# Patient Record
Sex: FEMALE | Race: WHITE | NOT HISPANIC OR LATINO | Employment: OTHER | URBAN - METROPOLITAN AREA
[De-identification: names, ages, dates, MRNs, and addresses within clinical notes are randomized per-mention and may not be internally consistent; named-entity substitution may affect disease eponyms.]

---

## 2018-09-27 ENCOUNTER — OFFICE VISIT CONVERTED (OUTPATIENT)
Dept: FAMILY MEDICINE CLINIC | Facility: CLINIC | Age: 63
End: 2018-09-27
Attending: NURSE PRACTITIONER

## 2018-09-27 ENCOUNTER — CONVERSION ENCOUNTER (OUTPATIENT)
Dept: FAMILY MEDICINE CLINIC | Facility: CLINIC | Age: 63
End: 2018-09-27

## 2019-01-08 ENCOUNTER — HOSPITAL ENCOUNTER (OUTPATIENT)
Dept: GASTROENTEROLOGY | Facility: HOSPITAL | Age: 64
Setting detail: HOSPITAL OUTPATIENT SURGERY
Discharge: HOME | End: 2019-01-08
Attending: INTERNAL MEDICINE

## 2019-01-15 ENCOUNTER — OFFICE VISIT CONVERTED (OUTPATIENT)
Dept: FAMILY MEDICINE CLINIC | Facility: CLINIC | Age: 64
End: 2019-01-15
Attending: NURSE PRACTITIONER

## 2019-01-15 ENCOUNTER — HOSPITAL ENCOUNTER (OUTPATIENT)
Dept: FAMILY MEDICINE CLINIC | Facility: CLINIC | Age: 64
Discharge: HOME | End: 2019-01-15

## 2019-01-15 LAB
ALBUMIN SERPL-MCNC: 4.5 G/DL (ref 3.5–5)
ALBUMIN/GLOB SERPL: 1.6 {RATIO} (ref 1.4–2.6)
ALP SERPL-CCNC: 109 U/L (ref 43–160)
ALT SERPL-CCNC: 13 U/L (ref 10–40)
ANION GAP SERPL CALC-SCNC: 19 MMOL/L (ref 8–19)
AST SERPL-CCNC: 18 U/L (ref 15–50)
BASOPHILS # BLD AUTO: 0.02 10*3/UL (ref 0–0.2)
BASOPHILS NFR BLD AUTO: 0.22 % (ref 0–3)
BILIRUB SERPL-MCNC: 0.56 MG/DL (ref 0.2–1.3)
BUN SERPL-MCNC: 15 MG/DL (ref 5–25)
BUN/CREAT SERPL: 21 {RATIO} (ref 6–20)
CALCIUM SERPL-MCNC: 9.3 MG/DL (ref 8.7–10.4)
CHLORIDE SERPL-SCNC: 101 MMOL/L (ref 99–111)
CHOLEST SERPL-MCNC: 165 MG/DL (ref 107–200)
CHOLEST/HDLC SERPL: 3.8 {RATIO} (ref 3–6)
CONV CO2: 25 MMOL/L (ref 22–32)
CONV TOTAL PROTEIN: 7.4 G/DL (ref 6.3–8.2)
CREAT UR-MCNC: 0.7 MG/DL (ref 0.5–0.9)
EOSINOPHIL # BLD AUTO: 0.09 10*3/UL (ref 0–0.7)
EOSINOPHIL # BLD AUTO: 1.01 % (ref 0–7)
ERYTHROCYTE [DISTWIDTH] IN BLOOD BY AUTOMATED COUNT: 12.6 % (ref 11.5–14.5)
GFR SERPLBLD BASED ON 1.73 SQ M-ARVRAT: >60 ML/MIN/{1.73_M2}
GLOBULIN UR ELPH-MCNC: 2.9 G/DL (ref 2–3.5)
GLUCOSE SERPL-MCNC: 93 MG/DL (ref 65–99)
HBA1C MFR BLD: 14.1 G/DL (ref 12–16)
HCT VFR BLD AUTO: 42.3 % (ref 37–47)
HDLC SERPL-MCNC: 44 MG/DL (ref 40–60)
LDLC SERPL CALC-MCNC: 80 MG/DL (ref 70–100)
LYMPHOCYTES # BLD AUTO: 2.02 10*3/UL (ref 1–5)
MCH RBC QN AUTO: 30.5 PG (ref 27–31)
MCHC RBC AUTO-ENTMCNC: 33.4 G/DL (ref 33–37)
MCV RBC AUTO: 91.3 FL (ref 81–99)
MONOCYTES # BLD AUTO: 0.85 10*3/UL (ref 0.2–1.2)
MONOCYTES NFR BLD AUTO: 9.57 % (ref 3–10)
NEUTROPHILS # BLD AUTO: 5.93 10*3/UL (ref 2–8)
NEUTROPHILS NFR BLD AUTO: 66.5 % (ref 30–85)
NRBC BLD AUTO-RTO: 0 % (ref 0–0.01)
OSMOLALITY SERPL CALC.SUM OF ELEC: 293 MOSM/KG (ref 273–304)
PLATELET # BLD AUTO: 285 10*3/UL (ref 130–400)
PMV BLD AUTO: 7.1 FL (ref 7.4–10.4)
POTASSIUM SERPL-SCNC: 3.9 MMOL/L (ref 3.5–5.3)
RBC # BLD AUTO: 4.63 10*6/UL (ref 4.2–5.4)
SODIUM SERPL-SCNC: 141 MMOL/L (ref 135–147)
T4 FREE SERPL-MCNC: 1.2 NG/DL (ref 0.9–1.8)
TRIGL SERPL-MCNC: 204 MG/DL (ref 40–150)
TSH SERPL-ACNC: 0.71 M[IU]/L (ref 0.27–4.2)
VARIANT LYMPHS NFR BLD MANUAL: 22.7 % (ref 20–45)
VLDLC SERPL-MCNC: 41 MG/DL (ref 5–37)
WBC # BLD AUTO: 8.91 10*3/UL (ref 4.8–10.8)

## 2019-01-17 LAB — BACTERIA SPEC AEROBE CULT: NORMAL

## 2019-01-23 ENCOUNTER — OFFICE VISIT CONVERTED (OUTPATIENT)
Dept: FAMILY MEDICINE CLINIC | Facility: CLINIC | Age: 64
End: 2019-01-23
Attending: NURSE PRACTITIONER

## 2019-01-29 ENCOUNTER — OFFICE VISIT CONVERTED (OUTPATIENT)
Dept: SURGERY | Facility: CLINIC | Age: 64
End: 2019-01-29
Attending: SURGERY

## 2019-01-29 ENCOUNTER — OFFICE VISIT CONVERTED (OUTPATIENT)
Dept: FAMILY MEDICINE CLINIC | Facility: CLINIC | Age: 64
End: 2019-01-29
Attending: NURSE PRACTITIONER

## 2019-01-29 ENCOUNTER — CONVERSION ENCOUNTER (OUTPATIENT)
Dept: FAMILY MEDICINE CLINIC | Facility: CLINIC | Age: 64
End: 2019-01-29

## 2019-02-22 ENCOUNTER — OFFICE VISIT CONVERTED (OUTPATIENT)
Dept: FAMILY MEDICINE CLINIC | Facility: CLINIC | Age: 64
End: 2019-02-22
Attending: NURSE PRACTITIONER

## 2019-03-22 ENCOUNTER — HOSPITAL ENCOUNTER (OUTPATIENT)
Dept: MAMMOGRAPHY | Facility: HOSPITAL | Age: 64
Discharge: HOME | End: 2019-03-22

## 2019-05-08 ENCOUNTER — HOSPITAL ENCOUNTER (OUTPATIENT)
Dept: SURGERY | Facility: HOSPITAL | Age: 64
Setting detail: HOSPITAL OUTPATIENT SURGERY
Discharge: HOME | End: 2019-05-08
Attending: SURGERY

## 2019-05-30 ENCOUNTER — OFFICE VISIT CONVERTED (OUTPATIENT)
Dept: SURGERY | Facility: CLINIC | Age: 64
End: 2019-05-30
Attending: SURGERY

## 2019-06-26 ENCOUNTER — CONVERSION ENCOUNTER (OUTPATIENT)
Dept: FAMILY MEDICINE CLINIC | Facility: CLINIC | Age: 64
End: 2019-06-26

## 2019-06-26 ENCOUNTER — OFFICE VISIT CONVERTED (OUTPATIENT)
Dept: FAMILY MEDICINE CLINIC | Facility: CLINIC | Age: 64
End: 2019-06-26
Attending: NURSE PRACTITIONER

## 2019-10-10 ENCOUNTER — HOSPITAL ENCOUNTER (OUTPATIENT)
Dept: FAMILY MEDICINE CLINIC | Facility: CLINIC | Age: 64
Discharge: HOME | End: 2019-10-10
Attending: NURSE PRACTITIONER

## 2019-10-10 ENCOUNTER — CONVERSION ENCOUNTER (OUTPATIENT)
Dept: FAMILY MEDICINE CLINIC | Facility: CLINIC | Age: 64
End: 2019-10-10

## 2019-10-10 ENCOUNTER — OFFICE VISIT CONVERTED (OUTPATIENT)
Dept: FAMILY MEDICINE CLINIC | Facility: CLINIC | Age: 64
End: 2019-10-10
Attending: NURSE PRACTITIONER

## 2019-10-10 LAB
25(OH)D3 SERPL-MCNC: 39.5 NG/ML (ref 30–100)
ALBUMIN SERPL-MCNC: 4.2 G/DL (ref 3.5–5)
ALBUMIN/GLOB SERPL: 1.4 {RATIO} (ref 1.4–2.6)
ALP SERPL-CCNC: 104 U/L (ref 43–160)
ALT SERPL-CCNC: 18 U/L (ref 10–40)
ANION GAP SERPL CALC-SCNC: 19 MMOL/L (ref 8–19)
AST SERPL-CCNC: 22 U/L (ref 15–50)
BASOPHILS # BLD AUTO: 0.02 10*3/UL (ref 0–0.2)
BASOPHILS NFR BLD AUTO: 0.4 % (ref 0–3)
BILIRUB SERPL-MCNC: 0.62 MG/DL (ref 0.2–1.3)
BUN SERPL-MCNC: 14 MG/DL (ref 5–25)
BUN/CREAT SERPL: 20 {RATIO} (ref 6–20)
CALCIUM SERPL-MCNC: 9.2 MG/DL (ref 8.7–10.4)
CHLORIDE SERPL-SCNC: 101 MMOL/L (ref 99–111)
CHOLEST SERPL-MCNC: 179 MG/DL (ref 107–200)
CHOLEST/HDLC SERPL: 4.3 {RATIO} (ref 3–6)
CONV ABS IMM GRAN: 0.01 10*3/UL (ref 0–0.2)
CONV CO2: 22 MMOL/L (ref 22–32)
CONV IMMATURE GRAN: 0.2 % (ref 0–1.8)
CONV TOTAL PROTEIN: 7.2 G/DL (ref 6.3–8.2)
CREAT UR-MCNC: 0.7 MG/DL (ref 0.5–0.9)
DEPRECATED RDW RBC AUTO: 47.4 FL (ref 36.4–46.3)
EOSINOPHIL # BLD AUTO: 0.07 10*3/UL (ref 0–0.7)
EOSINOPHIL # BLD AUTO: 1.6 % (ref 0–7)
ERYTHROCYTE [DISTWIDTH] IN BLOOD BY AUTOMATED COUNT: 13.7 % (ref 11.7–14.4)
EST. AVERAGE GLUCOSE BLD GHB EST-MCNC: 111 MG/DL
GFR SERPLBLD BASED ON 1.73 SQ M-ARVRAT: >60 ML/MIN/{1.73_M2}
GLOBULIN UR ELPH-MCNC: 3 G/DL (ref 2–3.5)
GLUCOSE SERPL-MCNC: 110 MG/DL (ref 65–99)
HBA1C MFR BLD: 5.5 % (ref 3.5–5.7)
HCT VFR BLD AUTO: 41.8 % (ref 37–47)
HDLC SERPL-MCNC: 42 MG/DL (ref 40–60)
HGB BLD-MCNC: 13.3 G/DL (ref 12–16)
LDLC SERPL CALC-MCNC: 95 MG/DL (ref 70–100)
LYMPHOCYTES # BLD AUTO: 1.45 10*3/UL (ref 1–5)
LYMPHOCYTES NFR BLD AUTO: 32.4 % (ref 20–45)
MCH RBC QN AUTO: 29.7 PG (ref 27–31)
MCHC RBC AUTO-ENTMCNC: 31.8 G/DL (ref 33–37)
MCV RBC AUTO: 93.3 FL (ref 81–99)
MONOCYTES # BLD AUTO: 0.65 10*3/UL (ref 0.2–1.2)
MONOCYTES NFR BLD AUTO: 14.5 % (ref 3–10)
NEUTROPHILS # BLD AUTO: 2.28 10*3/UL (ref 2–8)
NEUTROPHILS NFR BLD AUTO: 50.9 % (ref 30–85)
NRBC CBCN: 0 % (ref 0–0.7)
OSMOLALITY SERPL CALC.SUM OF ELEC: 287 MOSM/KG (ref 273–304)
PLATELET # BLD AUTO: 252 10*3/UL (ref 130–400)
PMV BLD AUTO: 9.8 FL (ref 9.4–12.3)
POTASSIUM SERPL-SCNC: 3.9 MMOL/L (ref 3.5–5.3)
RBC # BLD AUTO: 4.48 10*6/UL (ref 4.2–5.4)
SODIUM SERPL-SCNC: 138 MMOL/L (ref 135–147)
TRIGL SERPL-MCNC: 212 MG/DL (ref 40–150)
VLDLC SERPL-MCNC: 42 MG/DL (ref 5–37)
WBC # BLD AUTO: 4.48 10*3/UL (ref 4.8–10.8)

## 2019-10-11 LAB
CONV HEPATITIS C AB WITH REFLEX TO CONFIRMATION: <0.1 S/CO RATIO (ref 0–0.9)
CONV HEPATITIS COMMENT: NORMAL

## 2019-11-20 ENCOUNTER — OFFICE VISIT CONVERTED (OUTPATIENT)
Dept: FAMILY MEDICINE CLINIC | Facility: CLINIC | Age: 64
End: 2019-11-20
Attending: NURSE PRACTITIONER

## 2020-01-31 ENCOUNTER — OFFICE VISIT CONVERTED (OUTPATIENT)
Dept: FAMILY MEDICINE CLINIC | Facility: CLINIC | Age: 65
End: 2020-01-31
Attending: NURSE PRACTITIONER

## 2020-01-31 ENCOUNTER — HOSPITAL ENCOUNTER (OUTPATIENT)
Dept: FAMILY MEDICINE CLINIC | Facility: CLINIC | Age: 65
Discharge: HOME | End: 2020-01-31
Attending: NURSE PRACTITIONER

## 2020-01-31 LAB
ALBUMIN SERPL-MCNC: 4.6 G/DL (ref 3.5–5)
ALBUMIN/GLOB SERPL: 1.4 {RATIO} (ref 1.4–2.6)
ALP SERPL-CCNC: 98 U/L (ref 43–160)
ALT SERPL-CCNC: 17 U/L (ref 10–40)
ANION GAP SERPL CALC-SCNC: 18 MMOL/L (ref 8–19)
AST SERPL-CCNC: 20 U/L (ref 15–50)
BASOPHILS # BLD AUTO: 0.03 10*3/UL (ref 0–0.2)
BASOPHILS NFR BLD AUTO: 0.3 % (ref 0–3)
BILIRUB SERPL-MCNC: 1.49 MG/DL (ref 0.2–1.3)
BUN SERPL-MCNC: 19 MG/DL (ref 5–25)
BUN/CREAT SERPL: 24 {RATIO} (ref 6–20)
CALCIUM SERPL-MCNC: 10.2 MG/DL (ref 8.7–10.4)
CHLORIDE SERPL-SCNC: 102 MMOL/L (ref 99–111)
CHOLEST SERPL-MCNC: 191 MG/DL (ref 107–200)
CHOLEST/HDLC SERPL: 4.3 {RATIO} (ref 3–6)
CONV ABS IMM GRAN: 0.03 10*3/UL (ref 0–0.2)
CONV CO2: 24 MMOL/L (ref 22–32)
CONV IMMATURE GRAN: 0.3 % (ref 0–1.8)
CONV TOTAL PROTEIN: 7.8 G/DL (ref 6.3–8.2)
CREAT UR-MCNC: 0.78 MG/DL (ref 0.5–0.9)
DEPRECATED RDW RBC AUTO: 47 FL (ref 36.4–46.3)
EOSINOPHIL # BLD AUTO: 0.04 10*3/UL (ref 0–0.7)
EOSINOPHIL # BLD AUTO: 0.4 % (ref 0–7)
ERYTHROCYTE [DISTWIDTH] IN BLOOD BY AUTOMATED COUNT: 13.5 % (ref 11.7–14.4)
GFR SERPLBLD BASED ON 1.73 SQ M-ARVRAT: >60 ML/MIN/{1.73_M2}
GLOBULIN UR ELPH-MCNC: 3.2 G/DL (ref 2–3.5)
GLUCOSE SERPL-MCNC: 119 MG/DL (ref 65–99)
HCT VFR BLD AUTO: 48 % (ref 37–47)
HDLC SERPL-MCNC: 44 MG/DL (ref 40–60)
HGB BLD-MCNC: 15.3 G/DL (ref 12–16)
LDLC SERPL CALC-MCNC: 108 MG/DL (ref 70–100)
LYMPHOCYTES # BLD AUTO: 2.01 10*3/UL (ref 1–5)
LYMPHOCYTES NFR BLD AUTO: 21.4 % (ref 20–45)
MCH RBC QN AUTO: 29.8 PG (ref 27–31)
MCHC RBC AUTO-ENTMCNC: 31.9 G/DL (ref 33–37)
MCV RBC AUTO: 93.6 FL (ref 81–99)
MONOCYTES # BLD AUTO: 0.78 10*3/UL (ref 0.2–1.2)
MONOCYTES NFR BLD AUTO: 8.3 % (ref 3–10)
NEUTROPHILS # BLD AUTO: 6.5 10*3/UL (ref 2–8)
NEUTROPHILS NFR BLD AUTO: 69.3 % (ref 30–85)
NRBC CBCN: 0 % (ref 0–0.7)
OSMOLALITY SERPL CALC.SUM OF ELEC: 293 MOSM/KG (ref 273–304)
PLATELET # BLD AUTO: 321 10*3/UL (ref 130–400)
PMV BLD AUTO: 9.7 FL (ref 9.4–12.3)
POTASSIUM SERPL-SCNC: 4.1 MMOL/L (ref 3.5–5.3)
RBC # BLD AUTO: 5.13 10*6/UL (ref 4.2–5.4)
SODIUM SERPL-SCNC: 140 MMOL/L (ref 135–147)
TRIGL SERPL-MCNC: 194 MG/DL (ref 40–150)
TSH SERPL-ACNC: 0.79 M[IU]/L (ref 0.27–4.2)
VLDLC SERPL-MCNC: 39 MG/DL (ref 5–37)
WBC # BLD AUTO: 9.39 10*3/UL (ref 4.8–10.8)

## 2020-02-01 LAB — 25(OH)D3 SERPL-MCNC: 39.6 NG/ML (ref 30–100)

## 2020-02-07 ENCOUNTER — HOSPITAL ENCOUNTER (OUTPATIENT)
Dept: FAMILY MEDICINE CLINIC | Facility: CLINIC | Age: 65
Discharge: HOME | End: 2020-02-07
Attending: NURSE PRACTITIONER

## 2020-02-07 LAB
EST. AVERAGE GLUCOSE BLD GHB EST-MCNC: 123 MG/DL
FOLATE SERPL-MCNC: >20 NG/ML (ref 4.8–20)
HBA1C MFR BLD: 5.9 % (ref 3.5–5.7)
VIT B12 SERPL-MCNC: 354 PG/ML (ref 211–911)

## 2020-05-26 ENCOUNTER — OFFICE VISIT CONVERTED (OUTPATIENT)
Dept: SURGERY | Facility: CLINIC | Age: 65
End: 2020-05-26
Attending: NURSE PRACTITIONER

## 2020-05-26 ENCOUNTER — CONVERSION ENCOUNTER (OUTPATIENT)
Dept: SURGERY | Facility: CLINIC | Age: 65
End: 2020-05-26

## 2020-06-09 ENCOUNTER — HOSPITAL ENCOUNTER (OUTPATIENT)
Dept: MAMMOGRAPHY | Facility: HOSPITAL | Age: 65
Discharge: HOME | End: 2020-06-09
Attending: NURSE PRACTITIONER

## 2020-06-10 LAB — SARS-COV-2 RNA SPEC QL NAA+PROBE: NOT DETECTED

## 2020-06-12 ENCOUNTER — HOSPITAL ENCOUNTER (OUTPATIENT)
Dept: GASTROENTEROLOGY | Facility: HOSPITAL | Age: 65
Setting detail: HOSPITAL OUTPATIENT SURGERY
Discharge: HOME | End: 2020-06-12
Attending: SURGERY

## 2020-06-18 ENCOUNTER — OFFICE VISIT CONVERTED (OUTPATIENT)
Dept: SURGERY | Facility: CLINIC | Age: 65
End: 2020-06-18
Attending: SURGERY

## 2020-09-11 ENCOUNTER — OFFICE VISIT CONVERTED (OUTPATIENT)
Dept: PSYCHIATRY | Facility: CLINIC | Age: 65
End: 2020-09-11
Attending: STUDENT IN AN ORGANIZED HEALTH CARE EDUCATION/TRAINING PROGRAM

## 2020-09-11 ENCOUNTER — CONVERSION ENCOUNTER (OUTPATIENT)
Dept: PSYCHIATRY | Facility: CLINIC | Age: 65
End: 2020-09-11

## 2020-09-16 ENCOUNTER — OFFICE VISIT CONVERTED (OUTPATIENT)
Dept: FAMILY MEDICINE CLINIC | Facility: CLINIC | Age: 65
End: 2020-09-16
Attending: NURSE PRACTITIONER

## 2020-09-25 ENCOUNTER — OFFICE VISIT CONVERTED (OUTPATIENT)
Dept: PSYCHIATRY | Facility: CLINIC | Age: 65
End: 2020-09-25
Attending: STUDENT IN AN ORGANIZED HEALTH CARE EDUCATION/TRAINING PROGRAM

## 2020-09-28 ENCOUNTER — HOSPITAL ENCOUNTER (OUTPATIENT)
Dept: OTHER | Facility: HOSPITAL | Age: 65
Discharge: HOME | End: 2020-09-28
Attending: STUDENT IN AN ORGANIZED HEALTH CARE EDUCATION/TRAINING PROGRAM

## 2020-10-16 ENCOUNTER — OFFICE VISIT CONVERTED (OUTPATIENT)
Dept: PSYCHIATRY | Facility: CLINIC | Age: 65
End: 2020-10-16
Attending: STUDENT IN AN ORGANIZED HEALTH CARE EDUCATION/TRAINING PROGRAM

## 2020-11-02 ENCOUNTER — OFFICE VISIT CONVERTED (OUTPATIENT)
Dept: PSYCHIATRY | Facility: CLINIC | Age: 65
End: 2020-11-02
Attending: STUDENT IN AN ORGANIZED HEALTH CARE EDUCATION/TRAINING PROGRAM

## 2020-11-24 ENCOUNTER — HOSPITAL ENCOUNTER (OUTPATIENT)
Dept: FAMILY MEDICINE CLINIC | Facility: CLINIC | Age: 65
Discharge: HOME | End: 2020-11-24
Attending: NURSE PRACTITIONER

## 2020-11-24 ENCOUNTER — OFFICE VISIT CONVERTED (OUTPATIENT)
Dept: FAMILY MEDICINE CLINIC | Facility: CLINIC | Age: 65
End: 2020-11-24
Attending: NURSE PRACTITIONER

## 2020-11-29 LAB — SARS-COV-2 RNA SPEC QL NAA+PROBE: NOT DETECTED

## 2020-12-02 ENCOUNTER — TELEMEDICINE CONVERTED (OUTPATIENT)
Dept: PSYCHIATRY | Facility: CLINIC | Age: 65
End: 2020-12-02
Attending: STUDENT IN AN ORGANIZED HEALTH CARE EDUCATION/TRAINING PROGRAM

## 2021-01-14 ENCOUNTER — TELEMEDICINE CONVERTED (OUTPATIENT)
Dept: PSYCHIATRY | Facility: CLINIC | Age: 66
End: 2021-01-14
Attending: STUDENT IN AN ORGANIZED HEALTH CARE EDUCATION/TRAINING PROGRAM

## 2021-02-02 ENCOUNTER — TELEMEDICINE CONVERTED (OUTPATIENT)
Dept: PSYCHIATRY | Facility: CLINIC | Age: 66
End: 2021-02-02
Attending: STUDENT IN AN ORGANIZED HEALTH CARE EDUCATION/TRAINING PROGRAM

## 2021-02-12 ENCOUNTER — HOSPITAL ENCOUNTER (OUTPATIENT)
Dept: OTHER | Facility: HOSPITAL | Age: 66
Discharge: HOME | End: 2021-02-12
Attending: STUDENT IN AN ORGANIZED HEALTH CARE EDUCATION/TRAINING PROGRAM

## 2021-02-12 LAB
ANION GAP SERPL CALC-SCNC: 15 MMOL/L (ref 8–19)
BUN SERPL-MCNC: 15 MG/DL (ref 5–25)
BUN/CREAT SERPL: 17 {RATIO} (ref 6–20)
CALCIUM SERPL-MCNC: 10.1 MG/DL (ref 8.7–10.4)
CHLORIDE SERPL-SCNC: 102 MMOL/L (ref 99–111)
CONV CO2: 26 MMOL/L (ref 22–32)
CREAT UR-MCNC: 0.87 MG/DL (ref 0.5–0.9)
GFR SERPLBLD BASED ON 1.73 SQ M-ARVRAT: >60 ML/MIN/{1.73_M2}
GLUCOSE SERPL-MCNC: 96 MG/DL (ref 65–99)
OSMOLALITY SERPL CALC.SUM OF ELEC: 289 MOSM/KG (ref 273–304)
POTASSIUM SERPL-SCNC: 4.2 MMOL/L (ref 3.5–5.3)
SODIUM SERPL-SCNC: 139 MMOL/L (ref 135–147)

## 2021-03-26 ENCOUNTER — TELEMEDICINE CONVERTED (OUTPATIENT)
Dept: PSYCHIATRY | Facility: CLINIC | Age: 66
End: 2021-03-26
Attending: STUDENT IN AN ORGANIZED HEALTH CARE EDUCATION/TRAINING PROGRAM

## 2021-05-10 NOTE — H&P
History and Physical      Patient Name: Shabana Ferguson   Patient ID: [id number]   Sex: Female   YOB: 1955    Primary Care Provider: Kvng SALAZAR   Referring Provider: Kvng SALAZAR    Visit Date: May 26, 2020    Provider: MARIE Ann   Location[facility]   Location Address: [address] [place] [address]  [id number]   Location Phone: [phone]          Chief Complaint  · Requesting colonoscopy  · Age 50 or over  · FH of colon cancer  · Here today for a pre-surgical colon screening visit  · Personal History of Polyps      History Of Present Illness  The patient is a 64 year old /White female presenting [facility] the [facility] on a referral from Kvng SALAZAR.   Shabana Ferguson needs to have a screening colonoscopy.   Patient states that they have had a colonoscopy. 1 year ago   Patient currently complains of: no complaints   Patient Does have family history of colon cancer. Sibling      Patient presents today on a referral from Malorie Simental for screening colonoscopy.  Patient presents without complaints today.  She denies any abdominal pain, diarrhea, or rectal bleeding.  She admits to a family history of colon cancer with her brother.  Patient also with a has a history of high-grade dysplasia.    5/2019 - Colonoscopy (Александр): sigmoid- Inflammatory polyp; cecum - Tubulovillous adenoma; diverticulosis.     1/2019 - Colonoscopy (Jerome): Cecum - tubular adenoma with high grade dysplasia; Ascending - tubular adenoma; Sigmoid - 2 Inflammatory polyp with surface erosion/ulceration; Diverticulosis.       Past Medical History  Disease Name Date Onset Notes   Anxiety --  --    Depression --  --    History of fracture of patella 9- ORIF- Left   Hyperlipemia --  --    Osteopenia --  --    Ulcer --  teenager         Past Surgical History  Procedure Name Date Notes   Colonoscopy 2008, 1-8-2019 --    Knee surgery 2017  broken knee         Medication List  Name Date Started Instructions   amitriptyline 25 mg oral tablet 2019 TAKE 1 TABLET (25 MG) BY ORAL ROUTE ONCE DAILY AT BEDTIME   atorvastatin 10 mg oral tablet 10/10/2019 take 1 tablet by oral route daily for 90 days   Calcium 500 + D 500 mg(1,250mg) -200 unit oral tablet 10/10/2019 take 1 tablet by oral route 2 times a day for 90 days   garlic 1,000 mg oral capsule  take 1 capsule by oral route daily   Glucosamine 500 mg oral tablet  take 1 tablet by oral route daily   Omega-3 Fish Oil 300-1,000 mg oral capsule 10/11/2019 take 1 capsule by oral route 3 times a day for 90 days   turmeric root extract 500 mg oral capsule  take 1 capsule by oral route daily   Viibryd 10 mg oral tablet  take 1 tablet (10 mg) by oral route once daily with food   Vitamin D3 1,000 unit oral capsule  take 1 capsule by oral route daily   vitamin E 400 unit oral capsule  take 1 capsule by oral route daily   Women's Multivitamin 18 mg iron-400 mcg-500 mg oral tablet  take 1 tablet by oral route daily   Xanax 0.25 mg oral tablet  take 1 tablet by oral route 2 times a day         Allergy List  Allergen Name Date Reaction Notes   NO KNOWN DRUG ALLERGIES --  --  --        Allergies Reconciled  Family Medical History  Disease Name Relative/Age Notes   Stroke Mother/   Mother   Heart Disease Mother/   Mother   Cancer, Unspecified Brother/   Brother-colon-50's, uncle- colon, 80's         Reproductive History  Menstrual   Age Menopause: 44   Pregnancy Summary   Total Pregnancies: 7 Full Term: 6 Premature: 0   Ab Induced: 0 Ab Spontaneous: 0 Ectopics: 0   Multiples: 0 Livin         Social History  Finding Status Start/Stop Quantity Notes   Alcohol Use Never --/-- --  does not drink   lives with spouse --  --/-- --  --    Recreational Drug Use Never --/-- --  no   Tobacco Never --/-- --  never smoker         Review of Systems  · Constitutional  o Denies  o : fever, chills  · Eyes  o Denies  o : yellowish  "discoloration of eyes  · HENT  o Denies  o : difficulty swallowing  · Cardiovascular  o Denies  o : chest pain, chest pain on exertion  · Respiratory  o Denies  o : shortness of breath  · Gastrointestinal  o Denies  o : nausea, vomiting, diarrhea, constipation  · Genitourinary  o Denies  o : abnormal color of urine  · Integument  o Denies  o : rash  · Neurologic  o Denies  o : tingling or numbness  · Musculoskeletal  o Denies  o : joint pain  · Endocrine  o Denies  o : weight gain, weight loss      Vitals  Date Time BP Position Site L\R Cuff Size HR RR TEMP (F) WT  HT  BMI kg/m2 BSA m2 O2 Sat HC       05/26/2020 09:10 AM       16  160lbs 8oz 5'  4\" 27.55 1.81           Physical Examination  · Constitutional  o Appearance  o : well developed, well-nourished, patient in no apparent distress  · Head and Face  o Head  o :   § Inspection  § : atraumatic, normocephalic  o Face  o :   § Inspection  § : no facial lesions  · Eyes  o Conjunctivae  o : conjunctivae normal  o Sclerae  o : sclerae white  · Neck  o Inspection/Palpation  o : normal appearance, no masses or tenderness, trachea midline  · Respiratory  o Respiratory Effort  o : breathing unlabored  · Skin and Subcutaneous Tissue  o General Inspection  o : no lesions present, no areas of discoloration, skin turgor normal, texture normal  · Neurologic  o Mental Status Examination  o :   § Orientation  § : grossly oriented to person, place and time  § Attention  § : attention normal, concentration abilities normal  § Fund of Knowledge  § : fund of knowledge within normal limits, patient aware of current events  o Gait and Station  o : normal gait, able to stand without difficulty  · Psychiatric  o Judgement and Insight  o : judgment and insight intact  o Mood and Affect  o : mood normal, affect appropriate     Abdomen: Soft, non-distended, non-tender, no guarding; No rebound tenderness noted.           Assessment  · Family History of Colon " Cancer     V16.0/Z80.0  · Personal history of colonic polyps     V12.72/Z86.010  · Screening for colon cancer     V76.51/Z12.11  · High grade dysplasia in colonic adenoma     211.3/D12.6  · Pre-op testing     V72.84/Z01.818      Plan  · Orders  o Consent for Colonoscopy Screening-Possible risk/complications, benefits, and alternatives to surgical or invasive procedure have been explained to patient and/or legal guardian. -Patient has been evaluated and can tolerate anesthesia and/or sedation. Risks, benefits, and alternatives to anesthesia and sedation have been explained to patient and/or legal guardian. () - V76.51/Z12.11, V16.0/Z80.0, 211.3/D12.6, V12.72/Z86.010 - 06/02/2020  o [facility] Pre-Op Covid-19 Screening (58999) - V72.84/Z01.818 - 05/29/2020  · Instructions  o Surgical Facility: [facility]  o Handouts Provided Pre-Procedure Instructions including date, time, and location of procedure.   o PLAN: Proceeed with colonoscopy. Patient understands risks/benefits and is willing to proceed.   o ***Surgical Orders***  o RISK AND BENEFITS:  o Given these options, the patient has verbally expressed an understanding of the risks of the surgery and finds these risks acceptable. Will proceed with surgery as soon as possible.  o O.R. PREP: Per protocol   o IV: Per Anesthesia  o Please sign permit for: Colonoscopy with possible biopsies by Dr. Fuller.  o The above History and Physical Examination has been completed within 30 days of admission.  o ***Patient Status***  o Outpatient  o Follow up in the in the office post procedure.  o I have discussed COVID testing preoperatively and self isolating until colonoscopy. Patient verbalizes understanding and is agreeable to proceeding with plan.  o Electronically Identified Patient Education Materials Provided Electronically  · Disposition  o Call or Return if symptoms worsen or persist.            Electronically Signed by: MARIE Ann -Author on May 26,  2020 10:44:27 AM

## 2021-05-10 NOTE — H&P
"   History and Physical      Patient Name: Shabana Ferguson   Patient ID: [id number]   Sex: Female   YOB: 1955    Primary Care Provider: Kvng SALAZAR   Referring Provider: Kvng SALAZAR    Visit Date: September 11, 2020    Provider: Vinny Krishnamurthy MD   Location: [facility]   Location Address: [address] [place] [address]   Location Phone: [phone]          Chief Complaint     Patient presents for medication management for anxiety and depression    \"I've been seeing a psychiatrist out here for about one and a half years.\"       History Of Present Illness  Shabana Ferguson is a 64 year old /White female, hx of anxiety and depression, fractured patella, HLD, osteopenia, ulcer who presents to the office today referred by REFERRING CARE PROVIDER NAME for anxiety and depression management.   Psychotropic medications: amitriptyline 25 mg QHS,Celexa 20 mg p.o. daily, lorazepam 0.5 p.o. twice daily as needed anxiety, propranolol 10 mg 1 p.o. daily as needed tachycardia, Topamax 50 mg p.o. twice daily.   Patient presented today with her . Both provided extensive history regarding her depression and anxiety. Patient had been seen by  at [facility] for roughly a year and a half. Patient reported that Dr. Gordon had become convinced that she had bipolar disorder, which both she and her  disagree with. Patient and  deny any episodes in the past where she experienced, for an extended length of time lasting at least several days, no need for sleep, rapid speech, risk-taking behaviors. Per the  and wife pair, her previous psychiatrist insisted on her being on a mood stabilizer.   Patient reports that she had been stable on Celexa for \"several years.\" In 2019, January, the patient underwent a colonoscopy where dysplasia was revealed. This led to significant anxiety and a \"breakdown.\" The dysplasia was " subsequently removed. Due to the heightened anxiety the patient's psychiatrist took the patient off of Celexa and started her on Lexapro. The patient did not tolerate Lexapro well, she continued to remain anxious, she began to engage in therapy in addition to medication management. The Lexapro was subsequently switched to Viibryd. The Viibryd did not work. The patient also began to experience panic attacks including shortness of breath, crying, tachycardia, palpitations. The panic attacks were new and had never happened before.   In 2020 the patient experienced another breakdown and sunk into a deeper depression. The COVID-19 pandemic only worsened her situation. In February the patient's , a physician, decided to become her full-time caretaker as she would become anxious and panicky without him present. The patient also experienced intermittent bouts of suicidal ideation.   Recently, the patient and  demanded of their psychiatrist to take her off of Viibryd and Seroquel. They actually tapered her off of Seroquel themselves. They asked him to switch her back to Celexa. She has been on Celexa now for the last 3 days and is already begun to experience improvement. She states that the Seroquel led to having hallucinations and actually worsened insomnia. Last night she did not take Seroquel for the first time in many months and she slept very well. Her mood is slightly improved. Regarding her anxiety, she endorses muscle tension and fatigue restlessness irritability and a history of insomnia. Regarding her depression she denies SI HI AVH, denies anhedonia denies guilt, notes some decreased energy, psychomotor retardation, and a history of insomnia. They had also tried acupuncture in the past with some success. Psychiatric review of systems is negative for psychosis nancy, positive for anxiety and depression. Possibly positive for  depression and postpartum depression. The patient is  medication compliant.   Past Psychiatric History:  Began Psychiatric Treatment: Several years   Dx: Depression and anxiety   Psychiatrist: Doctor Gordon for the last year and a half   Therapist: Sees a therapist at [facility] History: Denies, Although she came close to needing admission recently.   Medication Trials: See HPI. Seroquel, Prozac which was too activating, Zoloft which was too activating, Celexa which worked well, Effexor which worked well, Lexapro which did not work, Viibryd which did not work.   Self-Harm: Denies   Suicide Attempts: Denies   OB/GYN HISTORY:  Sexually Active: OB/GYN history deferred   Substance Abuse History:  Types: Denies all, including illicit   Social History:  Marital Status:    Employed: No     Kids: 4   House: House    Hx: Denies   Family History:  Suicide Attempts: Deferred today, Due to lack of time   Suicide Completions: Deferred   Substance Use: Deferred   Psychiatric Conditions: Deferred    depression, psychosis, anxiety: Deferred   Developmental History:  Born: Deferred   Siblings: Deferred   Access to Weapons: Denies   Thought Content: no suicidal ideation, no homicidal ideation, no auditory hallucinations, no visual hallucinations, and       Past Medical History  Disease Name Date Onset Notes   Anxiety --  --    Depression --  --    History of fracture of patella 2017 ORIF- Left   Hyperlipemia --  --    Osteopenia --  --    Screening Mammogram 2020- normal   Ulcer --  teenager         Past Surgical History  Procedure Name Date Notes   Colonoscopy , 2019 --    Knee surgery 2017 broken knee         Medication List  Name Date Started Instructions   amitriptyline 25 mg oral tablet 2019 TAKE 1 TABLET (25 MG) BY ORAL ROUTE ONCE DAILY AT BEDTIME   atorvastatin 10 mg oral tablet 2020 TAKE 1 TABLET BY MOUTH EVERY DAY   Calcium 500 + D 500 mg(1,250mg) -200 unit oral tablet 2020 take 1 tablet by oral  route 2 times a day for 90 days   citalopram 20 mg oral tablet  take 1 tablet (20 mg) by oral route once daily   garlic 1,000 mg oral capsule  take 1 capsule by oral route daily   Glucosamine 500 mg oral tablet  take 1 tablet by oral route daily   lorazepam 0.5 mg oral tablet  take 1 tablet (0.5 mg) by oral route 2 times per day   Omega-3 Fish Oil 300-1,000 mg oral capsule 10/11/2019 take 1 capsule by oral route 3 times a day for 90 days   propranolol 10 mg oral tablet  take 1 tablet by oral route As needed   topiramate 25 mg oral capsule, sprinkle  take 2 capsules (50 mg) by oral route 2 times per day in the morning and evening   turmeric root extract 500 mg oral capsule  take 1 capsule by oral route daily   Vitamin D3 1,000 unit oral capsule  take 1 capsule by oral route daily   vitamin E 400 unit oral capsule  take 1 capsule by oral route daily   Women's Multivitamin 18 mg iron-400 mcg-500 mg oral tablet  take 1 tablet by oral route daily   Xanax 0.25 mg oral tablet  take 1 tablet by oral route 2 times a day         Allergy List  Allergen Name Date Reaction Notes   NO KNOWN DRUG ALLERGIES --  --  --        Allergies Reconciled  Family Medical History  Disease Name Relative/Age Notes   Family history of cancer Brother/   --    Family history of stroke Mother/   --    Family history of heart disease Mother/   --          Reproductive History  Menstrual   Age Menopause: 44   Pregnancy Summary   Total Pregnancies: 7 Full Term: 6 Premature: 0   Ab Induced: 0 Ab Spontaneous: 0 Ectopics: 0   Multiples: 0 Livin         Social History  Finding Status Start/Stop Quantity Notes   Alcohol Use Never --/-- --  does not drink   lives with spouse --  --/-- --  --    Recreational Drug Use Never --/-- --  no   Tobacco Never --/-- --  never smoker         Immunizations  NameDate Admin Mfg Trade Name Lot Number Route Inj VIS Given VIS Publication   [id number]/10/2019 [facility] Fluzone Quadrivalent [id number] IM RD 10/10/2019   "  Comments: patient tolerated inj well   [id number]/27/2018 [facility] Fluzone Quadrivalent [id number] IM LD 09/27/2018 08/07/2015   Comments: pt tolerated well, left in stable condition   Prevnar 1301/31/2020 [facility] PREVNAR 13 [id number] IM RD 01/31/2020    Comments: pt tolerated inj well and left office in stable condition.         Review of Systems  · Constitutional  o Denies  o : fatigue, night sweats  · Eyes  o Denies  o : double vision, blurred vision  · HENT  o Denies  o : vertigo, recent head injury  · Breasts  o Denies  o : abnormal changes in breast size, additional breast symptoms except as noted in the HPI  · Cardiovascular  o Denies  o : chest pain, irregular heart beats  · Respiratory  o Denies  o : shortness of breath, productive cough  · Gastrointestinal  o Denies  o : nausea, vomiting  · Genitourinary  o Denies  o : dysuria, urinary retention  · Integument  o Denies  o : hair growth change, new skin lesions  · Neurologic  o Denies  o : altered mental status, seizures  · Musculoskeletal  o Denies  o : joint swelling, limitation of motion  · Endocrine  o Denies  o : cold intolerance, heat intolerance  · Psychiatric  o Admits  o : anxiety, depression, some hallucinations in the past attributed to Seroquel use; none before or since  · Heme-Lymph  o Denies  o : petechiae, lymph node enlargement or tenderness  · Allergic-Immunologic  o Denies  o : frequent illnesses      Vitals  Date Time BP Position Site L\R Cuff Size HR RR TEMP (F) WT  HT  BMI kg/m2 BSA m2 O2 Sat        09/11/2020 01:01 /72 Sitting    86 - R 20 97.1 156lbs 6oz 5'  4\" 26.84 1.79 99 %          Physical Examination  · Mental Status Exam  o Appearance  o : good eye contact, normal street clothes, groomed  o Behavior  o : pleasant and cooperative  o Motor  o : Some psychomotor agitation, fidgeting, handwringing specifically  o Speech  o : normal rhythm, rate, volume, tone, not hyperverbal, not pressured, normal prosidy, Speaks with an " accent  o Mood  o : mood normal  o Affect  o : Anxious, able to smile however  o Thought Content  o : negative suicidal ideations, negative homicidal ideations, negative obsessions  o Perceptions  o : negative auditory hallucinations, negative visual hallucinations  o Thought Process  o : linear  o Insight/Judgement  o : fair/fair  o Cognition  o : grossly intact  o Attention  o : intact          Assessment  · Screening for depression     V79.0/Z13.31  · Adverse drug effect     995.20/T88.7XXA  · Anxiety Disorder     300.00/F41.9  · Depressive Disorder     311/F32.9       Presentation most consistent with generalized anxiety disorder, and major depressive disorder, in partial remission, moderate to severe.    Given the history I have obtained today I do not believe the patient has bipolar disorder.  Patient will sign a release today to allow me to obtain the records from [facility] regarding her psychiatric care.  Patient has been switched to Celexa 20 mg and is already experiencing some improvement.  Now tapered off of Seroquel and last night, the first night she did not take Seroquel, she was able to sleep.  I do believe that the patient must have some sensitivity to medications, so we will be very prudent in the future to be very cautious about any medication changes.  I will continue the 25 mg of amitriptyline at this time.  I will also continue the Lorazepam as needed.  At this time I do not believe the patient has bipolar, so I have counseled the patient to reduce Topamax to 50 mg p.o. daily (down from 50 mg p.o. twice daily), for 1 week, and then discontinue the Topamax altogether.  I will see the patient back in 2 weeks, and at that time I will begin tapering her down from the amitriptyline, as I do not like her being on both Celexa and amitriptyline.I may consider adding another medication in its place, such as mirtazapine, after I have tapered her completely off of amitriptyline.    Patient is to continue  therapy.  I will closely follow her.     Problems Reconciled  Plan  · Orders  o ACO-39: Current medications updated and reviewed () - - 09/11/2020  · Medications  o Medications have been Reconciled  o Transition of Care or Provider Policy  · Instructions  o Risk Assessment: Risk of self-harm acutely is lowmoderate. Risk factors include a history of suicidal ideation, mood disorder, anxiety disorder, presence of psychosocial stressors such as colonic dysplasia, COVID-19 pandemic. Protective factors include no history of self-harm or suicide attempts, good social support, willingness to engage in care. Risk of self-harm chronically is also thought to be lowmoderate, but could be further elevated in the event of treatment noncompliance and/or AODA.  o Medications: Continue lorazepam, celexa, amitriptyline. REDUCE topiramax to 50 mg PO QDAY for seven days, then discontinue.  o Therapy: continue.  o Follow Up: two weeks.  · Disposition  o Follow Up in 2 weeks.  · Correspondence  o Behavioral Health Letter to Referring MD (Kvng SALAZAR) - 09/12/2020            Electronically Signed by: Vinny Krishnamurthy MD -Author on September 12, 2020 03:40:42 PM

## 2021-05-13 NOTE — PROGRESS NOTES
"   Progress Note      Patient Name: Shabana Ferguson   Patient ID: [id number]   Sex: Female   YOB: 1955    Primary Care Provider: Kvng SALAZAR   Referring Provider: Kvng SALAZAR    Visit Date: 2020    Provider: Vinny Krishnamurthy MD   Location: [facility]   Location Address: [address] [place] [address]  [id number]   Location Phone: [phone]          Chief Complaint     Pt presents for a 1 month follow up for anxiety    \"I am so grateful.\"           History Of Present Illness  Shabana Ferguson is a 64 year old /White female, hx of anxiety and depression, fractured patella, HLD, osteopenia, ulcer who presents to the office today referred by MARIE Brice, for anxiety and depression management.   Chart: Psychotropic medications: amitriptyline 25 mg QHS, Celexa 20 mg p.o. daily, lorazepam 0.5 p.o. twice daily as needed anxiety.   Audio visit via telephone due to technical difficulties with Zoom for 16 minutes due to the COVID-19 pandemic. Patient is accepting of and agreeable to visit. The visit consisted of the patient, her , and I. Interview: Patient's mood is \"good.\" Denies anxiety. States she is taking Celexa and Elavil, but has not taken lorazepam in basically a month. Feels her anxiety is well controlled. Having a routine has been helpful for her too. Feels she can go grocery shopping and look around \"in peace.\" She took Ativan once last week due to a bad reaction to the second shingles vaccine injection. COVID-19 and flu negative. Denies SI HI AVH.  concurs.   ...   ...   ...     Family History:  Suicide Attempts: Deferred   Suicide Completions: Deferred   Substance Use: Deferred   Psychiatric Conditions: Deferred    depression, psychosis, anxiety: Deferred   Developmental History:  Born: Deferred   Siblings: Deferred       Past Medical History  Disease Name Date Onset Notes   Anxiety --  --  "   Depression --  --    History of fracture of patella 9- ORIF- Left   Hyperlipemia --  --    Osteopenia --  --    Screening Mammogram 06/09/2020 06/09/2020- normal   Ulcer --  teenager         Past Surgical History  Procedure Name Date Notes   Colonoscopy 2008, 1-8-2019 --    Knee surgery 2017 broken knee         Medication List  Name Date Started Instructions   amitriptyline 25 mg oral tablet 02/18/2019 TAKE 1 TABLET (25 MG) BY ORAL ROUTE ONCE DAILY AT BEDTIME   ascorbic acid (vitamin C) 1,000 mg oral tablet  --    atorvastatin 10 mg oral tablet 09/01/2020 TAKE 1 TABLET BY MOUTH EVERY DAY   Calcium 500 + D 500 mg(1,250mg) -200 unit oral tablet 07/22/2020 take 1 tablet by oral route 2 times a day for 90 days   citalopram 20 mg oral tablet 09/25/2020 take 1 tablet (20 mg) by oral route once daily for 60 days   desonide 0.05 % topical cream 09/16/2020 apply sparingly and rub gently into the affected area(s) by topical route 2 times per day for 30 days   garlic 1,000 mg oral capsule  take 1 capsule by oral route daily   Glucosamine 500 mg oral tablet  take 1 tablet by oral route daily   lorazepam 0.5 mg oral tablet 09/25/2020 take 1 tablet (0.5 mg) by oral route 2 times per day for 60 days   Omega-3 Fish Oil 300-1,000 mg oral capsule 10/11/2019 take 1 capsule by oral route 3 times a day for 90 days   turmeric root extract 500 mg oral capsule  take 1 capsule by oral route daily   Vitamin D3 1,000 unit oral capsule  take 1 capsule by oral route daily   vitamin E 400 unit oral capsule  take 1 capsule by oral route daily   Women's Multivitamin 18 mg iron-400 mcg-500 mg oral tablet  take 1 tablet by oral route daily         Allergy List  Allergen Name Date Reaction Notes   NO KNOWN DRUG ALLERGIES --  --  --        Allergies Reconciled  Family Medical History  Disease Name Relative/Age Notes   Family history of cancer Brother/   --    Family history of stroke Mother/   --    Family history of heart disease Mother/    --          Reproductive History  Menstrual   Age Menopause: 44   Pregnancy Summary   Total Pregnancies: 7 Full Term: 6 Premature: 0   Ab Induced: 0 Ab Spontaneous: 0 Ectopics: 0   Multiples: 0 Livin         Social History  Finding Status Start/Stop Quantity Notes   Alcohol Use --  --/-- --  2020 - does not drink   lives with spouse --  --/-- --  --    Recreational Drug Use Never --/-- --  2020 - no   Tobacco Never --/-- --  never smoker         Immunizations  NameDate Admin Mfg Trade Name Lot Number Route Inj VIS Given VIS Publication   [id number]/10/2019 [facility] Fluzone Quadrivalent [id number] IM RD 10/10/2019    Comments: patient tolerated inj well   Prevnar 1302020 [facility] PREVNAR 13 [id number] IM RD 2020    Comments: pt tolerated inj well and left office in stable condition.         Review of Systems  · Constitutional  o Admits  o : night sweats  o Denies  o : fatigue  · Eyes  o Denies  o : double vision, blurred vision  · HENT  o Denies  o : vertigo, recent head injury  · Breasts  o Denies  o : abnormal changes in breast size, additional breast symptoms except as noted in the HPI  · Cardiovascular  o Denies  o : chest pain, irregular heart beats  · Respiratory  o Denies  o : shortness of breath, productive cough  · Gastrointestinal  o Denies  o : nausea, vomiting  · Genitourinary  o Denies  o : dysuria, urinary retention  · Integument  o Denies  o : hair growth change, new skin lesions  · Neurologic  o Denies  o : altered mental status, seizures  · Musculoskeletal  o Denies  o : joint swelling, limitation of motion  · Endocrine  o Denies  o : cold intolerance, heat intolerance  · Psychiatric  o Admits  o : anxiety, depression  · Heme-Lymph  o Denies  o : petechiae, lymph node enlargement or tenderness  · Allergic-Immunologic  o Denies  o : frequent illnesses      Physical Examination  · Mental Status Exam  o Appearance  o : Cannot assess  o Behavior  o : pleasant and cooperative  o Motor  o :  "Cannot assess  o Speech  o : normal rhythm, rate, volume, tone, not hyperverbal, not pressured, normal prosidy, Speaks with an accent  o Mood  o : \"Good\"  o Affect  o : Cannot assess, but patient sounds euthymic on the phone  o Thought Content  o : negative suicidal ideations, negative homicidal ideations, negative obsessions  o Perceptions  o : negative auditory hallucinations, negative visual hallucinations  o Thought Process  o : linear  o Insight/Judgement  o : fair/fair  o Cognition  o : grossly intact  o Attention  o : intact              Assessment  · Major depressive disorder, recurrent, in partial remission     296.35/F33.41  · Anxiety     300.02/F41.1       Presentation most consistent with major depressive disorder with anxious distress, in full remission.     Continued improvement.    Sleeping on the amitriptyline.  Consider doxepin, mirtazapine.    QTC per recent EKG is prolonged, but less than 500 ms.  Continue lorazepam, Celexa.  Continue therapy.  See back in 4 weeks.      See back in 2 months. Consider repeat BMP to monitor Na levels. Repeat EKG in 4 months. Consider discontinuing lorazepam.       Plan  · Orders  o ACO-39: Current medications updated and reviewed () - - 12/02/2020  · Medications  o citalopram 20 mg oral tablet   SIG: take 1 tablet (20 mg) by oral route once daily for 60 days   DISP: (60) Tablet with 1 refills  Prescribed on 12/02/2020     o amitriptyline 25 mg oral tablet   SIG: TAKE 1 TABLET (25 MG) BY ORAL ROUTE ONCE DAILY AT BEDTIME   DISP: (30) Tablet with 1 refills  Adjusted on 12/02/2020     o Medications have been Reconciled  o Transition of Care or Provider Policy  · Instructions  o Risk Assessment: Risk of self-harm acutely remain low. Risk factors include a history of suicidal ideation, mood disorder, anxiety disorder, presence of psychosocial stressors such as colonic dysplasia, COVID-19 pandemic. Protective factors include no history of self-harm or suicide " attempts, good social support, willingness to engage in care, improved depressive symptoms. Risk of self-harm chronically also remain low, but could be further elevated in the event of treatment noncompliance and/or AODA.  o Medications: Continue lorazepam 0.5 BID PRN, celexa 20 mg daily, amitriptyline 25 mg QHS.  o Therapy: continue.  o Labs/Studies: Consider BMP to monitor sodium levels. EKG in four months.  o Follow Up: 8 weeks.  · Disposition  o Follow Up in 2 months.            Electronically Signed by: Vinny Krishnamurthy MD -Author on December 2, 2020 01:59:30 PM

## 2021-05-13 NOTE — PROGRESS NOTES
"   Progress Note      Patient Name: Shabana Ferguson   Patient ID: [id number]   Sex: Female   YOB: 1955    Primary Care Provider: Kvng SALAZAR   Referring Provider: Kvng SALAZAR    Visit Date: September 25, 2020    Provider: Vinny Krishnamurthy MD   Location: [facility]   Location Address: [address] [place] [address]   Location Phone: [phone]          Chief Complaint       Patient is here for a 2 week follow up  Patient presents for medication management for anxiety and depression    \"I am better today than I was 2 weeks ago.\"           History Of Present Illness  Shabana Ferguson is a 64 year old /White female, hx of anxiety and depression, fractured patella, HLD, osteopenia, ulcer who presents to the office today referred by REFERRING CARE PROVIDER NAME for anxiety and depression management.   Psychotropic medications: amitriptyline 25 mg QHS, Celexa 20 mg p.o. daily, lorazepam 0.5 p.o. twice daily as needed anxiety, propranolol 10 mg 1 p.o. daily as needed tachycardia.   Patient accompanied by her  again today. Reports she is \"much better. I am more motivated, happier.\" Still reports having some anxiety which typically occurs early in the morning about 30 minutes after she wakes up. This is remedied easily with lorazepam 0.5 mg. Often times the patient does not take the evening dose of lorazepam. To manage her anxiety, besides taking medications, the patient also uses distraction, walking, activities such as mowing the lawn. She feels that therapy is somewhat helpful. She continues to note some muscle tension in her neck but she has been utilizing a chiropractor to help improve this. Acupuncture has also also been helpful. Sleeping well.   INPUT BOX   INPUT BOX   INPUT BOX     Family History:  Suicide Attempts: Deferred   Suicide Completions: Deferred   Substance Use: Deferred   Psychiatric Conditions: Deferred "    depression, psychosis, anxiety: Deferred   Developmental History:  Born: Deferred   Siblings: Deferred   Access to Weapons: Denies   Thought Content: no suicidal ideation, no homicidal ideation, no auditory hallucinations, no visual hallucinations, and       Past Medical History  Disease Name Date Onset Notes   Anxiety --  --    Depression --  --    History of fracture of patella 2017 ORIF- Left   Hyperlipemia --  --    Osteopenia --  --    Screening Mammogram 2020- normal   Ulcer --  teenager         Past Surgical History  Procedure Name Date Notes   Colonoscopy , 2019 --    Knee surgery 2017 broken knee         Medication List  Name Date Started Instructions   amitriptyline 25 mg oral tablet 2019 TAKE 1 TABLET (25 MG) BY ORAL ROUTE ONCE DAILY AT BEDTIME   ascorbic acid (vitamin C) 1,000 mg oral tablet  --    atorvastatin 10 mg oral tablet 2020 TAKE 1 TABLET BY MOUTH EVERY DAY   Calcium 500 + D 500 mg(1,250mg) -200 unit oral tablet 2020 take 1 tablet by oral route 2 times a day for 90 days   citalopram 20 mg oral tablet 2020 take 1 tablet (20 mg) by oral route once daily for 60 days   desonide 0.05 % topical cream 2020 apply sparingly and rub gently into the affected area(s) by topical route 2 times per day for 30 days   garlic 1,000 mg oral capsule  take 1 capsule by oral route daily   Glucosamine 500 mg oral tablet  take 1 tablet by oral route daily   lorazepam 0.5 mg oral tablet 2020 take 1 tablet (0.5 mg) by oral route 2 times per day for 60 days   Omega-3 Fish Oil 300-1,000 mg oral capsule 10/11/2019 take 1 capsule by oral route 3 times a day for 90 days   propranolol 10 mg oral tablet  take 1 tablet by oral route As needed   turmeric root extract 500 mg oral capsule  take 1 capsule by oral route daily   Vitamin D3 1,000 unit oral capsule  take 1 capsule by oral route daily   vitamin E 400 unit oral capsule  take 1 capsule by oral  route daily   Women's Multivitamin 18 mg iron-400 mcg-500 mg oral tablet  take 1 tablet by oral route daily         Allergy List  Allergen Name Date Reaction Notes   NO KNOWN DRUG ALLERGIES --  --  --          Family Medical History  Disease Name Relative/Age Notes   Family history of cancer Brother/   --    Family history of stroke Mother/   --    Family history of heart disease Mother/   --          Reproductive History  Menstrual   Age Menopause: 44   Pregnancy Summary   Total Pregnancies: 7 Full Term: 6 Premature: 0   Ab Induced: 0 Ab Spontaneous: 0 Ectopics: 0   Multiples: 0 Livin         Social History  Finding Status Start/Stop Quantity Notes   Alcohol Use --  --/-- --  does not drink   lives with spouse --  --/-- --  --    Recreational Drug Use Never --/-- --  no   Tobacco Never --/-- --  never smoker         Immunizations  NameDate Admin Mfg Trade Name Lot Number Route Inj VIS Given VIS Publication   [id number]/10/2019 [facility] Fluzone Quadrivalent [id number] IM RD 10/10/2019    Comments: patient tolerated inj well   Ezhszkvmd42/27/2018 [facility] Fluzone Quadrivalent [id number] IM LD 2018   Comments: pt tolerated well, left in stable condition   Prevnar 1302020 [facility] PREVNAR 13 [id number] IM RD 2020    Comments: pt tolerated inj well and left office in stable condition.         Review of Systems  · Constitutional  o Admits  o : fatigue  o Denies  o : night sweats  · Eyes  o Denies  o : double vision, blurred vision  · HENT  o Denies  o : vertigo, recent head injury  · Breasts  o Denies  o : abnormal changes in breast size, additional breast symptoms except as noted in the HPI  · Cardiovascular  o Denies  o : chest pain, irregular heart beats  · Respiratory  o Denies  o : shortness of breath, productive cough  · Gastrointestinal  o Denies  o : nausea, vomiting  · Genitourinary  o Denies  o : dysuria, urinary retention  · Integument  o Denies  o : hair growth change, new skin  "lesions  · Neurologic  o Denies  o : altered mental status, seizures  · Musculoskeletal  o Denies  o : joint swelling, limitation of motion  · Endocrine  o Denies  o : cold intolerance, heat intolerance  · Psychiatric  o Admits  o : anxiety, depression  · Heme-Lymph  o Denies  o : petechiae, lymph node enlargement or tenderness  · Allergic-Immunologic  o Denies  o : frequent illnesses      Vitals  Date Time BP Position Site L\R Cuff Size HR RR TEMP (F) WT  HT  BMI kg/m2 BSA m2 O2 Sat HC       09/25/2020 04:59 /73 Sitting    102 - R 16 97.9 157lbs 6oz 5'  4\" 27.01 1.8 96 %          Physical Examination  · Mental Status Exam  o Appearance  o : good eye contact, normal street clothes, groomed, accompanied by her , both wearing masks  o Behavior  o : pleasant and cooperative  o Motor  o : Normal.  o Speech  o : normal rhythm, rate, volume, tone, not hyperverbal, not pressured, normal prosidy, Speaks with an accent  o Mood  o : \"More motivated, happier\"  o Affect  o : Slight constriction, nearly euthymic  o Thought Content  o : negative suicidal ideations, negative homicidal ideations, negative obsessions  o Perceptions  o : negative auditory hallucinations, negative visual hallucinations  o Thought Process  o : linear  o Insight/Judgement  o : fair/fair  o Cognition  o : grossly intact  o Attention  o : intact          Assessment  · Major depressive disorder, recurrent, in partial remission     296.35/F33.41       Presentation most consistent with major depressive disorder, in partial remission, mild to moderate, with anxious distress.    Significantly improved.  Now sleeping, muscle tension is improving, basically experiences anxiety during the mornings as a likely panic attack, which is remedied by the low-dose lorazepam.  Sometimes the patient is not even taking 2 pills a day of this PRN medication.    Patient desires to proceed with tapering off of amitriptyline.  She will cut the dose in half for 7 " days, then discontinue.  Patient's anxiety is in the mornings; it will be interesting to see if by discontinuing the amitriptyline we actually improve her anxiety if it is due to orthostatic hypotension.  Patient does seem to have a sensitivity to medications; she had episodes of tachycardia while on Seroquel, which also induces orthostatic hypotension.  She used to take propranolol for the tachycardia; since discontinuing the Seroquel, she has no longer needed to take propranolol, either.    Continue lorazepam, Celexa.  Continue therapy.  Order EKG to monitor QTc interval.       Plan  · Orders  o ACO-39: Current medications updated and reviewed () - - 09/25/2020  o EKG with at least 12 leads (INTERPRETATION AND REPORT ONLY) [facility] (Done at [facility] but read at [facility]) (20603) - - 09/25/2020  · Medications  o citalopram 20 mg oral tablet   SIG: take 1 tablet (20 mg) by oral route once daily for 60 days   DISP: (60) tablet with 1 refills  Courtesy Refilled on 09/25/2020     o lorazepam 0.5 mg oral tablet   SIG: take 1 tablet (0.5 mg) by oral route 2 times per day for 60 days   DISP: (120) tablet with 1 refills  Courtesy Refilled on 09/25/2020     o Medications have been Reconciled  o Transition of Care or Provider Policy  · Instructions  o Risk Assessment: Risk of self-harm acutely is now low. Risk factors include a history of suicidal ideation, mood disorder, anxiety disorder, presence of psychosocial stressors such as colonic dysplasia, COVID-19 pandemic. Protective factors include no history of self-harm or suicide attempts, good social support, willingness to engage in care, improved depressive symptoms. Risk of self-harm chronically is also now thought to be low, but could be further elevated in the event of treatment noncompliance and/or AODA.  o Medications: Continue lorazepam, celexa. Refilled. REDUCE amitriptyline to 12.5 PO QDAY x 7 days and then DISCONTINUE.  o Therapy: continue.  o Labs/Studies: EKG to monitor  QTc. Consider BMP to monitor Na level if not recently done.  o Follow Up: two weeks.  · Disposition  o Follow Up in 2 weeks.  · Correspondence  o Behavioral Health Letter to Referring MD (Kvng SALAZAR) - 09/25/2020            Electronically Signed by: Vinny Krishnamurthy MD -Author on September 25, 2020 06:03:57 PM

## 2021-05-13 NOTE — PROGRESS NOTES
Progress Note      Patient Name: Shabana Ferguson   Patient ID: [id number]   Sex: Female   YOB: 1955    Primary Care Provider: Kvng SALAZAR   Referring Provider: Kvng SALAZAR    Visit Date: 2020    Provider: Skyler Fuller MD   Location: [facility]   Location Address: [address] [place] [address]  [id number]   Location Phone: [phone]          Chief Complaint  · Status Post Surgery      History Of Present Illness  Shabana Ferguson is a 64 year old /White female who presents today for a postoperative visit. She follows-up status post colonoscopy. The patient had a colonoscopy a year ago with the findings of dysplasia in her cecum. She needed a one year follow-up after her colonoscopy. She is not reporting any unexpected signs or symptoms. She is eating and drinking normally and reports regular bowel movements. She is not having any bleeding or pain.       Past Medical History  Anxiety; Depression; History of fracture of patella; Hyperlipemia; Osteopenia; Screening Mammogram; Ulcer         Past Surgical History  Colonoscopy; Knee surgery         Medication List  amitriptyline 25 mg oral tablet; atorvastatin 10 mg oral tablet; Calcium 500 + D 500 mg(1,250mg) -200 unit oral tablet; garlic 1,000 mg oral capsule; Glucosamine 500 mg oral tablet; Omega-3 Fish Oil 300-1,000 mg oral capsule; turmeric root extract 500 mg oral capsule; Viibryd 10 mg oral tablet; Vitamin D3 1,000 unit oral capsule; vitamin E 400 unit oral capsule; Women's Multivitamin 18 mg iron-400 mcg-500 mg oral tablet; Xanax 0.25 mg oral tablet         Allergy List  NO KNOWN DRUG ALLERGIES       Allergies Reconciled  Family Medical History  Stroke; Heart Disease; Cancer, Unspecified         Reproductive History   7 Para 6 0 0 0       Social History  Alcohol Use (Never); lives with spouse; Recreational Drug Use (Never); Tobacco (Never)         Review of  "Systems  · Cardiovascular  o Denies  o : chest pain on exertion, shortness of breath, lower extremity swelling  · Respiratory  o Denies  o : shortness of breath, coughing up blood  · Gastrointestinal  o Denies  o : chronic abdominal pain      Vitals  Date Time BP Position Site L\R Cuff Size HR RR TEMP (F) WT  HT  BMI kg/m2 BSA m2 O2 Sat HC       06/18/2020 10:48 AM       16  160lbs 0oz 5'  4\" 27.46 1.81           Physical Examination  · Constitutional  o Appearance  o : well developed, well-nourished, alert and in no acute distress  · Head and Face  o Head  o :   § Inspection  § : no deformities or lesions  · Eyes  o Conjunctivae  o : clear  o Sclerae  o : nonicteric  · Neck  o Inspection/Palpation  o : normal appearance, no masses or tenderness, trachea midline  · Respiratory  o Respiratory Effort  o : breathing unlabored  o Inspection of Chest  o : normal appearance, no retractions  · Cardiovascular  o Heart  o : regular rate and rhythm  · Gastrointestinal  o Abdominal Examination  o :   § Abdomen  § : abdomen is soft.  · Lymphatic  o Neck  o : no lymphadenopathy present  o Axilla  o : no lymphadenopathy present  o Groin  o : no lymphadenopathy present  · Skin and Subcutaneous Tissue  o General Inspection  o : no rashes present, no lesions present, no areas of discoloration  · Neurologic  o Cranial Nerves  o : grossly intact  o Sensation  o : grossly intact  o Gait and Station  o :   § Gait Screening  § : normal gait, able to stand without diffculty  o Cerebellar Function  o : no obvious abnormalities  · Psychiatric  o Judgement and Insight  o : judgment and insight intact  o Mood and Affect  o : mood normal, affect appropriate          Assessment  · Encounter for examination following surgery     V67.00/Z09       Patient status post colonoscopy. She had findings of normal mucosa in the cecum. I didn't seen any obvious signs of any issues or dysplasia. We did take random biopsies from that area, which came back as " normal with lymphoid aggregates. She also had two areas in her sigmoid that were tattooed on her colonoscopy, which weren't previously mentioned. I did take some random biopsies from around the higher tattoo and those biopsies came back negative. She also had two polyps around the area of the lower tattoo and those came back as an inflammatory polyp as well as an adenoma.     Problems Reconciled  Plan  · Medications  o Medications have been Reconciled  o Transition of Care or Provider Policy     Seeing as the patient had very concerning findings on her colonoscopy a year ago and she has developed a new polyp within the last year around the site of a previous tattoo, I don't feel comfortable extending out her colonoscopy. I think it would be best to go ahead and repeat her colonoscopy again in one year, seeing as she has the new interval development of another polyp.     In regards to the diverticulosis, we have given her some informational handouts and printed information as well as encouraged a high-fiber diet for her diverticulosis. I told her to follow-up or call with any new or concerning symptoms and we will repeat another colonoscopy in one year for surveillance. I discussed all of this with the patient and her .  All questions were answered.  They voiced understanding and agreed to proceed with the above plan.             Electronically Signed by: Serina Pryor-, -Author on June 18, 2020 01:43:01 PM  Electronically Co-signed by: Skyler Fuller MD -Reviewer on June 18, 2020 03:40:50 PM

## 2021-05-13 NOTE — PROGRESS NOTES
"   Progress Note      Patient Name: Shabana Ferguson   Patient ID: [id number]   Sex: Female   YOB: 1955    Primary Care Provider: Kvng SALAZAR   Referring Provider: Kvng SALAZAR    Visit Date: October 16, 2020    Provider: Vinny Krishnamurthy MD   Location: [facility]   Location Address: [address] [place] [address]   Location Phone: [phone]          Chief Complaint     Patient is here for a 2 week follow up    \"My depression has lifted But this week was difficult.\"       History Of Present Illness  Shabana Ferguson is a 64 year old /White female, hx of anxiety and depression, fractured patella, HLD, osteopenia, ulcer who presents to the office today referred by MARIE Brice, for anxiety and depression management.   Psychotropic medications: amitriptyline 25 mg QHS, Celexa 20 mg p.o. daily, lorazepam 0.5 p.o. twice daily as needed anxiety.   Patient reports that her depression \"has lifted,\" however she still continues to experience bouts of anxiety. The bouts do not qualify as panic attacks, but rather worsening of anxiety at different times. No triggers are necessary for the anxiety to worsen, however if patient thinks about the COVID-19 pandemic, she can certainly make her anxiety even worse. , who accompanied her today, also noted that the anxiety improves when they are around her grandkids. Patient continues to take Elavil at night to help with insomnia. She would like to continue this.   Patient is still engaging with her therapist once every 2 weeks. They have not discussed coping skills, such as CBT, mindfulness, meditation for management of anxiety and anxious thoughts. Both patient and  agreed to a short meditation meditation session during the interview. Afterwards, they both noted that that they felt much better. The  felt that the session was similar to the yoga sessions they used to do. " Patient will discuss techniques to manage her anxiety with her therapist.   We discussed patient's EKG and her prolonged QTC of 461 (Bazette method, rate 84, sinus). This QTC is still under the concern threshold of 500 ms. We discussed switching to Lexapro, which the patient now reports she tolerated well in the past. Patient also reports she tolerated Effexor well in the past. She has never tried mirtazapine.   ...     Family History:  Suicide Attempts: Deferred   Suicide Completions: Deferred   Substance Use: Deferred   Psychiatric Conditions: Deferred    depression, psychosis, anxiety: Deferred   Developmental History:  Born: Deferred   Siblings: Deferred   Access to Weapons: Denies   Thought Content: no suicidal ideation, no homicidal ideation, no auditory hallucinations, no visual hallucinations, and       Past Medical History  Anxiety; Depression; History of fracture of patella; Hyperlipemia; Osteopenia; Screening Mammogram; Ulcer         Past Surgical History  Colonoscopy; Knee surgery         Medication List  amitriptyline 25 mg oral tablet; ascorbic acid (vitamin C) 1,000 mg oral tablet; atorvastatin 10 mg oral tablet; Calcium 500 + D 500 mg(1,250mg) -200 unit oral tablet; citalopram 20 mg oral tablet; desonide 0.05 % topical cream; garlic 1,000 mg oral capsule; Glucosamine 500 mg oral tablet; lorazepam 0.5 mg oral tablet; Omega-3 Fish Oil 300-1,000 mg oral capsule; turmeric root extract 500 mg oral capsule; Vitamin D3 1,000 unit oral capsule; vitamin E 400 unit oral capsule; Women's Multivitamin 18 mg iron-400 mcg-500 mg oral tablet         Allergy List  NO KNOWN DRUG ALLERGIES         Family Medical History  Family history of cancer; Family history of stroke; Family history of heart disease         Reproductive History   7 Para 6 0 0 0       Social History  Alcohol Use; lives with spouse; Recreational Drug Use (Never); Tobacco (Never)         Immunizations  Name Date Admin   Influenza  "10/10/2019   Influenza 09/27/2018   Prevnar 13 01/31/2020         Review of Systems  · Constitutional  o Admits  o : night sweats  o Denies  o : fatigue  · Eyes  o Admits  o : blurred vision  o Denies  o : double vision  · HENT  o Denies  o : vertigo, recent head injury  · Breasts  o Denies  o : abnormal changes in breast size, additional breast symptoms except as noted in the HPI  · Cardiovascular  o Denies  o : chest pain, irregular heart beats  · Respiratory  o Denies  o : shortness of breath, productive cough  · Gastrointestinal  o Denies  o : nausea, vomiting  · Genitourinary  o Denies  o : dysuria, urinary retention  · Integument  o Denies  o : hair growth change, new skin lesions  · Neurologic  o Denies  o : altered mental status, seizures  · Musculoskeletal  o Denies  o : joint swelling, limitation of motion  · Endocrine  o Denies  o : cold intolerance, heat intolerance  · Psychiatric  o Admits  o : anxiety, depression  · Heme-Lymph  o Denies  o : petechiae, lymph node enlargement or tenderness  · Allergic-Immunologic  o Denies  o : frequent illnesses      Vitals  Date Time BP Position Site L\R Cuff Size HR RR TEMP (F) WT  HT  BMI kg/m2 BSA m2 O2 Sat FR L/min FiO2 HC       10/16/2020 04:35 PM        97.9                 Physical Examination  · Mental Status Exam  o Appearance  o : good eye contact, normal street clothes, groomed, accompanied by her , both wearing masks  o Behavior  o : pleasant and cooperative  o Motor  o : Normal, not fidgeting  o Speech  o : normal rhythm, rate, volume, tone, not hyperverbal, not pressured, normal prosidy, Speaks with an accent  o Mood  o : \"This week has been difficult\"  o Affect  o : Constricted but able to smile  o Thought Content  o : negative suicidal ideations, negative homicidal ideations, negative obsessions  o Perceptions  o : negative auditory hallucinations, negative visual hallucinations  o Thought Process  o : linear  o Insight/Judgement  o : " fair/fair  o Cognition  o : grossly intact  o Attention  o : intact          Assessment  · Major depressive disorder, recurrent, in partial remission     296.35/F33.41       Presentation most consistent with major depressive disorder, in partial remission, mild to moderate, with anxious distress.  It is possible that the major depressive episode has resolved and that we are now dealing with generalized anxiety disorder.    About the same as 2 weeks ago, perhaps a little worse.  Utilizing lorazepam as needed with success.  Sleeping on the amitriptyline.  She prefers to continue to take the amitriptyline as it helps with insomnia.  I could switch this with doxepin, mirtazapine.    QTC per recent EKG is prolonged, but less than 500 ms.  Patient is agreeable to the idea of switching to Lexapro, which we will also consider.  Also consider switching to Effexor.  Patient is not on any other QTc prolonging agents according to the medications in the system.    I would like the patient to remain stable on one medication regimen for a long period of time before I make medication changes.  I also believe that her anxiety, still significantly improved, but which varies from week to week, likely requires nonmedication management in the form of therapy, CBT, mindfulness, meditation.  Patient agrees to actively explore such techniques with her therapist and through her own research.    Continue lorazepam, Celexa.  Continue therapy.  See back in 2 weeks.       Plan  · Orders  o ACO-39: Current medications updated and reviewed () - - 10/16/2020  · Medications  o Medications have been Reconciled  o Transition of Care or Provider Policy  · Instructions  o Risk Assessment: Risk of self-harm acutely remain low. Risk factors include a history of suicidal ideation, mood disorder, anxiety disorder, presence of psychosocial stressors such as colonic dysplasia, COVID-19 pandemic. Protective factors include no history of self-harm or  suicide attempts, good social support, willingness to engage in care, improved depressive symptoms. Risk of self-harm chronically also remain low, but could be further elevated in the event of treatment noncompliance and/or AODA.  o Medications: Continue lorazepam, celexa, amitriptyline.  o Therapy: continue. Patient to discuss with her therapist how to learn strategies to manage anxiety such as CBT, mindfulness, meditation.  o Labs/Studies: Consider BMP to monitor sodium levels.  o Follow Up: two weeks.  · Disposition  o Follow Up in 2 weeks.            Electronically Signed by: Vinny Krishnamurthy MD -Author on October 17, 2020 02:03:12 PM

## 2021-05-13 NOTE — PROGRESS NOTES
"   Progress Note      Patient Name: Shabana Ferguson   Patient ID: [id number]   Sex: Female   YOB: 1955    Primary Care Provider: Kvng SALAZAR   Referring Provider: Kvng SALAZAR    Visit Date: 2020    Provider: Vinny Krishnamurthy MD   Location: [facility]   Location Address: [address] [place] [address]  [id number]   Location Phone: [phone]          Chief Complaint     Patient is here for a 2 week follow up for anxiety    \"I am doing so much better than last time.\"       History Of Present Illness  Shabana Ferguson is a 64 year old /White female, hx of anxiety and depression, fractured patella, HLD, osteopenia, ulcer who presents to the office today referred by MARIE Brice, for anxiety and depression management.   Chart: Psychotropic medications: amitriptyline 25 mg QHS, Celexa 20 mg p.o. daily, lorazepam 0.5 p.o. twice daily as needed anxiety.   Mood is \"much better.\" Anxiety has reduced to the point that she rarely takes Ativan. Sometimes goes 2-3 days without taking it. Still taking amitriptyline in the evenings to sleep. Spending time around grandchildren sparingly due to the pandemic. Some meditation, but not much.   ...   ...   ...     Family History:  Suicide Attempts: Deferred   Suicide Completions: Deferred   Substance Use: Deferred   Psychiatric Conditions: Deferred    depression, psychosis, anxiety: Deferred   Developmental History:  Born: Deferred   Siblings: Deferred   Access to Weapons: Denies   Thought Content: no suicidal ideation, no homicidal ideation, no auditory hallucinations, no visual hallucinations, and       Past Medical History  Anxiety; Depression; History of fracture of patella; Hyperlipemia; Osteopenia; Screening Mammogram; Ulcer         Past Surgical History  Colonoscopy; Knee surgery         Medication List  amitriptyline 25 mg oral tablet; ascorbic acid (vitamin C) 1,000 mg " oral tablet; atorvastatin 10 mg oral tablet; Calcium 500 + D 500 mg(1,250mg) -200 unit oral tablet; citalopram 20 mg oral tablet; desonide 0.05 % topical cream; garlic 1,000 mg oral capsule; Glucosamine 500 mg oral tablet; lorazepam 0.5 mg oral tablet; Omega-3 Fish Oil 300-1,000 mg oral capsule; turmeric root extract 500 mg oral capsule; Vitamin D3 1,000 unit oral capsule; vitamin E 400 unit oral capsule; Women's Multivitamin 18 mg iron-400 mcg-500 mg oral tablet         Allergy List  NO KNOWN DRUG ALLERGIES         Family Medical History  Family history of cancer; Family history of stroke; Family history of heart disease         Reproductive History   7 Para 6 0 0 0       Social History  Alcohol Use; lives with spouse; Recreational Drug Use (Never); Tobacco (Never)         Immunizations  Name Date Admin   Influenza 10/10/2019   Influenza 2018   Prevnar 13 2020         Review of Systems  · Constitutional  o Denies  o : fatigue, night sweats  · Eyes  o Admits  o : blurred vision  o Denies  o : double vision  · HENT  o Denies  o : vertigo, recent head injury  · Breasts  o Denies  o : abnormal changes in breast size, additional breast symptoms except as noted in the HPI  · Cardiovascular  o Denies  o : chest pain, irregular heart beats  · Respiratory  o Denies  o : shortness of breath, productive cough  · Gastrointestinal  o Denies  o : nausea, vomiting  · Genitourinary  o Denies  o : dysuria, urinary retention  · Integument  o Denies  o : hair growth change, new skin lesions  · Neurologic  o Denies  o : altered mental status, seizures  · Musculoskeletal  o Denies  o : joint swelling, limitation of motion  · Endocrine  o Denies  o : cold intolerance, heat intolerance  · Psychiatric  o Admits  o : anxiety, depression  · Heme-Lymph  o Denies  o : petechiae, lymph node enlargement or tenderness  · Allergic-Immunologic  o Denies  o : frequent illnesses      Vitals  Date Time BP Position Site L\R Cuff  "Size HR RR TEMP (F) WT  HT  BMI kg/m2 BSA m2 O2 Sat FR L/min FiO2 HC       11/02/2020 11:11 /75 Sitting    94 - R 18 97 155lbs 8oz 5'  4\" 26.69 1.78 96 %  21%          Physical Examination  · Mental Status Exam  o Appearance  o : good eye contact, normal street clothes, groomed, accompanied by her , both wearing masks  o Behavior  o : pleasant and cooperative  o Motor  o : Normal, not fidgeting  o Speech  o : normal rhythm, rate, volume, tone, not hyperverbal, not pressured, normal prosidy, Speaks with an accent  o Mood  o : \"I'm so much better\"  o Affect  o : slight constriction, almost euthymic  o Thought Content  o : negative suicidal ideations, negative homicidal ideations, negative obsessions  o Perceptions  o : negative auditory hallucinations, negative visual hallucinations  o Thought Process  o : linear  o Insight/Judgement  o : fair/fair  o Cognition  o : grossly intact  o Attention  o : intact          Assessment  · Major depressive disorder, recurrent, in partial remission     296.35/F33.41  · Anxiety     300.02/F41.1       Presentation most consistent with major depressive disorder, in partial remission, mild to moderate, with anxious distress.     Improved.    Sleeping on the amitriptyline. Consider doxepin, mirtazapine.    QTC per recent EKG is prolonged, but less than 500 ms.  Continue lorazepam, Celexa.  Continue therapy.  See back in 4 weeks. Repeat CMP to monitor Na levels at next visit. Repeat EKG in 4 months.       Plan  · Orders  o ACO-39: Current medications updated and reviewed () - - 11/02/2020  · Medications  o Medications have been Reconciled  o Transition of Care or Provider Policy  · Instructions  o Risk Assessment: Risk of self-harm acutely remain low. Risk factors include a history of suicidal ideation, mood disorder, anxiety disorder, presence of psychosocial stressors such as colonic dysplasia, COVID-19 pandemic. Protective factors include no history of self-harm or " suicide attempts, good social support, willingness to engage in care, improved depressive symptoms. Risk of self-harm chronically also remain low, but could be further elevated in the event of treatment noncompliance and/or AODA.  o Medications: Continue lorazepam 0.5 BID PRN, celexa 20 mg daily, amitriptyline 25 mg QHS.  o Therapy: continue.  o Labs/Studies: Consider BMP to monitor sodium levels. EKG in four months.  o Follow Up: 4 weeks.            Electronically Signed by: Vinny Krishnamurthy MD -Author on November 2, 2020 11:53:38 AM

## 2021-05-13 NOTE — PROGRESS NOTES
Progress Note      Patient Name: Shabana Ferguson   Patient ID: [id number]   Sex: Female   YOB: 1955    Primary Care Provider: Kvng SALAZAR   Referring Provider: Kvng SALAZAR    Visit Date: November 24, 2020    Provider: MARIE Armstrong   Location: [facility]   Location Address: [address] [place] [address], [address] [place] [address]  [id number]   Location Phone: [phone]          Chief Complaint  · body aches      History Of Present Illness  Shabana Ferguson is a 64 year old /White female who presents for evaluation and treatment of:      She is here for an acute visit today, she is a patient of MARIE Moulton.    She is complaining of body aches, low-grade fever of 100 degrees and headache.  She is afebrile here in the office.  She did get her second Shingrix vaccine yesterday.  She did take Tylenol with some relief.  She denies any known exposures to COVID-19 but she would like to be tested.  Her  request a prescription for ibuprofen 600 mg to take as needed.       Past Medical History  Disease Name Date Onset Notes   Anxiety --  --    Depression --  --    History of fracture of patella 9- ORIF- Left   Hyperlipemia --  --    Osteopenia --  --    Screening Mammogram 06/09/2020 06/09/2020- normal   Ulcer --  teenager         Past Surgical History  Procedure Name Date Notes   Colonoscopy 2008, 1-8-2019 --    Knee surgery 2017 broken knee         Medication List  Name Date Started Instructions   amitriptyline 25 mg oral tablet 02/18/2019 TAKE 1 TABLET (25 MG) BY ORAL ROUTE ONCE DAILY AT BEDTIME   ascorbic acid (vitamin C) 1,000 mg oral tablet  --    atorvastatin 10 mg oral tablet 09/01/2020 TAKE 1 TABLET BY MOUTH EVERY DAY   Calcium 500 + D 500 mg(1,250mg) -200 unit oral tablet 07/22/2020 take 1 tablet by oral route 2 times a day for 90 days   citalopram 20 mg oral tablet 09/25/2020 take 1 tablet (20 mg) by oral route once  daily for 60 days   desonide 0.05 % topical cream 2020 apply sparingly and rub gently into the affected area(s) by topical route 2 times per day for 30 days   garlic 1,000 mg oral capsule  take 1 capsule by oral route daily   Glucosamine 500 mg oral tablet  take 1 tablet by oral route daily   lorazepam 0.5 mg oral tablet 2020 take 1 tablet (0.5 mg) by oral route 2 times per day for 60 days   Omega-3 Fish Oil 300-1,000 mg oral capsule 10/11/2019 take 1 capsule by oral route 3 times a day for 90 days   turmeric root extract 500 mg oral capsule  take 1 capsule by oral route daily   Vitamin D3 1,000 unit oral capsule  take 1 capsule by oral route daily   vitamin E 400 unit oral capsule  take 1 capsule by oral route daily   Women's Multivitamin 18 mg iron-400 mcg-500 mg oral tablet  take 1 tablet by oral route daily         Allergy List  Allergen Name Date Reaction Notes   NO KNOWN DRUG ALLERGIES --  --  --        Allergies Reconciled  Family Medical History  Disease Name Relative/Age Notes   Family history of cancer Brother/   --    Family history of stroke Mother/   --    Family history of heart disease Mother/   --          Reproductive History  Menstrual   Age Menopause: 44   Pregnancy Summary   Total Pregnancies: 7 Full Term: 6 Premature: 0   Ab Induced: 0 Ab Spontaneous: 0 Ectopics: 0   Multiples: 0 Livin         Social History  Finding Status Start/Stop Quantity Notes   Alcohol Use --  --/-- --  2020 - does not drink   lives with spouse --  --/-- --  --    Recreational Drug Use Never --/-- --  2020 - no   Tobacco Never --/-- --  never smoker         Immunizations  NameDate Admin Mfg Trade Name Lot Number Route Inj VIS Given VIS Publication   Fcbxvavwa21/10/2019 [facility] Fluzone Quadrivalent [id number] IM RD 10/10/2019    Comments: patient tolerated inj well   Prevnar [id number] [facility] PREVNAR 13 [id number] IM RD 2020    Comments: pt tolerated inj well and left office in stable condition.  "        Review of Systems  · Constitutional  o Admits  o : fever, body aches  o Denies  o : fatigue, night sweats  · HENT  o Admits  o : headaches  o Denies  o : nasal congestion, nasal discharge, sore throat, ear pain  · Cardiovascular  o Denies  o : lower extremity edema, claudication, chest pressure, palpitations  · Respiratory  o Denies  o : shortness of breath, wheezing, cough, hemoptysis, dyspnea on exertion  · Gastrointestinal  o Denies  o : nausea, vomiting, diarrhea, constipation, abdominal pain  · Integument  o Denies  o : rash, itching      Vitals  Date Time BP Position Site L\R Cuff Size HR RR TEMP (F) WT  HT  BMI kg/m2 BSA m2 O2 Sat FR L/min FiO2 HC       11/24/2020 02:37 /70 Sitting    90 - R  98.1 160lbs 4oz 5'  4\" 27.51 1.81 97 %            Physical Examination  · Constitutional  o Appearance  o : no acute distress, well-nourished  · Head and Face  o Head  o :   § Inspection  § : atraumatic, normocephalic  · Ears, Nose, Mouth and Throat  o Ears  o :   § External Ears  § : no auricle tenderness to palpation present, bilateral cerumen present in canal   § Otoscopic Examination  § : tympanic membrane appearance within normal limits bilaterally  o Oral Cavity  o :   § Oral Mucosa  § : moist mucous membranes  o Throat  o :   § Oropharynx  § : no inflammation or lesions present, tonsils within normal limits  · Neck  o Thyroid  o : gland size normal, nontender, no nodules or masses present on palpation, symmetric  · Respiratory  o Respiratory Effort  o : breathing comfortably, symmetric chest rise  o Auscultation of Lungs  o : clear to asculatation bilaterally, no wheezes, rales, or rhonchii  · Cardiovascular  o Heart  o :   § Auscultation of Heart  § : regular rate and rhythm, no murmurs, rubs, or gallops  o Peripheral Vascular System  o :   § Extremities  § : no edema  · Lymphatic  o Neck  o : nontender mobile soft lymphadenopathy present   · Neurologic  o Mental Status Examination  o : "   § Orientation  § : grossly oriented to person, place and time  o Gait and Station  o :   § Gait Screening  § : normal gait  · Psychiatric  o General  o : normal mood and affect          Results  · In-Office Procedures  o Lab procedure  § IOP - Influenza A/B Test (61282)   § Influenza A: Negative   § Influenza B: Negative   § Internal Control Verified?: Yes       Assessment  · Headache     784.0/R51  · Body aches     780.96/R52  We will check her for COVID-19. Discussed we will need to quarantine until a negative COVID-19 and symptoms have resolved. Patient instructed not to go anywhere except to seek medical care. Instructed to seek medical care for any difficulty of breathing, unable to keep fluids down, or worsening of symptoms. Instructed to take Tylenol and alternate with ibuprofen as needed  · Fever     780.60/R50.9      Plan  · Orders  o ACO-39: Current medications updated and reviewed (, 1159F) - - 11/24/2020  o [facility] NCOV2 (send-out) (43124) - 780.96/R52, 780.60/R50.9 - 11/24/2020  · Medications  o ibuprofen 600 mg oral tablet   SIG: take 1 tablet (600 mg) by oral route every 6 hours as needed with food for 30 days   DISP: (90) Tablet with 0 refills  Prescribed on 11/24/2020     · Instructions  o Rest. Increase Fluids.  o Patient was educated/instructed on their diagnosis, treatment and medications prior to discharge from the clinic today.  o Patient instructed to seek medical attention urgently for new or worsening symptoms.  o Call the office with any concerns or questions.  o Discussed Covid-19 precautions including, but not limited to, social distancing, avoid touching your face, and hand washing.   · Disposition  o Call or Return if symptoms worsen or persist.            Electronically Signed by: MARIE Armstrong -Author on November 24, 2020 03:25:56 PM

## 2021-05-13 NOTE — PROGRESS NOTES
Progress Note      Patient Name: Shabana Ferguson   Patient ID: [id number]   Sex: Female   YOB: 1955    Primary Care Provider: Kvng SALAZAR   Referring Provider: Kvng SALAZAR    Visit Date: September 16, 2020    Provider: MARIE Pope   Location: [facility]   Location Address: [address] [place] [address], [address] [place] [address]  [id number]   Location Phone: [phone]          Chief Complaint  · Rash broke out on face      History Of Present Illness  Shabana Ferguson is a 64 year old /White female who presents for evaluation and treatment of:      Patient is a 64-year-old female who comes in today for an acute appointment.  Patient is complaining of a rash on her chin, forehead, and cheeks that is been occurring for the past 3 weeks.  She states that she worked out in the garden without gloves and touched her face and since then she has developed a rash.  She states it is itching in nature.  Has been using hydrocortisone cream without any improvement of symptoms.  She denies a rash spreading to any other area of the body.  She denies the rash being painful.       Past Medical History  Disease Name Date Onset Notes   Anxiety --  --    Depression --  --    History of fracture of patella 9- ORIF- Left   Hyperlipemia --  --    Osteopenia --  --    Screening Mammogram 06/09/2020 06/09/2020- normal   Ulcer --  teenager         Past Surgical History  Procedure Name Date Notes   Colonoscopy 2008, 1-8-2019 --    Knee surgery 2017 broken knee         Medication List  Name Date Started Instructions   amitriptyline 25 mg oral tablet 02/18/2019 TAKE 1 TABLET (25 MG) BY ORAL ROUTE ONCE DAILY AT BEDTIME   atorvastatin 10 mg oral tablet 09/01/2020 TAKE 1 TABLET BY MOUTH EVERY DAY   Calcium 500 + D 500 mg(1,250mg) -200 unit oral tablet 07/22/2020 take 1 tablet by oral route 2 times a day for 90 days   citalopram 20 mg oral tablet  take 1 tablet (20  mg) by oral route once daily   garlic 1,000 mg oral capsule  take 1 capsule by oral route daily   Glucosamine 500 mg oral tablet  take 1 tablet by oral route daily   lorazepam 0.5 mg oral tablet  take 1 tablet (0.5 mg) by oral route 2 times per day   Omega-3 Fish Oil 300-1,000 mg oral capsule 10/11/2019 take 1 capsule by oral route 3 times a day for 90 days   propranolol 10 mg oral tablet  take 1 tablet by oral route As needed   turmeric root extract 500 mg oral capsule  take 1 capsule by oral route daily   Vitamin D3 1,000 unit oral capsule  take 1 capsule by oral route daily   vitamin E 400 unit oral capsule  take 1 capsule by oral route daily   Women's Multivitamin 18 mg iron-400 mcg-500 mg oral tablet  take 1 tablet by oral route daily         Allergy List  Allergen Name Date Reaction Notes   NO KNOWN DRUG ALLERGIES --  --  --        Allergies Reconciled  Family Medical History  Disease Name Relative/Age Notes   Family history of cancer Brother/   --    Family history of stroke Mother/   --    Family history of heart disease Mother/   --          Reproductive History  Menstrual   Age Menopause: 44   Pregnancy Summary   Total Pregnancies: 7 Full Term: 6 Premature: 0   Ab Induced: 0 Ab Spontaneous: 0 Ectopics: 0   Multiples: 0 Livin         Social History  Finding Status Start/Stop Quantity Notes   Alcohol Use Never --/-- --  does not drink   lives with spouse --  --/-- --  --    Recreational Drug Use Never --/-- --  no   Tobacco Never --/-- --  never smoker         Immunizations  NameDate Admin Mfg Trade Name Lot Number Route Inj VIS Given VIS Publication   Shypebool98/10/2019 [facility] Fluzone Quadrivalent [id number] IM RD 10/10/2019    Comments: patient tolerated inj well   Mskvqkxdv88/27/2018 [facility] Fluzone Quadrivalent [id number] IM LD 2018   Comments: pt tolerated well, left in stable condition   Prevnar 1302020 [facility] PREVNAR 13 [id number] IM RD 2020    Comments: pt tolerated inj well and left  "office in stable condition.         Review of Systems  · Constitutional  o Denies  o : fever, fatigue, weight loss, weight gain  · Cardiovascular  o Denies  o : lower extremity edema, claudication, chest pressure, palpitations  · Respiratory  o Denies  o : shortness of breath, wheezing, cough, hemoptysis, dyspnea on exertion  · Gastrointestinal  o Denies  o : nausea, vomiting, diarrhea, constipation, abdominal pain  · Integument  o Admits  o : rash, itching  o Denies  o : skin dryness, nail changes      Vitals  Date Time BP Position Site L\R Cuff Size HR RR TEMP (F) WT  HT  BMI kg/m2 BSA m2 O2 Sat        09/16/2020 02:54 /78 Sitting    93 - R  98.1 156lbs 6oz 5'  4\" 26.84 1.79 98 %          Physical Examination  · Constitutional  o Appearance  o : well-nourished, well developed, in no acute distress  · Eyes  o Conjunctivae  o : conjunctivae normal, no exudates present  o Sclerae  o : sclerae white  o Pupils and Irises  o : pupils equal and round, and reactive to light and accomodation bilaterally  o Eyelids/Ocular Adnexae  o : extra ocular movements intact  · Respiratory  o Respiratory Effort  o : breathing unlabored, no accessory muscle use  o Inspection of Chest  o : normal appearance, no retractions  o Auscultation of Lungs  o : normal breath sounds bilaterally  · Cardiovascular  o Heart  o :   § Auscultation of Heart  § : regular rate and rhythm, no murmurs, gallops or rubs  o Peripheral Vascular System  o :   § Extremities  § : no edema  · Skin and Subcutaneous Tissue  o General Inspection  o : Maculopapular rash, blanchable, scattered over forehead, chin and cheeks, itching, nonpainful, non-spreading  · Neurologic  o Mental Status Examination  o :   § Orientation  § : alert and oriented x3  § Speech/Language  § : normal speech pattern  o Gait and Station  o : normal gait, able to stand without difficulty              Assessment  · Dermatitis     692.9/L30.9  Will treat with desonide cream, discussed " return precautions. Patient verbalized understanding is agreeable treatment plan.    Problems Reconciled  Plan  · Orders  o ACO-39: Current medications updated and reviewed () - - 09/16/2020  · Medications  o desonide 0.05 % topical cream   SIG: apply sparingly and rub gently into the affected area(s) by topical route 2 times per day for 30 days   DISP: (1) 60 gm jar with 0 refills  Prescribed on 09/16/2020     · Instructions  o Take all medications as prescribed/directed.  o Patient was educated/instructed on their diagnosis, treatment and medications prior to discharge from the clinic today.  o Call the office with any concerns or questions.  · Disposition  o Call or Return if symptoms worsen or persist.  o follow up as needed  o call the office with any questions or concerns            Electronically Signed by: MARIE Pope -Author on September 16, 2020 03:58:43 PM

## 2021-05-14 VITALS
DIASTOLIC BLOOD PRESSURE: 75 MMHG | BODY MASS INDEX: 26.55 KG/M2 | HEART RATE: 94 BPM | RESPIRATION RATE: 18 BRPM | WEIGHT: 155.5 LBS | HEIGHT: 64 IN | OXYGEN SATURATION: 96 % | SYSTOLIC BLOOD PRESSURE: 131 MMHG | TEMPERATURE: 97 F

## 2021-05-14 VITALS
HEART RATE: 86 BPM | RESPIRATION RATE: 20 BRPM | SYSTOLIC BLOOD PRESSURE: 139 MMHG | HEIGHT: 64 IN | OXYGEN SATURATION: 99 % | BODY MASS INDEX: 26.7 KG/M2 | TEMPERATURE: 97.1 F | DIASTOLIC BLOOD PRESSURE: 72 MMHG | WEIGHT: 156.37 LBS

## 2021-05-14 VITALS
WEIGHT: 157.37 LBS | TEMPERATURE: 97.9 F | OXYGEN SATURATION: 96 % | BODY MASS INDEX: 26.87 KG/M2 | DIASTOLIC BLOOD PRESSURE: 73 MMHG | RESPIRATION RATE: 16 BRPM | HEIGHT: 64 IN | HEART RATE: 102 BPM | SYSTOLIC BLOOD PRESSURE: 136 MMHG

## 2021-05-14 VITALS
OXYGEN SATURATION: 97 % | WEIGHT: 160.25 LBS | BODY MASS INDEX: 27.36 KG/M2 | TEMPERATURE: 98.1 F | SYSTOLIC BLOOD PRESSURE: 128 MMHG | HEART RATE: 90 BPM | DIASTOLIC BLOOD PRESSURE: 70 MMHG | HEIGHT: 64 IN

## 2021-05-14 VITALS
HEIGHT: 64 IN | BODY MASS INDEX: 26.7 KG/M2 | OXYGEN SATURATION: 98 % | DIASTOLIC BLOOD PRESSURE: 78 MMHG | TEMPERATURE: 98.1 F | HEART RATE: 93 BPM | WEIGHT: 156.37 LBS | SYSTOLIC BLOOD PRESSURE: 132 MMHG

## 2021-05-14 VITALS — TEMPERATURE: 97.9 F

## 2021-05-14 NOTE — PROGRESS NOTES
"   Progress Note      Patient Name: Shabana Ferguson   Patient ID: [id number]   Sex: Female   YOB: 1955    Primary Care Provider: Kvng SALAZAR   Referring Provider: Kvng SALAZAR    Visit Date: 2021    Provider: Vinny Krishnamurthy MD   [facility]   Location Address: [address] [place] [address]  [id number]   Location Phone: [phone]          Chief Complaint     \"I am struggling with anxiety and depression.\"           History Of Present Illness  Shabana Ferguson is a 64 year old /White female, hx of anxiety and depression, fractured patella, HLD, osteopenia, ulcer who presents to the office today referred by MARIE Brice, for anxiety and depression management.   Chart: Psychotropic medications: amitriptyline 25 mg QHS, Celexa 20 mg p.o. daily, lorazepam 0.5 p.o. twice daily as needed anxiety.   Virtual visit via Zoom audio and video due to the COVID-19 pandemic. Patient is accepting of and agreeable to appointment. The visit consisted of the patient, her , and I. Interview: Patient reports that she began to feel anxiety and depression the week before [place]. At that time she began to take lorazepam. Taking lorazepam usually about once a day now. In addition to irritability and restlessness and worrying, the patient is also experiencing hopelessness and despair. She reports that the news has also been very stressful to her. Continuing to do psychotherapy. Notes that her mood is often times fine, then becomes down or anxious, but she is experiencing more down days than up. Denies SI HI AVH.  concurs.   ...   ...   ...     Family History:  Suicide Attempts: Deferred   Suicide Completions: Deferred   Substance Use: Deferred   Psychiatric Conditions: Deferred    depression, psychosis, anxiety: Deferred   Developmental History:  Born: Deferred   Siblings: Deferred       Past Medical " History  Disease Name Date Onset Notes   Anxiety --  --    Depression --  --    History of fracture of patella 9- ORIF- Left   Hyperlipemia --  --    Osteopenia --  --    Screening Mammogram 06/09/2020 06/09/2020- normal   Ulcer --  teenager         Past Surgical History  Procedure Name Date Notes   Colonoscopy 2008, 1-8-2019 --    Knee surgery 2017 broken knee         Medication List  Name Date Started Instructions   amitriptyline 25 mg oral tablet 12/02/2020 TAKE 1 TABLET (25 MG) BY ORAL ROUTE ONCE DAILY AT BEDTIME   ascorbic acid (vitamin C) 1,000 mg oral tablet  --    atorvastatin 10 mg oral tablet 09/01/2020 TAKE 1 TABLET BY MOUTH EVERY DAY   Calcium 500 + D 500 mg(1,250mg) -200 unit oral tablet 07/22/2020 take 1 tablet by oral route 2 times a day for 90 days   citalopram 20 mg oral tablet 12/02/2020 take 1 tablet (20 mg) by oral route once daily for 60 days   garlic 1,000 mg oral capsule  take 1 capsule by oral route daily   Glucosamine 500 mg oral tablet  take 1 tablet by oral route daily   lorazepam 0.5 mg oral tablet 09/25/2020 take 1 tablet (0.5 mg) by oral route 2 times per day for 60 days   Omega-3 Fish Oil 300-1,000 mg oral capsule 10/11/2019 take 1 capsule by oral route 3 times a day for 90 days   turmeric root extract 500 mg oral capsule  take 1 capsule by oral route daily   Vitamin D3 1,000 unit oral capsule  take 1 capsule by oral route daily   vitamin E 400 unit oral capsule  take 1 capsule by oral route daily   Women's Multivitamin 18 mg iron-400 mcg-500 mg oral tablet  take 1 tablet by oral route daily         Allergy List  Allergen Name Date Reaction Notes   NO KNOWN DRUG ALLERGIES --  --  --          Family Medical History  Disease Name Relative/Age Notes   Family history of cancer Brother/   --    Family history of stroke Mother/   --    Family history of heart disease Mother/   --          Reproductive History  Menstrual   Age Menopause: 44   Pregnancy Summary   Total Pregnancies: 7  Full Term: 6 Premature: 0   Ab Induced: 0 Ab Spontaneous: 0 Ectopics: 0   Multiples: 0 Livin         Social History  Finding Status Start/Stop Quantity Notes   Alcohol Use --  --/-- --  2021 - 2020 - does not drink   lives with spouse --  --/-- --  --    Recreational Drug Use Never --/-- --  2021 - 2020 - no   Tobacco Never --/-- --  never smoker         Immunizations  NameDate Admin Mfg Trade Name Lot Number Route Inj VIS Given VIS Publication   Nhzvrgeed01/10/2019 [facility] Fluzone Quadrivalent [id number] IM RD 10/10/2019    Comments: patient tolerated inj well   Prevnar 1302020 [facility] PREVNAR 13 [id number] IM RD 2020    Comments: pt tolerated inj well and left office in stable condition.         Review of Systems  · Constitutional  o Admits  o : night sweats  o Denies  o : fatigue  · Eyes  o Denies  o : double vision, blurred vision  · HENT  o Denies  o : vertigo, recent head injury  · Breasts  o Denies  o : abnormal changes in breast size, additional breast symptoms except as noted in the HPI  · Cardiovascular  o Denies  o : chest pain, irregular heart beats  · Respiratory  o Denies  o : shortness of breath, productive cough  · Gastrointestinal  o Denies  o : nausea, vomiting  · Genitourinary  o Denies  o : dysuria, urinary retention  · Integument  o Denies  o : hair growth change, new skin lesions  · Neurologic  o Denies  o : altered mental status, seizures  · Musculoskeletal  o Denies  o : joint swelling, limitation of motion  · Endocrine  o Admits  o : cold intolerance  o Denies  o : heat intolerance  · Psychiatric  o Admits  o : anxiety, depression, additional psychiatric symptoms except as noted in the HPI  · Heme-Lymph  o Denies  o : petechiae, lymph node enlargement or tenderness  · Allergic-Immunologic  o Denies  o : frequent illnesses      Physical Examination  · Mental Status Exam  o Appearance  o : With her , sitting on a couch, good eye contact, normal street clothes,  "groomed  o Behavior  o : pleasant and cooperative  o Motor  o : No abnormal  o Speech  o : normal rhythm, rate, volume, tone, not hyperverbal, not pressured, normal prosidy, Speaks with an accent  o Mood  o : \"Struggling with anxiety and depression\"  o Affect  o : Slight depression, but able to smile, no tears  o Thought Content  o : negative suicidal ideations, negative homicidal ideations, negative obsessions  o Perceptions  o : negative auditory hallucinations, negative visual hallucinations  o Thought Process  o : linear  o Insight/Judgement  o : fair/fair  o Cognition  o : grossly intact  o Attention  o : intact          Assessment  · Major depressive disorder, recurrent, in partial remission     296.35/F33.41  · Anxiety     300.02/F41.1       Presentation most consistent with major depressive disorder with anxious distress, in full remission.     Patient has worsened, likely because of the season, psychosocial stressors such as the pandemic, the news.    No insomnia on the amitriptyline.  Consider doxepin.    Continue lorazepam, Celexa.  Start low-dose Abilify; consider mirtazapine if it does not work.  See back in 2 weeks.  At that time, repeat EKG, BMP.  Abilify is protective of the QTC.    Continue psychotherapy.       Plan  · Orders  o ACO-39: Current medications updated and reviewed () - - 01/14/2021  · Medications  o aripiprazole 2 mg oral tablet   SIG: take 1 tablet (2 mg) by oral route once daily for 90 days   DISP: (90) Tablet with 1 refills  Prescribed on 01/14/2021     o Medications have been Reconciled  o Transition of Care or Provider Policy  · Instructions  o Risk Assessment: Risk of self-harm acutely remain low. Risk factors include a history of suicidal ideation, mood disorder, anxiety disorder, presence of psychosocial stressors such as colonic dysplasia, COVID-19 pandemic. Protective factors include no history of self-harm or suicide attempts, good social support, willingness to engage " in care, improved depressive symptoms. Risk of self-harm chronically also remain low, but could be further elevated in the event of treatment noncompliance and/or AODA.  o Medications: Continue lorazepam 0.5 BID PRN, celexa 20 mg daily, amitriptyline 25 mg QHS. START Abilify 2 mg p.o. daily to target depression, anxiety, decreased energy. Risks, benefits, alternatives discussed with patient including increased energy, exacerbation of irritability, akathisia, GI upset, orthostatic hypotension. After discussion of these risks and benefits, the patient voiced understanding and agreed to proceed.  o Therapy: continue.  o Labs/Studies: Consider BMP to monitor sodium levels. EKG in four months.  o Follow Up: 2 weeks.  · Disposition  o Follow Up in 2 weeks.            Electronically Signed by: Vinny Krishnamurthy MD -Author on January 14, 2021 10:31:49 AM

## 2021-05-14 NOTE — PROGRESS NOTES
"   Progress Note      Patient Name: Shabana Ferguson   Patient ID: [id number]   Sex: Female   YOB: 1955    Primary Care Provider: Kvng SALAZAR   Referring Provider: Kvng SALAZAR    Visit Date: 2021    Provider: Vinny Krishnamurthy MD   Location: [facility]   Location Address: [address] [place] [address]  [id number]   Location Phone: [phone]          Chief Complaint     \"I am doing a lot better.\"           History Of Present Illness  Shabana Ferguson is a 64 year old /White female, hx of anxiety and depression, fractured patella, HLD, osteopenia, ulcer who presents to the office today referred by MARIE Brice, for anxiety and depression management.   Chart: Psychotropic medications: amitriptyline 25 mg QHS, Celexa 20 mg p.o. daily, lorazepam 0.5 p.o. twice daily as needed anxiety.   Virtual visit via Zoom audio and video due to the COVID-19 pandemic. Patient is accepting of and agreeable to appointment. The visit consisted of the patient, her , and I. Interview: Patient reports her anxiety is significantly improved. The first week she experienced some sleep disruption and vivid dreams, but this resolved. Her anxiety has improved to such an extent that she is no longer taking lorazepam. Patient reports she feels normal for the first time in 2 years. She states \"I am finally able to enjoy life.\" Denies SI HI AVH.  concurs.   ...   ...   ...     Family History:  Suicide Attempts: Deferred   Suicide Completions: Deferred   Substance Use: Deferred   Psychiatric Conditions: Deferred    depression, psychosis, anxiety: Deferred   Developmental History:  Born: Deferred   Siblings: Deferred       Past Medical History  Disease Name Date Onset Notes   Anxiety --  --    Depression --  --    History of fracture of patella 2017 ORIF- Left   Hyperlipemia --  --    Osteopenia --  --    Screening " Mammogram 2020- normal   Ulcer --  teenager         Past Surgical History  Procedure Name Date Notes   Colonoscopy 2019 --    Knee surgery 2017 broken knee         Medication List  Name Date Started Instructions   amitriptyline 25 mg oral tablet 2020 TAKE 1 TABLET (25 MG) BY ORAL ROUTE ONCE DAILY AT BEDTIME   aripiprazole 2 mg oral tablet 2021 take 1 tablet (2 mg) by oral route once daily for 90 days   atorvastatin 10 mg oral tablet 2021 TAKE 1 TABLET BY MOUTH EVERY DAY for 90 days   Calcium 500 + D 500 mg(1,250mg) -200 unit oral tablet 2020 take 1 tablet by oral route 2 times a day for 90 days   citalopram 20 mg oral tablet 2020 take 1 tablet (20 mg) by oral route once daily for 60 days   garlic 1,000 mg oral capsule  take 1 capsule by oral route daily   Glucosamine 500 mg oral tablet  take 1 tablet by oral route daily   lorazepam 0.5 mg oral tablet 2020 take 1 tablet (0.5 mg) by oral route 2 times per day for 60 days   Omega-3 Fish Oil 300-1,000 mg oral capsule 10/11/2019 take 1 capsule by oral route 3 times a day for 90 days   turmeric root extract 500 mg oral capsule  take 1 capsule by oral route daily   Vitamin D3 1,000 unit oral capsule  take 1 capsule by oral route daily   vitamin E 400 unit oral capsule  take 1 capsule by oral route daily   Women's Multivitamin 18 mg iron-400 mcg-500 mg oral tablet  take 1 tablet by oral route daily         Allergy List  Allergen Name Date Reaction Notes   NO KNOWN DRUG ALLERGIES --  --  --          Family Medical History  Disease Name Relative/Age Notes   Family history of cancer Brother/   --    Family history of stroke Mother/   --    Family history of heart disease Mother/   --          Reproductive History  Menstrual   Age Menopause: 44   Pregnancy Summary   Total Pregnancies: 7 Full Term: 6 Premature: 0   Ab Induced: 0 Ab Spontaneous: 0 Ectopics: 0   Multiples: 0 Livin         Social History  Finding  "Status Start/Stop Quantity Notes   Alcohol Use --  --/-- --  02/02/2021 - 01/14/2021 - 11/02/2020 - does not drink   lives with spouse --  --/-- --  --    Recreational Drug Use Never --/-- --  02/02/2021 - 01/14/2021 - 11/02/2020 - no   Tobacco Never --/-- --  never smoker         Immunizations  NameDate Admin Mfg Trade Name Lot Number Route Inj VIS Given VIS Publication   Iqclxpigs71/10/2019 [facility] Fluzone Quadrivalent [id number] IM RD 10/10/2019    Comments: patient tolerated inj well   Prevnar 1301/31/2020 [facility] PREVNAR 13 [id number] IM RD 01/31/2020    Comments: pt tolerated inj well and left office in stable condition.         Review of Systems  · Constitutional  o Admits  o : night sweats  o Denies  o : fatigue  · Eyes  o Denies  o : double vision, blurred vision  · HENT  o Denies  o : vertigo, recent head injury  · Breasts  o Denies  o : abnormal changes in breast size, additional breast symptoms except as noted in the HPI  · Cardiovascular  o Denies  o : chest pain, irregular heart beats  · Respiratory  o Denies  o : shortness of breath, productive cough  · Gastrointestinal  o Denies  o : nausea, vomiting  · Genitourinary  o Denies  o : dysuria, urinary retention  · Integument  o Denies  o : hair growth change, new skin lesions  · Neurologic  o Denies  o : altered mental status, seizures  · Musculoskeletal  o Denies  o : joint swelling, limitation of motion  · Endocrine  o Denies  o : cold intolerance, heat intolerance  · Heme-Lymph  o Denies  o : petechiae, lymph node enlargement or tenderness  · Allergic-Immunologic  o Denies  o : frequent illnesses      Physical Examination  · Mental Status Exam  o Appearance  o : With her , sitting at a table, good eye contact, normal street clothes, groomed  o Behavior  o : pleasant and cooperative  o Motor  o : No abnormal  o Speech  o : normal rhythm, rate, volume, tone, not hyperverbal, not pressured, normal prosidy, Speaks with an accent  o Mood  o : \"I am doing a lot " "better\"  o Affect  o : Euthymic  o Thought Content  o : negative suicidal ideations, negative homicidal ideations, negative obsessions  o Perceptions  o : negative auditory hallucinations, negative visual hallucinations  o Thought Process  o : linear  o Insight/Judgement  o : fair/fair  o Cognition  o : grossly intact  o Attention  o : intact          Assessment  · Major depressive disorder, recurrent, in partial remission     296.35/F33.41  · Anxiety     300.02/F41.1       Presentation most consistent with major depressive disorder with anxious distress, in partial remission.     Significant improvement.  No change to medications.  Continue Celexa, low-dose Abilify, amitriptyline, lorazepam as needed.  EKG and BMP ordered to monitor QTC and electrolytes.    No insomnia on the amitriptyline.  Consider doxepin, mirtazapine.    See back in 6 weeks.    Continue psychotherapy.       Plan  · Orders  o BMP [facility] ([id number]) - 296.35/F33.41, 300.02/F41.1 - 02/02/2021  o ACO-39: Current medications updated and reviewed () - - 02/02/2021  o EKG with at least 12 leads (INTERPRETATION AND REPORT ONLY) [facility] (Done at [facility] but read at [facility]) ([id number], 93560, 26221, 72411, 15353, 44038) - 296.35/F33.41, 300.02/F41.1 - 02/02/2021  · Medications  o Medications have been Reconciled  o Transition of Care or Provider Policy  · Instructions  o Risk Assessment: Risk of self-harm acutely remain low. Risk factors include a history of suicidal ideation, mood disorder, anxiety disorder, presence of psychosocial stressors such as colonic dysplasia, COVID-19 pandemic. Protective factors include no history of self-harm or suicide attempts, good social support, willingness to engage in care, improved depressive symptoms. Risk of self-harm chronically also remain low, but could be further elevated in the event of treatment noncompliance and/or AODA.  o Medications: CONTINUE lorazepam 0.5 BID PRN, celexa 20 mg daily, amitriptyline 25 mg QHS, Abilify 2 mg " p.o. daily to target depression, anxiety, decreased energy. Risks, benefits, alternatives discussed with patient including increased energy, exacerbation of irritability, akathisia, GI upset, orthostatic hypotension. After discussion of these risks and benefits, the patient voiced understanding and agreed to proceed.  o Therapy: continue.  o Labs/Studies: BMP to monitor sodium levels, EKG.  o Follow Up: 8 weeks.            Electronically Signed by: Vinny Krishnamurthy MD -Author on February 2, 2021 11:48:17 AM

## 2021-05-14 NOTE — PROGRESS NOTES
"   Progress Note      Patient Name: Shabana Ferguson   Patient ID: [id number]   Sex: Female   YOB: 1955    Primary Care Provider: Kvng SALAZAR   Referring Provider: Kvng SALAZAR    Visit Date: 2021    Provider: Vinny Krishnamurthy MD   Location: [facility]   Location Address: [address] [place] [address]  [id number]   Location Phone: [phone]          Chief Complaint     \"I am okay.\"           History Of Present Illness  Shabana Ferguson is a 64 year old /White female, hx of anxiety and depression, fractured patella, HLD, osteopenia, ulcer who presents to the office today referred by MARIE Brice, for anxiety and depression management.   Chart: Psychotropic medications: amitriptyline 25 mg QHS, Celexa 20 mg p.o. daily, lorazepam 0.5 p.o. twice daily as needed anxiety.   Virtual visit via Zoom audio and video due to the COVID-19 pandemic. Patient is accepting of and agreeable to appointment. The visit consisted of the patient, her , and I. Interview: Continued improvement. Sleeping well. Patient reports she is out planting things and cross stitching, which is something she did not do last year because she was depressed. Extremely grateful. Able to go out and shop, per her . Not taking lorazepam as her anxiety is under control. Notes rare \"brain zaps.\" Denies SI HI AVH.  concurs.   ...   ...   ...     Family History:  Suicide Attempts: Deferred   Suicide Completions: Deferred   Substance Use: Deferred   Psychiatric Conditions: Deferred    depression, psychosis, anxiety: Deferred   Developmental History:  Born: Deferred   Siblings: Deferred       Past Medical History  Disease Name Date Onset Notes   Anxiety --  --    Depression --  --    History of fracture of patella 2017 ORIF- Left   Hyperlipemia --  --    Osteopenia --  --    Screening Mammogram 2020- normal "   Ulcer --  teenager         Past Surgical History  Procedure Name Date Notes   Colonoscopy , 2019 --    Knee surgery 2017 broken knee         Medication List  Name Date Started Instructions   amitriptyline 25 mg oral tablet 2021 TAKE 1 TABLET (25 MG) BY ORAL ROUTE ONCE DAILY AT BEDTIME   aripiprazole 2 mg oral tablet 2021 take 1 tablet (2 mg) by oral route once daily for 90 days   atorvastatin 10 mg oral tablet 2021 TAKE 1 TABLET BY MOUTH EVERY DAY for 90 days   Calcium 500 + D 500 mg(1,250mg) -200 unit oral tablet 2020 take 1 tablet by oral route 2 times a day for 90 days   citalopram 20 mg oral tablet 2021 take 1 tablet (20 mg) by oral route once daily for 90 days   garlic 1,000 mg oral capsule  take 1 capsule by oral route daily   Glucosamine 500 mg oral tablet  take 1 tablet by oral route daily   lorazepam 0.5 mg oral tablet 2020 take 1 tablet (0.5 mg) by oral route 2 times per day for 60 days   Omega-3 Fish Oil 300-1,000 mg oral capsule 10/11/2019 take 1 capsule by oral route 3 times a day for 90 days   turmeric root extract 500 mg oral capsule  take 1 capsule by oral route daily   Vitamin D3 1,000 unit oral capsule  take 1 capsule by oral route daily   vitamin E 400 unit oral capsule  take 1 capsule by oral route daily   Women's Multivitamin 18 mg iron-400 mcg-500 mg oral tablet  take 1 tablet by oral route daily         Allergy List  Allergen Name Date Reaction Notes   NO KNOWN DRUG ALLERGIES --  --  --        Allergies Reconciled  Family Medical History  Disease Name Relative/Age Notes   Family history of cancer Brother/   --    Family history of stroke Mother/   --    Family history of heart disease Mother/   --          Reproductive History  Menstrual   Age Menopause: 44   Pregnancy Summary   Total Pregnancies: 7 Full Term: 6 Premature: 0   Ab Induced: 0 Ab Spontaneous: 0 Ectopics: 0   Multiples: 0 Livin         Social History  Finding Status Start/Stop  "Quantity Notes   Alcohol Use --  --/-- --  02/02/2021 - 01/14/2021 - 11/02/2020 - does not drink   lives with spouse --  --/-- --  --    Recreational Drug Use Never --/-- --  02/02/2021 - 01/14/2021 - 11/02/2020 - no   Tobacco Never --/-- --  never smoker         Immunizations  NameDate Admin Mfg Trade Name Lot Number Route Inj VIS Given VIS Publication   [id number]/10/2019 [facility] Fluzone Quadrivalent [id number] IM RD 10/10/2019    Comments: patient tolerated inj well   Prevnar 1301/31/2020 [facility] PREVNAR 13 [id number] IM RD 01/31/2020    Comments: pt tolerated inj well and left office in stable condition.         Review of Systems  · Constitutional  o Denies  o : fatigue, night sweats  · Eyes  o Denies  o : double vision, blurred vision  · HENT  o Denies  o : vertigo, recent head injury  · Breasts  o Denies  o : abnormal changes in breast size, additional breast symptoms except as noted in the HPI  · Cardiovascular  o Denies  o : chest pain, irregular heart beats  · Respiratory  o Denies  o : shortness of breath, productive cough  · Gastrointestinal  o Denies  o : nausea, vomiting  · Genitourinary  o Denies  o : dysuria, urinary retention  · Integument  o Denies  o : hair growth change, new skin lesions  · Neurologic  o Denies  o : altered mental status, seizures  · Musculoskeletal  o Denies  o : joint swelling, limitation of motion  · Endocrine  o Denies  o : cold intolerance, heat intolerance  · Heme-Lymph  o Denies  o : petechiae, lymph node enlargement or tenderness  · Allergic-Immunologic  o Denies  o : frequent illnesses      Physical Examination  · Mental Status Exam  o Appearance  o : With her , sitting at a table, good eye contact, normal street clothes, groomed  o Behavior  o : pleasant and cooperative  o Motor  o : No abnormal  o Speech  o : normal rhythm, rate, volume, tone, not hyperverbal, not pressured, normal prosidy, Speaks with an accent  o Mood  o : \"I am okay\"  o Affect  o : Euthymic, " smiling  o Thought Content  o : negative suicidal ideations, negative homicidal ideations, negative obsessions  o Perceptions  o : negative auditory hallucinations, negative visual hallucinations  o Thought Process  o : linear  o Insight/Judgement  o : fair/fair  o Cognition  o : grossly intact  o Attention  o : intact          Assessment  · Anxiety     300.02/F41.1  · Major depressive disorder, recurrent, in full remission     296.36/F33.42       Presentation most consistent with major depressive disorder with anxious distress, in full remission.     Continued improvement. No change to medications.  Continue Celexa, low-dose Abilify, amitriptyline, lorazepam as needed.  Recent EKG is reassuring.  No signs or symptoms of hyponatremia, so I do not believe it is necessary to get the BMP if the patient has not already gotten it.    No insomnia on the amitriptyline.  Consider doxepin, mirtazapine.    See back in 2 months.    Continue psychotherapy.       Plan  · Orders  o ACO-39: Current medications updated and reviewed () - - 03/26/2021  · Medications  o citalopram 20 mg oral tablet   SIG: take 1 tablet (20 mg) by oral route once daily for 90 days   DISP: (90) Tablet with 1 refills  Refilled on 03/26/2021     o Medications have been Reconciled  o Transition of Care or Provider Policy  · Instructions  o Risk Assessment: Risk of self-harm acutely remain low. Risk factors include a history of suicidal ideation, mood disorder, anxiety disorder, presence of psychosocial stressors such as colonic dysplasia, COVID-19 pandemic. Protective factors include no history of self-harm or suicide attempts, good social support, willingness to engage in care, improved depressive symptoms. Risk of self-harm chronically also remain low, but could be further elevated in the event of treatment noncompliance and/or AODA.  o Medications: CONTINUE lorazepam 0.5 BID PRN, celexa 20 mg daily, amitriptyline 25 mg QHS, Abilify 2 mg p.o. daily  to target depression, anxiety, decreased energy. Risks, benefits, alternatives discussed with patient including increased energy, exacerbation of irritability, akathisia, GI upset, orthostatic hypotension. After discussion of these risks and benefits, the patient voiced understanding and agreed to proceed.  o Therapy: continue.  o Labs/Studies: BMP to monitor sodium levels, EKG.  o Follow Up: 8 weeks.  · Disposition  o Follow Up in 2 months.            Electronically Signed by: Vinny Krishnamurthy MD -Author on March 26, 2021 11:13:51 AM

## 2021-05-15 VITALS
OXYGEN SATURATION: 96 % | BODY MASS INDEX: 27.4 KG/M2 | HEIGHT: 64 IN | DIASTOLIC BLOOD PRESSURE: 88 MMHG | WEIGHT: 160.5 LBS | HEART RATE: 89 BPM | TEMPERATURE: 98.3 F | SYSTOLIC BLOOD PRESSURE: 138 MMHG

## 2021-05-15 VITALS — BODY MASS INDEX: 27.4 KG/M2 | RESPIRATION RATE: 16 BRPM | HEIGHT: 64 IN | WEIGHT: 160.5 LBS

## 2021-05-15 VITALS — RESPIRATION RATE: 16 BRPM | WEIGHT: 158 LBS | BODY MASS INDEX: 26.98 KG/M2 | HEIGHT: 64 IN

## 2021-05-15 VITALS
DIASTOLIC BLOOD PRESSURE: 76 MMHG | BODY MASS INDEX: 26.8 KG/M2 | HEIGHT: 64 IN | OXYGEN SATURATION: 97 % | SYSTOLIC BLOOD PRESSURE: 130 MMHG | WEIGHT: 157 LBS | HEART RATE: 88 BPM

## 2021-05-15 VITALS
HEART RATE: 81 BPM | DIASTOLIC BLOOD PRESSURE: 68 MMHG | TEMPERATURE: 98.2 F | BODY MASS INDEX: 26.98 KG/M2 | WEIGHT: 158 LBS | HEIGHT: 64 IN | SYSTOLIC BLOOD PRESSURE: 116 MMHG | OXYGEN SATURATION: 97 %

## 2021-05-15 VITALS
HEART RATE: 94 BPM | HEIGHT: 64 IN | SYSTOLIC BLOOD PRESSURE: 124 MMHG | DIASTOLIC BLOOD PRESSURE: 76 MMHG | OXYGEN SATURATION: 96 % | WEIGHT: 158 LBS | BODY MASS INDEX: 26.98 KG/M2

## 2021-05-15 VITALS
BODY MASS INDEX: 27.19 KG/M2 | HEIGHT: 64 IN | SYSTOLIC BLOOD PRESSURE: 120 MMHG | DIASTOLIC BLOOD PRESSURE: 72 MMHG | TEMPERATURE: 98.5 F | WEIGHT: 159.25 LBS | HEART RATE: 80 BPM | OXYGEN SATURATION: 97 %

## 2021-05-15 VITALS
WEIGHT: 155.5 LBS | HEART RATE: 78 BPM | OXYGEN SATURATION: 98 % | TEMPERATURE: 98.8 F | DIASTOLIC BLOOD PRESSURE: 75 MMHG | BODY MASS INDEX: 26.55 KG/M2 | HEIGHT: 64 IN | SYSTOLIC BLOOD PRESSURE: 134 MMHG

## 2021-05-15 VITALS
SYSTOLIC BLOOD PRESSURE: 140 MMHG | DIASTOLIC BLOOD PRESSURE: 78 MMHG | BODY MASS INDEX: 26.63 KG/M2 | TEMPERATURE: 98.6 F | HEART RATE: 95 BPM | OXYGEN SATURATION: 98 % | WEIGHT: 156 LBS | HEIGHT: 64 IN

## 2021-05-15 VITALS — HEIGHT: 64 IN | WEIGHT: 160 LBS | BODY MASS INDEX: 27.31 KG/M2 | RESPIRATION RATE: 16 BRPM

## 2021-05-15 VITALS
DIASTOLIC BLOOD PRESSURE: 80 MMHG | HEIGHT: 64 IN | WEIGHT: 154.25 LBS | BODY MASS INDEX: 26.34 KG/M2 | OXYGEN SATURATION: 96 % | SYSTOLIC BLOOD PRESSURE: 126 MMHG | TEMPERATURE: 98.2 F | HEART RATE: 108 BPM

## 2021-05-15 VITALS — BODY MASS INDEX: 24.33 KG/M2 | WEIGHT: 155 LBS | RESPIRATION RATE: 16 BRPM | HEIGHT: 67 IN

## 2021-05-16 VITALS
HEIGHT: 64 IN | DIASTOLIC BLOOD PRESSURE: 68 MMHG | OXYGEN SATURATION: 95 % | HEART RATE: 71 BPM | TEMPERATURE: 98.2 F | SYSTOLIC BLOOD PRESSURE: 110 MMHG | BODY MASS INDEX: 27.7 KG/M2 | WEIGHT: 162.25 LBS

## 2021-06-01 ENCOUNTER — TELEMEDICINE CONVERTED (OUTPATIENT)
Dept: PSYCHIATRY | Facility: CLINIC | Age: 66
End: 2021-06-01
Attending: STUDENT IN AN ORGANIZED HEALTH CARE EDUCATION/TRAINING PROGRAM

## 2021-06-05 NOTE — PROGRESS NOTES
"   Progress Note      Patient Name: Shabana Ferguson   Patient ID: [id number]   Sex: Female   YOB: 1955    Primary Care Provider: Kvng SALAZAR   Referring Provider: Kvng SALAZAR    Visit Date: 2021    Provider: Vinny Krishnamurthy MD   Location: [facility]   Location Address: [address] [place] [address]  [id number]   Location Phone: [phone]          Chief Complaint     \"Other than the constipation I'm doing wonderful.\"           History Of Present Illness  Shabana Ferguson is a 64 year old /White female, hx of anxiety and depression, fractured patella, HLD, osteopenia, ulcer who presents to the office today referred by MARIE Brice, for anxiety and depression management.   Chart: Psychotropic medications: amitriptyline 25 mg QHS, Celexa 20 mg p.o. daily, lorazepam 0.5 p.o. twice daily as needed anxiety.   Virtual visit via Zoom audio and video due to the COVID-19 pandemic. Patient is accepting of and agreeable to appointment. The visit consisted of the patient, her , and I. Interview: Continued improvement. Doing well from d and a perspective. Persistent constipation that began with abilify. Still not taking lorazepam as her anxiety is under control. Denies SI HI AVH.  concurs.   ...   ...   ...     Family History:  Suicide Attempts: Deferred   Suicide Completions: Deferred   Substance Use: Deferred   Psychiatric Conditions: Deferred    depression, psychosis, anxiety: Deferred   Developmental History:  Born: Deferred   Siblings: Deferred       Past Medical History  Disease Name Date Onset Notes   Anxiety --  --    Depression --  --    History of fracture of patella 2017 ORIF- Left   Hyperlipemia --  --    Osteopenia --  --    Screening Mammogram 2020- normal   Ulcer --  teenager         Past Surgical History  Procedure Name Date Notes   Colonoscopy , 2019 --  "   Knee surgery 2017 broken knee         Medication List  Name Date Started Instructions   amitriptyline 25 mg oral tablet 2021 TAKE 1 TABLET (25 MG) BY ORAL ROUTE ONCE DAILY AT BEDTIME   aripiprazole 2 mg oral tablet 2021 take 1 tablet (2 mg) by oral route once daily for 90 days   atorvastatin 10 mg oral tablet 2021 TAKE 1 TABLET BY MOUTH EVERY DAY for 90 days   Calcium 500 500 mg calcium (1,250 mg) oral tablet  take 1 tablet by oral route daily   citalopram 20 mg oral tablet 2021 take 1 tablet (20 mg) by oral route once daily for 90 days   garlic 1,000 mg oral capsule  take 1 capsule by oral route daily   Glucosamine 500 mg oral tablet  take 1 tablet by oral route daily   Omega-3 Fish Oil 300-1,000 mg oral capsule  take 1 capsule by oral route daily   turmeric root extract 500 mg oral capsule  take 1 capsule by oral route daily   vitamin E 400 unit oral capsule  take 1 capsule by oral route daily   Women's Multivitamin 18 mg iron-400 mcg-500 mg oral tablet  take 1 tablet by oral route daily         Allergy List  Allergen Name Date Reaction Notes   NO KNOWN DRUG ALLERGIES --  --  --          Family Medical History  Disease Name Relative/Age Notes   Family history of cancer Brother/   --    Family history of stroke Mother/   --    Family history of heart disease Mother/   --          Reproductive History  Menstrual   Age Menopause: 44   Pregnancy Summary   Total Pregnancies: 7 Full Term: 6 Premature: 0   Ab Induced: 0 Ab Spontaneous: 0 Ectopics: 0   Multiples: 0 Livin         Social History  Finding Status Start/Stop Quantity Notes   Alcohol Use --  --/-- --  2021 - 2021 - 2020 - does not drink   lives with spouse --  --/-- --  --    Recreational Drug Use Never --/-- --  2021 - 2021 - 2020 - no   Tobacco Never --/-- --  never smoker         Immunizations  NameDate Admin Mfg Trade Name Lot Number Route Inj VIS Given VIS Publication   [id number]/10/2019  "[facility] Fluzone Quadrivalent [id number] IM RD 10/10/2019    Comments: patient tolerated inj well   Prevnar 1301/31/2020 [facility] PREVNAR 13 [id number] IM RD 01/31/2020    Comments: pt tolerated inj well and left office in stable condition.         Review of Systems  · Constitutional  o Denies  o : fatigue, night sweats  · Eyes  o Denies  o : double vision, blurred vision  · HENT  o Denies  o : vertigo, recent head injury  · Breasts  o Denies  o : abnormal changes in breast size, additional breast symptoms except as noted in the HPI  · Cardiovascular  o Denies  o : chest pain, irregular heart beats  · Respiratory  o Denies  o : shortness of breath, productive cough  · Gastrointestinal  o Denies  o : nausea, vomiting  · Genitourinary  o Denies  o : dysuria, urinary retention  · Integument  o Denies  o : hair growth change, new skin lesions  · Neurologic  o Denies  o : altered mental status, seizures  · Musculoskeletal  o Denies  o : joint swelling, limitation of motion  · Endocrine  o Denies  o : cold intolerance, heat intolerance  · Heme-Lymph  o Denies  o : petechiae, lymph node enlargement or tenderness  · Allergic-Immunologic  o Denies  o : frequent illnesses      Physical Examination  · Mental Status Exam  o Appearance  o : With her , sitting in a chair, good eye contact, normal street clothes, groomed, hotel room  o Behavior  o : pleasant and cooperative  o Motor  o : No abnormal  o Speech  o : normal rhythm, rate, volume, tone, not hyperverbal, not pressured, normal prosidy, Speaks with an accent  o Mood  o : \"wonderful\"  o Affect  o : Euthymic, smiling  o Thought Content  o : negative suicidal ideations, negative homicidal ideations, negative obsessions  o Perceptions  o : negative auditory hallucinations, negative visual hallucinations  o Thought Process  o : linear  o Insight/Judgement  o : fair/fair  o Cognition  o : grossly intact  o Attention  o : intact          Assessment  · Anxiety     300.02/F41.1  · Major " depressive disorder, recurrent, in full remission     296.36/F33.42       Presentation most consistent with major depressive disorder with anxious distress, in full remission.     Doing well from a mental health standpoint, but having constipation from Abilify.  No doubt the low-dose of amitriptyline in the evenings as also contributing to the cholinergic blockade leading to constipation.  Patient instructed to half the dose of Abilify.  Continue other medications as scheduled.    See back in 2 months. Consider switching amitriptyline with mirtazapine.    No insomnia on the amitriptyline.  Consider doxepin as well, trazodone?    Continue psychotherapy.       Plan  · Orders  o ACO-39: Current medications updated and reviewed () - - 06/01/2021  · Medications  o aripiprazole 2 mg oral tablet   SIG: take 0.5 tablet by oral route daily for 90 days   DISP: (45) Tablet with 1 refills  Adjusted on 06/01/2021     o Medications have been Reconciled  o Transition of Care or Provider Policy  · Instructions  o Risk Assessment: Risk of self-harm acutely remain low. Risk factors include a history of suicidal ideation, mood disorder, anxiety disorder, presence of psychosocial stressors such as colonic dysplasia, COVID-19 pandemic. Protective factors include no history of self-harm or suicide attempts, good social support, willingness to engage in care, improved depressive symptoms. Risk of self-harm chronically also remain low, but could be further elevated in the event of treatment noncompliance and/or AODA.  o Medications: CONTINUE lorazepam 0.5 BID PRN, celexa 20 mg daily, amitriptyline 25 mg QHS, REDUCE abilify 2 to 1 mg p.o. daily to target depression, anxiety, decreased energy. Risks, benefits, alternatives discussed with patient including increased energy, exacerbation of irritability, akathisia, GI upset, orthostatic hypotension. After discussion of these risks and benefits, the patient voiced understanding and agreed  to proceed.  o Therapy: continue.  o Labs/Studies: Consider BMP to monitor sodium levels, EKG.  o Follow Up: 8 weeks.  · Disposition  o Follow Up in 2 months.            Electronically Signed by: Vinny Krishnamurthy MD -Author on June 1, 2021 02:13:54 PM

## 2021-06-07 ENCOUNTER — TRANSCRIBE ORDERS (OUTPATIENT)
Dept: FAMILY MEDICINE CLINIC | Facility: CLINIC | Age: 66
End: 2021-06-07

## 2021-06-07 DIAGNOSIS — Z12.31 SCREENING MAMMOGRAM, ENCOUNTER FOR: Primary | ICD-10-CM

## 2021-06-19 ENCOUNTER — HOSPITAL ENCOUNTER (OUTPATIENT)
Dept: MAMMOGRAPHY | Facility: HOSPITAL | Age: 66
Discharge: HOME | End: 2021-06-19
Admitting: NURSE PRACTITIONER

## 2021-06-19 DIAGNOSIS — Z12.31 SCREENING MAMMOGRAM, ENCOUNTER FOR: ICD-10-CM

## 2021-06-19 PROCEDURE — 77063 BREAST TOMOSYNTHESIS BI: CPT

## 2021-06-19 PROCEDURE — 77063 BREAST TOMOSYNTHESIS BI: CPT | Performed by: RADIOLOGY

## 2021-06-19 PROCEDURE — 77067 SCR MAMMO BI INCL CAD: CPT | Performed by: RADIOLOGY

## 2021-06-19 PROCEDURE — 77067 SCR MAMMO BI INCL CAD: CPT

## 2021-07-26 RX ORDER — ATORVASTATIN CALCIUM 10 MG/1
TABLET, FILM COATED ORAL
Qty: 90 TABLET | Refills: 1 | Status: SHIPPED | OUTPATIENT
Start: 2021-07-26 | End: 2021-10-19 | Stop reason: SDUPTHER

## 2021-07-29 NOTE — PROGRESS NOTES
"Subjective   Shabana Ferguson is a 65 y.o. female who presents today for follow up    Referring Provider:  No referring provider defined for this encounter.    Chief Complaint:  mdd margarita    History of Present Illness:     Shabana Ferguson is a 64 year old /White female, hx of anxiety and depression, fractured patella, HLD, osteopenia, ulcer referred by MARIE Brice, for anxiety and depression management.   Chart: Psychotropic medications: amitriptyline 25 mg QHS, Celexa 20 mg p.o. daily, lorazepam 0.5 p.o. twice daily as needed anxiety.   Virtual visit via Zoom audio and video due to the COVID-19 pandemic. Patient is accepting of and agreeable to appointment. The visit consisted of the patient, her , and I.     7/30: \"Doing better on half the abilify.\"  1. Abilify: occasionally had brain zaps when falling asleep. No longer having. Had them when she was taking 2 mg (not having on 1 mg). Still having some constipation, but \"not that bad.\"  2. Takes 12.5 mg of amitriptyline at night. Helps with insomnia.  3. Overall doing \"ok.\" Some depressive symptoms.  4. Agreeable to trying mirtazapine.  5. No SI HI AVH.    6/1: Interview: Continued improvement. Doing well from d and a perspective. Persistent constipation that began with abilify. Still not taking lorazepam as her anxiety is under control. Denies SI HI AVH.  concurs.    Past Psychiatric History:  Began Psychiatric Treatment: Several years   Dx: Depression and anxiety   Psychiatrist: Doctor Gordon for the last year and a half   Therapist: Sees a therapist at [facility]   Admissions History: Denies, Although she came close to needing admission recently.   Medication Trials: See HPI. Seroquel, Prozac which was too activating, Zoloft which was too activating, Celexa which worked well, Effexor which worked well, Lexapro which did not work, Viibryd which did not work.   Self-Harm: Denies   Suicide Attempts: Denies   OB/GYN HISTORY:  Sexually " Active: OB/GYN history deferred   Substance Abuse History:  Types: Denies all, including illicit   Social History:  Marital Status:    Employed: No     Kids: 4   House: House    Hx: Denies   Family History:  Suicide Attempts: Deferred today, Due to lack of time   Suicide Completions: Deferred   Substance Use: Deferred   Psychiatric Conditions: Deferred    depression, psychosis, anxiety: Deferred   Developmental History:  Born: Deferred   Siblings: Deferred   Access to Weapons: Denies   Thought Content: no suicidal ideation, no homicidal ideation, no auditory hallucinations, no visual hallucinations, and     PHQ-9 Depression Screening  PHQ-9 Total Score:      Little interest or pleasure in doing things?     Feeling down, depressed, or hopeless?     Trouble falling or staying asleep, or sleeping too much?     Feeling tired or having little energy?     Poor appetite or overeating?     Feeling bad about yourself - or that you are a failure or have let yourself or your family down?     Trouble concentrating on things, such as reading the newspaper or watching television?     Moving or speaking so slowly that other people could have noticed? Or the opposite - being so fidgety or restless that you have been moving around a lot more than usual?     Thoughts that you would be better off dead, or of hurting yourself in some way?     PHQ-9 Total Score       FAREED-7       Past Surgical History:  Past Surgical History:   Procedure Laterality Date   • COLONOSCOPY  2008   • KNEE SURGERY  2017    broken knee       Problem List:  Patient Active Problem List   Diagnosis   • Anxiety   • Depression   • History of fracture of patella   • Hyperlipemia   • Osteopenia   • Ulcerative lesion       Allergy:   No Known Allergies     Discontinued Medications:  Medications Discontinued During This Encounter   Medication Reason   • ARIPiprazole (ABILIFY) 2 MG tablet        Current Medications:   Current  Outpatient Medications   Medication Sig Dispense Refill   • amitriptyline (ELAVIL) 25 MG tablet      • atorvastatin (LIPITOR) 10 MG tablet TAKE 1 TABLET BY MOUTH EVERY DAY 90 tablet 1   • Calcium Carb-Cholecalciferol (OYSCO 500 + D) 500-200 MG-UNIT tablet      • citalopram (CeleXA) 20 MG tablet      • GARLIC PO garlic 1,000 mg oral capsule take 1 capsule by oral route daily   Active     • Glucosamine Sulfate 500 MG tablet Glucosamine 500 mg oral tablet take 1 tablet by oral route daily   Active     • Multiple Vitamins-Iron (MULTIVITAMIN PLUS IRON ADULT PO) Women's Multivitamin 18 mg iron-400 mcg-500 mg oral tablet take 1 tablet by oral route daily   Active     • Omega-3 Fatty Acids (fish oil) 1000 MG capsule capsule Fish Oil 1,000 mg (120 mg-180 mg) oral capsule take 1 capsule by oral route daily   Suspended     • vitamin E 400 UNIT capsule vitamin E 400 unit oral capsule take 1 capsule by oral route daily   Active     • ALPRAZolam (XANAX) 0.5 MG tablet      • calcium carbonate (OS-KIMBERLYN) 1250 (500 Ca) MG tablet Calcium 500 500 mg calcium (1,250 mg) oral tablet take 1 tablet by oral route daily   Active     • Diclofenac Sodium (Voltaren) 1 % gel gel      • erythromycin (ROMYCIN) 5 MG/GM ophthalmic ointment      • mirtazapine (REMERON) 7.5 MG tablet Take 1 tablet by mouth Every Night for 90 days. 30 tablet 2   • Multiple Vitamins-Minerals (OCUVITE ADULT 50+ PO) Ocuvite 528-90-0-150 mg-unit-mg-mg oral capsule take 1 capsule by oral route daily   Suspended     • mupirocin (BACTROBAN) 2 % ointment      • Omega-3 Fatty Acids (Omega-3 Fish Oil) 1000 MG capsule Omega-3 Fish Oil 300-1,000 mg oral capsule take 1 capsule by oral route daily   Active     • ondansetron (ZOFRAN) 4 MG tablet      • oxyCODONE (ROXICODONE) 5 MG immediate release tablet      • sulfamethoxazole-trimethoprim (BACTRIM DS,SEPTRA DS) 800-160 MG per tablet      • Turmeric, Curcuma Longa, (Turmeric Root) powder turmeric root extract 500 mg oral capsule take  "1 capsule by oral route daily   Active       No current facility-administered medications for this visit.       Past Medical History:  Past Medical History:   Diagnosis Date   • Anxiety    • Depression    • Fracture of patella 09/12/2017   • Hyperlipemia    • Osteopenia        Social History     Socioeconomic History   • Marital status:      Spouse name: Not on file   • Number of children: Not on file   • Years of education: Not on file   • Highest education level: Not on file   Tobacco Use   • Smoking status: Never Smoker   • Smokeless tobacco: Never Used   Vaping Use   • Vaping Use: Never used   Substance and Sexual Activity   • Alcohol use: Not Currently   • Drug use: Never   • Sexual activity: Not Currently         Family History   Problem Relation Age of Onset   • Stroke Mother    • Heart disease Mother    • Cancer Brother      Review of Systems:  Review of Systems   Constitutional: Negative for diaphoresis and fatigue.   HENT: Negative for drooling.    Eyes: Negative for visual disturbance.   Respiratory: Negative for cough and shortness of breath.    Cardiovascular: Negative for chest pain, palpitations and leg swelling.   Gastrointestinal: Negative for nausea and vomiting.   Endocrine: Negative for cold intolerance and heat intolerance.   Genitourinary: Negative for difficulty urinating.   Musculoskeletal: Negative for joint swelling.   Allergic/Immunologic: Negative for immunocompromised state.   Neurological: Negative for dizziness, seizures, speech difficulty and numbness.         · Mental Status Exam  · Appearance  · : With her , sitting in a chair, good eye contact, normal street clothes, groomed, hotel room  · Behavior  · : pleasant and cooperative  · Motor  · : No abnormal  · Speech  · : normal rhythm, rate, volume, tone, not hyperverbal, not pressured, normal prosidy, Speaks with an accent  · Mood  · : \"ok\"  · Affect  · : Euthymic, smiling, mood congruent, good variability  · Thought " Content  · : negative suicidal ideations, negative homicidal ideations, negative obsessions  · Perceptions  · : negative auditory hallucinations, negative visual hallucinations  · Thought Process  · : linear  · Insight/Judgement  · : fair/fair  · Cognition  · : grossly intact  · Attention  · : intact      Physical Exam:  Physical Exam    Vital Signs:   There were no vitals taken for this visit.     Lab Results:   No visits with results within 6 Month(s) from this visit.   Latest known visit with results is:   No results found for any previous visit.       EKG Results:  No orders to display       Imaging Results:  No Images in the past 120 days found..      Assessment/Plan   Diagnoses and all orders for this visit:    1. Generalized anxiety disorder (Primary)    2. Recurrent major depressive disorder in remission (CMS/HCC)    3. Panic attacks    Other orders  -     mirtazapine (REMERON) 7.5 MG tablet; Take 1 tablet by mouth Every Night for 90 days.  Dispense: 30 tablet; Refill: 2        Visit Diagnoses:    ICD-10-CM ICD-9-CM   1. Generalized anxiety disorder  F41.1 300.02   2. Recurrent major depressive disorder in remission (CMS/HCC)  F33.40 296.35   3. Panic attacks  F41.0 300.01       Presentation most consistent with major depressive disorder in partial remission, FAREED, rare panic attacks.      7/30: Abilify causing constipation.  Also caused brain zaps.  Discontinue.  Start mirtazapine to target depression, anxiety.  Willing to try, even though she knows it could make her hungrier.  See back in 4 weeks.  Continue therapy.  Longer term, consider discontinuing amitriptyline.      6/1: Doing well from a mental health standpoint, but having constipation from Abilify. No doubt the low-dose of amitriptyline in the evenings is also contributing to the cholinergic blockade leading to constipation. Patient instructed to half the dose of Abilify. Continue other medications as scheduled.    Previous:  No insomnia on the  amitriptyline. Consider doxepin as well, trazodone.  Abilify caused constipation and brain zaps.        PLAN:  1. Risk Assessment: Risk of self-harm acutely remain low. Risk factors include a history of suicidal ideation, mood disorder, anxiety disorder, presence of psychosocial stressors such as colonic dysplasia, COVID-19 pandemic. Protective factors include no history of self-harm or suicide attempts, good social support, willingness to engage in care, improved depressive symptoms. Risk of self-harm chronically also remain low, but could be further elevated in the event of treatment noncompliance and/or AODA.  2. Medications:   a. START mirtazapine 7.5 mg p.o. nightly. Risks, benefits, alternatives discussed with patient including GI upset, sedation, dizziness with falls risk, increased appetite.  After discussion of these risks and benefits, the patient voiced understanding and agreed to proceed.  b. CONTINUE lorazepam 0.5 BID PRN. Risks, benefits, alternatives discussed with patient including GI upset, sedation, dizziness, respiratory depression, falls risk.  After discussion of these risks and benefits, the patient voiced understanding and agreed to proceed.  c. CONTINUE celexa 20 mg daily. Risks, benefits, alternatives discussed with patient including GI upset, nausea vomiting diarrhea, theoretical decrease of seizure threshold predisposing the patient to a slightly higher seizure risk, headaches, sexual dysfunction, serotonin syndrome, bleeding risk, increased suicidality in patients 24 years and younger.  After discussion of these risks and benefits, the patient voiced understanding and agreed to proceed.  d. CONTINUE amitriptyline 12.5 mg (not 25) QHS.  Risks, benefits, alternatives, discussed with patient including dry mouth, constipation, dizziness, sedation.  After discussion of these risks and benefits, the patient voiced understanding and agreed to proceed.  e. DISCONTINUE abilify 1 mg p.o. daily to  target depression, anxiety, decreased energy. Risks, benefits, alternatives discussed with patient including increased energy, exacerbation of irritability, akathisia, GI upset, orthostatic hypotension. After discussion of these risks and benefits, the patient voiced understanding and agreed to proceed.  3. Therapy: continue.  4. Labs/Studies: BMP to monitor sodium levels in February was entirely normal, EKG February shows rate 72, sinus, .  5. Follow Up: 4 weeks.      TREATMENT PLAN/GOALS: Continue supportive psychotherapy efforts and medications as indicated. Treatment and medication options discussed during today's visit. Patient acknowledged and verbally consented to continue with current treatment plan and was educated on the importance of compliance with treatment and follow-up appointments.    MEDICATION ISSUES:  YVETTE reviewed as expected.  Discussed medication options and treatment plan of prescribed medication as well as the risks, benefits, and side effects including potential falls, possible impaired driving and metabolic adversities among others. Patient is agreeable to call the office with any worsening of symptoms or onset of side effects. Patient is agreeable to call 911 or go to the nearest ER should he/she begin having SI/HI. No medication side effects or related complaints today.     MEDS ORDERED DURING VISIT:  New Medications Ordered This Visit   Medications   • mirtazapine (REMERON) 7.5 MG tablet     Sig: Take 1 tablet by mouth Every Night for 90 days.     Dispense:  30 tablet     Refill:  2       Return in about 4 weeks (around 8/27/2021).         This document has been electronically signed by Vinny Krishnamurthy MD  July 30, 2021 10:14 EDT      Part of this note may be an electronic transcription/translation of spoken language to printed text using the Dragon Dictation System.

## 2021-07-29 NOTE — PATIENT INSTRUCTIONS
1.  Please return to clinic at your next scheduled visit.  Contact the clinic ([phone]) at least 24 hours prior in the event you need to cancel.  2.  Do no harm to yourself or others.    3.  Avoid alcohol and drugs.    4.  Take all medications as prescribed.  Please contact the clinic with any concerns. If you are in need of medication refills, please call the clinic at [phone].    5. Should you want to get in touch with your provider, Dr. Vinny Krishnamurthy, please utilize [facility] or contact the office ([phone]), and staff will be able to page Dr. Krishnamurthy directly.  6.  In the event you have personal crisis, contact the following crisis numbers: Suicide Prevention Hotline 1-760.414.3156; [facility] Helpline 0-218-284-RHZP; [facility] Room [phone]; text HELLO to 187782; or 956.     SPECIFIC RECOMMENDATIONS:     1.      Medications discussed at this encounter:                   -Stop Abilify, start mirtazapine     2.      Psychotherapy recommendations: Continue     3.     Return to clinic: 4 weeks

## 2021-07-30 ENCOUNTER — TELEMEDICINE (OUTPATIENT)
Dept: PSYCHIATRY | Facility: CLINIC | Age: 66
End: 2021-07-30

## 2021-07-30 DIAGNOSIS — F41.0 PANIC ATTACKS: ICD-10-CM

## 2021-07-30 DIAGNOSIS — F33.40 RECURRENT MAJOR DEPRESSIVE DISORDER IN REMISSION (HCC): ICD-10-CM

## 2021-07-30 DIAGNOSIS — F41.1 GENERALIZED ANXIETY DISORDER: Primary | ICD-10-CM

## 2021-07-30 PROBLEM — F32.A DEPRESSION: Status: ACTIVE | Noted: 2021-07-30

## 2021-07-30 PROBLEM — M85.80 OSTEOPENIA: Status: ACTIVE | Noted: 2021-07-30

## 2021-07-30 PROBLEM — F41.9 ANXIETY: Status: ACTIVE | Noted: 2021-07-30

## 2021-07-30 PROBLEM — Z87.81 HISTORY OF FRACTURE OF PATELLA: Status: ACTIVE | Noted: 2017-09-12

## 2021-07-30 PROBLEM — IMO0002 ULCERATIVE LESION: Status: ACTIVE | Noted: 2021-07-30

## 2021-07-30 PROBLEM — E78.5 HYPERLIPEMIA: Status: ACTIVE | Noted: 2021-07-30

## 2021-07-30 PROCEDURE — 99214 OFFICE O/P EST MOD 30 MIN: CPT | Performed by: STUDENT IN AN ORGANIZED HEALTH CARE EDUCATION/TRAINING PROGRAM

## 2021-07-30 RX ORDER — SULFAMETHOXAZOLE AND TRIMETHOPRIM 800; 160 MG/1; MG/1
TABLET ORAL
COMMUNITY
End: 2021-08-26

## 2021-07-30 RX ORDER — MIRTAZAPINE 7.5 MG/1
7.5 TABLET, FILM COATED ORAL NIGHTLY
Qty: 30 TABLET | Refills: 2 | Status: SHIPPED | OUTPATIENT
Start: 2021-07-30 | End: 2021-08-16 | Stop reason: SDUPTHER

## 2021-07-30 RX ORDER — AMITRIPTYLINE HYDROCHLORIDE 25 MG/1
TABLET, FILM COATED ORAL
COMMUNITY
Start: 2021-06-27 | End: 2021-08-26

## 2021-07-30 RX ORDER — ARIPIPRAZOLE 2 MG/1
1 TABLET ORAL DAILY
COMMUNITY
Start: 2021-06-28 | End: 2021-07-30

## 2021-07-30 RX ORDER — ONDANSETRON 4 MG/1
TABLET, FILM COATED ORAL
COMMUNITY
End: 2021-08-26

## 2021-07-30 RX ORDER — IBUPROFEN 200 MG
CAPSULE ORAL
COMMUNITY
End: 2021-08-27

## 2021-07-30 RX ORDER — CITALOPRAM 20 MG/1
TABLET ORAL
COMMUNITY
Start: 2021-06-27 | End: 2021-09-24

## 2021-07-30 RX ORDER — CALCIUM CARBONATE/VITAMIN D3 500MG-5MCG
TABLET ORAL
COMMUNITY

## 2021-07-30 RX ORDER — METHYLPREDNISOLONE 4 MG
TABLET, DOSE PACK ORAL
COMMUNITY
End: 2022-10-31

## 2021-07-30 RX ORDER — CHLORAL HYDRATE 500 MG
3000 CAPSULE ORAL
COMMUNITY

## 2021-07-30 RX ORDER — CHLORAL HYDRATE 500 MG
CAPSULE ORAL
COMMUNITY
End: 2021-08-26 | Stop reason: SDUPTHER

## 2021-07-30 RX ORDER — ALPRAZOLAM 0.5 MG/1
TABLET ORAL
COMMUNITY
End: 2021-08-27

## 2021-07-30 RX ORDER — ERYTHROMYCIN 5 MG/G
OINTMENT OPHTHALMIC
COMMUNITY
End: 2021-08-26

## 2021-07-30 RX ORDER — OXYCODONE HYDROCHLORIDE 5 MG/1
TABLET ORAL
COMMUNITY
End: 2021-08-26

## 2021-07-30 RX ORDER — VITAMIN E 268 MG
CAPSULE ORAL
COMMUNITY

## 2021-07-30 RX ORDER — TURMERIC 100 %
POWDER (GRAM) MISCELLANEOUS
COMMUNITY
End: 2021-08-26

## 2021-08-08 PROCEDURE — U0003 INFECTIOUS AGENT DETECTION BY NUCLEIC ACID (DNA OR RNA); SEVERE ACUTE RESPIRATORY SYNDROME CORONAVIRUS 2 (SARS-COV-2) (CORONAVIRUS DISEASE [COVID-19]), AMPLIFIED PROBE TECHNIQUE, MAKING USE OF HIGH THROUGHPUT TECHNOLOGIES AS DESCRIBED BY CMS-2020-01-R: HCPCS | Performed by: NURSE PRACTITIONER

## 2021-08-10 ENCOUNTER — TELEPHONE (OUTPATIENT)
Dept: URGENT CARE | Facility: CLINIC | Age: 66
End: 2021-08-10

## 2021-08-16 ENCOUNTER — TELEPHONE (OUTPATIENT)
Dept: PSYCHIATRY | Facility: CLINIC | Age: 66
End: 2021-08-16

## 2021-08-16 DIAGNOSIS — F33.40 RECURRENT MAJOR DEPRESSIVE DISORDER IN REMISSION (HCC): Primary | ICD-10-CM

## 2021-08-16 RX ORDER — MIRTAZAPINE 7.5 MG/1
7.5 TABLET, FILM COATED ORAL NIGHTLY
Qty: 60 TABLET | Refills: 2 | Status: SHIPPED | OUTPATIENT
Start: 2021-08-16 | End: 2021-10-08 | Stop reason: SDUPTHER

## 2021-08-16 NOTE — TELEPHONE ENCOUNTER
Patient's insurance will only pay for her Mirtazapine if it is sent through the [facility] pharmacy.  Can you please reroute?

## 2021-08-16 NOTE — TELEPHONE ENCOUNTER
PT SAID [facility], PT WOULD LIKE FOR MED TO GO TO [facility] BECAUSE SHE CAN GET IT CHEAPER

## 2021-08-26 ENCOUNTER — PREP FOR SURGERY (OUTPATIENT)
Dept: OTHER | Facility: HOSPITAL | Age: 66
End: 2021-08-26

## 2021-08-26 ENCOUNTER — OFFICE VISIT (OUTPATIENT)
Dept: SURGERY | Facility: CLINIC | Age: 66
End: 2021-08-26

## 2021-08-26 VITALS — WEIGHT: 161.8 LBS | OXYGEN SATURATION: 95 % | HEIGHT: 64 IN | BODY MASS INDEX: 27.62 KG/M2 | HEART RATE: 89 BPM

## 2021-08-26 DIAGNOSIS — Z86.010 HISTORY OF COLONIC POLYPS: ICD-10-CM

## 2021-08-26 DIAGNOSIS — K59.00 CONSTIPATION, UNSPECIFIED CONSTIPATION TYPE: Primary | ICD-10-CM

## 2021-08-26 DIAGNOSIS — K59.00 CONSTIPATION: Primary | ICD-10-CM

## 2021-08-26 PROBLEM — Z86.0100 HISTORY OF COLONIC POLYPS: Status: ACTIVE | Noted: 2021-08-26

## 2021-08-26 PROCEDURE — 99213 OFFICE O/P EST LOW 20 MIN: CPT | Performed by: NURSE PRACTITIONER

## 2021-08-26 RX ORDER — SODIUM CHLORIDE 0.9 % (FLUSH) 0.9 %
3 SYRINGE (ML) INJECTION EVERY 12 HOURS SCHEDULED
Status: CANCELLED | OUTPATIENT
Start: 2021-08-26

## 2021-08-26 RX ORDER — SODIUM, POTASSIUM,MAG SULFATES 17.5-3.13G
SOLUTION, RECONSTITUTED, ORAL ORAL
Qty: 354 ML | Refills: 0 | Status: SHIPPED | OUTPATIENT
Start: 2021-08-26 | End: 2021-11-05

## 2021-08-26 RX ORDER — SODIUM CHLORIDE 0.9 % (FLUSH) 0.9 %
10 SYRINGE (ML) INJECTION AS NEEDED
Status: CANCELLED | OUTPATIENT
Start: 2021-08-26

## 2021-08-26 RX ORDER — MELATONIN
1000 DAILY
COMMUNITY

## 2021-08-26 RX ORDER — MIRTAZAPINE 15 MG/1
7.5 TABLET, FILM COATED ORAL
COMMUNITY
Start: 2021-08-19 | End: 2021-08-27

## 2021-08-26 NOTE — PROGRESS NOTES
Chief Complaint:     Follow-up (1 year) and Colonoscopy (consult)    Subjective         History of Present Illness  Shabana Ferguson is a 65 y.o. female presents to [facility] for colonoscopy consulation.      Patient reports some constipation issues in which she is taken MOM.    Denies any abdominal pain, change in bowel habit or rectal bleeding.    Patient admits to family history of colon cancer with her father.    Dr. Fuller performed colonoscopy last year, patient had developed another polyp and a previous tattooed area.  He recommended that she repeat colonoscopy in 1 year again.    6/20: Colonoscopy (Alina): 2 sigmoid biopsies- inflammatory polyp; tubular adenoma.    5/19: colonoscopy (Александр): sigmoid - inflammatory; cecum - tubulovillous adenoma; diverticulosis.    1/19: Colonoscopy (Jerome): Cecum - tubular adenoma with high grade dysplasia; Ascending - tubular adenoma; sigmoid - 2 inflammatory polyps with surface erosions/ulceratoin; diverticulosis.      Objective     Past Medical History:   Diagnosis Date   • Anxiety    • Depression    • Fracture of patella 09/12/2017   • Hyperlipemia    • Osteopenia        Past Surgical History:   Procedure Laterality Date   • COLONOSCOPY  [id number] 2008   • KNEE SURGERY  2017    broken knee         Current Outpatient Medications:   •  atorvastatin (LIPITOR) 10 MG tablet, TAKE 1 TABLET BY MOUTH EVERY DAY, Disp: 90 tablet, Rfl: 1  •  Calcium Carb-Cholecalciferol (OYSCO 500 + D) 500-200 MG-UNIT tablet, , Disp: , Rfl:   •  calcium carbonate (OS-KIMBERLYN) 1250 (500 Ca) MG tablet, Calcium 500 500 mg calcium (1,250 mg) oral tablet take 1 tablet by oral route daily   Active, Disp: , Rfl:   •  cholecalciferol (VITAMIN D3) 25 MCG (1000 UT) tablet, Take 1,000 Units by mouth Daily., Disp: , Rfl:   •  citalopram (CeleXA) 20 MG tablet, , Disp: , Rfl:   •  GARLIC PO, garlic 1,000 mg oral capsule take 1 capsule by oral route daily   Active,  "Disp: , Rfl:   •  Glucosamine Sulfate 500 MG tablet, Glucosamine 500 mg oral tablet take 1 tablet by oral route daily   Active, Disp: , Rfl:   •  Multiple Vitamins-Iron (MULTIVITAMIN PLUS IRON ADULT PO), Women's Multivitamin 18 mg iron-400 mcg-500 mg oral tablet take 1 tablet by oral route daily   Active, Disp: , Rfl:   •  Multiple Vitamins-Minerals (OCUVITE ADULT 50+ PO), Ocuvite 635-85-1-150 mg-unit-mg-mg oral capsule take 1 capsule by oral route daily   Suspended, Disp: , Rfl:   •  Omega-3 Fatty Acids (fish oil) 1000 MG capsule capsule, Fish Oil 1,000 mg (120 mg-180 mg) oral capsule take 1 capsule by oral route daily   Suspended, Disp: , Rfl:   •  vitamin E 400 UNIT capsule, vitamin E 400 unit oral capsule take 1 capsule by oral route daily   Active, Disp: , Rfl:   •  ALPRAZolam (XANAX) 0.5 MG tablet, , Disp: , Rfl:   •  Diclofenac Sodium (Voltaren) 1 % gel gel, , Disp: , Rfl:   •  mirtazapine (REMERON) 15 MG tablet, , Disp: , Rfl:   •  mirtazapine (REMERON) 7.5 MG tablet, Take 1 tablet by mouth Every Night for 180 days., Disp: 60 tablet, Rfl: 2  •  sodium-potassium-magnesium sulfates (Suprep Bowel Prep Kit) 17.5-3.13-1.6 GM/177ML solution oral solution, Take as directed. Instructions given in office. Disp: 2 bottles, Disp: 354 mL, Rfl: 0    No Known Allergies     Family History   Problem Relation Age of Onset   • Stroke Mother    • Heart disease Mother    • Cancer Brother        Social History     Socioeconomic History   • Marital status:      Spouse name: Not on file   • Number of children: Not on file   • Years of education: Not on file   • Highest education level: Not on file   Tobacco Use   • Smoking status: Never Smoker   • Smokeless tobacco: Never Used   Vaping Use   • Vaping Use: Never used   Substance and Sexual Activity   • Alcohol use: Not Currently   • Drug use: Never   • Sexual activity: Not Currently       Review of Systems     Vital Signs:   Pulse 89   Ht 162.6 cm (64\")   Wt 73.4 kg (161 " lb 12.8 oz)   SpO2 95% Comment: room air  BMI 27.77 kg/m²      Physical Exam  Constitutional:       Appearance: Normal appearance.   Cardiovascular:      Rate and Rhythm: Normal rate.   Pulmonary:      Effort: Pulmonary effort is normal.   Abdominal:      General: Abdomen is flat.   Skin:     General: Skin is warm and dry.   Neurological:      General: No focal deficit present.      Mental Status: She is alert and oriented to person, place, and time.   Psychiatric:         Mood and Affect: Mood normal.         Behavior: Behavior normal.          Result Review :              []  Laboratory  []  Radiology  [x]  Pathology  []  Microbiology  []  EKG/Telemetry   []  Cardiology/Vascular   [x]  Old records       Assessment and Plan    Diagnoses and all orders for this visit:    1. Constipation, unspecified constipation type (Primary)    2. History of colonic polyps    Other orders  -     sodium-potassium-magnesium sulfates (Suprep Bowel Prep Kit) 17.5-3.13-1.6 GM/177ML solution oral solution; Take as directed. Instructions given in office. Disp: 2 bottles  Dispense: 354 mL; Refill: 0        Follow Up   Return for Scheduled colonoscopy with Dr. Fuller on 10/29/2021 at [facility].     Hospital will call with arrival time.      Patient was given instructions and counseling regarding her condition or for health maintenance advice. Please see specific information pulled into the AVS if appropriate.

## 2021-08-26 NOTE — PATIENT INSTRUCTIONS
1.  Please return to clinic at your next scheduled visit.  Contact the clinic ([phone]) at least 24 hours prior in the event you need to cancel.  2.  Do no harm to yourself or others.    3.  Avoid alcohol and drugs.    4.  Take all medications as prescribed.  Please contact the clinic with any concerns. If you are in need of medication refills, please call the clinic at [phone].    5. Should you want to get in touch with your provider, Dr. Vinny Krishnamurthy, please utilize [facility] or contact the office ([phone]), and staff will be able to page Dr. Krishnamurthy directly.  6.  In the event you have personal crisis, contact the following crisis numbers: Suicide Prevention Hotline 1-916.665.6920; [facility] Helpline 0-909-700-CEFT; [facility] [phone]; text HELLO to 045216; or 701.     SPECIFIC RECOMMENDATIONS:     1.      Medications discussed at this encounter:                   - no changes     2.      Psychotherapy recommendations:      3.     Return to clinic: 6 weeks

## 2021-08-26 NOTE — PROGRESS NOTES
"Subjective   Shabana Ferguson is a 65 y.o. female who presents today for follow up    Referring Provider:  No referring provider defined for this encounter.    Chief Complaint:  mdd margarita    History of Present Illness:     Shabana Ferguson is a 64 year old /White female, hx of anxiety and depression, fractured patella, HLD, osteopenia, ulcer referred by MARIE Brice, for anxiety and depression management.   Chart: Psychotropic medications: amitriptyline 25 mg QHS, Celexa 20 mg p.o. daily, lorazepam 0.5 p.o. twice daily as needed anxiety.   Virtual visit via Zoom audio and video due to the COVID-19 pandemic. Patient is accepting of and agreeable to appointment. The visit consisted of the patient, her , and I.     8/27: Virtual visit via Zoom audio and video due to the COVID-19 pandemic.  Patient is accepting of and agreeable to visit.  The visit consisted of the patient and I.  Interview:  1. Records reviewed: Patient seen yesterday for colonoscopy consult for constipation issues.  tested positive for COVID.  She tested negative on the eighth.  2. Mirtazapine is \"helping.\" Falling asleep. Feels \"fine.\" Anxiety and depression is under control; no restlessness. Doesn't really use ativan.  3. No SI HI AVH.      7/30: \"Doing better on half the abilify.\"  1. Abilify: occasionally had brain zaps when falling asleep. No longer having. Had them when she was taking 2 mg (not having on 1 mg). Still having some constipation, but \"not that bad.\"  2. Takes 12.5 mg of amitriptyline at night. Helps with insomnia.  3. Overall doing \"ok.\" Some depressive symptoms.  4. Agreeable to trying mirtazapine.  5. No SI HI AVH.    6/1: Interview: Continued improvement. Doing well from d and a perspective. Persistent constipation that began with abilify. Still not taking lorazepam as her anxiety is under control. Denies SI HI AVH.  concurs.    Past Psychiatric History:  Began Psychiatric Treatment: " Several years   Dx: Depression and anxiety   Psychiatrist: Doctor Gordon for the last year and a half   Therapist: Sees a therapist at [facility] History: Denies, Although she came close to needing admission recently.   Medication Trials: See HPI. Seroquel, Prozac which was too activating, Zoloft which was too activating, Celexa which worked well, Effexor which worked well, Lexapro which did not work, Viibryd which did not work.   Self-Harm: Denies   Suicide Attempts: Denies   OB/GYN HISTORY:  Sexually Active: OB/GYN history deferred   Substance Abuse History:  Types: Denies all, including illicit   Social History:  Marital Status:    Employed: No     Kids: 4   House: House    Hx: Denies   Family History:  Suicide Attempts: Deferred today, Due to lack of time   Suicide Completions: Deferred   Substance Use: Deferred   Psychiatric Conditions: Deferred    depression, psychosis, anxiety: Deferred   Developmental History:  Born: Deferred   Siblings: Deferred   Access to Weapons: Denies   Thought Content: no suicidal ideation, no homicidal ideation, no auditory hallucinations, no visual hallucinations, and     PHQ-9 Depression Screening  PHQ-9 Total Score: 0    Little interest or pleasure in doing things? 0   Feeling down, depressed, or hopeless? 0   Trouble falling or staying asleep, or sleeping too much?     Feeling tired or having little energy?     Poor appetite or overeating?     Feeling bad about yourself - or that you are a failure or have let yourself or your family down?     Trouble concentrating on things, such as reading the newspaper or watching television?     Moving or speaking so slowly that other people could have noticed? Or the opposite - being so fidgety or restless that you have been moving around a lot more than usual?     Thoughts that you would be better off dead, or of hurting yourself in some way?     PHQ-9 Total Score 0     FAREED-7  Feeling nervous, anxious or on  edge: Not at all  Not being able to stop or control worrying: Not at all  Worrying too much about different things: Not at all  Trouble Relaxing: Not at all  Being so restless that it is hard to sit still: Not at all  Feeling afraid as if something awful might happen: Not at all  Becoming easily annoyed or irritable: Not at all  FAREED 7 Total Score: 0  If you checked any problems, how difficult have these problems made it for you to do your work, take care of things at home, or get along with other people: Not difficult at all    Past Surgical History:  Past Surgical History:   Procedure Laterality Date   • COLONOSCOPY  1/[id number] 2008   • KNEE SURGERY  2017    broken knee       Problem List:  Patient Active Problem List   Diagnosis   • Anxiety   • Depression   • History of fracture of patella   • Hyperlipemia   • Osteopenia   • Ulcerative lesion   • Constipation   • History of colonic polyps       Allergy:   No Known Allergies     Discontinued Medications:  Medications Discontinued During This Encounter   Medication Reason   • ALPRAZolam (XANAX) 0.5 MG tablet *Therapy completed   • calcium carbonate (OS-KIMBERLYN) 1250 (500 Ca) MG tablet *Therapy completed   • Diclofenac Sodium (Voltaren) 1 % gel gel *Therapy completed   • mirtazapine (REMERON) 15 MG tablet *Therapy completed       Current Medications:   Current Outpatient Medications   Medication Sig Dispense Refill   • atorvastatin (LIPITOR) 10 MG tablet TAKE 1 TABLET BY MOUTH EVERY DAY 90 tablet 1   • Calcium Carb-Cholecalciferol (OYSCO 500 + D) 500-200 MG-UNIT tablet      • cholecalciferol (VITAMIN D3) 25 MCG (1000 UT) tablet Take 1,000 Units by mouth Daily.     • citalopram (CeleXA) 20 MG tablet      • GARLIC PO garlic 1,000 mg oral capsule take 1 capsule by oral route daily   Active     • Glucosamine Sulfate 500 MG tablet Glucosamine 500 mg oral tablet take 1 tablet by oral route daily   Active     • mirtazapine (REMERON) 7.5 MG tablet Take 1 tablet by mouth Every  Night for 180 days. 60 tablet 2   • Multiple Vitamins-Iron (MULTIVITAMIN PLUS IRON ADULT PO) Women's Multivitamin 18 mg iron-400 mcg-500 mg oral tablet take 1 tablet by oral route daily   Active     • Multiple Vitamins-Minerals (OCUVITE ADULT 50+ PO) Ocuvite 828-83-6-150 mg-unit-mg-mg oral capsule take 1 capsule by oral route daily   Suspended     • Omega-3 Fatty Acids (fish oil) 1000 MG capsule capsule Fish Oil 1,000 mg (120 mg-180 mg) oral capsule take 1 capsule by oral route daily   Suspended     • sodium-potassium-magnesium sulfates (Suprep Bowel Prep Kit) 17.5-3.13-1.6 GM/177ML solution oral solution Take as directed. Instructions given in office. Disp: 2 bottles 354 mL 0   • vitamin E 400 UNIT capsule vitamin E 400 unit oral capsule take 1 capsule by oral route daily   Active       No current facility-administered medications for this visit.       Past Medical History:  Past Medical History:   Diagnosis Date   • Anxiety    • Depression    • Fracture of patella 09/12/2017   • Hyperlipemia    • Osteopenia        Social History     Socioeconomic History   • Marital status:      Spouse name: Not on file   • Number of children: Not on file   • Years of education: Not on file   • Highest education level: Not on file   Tobacco Use   • Smoking status: Never Smoker   • Smokeless tobacco: Never Used   Vaping Use   • Vaping Use: Never used   Substance and Sexual Activity   • Alcohol use: Not Currently   • Drug use: Never   • Sexual activity: Not Currently         Family History   Problem Relation Age of Onset   • Stroke Mother    • Heart disease Mother    • Cancer Brother      Review of Systems:  Review of Systems   Constitutional: Negative for diaphoresis and fatigue.   HENT: Negative for drooling.    Eyes: Negative for visual disturbance.   Respiratory: Negative for cough and shortness of breath.    Cardiovascular: Negative for chest pain, palpitations and leg swelling.   Gastrointestinal: Negative for nausea  "and vomiting.   Endocrine: Negative for cold intolerance and heat intolerance.   Genitourinary: Negative for difficulty urinating.   Musculoskeletal: Negative for joint swelling.   Allergic/Immunologic: Negative for immunocompromised state.   Neurological: Negative for dizziness, seizures, speech difficulty and numbness.         · Mental Status Exam  · Appearance  · : With her , sitting in a chair, good eye contact, normal street clothes, groomed, hotel room  · Behavior  · : pleasant and cooperative  · Motor  · : No abnormal  · Speech  · : normal rhythm, rate, volume, tone, not hyperverbal, not pressured, normal prosidy, Speaks with an accent  · Mood  · : \"fine\"  · Affect  · : Euthymic, smiling, mood congruent, good variability  · Thought Content  · : negative suicidal ideations, negative homicidal ideations, negative obsessions  · Perceptions  · : negative auditory hallucinations, negative visual hallucinations  · Thought Process  · : linear  · Insight/Judgement  · : fair/fair  · Cognition  · : grossly intact  · Attention  · : intact      Physical Exam:  Physical Exam    Vital Signs:   There were no vitals taken for this visit.     Lab Results:   Admission on 08/08/2021, Discharged on 08/08/2021   Component Date Value Ref Range Status   • SARS Antigen 08/08/2021 Not Detected  Not Detected Final   • Internal Control 08/08/2021 Passed  Passed Final   • Lot Number 08/08/2021 [id number]   Final   • Expiration Date 08/08/2021 01/16/2023   Final   • COVID19 08/08/2021 Not Detected  Not Detected - Ref. Range Final       EKG Results:  No orders to display       Imaging Results:  No Images in the past 120 days found..      Assessment/Plan   Diagnoses and all orders for this visit:    1. Recurrent major depressive disorder in remission (CMS/HCC) (Primary)    2. Generalized anxiety disorder    3. Panic attacks        Visit Diagnoses:    ICD-10-CM ICD-9-CM   1. Recurrent major depressive disorder in remission (CMS/HCC)  " F33.40 296.35   2. Generalized anxiety disorder  F41.1 300.02   3. Panic attacks  F41.0 300.01       Presentation most consistent with major depressive disorder in partial remission, FAREED, rare panic attacks.    8/27: Doing well on mirtazapine. 6 wks.    7/30: Abilify causing constipation.  Also caused brain zaps.  Discontinue.  Start mirtazapine to target depression, anxiety.  Willing to try, even though she knows it could make her hungrier.  See back in 4 weeks.  Continue therapy.  Longer term, consider discontinuing amitriptyline.    6/1: Doing well from a mental health standpoint, but having constipation from Abilify. No doubt the low-dose of amitriptyline in the evenings is also contributing to the cholinergic blockade leading to constipation. Patient instructed to half the dose of Abilify. Continue other medications as scheduled.    Previous:  No insomnia on the amitriptyline. Consider doxepin as well, trazodone.  Abilify caused constipation and brain zaps.        PLAN:  4. Risk Assessment: Risk of self-harm acutely remain low. Risk factors include a history of suicidal ideation, mood disorder, anxiety disorder, presence of psychosocial stressors such as colonic dysplasia, COVID-19 pandemic. Protective factors include no history of self-harm or suicide attempts, good social support, willingness to engage in care, improved depressive symptoms. Risk of self-harm chronically also remain low, but could be further elevated in the event of treatment noncompliance and/or AODA.  5. Medications:   a. CONTINUE mirtazapine 7.5 mg p.o. nightly. Risks, benefits, alternatives discussed with patient including GI upset, sedation, dizziness with falls risk, increased appetite.  After discussion of these risks and benefits, the patient voiced understanding and agreed to proceed.  b. CONTINUE lorazepam 0.5 BID PRN. Risks, benefits, alternatives discussed with patient including GI upset, sedation, dizziness, respiratory  depression, falls risk.  After discussion of these risks and benefits, the patient voiced understanding and agreed to proceed.  c. CONTINUE celexa 20 mg daily. Risks, benefits, alternatives discussed with patient including GI upset, nausea vomiting diarrhea, theoretical decrease of seizure threshold predisposing the patient to a slightly higher seizure risk, headaches, sexual dysfunction, serotonin syndrome, bleeding risk, increased suicidality in patients 24 years and younger.  After discussion of these risks and benefits, the patient voiced understanding and agreed to proceed.  d. CONTINUE amitriptyline 12.5 mg (not 25) QHS.  Risks, benefits, alternatives, discussed with patient including dry mouth, constipation, dizziness, sedation.  After discussion of these risks and benefits, the patient voiced understanding and agreed to proceed.  6. Therapy: continue.  7. Labs/Studies: BMP to monitor sodium levels in February was entirely normal, EKG February shows rate 72, sinus, .  8. Follow Up: 6 weeks.      TREATMENT PLAN/GOALS: Continue supportive psychotherapy efforts and medications as indicated. Treatment and medication options discussed during today's visit. Patient acknowledged and verbally consented to continue with current treatment plan and was educated on the importance of compliance with treatment and follow-up appointments.    MEDICATION ISSUES:  YVETTE reviewed as expected.  Discussed medication options and treatment plan of prescribed medication as well as the risks, benefits, and side effects including potential falls, possible impaired driving and metabolic adversities among others. Patient is agreeable to call the office with any worsening of symptoms or onset of side effects. Patient is agreeable to call 911 or go to the nearest ER should he/she begin having SI/HI. No medication side effects or related complaints today.     MEDS ORDERED DURING VISIT:  No orders of the defined types were placed in  this encounter.      Return in about 6 weeks (around 10/8/2021).         This document has been electronically signed by Vinny Krishnamurthy MD  August 27, 2021 10:03 EDT      Part of this note may be an electronic transcription/translation of spoken language to printed text using the Dragon Dictation System.

## 2021-08-27 ENCOUNTER — TELEMEDICINE (OUTPATIENT)
Dept: PSYCHIATRY | Facility: CLINIC | Age: 66
End: 2021-08-27

## 2021-08-27 DIAGNOSIS — F41.1 GENERALIZED ANXIETY DISORDER: ICD-10-CM

## 2021-08-27 DIAGNOSIS — F41.0 PANIC ATTACKS: ICD-10-CM

## 2021-08-27 DIAGNOSIS — F33.40 RECURRENT MAJOR DEPRESSIVE DISORDER IN REMISSION (HCC): Primary | ICD-10-CM

## 2021-08-27 PROCEDURE — 99214 OFFICE O/P EST MOD 30 MIN: CPT | Performed by: STUDENT IN AN ORGANIZED HEALTH CARE EDUCATION/TRAINING PROGRAM

## 2021-09-24 DIAGNOSIS — F33.40 RECURRENT MAJOR DEPRESSIVE DISORDER IN REMISSION (HCC): Primary | ICD-10-CM

## 2021-09-24 RX ORDER — CITALOPRAM 20 MG/1
20 TABLET ORAL DAILY
Qty: 90 TABLET | Refills: 1 | Status: SHIPPED | OUTPATIENT
Start: 2021-09-24 | End: 2021-10-08 | Stop reason: SDUPTHER

## 2021-10-08 ENCOUNTER — TELEMEDICINE (OUTPATIENT)
Dept: PSYCHIATRY | Facility: CLINIC | Age: 66
End: 2021-10-08

## 2021-10-08 DIAGNOSIS — F33.40 RECURRENT MAJOR DEPRESSIVE DISORDER IN REMISSION (HCC): Primary | ICD-10-CM

## 2021-10-08 DIAGNOSIS — F41.1 GENERALIZED ANXIETY DISORDER: ICD-10-CM

## 2021-10-08 DIAGNOSIS — F41.0 PANIC ATTACKS: ICD-10-CM

## 2021-10-08 PROCEDURE — 99214 OFFICE O/P EST MOD 30 MIN: CPT | Performed by: STUDENT IN AN ORGANIZED HEALTH CARE EDUCATION/TRAINING PROGRAM

## 2021-10-08 RX ORDER — CITALOPRAM 20 MG/1
20 TABLET ORAL DAILY
Qty: 90 TABLET | Refills: 1 | Status: SHIPPED | OUTPATIENT
Start: 2021-10-08 | End: 2021-11-15

## 2021-10-08 RX ORDER — MIRTAZAPINE 15 MG/1
15 TABLET, FILM COATED ORAL NIGHTLY
Qty: 30 TABLET | Refills: 2 | Status: SHIPPED | OUTPATIENT
Start: 2021-10-08 | End: 2021-11-05

## 2021-10-08 NOTE — PROGRESS NOTES
"Subjective   Shabana Ferguson is a 65 y.o. female who presents today for follow up    Referring Provider:  No referring provider defined for this encounter.    Chief Complaint:  mdd margarita    History of Present Illness:     Shabana Ferguson is a 64 year old /White female, hx of anxiety and depression, fractured patella, HLD, osteopenia, ulcer referred by MARIE Brice, for anxiety and depression management.   Chart: Psychotropic medications: amitriptyline 25 mg QHS, Celexa 20 mg p.o. daily, lorazepam 0.5 p.o. twice daily as needed anxiety.   Virtual visit via Zoom audio and video due to the COVID-19 pandemic. Patient is accepting of and agreeable to appointment. The visit consisted of the patient, her , and I.     10/8: Virtual visit via Zoom audio and video due to the COVID-19 pandemic.  Patient is accepting of and agreeable to visit.  The visit consisted of the patient and I.  Interview:  1. Chart review: No new developments.  2. \"I have some difficulties the last few weeks.\"  3. Mood: some depression  a. No precipitating event  b. Depressed mood, starts in the morning, but gets better  c. Energy is sometimes low  d. Concentration good  e. Mild feelings of hopelessness  f. No anhedonia; they got a new puppy, which helps  4. Anxiety:  a. Some anxiety, mild  b. Some worrying  c. Some irritability  d. Restlessness at night; maintenance insomnia  e. No precipitating event  5. I haven't used lorazepam for \"a long time.\"  6. Eating: stable  7. Substances: denies  8. Therapy: therapist retired; awaiting a call back.  9. Medication compliant: yes  10. No SI HI AVH.      8/27: Virtual visit via Zoom audio and video due to the COVID-19 pandemic.  Patient is accepting of and agreeable to visit.  The visit consisted of the patient and I.  Interview:  11. Records reviewed: Patient seen yesterday for colonoscopy consult for constipation issues.  tested positive for COVID.  She tested negative " "on the eighth.  12. Mirtazapine is \"helping.\" Falling asleep. Feels \"fine.\" Anxiety and depression is under control; no restlessness. Doesn't really use ativan.  13. No SI HI AVH.    : \"Doing better on half the abilify.\"  1. Abilify: occasionally had brain zaps when falling asleep. No longer having. Had them when she was taking 2 mg (not having on 1 mg). Still having some constipation, but \"not that bad.\"  2. Takes 12.5 mg of amitriptyline at night. Helps with insomnia.  3. Overall doing \"ok.\" Some depressive symptoms.  4. Agreeable to trying mirtazapine.  5. No SI HI AVH.    : Interview: Continued improvement. Doing well from d and a perspective. Persistent constipation that began with abilify. Still not taking lorazepam as her anxiety is under control. Denies SI HI AVH.  concurs.    Past Psychiatric History:  Began Psychiatric Treatment: Several years   Dx: Depression and anxiety   Psychiatrist: Doctor Gordon for the last year and a half   Therapist: Sees a therapist at [facility] History: Denies, Although she came close to needing admission recently.   Medication Trials: See HPI. Seroquel, Prozac which was too activating, Zoloft which was too activating, Celexa which worked well, Effexor which worked well, Lexapro which did not work, Viibryd which did not work.   Self-Harm: Denies   Suicide Attempts: Denies   OB/GYN HISTORY:  Sexually Active: OB/GYN history deferred   Substance Abuse History:  Types: Denies all, including illicit   Social History:  Marital Status:    Employed: No     Kids: 4   House: House    Hx: Denies   Family History:  Suicide Attempts: Deferred today, Due to lack of time   Suicide Completions: Deferred   Substance Use: Deferred   Psychiatric Conditions: Deferred    depression, psychosis, anxiety: Deferred   Developmental History:  Born: Deferred   Siblings: Deferred   Access to Weapons: Denies   Thought Content: no suicidal ideation, no homicidal " ideation, no auditory hallucinations, no visual hallucinations, and     PHQ-9 Depression Screening  PHQ-9 Total Score:      Little interest or pleasure in doing things?     Feeling down, depressed, or hopeless?     Trouble falling or staying asleep, or sleeping too much?     Feeling tired or having little energy?     Poor appetite or overeating?     Feeling bad about yourself - or that you are a failure or have let yourself or your family down?     Trouble concentrating on things, such as reading the newspaper or watching television?     Moving or speaking so slowly that other people could have noticed? Or the opposite - being so fidgety or restless that you have been moving around a lot more than usual?     Thoughts that you would be better off dead, or of hurting yourself in some way?     PHQ-9 Total Score       FAREED-7       Past Surgical History:  Past Surgical History:   Procedure Laterality Date   • COLONOSCOPY  1/812019 2008   • KNEE SURGERY  2017    broken knee       Problem List:  Patient Active Problem List   Diagnosis   • Anxiety   • Depression   • History of fracture of patella   • Hyperlipemia   • Osteopenia   • Ulcerative lesion   • Constipation   • History of colonic polyps       Allergy:   No Known Allergies     Discontinued Medications:  Medications Discontinued During This Encounter   Medication Reason   • mirtazapine (REMERON) 7.5 MG tablet Reorder   • citalopram (CeleXA) 20 MG tablet Reorder       Current Medications:   Current Outpatient Medications   Medication Sig Dispense Refill   • atorvastatin (LIPITOR) 10 MG tablet TAKE 1 TABLET BY MOUTH EVERY DAY 90 tablet 1   • Calcium Carb-Cholecalciferol (OYSCO 500 + D) 500-200 MG-UNIT tablet      • cholecalciferol (VITAMIN D3) 25 MCG (1000 UT) tablet Take 1,000 Units by mouth Daily.     • citalopram (CeleXA) 20 MG tablet Take 1 tablet by mouth Daily. 90 tablet 1   • GARLIC PO garlic 1,000 mg oral capsule take 1 capsule by oral route daily   Active      • Glucosamine Sulfate 500 MG tablet Glucosamine 500 mg oral tablet take 1 tablet by oral route daily   Active     • mirtazapine (REMERON) 15 MG tablet Take 1 tablet by mouth Every Night. 30 tablet 2   • Multiple Vitamins-Iron (MULTIVITAMIN PLUS IRON ADULT PO) Women's Multivitamin 18 mg iron-400 mcg-500 mg oral tablet take 1 tablet by oral route daily   Active     • Multiple Vitamins-Minerals (OCUVITE ADULT 50+ PO) Ocuvite 601-78-9-150 mg-unit-mg-mg oral capsule take 1 capsule by oral route daily   Suspended     • Omega-3 Fatty Acids (fish oil) 1000 MG capsule capsule Fish Oil 1,000 mg (120 mg-180 mg) oral capsule take 1 capsule by oral route daily   Suspended     • sodium-potassium-magnesium sulfates (Suprep Bowel Prep Kit) 17.5-3.13-1.6 GM/177ML solution oral solution Take as directed. Instructions given in office. Disp: 2 bottles 354 mL 0   • vitamin E 400 UNIT capsule vitamin E 400 unit oral capsule take 1 capsule by oral route daily   Active       No current facility-administered medications for this visit.       Past Medical History:  Past Medical History:   Diagnosis Date   • Anxiety    • Depression    • Fracture of patella 09/12/2017   • Hyperlipemia    • Osteopenia        Social History     Socioeconomic History   • Marital status:      Spouse name: Not on file   • Number of children: Not on file   • Years of education: Not on file   • Highest education level: Not on file   Tobacco Use   • Smoking status: Never Smoker   • Smokeless tobacco: Never Used   Vaping Use   • Vaping Use: Never used   Substance and Sexual Activity   • Alcohol use: Not Currently   • Drug use: Never   • Sexual activity: Not Currently         Family History   Problem Relation Age of Onset   • Stroke Mother    • Heart disease Mother    • Cancer Brother      Review of Systems:  Review of Systems   Constitutional: Negative for diaphoresis and fatigue.   HENT: Negative for drooling.    Eyes: Negative for visual disturbance.  "  Respiratory: Negative for cough and shortness of breath.    Cardiovascular: Negative for chest pain, palpitations and leg swelling.   Gastrointestinal: Negative for nausea and vomiting.   Endocrine: Negative for cold intolerance and heat intolerance.   Genitourinary: Negative for difficulty urinating.   Musculoskeletal: Negative for joint swelling.   Allergic/Immunologic: Negative for immunocompromised state.   Neurological: Negative for dizziness, seizures, speech difficulty and numbness.         · Mental Status Exam  · Appearance  · : alone, sitting in a chair, good eye contact, normal street clothes, groomed, in her kitchen  · Behavior  · : pleasant and cooperative  · Motor  · : No abnormal  · Speech  · : normal rhythm, rate, volume, tone, not hyperverbal, not pressured, normal prosidy, Speaks with an accent  · Mood  · : \"some anxiety and depression, but it is mild\"  · Affect  · : Euthymic, smiling, mood incongruent, good variability  · Thought Content  · : negative suicidal ideations, negative homicidal ideations, negative obsessions  · Perceptions  · : negative auditory hallucinations, negative visual hallucinations  · Thought Process  · : linear  · Insight/Judgement  · : fair/fair  · Cognition  · : grossly intact  · Attention  · : intact      Physical Exam:  Physical Exam    Vital Signs:   There were no vitals taken for this visit.     Lab Results:   Admission on 08/08/2021, Discharged on 08/08/2021   Component Date Value Ref Range Status   • SARS Antigen 08/08/2021 Not Detected  Not Detected Final   • Internal Control 08/08/2021 Passed  Passed Final   • Lot Number 08/08/2021 [id number]   Final   • Expiration Date 08/08/2021 01/16/2023   Final   • COVID19 08/08/2021 Not Detected  Not Detected - Ref. Range Final       EKG Results:  No orders to display       Imaging Results:  No Images in the past 120 days found..      Assessment/Plan   Diagnoses and all orders for this visit:    1. Recurrent major depressive " disorder in remission (HCC) (Primary)  -     mirtazapine (REMERON) 15 MG tablet; Take 1 tablet by mouth Every Night.  Dispense: 30 tablet; Refill: 2  -     citalopram (CeleXA) 20 MG tablet; Take 1 tablet by mouth Daily.  Dispense: 90 tablet; Refill: 1    2. Generalized anxiety disorder    3. Panic attacks        Visit Diagnoses:    ICD-10-CM ICD-9-CM   1. Recurrent major depressive disorder in remission (HCC)  F33.40 296.35   2. Generalized anxiety disorder  F41.1 300.02   3. Panic attacks  F41.0 300.01       Presentation most consistent with major depressive disorder in partial remission, FAREED, rare panic attacks.    10/8: Some residual anxiety and depression reemerging.  Increase mirtazapine.  Patient is now doing her visits alone, rather than with her .  4 weeks.    8/27: Doing well on mirtazapine. 6 wks.    7/30: Abilify causing constipation.  Also caused brain zaps.  Discontinue.  Start mirtazapine to target depression, anxiety.  Willing to try, even though she knows it could make her hungrier.  See back in 4 weeks.  Continue therapy.  Longer term, consider discontinuing amitriptyline.    6/1: Doing well from a mental health standpoint, but having constipation from Abilify. No doubt the low-dose of amitriptyline in the evenings is also contributing to the cholinergic blockade leading to constipation. Patient instructed to half the dose of Abilify. Continue other medications as scheduled.    Previous:  No insomnia on the amitriptyline. Consider doxepin as well, trazodone.  Abilify caused constipation and brain zaps.        PLAN:  14. Risk Assessment: Risk of self-harm acutely remain low. Risk factors include a history of suicidal ideation, mood disorder, anxiety disorder, presence of psychosocial stressors such as colonic dysplasia, COVID-19 pandemic. Protective factors include no history of self-harm or suicide attempts, good social support, willingness to engage in care, improved depressive symptoms.  Risk of self-harm chronically also remain low, but could be further elevated in the event of treatment noncompliance and/or AODA.  15. Medications:   a. INCREASE mirtazapine 7.5 to 15 mg p.o. nightly. Risks, benefits, alternatives discussed with patient including GI upset, sedation, dizziness with falls risk, increased appetite.  After discussion of these risks and benefits, the patient voiced understanding and agreed to proceed.  b. CONTINUE lorazepam 0.5 BID PRN. (rarely uses) Risks, benefits, alternatives discussed with patient including GI upset, sedation, dizziness, respiratory depression, falls risk.  After discussion of these risks and benefits, the patient voiced understanding and agreed to proceed.  c. CONTINUE celexa 20 mg daily. Risks, benefits, alternatives discussed with patient including GI upset, nausea vomiting diarrhea, theoretical decrease of seizure threshold predisposing the patient to a slightly higher seizure risk, headaches, sexual dysfunction, serotonin syndrome, bleeding risk, increased suicidality in patients 24 years and younger.  After discussion of these risks and benefits, the patient voiced understanding and agreed to proceed.  d. CONTINUE amitriptyline 12.5 mg (not 25) QHS.  Risks, benefits, alternatives, discussed with patient including dry mouth, constipation, dizziness, sedation.  After discussion of these risks and benefits, the patient voiced understanding and agreed to proceed.  16. Therapy: continue.  17. Labs/Studies: BMP to monitor sodium levels in February was entirely normal, EKG February shows rate 72, sinus, .  18. Follow Up: 4 weeks.      TREATMENT PLAN/GOALS: Continue supportive psychotherapy efforts and medications as indicated. Treatment and medication options discussed during today's visit. Patient acknowledged and verbally consented to continue with current treatment plan and was educated on the importance of compliance with treatment and follow-up  appointments.    MEDICATION ISSUES:  YVETTE reviewed as expected.  Discussed medication options and treatment plan of prescribed medication as well as the risks, benefits, and side effects including potential falls, possible impaired driving and metabolic adversities among others. Patient is agreeable to call the office with any worsening of symptoms or onset of side effects. Patient is agreeable to call 911 or go to the nearest ER should he/she begin having SI/HI. No medication side effects or related complaints today.     MEDS ORDERED DURING VISIT:  New Medications Ordered This Visit   Medications   • mirtazapine (REMERON) 15 MG tablet     Sig: Take 1 tablet by mouth Every Night.     Dispense:  30 tablet     Refill:  2   • citalopram (CeleXA) 20 MG tablet     Sig: Take 1 tablet by mouth Daily.     Dispense:  90 tablet     Refill:  1       Return in about 4 weeks (around 11/5/2021).         This document has been electronically signed by Vinny Krishnamurthy MD  October 8, 2021 09:57 EDT      Part of this note may be an electronic transcription/translation of spoken language to printed text using the Dragon Dictation System.

## 2021-10-08 NOTE — PATIENT INSTRUCTIONS
1.  Please return to clinic at your next scheduled visit.  Contact the clinic ([phone]) at least 24 hours prior in the event you need to cancel.  2.  Do no harm to yourself or others.    3.  Avoid alcohol and drugs.    4.  Take all medications as prescribed.  Please contact the clinic with any concerns. If you are in need of medication refills, please call the clinic at [phone].    5. Should you want to get in touch with your provider, Dr. Vinny Krishnamurthy, please utilize [employer] or contact the office ([phone]), and staff will be able to page Dr. Krishnamurthy directly.  6.  In the event you have personal crisis, contact the following crisis numbers: Suicide Prevention Hotline 1-504.444.6951; [facility] Helpline 8-986-159-QFNT; [facility] [phone]; text HELLO to 381033; or 659.     SPECIFIC RECOMMENDATIONS:     1.      Medications discussed at this encounter:                   - increase mirtazapine     2.      Psychotherapy recommendations:      3.     Return to clinic: 4 weeks

## 2021-10-11 ENCOUNTER — DOCUMENTATION (OUTPATIENT)
Dept: PSYCHIATRY | Facility: CLINIC | Age: 66
End: 2021-10-11

## 2021-10-11 DIAGNOSIS — F41.0 PANIC ATTACKS: Primary | ICD-10-CM

## 2021-10-11 DIAGNOSIS — F41.1 GENERALIZED ANXIETY DISORDER: ICD-10-CM

## 2021-10-11 RX ORDER — LORAZEPAM 0.5 MG/1
0.5 TABLET ORAL EVERY 8 HOURS PRN
Qty: 60 TABLET | Refills: 2 | Status: SHIPPED | OUTPATIENT
Start: 2021-10-11 | End: 2021-11-05 | Stop reason: SDUPTHER

## 2021-10-11 NOTE — PROGRESS NOTES
Patient's  came in, tres.  Stating patient has been very anxious for the last few weeks and has been minimizing it.  States she should be taking her lorazepam but she is not.  States that she is not sure she has any left.    I called the patient and left a message to call back.  I will refill lorazepam also.  All questions were answered.  No SI HI AVH.

## 2021-10-15 ENCOUNTER — OFFICE VISIT (OUTPATIENT)
Dept: FAMILY MEDICINE CLINIC | Facility: CLINIC | Age: 66
End: 2021-10-15

## 2021-10-15 VITALS
HEART RATE: 71 BPM | WEIGHT: 158.8 LBS | OXYGEN SATURATION: 98 % | HEIGHT: 64 IN | BODY MASS INDEX: 27.11 KG/M2 | SYSTOLIC BLOOD PRESSURE: 120 MMHG | TEMPERATURE: 97.9 F | DIASTOLIC BLOOD PRESSURE: 80 MMHG

## 2021-10-15 DIAGNOSIS — Z00.00 ENCOUNTER FOR MEDICARE ANNUAL WELLNESS EXAM: Primary | ICD-10-CM

## 2021-10-15 DIAGNOSIS — Z23 NEED FOR INFLUENZA VACCINATION: ICD-10-CM

## 2021-10-15 DIAGNOSIS — D22.9 CHANGE IN COLOR OF SKIN MOLE: ICD-10-CM

## 2021-10-15 DIAGNOSIS — E78.2 MIXED HYPERLIPIDEMIA: ICD-10-CM

## 2021-10-15 DIAGNOSIS — M85.88 OSTEOPENIA OF LUMBAR SPINE: ICD-10-CM

## 2021-10-15 LAB
ALBUMIN SERPL-MCNC: 4.7 G/DL (ref 3.5–5.2)
ALBUMIN/GLOB SERPL: 1.7 G/DL
ALP SERPL-CCNC: 112 U/L (ref 39–117)
ALT SERPL W P-5'-P-CCNC: 20 U/L (ref 1–33)
ANION GAP SERPL CALCULATED.3IONS-SCNC: 7.9 MMOL/L (ref 5–15)
AST SERPL-CCNC: 24 U/L (ref 1–32)
BASOPHILS # BLD AUTO: 0.03 10*3/MM3 (ref 0–0.2)
BASOPHILS NFR BLD AUTO: 0.5 % (ref 0–1.5)
BILIRUB SERPL-MCNC: 1 MG/DL (ref 0–1.2)
BUN SERPL-MCNC: 14 MG/DL (ref 8–23)
BUN/CREAT SERPL: 18.7 (ref 7–25)
CALCIUM SPEC-SCNC: 9.6 MG/DL (ref 8.6–10.5)
CHLORIDE SERPL-SCNC: 103 MMOL/L (ref 98–107)
CHOLEST SERPL-MCNC: 197 MG/DL (ref 0–200)
CO2 SERPL-SCNC: 28.1 MMOL/L (ref 22–29)
CREAT SERPL-MCNC: 0.75 MG/DL (ref 0.57–1)
DEPRECATED RDW RBC AUTO: 44.6 FL (ref 37–54)
EOSINOPHIL # BLD AUTO: 0.04 10*3/MM3 (ref 0–0.4)
EOSINOPHIL NFR BLD AUTO: 0.7 % (ref 0.3–6.2)
ERYTHROCYTE [DISTWIDTH] IN BLOOD BY AUTOMATED COUNT: 13.4 % (ref 12.3–15.4)
GFR SERPL CREATININE-BSD FRML MDRD: 77 ML/MIN/1.73
GLOBULIN UR ELPH-MCNC: 2.7 GM/DL
GLUCOSE SERPL-MCNC: 96 MG/DL (ref 65–99)
HCT VFR BLD AUTO: 41.7 % (ref 34–46.6)
HDLC SERPL-MCNC: 46 MG/DL (ref 40–60)
HGB BLD-MCNC: 13.9 G/DL (ref 12–15.9)
IMM GRANULOCYTES # BLD AUTO: 0.02 10*3/MM3 (ref 0–0.05)
IMM GRANULOCYTES NFR BLD AUTO: 0.3 % (ref 0–0.5)
LDLC SERPL CALC-MCNC: 122 MG/DL (ref 0–100)
LDLC/HDLC SERPL: 2.58 {RATIO}
LYMPHOCYTES # BLD AUTO: 1.88 10*3/MM3 (ref 0.7–3.1)
LYMPHOCYTES NFR BLD AUTO: 31.2 % (ref 19.6–45.3)
MCH RBC QN AUTO: 30.2 PG (ref 26.6–33)
MCHC RBC AUTO-ENTMCNC: 33.3 G/DL (ref 31.5–35.7)
MCV RBC AUTO: 90.7 FL (ref 79–97)
MONOCYTES # BLD AUTO: 0.57 10*3/MM3 (ref 0.1–0.9)
MONOCYTES NFR BLD AUTO: 9.5 % (ref 5–12)
NEUTROPHILS NFR BLD AUTO: 3.49 10*3/MM3 (ref 1.7–7)
NEUTROPHILS NFR BLD AUTO: 57.8 % (ref 42.7–76)
NRBC BLD AUTO-RTO: 0.2 /100 WBC (ref 0–0.2)
PLATELET # BLD AUTO: 301 10*3/MM3 (ref 140–450)
PMV BLD AUTO: 10.2 FL (ref 6–12)
POTASSIUM SERPL-SCNC: 4.4 MMOL/L (ref 3.5–5.2)
PROT SERPL-MCNC: 7.4 G/DL (ref 6–8.5)
RBC # BLD AUTO: 4.6 10*6/MM3 (ref 3.77–5.28)
SODIUM SERPL-SCNC: 139 MMOL/L (ref 136–145)
TRIGL SERPL-MCNC: 162 MG/DL (ref 0–150)
TSH SERPL DL<=0.05 MIU/L-ACNC: 0.69 UIU/ML (ref 0.27–4.2)
VLDLC SERPL-MCNC: 29 MG/DL (ref 5–40)
WBC # BLD AUTO: 6.03 10*3/MM3 (ref 3.4–10.8)

## 2021-10-15 PROCEDURE — 80061 LIPID PANEL: CPT | Performed by: NURSE PRACTITIONER

## 2021-10-15 PROCEDURE — 90662 IIV NO PRSV INCREASED AG IM: CPT | Performed by: NURSE PRACTITIONER

## 2021-10-15 PROCEDURE — 84443 ASSAY THYROID STIM HORMONE: CPT | Performed by: NURSE PRACTITIONER

## 2021-10-15 PROCEDURE — 80053 COMPREHEN METABOLIC PANEL: CPT | Performed by: NURSE PRACTITIONER

## 2021-10-15 PROCEDURE — 1126F AMNT PAIN NOTED NONE PRSNT: CPT | Performed by: NURSE PRACTITIONER

## 2021-10-15 PROCEDURE — 1170F FXNL STATUS ASSESSED: CPT | Performed by: NURSE PRACTITIONER

## 2021-10-15 PROCEDURE — G0438 PPPS, INITIAL VISIT: HCPCS | Performed by: NURSE PRACTITIONER

## 2021-10-15 PROCEDURE — G0008 ADMIN INFLUENZA VIRUS VAC: HCPCS | Performed by: NURSE PRACTITIONER

## 2021-10-15 PROCEDURE — 1159F MED LIST DOCD IN RCRD: CPT | Performed by: NURSE PRACTITIONER

## 2021-10-15 PROCEDURE — 99397 PER PM REEVAL EST PAT 65+ YR: CPT | Performed by: NURSE PRACTITIONER

## 2021-10-15 PROCEDURE — 85025 COMPLETE CBC W/AUTO DIFF WBC: CPT | Performed by: NURSE PRACTITIONER

## 2021-10-15 NOTE — PROGRESS NOTES
Chief Complaint  Medicare Wellness-Initial Visit    Subjective          Shabana Ferguson presents to [facility]  activities of daily living  Do you need help using the phone: No  You Deford you have serous difficulty in hearing: Yes history of  Do you need help going to places out of walking distance: No  Do you need help shopping: No  Do you need help preparing meals: No  Darlin help with housework: No  Do you need help with laundry: No  Do you need help taking your medications: No  Do you need help managing her money: No  Do you ever drive or ride in a car without wearing a seatbelt: No    Have you felt unusual stress anger and loneliness in the last month: Yes  Who he lives with: Spouse  If you need help, do you have trouble finding someone available to you: No  Have you been bothered by the last 4 weeks for sexual problems: No    Do have difficulty concentrating, remembering or making decisions: No    PHQ-2 Depression Screening  Little interest or pleasure in doing things? 0  Feeling down, depressed, or hopeless? 3  PHQ-2 Total Score 5      Social History    Socioeconomic History      Marital status:     Tobacco Use      Smoking status: Never Smoker      Smokeless tobacco: Never Used    Vaping Use      Vaping Use: Never used    Substance and Sexual Activity      Alcohol use: Not Currently      Drug use: Never      Sexual activity: Not Currently    Past Medical History:  No date: Anxiety  No date: Depression  09/12/2017: Fracture of patella  No date: Hyperlipemia  No date: Osteopenia    Past Surgical History:  1/812019: COLONOSCOPY      Comment:  2008 2017: KNEE SURGERY      Comment:  broken knee      Rash  This is a new (New skin lesion) problem. The current episode started 1 to 4 weeks ago. The affected locations include the torso. Rash characteristics: Black in color, irregular borders. Pertinent negatives include no anorexia. Past treatments include nothing. (No  "previous history of skin cancer/family history of skin cancer)   Hyperlipidemia  This is a recurrent problem. The current episode started more than 1 year ago. The problem is controlled. Recent lipid tests were reviewed and are normal. She has no history of diabetes. Current antihyperlipidemic treatment includes statins. The current treatment provides moderate improvement of lipids. There are no compliance problems.        Objective   Vital Signs:   /80   Pulse 71   Temp 97.9 °F (36.6 °C)   Ht 162.6 cm (64\")   Wt 72 kg (158 lb 12.8 oz)   SpO2 98%   BMI 27.26 kg/m²     Physical Exam  Vitals reviewed.   Constitutional:       Appearance: Normal appearance. She is well-developed.   HENT:      Head: Normocephalic and atraumatic.      Right Ear: External ear normal.      Left Ear: External ear normal.   Eyes:      Conjunctiva/sclera: Conjunctivae normal.      Pupils: Pupils are equal, round, and reactive to light.   Cardiovascular:      Rate and Rhythm: Normal rate and regular rhythm.      Heart sounds: No murmur heard.  No friction rub. No gallop.    Pulmonary:      Effort: Pulmonary effort is normal.      Breath sounds: Normal breath sounds. No wheezing or rhonchi.   Skin:     General: Skin is warm and dry.             Comments: Single new skin lesion black in color irregular border   Neurological:      Mental Status: She is alert and oriented to person, place, and time.   Psychiatric:         Mood and Affect: Mood and affect normal.         Behavior: Behavior normal.         Thought Content: Thought content normal.         Judgment: Judgment normal.        Result Review :                 Assessment and Plan    Diagnoses and all orders for this visit:    1. Encounter for Medicare annual wellness exam (Primary)    2. Change in color of skin mole  Comments:  Will refer over to dermatology for further work-up and management  Orders:  -     Ambulatory Referral to Dermatology    3. Mixed " hyperlipidemia  Comments:  Will check labs, adjust medication if needed  Orders:  -     CBC & Differential  -     Comprehensive Metabolic Panel  -     TSH  -     Lipid Panel    4. Osteopenia of lumbar spine  Comments:  History of osteopenia we will repeat DEXA scan it has been 2 years since previous exam.  Patient is on calcium twice daily currently.  Orders:  -     DEXA Bone Density Axial    5. Need for influenza vaccination  -     Fluzone High-Dose 65+yrs (1220-6146)        Follow Up   Return in about 6 months (around 4/15/2022), or if symptoms worsen or fail to improve.  Patient was given instructions and counseling regarding her condition or for health maintenance advice. Please see specific information pulled into the AVS if appropriate.

## 2021-10-19 ENCOUNTER — TELEMEDICINE (OUTPATIENT)
Dept: PSYCHIATRY | Facility: CLINIC | Age: 66
End: 2021-10-19

## 2021-10-19 DIAGNOSIS — F41.0 PANIC ATTACKS: ICD-10-CM

## 2021-10-19 DIAGNOSIS — F33.40 RECURRENT MAJOR DEPRESSIVE DISORDER IN REMISSION (HCC): ICD-10-CM

## 2021-10-19 DIAGNOSIS — F41.1 GENERALIZED ANXIETY DISORDER: Primary | ICD-10-CM

## 2021-10-19 PROCEDURE — 99214 OFFICE O/P EST MOD 30 MIN: CPT | Performed by: STUDENT IN AN ORGANIZED HEALTH CARE EDUCATION/TRAINING PROGRAM

## 2021-10-19 RX ORDER — ATORVASTATIN CALCIUM 20 MG/1
20 TABLET, FILM COATED ORAL NIGHTLY
Qty: 90 TABLET | Refills: 1 | Status: SHIPPED | OUTPATIENT
Start: 2021-10-19 | End: 2021-11-05

## 2021-10-19 NOTE — PATIENT INSTRUCTIONS
1.  Please return to clinic at your next scheduled visit.  Contact the clinic ([phone]) at least 24 hours prior in the event you need to cancel.  2.  Do no harm to yourself or others.    3.  Avoid alcohol and drugs.    4.  Take all medications as prescribed.  Please contact the clinic with any concerns. If you are in need of medication refills, please call the clinic at [phone].    5. Should you want to get in touch with your provider, Dr. Vinny Krishnamurthy, please utilize [email or web address] or contact the office ([phone]), and staff will be able to page Dr. Krishnamurhty directly.  6.  In the event you have personal crisis, contact the following crisis numbers: Suicide Prevention Hotline 1-786.351.9086; [facility] Helpline 8-382-380-BVCO; [facility] [phone]; text HELLO to 370832; or 574.     SPECIFIC RECOMMENDATIONS:     1.      Medications discussed at this encounter:                   - re-start elavil, reduce mirtazapine     2.      Psychotherapy recommendations:      3.     Return to clinic: 2 weeks

## 2021-10-19 NOTE — PROGRESS NOTES
"Subjective   Shabana Ferguson is a 65 y.o. female who presents today for follow up    Referring Provider:  No referring provider defined for this encounter.    Chief Complaint:  mdd margarita    History of Present Illness:     Shabana Ferguson is a 64 year old /White female, hx of anxiety and depression, fractured patella, HLD, osteopenia, ulcer referred by MARIE Brice, for anxiety and depression management.   Chart: Psychotropic medications: amitriptyline 25 mg QHS, Celexa 20 mg p.o. daily, lorazepam 0.5 p.o. twice daily as needed anxiety.   Virtual visit via Zoom audio and video due to the COVID-19 pandemic. Patient is accepting of and agreeable to appointment. The visit consisted of the patient, her , and I.     10/19: Virtual visit via Zoom audio and video due to the COVID-19 pandemic.  Patient is accepting of and agreeable to visit.  The visit consisted of the patient and I.  Interview:  1. Chart review: Patient's  came in, floritzel.  Stating patient has been very anxious for the last few weeks and has been minimizing it.  States she should be taking her lorazepam but she is not.  States that she is not sure she has any left.I called the patient and left a message to call back.  I will refill lorazepam also.  All questions were answered.  No SI HI AVH.  2. Meds: \"I am taking only half the mirtazapine.\"  a. Was waking up 2-3x during the night on the 15 mg dose.  b. Better on lower dose of it, rather than higher  c. Stopped elavil when started mirtazpine.  3. Depression/Mood: \"Still waking up in the morning in despair.\"  a. Milder in the morning; gets worse during the day  b. Takes lorazepam to treat it; which works  4. Anxiety:  a. Unknown fears, uncontrolled worrying  b. Fear of losing control over her life  c. Fear of diseases  5. Sleeping: wakes once nightly.  6. Eating: deferred  7. Substances: denies  8. Therapy: saw Desi 2 weeks ago  9. Medication compliant: yes  10. No " "SI HI AVH.      10/8: Virtual visit via Zoom audio and video due to the COVID-19 pandemic.  Patient is accepting of and agreeable to visit.  The visit consisted of the patient and I.  Interview:  11. Chart review: No new developments.  12. \"I have some difficulties the last few weeks.\"  13. Mood: some depression  a. No precipitating event  b. Depressed mood, starts in the morning, but gets better  c. Energy is sometimes low  d. Concentration good  e. Mild feelings of hopelessness  f. No anhedonia; they got a new puppy, which helps  14. Anxiety:  a. Some anxiety, mild  b. Some worrying  c. Some irritability  d. Restlessness at night; maintenance insomnia  e. No precipitating event  15. I haven't used lorazepam for \"a long time.\"  16. Eating: stable  17. Substances: denies  18. Therapy: therapist retired; awaiting a call back.  19. Medication compliant: yes  20. No SI HI AVH.      8/27: Virtual visit via Zoom audio and video due to the COVID-19 pandemic.  Patient is accepting of and agreeable to visit.  The visit consisted of the patient and I.  Interview:  21. Records reviewed: Patient seen yesterday for colonoscopy consult for constipation issues.  tested positive for COVID.  She tested negative on the eighth.  22. Mirtazapine is \"helping.\" Falling asleep. Feels \"fine.\" Anxiety and depression is under control; no restlessness. Doesn't really use ativan.  23. No SI HI AVH.    7/30: \"Doing better on half the abilify.\"  1. Abilify: occasionally had brain zaps when falling asleep. No longer having. Had them when she was taking 2 mg (not having on 1 mg). Still having some constipation, but \"not that bad.\"  2. Takes 12.5 mg of amitriptyline at night. Helps with insomnia.  3. Overall doing \"ok.\" Some depressive symptoms.  4. Agreeable to trying mirtazapine.  5. No SI HI AVH.    6/1: Interview: Continued improvement. Doing well from d and a perspective. Persistent constipation that began with abilify. Still not " "taking lorazepam as her anxiety is under control. Denies SI HI MELISA.  concurs.    9/11/20 H&P: Patient presented today with her . Both provided extensive history regarding her depression and anxiety. Patient had been seen by  at [facility] for roughly a year and a half. Patient reported that Dr. Gordon had become convinced that she had bipolar disorder, which both she and her  disagree with. Patient and  deny any episodes in the past where she experienced, for an extended length of time lasting at least several days, no need for sleep, rapid speech, risk-taking behaviors. Per the  and wife pair, her previous psychiatrist insisted on her being on a mood stabilizer.   Patient reports that she had been stable on Celexa for \"several years.\" In 2019, January, the patient underwent a colonoscopy where dysplasia was revealed. This led to significant anxiety and a \"breakdown.\" The dysplasia was subsequently removed. Due to the heightened anxiety the patient's psychiatrist took the patient off of Celexa and started her on Lexapro. The patient did not tolerate Lexapro well, she continued to remain anxious, she began to engage in therapy in addition to medication management. The Lexapro was subsequently switched to Viibryd. The Viibryd did not work. The patient also began to experience panic attacks including shortness of breath, crying, tachycardia, palpitations. The panic attacks were new and had never happened before.   In January 2020 the patient experienced another breakdown and sunk into a deeper depression. The COVID-19 pandemic only worsened her situation. In February the patient's , a physician, decided to become her full-time caretaker as she would become anxious and panicky without him present. The patient also experienced intermittent bouts of suicidal ideation.   Recently, the patient and  demanded of their psychiatrist to take her off of Viibryd and Seroquel. " They actually tapered her off of Seroquel themselves. They asked him to switch her back to Celexa. She has been on Celexa now for the last 3 days and is already begun to experience improvement. She states that the Seroquel led to having hallucinations and actually worsened insomnia. Last night she did not take Seroquel for the first time in many months and she slept very well. Her mood is slightly improved. Regarding her anxiety, she endorses muscle tension and fatigue restlessness irritability and a history of insomnia. Regarding her depression she denies SI HI AVH, denies anhedonia denies guilt, notes some decreased energy, psychomotor retardation, and a history of insomnia. They had also tried acupuncture in the past with some success. Psychiatric review of systems is negative for psychosis nancy, positive for anxiety and depression. Possibly positive for  depression and postpartum depression. The patient is medication compliant.       Past Psychiatric History:  Began Psychiatric Treatment: Several years   Dx: Depression and anxiety   Psychiatrist: Doctor Gordon for the last year and a half   Therapist: Sees a therapist at [facility] History: Denies, Although she came close to needing admission recently.   Medication Trials: See HPI. Seroquel, Prozac which was too activating, Zoloft which was too activating, Celexa which worked well, Effexor which worked well, Lexapro which did not work, Viibryd which did not work.   Self-Harm: Denies   Suicide Attempts: Denies   OB/GYN HISTORY:  Sexually Active: OB/GYN history deferred   Substance Abuse History:  Types: Denies all, including illicit   Social History:  Marital Status:    Employed: No     Kids: 4   House: House    Hx: Denies   Family History:  Suicide Attempts: Deferred today, Due to lack of time   Suicide Completions: Deferred   Substance Use: Deferred   Psychiatric Conditions: Deferred    depression, psychosis, anxiety:  Deferred   Developmental History:  Born: Deferred   Siblings: Deferred   Access to Weapons: Denies   Thought Content: no suicidal ideation, no homicidal ideation, no auditory hallucinations, no visual hallucinations, and     PHQ-9 Depression Screening  PHQ-9 Total Score:      Little interest or pleasure in doing things?     Feeling down, depressed, or hopeless?     Trouble falling or staying asleep, or sleeping too much?     Feeling tired or having little energy?     Poor appetite or overeating?     Feeling bad about yourself - or that you are a failure or have let yourself or your family down?     Trouble concentrating on things, such as reading the newspaper or watching television?     Moving or speaking so slowly that other people could have noticed? Or the opposite - being so fidgety or restless that you have been moving around a lot more than usual?     Thoughts that you would be better off dead, or of hurting yourself in some way?     PHQ-9 Total Score       FAREED-7       Past Surgical History:  Past Surgical History:   Procedure Laterality Date   • COLONOSCOPY  1/[id number] 2008   • KNEE SURGERY  2017    broken knee       Problem List:  Patient Active Problem List   Diagnosis   • Anxiety   • Depression   • History of fracture of patella   • Hyperlipemia   • Osteopenia   • Ulcerative lesion   • Constipation   • History of colonic polyps       Allergy:   No Known Allergies     Discontinued Medications:  There are no discontinued medications.    Current Medications:   Current Outpatient Medications   Medication Sig Dispense Refill   • atorvastatin (LIPITOR) 20 MG tablet Take 1 tablet by mouth Every Night. 90 tablet 1   • Calcium Carb-Cholecalciferol (OYSCO 500 + D) 500-200 MG-UNIT tablet      • cholecalciferol (VITAMIN D3) 25 MCG (1000 UT) tablet Take 1,000 Units by mouth Daily.     • citalopram (CeleXA) 20 MG tablet Take 1 tablet by mouth Daily. 90 tablet 1   • GARLIC PO garlic 1,000 mg oral capsule take 1 capsule  by oral route daily   Active     • Glucosamine Sulfate 500 MG tablet Glucosamine 500 mg oral tablet take 1 tablet by oral route daily   Active     • LORazepam (Ativan) 0.5 MG tablet Take 1 tablet by mouth Every 8 (Eight) Hours As Needed for Anxiety. 60 tablet 2   • mirtazapine (REMERON) 15 MG tablet Take 1 tablet by mouth Every Night. (Patient taking differently: Take 7.5 mg by mouth Every Night.) 30 tablet 2   • Multiple Vitamins-Iron (MULTIVITAMIN PLUS IRON ADULT PO) Women's Multivitamin 18 mg iron-400 mcg-500 mg oral tablet take 1 tablet by oral route daily   Active     • Multiple Vitamins-Minerals (OCUVITE ADULT 50+ PO) Ocuvite 265-40-5-150 mg-unit-mg-mg oral capsule take 1 capsule by oral route daily   Suspended     • Omega-3 Fatty Acids (fish oil) 1000 MG capsule capsule Fish Oil 1,000 mg (120 mg-180 mg) oral capsule take 1 capsule by oral route daily   Suspended     • sodium-potassium-magnesium sulfates (Suprep Bowel Prep Kit) 17.5-3.13-1.6 GM/177ML solution oral solution Take as directed. Instructions given in office. Disp: 2 bottles 354 mL 0   • vitamin E 400 UNIT capsule vitamin E 400 unit oral capsule take 1 capsule by oral route daily   Active       No current facility-administered medications for this visit.       Past Medical History:  Past Medical History:   Diagnosis Date   • Anxiety    • Depression    • Fracture of patella 09/12/2017   • Hyperlipemia    • Osteopenia        Social History     Socioeconomic History   • Marital status:    Tobacco Use   • Smoking status: Never Smoker   • Smokeless tobacco: Never Used   Vaping Use   • Vaping Use: Never used   Substance and Sexual Activity   • Alcohol use: Not Currently   • Drug use: Never   • Sexual activity: Not Currently         Family History   Problem Relation Age of Onset   • Stroke Mother    • Heart disease Mother    • Cancer Brother      Review of Systems:  Review of Systems   Constitutional: Positive for fatigue. Negative for diaphoresis.  "  HENT: Negative for drooling.    Eyes: Negative for visual disturbance.   Respiratory: Negative for cough and shortness of breath.    Cardiovascular: Negative for chest pain, palpitations and leg swelling.   Gastrointestinal: Negative for nausea and vomiting.   Endocrine: Negative for cold intolerance and heat intolerance.   Genitourinary: Negative for difficulty urinating.   Musculoskeletal: Negative for joint swelling.   Allergic/Immunologic: Negative for immunocompromised state.   Neurological: Negative for dizziness, seizures, speech difficulty and numbness.         · Mental Status Exam  · Appearance  · : alone, sitting in a chair, good eye contact, normal street clothes, groomed, in her living room  · Behavior  · : pleasant and cooperative  · Motor  · : No abnormal  · Speech  · : normal rhythm, rate, volume, tone, not hyperverbal, not pressured, normal prosidy, Speaks with an accent  · Mood  · : \"I feel like I am filled with despair\"  · Affect  · : constricted, slightly depressed, mood congruent, good variability  · Thought Content  · : negative suicidal ideations, negative homicidal ideations, negative obsessions  · Perceptions  · : negative auditory hallucinations, negative visual hallucinations  · Thought Process  · : linear  · Insight/Judgement  · : fair/fair  · Cognition  · : grossly intact  · Attention  · : intact      Physical Exam:  Physical Exam    Vital Signs:   There were no vitals taken for this visit.     Lab Results:   Office Visit on 10/15/2021   Component Date Value Ref Range Status   • Glucose 10/15/2021 96  65 - 99 mg/dL Final   • BUN 10/15/2021 14  8 - 23 mg/dL Final   • Creatinine 10/15/2021 0.75  0.57 - 1.00 mg/dL Final   • Sodium 10/15/2021 139  136 - 145 mmol/L Final   • Potassium 10/15/2021 4.4  3.5 - 5.2 mmol/L Final   • Chloride 10/15/2021 103  98 - 107 mmol/L Final   • CO2 10/15/2021 28.1  22.0 - 29.0 mmol/L Final   • Calcium 10/15/2021 9.6  8.6 - 10.5 mg/dL Final   • Total Protein " 10/15/2021 7.4  6.0 - 8.5 g/dL Final   • Albumin 10/15/2021 4.70  3.50 - 5.20 g/dL Final   • ALT (SGPT) 10/15/2021 20  1 - 33 U/L Final   • AST (SGOT) 10/15/2021 24  1 - 32 U/L Final   • Alkaline Phosphatase 10/15/2021 112  39 - 117 U/L Final   • Total Bilirubin 10/15/2021 1.0  0.0 - 1.2 mg/dL Final   • eGFR Non  Amer 10/15/2021 77  >60 mL/min/1.73 Final   • Globulin 10/15/2021 2.7  gm/dL Final   • A/G Ratio 10/15/2021 1.7  g/dL Final   • BUN/Creatinine Ratio 10/15/2021 18.7  7.0 - 25.0 Final   • Anion Gap 10/15/2021 7.9  5.0 - 15.0 mmol/L Final   • TSH 10/15/2021 0.693  0.270 - 4.200 uIU/mL Final   • Total Cholesterol 10/15/2021 197  0 - 200 mg/dL Final   • Triglycerides 10/15/2021 162* 0 - 150 mg/dL Final   • HDL Cholesterol 10/15/2021 46  40 - 60 mg/dL Final   • LDL Cholesterol  10/15/2021 122* 0 - 100 mg/dL Final   • VLDL Cholesterol 10/15/2021 29  5 - 40 mg/dL Final   • LDL/HDL Ratio 10/15/2021 2.58   Final   • WBC 10/15/2021 6.03  3.40 - 10.80 10*3/mm3 Final   • RBC 10/15/2021 4.60  3.77 - 5.28 10*6/mm3 Final   • Hemoglobin 10/15/2021 13.9  12.0 - 15.9 g/dL Final   • Hematocrit 10/15/2021 41.7  34.0 - 46.6 % Final   • MCV 10/15/2021 90.7  79.0 - 97.0 fL Final   • MCH 10/15/2021 30.2  26.6 - 33.0 pg Final   • MCHC 10/15/2021 33.3  31.5 - 35.7 g/dL Final   • RDW 10/15/2021 13.4  12.3 - 15.4 % Final   • RDW-SD 10/15/2021 44.6  37.0 - 54.0 fl Final   • MPV 10/15/2021 10.2  6.0 - 12.0 fL Final   • Platelets 10/15/2021 301  140 - 450 10*3/mm3 Final   • Neutrophil % 10/15/2021 57.8  42.7 - 76.0 % Final   • Lymphocyte % 10/15/2021 31.2  19.6 - 45.3 % Final   • Monocyte % 10/15/2021 9.5  5.0 - 12.0 % Final   • Eosinophil % 10/15/2021 0.7  0.3 - 6.2 % Final   • Basophil % 10/15/2021 0.5  0.0 - 1.5 % Final   • Immature Grans % 10/15/2021 0.3  0.0 - 0.5 % Final   • Neutrophils, Absolute 10/15/2021 3.49  1.70 - 7.00 10*3/mm3 Final   • Lymphocytes, Absolute 10/15/2021 1.88  0.70 - 3.10 10*3/mm3 Final   • Monocytes,  Absolute 10/15/2021 0.57  0.10 - 0.90 10*3/mm3 Final   • Eosinophils, Absolute 10/15/2021 0.04  0.00 - 0.40 10*3/mm3 Final   • Basophils, Absolute 10/15/2021 0.03  0.00 - 0.20 10*3/mm3 Final   • Immature Grans, Absolute 10/15/2021 0.02  0.00 - 0.05 10*3/mm3 Final   • nRBC 10/15/2021 0.2  0.0 - 0.2 /100 WBC Final   Admission on 08/08/2021, Discharged on 08/08/2021   Component Date Value Ref Range Status   • SARS Antigen 08/08/2021 Not Detected  Not Detected Final   • Internal Control 08/08/2021 Passed  Passed Final   • Lot Number 08/08/2021 [id number]   Final   • Expiration Date 08/08/2021 01/16/2023   Final   • COVID19 08/08/2021 Not Detected  Not Detected - Ref. Range Final       EKG Results:  No orders to display       Imaging Results:  No Images in the past 120 days found..      Assessment/Plan   Diagnoses and all orders for this visit:    1. Generalized anxiety disorder (Primary)  -     Ambulatory Referral to Behavioral Health    2. Panic attacks  -     Ambulatory Referral to Behavioral Health    3. Recurrent major depressive disorder in remission (HCC)  -     Ambulatory Referral to Behavioral Health        Visit Diagnoses:    ICD-10-CM ICD-9-CM   1. Generalized anxiety disorder  F41.1 300.02   2. Panic attacks  F41.0 300.01   3. Recurrent major depressive disorder in remission (HCC)  F33.40 296.35       Presentation most consistent with major depressive disorder in partial remission, FAREED, rare panic attacks.    10/19: Re-start elavil. Reduce mirtazapine. Re-start therapy. 2 wks.    10/8: Some residual anxiety and depression reemerging.  Increase mirtazapine.  Patient is now doing her visits alone, rather than with her .  4 weeks.    8/27: Doing well on mirtazapine. 6 wks.    7/30: Abilify causing constipation.  Also caused brain zaps.  Discontinue.  Start mirtazapine to target depression, anxiety.  Willing to try, even though she knows it could make her hungrier.  See back in 4 weeks.  Continue therapy.   Longer term, consider discontinuing amitriptyline.    6/1: Doing well from a mental health standpoint, but having constipation from Abilify. No doubt the low-dose of amitriptyline in the evenings is also contributing to the cholinergic blockade leading to constipation. Patient instructed to half the dose of Abilify. Continue other medications as scheduled.    Previous:  No insomnia on the amitriptyline. Consider doxepin as well, trazodone.  Abilify caused constipation and brain zaps.        PLAN:  24. Risk Assessment: Risk of self-harm acutely remain low. Risk factors include a history of suicidal ideation, mood disorder, anxiety disorder, presence of psychosocial stressors such as colonic dysplasia, COVID-19 pandemic. Protective factors include no history of self-harm or suicide attempts, good social support, willingness to engage in care, improved depressive symptoms. Risk of self-harm chronically also remain low, but could be further elevated in the event of treatment noncompliance and/or AODA.  25. Medications:   a. REDUCE mirtazapine 15 to 7.5 mg p.o. nightly. Risks, benefits, alternatives discussed with patient including GI upset, sedation, dizziness with falls risk, increased appetite.  After discussion of these risks and benefits, the patient voiced understanding and agreed to proceed.  b. CONTINUE lorazepam 0.5 BID PRN. (rarely uses) Risks, benefits, alternatives discussed with patient including GI upset, sedation, dizziness, respiratory depression, falls risk.  After discussion of these risks and benefits, the patient voiced understanding and agreed to proceed.  c. CONTINUE celexa 20 mg daily. Risks, benefits, alternatives discussed with patient including GI upset, nausea vomiting diarrhea, theoretical decrease of seizure threshold predisposing the patient to a slightly higher seizure risk, headaches, sexual dysfunction, serotonin syndrome, bleeding risk, increased suicidality in patients 24 years and  younger.  After discussion of these risks and benefits, the patient voiced understanding and agreed to proceed.  d. RE-START amitriptyline 25 mg QHS.  Risks, benefits, alternatives, discussed with patient including dry mouth, constipation, dizziness, sedation.  After discussion of these risks and benefits, the patient voiced understanding and agreed to proceed.  26. Therapy: continue.  27. Labs/Studies: BMP to monitor sodium levels in February was entirely normal, EKG February shows rate 72, sinus, .  28. Follow Up: 2 weeks.      TREATMENT PLAN/GOALS: Continue supportive psychotherapy efforts and medications as indicated. Treatment and medication options discussed during today's visit. Patient acknowledged and verbally consented to continue with current treatment plan and was educated on the importance of compliance with treatment and follow-up appointments.    MEDICATION ISSUES:  YVETTE reviewed as expected.  Discussed medication options and treatment plan of prescribed medication as well as the risks, benefits, and side effects including potential falls, possible impaired driving and metabolic adversities among others. Patient is agreeable to call the office with any worsening of symptoms or onset of side effects. Patient is agreeable to call 911 or go to the nearest ER should he/she begin having SI/HI. No medication side effects or related complaints today.     MEDS ORDERED DURING VISIT:  No orders of the defined types were placed in this encounter.      Return in about 2 weeks (around 11/2/2021).         This document has been electronically signed by Vinny Krishnamurthy MD  October 19, 2021 14:01 EDT      Part of this note may be an electronic transcription/translation of spoken language to printed text using the Dragon Dictation System.

## 2021-10-20 ENCOUNTER — TELEPHONE (OUTPATIENT)
Dept: PSYCHIATRY | Facility: CLINIC | Age: 66
End: 2021-10-20

## 2021-10-20 DIAGNOSIS — F51.05 INSOMNIA DUE TO MENTAL CONDITION: ICD-10-CM

## 2021-10-20 DIAGNOSIS — F51.05 INSOMNIA DUE TO MENTAL CONDITION: Primary | ICD-10-CM

## 2021-10-20 RX ORDER — AMITRIPTYLINE HYDROCHLORIDE 25 MG/1
25 TABLET, FILM COATED ORAL NIGHTLY
Qty: 30 TABLET | Refills: 2 | Status: SHIPPED | OUTPATIENT
Start: 2021-10-20 | End: 2021-11-15 | Stop reason: SDUPTHER

## 2021-10-20 RX ORDER — AMITRIPTYLINE HYDROCHLORIDE 25 MG/1
25 TABLET, FILM COATED ORAL NIGHTLY
Qty: 30 TABLET | Refills: 2 | Status: SHIPPED | OUTPATIENT
Start: 2021-10-20 | End: 2021-10-20 | Stop reason: SDUPTHER

## 2021-10-20 NOTE — TELEPHONE ENCOUNTER
Med refill, pt said that you told her to restart amitriptyline 25mg QHS, she didn't realize she didn't have any

## 2021-10-29 ENCOUNTER — HOSPITAL ENCOUNTER (OUTPATIENT)
Facility: HOSPITAL | Age: 66
Setting detail: HOSPITAL OUTPATIENT SURGERY
Discharge: HOME | End: 2021-10-29
Attending: SURGERY | Admitting: SURGERY

## 2021-10-29 ENCOUNTER — ANESTHESIA EVENT (OUTPATIENT)
Dept: GASTROENTEROLOGY | Facility: HOSPITAL | Age: 66
End: 2021-10-29

## 2021-10-29 ENCOUNTER — ANESTHESIA (OUTPATIENT)
Dept: GASTROENTEROLOGY | Facility: HOSPITAL | Age: 66
End: 2021-10-29

## 2021-10-29 VITALS
OXYGEN SATURATION: 97 % | BODY MASS INDEX: 26.41 KG/M2 | TEMPERATURE: 97.6 F | HEART RATE: 66 BPM | DIASTOLIC BLOOD PRESSURE: 58 MMHG | WEIGHT: 153.88 LBS | SYSTOLIC BLOOD PRESSURE: 103 MMHG | RESPIRATION RATE: 18 BRPM

## 2021-10-29 DIAGNOSIS — Z86.010 HISTORY OF COLONIC POLYPS: ICD-10-CM

## 2021-10-29 DIAGNOSIS — K59.00 CONSTIPATION: ICD-10-CM

## 2021-10-29 PROCEDURE — 25010000002 PROPOFOL 10 MG/ML EMULSION: Performed by: NURSE ANESTHETIST, CERTIFIED REGISTERED

## 2021-10-29 RX ORDER — SODIUM CHLORIDE 0.9 % (FLUSH) 0.9 %
10 SYRINGE (ML) INJECTION AS NEEDED
Status: DISCONTINUED | OUTPATIENT
Start: 2021-10-29 | End: 2021-10-29 | Stop reason: HOSPADM

## 2021-10-29 RX ORDER — SODIUM CHLORIDE 0.9 % (FLUSH) 0.9 %
3 SYRINGE (ML) INJECTION EVERY 12 HOURS SCHEDULED
Status: DISCONTINUED | OUTPATIENT
Start: 2021-10-29 | End: 2021-10-29 | Stop reason: HOSPADM

## 2021-10-29 RX ORDER — SODIUM CHLORIDE, SODIUM LACTATE, POTASSIUM CHLORIDE, CALCIUM CHLORIDE 600; 310; 30; 20 MG/100ML; MG/100ML; MG/100ML; MG/100ML
30 INJECTION, SOLUTION INTRAVENOUS CONTINUOUS
Status: DISCONTINUED | OUTPATIENT
Start: 2021-10-29 | End: 2021-10-29 | Stop reason: HOSPADM

## 2021-10-29 RX ORDER — LIDOCAINE HYDROCHLORIDE 20 MG/ML
INJECTION, SOLUTION INFILTRATION; PERINEURAL AS NEEDED
Status: DISCONTINUED | OUTPATIENT
Start: 2021-10-29 | End: 2021-10-29 | Stop reason: SURG

## 2021-10-29 RX ORDER — SODIUM CHLORIDE, SODIUM LACTATE, POTASSIUM CHLORIDE, CALCIUM CHLORIDE 600; 310; 30; 20 MG/100ML; MG/100ML; MG/100ML; MG/100ML
INJECTION, SOLUTION INTRAVENOUS CONTINUOUS PRN
Status: DISCONTINUED | OUTPATIENT
Start: 2021-10-29 | End: 2021-10-29 | Stop reason: SURG

## 2021-10-29 RX ADMIN — SODIUM CHLORIDE, POTASSIUM CHLORIDE, SODIUM LACTATE AND CALCIUM CHLORIDE: 600; 310; 30; 20 INJECTION, SOLUTION INTRAVENOUS at 08:12

## 2021-10-29 RX ADMIN — PROPOFOL 155 MCG/KG/MIN: 10 INJECTION, EMULSION INTRAVENOUS at 08:10

## 2021-10-29 RX ADMIN — LIDOCAINE HYDROCHLORIDE 100 MG: 20 INJECTION, SOLUTION INFILTRATION; PERINEURAL at 08:10

## 2021-10-29 NOTE — ANESTHESIA PREPROCEDURE EVALUATION
Anesthesia Evaluation     Patient summary reviewed and Nursing notes reviewed   no history of anesthetic complications:  NPO Solid Status: > 8 hours  NPO Liquid Status: > 2 hours           Airway   Mallampati: III  TM distance: >3 FB  Neck ROM: full  No difficulty expected  Dental      Pulmonary - negative pulmonary ROS and normal exam    breath sounds clear to auscultation  Cardiovascular - negative cardio ROS and normal exam  Exercise tolerance: good (4-7 METS)    Rhythm: regular        Neuro/Psych- negative ROS  GI/Hepatic/Renal/Endo - negative ROS     Musculoskeletal (-) negative ROS    Abdominal    Substance History - negative use     OB/GYN negative ob/gyn ROS         Other - negative ROS                       Anesthesia Plan    ASA 2     general   total IV anesthesia    Anesthetic plan, all risks, benefits, and alternatives have been provided, discussed and informed consent has been obtained with: patient.

## 2021-10-29 NOTE — ANESTHESIA POSTPROCEDURE EVALUATION
Patient: Shabana Ferguson    Procedure Summary     Date: 10/29/21 Room / Location: [facility] ENDOSCOPY 1 / [facility] ENDOSCOPY    Anesthesia Start: 0809 Anesthesia Stop: 0841    Procedure: COLONOSCOPY with possible biopies. (N/A ) Diagnosis:       Constipation      History of colonic polyps      (Constipation [K59.00])      (History of colonic polyps [Z86.010])    Surgeons: Skyler Fuller MD Provider: Jayy Moralez MD    Anesthesia Type: general ASA Status: 2          Anesthesia Type: general    Vitals  Vitals Value Taken Time   BP 86/55 10/29/21 0847   Temp 36.3 °C (97.3 °F) 10/29/21 0845   Pulse 64 10/29/21 0850   Resp 14 10/29/21 0845   SpO2 95 % 10/29/21 0850   Vitals shown include unvalidated device data.        Post Anesthesia Care and Evaluation    Patient location during evaluation: bedside  Patient participation: complete - patient participated  Level of consciousness: awake  Pain management: adequate  Airway patency: patent  Anesthetic complications: No anesthetic complications  PONV Status: none  Cardiovascular status: acceptable and stable  Respiratory status: acceptable  Hydration status: acceptable    Comments: An Anesthesiologist personally participated in the most demanding procedures (including induction and emergence if applicable) in the anesthesia plan, monitored the course of anesthesia administration at frequent intervals and remained physically present and available for immediate diagnosis and treatment of emergencies.

## 2021-11-05 ENCOUNTER — TELEMEDICINE (OUTPATIENT)
Dept: PSYCHIATRY | Facility: CLINIC | Age: 66
End: 2021-11-05

## 2021-11-05 DIAGNOSIS — F41.0 PANIC ATTACKS: ICD-10-CM

## 2021-11-05 DIAGNOSIS — F41.1 GENERALIZED ANXIETY DISORDER: ICD-10-CM

## 2021-11-05 DIAGNOSIS — F33.1 MAJOR DEPRESSIVE DISORDER, RECURRENT EPISODE, MODERATE (HCC): Primary | ICD-10-CM

## 2021-11-05 DIAGNOSIS — F51.05 INSOMNIA DUE TO MENTAL CONDITION: ICD-10-CM

## 2021-11-05 PROCEDURE — 90833 PSYTX W PT W E/M 30 MIN: CPT | Performed by: STUDENT IN AN ORGANIZED HEALTH CARE EDUCATION/TRAINING PROGRAM

## 2021-11-05 PROCEDURE — 99214 OFFICE O/P EST MOD 30 MIN: CPT | Performed by: STUDENT IN AN ORGANIZED HEALTH CARE EDUCATION/TRAINING PROGRAM

## 2021-11-05 RX ORDER — LORAZEPAM 0.5 MG/1
1 TABLET ORAL 2 TIMES DAILY PRN
Qty: 60 TABLET | Refills: 2 | Status: SHIPPED | OUTPATIENT
Start: 2021-11-05 | End: 2022-04-22

## 2021-11-05 RX ORDER — ATORVASTATIN CALCIUM 10 MG/1
10 TABLET, FILM COATED ORAL DAILY
COMMUNITY
Start: 2021-10-26 | End: 2022-01-27

## 2021-11-05 NOTE — PATIENT INSTRUCTIONS
1.  Please return to clinic at your next scheduled visit.  Contact the clinic ([phone]) at least 24 hours prior in the event you need to cancel.  2.  Do no harm to yourself or others.    3.  Avoid alcohol and drugs.    4.  Take all medications as prescribed.  Please contact the clinic with any concerns. If you are in need of medication refills, please call the clinic at [phone].    5. Should you want to get in touch with your provider, Dr. Vinny Krishnamurthy, please utilize [employer] or contact the office ([phone]), and staff will be able to page Dr. Krishnamurthy directly.  6.  In the event you have personal crisis, contact the following crisis numbers: Suicide Prevention Hotline 1-582.962.8700; [facility] 7-516-347-TUDE; [facility] Emergency Room [phone]; text HELLO to 627624; or 012.     SPECIFIC RECOMMENDATIONS:     1.      Medications discussed at this encounter:                   - increase lorazepam     2.      Psychotherapy recommendations:      3.     Return to clinic: 2 weeks

## 2021-11-05 NOTE — PROGRESS NOTES
"Subjective   Shabana Ferguson is a 65 y.o. female who presents today for follow up    Referring Provider:  No referring provider defined for this encounter.    Chief Complaint:  mdd margarita    History of Present Illness:     Shabana Ferguson is a 64 year old /White female, hx of anxiety and depression, fractured patella, HLD, osteopenia, ulcer referred by MARIE Brice, for anxiety and depression management.   Chart: Psychotropic medications: amitriptyline 25 mg QHS, Celexa 20 mg p.o. daily, lorazepam 0.5 p.o. twice daily as needed anxiety.   Virtual visit via Zoom audio and video due to the COVID-19 pandemic. Patient is accepting of and agreeable to appointment. The visit consisted of the patient, her , and I.     11/5: Virtual visit via Zoom audio and video due to the COVID-19 pandemic.  Patient is accepting of and agreeable to visit.  The visit consisted of the patient and I.  Interview:  1. Chart review: Status post colonoscopy October 29, which apparently went well per the patient.  2. Did she read start therapy?  Does she have a seasonal pattern?  3. \"It's been a very very hard time.\"  a. Ups and downs  b. Some days feels like she is getting better, then gets hopeless.  c. Sometimes high anxiety  d. Crying spells  e. Per : same day can have good hours and bad hours. Nights have been good, generally.  f. Stopped mirtazapine on Sunday night.  4. Depression/Mood: depressed mood, feelings of hopelessness  a. Denies seasonal pattern.  5. Anxiety: some irritability, worrying, generalized  6. Sleeping: stable  7. Eating: not eating as much  8. Substances: denies  9. Therapy: started counseling again yesterday (marriage counseling)  10. Medication compliant: y  11. No SI HI AVH.      10/19: Virtual visit via Zoom audio and video due to the COVID-19 pandemic.  Patient is accepting of and agreeable to visit.  The visit consisted of the patient and I.  Interview:  12. Chart review: " "Patient's  came in, tres.  Stating patient has been very anxious for the last few weeks and has been minimizing it.  States she should be taking her lorazepam but she is not.  States that she is not sure she has any left.I called the patient and left a message to call back.  I will refill lorazepam also.  All questions were answered.  No SI HI AVH.  13. Meds: \"I am taking only half the mirtazapine.\"  a. Was waking up 2-3x during the night on the 15 mg dose.  b. Better on lower dose of it, rather than higher  c. Stopped elavil when started mirtazpine.  14. Depression/Mood: \"Still waking up in the morning in despair.\"  a. Milder in the morning; gets worse during the day  b. Takes lorazepam to treat it; which works  15. Anxiety:  a. Unknown fears, uncontrolled worrying  b. Fear of losing control over her life  c. Fear of diseases  16. Sleeping: wakes once nightly.  17. Eating: deferred  18. Substances: denies  19. Therapy: saw Desi 2 weeks ago  20. Medication compliant: yes  21. No SI HI AVH.    10/8: Virtual visit via Zoom audio and video due to the COVID-19 pandemic.  Patient is accepting of and agreeable to visit.  The visit consisted of the patient and I.  Interview:  22. Chart review: No new developments.  23. \"I have some difficulties the last few weeks.\"  24. Mood: some depression  a. No precipitating event  b. Depressed mood, starts in the morning, but gets better  c. Energy is sometimes low  d. Concentration good  e. Mild feelings of hopelessness  f. No anhedonia; they got a new puppy, which helps  25. Anxiety:  a. Some anxiety, mild  b. Some worrying  c. Some irritability  d. Restlessness at night; maintenance insomnia  e. No precipitating event  26. I haven't used lorazepam for \"a long time.\"  27. Eating: stable  28. Substances: denies  29. Therapy: therapist retired; awaiting a call back.  30. Medication compliant: yes  31. No SI HI AVH.      8/27: Virtual visit via Zoom audio and video due " "to the COVID-19 pandemic.  Patient is accepting of and agreeable to visit.  The visit consisted of the patient and I.  Interview:  32. Records reviewed: Patient seen yesterday for colonoscopy consult for constipation issues.  tested positive for COVID.  She tested negative on the eighth.  33. Mirtazapine is \"helping.\" Falling asleep. Feels \"fine.\" Anxiety and depression is under control; no restlessness. Doesn't really use ativan.  34. No SI HI AVH.    7/30: \"Doing better on half the abilify.\"  1. Abilify: occasionally had brain zaps when falling asleep. No longer having. Had them when she was taking 2 mg (not having on 1 mg). Still having some constipation, but \"not that bad.\"  2. Takes 12.5 mg of amitriptyline at night. Helps with insomnia.  3. Overall doing \"ok.\" Some depressive symptoms.  4. Agreeable to trying mirtazapine.  5. No SI HI AVH.    6/1: Interview: Continued improvement. Doing well from d and a perspective. Persistent constipation that began with abilify. Still not taking lorazepam as her anxiety is under control. Denies SI HI AVH.  concurs.    9/11/20 H&P: Patient presented today with her . Both provided extensive history regarding her depression and anxiety. Patient had been seen by  at [facility] for roughly a year and a half. Patient reported that Dr. Gordon had become convinced that she had bipolar disorder, which both she and her  disagree with. Patient and  deny any episodes in the past where she experienced, for an extended length of time lasting at least several days, no need for sleep, rapid speech, risk-taking behaviors. Per the  and wife pair, her previous psychiatrist insisted on her being on a mood stabilizer.   Patient reports that she had been stable on Celexa for \"several years.\" In 2019, January, the patient underwent a colonoscopy where dysplasia was revealed. This led to significant anxiety and a \"breakdown.\" The dysplasia was " subsequently removed. Due to the heightened anxiety the patient's psychiatrist took the patient off of Celexa and started her on Lexapro. The patient did not tolerate Lexapro well, she continued to remain anxious, she began to engage in therapy in addition to medication management. The Lexapro was subsequently switched to Viibryd. The Viibryd did not work. The patient also began to experience panic attacks including shortness of breath, crying, tachycardia, palpitations. The panic attacks were new and had never happened before.   In 2020 the patient experienced another breakdown and sunk into a deeper depression. The COVID-19 pandemic only worsened her situation. In February the patient's , a physician, decided to become her full-time caretaker as she would become anxious and panicky without him present. The patient also experienced intermittent bouts of suicidal ideation.   Recently, the patient and  demanded of their psychiatrist to take her off of Viibryd and Seroquel. They actually tapered her off of Seroquel themselves. They asked him to switch her back to Celexa. She has been on Celexa now for the last 3 days and is already begun to experience improvement. She states that the Seroquel led to having hallucinations and actually worsened insomnia. Last night she did not take Seroquel for the first time in many months and she slept very well. Her mood is slightly improved. Regarding her anxiety, she endorses muscle tension and fatigue restlessness irritability and a history of insomnia. Regarding her depression she denies SI HI AVH, denies anhedonia denies guilt, notes some decreased energy, psychomotor retardation, and a history of insomnia. They had also tried acupuncture in the past with some success. Psychiatric review of systems is negative for psychosis nancy, positive for anxiety and depression. Possibly positive for  depression and postpartum depression. The patient is  medication compliant.       Past Psychiatric History:  Began Psychiatric Treatment: Several years   Dx: Depression and anxiety   Psychiatrist: Doctor Gordon for the last year and a half   Therapist: Sees a therapist [facility] History: Denies, Although she came close to needing admission recently.   Medication Trials: See HPI. Seroquel, Prozac which was too activating, Zoloft which was too activating, Celexa which worked well, Effexor which worked well, Lexapro which did not work, Viibryd which did not work.   Self-Harm: Denies   Suicide Attempts: Denies   OB/GYN HISTORY:  Sexually Active: OB/GYN history deferred   Substance Abuse History:  Types: Denies all, including illicit   Social History:  Marital Status:    Employed: No     Kids: 4   House: House    Hx: Denies   Family History:  Suicide Attempts: Deferred today, Due to lack of time   Suicide Completions: Deferred   Substance Use: Deferred   Psychiatric Conditions: Deferred    depression, psychosis, anxiety: Deferred   Developmental History:  Born: Deferred   Siblings: Deferred   Access to Weapons: Denies   Thought Content: no suicidal ideation, no homicidal ideation, no auditory hallucinations, no visual hallucinations, and     PHQ-9 Depression Screening  PHQ-9 Total Score:      Little interest or pleasure in doing things?     Feeling down, depressed, or hopeless?     Trouble falling or staying asleep, or sleeping too much?     Feeling tired or having little energy?     Poor appetite or overeating?     Feeling bad about yourself - or that you are a failure or have let yourself or your family down?     Trouble concentrating on things, such as reading the newspaper or watching television?     Moving or speaking so slowly that other people could have noticed? Or the opposite - being so fidgety or restless that you have been moving around a lot more than usual?     Thoughts that you would be better off dead, or of hurting  yourself in some way?     PHQ-9 Total Score       FAREED-7       Past Surgical History:  Past Surgical History:   Procedure Laterality Date   • COLONOSCOPY  [id number]/[id number]    last scope 2020   • COLONOSCOPY N/A 10/29/2021    Procedure: COLONOSCOPY with possible biopies.;  Surgeon: Skyler Fuller MD;  Location: [facility];  Service: General;  Laterality: N/A;   • KNEE SURGERY  2017    broken knee       Problem List:  Patient Active Problem List   Diagnosis   • Anxiety   • Depression   • History of fracture of patella   • Hyperlipemia   • Osteopenia   • Ulcerative lesion   • Constipation   • History of colonic polyps       Allergy:   No Known Allergies     Discontinued Medications:  Medications Discontinued During This Encounter   Medication Reason   • atorvastatin (LIPITOR) 20 MG tablet *Therapy completed   • mirtazapine (REMERON) 15 MG tablet *Therapy completed   • sodium-potassium-magnesium sulfates (Suprep Bowel Prep Kit) 17.5-3.13-1.6 GM/177ML solution oral solution *Therapy completed   • LORazepam (Ativan) 0.5 MG tablet Reorder       Current Medications:   Current Outpatient Medications   Medication Sig Dispense Refill   • amitriptyline (ELAVIL) 25 MG tablet Take 1 tablet by mouth Every Night. 30 tablet 2   • atorvastatin (LIPITOR) 10 MG tablet Take 10 mg by mouth Daily.     • Calcium Carb-Cholecalciferol (OYSCO 500 + D) 500-200 MG-UNIT tablet      • cholecalciferol (VITAMIN D3) 25 MCG (1000 UT) tablet Take 1,000 Units by mouth Daily.     • citalopram (CeleXA) 20 MG tablet Take 1 tablet by mouth Daily. 90 tablet 1   • GARLIC PO garlic 1,000 mg oral capsule take 1 capsule by oral route daily   Active     • Glucosamine Sulfate 500 MG tablet Glucosamine 500 mg oral tablet take 1 tablet by oral route daily   Active     • LORazepam (Ativan) 0.5 MG tablet Take 2 tablets by mouth 2 (Two) Times a Day As Needed for Anxiety. 60 tablet 2   • Multiple Vitamins-Iron (MULTIVITAMIN PLUS IRON ADULT PO) Women's Multivitamin 18  mg iron-400 mcg-500 mg oral tablet take 1 tablet by oral route daily   Active     • Multiple Vitamins-Minerals (OCUVITE ADULT 50+ PO) Ocuvite 310-35-1-150 mg-unit-mg-mg oral capsule take 1 capsule by oral route daily   Suspended     • Omega-3 Fatty Acids (fish oil) 1000 MG capsule capsule Fish Oil 1,000 mg (120 mg-180 mg) oral capsule take 1 capsule by oral route daily   Suspended     • vitamin E 400 UNIT capsule vitamin E 400 unit oral capsule take 1 capsule by oral route daily   Active       No current facility-administered medications for this visit.       Past Medical History:  Past Medical History:   Diagnosis Date   • Anxiety    • Depression    • Fracture of patella 09/12/2017   • Hyperlipemia    • Osteopenia        Social History     Socioeconomic History   • Marital status:    Tobacco Use   • Smoking status: Never Smoker   • Smokeless tobacco: Never Used   Vaping Use   • Vaping Use: Never used   Substance and Sexual Activity   • Alcohol use: Not Currently   • Drug use: Never   • Sexual activity: Not Currently         Family History   Problem Relation Age of Onset   • Stroke Mother    • Heart disease Mother    • Cancer Brother      Review of Systems:  Review of Systems   Constitutional: Positive for fatigue. Negative for diaphoresis.   HENT: Negative for drooling.    Eyes: Negative for visual disturbance.   Respiratory: Negative for cough and shortness of breath.    Cardiovascular: Negative for chest pain, palpitations and leg swelling.   Gastrointestinal: Negative for nausea and vomiting.   Endocrine: Negative for cold intolerance and heat intolerance.   Genitourinary: Negative for difficulty urinating.   Musculoskeletal: Negative for joint swelling.   Allergic/Immunologic: Negative for immunocompromised state.   Neurological: Negative for dizziness, seizures, speech difficulty and numbness.         · Mental Status Exam  · Appearance  · : alone, sitting in a chair, good eye contact, normal street  "clothes, groomed, in her living room  · Behavior  · : pleasant and cooperative  · Motor  · : No abnormal  · Speech  · : normal rhythm, rate, volume, tone, not hyperverbal, not pressured, normal prosidy, Speaks with an accent  · Mood  · : \"I get overwhelmed\"  · Affect  · : depressed, mood congruent, good variability  · Thought Content  · : negative suicidal ideations, negative homicidal ideations, negative obsessions  · Perceptions  · : negative auditory hallucinations, negative visual hallucinations  · Thought Process  · : linear  · Insight/Judgement  · : fair/fair  · Cognition  · : grossly intact  · Attention  · : intact      Physical Exam:  Physical Exam    Vital Signs:   There were no vitals taken for this visit.     Lab Results:   Office Visit on 10/15/2021   Component Date Value Ref Range Status   • Glucose 10/15/2021 96  65 - 99 mg/dL Final   • BUN 10/15/2021 14  8 - 23 mg/dL Final   • Creatinine 10/15/2021 0.75  0.57 - 1.00 mg/dL Final   • Sodium 10/15/2021 139  136 - 145 mmol/L Final   • Potassium 10/15/2021 4.4  3.5 - 5.2 mmol/L Final   • Chloride 10/15/2021 103  98 - 107 mmol/L Final   • CO2 10/15/2021 28.1  22.0 - 29.0 mmol/L Final   • Calcium 10/15/2021 9.6  8.6 - 10.5 mg/dL Final   • Total Protein 10/15/2021 7.4  6.0 - 8.5 g/dL Final   • Albumin 10/15/2021 4.70  3.50 - 5.20 g/dL Final   • ALT (SGPT) 10/15/2021 20  1 - 33 U/L Final   • AST (SGOT) 10/15/2021 24  1 - 32 U/L Final   • Alkaline Phosphatase 10/15/2021 112  39 - 117 U/L Final   • Total Bilirubin 10/15/2021 1.0  0.0 - 1.2 mg/dL Final   • eGFR Non  Amer 10/15/2021 77  >60 mL/min/1.73 Final   • Globulin 10/15/2021 2.7  gm/dL Final   • A/G Ratio 10/15/2021 1.7  g/dL Final   • BUN/Creatinine Ratio 10/15/2021 18.7  7.0 - 25.0 Final   • Anion Gap 10/15/2021 7.9  5.0 - 15.0 mmol/L Final   • TSH 10/15/2021 0.693  0.270 - 4.200 uIU/mL Final   • Total Cholesterol 10/15/2021 197  0 - 200 mg/dL Final   • Triglycerides 10/15/2021 162* 0 - 150 mg/dL " Final   • HDL Cholesterol 10/15/2021 46  40 - 60 mg/dL Final   • LDL Cholesterol  10/15/2021 122* 0 - 100 mg/dL Final   • VLDL Cholesterol 10/15/2021 29  5 - 40 mg/dL Final   • LDL/HDL Ratio 10/15/2021 2.58   Final   • WBC 10/15/2021 6.03  3.40 - 10.80 10*3/mm3 Final   • RBC 10/15/2021 4.60  3.77 - 5.28 10*6/mm3 Final   • Hemoglobin 10/15/2021 13.9  12.0 - 15.9 g/dL Final   • Hematocrit 10/15/2021 41.7  34.0 - 46.6 % Final   • MCV 10/15/2021 90.7  79.0 - 97.0 fL Final   • MCH 10/15/2021 30.2  26.6 - 33.0 pg Final   • MCHC 10/15/2021 33.3  31.5 - 35.7 g/dL Final   • RDW 10/15/2021 13.4  12.3 - 15.4 % Final   • RDW-SD 10/15/2021 44.6  37.0 - 54.0 fl Final   • MPV 10/15/2021 10.2  6.0 - 12.0 fL Final   • Platelets 10/15/2021 301  140 - 450 10*3/mm3 Final   • Neutrophil % 10/15/2021 57.8  42.7 - 76.0 % Final   • Lymphocyte % 10/15/2021 31.2  19.6 - 45.3 % Final   • Monocyte % 10/15/2021 9.5  5.0 - 12.0 % Final   • Eosinophil % 10/15/2021 0.7  0.3 - 6.2 % Final   • Basophil % 10/15/2021 0.5  0.0 - 1.5 % Final   • Immature Grans % 10/15/2021 0.3  0.0 - 0.5 % Final   • Neutrophils, Absolute 10/15/2021 3.49  1.70 - 7.00 10*3/mm3 Final   • Lymphocytes, Absolute 10/15/2021 1.88  0.70 - 3.10 10*3/mm3 Final   • Monocytes, Absolute 10/15/2021 0.57  0.10 - 0.90 10*3/mm3 Final   • Eosinophils, Absolute 10/15/2021 0.04  0.00 - 0.40 10*3/mm3 Final   • Basophils, Absolute 10/15/2021 0.03  0.00 - 0.20 10*3/mm3 Final   • Immature Grans, Absolute 10/15/2021 0.02  0.00 - 0.05 10*3/mm3 Final   • nRBC 10/15/2021 0.2  0.0 - 0.2 /100 WBC Final   Admission on 08/08/2021, Discharged on 08/08/2021   Component Date Value Ref Range Status   • SARS Antigen 08/08/2021 Not Detected  Not Detected Final   • Internal Control 08/08/2021 Passed  Passed Final   • Lot Number 08/08/2021 [id number]   Final   • Expiration Date 08/08/2021 01/16/2023   Final   • COVID19 08/08/2021 Not Detected  Not Detected - Ref. Range Final       EKG Results:  No orders to  display       Imaging Results:  No Images in the past 120 days found..      Assessment/Plan   Diagnoses and all orders for this visit:    1. Major depressive disorder, recurrent episode, moderate (HCC) (Primary)    2. Generalized anxiety disorder  -     LORazepam (Ativan) 0.5 MG tablet; Take 2 tablets by mouth 2 (Two) Times a Day As Needed for Anxiety.  Dispense: 60 tablet; Refill: 2    3. Panic attacks  -     LORazepam (Ativan) 0.5 MG tablet; Take 2 tablets by mouth 2 (Two) Times a Day As Needed for Anxiety.  Dispense: 60 tablet; Refill: 2    4. Insomnia due to mental condition        Visit Diagnoses:    ICD-10-CM ICD-9-CM   1. Major depressive disorder, recurrent episode, moderate (HCC)  F33.1 296.32   2. Generalized anxiety disorder  F41.1 300.02   3. Panic attacks  F41.0 300.01   4. Insomnia due to mental condition  F51.05 300.9     327.02       Presentation most consistent with major depressive disorder in partial remission, FAREED, rare panic attacks.    11/5: Increase lorazepam, continue celexa and amitriptyline. Consider effexor. 17 minutes of supportive psychotherapy with goal to strengthen defenses, promote problems solving, restore adaptive functioning and provide symptom relief. The therapeutic alliance was strengthened to encourage the patient to express their thoughts and feelings. Esteem building was enhanced through praise, reassurance, normalizing and encouragement. Coping skills were enhanced to build distress tolerance skills and emotional regulation. Patient given education on medication side effects, diagnosis/illness and relapse symptoms. Plan to continue supportive psychotherapy in next appointment to provide symptom relief.  2 wks    10/19: Re-start elavil. Reduce mirtazapine. Re-start therapy. 2 wks.    10/8: Some residual anxiety and depression reemerging.  Increase mirtazapine.  Patient is now doing her visits alone, rather than with her .  4 weeks.    8/27: Doing well on mirtazapine.  6 wks.    7/30: Abilify causing constipation.  Also caused brain zaps.  Discontinue.  Start mirtazapine to target depression, anxiety.  Willing to try, even though she knows it could make her hungrier.  See back in 4 weeks.  Continue therapy.  Longer term, consider discontinuing amitriptyline.    6/1: Doing well from a mental health standpoint, but having constipation from Abilify. No doubt the low-dose of amitriptyline in the evenings is also contributing to the cholinergic blockade leading to constipation. Patient instructed to half the dose of Abilify. Continue other medications as scheduled.    Previous:  No insomnia on the amitriptyline. Consider doxepin as well, trazodone.  Abilify caused constipation and brain zaps.        PLAN:  35. Risk Assessment: Risk of self-harm acutely remain low. Risk factors include a history of suicidal ideation, mood disorder, anxiety disorder, presence of psychosocial stressors such as colonic dysplasia, COVID-19 pandemic. Protective factors include no history of self-harm or suicide attempts, good social support, willingness to engage in care, improved depressive symptoms. Risk of self-harm chronically also remain low, but could be further elevated in the event of treatment noncompliance and/or AODA.  36. Medications:   a. DISCONTINUE mirtazapine 7.5 mg p.o. nightly. Risks, benefits, alternatives discussed with patient including GI upset, sedation, dizziness with falls risk, increased appetite.  After discussion of these risks and benefits, the patient voiced understanding and agreed to proceed.  b. INCREASE lorazepam 0.5 to 1 mg BID PRN. (rarely uses) Risks, benefits, alternatives discussed with patient including GI upset, sedation, dizziness, respiratory depression, falls risk.  After discussion of these risks and benefits, the patient voiced understanding and agreed to proceed.  c. CONTINUE celexa 20 mg daily. Risks, benefits, alternatives discussed with patient including GI  upset, nausea vomiting diarrhea, theoretical decrease of seizure threshold predisposing the patient to a slightly higher seizure risk, headaches, sexual dysfunction, serotonin syndrome, bleeding risk, increased suicidality in patients 24 years and younger.  After discussion of these risks and benefits, the patient voiced understanding and agreed to proceed.  d. CONTINUE amitriptyline 25 mg QHS.  Risks, benefits, alternatives, discussed with patient including dry mouth, constipation, dizziness, sedation.  After discussion of these risks and benefits, the patient voiced understanding and agreed to proceed.  37. Therapy: continue.  38. Labs/Studies: BMP to monitor sodium levels in February was entirely normal, EKG February shows rate 72, sinus, .  39. Follow Up: 2 weeks.      TREATMENT PLAN/GOALS: Continue supportive psychotherapy efforts and medications as indicated. Treatment and medication options discussed during today's visit. Patient acknowledged and verbally consented to continue with current treatment plan and was educated on the importance of compliance with treatment and follow-up appointments.    MEDICATION ISSUES:  YVETTE reviewed as expected.  Discussed medication options and treatment plan of prescribed medication as well as the risks, benefits, and side effects including potential falls, possible impaired driving and metabolic adversities among others. Patient is agreeable to call the office with any worsening of symptoms or onset of side effects. Patient is agreeable to call 911 or go to the nearest ER should he/she begin having SI/HI. No medication side effects or related complaints today.     MEDS ORDERED DURING VISIT:  New Medications Ordered This Visit   Medications   • LORazepam (Ativan) 0.5 MG tablet     Sig: Take 2 tablets by mouth 2 (Two) Times a Day As Needed for Anxiety.     Dispense:  60 tablet     Refill:  2       Return in about 2 weeks (around 11/19/2021).         This document has  been electronically signed by Vinny Krishnamurthy MD  November 5, 2021 11:18 EDT      Part of this note may be an electronic transcription/translation of spoken language to printed text using the Dragon Dictation System.

## 2021-11-08 ENCOUNTER — TELEPHONE (OUTPATIENT)
Dept: PSYCHIATRY | Facility: CLINIC | Age: 66
End: 2021-11-08

## 2021-11-08 NOTE — TELEPHONE ENCOUNTER
Pharmacy wants to change to 1mg of lorazepam BID as her insurance will only cover a max of 3 pills per day.  Is this okay?

## 2021-11-15 ENCOUNTER — TELEMEDICINE (OUTPATIENT)
Dept: PSYCHIATRY | Facility: CLINIC | Age: 66
End: 2021-11-15

## 2021-11-15 DIAGNOSIS — F51.05 INSOMNIA DUE TO MENTAL CONDITION: ICD-10-CM

## 2021-11-15 DIAGNOSIS — F41.0 PANIC ATTACKS: ICD-10-CM

## 2021-11-15 DIAGNOSIS — F33.1 MAJOR DEPRESSIVE DISORDER, RECURRENT EPISODE, MODERATE (HCC): Primary | ICD-10-CM

## 2021-11-15 DIAGNOSIS — F41.1 GENERALIZED ANXIETY DISORDER: ICD-10-CM

## 2021-11-15 PROCEDURE — 90833 PSYTX W PT W E/M 30 MIN: CPT | Performed by: STUDENT IN AN ORGANIZED HEALTH CARE EDUCATION/TRAINING PROGRAM

## 2021-11-15 PROCEDURE — 99214 OFFICE O/P EST MOD 30 MIN: CPT | Performed by: STUDENT IN AN ORGANIZED HEALTH CARE EDUCATION/TRAINING PROGRAM

## 2021-11-15 RX ORDER — LORAZEPAM 1 MG/1
TABLET ORAL
COMMUNITY
Start: 2021-11-08 | End: 2021-12-13

## 2021-11-15 RX ORDER — AMITRIPTYLINE HYDROCHLORIDE 25 MG/1
25 TABLET, FILM COATED ORAL 3 TIMES DAILY
Qty: 90 TABLET | Refills: 2 | Status: SHIPPED | OUTPATIENT
Start: 2021-11-15 | End: 2022-01-03 | Stop reason: SDUPTHER

## 2021-11-15 NOTE — PATIENT INSTRUCTIONS
1.  Please return to clinic at your next scheduled visit.  Contact the clinic ([phone]) at least 24 hours prior in the event you need to cancel.  2.  Do no harm to yourself or others.    3.  Avoid alcohol and drugs.    4.  Take all medications as prescribed.  Please contact the clinic with any concerns. If you are in need of medication refills, please call the clinic at [phone].    5. Should you want to get in touch with your provider, Dr. Vinny Krishnamurthy, please utilize [employer] or contact the office ([phone]), and staff will be able to page Dr. Krishnamurthy directly.  6.  In the event you have personal crisis, contact the following crisis numbers: Suicide Prevention Hotline 1-661.939.3618; [facility] Helpline 6-778-250-OIRY; [facility] Emergency Room [phone]; text HELLO to 029893; or 870.     SPECIFIC RECOMMENDATIONS:     1.      Medications discussed at this encounter:                   - stop citalopram and increase elavil     2.      Psychotherapy recommendations:      3.     Return to clinic: 4 weeks

## 2021-11-15 NOTE — PROGRESS NOTES
"Subjective   Shabana Ferguson is a 65 y.o. female who presents today for follow up    Referring Provider:  No referring provider defined for this encounter.    Chief Complaint:  mdd margarita    History of Present Illness:     Shabana Ferguson is a 64 year old /White female, hx of anxiety and depression, fractured patella, HLD, osteopenia, ulcer referred by MARIE Brice, for anxiety and depression management.   Chart: Psychotropic medications: amitriptyline 25 mg QHS, Celexa 20 mg p.o. daily, lorazepam 0.5 p.o. twice daily as needed anxiety.   Virtual visit via Zoom audio and video due to the COVID-19 pandemic. Patient is accepting of and agreeable to appointment. The visit consisted of the patient, her , and I.     11/15: Virtual visit via Zoom audio and video due to the COVID-19 pandemic.  Patient is accepting of and agreeable to visit.  The visit consisted of the patient and I.  Interview:  1. Chart review: Pt emailed me.  States that 1 mg of lorazepam takes away her anxiety but makes her tired, sleeping well at night, very sad with crying in the day and late afternoon.  States that when she takes Elavil at 6:30 PM she soon feels better.  The evenings are the best.  Still continues to suffer from hopelessness and fatigue and not enjoying herself.  Some fears of being alone.  Expresses some concerns regarding why she has suddenly become so depressed when there is no crisis or external reason for this to happen.  2. \"Not feeling very well.\"  a. Anxiety: Feels both jittery, restless, agitated once wakes in the morning.  i. Uncontrolled worrying, irritable.  b. Depression: Crashes in the afternoon. Feels hopeless. Decreased appetite. Depressed mood.  3. Sleeping: well  4. Eating: decreased appetite.  5. Substances: denies  6. Therapy: continuing  7. Medication compliant: y  8. No SI HI AVH.  a. Some passive SI yesterday, without plan or intent.      11/5: Virtual visit via Zoom audio and " "video due to the COVID-19 pandemic.  Patient is accepting of and agreeable to visit.  The visit consisted of the patient and I.  Interview:  9. Chart review: Status post colonoscopy October 29, which apparently went well per the patient.  10. Did she read start therapy?  Does she have a seasonal pattern?  11. \"It's been a very very hard time.\"  a. Ups and downs  b. Some days feels like she is getting better, then gets hopeless.  c. Sometimes high anxiety  d. Crying spells  e. Per : same day can have good hours and bad hours. Nights have been good, generally.  f. Stopped mirtazapine on Sunday night.  12. Depression/Mood: depressed mood, feelings of hopelessness  a. Denies seasonal pattern.  13. Anxiety: some irritability, worrying, generalized  14. Sleeping: stable  15. Eating: not eating as much  16. Substances: denies  17. Therapy: started counseling again yesterday (marriage counseling)  18. Medication compliant: y  19. No SI HI AVH.      10/19: Virtual visit via Zoom audio and video due to the COVID-19 pandemic.  Patient is accepting of and agreeable to visit.  The visit consisted of the patient and I.  Interview:  20. Chart review: Patient's  came in, tres.  Stating patient has been very anxious for the last few weeks and has been minimizing it.  States she should be taking her lorazepam but she is not.  States that she is not sure she has any left.I called the patient and left a message to call back.  I will refill lorazepam also.  All questions were answered.  No SI HI AVH.  21. Meds: \"I am taking only half the mirtazapine.\"  a. Was waking up 2-3x during the night on the 15 mg dose.  b. Better on lower dose of it, rather than higher  c. Stopped elavil when started mirtazpine.  22. Depression/Mood: \"Still waking up in the morning in despair.\"  a. Milder in the morning; gets worse during the day  b. Takes lorazepam to treat it; which works  23. Anxiety:  a. Unknown fears, uncontrolled " "worrying  b. Fear of losing control over her life  c. Fear of diseases  24. Sleeping: wakes once nightly.  25. Eating: deferred  26. Substances: denies  27. Therapy: saw Desi 2 weeks ago  28. Medication compliant: yes  29. No SI HI AVH.    10/8: Virtual visit via Zoom audio and video due to the COVID-19 pandemic.  Patient is accepting of and agreeable to visit.  The visit consisted of the patient and I.  Interview:  30. Chart review: No new developments.  31. \"I have some difficulties the last few weeks.\"  32. Mood: some depression  a. No precipitating event  b. Depressed mood, starts in the morning, but gets better  c. Energy is sometimes low  d. Concentration good  e. Mild feelings of hopelessness  f. No anhedonia; they got a new puppy, which helps  33. Anxiety:  a. Some anxiety, mild  b. Some worrying  c. Some irritability  d. Restlessness at night; maintenance insomnia  e. No precipitating event  34. I haven't used lorazepam for \"a long time.\"  35. Eating: stable  36. Substances: denies  37. Therapy: therapist retired; awaiting a call back.  38. Medication compliant: yes  39. No SI HI AVH.      8/27: Virtual visit via Zoom audio and video due to the COVID-19 pandemic.  Patient is accepting of and agreeable to visit.  The visit consisted of the patient and I.  Interview:  40. Records reviewed: Patient seen yesterday for colonoscopy consult for constipation issues.  tested positive for COVID.  She tested negative on the eighth.  41. Mirtazapine is \"helping.\" Falling asleep. Feels \"fine.\" Anxiety and depression is under control; no restlessness. Doesn't really use ativan.  42. No SI HI AVH.    7/30: \"Doing better on half the abilify.\"  1. Abilify: occasionally had brain zaps when falling asleep. No longer having. Had them when she was taking 2 mg (not having on 1 mg). Still having some constipation, but \"not that bad.\"  2. Takes 12.5 mg of amitriptyline at night. Helps with insomnia.  3. Overall doing " "\"ok.\" Some depressive symptoms.  4. Agreeable to trying mirtazapine.  5. No SI HI AVH.    6/1: Interview: Continued improvement. Doing well from d and a perspective. Persistent constipation that began with abilify. Still not taking lorazepam as her anxiety is under control. Denies SI HI AVH.  concurs.    9/11/20 H&P: Patient presented today with her . Both provided extensive history regarding her depression and anxiety. Patient had been seen by  at [facility] for roughly a year and a half. Patient reported that Dr. Gordon had become convinced that she had bipolar disorder, which both she and her  disagree with. Patient and  deny any episodes in the past where she experienced, for an extended length of time lasting at least several days, no need for sleep, rapid speech, risk-taking behaviors. Per the  and wife pair, her previous psychiatrist insisted on her being on a mood stabilizer.   Patient reports that she had been stable on Celexa for \"several years.\" In 2019, January, the patient underwent a colonoscopy where dysplasia was revealed. This led to significant anxiety and a \"breakdown.\" The dysplasia was subsequently removed. Due to the heightened anxiety the patient's psychiatrist took the patient off of Celexa and started her on Lexapro. The patient did not tolerate Lexapro well, she continued to remain anxious, she began to engage in therapy in addition to medication management. The Lexapro was subsequently switched to Viibryd. The Viibryd did not work. The patient also began to experience panic attacks including shortness of breath, crying, tachycardia, palpitations. The panic attacks were new and had never happened before.   In January 2020 the patient experienced another breakdown and sunk into a deeper depression. The COVID-19 pandemic only worsened her situation. In February the patient's , a physician, decided to become her full-time caretaker as she would " become anxious and panicky without him present. The patient also experienced intermittent bouts of suicidal ideation.   Recently, the patient and  demanded of their psychiatrist to take her off of Viibryd and Seroquel. They actually tapered her off of Seroquel themselves. They asked him to switch her back to Celexa. She has been on Celexa now for the last 3 days and is already begun to experience improvement. She states that the Seroquel led to having hallucinations and actually worsened insomnia. Last night she did not take Seroquel for the first time in many months and she slept very well. Her mood is slightly improved. Regarding her anxiety, she endorses muscle tension and fatigue restlessness irritability and a history of insomnia. Regarding her depression she denies SI HI AVH, denies anhedonia denies guilt, notes some decreased energy, psychomotor retardation, and a history of insomnia. They had also tried acupuncture in the past with some success. Psychiatric review of systems is negative for psychosis nancy, positive for anxiety and depression. Possibly positive for  depression and postpartum depression. The patient is medication compliant.       Past Psychiatric History:  Began Psychiatric Treatment: Several years   Dx: Depression and anxiety   Psychiatrist: Doctor Gordon for the last year and a half   Therapist: Sees a therapist at [facility] History: Denies, Although she came close to needing admission recently.   Medication Trials: See HPI. Seroquel, Prozac which was too activating, Zoloft which was too activating, Celexa which worked well, Effexor which worked well, Lexapro which did not work, Viibryd which did not work.   Self-Harm: Denies   Suicide Attempts: Denies   OB/GYN HISTORY:  Sexually Active: OB/GYN history deferred   Substance Abuse History:  Types: Denies all, including illicit   Social History:  Marital Status:    Employed: No     Kids: 4   House: House     Hx: Denies   Family History:  Suicide Attempts: Deferred today, Due to lack of time   Suicide Completions: Deferred   Substance Use: Deferred   Psychiatric Conditions: Deferred    depression, psychosis, anxiety: Deferred   Developmental History:  Born: Deferred   Siblings: Deferred   Access to Weapons: Denies   Thought Content: no suicidal ideation, no homicidal ideation, no auditory hallucinations, no visual hallucinations, and     PHQ-9 Depression Screening  PHQ-9 Total Score:      Little interest or pleasure in doing things?     Feeling down, depressed, or hopeless?     Trouble falling or staying asleep, or sleeping too much?     Feeling tired or having little energy?     Poor appetite or overeating?     Feeling bad about yourself - or that you are a failure or have let yourself or your family down?     Trouble concentrating on things, such as reading the newspaper or watching television?     Moving or speaking so slowly that other people could have noticed? Or the opposite - being so fidgety or restless that you have been moving around a lot more than usual?     Thoughts that you would be better off dead, or of hurting yourself in some way?     PHQ-9 Total Score       FAREED-7       Past Surgical History:  Past Surgical History:   Procedure Laterality Date   • COLONOSCOPY      last scope    • COLONOSCOPY N/A 10/29/2021    Procedure: COLONOSCOPY with possible biopies.;  Surgeon: Skyler Fuller MD;  Location: formerly [facility] ENDOSCOPY;  Service: General;  Laterality: N/A;   • KNEE SURGERY      broken knee       Problem List:  Patient Active Problem List   Diagnosis   • Anxiety   • Depression   • History of fracture of patella   • Hyperlipemia   • Osteopenia   • Ulcerative lesion   • Constipation   • History of colonic polyps       Allergy:   No Known Allergies     Discontinued Medications:  Medications Discontinued During This Encounter   Medication Reason   • amitriptyline (ELAVIL) 25  MG tablet Reorder   • citalopram (CeleXA) 20 MG tablet Not Efficacious       Current Medications:   Current Outpatient Medications   Medication Sig Dispense Refill   • amitriptyline (ELAVIL) 25 MG tablet Take 1 tablet by mouth 3 (Three) Times a Day. 90 tablet 2   • atorvastatin (LIPITOR) 10 MG tablet Take 10 mg by mouth Daily.     • Calcium Carb-Cholecalciferol (OYSCO 500 + D) 500-200 MG-UNIT tablet      • cholecalciferol (VITAMIN D3) 25 MCG (1000 UT) tablet Take 1,000 Units by mouth Daily.     • GARLIC PO garlic 1,000 mg oral capsule take 1 capsule by oral route daily   Active     • Glucosamine Sulfate 500 MG tablet Glucosamine 500 mg oral tablet take 1 tablet by oral route daily   Active     • LORazepam (Ativan) 0.5 MG tablet Take 2 tablets by mouth 2 (Two) Times a Day As Needed for Anxiety. 60 tablet 2   • LORazepam (ATIVAN) 1 MG tablet TAKE 1 TABLETS BY MOUTH TWICE DAILY AS NEEDED FOR ANXIETY     • Multiple Vitamins-Iron (MULTIVITAMIN PLUS IRON ADULT PO) Women's Multivitamin 18 mg iron-400 mcg-500 mg oral tablet take 1 tablet by oral route daily   Active     • Multiple Vitamins-Minerals (OCUVITE ADULT 50+ PO) Ocuvite 275-84-0-150 mg-unit-mg-mg oral capsule take 1 capsule by oral route daily   Suspended     • Omega-3 Fatty Acids (fish oil) 1000 MG capsule capsule Fish Oil 1,000 mg (120 mg-180 mg) oral capsule take 1 capsule by oral route daily   Suspended     • vitamin E 400 UNIT capsule vitamin E 400 unit oral capsule take 1 capsule by oral route daily   Active       No current facility-administered medications for this visit.       Past Medical History:  Past Medical History:   Diagnosis Date   • Anxiety    • Depression    • Fracture of patella 09/12/2017   • Hyperlipemia    • Osteopenia        Social History     Socioeconomic History   • Marital status:    Tobacco Use   • Smoking status: Never Smoker   • Smokeless tobacco: Never Used   Vaping Use   • Vaping Use: Never used   Substance and Sexual  "Activity   • Alcohol use: Not Currently   • Drug use: Never   • Sexual activity: Not Currently         Family History   Problem Relation Age of Onset   • Stroke Mother    • Heart disease Mother    • Cancer Brother      Review of Systems:  Review of Systems   Constitutional: Positive for fatigue. Negative for diaphoresis.   HENT: Negative for drooling.    Eyes: Negative for visual disturbance.   Respiratory: Negative for cough and shortness of breath.    Cardiovascular: Negative for chest pain, palpitations and leg swelling.   Gastrointestinal: Negative for nausea and vomiting.   Endocrine: Negative for cold intolerance and heat intolerance.   Genitourinary: Negative for difficulty urinating.   Musculoskeletal: Negative for joint swelling.   Allergic/Immunologic: Negative for immunocompromised state.   Neurological: Negative for dizziness, seizures, speech difficulty and numbness.         · Mental Status Exam  · Appearance  · : sitting in a chair with her , good eye contact, normal street clothes, groomed, in her living room  · Behavior  · : pleasant and cooperative  · Motor  · : No abnormal  · Speech  · : normal rhythm, rate, volume, tone, not hyperverbal, not pressured, normal prosidy, Speaks with an accent  · Mood  · : \"I feel hopeless\"  · Affect  · : depressed, tearful, mood congruent, good variability  · Thought Content  · : negative suicidal ideations, negative homicidal ideations, negative obsessions  · Perceptions  · : negative auditory hallucinations, negative visual hallucinations  · Thought Process  · : linear  · Insight/Judgement  · : fair/fair  · Cognition  · : grossly intact  · Attention  · : intact      Physical Exam:  Physical Exam    Vital Signs:   There were no vitals taken for this visit.     Lab Results:   Office Visit on 10/15/2021   Component Date Value Ref Range Status   • Glucose 10/15/2021 96  65 - 99 mg/dL Final   • BUN 10/15/2021 14  8 - 23 mg/dL Final   • Creatinine 10/15/2021 0.75  " 0.57 - 1.00 mg/dL Final   • Sodium 10/15/2021 139  136 - 145 mmol/L Final   • Potassium 10/15/2021 4.4  3.5 - 5.2 mmol/L Final   • Chloride 10/15/2021 103  98 - 107 mmol/L Final   • CO2 10/15/2021 28.1  22.0 - 29.0 mmol/L Final   • Calcium 10/15/2021 9.6  8.6 - 10.5 mg/dL Final   • Total Protein 10/15/2021 7.4  6.0 - 8.5 g/dL Final   • Albumin 10/15/2021 4.70  3.50 - 5.20 g/dL Final   • ALT (SGPT) 10/15/2021 20  1 - 33 U/L Final   • AST (SGOT) 10/15/2021 24  1 - 32 U/L Final   • Alkaline Phosphatase 10/15/2021 112  39 - 117 U/L Final   • Total Bilirubin 10/15/2021 1.0  0.0 - 1.2 mg/dL Final   • eGFR Non  Amer 10/15/2021 77  >60 mL/min/1.73 Final   • Globulin 10/15/2021 2.7  gm/dL Final   • A/G Ratio 10/15/2021 1.7  g/dL Final   • BUN/Creatinine Ratio 10/15/2021 18.7  7.0 - 25.0 Final   • Anion Gap 10/15/2021 7.9  5.0 - 15.0 mmol/L Final   • TSH 10/15/2021 0.693  0.270 - 4.200 uIU/mL Final   • Total Cholesterol 10/15/2021 197  0 - 200 mg/dL Final   • Triglycerides 10/15/2021 162* 0 - 150 mg/dL Final   • HDL Cholesterol 10/15/2021 46  40 - 60 mg/dL Final   • LDL Cholesterol  10/15/2021 122* 0 - 100 mg/dL Final   • VLDL Cholesterol 10/15/2021 29  5 - 40 mg/dL Final   • LDL/HDL Ratio 10/15/2021 2.58   Final   • WBC 10/15/2021 6.03  3.40 - 10.80 10*3/mm3 Final   • RBC 10/15/2021 4.60  3.77 - 5.28 10*6/mm3 Final   • Hemoglobin 10/15/2021 13.9  12.0 - 15.9 g/dL Final   • Hematocrit 10/15/2021 41.7  34.0 - 46.6 % Final   • MCV 10/15/2021 90.7  79.0 - 97.0 fL Final   • MCH 10/15/2021 30.2  26.6 - 33.0 pg Final   • MCHC 10/15/2021 33.3  31.5 - 35.7 g/dL Final   • RDW 10/15/2021 13.4  12.3 - 15.4 % Final   • RDW-SD 10/15/2021 44.6  37.0 - 54.0 fl Final   • MPV 10/15/2021 10.2  6.0 - 12.0 fL Final   • Platelets 10/15/2021 301  140 - 450 10*3/mm3 Final   • Neutrophil % 10/15/2021 57.8  42.7 - 76.0 % Final   • Lymphocyte % 10/15/2021 31.2  19.6 - 45.3 % Final   • Monocyte % 10/15/2021 9.5  5.0 - 12.0 % Final   • Eosinophil  % 10/15/2021 0.7  0.3 - 6.2 % Final   • Basophil % 10/15/2021 0.5  0.0 - 1.5 % Final   • Immature Grans % 10/15/2021 0.3  0.0 - 0.5 % Final   • Neutrophils, Absolute 10/15/2021 3.49  1.70 - 7.00 10*3/mm3 Final   • Lymphocytes, Absolute 10/15/2021 1.88  0.70 - 3.10 10*3/mm3 Final   • Monocytes, Absolute 10/15/2021 0.57  0.10 - 0.90 10*3/mm3 Final   • Eosinophils, Absolute 10/15/2021 0.04  0.00 - 0.40 10*3/mm3 Final   • Basophils, Absolute 10/15/2021 0.03  0.00 - 0.20 10*3/mm3 Final   • Immature Grans, Absolute 10/15/2021 0.02  0.00 - 0.05 10*3/mm3 Final   • nRBC 10/15/2021 0.2  0.0 - 0.2 /100 WBC Final   Admission on 08/08/2021, Discharged on 08/08/2021   Component Date Value Ref Range Status   • SARS Antigen 08/08/2021 Not Detected  Not Detected Final   • Internal Control 08/08/2021 Passed  Passed Final   • Lot Number 08/08/2021 [id number]   Final   • Expiration Date 08/08/2021 01/16/2023   Final   • COVID19 08/08/2021 Not Detected  Not Detected - Ref. Range Final       EKG Results:  No orders to display       Imaging Results:  No Images in the past 120 days found..      Assessment/Plan   Diagnoses and all orders for this visit:    1. Major depressive disorder, recurrent episode, moderate (HCC) (Primary)    2. Generalized anxiety disorder    3. Panic attacks    4. Insomnia due to mental condition  -     amitriptyline (ELAVIL) 25 MG tablet; Take 1 tablet by mouth 3 (Three) Times a Day.  Dispense: 90 tablet; Refill: 2        Visit Diagnoses:    ICD-10-CM ICD-9-CM   1. Major depressive disorder, recurrent episode, moderate (HCC)  F33.1 296.32   2. Generalized anxiety disorder  F41.1 300.02   3. Panic attacks  F41.0 300.01   4. Insomnia due to mental condition  F51.05 300.9     327.02       Presentation most consistent with major depressive disorder in partial remission, FAREED, rare panic attacks.    11/15: Taper off escitalopram and increase elavil as the latter is helping her depression; patient has been on elavil 25 mg  tid in the past with good success. Alternatively, consider switching to effexor. 18 minutes of supportive psychotherapy with goal to strengthen defenses, promote problems solving, restore adaptive functioning and provide symptom relief. The therapeutic alliance was strengthened to encourage the patient to express their thoughts and feelings. Esteem building was enhanced through praise, reassurance, normalizing and encouragement. Coping skills were enhanced to build distress tolerance skills and emotional regulation. Patient given education on medication side effects, diagnosis/illness and relapse symptoms. Plan to continue supportive psychotherapy in next appointment to provide symptom relief.    11/5: Increase lorazepam, continue celexa and amitriptyline. Consider effexor. 17 minutes of supportive psychotherapy with goal to strengthen defenses, promote problems solving, restore adaptive functioning and provide symptom relief. The therapeutic alliance was strengthened to encourage the patient to express their thoughts and feelings. Esteem building was enhanced through praise, reassurance, normalizing and encouragement. Coping skills were enhanced to build distress tolerance skills and emotional regulation. Patient given education on medication side effects, diagnosis/illness and relapse symptoms. Plan to continue supportive psychotherapy in next appointment to provide symptom relief.  2 wks    10/19: Re-start elavil. Reduce mirtazapine. Re-start therapy. 2 wks.    10/8: Some residual anxiety and depression reemerging.  Increase mirtazapine.  Patient is now doing her visits alone, rather than with her .  4 weeks.    8/27: Doing well on mirtazapine. 6 wks.    7/30: Abilify causing constipation.  Also caused brain zaps.  Discontinue.  Start mirtazapine to target depression, anxiety.  Willing to try, even though she knows it could make her hungrier.  See back in 4 weeks.  Continue therapy.  Longer term, consider  discontinuing amitriptyline.    6/1: Doing well from a mental health standpoint, but having constipation from Abilify. No doubt the low-dose of amitriptyline in the evenings is also contributing to the cholinergic blockade leading to constipation. Patient instructed to half the dose of Abilify. Continue other medications as scheduled.    Previous:  No insomnia on the amitriptyline. Consider doxepin as well, trazodone.  Abilify caused constipation and brain zaps.        PLAN:  43. Risk Assessment: Risk of self-harm acutely remain low. Risk factors include a history of suicidal ideation, mood disorder, anxiety disorder, presence of psychosocial stressors such as colonic dysplasia, COVID-19 pandemic. Protective factors include no history of self-harm or suicide attempts, good social support, willingness to engage in care, improved depressive symptoms. Risk of self-harm chronically also remain low, but could be further elevated in the event of treatment noncompliance and/or AODA.  44. Medications:   a. CONTINUE lorazepam 0.5 to 1 mg BID PRN. (rarely uses) Risks, benefits, alternatives discussed with patient including GI upset, sedation, dizziness, respiratory depression, falls risk.  After discussion of these risks and benefits, the patient voiced understanding and agreed to proceed.  b. REDUCE celexa 20 to 10 mg daily for 5 days, then 5 mg daily for 5 days, then STOP. Risks, benefits, alternatives discussed with patient including GI upset, nausea vomiting diarrhea, theoretical decrease of seizure threshold predisposing the patient to a slightly higher seizure risk, headaches, sexual dysfunction, serotonin syndrome, bleeding risk, increased suicidality in patients 24 years and younger.  After discussion of these risks and benefits, the patient voiced understanding and agreed to proceed.  c. INCREASE amitriptyline 25 mg QHS to BID for 5 days, then to TID thereafter.  Risks, benefits, alternatives, discussed with patient  including dry mouth, constipation, dizziness, sedation.  After discussion of these risks and benefits, the patient voiced understanding and agreed to proceed.  d. S/P:   i. Mirtazapine 15 mg, 7.5 mg: ineffective; made anxiety and depression worse.  ii. Abilify: brain zaps and constipation  iii. Seroquel made depression worse  45. Therapy: continue.  46. Labs/Studies: BMP to monitor sodium levels in February was entirely normal, EKG February shows rate 72, sinus, .  47. Follow Up: 4 weeks.      TREATMENT PLAN/GOALS: Continue supportive psychotherapy efforts and medications as indicated. Treatment and medication options discussed during today's visit. Patient acknowledged and verbally consented to continue with current treatment plan and was educated on the importance of compliance with treatment and follow-up appointments.    MEDICATION ISSUES:  YVETTE reviewed as expected.  Discussed medication options and treatment plan of prescribed medication as well as the risks, benefits, and side effects including potential falls, possible impaired driving and metabolic adversities among others. Patient is agreeable to call the office with any worsening of symptoms or onset of side effects. Patient is agreeable to call 911 or go to the nearest ER should he/she begin having SI/HI. No medication side effects or related complaints today.     MEDS ORDERED DURING VISIT:  New Medications Ordered This Visit   Medications   • amitriptyline (ELAVIL) 25 MG tablet     Sig: Take 1 tablet by mouth 3 (Three) Times a Day.     Dispense:  90 tablet     Refill:  2       Return in about 4 weeks (around 12/13/2021).         This document has been electronically signed by Vinny Krishnamurthy MD  November 15, 2021 11:44 EST      Part of this note may be an electronic transcription/translation of spoken language to printed text using the Dragon Dictation System.

## 2021-12-10 ENCOUNTER — TELEPHONE (OUTPATIENT)
Dept: PSYCHIATRY | Facility: CLINIC | Age: 66
End: 2021-12-10

## 2021-12-10 RX ORDER — LURASIDONE HYDROCHLORIDE 40 MG/1
20 TABLET, FILM COATED ORAL DAILY
Qty: 28 TABLET | Refills: 0 | COMMUNITY
Start: 2021-12-10 | End: 2022-01-17 | Stop reason: SDUPTHER

## 2021-12-10 NOTE — TELEPHONE ENCOUNTER
Patient called requesting to speak with you. Stated she is having a crisis and would like a call back.

## 2021-12-10 NOTE — TELEPHONE ENCOUNTER
Patient called in.  Very distressed after waking up to [employer] this morning.  It felt so real, she feared that she was hallucinating.  However it occurred around the time she woke up in bed, which sounds to be more like a hypnopompic hallucination.  She has never experienced hallucinations before.  She had also been to a [employer] concert the night before.      Continued anxiety and depression.  Intrusive thoughts about not liking God and Alejandro.    She and her  are both working on setting up TMS.  The TMS clinic called them yesterday for what sounded like her intake.    Some passive SI without intent.  She has thought of a plan of cutting her wrists or overdosing, though she has never tried to hurt herself in the past.  Protective factors include her family and [employer] beliefs.  We discussed going to the ER if she ever becomes acutely suicidal.  She and her  both voiced understanding and agreed to proceed.    START Latuda 20 mg p.o. daily.  Samples to be picked up.Take with food. Risks, benefits, and alternatives discussed with patient including akathisia, sedation, dizziness/falls risk, nausea, low risk of weight gain & metabolic risks for diabetes and dyslipidemia, and rare tardive dyskinesia.  After discussion of these risks and benefits, the patient voiced understanding and agreed to proceed. Instructed to take medication with meal of minimum of 350 calories to improve consistent efficacy and absorption.

## 2021-12-13 ENCOUNTER — TELEPHONE (OUTPATIENT)
Dept: PSYCHIATRY | Facility: CLINIC | Age: 66
End: 2021-12-13

## 2021-12-13 ENCOUNTER — TELEMEDICINE (OUTPATIENT)
Dept: PSYCHIATRY | Facility: CLINIC | Age: 66
End: 2021-12-13

## 2021-12-13 DIAGNOSIS — F51.05 INSOMNIA DUE TO MENTAL CONDITION: ICD-10-CM

## 2021-12-13 DIAGNOSIS — F33.1 MAJOR DEPRESSIVE DISORDER, RECURRENT EPISODE, MODERATE (HCC): Primary | ICD-10-CM

## 2021-12-13 DIAGNOSIS — F41.0 PANIC ATTACKS: ICD-10-CM

## 2021-12-13 DIAGNOSIS — F41.1 GENERALIZED ANXIETY DISORDER: ICD-10-CM

## 2021-12-13 PROCEDURE — 90833 PSYTX W PT W E/M 30 MIN: CPT | Performed by: STUDENT IN AN ORGANIZED HEALTH CARE EDUCATION/TRAINING PROGRAM

## 2021-12-13 PROCEDURE — 99214 OFFICE O/P EST MOD 30 MIN: CPT | Performed by: STUDENT IN AN ORGANIZED HEALTH CARE EDUCATION/TRAINING PROGRAM

## 2021-12-13 RX ORDER — VENLAFAXINE HYDROCHLORIDE 37.5 MG/1
37.5 CAPSULE, EXTENDED RELEASE ORAL DAILY
Qty: 30 CAPSULE | Refills: 2 | Status: SHIPPED | OUTPATIENT
Start: 2021-12-13 | End: 2022-01-03 | Stop reason: SDUPTHER

## 2021-12-13 NOTE — PROGRESS NOTES
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 insomnia on the amitriptyline. Consider doxepin as well, trazodone.  Abilify caused constipation and brain zaps.        PLAN:  53. Risk Assessment: Risk of self-harm acutely remain low. Risk factors include a history of suicidal ideation, mood disorder, anxiety disorder, presence of psychosocial stressors such as colonic dysplasia, COVID-19 pandemic. Protective factors include no history of self-harm or suicide attempts, good social support, willingness to engage in care, improved depressive symptoms. Risk of self-harm chronically also remain low, but could be further elevated in the event of treatment noncompliance and/or AODA.  54. Medications:   a. CONTINUE lorazepam 0.5 to 1 mg BID PRN. (rarely uses) Risks, benefits, alternatives discussed with patient including GI upset, sedation, dizziness, respiratory depression, falls risk.  After discussion of these risks and benefits, the patient voiced understanding and agreed to proceed.  b. START effexor 37.5 daily. Risks, benefits, alternatives discussed with patient including GI upset, nausea vomiting diarrhea, theoretical decrease of seizure threshold predisposing the patient to a slightly higher seizure risk, headaches, sexual dysfunction,  serotonin syndrome, bleeding risk, increased suicidality in patients 24 years and younger.  Also constipation and urinary retention.  After discussion of these risks and benefits, the patient voiced understanding and agreed to proceed.  c. DECREASE amitriptyline 25 mg BID for 7 days, then nightly thereafter.  Risks, benefits, alternatives, discussed with patient including dry mouth, constipation, dizziness, sedation.  After discussion of these risks and benefits, the patient voiced understanding and agreed to proceed.  d. CONTINUE latuda 20 mg nightly. Take with food. Risks, benefits, and alternatives discussed with patient including akathisia, sedation, dizziness/falls risk, nausea, low risk of weight gain & metabolic risks for diabetes and dyslipidemia, and rare tardive dyskinesia.  After discussion of these risks and benefits, the patient voiced understanding and agreed to proceed. Instructed to take medication with meal of minimum of 350 calories to improve consistent efficacy and absorption.   e. S/P:   i. Mirtazapine 15 mg, 7.5 mg: ineffective; made anxiety and depression worse.  ii. Abilify: brain zaps and constipation  iii. Seroquel made depression worse  iv. celexa 20 mg daily, ineffective after several months  55. Therapy: continue.  56. Labs/Studies: BMP to monitor sodium levels in February was entirely normal, EKG February shows rate 72, sinus, .  57. Follow Up: 1 weeks.      TREATMENT PLAN/GOALS: Continue supportive psychotherapy efforts and medications as indicated. Treatment and medication options discussed during today's visit. Patient acknowledged and verbally consented to continue with current treatment plan and was educated on the importance of compliance with treatment and follow-up appointments.    MEDICATION ISSUES:  YVETTE reviewed as expected.  Discussed medication options and treatment plan of prescribed medication as well as the risks, benefits, and side effects including potential  falls, possible impaired driving and metabolic adversities among others. Patient is agreeable to call the office with any worsening of symptoms or onset of side effects. Patient is agreeable to call 911 or go to the nearest ER should he/she begin having SI/HI. No medication side effects or related complaints today.     MEDS ORDERED DURING VISIT:  New Medications Ordered This Visit   Medications   • venlafaxine XR (Effexor XR) 37.5 MG 24 hr capsule     Sig: Take 1 capsule by mouth Daily.     Dispense:  30 capsule     Refill:  2       Return in about 1 week (around 12/20/2021).         This document has been electronically signed by Vinny Krishnamurthy MD  December 13, 2021 11:23 EST      Part of this note may be an electronic transcription/translation of spoken language to printed text using the Dragon Dictation System.

## 2021-12-13 NOTE — PROGRESS NOTES
Spoke with patient's son 12/12/21, who reports patient has had increase in anxiety and depression recently. Has had suicidal thoughts as well. Patient's son wanted to know if we thought it was appropriate to take patient to the emergency room, which I agreed with. Patient to go to the emergency room to be evaluated and will follow up with Dr. Krishnamurthy this week.

## 2021-12-17 ENCOUNTER — TELEMEDICINE (OUTPATIENT)
Dept: PSYCHIATRY | Facility: CLINIC | Age: 66
End: 2021-12-17

## 2021-12-17 DIAGNOSIS — F41.1 GENERALIZED ANXIETY DISORDER: ICD-10-CM

## 2021-12-17 DIAGNOSIS — F41.0 PANIC ATTACKS: ICD-10-CM

## 2021-12-17 DIAGNOSIS — F51.05 INSOMNIA DUE TO MENTAL CONDITION: ICD-10-CM

## 2021-12-17 DIAGNOSIS — F33.2 SEVERE EPISODE OF RECURRENT MAJOR DEPRESSIVE DISORDER, WITHOUT PSYCHOTIC FEATURES (HCC): Primary | ICD-10-CM

## 2021-12-17 PROCEDURE — 99214 OFFICE O/P EST MOD 30 MIN: CPT | Performed by: STUDENT IN AN ORGANIZED HEALTH CARE EDUCATION/TRAINING PROGRAM

## 2021-12-17 PROCEDURE — 90833 PSYTX W PT W E/M 30 MIN: CPT | Performed by: STUDENT IN AN ORGANIZED HEALTH CARE EDUCATION/TRAINING PROGRAM

## 2021-12-17 NOTE — PATIENT INSTRUCTIONS
1.  Please return to clinic at your next scheduled visit.  Contact the clinic ([phone]) at least 24 hours prior in the event you need to cancel.  2.  Do no harm to yourself or others.    3.  Avoid alcohol and drugs.    4.  Take all medications as prescribed.  Please contact the clinic with any concerns. If you are in need of medication refills, please call the clinic at [phone].    5. Should you want to get in touch with your provider, Dr. Vinny Krishnamurthy, please utilize [employer] or contact the office ([phone]), and staff will be able to page Dr. Krishnamurthy directly.  6.  In the event you have personal crisis, contact the following crisis numbers: Suicide Prevention Hotline 1-792.928.5005; [facility] Helpline 7-480-674-MHEA; [facility] [phone]; text HELLO to 687499; or 397.     SPECIFIC RECOMMENDATIONS:     1.      Medications discussed at this encounter:                   -No changes     2.      Psychotherapy recommendations:      3.     Return to clinic: 2 weeks

## 2021-12-17 NOTE — PROGRESS NOTES
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 relief.    11/5: Increase lorazepam, continue celexa and amitriptyline. Consider effexor. 17 minutes of supportive psychotherapy with goal to strengthen defenses, promote problems solving, restore adaptive functioning and provide symptom relief. The therapeutic alliance was strengthened to encourage the patient to express their thoughts and feelings. Esteem building was enhanced through praise, reassurance, normalizing and encouragement. Coping skills were enhanced to build distress tolerance skills and emotional regulation. Patient given education on medication side effects, diagnosis/illness and relapse symptoms. Plan to continue supportive psychotherapy in next appointment to provide symptom relief.  2 wks    10/19: Re-start elavil. Reduce mirtazapine. Re-start therapy. 2 wks.    10/8: Some residual anxiety and depression reemerging.  Increase mirtazapine.  Patient is now doing her visits alone, rather than with her .  4 weeks.    8/27: Doing well on mirtazapine. 6 wks.    7/30: Abilify causing constipation.  Also caused brain zaps.  Discontinue.  Start mirtazapine to target depression, anxiety.  Willing to try, even though she knows it could make her hungrier.  See back in 4 weeks.  Continue therapy.  Longer term, consider  discontinuing amitriptyline.    6/1: Doing well from a mental health standpoint, but having constipation from Abilify. No doubt the low-dose of amitriptyline in the evenings is also contributing to the cholinergic blockade leading to constipation. Patient instructed to half the dose of Abilify. Continue other medications as scheduled.    Previous:  No insomnia on the amitriptyline. Consider doxepin as well, trazodone.  Abilify caused constipation and brain zaps.        PLAN:  64. Risk Assessment: Risk of self-harm acutely remain low. Risk factors include a history of suicidal ideation, mood disorder, anxiety disorder, presence of psychosocial stressors such as colonic dysplasia, COVID-19 pandemic. Protective factors include no history of self-harm or suicide attempts, good social support, willingness to engage in care, improved depressive symptoms. Risk of self-harm chronically also remain low, but could be further elevated in the event of treatment noncompliance and/or AODA.  65. Medications:   a. CONTINUE lorazepam 0.5 to 1 mg BID PRN. (rarely uses) Risks, benefits, alternatives discussed with patient including GI upset, sedation, dizziness, respiratory depression, falls risk.  After discussion of these risks and benefits, the patient voiced understanding and agreed to proceed.  b. CONTINUE effexor 37.5 daily. Risks, benefits, alternatives discussed with patient including GI upset, nausea vomiting diarrhea, theoretical decrease of seizure threshold predisposing the patient to a slightly higher seizure risk, headaches, sexual dysfunction, serotonin syndrome, bleeding risk, increased suicidality in patients 24 years and younger.  Also constipation and urinary retention.  After discussion of these risks and benefits, the patient voiced understanding and agreed to proceed.  c. CONTINUE amitriptyline 25 mg BID for 7 days, then only QHS. Risks, benefits, alternatives, discussed with patient including dry mouth,  constipation, dizziness, sedation.  After discussion of these risks and benefits, the patient voiced understanding and agreed to proceed.  d. CONTINUE latuda 20 mg nightly. Take with food. Risks, benefits, and alternatives discussed with patient including akathisia, sedation, dizziness/falls risk, nausea, low risk of weight gain & metabolic risks for diabetes and dyslipidemia, and rare tardive dyskinesia.  After discussion of these risks and benefits, the patient voiced understanding and agreed to proceed. Instructed to take medication with meal of minimum of 350 calories to improve consistent efficacy and absorption.   e. S/P:   i. Mirtazapine 15 mg, 7.5 mg: ineffective; made anxiety and depression worse.  ii. Abilify: brain zaps and constipation  iii. Seroquel made depression worse  iv. celexa 20 mg daily, ineffective after several months  66. Therapy: continue.  67. Labs/Studies: BMP to monitor sodium levels in February was entirely normal, EKG February shows rate 72, sinus, .  68. Follow Up: 2 weeks (1/3 at 8:40 am)      TREATMENT PLAN/GOALS: Continue supportive psychotherapy efforts and medications as indicated. Treatment and medication options discussed during today's visit. Patient acknowledged and verbally consented to continue with current treatment plan and was educated on the importance of compliance with treatment and follow-up appointments.    MEDICATION ISSUES:  YVETTE reviewed as expected.  Discussed medication options and treatment plan of prescribed medication as well as the risks, benefits, and side effects including potential falls, possible impaired driving and metabolic adversities among others. Patient is agreeable to call the office with any worsening of symptoms or onset of side effects. Patient is agreeable to call 911 or go to the nearest ER should he/she begin having SI/HI. No medication side effects or related complaints today.     MEDS ORDERED DURING VISIT:  No orders of the  defined types were placed in this encounter.      Return in about 2 weeks (around 12/31/2021).  (1/3 @ 8:40 am)       This document has been electronically signed by Vinny Krishnamurthy MD  December 17, 2021 13:29 EST      Part of this note may be an electronic transcription/translation of spoken language to printed text using the Dragon Dictation System.

## 2022-01-03 ENCOUNTER — TELEMEDICINE (OUTPATIENT)
Dept: PSYCHIATRY | Facility: CLINIC | Age: 67
End: 2022-01-03

## 2022-01-03 DIAGNOSIS — F33.2 SEVERE EPISODE OF RECURRENT MAJOR DEPRESSIVE DISORDER, WITHOUT PSYCHOTIC FEATURES: Primary | ICD-10-CM

## 2022-01-03 DIAGNOSIS — F41.1 GENERALIZED ANXIETY DISORDER: ICD-10-CM

## 2022-01-03 DIAGNOSIS — F33.1 MAJOR DEPRESSIVE DISORDER, RECURRENT EPISODE, MODERATE: ICD-10-CM

## 2022-01-03 DIAGNOSIS — F41.0 PANIC ATTACKS: ICD-10-CM

## 2022-01-03 DIAGNOSIS — F51.05 INSOMNIA DUE TO MENTAL CONDITION: ICD-10-CM

## 2022-01-03 PROCEDURE — 90833 PSYTX W PT W E/M 30 MIN: CPT | Performed by: STUDENT IN AN ORGANIZED HEALTH CARE EDUCATION/TRAINING PROGRAM

## 2022-01-03 PROCEDURE — 99214 OFFICE O/P EST MOD 30 MIN: CPT | Performed by: STUDENT IN AN ORGANIZED HEALTH CARE EDUCATION/TRAINING PROGRAM

## 2022-01-03 RX ORDER — AMITRIPTYLINE HYDROCHLORIDE 25 MG/1
25 TABLET, FILM COATED ORAL NIGHTLY
Qty: 90 TABLET | Refills: 2
Start: 2022-01-03 | End: 2022-03-21

## 2022-01-03 RX ORDER — VENLAFAXINE HYDROCHLORIDE 75 MG/1
75 CAPSULE, EXTENDED RELEASE ORAL DAILY
Qty: 30 CAPSULE | Refills: 2 | Status: SHIPPED | OUTPATIENT
Start: 2022-01-03 | End: 2022-03-07

## 2022-01-03 NOTE — PROGRESS NOTES
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 emotional regulation. Patient given education on medication side effects, diagnosis/illness and relapse symptoms. Plan to continue supportive psychotherapy in next appointment to provide symptom relief.  4 wks    12/17: Improved.  Continue medication titration as planned.  17 minutes of supportive psychotherapy with goal to strengthen defenses, promote problems solving, restore adaptive functioning and provide symptom relief. The therapeutic alliance was strengthened to encourage the patient to express their thoughts and feelings. Esteem building was enhanced through praise, reassurance, normalizing and encouragement. Coping skills were enhanced to build distress tolerance skills and emotional regulation. Patient given education on medication side effects, diagnosis/illness and relapse symptoms. Plan to continue supportive psychotherapy in next appointment to provide symptom relief.  See back in 2 weeks on 3 January in the morning    12/13: See back in 1 week. Taper down on amitriptyline, start effexor. 17 minutes of supportive psychotherapy with goal to strengthen defenses, promote problems solving, restore adaptive functioning and provide symptom relief. The therapeutic alliance was strengthened to encourage the patient to express their thoughts and feelings.  Esteem building was enhanced through praise, reassurance, normalizing and encouragement. Coping skills were enhanced to build distress tolerance skills and emotional regulation. Patient given education on medication side effects, diagnosis/illness and relapse symptoms. Plan to continue supportive psychotherapy in next appointment to provide symptom relief.  1 wk    11/15: Taper off escitalopram and increase elavil as the latter is helping her depression; patient has been on elavil 25 mg tid in the past with good success. Alternatively, consider switching to effexor. 18 minutes of supportive psychotherapy with goal to strengthen defenses, promote problems solving, restore adaptive functioning and provide symptom relief. The therapeutic alliance was strengthened to encourage the patient to express their thoughts and feelings. Esteem building was enhanced through praise, reassurance, normalizing and encouragement. Coping skills were enhanced to build distress tolerance skills and emotional regulation. Patient given education on medication side effects, diagnosis/illness and relapse symptoms. Plan to continue supportive psychotherapy in next appointment to provide symptom relief.    11/5: Increase lorazepam, continue celexa and amitriptyline. Consider effexor. 17 minutes of supportive psychotherapy with goal to strengthen defenses, promote problems solving, restore adaptive functioning and provide symptom relief. The therapeutic alliance was strengthened to encourage the patient to express their thoughts and feelings. Esteem building was enhanced through praise, reassurance, normalizing and encouragement. Coping skills were enhanced to build distress tolerance skills and emotional regulation. Patient given education on medication side effects, diagnosis/illness and relapse symptoms. Plan to continue supportive psychotherapy in next appointment to provide symptom relief.  2 wks    10/19: Re-start elavil. Reduce  mirtazapine. Re-start therapy. 2 wks.    10/8: Some residual anxiety and depression reemerging.  Increase mirtazapine.  Patient is now doing her visits alone, rather than with her .  4 weeks.    8/27: Doing well on mirtazapine. 6 wks.    7/30: Abilify causing constipation.  Also caused brain zaps.  Discontinue.  Start mirtazapine to target depression, anxiety.  Willing to try, even though she knows it could make her hungrier.  See back in 4 weeks.  Continue therapy.  Longer term, consider discontinuing amitriptyline.    6/1: Doing well from a mental health standpoint, but having constipation from Abilify. No doubt the low-dose of amitriptyline in the evenings is also contributing to the cholinergic blockade leading to constipation. Patient instructed to half the dose of Abilify. Continue other medications as scheduled.    Previous:  No insomnia on the amitriptyline. Consider doxepin as well, trazodone.  Abilify caused constipation and brain zaps.        PLAN:  75. Risk Assessment: Risk of self-harm acutely remain low. Risk factors include a history of suicidal ideation, mood disorder, anxiety disorder, presence of psychosocial stressors such as colonic dysplasia, COVID-19 pandemic. Protective factors include no history of self-harm or suicide attempts, good social support, willingness to engage in care, improved depressive symptoms. Risk of self-harm chronically also remain low, but could be further elevated in the event of treatment noncompliance and/or AODA.  76. Medications:   a. CONTINUE lorazepam 0.25 to 0.5 mg BID PRN. (rarely uses) Risks, benefits, alternatives discussed with patient including GI upset, sedation, dizziness, respiratory depression, falls risk.  After discussion of these risks and benefits, the patient voiced understanding and agreed to proceed.  b. INCREASE effexor 37.5 to 75 daily. Risks, benefits, alternatives discussed with patient including GI upset, nausea vomiting diarrhea,  theoretical decrease of seizure threshold predisposing the patient to a slightly higher seizure risk, headaches, sexual dysfunction, serotonin syndrome, bleeding risk, increased suicidality in patients 24 years and younger.  Also constipation and urinary retention.  After discussion of these risks and benefits, the patient voiced understanding and agreed to proceed.  c. CONTINUE amitriptyline 25 mg QHS. Risks, benefits, alternatives, discussed with patient including dry mouth, constipation, dizziness, sedation.  After discussion of these risks and benefits, the patient voiced understanding and agreed to proceed.  d. CONTINUE latuda 20 mg nightly. Take with food. Risks, benefits, and alternatives discussed with patient including akathisia, sedation, dizziness/falls risk, nausea, low risk of weight gain & metabolic risks for diabetes and dyslipidemia, and rare tardive dyskinesia.  After discussion of these risks and benefits, the patient voiced understanding and agreed to proceed. Instructed to take medication with meal of minimum of 350 calories to improve consistent efficacy and absorption.   e. S/P:   i. Mirtazapine 15 mg, 7.5 mg: ineffective; made anxiety and depression worse.  ii. Abilify: brain zaps and constipation  iii. Seroquel made depression worse  iv. celexa 20 mg daily, ineffective after several months  77. Therapy: continue with [facility] at [facility].  78. Labs/Studies: BMP to monitor sodium levels in February was entirely normal, EKG February shows rate 72, sinus, .  79. Follow Up: 2 weeks (1/3 at 8:40 am)      TREATMENT PLAN/GOALS: Continue supportive psychotherapy efforts and medications as indicated. Treatment and medication options discussed during today's visit. Patient acknowledged and verbally consented to continue with current treatment plan and was educated on the importance of compliance with treatment and follow-up appointments.    MEDICATION ISSUES:  YVETTE reviewed as expected.  Discussed  medication options and treatment plan of prescribed medication as well as the risks, benefits, and side effects including potential falls, possible impaired driving and metabolic adversities among others. Patient is agreeable to call the office with any worsening of symptoms or onset of side effects. Patient is agreeable to call 911 or go to the nearest ER should he/she begin having SI/HI. No medication side effects or related complaints today.     MEDS ORDERED DURING VISIT:  New Medications Ordered This Visit   Medications   • amitriptyline (ELAVIL) 25 MG tablet     Sig: Take 1 tablet by mouth Every Night.     Dispense:  90 tablet     Refill:  2   • venlafaxine XR (Effexor XR) 75 MG 24 hr capsule     Sig: Take 1 capsule by mouth Daily.     Dispense:  30 capsule     Refill:  2       Return in about 2 weeks (around 1/17/2022).         This document has been electronically signed by Vinny Krishnamurthy MD  January 3, 2022 09:05 EST      Part of this note may be an electronic transcription/translation of spoken language to printed text using the Dragon Dictation System.

## 2022-01-03 NOTE — PATIENT INSTRUCTIONS
1.  Please return to clinic at your next scheduled visit.  Contact the clinic ([phone]) at least 24 hours prior in the event you need to cancel.  2.  Do no harm to yourself or others.    3.  Avoid alcohol and drugs.    4.  Take all medications as prescribed.  Please contact the clinic with any concerns. If you are in need of medication refills, please call the clinic at [phone].    5. Should you want to get in touch with your provider, Dr. Vinny Krishnamurthy, please utilize [employer] or contact the office ([phone]), and staff will be able to page Dr. Krishnamurthy directly.  6.  In the event you have personal crisis, contact the following crisis numbers: Suicide Prevention Hotline 1-766.670.8201; [facility] Helpline 9-875-493-OTQC; [facility] Emergency Room [phone]; text HELLO to 132700; or 205.     SPECIFIC RECOMMENDATIONS:     1.      Medications discussed at this encounter:                   - increase effexor     2.      Psychotherapy recommendations:      3.     Return to clinic: 2 weeks

## 2022-01-17 ENCOUNTER — TELEMEDICINE (OUTPATIENT)
Dept: PSYCHIATRY | Facility: CLINIC | Age: 67
End: 2022-01-17

## 2022-01-17 ENCOUNTER — TELEPHONE (OUTPATIENT)
Dept: PSYCHIATRY | Facility: CLINIC | Age: 67
End: 2022-01-17

## 2022-01-17 DIAGNOSIS — F41.1 GENERALIZED ANXIETY DISORDER: ICD-10-CM

## 2022-01-17 DIAGNOSIS — F41.0 PANIC ATTACKS: ICD-10-CM

## 2022-01-17 DIAGNOSIS — F33.2 SEVERE EPISODE OF RECURRENT MAJOR DEPRESSIVE DISORDER, WITHOUT PSYCHOTIC FEATURES: Primary | ICD-10-CM

## 2022-01-17 DIAGNOSIS — F51.05 INSOMNIA DUE TO MENTAL CONDITION: ICD-10-CM

## 2022-01-17 PROCEDURE — 99214 OFFICE O/P EST MOD 30 MIN: CPT | Performed by: STUDENT IN AN ORGANIZED HEALTH CARE EDUCATION/TRAINING PROGRAM

## 2022-01-17 PROCEDURE — 90833 PSYTX W PT W E/M 30 MIN: CPT | Performed by: STUDENT IN AN ORGANIZED HEALTH CARE EDUCATION/TRAINING PROGRAM

## 2022-01-17 RX ORDER — LURASIDONE HYDROCHLORIDE 20 MG/1
20 TABLET, FILM COATED ORAL DAILY
Qty: 30 TABLET | Refills: 2 | Status: SHIPPED | OUTPATIENT
Start: 2022-01-17 | End: 2022-01-17 | Stop reason: SDUPTHER

## 2022-01-17 RX ORDER — CITALOPRAM 20 MG/1
20 TABLET ORAL DAILY
COMMUNITY
Start: 2022-01-04 | End: 2022-01-17

## 2022-01-17 RX ORDER — VENLAFAXINE HYDROCHLORIDE 37.5 MG/1
CAPSULE, EXTENDED RELEASE ORAL
COMMUNITY
Start: 2022-01-09 | End: 2022-01-17

## 2022-01-17 RX ORDER — LORAZEPAM 1 MG/1
1 TABLET ORAL 2 TIMES DAILY
COMMUNITY
Start: 2022-01-03 | End: 2022-01-17

## 2022-01-17 RX ORDER — LURASIDONE HYDROCHLORIDE 20 MG/1
20 TABLET, FILM COATED ORAL DAILY
Qty: 28 TABLET | Refills: 0 | COMMUNITY
Start: 2022-01-17 | End: 2022-01-24 | Stop reason: SDUPTHER

## 2022-01-17 NOTE — PROGRESS NOTES
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 effects.  17 minutes of supportive psychotherapy with goal to strengthen defenses, promote problems solving, restore adaptive functioning and provide symptom relief. The therapeutic alliance was strengthened to encourage the patient to express their thoughts and feelings. Esteem building was enhanced through praise, reassurance, normalizing and encouragement. Coping skills were enhanced to build distress tolerance skills and emotional regulation. Patient given education on medication side effects, diagnosis/illness and relapse symptoms. Plan to continue supportive psychotherapy in next appointment to provide symptom relief.  4 wks    1/3: Continued improvement. Increase effexor to target residual dep anx. TMS to start 1/6. 17 minutes of supportive psychotherapy with goal to strengthen defenses, promote problems solving, restore adaptive functioning and provide symptom relief. The therapeutic alliance was strengthened to encourage the patient to express their thoughts and feelings. Esteem building was enhanced through praise, reassurance, normalizing and encouragement. Coping skills were enhanced to build distress tolerance skills and emotional regulation. Patient given education on medication side effects, diagnosis/illness and relapse  symptoms. Plan to continue supportive psychotherapy in next appointment to provide symptom relief.  4 wks    12/17: Improved.  Continue medication titration as planned.  17 minutes of supportive psychotherapy with goal to strengthen defenses, promote problems solving, restore adaptive functioning and provide symptom relief. The therapeutic alliance was strengthened to encourage the patient to express their thoughts and feelings. Esteem building was enhanced through praise, reassurance, normalizing and encouragement. Coping skills were enhanced to build distress tolerance skills and emotional regulation. Patient given education on medication side effects, diagnosis/illness and relapse symptoms. Plan to continue supportive psychotherapy in next appointment to provide symptom relief.  See back in 2 weeks on 3 January in the morning    12/13: See back in 1 week. Taper down on amitriptyline, start effexor. 17 minutes of supportive psychotherapy with goal to strengthen defenses, promote problems solving, restore adaptive functioning and provide symptom relief. The therapeutic alliance was strengthened to encourage the patient to express their thoughts and feelings. Esteem building was enhanced through praise, reassurance, normalizing and encouragement. Coping skills were enhanced to build distress tolerance skills and emotional regulation. Patient given education on medication side effects, diagnosis/illness and relapse symptoms. Plan to continue supportive psychotherapy in next appointment to provide symptom relief.  1 wk    11/15: Taper off escitalopram and increase elavil as the latter is helping her depression; patient has been on elavil 25 mg tid in the past with good success. Alternatively, consider switching to effexor. 18 minutes of supportive psychotherapy with goal to strengthen defenses, promote problems solving, restore adaptive functioning and provide symptom relief. The therapeutic alliance was strengthened  to encourage the patient to express their thoughts and feelings. Esteem building was enhanced through praise, reassurance, normalizing and encouragement. Coping skills were enhanced to build distress tolerance skills and emotional regulation. Patient given education on medication side effects, diagnosis/illness and relapse symptoms. Plan to continue supportive psychotherapy in next appointment to provide symptom relief.    11/5: Increase lorazepam, continue celexa and amitriptyline. Consider effexor. 17 minutes of supportive psychotherapy with goal to strengthen defenses, promote problems solving, restore adaptive functioning and provide symptom relief. The therapeutic alliance was strengthened to encourage the patient to express their thoughts and feelings. Esteem building was enhanced through praise, reassurance, normalizing and encouragement. Coping skills were enhanced to build distress tolerance skills and emotional regulation. Patient given education on medication side effects, diagnosis/illness and relapse symptoms. Plan to continue supportive psychotherapy in next appointment to provide symptom relief.  2 wks    10/19: Re-start elavil. Reduce mirtazapine. Re-start therapy. 2 wks.    10/8: Some residual anxiety and depression reemerging.  Increase mirtazapine.  Patient is now doing her visits alone, rather than with her .  4 weeks.    8/27: Doing well on mirtazapine. 6 wks.    7/30: Abilify causing constipation.  Also caused brain zaps.  Discontinue.  Start mirtazapine to target depression, anxiety.  Willing to try, even though she knows it could make her hungrier.  See back in 4 weeks.  Continue therapy.  Longer term, consider discontinuing amitriptyline.    6/1: Doing well from a mental health standpoint, but having constipation from Abilify. No doubt the low-dose of amitriptyline in the evenings is also contributing to the cholinergic blockade leading to constipation. Patient instructed to half the  dose of Abilify. Continue other medications as scheduled.    Previous:  No insomnia on the amitriptyline. Consider doxepin as well, trazodone.  Abilify caused constipation and brain zaps.        PLAN:  86. Risk Assessment: Risk of self-harm acutely remain low. Risk factors include a history of suicidal ideation, mood disorder, anxiety disorder, presence of psychosocial stressors such as colonic dysplasia, COVID-19 pandemic. Protective factors include no history of self-harm or suicide attempts, good social support, willingness to engage in care, improved depressive symptoms. Risk of self-harm chronically also remain low, but could be further elevated in the event of treatment noncompliance and/or AODA.  87. Medications:   a. CONTINUE lorazepam 0.25 to 0.5 mg BID PRN. (rarely uses) Risks, benefits, alternatives discussed with patient including GI upset, sedation, dizziness, respiratory depression, falls risk.  After discussion of these risks and benefits, the patient voiced understanding and agreed to proceed.  b. CONTINUE effexor 75 daily. Risks, benefits, alternatives discussed with patient including GI upset, nausea vomiting diarrhea, theoretical decrease of seizure threshold predisposing the patient to a slightly higher seizure risk, headaches, sexual dysfunction, serotonin syndrome, bleeding risk, increased suicidality in patients 24 years and younger.  Also constipation and urinary retention.  After discussion of these risks and benefits, the patient voiced understanding and agreed to proceed.  c. CONTINUE amitriptyline 25 mg QHS. Risks, benefits, alternatives, discussed with patient including dry mouth, constipation, dizziness, sedation.  After discussion of these risks and benefits, the patient voiced understanding and agreed to proceed.  d. CONTINUE latuda 20 mg nightly. Take with food. Risks, benefits, and alternatives discussed with patient including akathisia, sedation, dizziness/falls risk, nausea, low  risk of weight gain & metabolic risks for diabetes and dyslipidemia, and rare tardive dyskinesia.  After discussion of these risks and benefits, the patient voiced understanding and agreed to proceed. Instructed to take medication with meal of minimum of 350 calories to improve consistent efficacy and absorption.   e. S/P:   i. Mirtazapine 15 mg, 7.5 mg: ineffective; made anxiety and depression worse.  ii. Abilify: brain zaps and constipation  iii. Seroquel made depression worse  iv. celexa 20 mg daily, ineffective after several months  88. Therapy: continue with Gen[facility] at [facility].  89. Labs/Studies: BMP to monitor sodium levels in February was entirely normal, EKG February shows rate 72, sinus, .  90. Follow Up: 4 weeks      TREATMENT PLAN/GOALS: Continue supportive psychotherapy efforts and medications as indicated. Treatment and medication options discussed during today's visit. Patient acknowledged and verbally consented to continue with current treatment plan and was educated on the importance of compliance with treatment and follow-up appointments.    MEDICATION ISSUES:  YVETTE reviewed as expected.  Discussed medication options and treatment plan of prescribed medication as well as the risks, benefits, and side effects including potential falls, possible impaired driving and metabolic adversities among others. Patient is agreeable to call the office with any worsening of symptoms or onset of side effects. Patient is agreeable to call 911 or go to the nearest ER should he/she begin having SI/HI. No medication side effects or related complaints today.     MEDS ORDERED DURING VISIT:  New Medications Ordered This Visit   Medications   • Lurasidone HCl (LATUDA) 20 MG tablet tablet     Sig: Take 1 tablet by mouth Daily.     Dispense:  28 tablet     Refill:  0     Order Specific Question:   Lot Number?     Answer:   [id number]     Order Specific Question:   Expiration Date?     Answer:   10/1/2024     Order  Specific Question:   Quantity     Answer:   28       Return in about 4 weeks (around 2/14/2022).         This document has been electronically signed by Vinny Krishnamurthy MD  January 17, 2022 16:03 EST      Part of this note may be an electronic transcription/translation of spoken language to printed text using the Dragon Dictation System.

## 2022-01-17 NOTE — PATIENT INSTRUCTIONS
1.  Please return to clinic at your next scheduled visit.  Contact the clinic ([phone]) at least 24 hours prior in the event you need to cancel.  2.  Do no harm to yourself or others.    3.  Avoid alcohol and drugs.    4.  Take all medications as prescribed.  Please contact the clinic with any concerns. If you are in need of medication refills, please call the clinic at [phone].    5. Should you want to get in touch with your provider, Dr. Vinny Krishnamurthy, please utilize [employer] or contact the office ([phone]), and staff will be able to page Dr. Krishnamurthy directly.  6.  In the event you have personal crisis, contact the following crisis numbers: Suicide Prevention Hotline 1-283.732.5427; [facility] Helpline 5-098-494-ZUGO; [facility] Emergency Room [phone]; text HELLO to 553519; or 202.     SPECIFIC RECOMMENDATIONS:     1.      Medications discussed at this encounter:                   - no changes     2.      Psychotherapy recommendations:      3.     Return to clinic: 4 weeks

## 2022-01-24 DIAGNOSIS — F33.2 SEVERE EPISODE OF RECURRENT MAJOR DEPRESSIVE DISORDER, WITHOUT PSYCHOTIC FEATURES: ICD-10-CM

## 2022-01-24 RX ORDER — LURASIDONE HYDROCHLORIDE 20 MG/1
20 TABLET, FILM COATED ORAL DAILY
Qty: 30 TABLET | Refills: 2 | Status: SHIPPED | OUTPATIENT
Start: 2022-01-24 | End: 2022-03-07 | Stop reason: SDUPTHER

## 2022-01-27 RX ORDER — ATORVASTATIN CALCIUM 10 MG/1
TABLET, FILM COATED ORAL
Qty: 90 TABLET | Refills: 0 | Status: SHIPPED | OUTPATIENT
Start: 2022-01-27 | End: 2022-04-22

## 2022-02-03 ENCOUNTER — HOSPITAL ENCOUNTER (OUTPATIENT)
Dept: BONE DENSITY | Facility: HOSPITAL | Age: 67
Discharge: HOME | End: 2022-02-03
Admitting: NURSE PRACTITIONER

## 2022-02-03 PROCEDURE — 77080 DXA BONE DENSITY AXIAL: CPT

## 2022-02-04 ENCOUNTER — APPOINTMENT (OUTPATIENT)
Dept: BONE DENSITY | Facility: HOSPITAL | Age: 67
End: 2022-02-04

## 2022-02-15 ENCOUNTER — TELEMEDICINE (OUTPATIENT)
Dept: PSYCHIATRY | Facility: CLINIC | Age: 67
End: 2022-02-15

## 2022-02-15 DIAGNOSIS — F33.1 MAJOR DEPRESSIVE DISORDER, RECURRENT EPISODE, MODERATE: Primary | ICD-10-CM

## 2022-02-15 DIAGNOSIS — F41.1 GENERALIZED ANXIETY DISORDER: ICD-10-CM

## 2022-02-15 DIAGNOSIS — F51.05 INSOMNIA DUE TO MENTAL CONDITION: ICD-10-CM

## 2022-02-15 DIAGNOSIS — F41.0 PANIC ATTACKS: ICD-10-CM

## 2022-02-15 PROCEDURE — 99214 OFFICE O/P EST MOD 30 MIN: CPT | Performed by: STUDENT IN AN ORGANIZED HEALTH CARE EDUCATION/TRAINING PROGRAM

## 2022-02-15 PROCEDURE — 90833 PSYTX W PT W E/M 30 MIN: CPT | Performed by: STUDENT IN AN ORGANIZED HEALTH CARE EDUCATION/TRAINING PROGRAM

## 2022-02-15 RX ORDER — VENLAFAXINE HYDROCHLORIDE 37.5 MG/1
37.5 CAPSULE, EXTENDED RELEASE ORAL DAILY
Qty: 30 CAPSULE | Refills: 2
Start: 2022-02-15 | End: 2022-03-07

## 2022-02-15 NOTE — PROGRESS NOTES
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 300.9     327.02       Presentation most consistent with major depressive disorder in partial remission, FAREED, rare panic attacks.    2/15: Still utilizing lorazepam which means the anxiety is not under control.  Increase Effexor.  17 minutes of supportive psychotherapy with goal to strengthen defenses, promote problems solving, restore adaptive functioning and provide symptom relief. The therapeutic alliance was strengthened to encourage the patient to express their thoughts and feelings. Esteem building was enhanced through praise, reassurance, normalizing and encouragement. Coping skills were enhanced to build distress tolerance skills and emotional regulation. Patient given education on medication side effects, diagnosis/illness and relapse symptoms. Plan to continue supportive psychotherapy in next appointment to provide symptom relief.  4 weeks    1/17: Continued improvement.  No changes, only on effexor 75 mg for 2 weeks.  Tolerating medications without side effects.  17 minutes of supportive psychotherapy with goal to strengthen defenses, promote problems solving, restore adaptive functioning and provide symptom relief. The therapeutic alliance was strengthened to encourage the patient to express their thoughts and feelings.  Esteem building was enhanced through praise, reassurance, normalizing and encouragement. Coping skills were enhanced to build distress tolerance skills and emotional regulation. Patient given education on medication side effects, diagnosis/illness and relapse symptoms. Plan to continue supportive psychotherapy in next appointment to provide symptom relief.  4 wks    1/3: Continued improvement. Increase effexor to target residual dep anx. TMS to start 1/6. 17 minutes of supportive psychotherapy with goal to strengthen defenses, promote problems solving, restore adaptive functioning and provide symptom relief. The therapeutic alliance was strengthened to encourage the patient to express their thoughts and feelings. Esteem building was enhanced through praise, reassurance, normalizing and encouragement. Coping skills were enhanced to build distress tolerance skills and emotional regulation. Patient given education on medication side effects, diagnosis/illness and relapse symptoms. Plan to continue supportive psychotherapy in next appointment to provide symptom relief.  4 wks    12/17: Improved.  Continue medication titration as planned.  17 minutes of supportive psychotherapy with goal to strengthen defenses, promote problems solving, restore adaptive functioning and provide symptom relief. The therapeutic alliance was strengthened to encourage the patient to express their thoughts and feelings. Esteem building was enhanced through praise, reassurance, normalizing and encouragement. Coping skills were enhanced to build distress tolerance skills and emotional regulation. Patient given education on medication side effects, diagnosis/illness and relapse symptoms. Plan to continue supportive psychotherapy in next appointment to provide symptom relief.  See back in 2 weeks on 3 January in the morning    12/13: See back in 1 week. Taper down on amitriptyline, start effexor. 17 minutes of supportive psychotherapy with goal to  strengthen defenses, promote problems solving, restore adaptive functioning and provide symptom relief. The therapeutic alliance was strengthened to encourage the patient to express their thoughts and feelings. Esteem building was enhanced through praise, reassurance, normalizing and encouragement. Coping skills were enhanced to build distress tolerance skills and emotional regulation. Patient given education on medication side effects, diagnosis/illness and relapse symptoms. Plan to continue supportive psychotherapy in next appointment to provide symptom relief.  1 wk    11/15: Taper off escitalopram and increase elavil as the latter is helping her depression; patient has been on elavil 25 mg tid in the past with good success. Alternatively, consider switching to effexor. 18 minutes of supportive psychotherapy with goal to strengthen defenses, promote problems solving, restore adaptive functioning and provide symptom relief. The therapeutic alliance was strengthened to encourage the patient to express their thoughts and feelings. Esteem building was enhanced through praise, reassurance, normalizing and encouragement. Coping skills were enhanced to build distress tolerance skills and emotional regulation. Patient given education on medication side effects, diagnosis/illness and relapse symptoms. Plan to continue supportive psychotherapy in next appointment to provide symptom relief.    11/5: Increase lorazepam, continue celexa and amitriptyline. Consider effexor. 17 minutes of supportive psychotherapy with goal to strengthen defenses, promote problems solving, restore adaptive functioning and provide symptom relief. The therapeutic alliance was strengthened to encourage the patient to express their thoughts and feelings. Esteem building was enhanced through praise, reassurance, normalizing and encouragement. Coping skills were enhanced to build distress tolerance skills and emotional regulation. Patient given  education on medication side effects, diagnosis/illness and relapse symptoms. Plan to continue supportive psychotherapy in next appointment to provide symptom relief.  2 wks    10/19: Re-start elavil. Reduce mirtazapine. Re-start therapy. 2 wks.    10/8: Some residual anxiety and depression reemerging.  Increase mirtazapine.  Patient is now doing her visits alone, rather than with her .  4 weeks.    8/27: Doing well on mirtazapine. 6 wks.    7/30: Abilify causing constipation.  Also caused brain zaps.  Discontinue.  Start mirtazapine to target depression, anxiety.  Willing to try, even though she knows it could make her hungrier.  See back in 4 weeks.  Continue therapy.  Longer term, consider discontinuing amitriptyline.    6/1: Doing well from a mental health standpoint, but having constipation from Abilify. No doubt the low-dose of amitriptyline in the evenings is also contributing to the cholinergic blockade leading to constipation. Patient instructed to half the dose of Abilify. Continue other medications as scheduled.    Previous:  No insomnia on the amitriptyline. Consider doxepin as well, trazodone.  Abilify caused constipation and brain zaps.        PLAN:  90. Risk Assessment: Risk of self-harm acutely remain low. Risk factors include a history of suicidal ideation, mood disorder, anxiety disorder, presence of psychosocial stressors such as colonic dysplasia, COVID-19 pandemic. Protective factors include no history of self-harm or suicide attempts, good social support, willingness to engage in care, improved depressive symptoms. Risk of self-harm chronically also remain low, but could be further elevated in the event of treatment noncompliance and/or AODA.  91. Medications:   a. CONTINUE lorazepam 0.25 to 0.5 mg BID PRN. (rarely uses) Risks, benefits, alternatives discussed with patient including GI upset, sedation, dizziness, respiratory depression, falls risk.  After discussion of these risks and  benefits, the patient voiced understanding and agreed to proceed.  b. INCREASE effexor 75 to 112.5 daily. Risks, benefits, alternatives discussed with patient including GI upset, nausea vomiting diarrhea, theoretical decrease of seizure threshold predisposing the patient to a slightly higher seizure risk, headaches, sexual dysfunction, serotonin syndrome, bleeding risk, increased suicidality in patients 24 years and younger.  Also constipation and urinary retention.  After discussion of these risks and benefits, the patient voiced understanding and agreed to proceed.  c. CONTINUE amitriptyline 25 mg QHS. Risks, benefits, alternatives, discussed with patient including dry mouth, constipation, dizziness, sedation.  After discussion of these risks and benefits, the patient voiced understanding and agreed to proceed.  d. CONTINUE latuda 20 mg nightly. Take with food. Risks, benefits, and alternatives discussed with patient including akathisia, sedation, dizziness/falls risk, nausea, low risk of weight gain & metabolic risks for diabetes and dyslipidemia, and rare tardive dyskinesia.  After discussion of these risks and benefits, the patient voiced understanding and agreed to proceed. Instructed to take medication with meal of minimum of 350 calories to improve consistent efficacy and absorption.   e. S/P:   i. Mirtazapine 15 mg, 7.5 mg: ineffective; made anxiety and depression worse.  ii. Abilify: brain zaps and constipation  iii. Seroquel made depression worse  iv. celexa 20 mg daily, ineffective after several months  92. Therapy: continue with Genevieve at [facility].  93. Labs/Studies: BMP to monitor sodium levels in February was entirely normal, EKG February shows rate 72, sinus, .  94. Follow Up: 4 weeks      TREATMENT PLAN/GOALS: Continue supportive psychotherapy efforts and medications as indicated. Treatment and medication options discussed during today's visit. Patient acknowledged and verbally consented to  continue with current treatment plan and was educated on the importance of compliance with treatment and follow-up appointments.    MEDICATION ISSUES:  YVETTE reviewed as expected.  Discussed medication options and treatment plan of prescribed medication as well as the risks, benefits, and side effects including potential falls, possible impaired driving and metabolic adversities among others. Patient is agreeable to call the office with any worsening of symptoms or onset of side effects. Patient is agreeable to call 911 or go to the nearest ER should he/she begin having SI/HI. No medication side effects or related complaints today.     MEDS ORDERED DURING VISIT:  New Medications Ordered This Visit   Medications   • venlafaxine XR (Effexor XR) 37.5 MG 24 hr capsule     Sig: Take 1 capsule by mouth Daily.     Dispense:  30 capsule     Refill:  2       Return in about 4 weeks (around 3/15/2022).         This document has been electronically signed by Vinny Krishnamurthy MD  February 15, 2022 08:11 EST      Part of this note may be an electronic transcription/translation of spoken language to printed text using the Dragon Dictation System.

## 2022-02-15 NOTE — PATIENT INSTRUCTIONS
1.  Please return to clinic at your next scheduled visit.  Contact the clinic ([phone]) at least 24 hours prior in the event you need to cancel.  2.  Do no harm to yourself or others.    3.  Avoid alcohol and drugs.    4.  Take all medications as prescribed.  Please contact the clinic with any concerns. If you are in need of medication refills, please call the clinic at [phone].    5. Should you want to get in touch with your provider, Dr. Vinny Krishnamurthy, please utilize [employer] or contact the office ([phone]), and staff will be able to page Dr. Krishnamurthy directly.  6.  In the event you have personal crisis, contact the following crisis numbers: Suicide Prevention Hotline 1-992.372.4057; [facility] Helpline 3-328-953-ODBB; [facility] Emergency Room [phone]; text HELLO to 921049; or 768.     SPECIFIC RECOMMENDATIONS:     1.      Medications discussed at this encounter:                   - increase effexor     2.      Psychotherapy recommendations:      3.     Return to clinic: 4 weeks

## 2022-02-24 NOTE — PROGRESS NOTES
Patient called in.  Since increasing the dose of Effexor 212.5 mg a day she has been having increased anxiety and crying spells.  Counseled her to reduce the dose back to 75 mg a day.  She will call me in a few days with an update.  All questions answered.  No SI EMI BALLARDH.

## 2022-03-01 ENCOUNTER — DOCUMENTATION (OUTPATIENT)
Dept: PSYCHIATRY | Facility: CLINIC | Age: 67
End: 2022-03-01

## 2022-03-01 NOTE — PROGRESS NOTES
I called the patient back after she informed the clinic that she was having a crisis.  Having lots of anxiety leading to chest pain.  She has reduced the dose of Effexor to 75 mg a day.  Ativan helps with the anxiety, but it continues to recur.  She was also not able to sleep very well last night.    It sounds like the Effexor may now be activating her.  Counseled her to reduce the dose to 37.5 mg a day.  We may titrate off this and onto Paxil.  Alternatively, consider increasing Latuda to see if what patient really needs is a mood stabilizer.    No SI HI AVH.  All questions answered.  Patient will call me back tomorrow with an update.    She has never tried Paxil, lithium, Depakote.

## 2022-03-07 DIAGNOSIS — F33.2 SEVERE EPISODE OF RECURRENT MAJOR DEPRESSIVE DISORDER, WITHOUT PSYCHOTIC FEATURES: ICD-10-CM

## 2022-03-07 RX ORDER — LURASIDONE HYDROCHLORIDE 40 MG/1
40 TABLET, FILM COATED ORAL DAILY
Qty: 30 TABLET | Refills: 2 | Status: SHIPPED | OUTPATIENT
Start: 2022-03-07 | End: 2022-03-07 | Stop reason: SDUPTHER

## 2022-03-07 RX ORDER — LAMOTRIGINE 25 MG/1
25 TABLET ORAL DAILY
Qty: 30 TABLET | Refills: 2 | Status: SHIPPED | OUTPATIENT
Start: 2022-03-07 | End: 2022-04-22

## 2022-03-07 RX ORDER — LAMOTRIGINE 25 MG/1
25 TABLET ORAL DAILY
Qty: 30 TABLET | Refills: 2 | Status: SHIPPED | OUTPATIENT
Start: 2022-03-07 | End: 2022-03-07 | Stop reason: SDUPTHER

## 2022-03-07 RX ORDER — LURASIDONE HYDROCHLORIDE 40 MG/1
40 TABLET, FILM COATED ORAL DAILY
Qty: 30 TABLET | Refills: 2 | Status: SHIPPED | OUTPATIENT
Start: 2022-03-07 | End: 2022-03-15 | Stop reason: SDUPTHER

## 2022-03-07 NOTE — PROGRESS NOTES
Patient had a bad weekend.  Discontinue Effexor, start Lamictal, increase Latuda.  Possible bipolar 2 disorder? Risks, benefits, alternatives discussed with patient including rash, rebound depressive or manic symptoms if prompt discontinuation, GI upset, agitation, sedation/falls risk.  After discussion of these risks and benefits, patient voiced understanding and agreed to proceed. No SI HI AVH.  All questions answered.

## 2022-03-07 NOTE — TELEPHONE ENCOUNTER
Patient called stating that the Lamictal should have been sent to [facility] in   [facility] instead of [facility].  I have reordered the med and pended it. (sending to correct pharmacy)

## 2022-03-10 ENCOUNTER — OFFICE VISIT (OUTPATIENT)
Dept: PSYCHIATRY | Facility: CLINIC | Age: 67
End: 2022-03-10

## 2022-03-10 VITALS — HEIGHT: 64 IN | WEIGHT: 158 LBS | BODY MASS INDEX: 26.98 KG/M2

## 2022-03-10 DIAGNOSIS — F41.1 GENERALIZED ANXIETY DISORDER: ICD-10-CM

## 2022-03-10 DIAGNOSIS — F51.05 INSOMNIA DUE TO MENTAL CONDITION: ICD-10-CM

## 2022-03-10 DIAGNOSIS — F33.1 MAJOR DEPRESSIVE DISORDER, RECURRENT EPISODE, MODERATE: ICD-10-CM

## 2022-03-10 DIAGNOSIS — F33.2 SEVERE EPISODE OF RECURRENT MAJOR DEPRESSIVE DISORDER, WITHOUT PSYCHOTIC FEATURES: Primary | ICD-10-CM

## 2022-03-10 DIAGNOSIS — F41.0 PANIC ATTACKS: ICD-10-CM

## 2022-03-10 PROCEDURE — 90833 PSYTX W PT W E/M 30 MIN: CPT | Performed by: STUDENT IN AN ORGANIZED HEALTH CARE EDUCATION/TRAINING PROGRAM

## 2022-03-10 PROCEDURE — 99214 OFFICE O/P EST MOD 30 MIN: CPT | Performed by: STUDENT IN AN ORGANIZED HEALTH CARE EDUCATION/TRAINING PROGRAM

## 2022-03-10 NOTE — PATIENT INSTRUCTIONS
1.  Please return to clinic at your next scheduled visit.  Contact the clinic ([phone]) at least 24 hours prior in the event you need to cancel.  2.  Do no harm to yourself or others.    3.  Avoid alcohol and drugs.    4.  Take all medications as prescribed.  Please contact the clinic with any concerns. If you are in need of medication refills, please call the clinic at [phone].    5. Should you want to get in touch with your provider, Dr. Vinny Krishnamurthy, please utilize [employer] or contact the office ([phone]), and staff will be able to page Dr. Krishnamurthy directly.  6.  In the event you have personal crisis, contact the following crisis numbers: Suicide Prevention Hotline 1-522.599.6896; [facility] Helpline 7-410-328-QMIL; [facility] Emergency Room [phone]; text HELLO to 518364; or 749.     SPECIFIC RECOMMENDATIONS:     1.      Medications discussed at this encounter:                   -No changes, continue Latuda and Lamictal, with lorazepam as needed.     2.      Psychotherapy recommendations:      3.     Return to clinic: 6  weeks

## 2022-03-10 NOTE — PROGRESS NOTES
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    4. Panic attacks    5. Insomnia due to mental condition        Visit Diagnoses:    ICD-10-CM ICD-9-CM   1. Severe episode of recurrent major depressive disorder, without psychotic features (HCC)  F33.2 296.33   2. Major depressive disorder, recurrent episode, moderate (HCC)  F33.1 296.32   3. Generalized anxiety disorder  F41.1 300.02   4. Panic attacks  F41.0 300.01   5. Insomnia due to mental condition  F51.05 300.9     327.02       Presentation most consistent with major depressive disorder in partial remission, FAREED, rare panic attacks.    3/10: Effexor has now been stopped.  Continue Latuda and Lamictal at present doses.  I will see her back in a few days and then at 6 weeks.  Bipolar 2?  If so, how come she is tolerating amitriptyline in the evenings?  19 minutes of supportive psychotherapy with goal to strengthen defenses, promote problems solving, restore adaptive functioning and provide symptom relief. The therapeutic alliance was strengthened to encourage the patient to express their thoughts and feelings. Esteem building was enhanced through praise, reassurance, normalizing and encouragement. Coping skills were enhanced to build distress tolerance skills and emotional regulation. Patient given  education on medication side effects, diagnosis/illness and relapse symptoms. Plan to continue supportive psychotherapy in next appointment to provide symptom relief.  6 weeks    2/15: Still utilizing lorazepam which means the anxiety is not under control.  Increase Effexor.  17 minutes of supportive psychotherapy with goal to strengthen defenses, promote problems solving, restore adaptive functioning and provide symptom relief. The therapeutic alliance was strengthened to encourage the patient to express their thoughts and feelings. Esteem building was enhanced through praise, reassurance, normalizing and encouragement. Coping skills were enhanced to build distress tolerance skills and emotional regulation. Patient given education on medication side effects, diagnosis/illness and relapse symptoms. Plan to continue supportive psychotherapy in next appointment to provide symptom relief.  4 weeks    1/17: Continued improvement.  No changes, only on effexor 75 mg for 2 weeks.  Tolerating medications without side effects.  17 minutes of supportive psychotherapy with goal to strengthen defenses, promote problems solving, restore adaptive functioning and provide symptom relief. The therapeutic alliance was strengthened to encourage the patient to express their thoughts and feelings. Esteem building was enhanced through praise, reassurance, normalizing and encouragement. Coping skills were enhanced to build distress tolerance skills and emotional regulation. Patient given education on medication side effects, diagnosis/illness and relapse symptoms. Plan to continue supportive psychotherapy in next appointment to provide symptom relief.  4 wks    1/3: Continued improvement. Increase effexor to target residual dep anx. TMS to start 1/6. 17 minutes of supportive psychotherapy with goal to strengthen defenses, promote problems solving, restore adaptive functioning and provide symptom relief. The therapeutic alliance was  strengthened to encourage the patient to express their thoughts and feelings. Esteem building was enhanced through praise, reassurance, normalizing and encouragement. Coping skills were enhanced to build distress tolerance skills and emotional regulation. Patient given education on medication side effects, diagnosis/illness and relapse symptoms. Plan to continue supportive psychotherapy in next appointment to provide symptom relief.  4 wks    12/17: Improved.  Continue medication titration as planned.  17 minutes of supportive psychotherapy with goal to strengthen defenses, promote problems solving, restore adaptive functioning and provide symptom relief. The therapeutic alliance was strengthened to encourage the patient to express their thoughts and feelings. Esteem building was enhanced through praise, reassurance, normalizing and encouragement. Coping skills were enhanced to build distress tolerance skills and emotional regulation. Patient given education on medication side effects, diagnosis/illness and relapse symptoms. Plan to continue supportive psychotherapy in next appointment to provide symptom relief.  See back in 2 weeks on 3 January in the morning    12/13: See back in 1 week. Taper down on amitriptyline, start effexor. 17 minutes of supportive psychotherapy with goal to strengthen defenses, promote problems solving, restore adaptive functioning and provide symptom relief. The therapeutic alliance was strengthened to encourage the patient to express their thoughts and feelings. Esteem building was enhanced through praise, reassurance, normalizing and encouragement. Coping skills were enhanced to build distress tolerance skills and emotional regulation. Patient given education on medication side effects, diagnosis/illness and relapse symptoms. Plan to continue supportive psychotherapy in next appointment to provide symptom relief.  1 wk    11/15: Taper off escitalopram and increase elavil as the latter  is helping her depression; patient has been on elavil 25 mg tid in the past with good success. Alternatively, consider switching to effexor. 18 minutes of supportive psychotherapy with goal to strengthen defenses, promote problems solving, restore adaptive functioning and provide symptom relief. The therapeutic alliance was strengthened to encourage the patient to express their thoughts and feelings. Esteem building was enhanced through praise, reassurance, normalizing and encouragement. Coping skills were enhanced to build distress tolerance skills and emotional regulation. Patient given education on medication side effects, diagnosis/illness and relapse symptoms. Plan to continue supportive psychotherapy in next appointment to provide symptom relief.    11/5: Increase lorazepam, continue celexa and amitriptyline. Consider effexor. 17 minutes of supportive psychotherapy with goal to strengthen defenses, promote problems solving, restore adaptive functioning and provide symptom relief. The therapeutic alliance was strengthened to encourage the patient to express their thoughts and feelings. Esteem building was enhanced through praise, reassurance, normalizing and encouragement. Coping skills were enhanced to build distress tolerance skills and emotional regulation. Patient given education on medication side effects, diagnosis/illness and relapse symptoms. Plan to continue supportive psychotherapy in next appointment to provide symptom relief.  2 wks    10/19: Re-start elavil. Reduce mirtazapine. Re-start therapy. 2 wks.    10/8: Some residual anxiety and depression reemerging.  Increase mirtazapine.  Patient is now doing her visits alone, rather than with her .  4 weeks.    8/27: Doing well on mirtazapine. 6 wks.    7/30: Abilify causing constipation.  Also caused brain zaps.  Discontinue.  Start mirtazapine to target depression, anxiety.  Willing to try, even though she knows it could make her hungrier.  See  back in 4 weeks.  Continue therapy.  Longer term, consider discontinuing amitriptyline.    6/1: Doing well from a mental health standpoint, but having constipation from Abilify. No doubt the low-dose of amitriptyline in the evenings is also contributing to the cholinergic blockade leading to constipation. Patient instructed to half the dose of Abilify. Continue other medications as scheduled.    Previous:  No insomnia on the amitriptyline. Consider doxepin as well, trazodone.  Abilify caused constipation and brain zaps.        PLAN:  94. Risk Assessment: Risk of self-harm acutely remain low. Risk factors include a history of suicidal ideation, mood disorder, anxiety disorder, presence of psychosocial stressors such as colonic dysplasia, COVID-19 pandemic. Protective factors include no history of self-harm or suicide attempts, good social support, willingness to engage in care, improved depressive symptoms. Risk of self-harm chronically also remain low, but could be further elevated in the event of treatment noncompliance and/or AODA.  95. Medications:   a. CONTINUE Lamictal 25 mg daily. Risks, benefits, alternatives discussed with patient including rash, rebound depressive or manic symptoms if prompt discontinuation, GI upset, agitation, sedation/falls risk.  After discussion of these risks and benefits, patient voiced understanding and agreed to proceed.  b. CONTINUE lorazepam 0.25 to 0.5 mg BID PRN. (rarely uses) Risks, benefits, alternatives discussed with patient including GI upset, sedation, dizziness, respiratory depression, falls risk.  After discussion of these risks and benefits, the patient voiced understanding and agreed to proceed.  c. DISCONTINUE effexor 37.5 mg daily. Risks, benefits, alternatives discussed with patient including GI upset, nausea vomiting diarrhea, theoretical decrease of seizure threshold predisposing the patient to a slightly higher seizure risk, headaches, sexual dysfunction,  serotonin syndrome, bleeding risk, increased suicidality in patients 24 years and younger.  Also constipation and urinary retention.  After discussion of these risks and benefits, the patient voiced understanding and agreed to proceed.  d. CONTINUE amitriptyline 25 mg QHS. Risks, benefits, alternatives, discussed with patient including dry mouth, constipation, dizziness, sedation.  After discussion of these risks and benefits, the patient voiced understanding and agreed to proceed.  e. CONTINUE latuda 20 mg nightly. Take with food. Risks, benefits, and alternatives discussed with patient including akathisia, sedation, dizziness/falls risk, nausea, low risk of weight gain & metabolic risks for diabetes and dyslipidemia, and rare tardive dyskinesia.  After discussion of these risks and benefits, the patient voiced understanding and agreed to proceed. Instructed to take medication with meal of minimum of 350 calories to improve consistent efficacy and absorption.   f. S/P:   i. Mirtazapine 15 mg, 7.5 mg: ineffective; made anxiety and depression worse.  ii. Abilify: brain zaps and constipation  iii. Seroquel made depression worse  iv. celexa 20 mg daily, ineffective after several months  96. Therapy: continue with Genevieve at [facility].  97. Labs/Studies: BMP to monitor sodium levels in February was entirely normal, EKG February shows rate 72, sinus, .  98. Follow Up: 6 weeks      TREATMENT PLAN/GOALS: Continue supportive psychotherapy efforts and medications as indicated. Treatment and medication options discussed during today's visit. Patient acknowledged and verbally consented to continue with current treatment plan and was educated on the importance of compliance with treatment and follow-up appointments.    MEDICATION ISSUES:  YVETTE reviewed as expected.  Discussed medication options and treatment plan of prescribed medication as well as the risks, benefits, and side effects including potential falls, possible  impaired driving and metabolic adversities among others. Patient is agreeable to call the office with any worsening of symptoms or onset of side effects. Patient is agreeable to call 911 or go to the nearest ER should he/she begin having SI/HI. No medication side effects or related complaints today.     MEDS ORDERED DURING VISIT:  No orders of the defined types were placed in this encounter.      Return in about 6 weeks (around 4/21/2022).         This document has been electronically signed by Vinny Krishnamurthy MD  March 10, 2022 13:39 EST      Part of this note may be an electronic transcription/translation of spoken language to printed text using the Dragon Dictation System.

## 2022-03-14 ENCOUNTER — TELEPHONE (OUTPATIENT)
Dept: PSYCHIATRY | Facility: CLINIC | Age: 67
End: 2022-03-14

## 2022-03-14 DIAGNOSIS — F51.05 INSOMNIA DUE TO MENTAL CONDITION: Primary | ICD-10-CM

## 2022-03-14 RX ORDER — ZOLPIDEM TARTRATE 10 MG/1
5 TABLET ORAL NIGHTLY PRN
Qty: 30 TABLET | Refills: 2 | Status: SHIPPED | OUTPATIENT
Start: 2022-03-14 | End: 2022-03-21

## 2022-03-14 NOTE — PROGRESS NOTES
"Patient had another panic attack this morning.  I advised her to utilize the Ativan liberally as she is not always using it.  Compliant on Latuda 40 mg a day.  She notes that there are some periods where she feels \"completely normal.\"  Then other times, she completely breaks down and has depression and anxiety.    Having trouble sleeping for the last 4 days.  Target this with Ambien 5 mg nightly. Risks, benefits, side effects discussed with patient including sedation, dizziness, GI upset, hallucinations, sleepwalking, sleep-eating.  After discussion of these risks and benefits, the patient voiced understanding and agreed to proceed.    Continue Latuda and Lamictal.    Start titrating off of amitriptyline by taking half a tab, 12.5 mg in other words, nightly.    We have a follow-up appointment tomorrow morning.  All questions answered.  No SI HI AVH.  "

## 2022-03-15 ENCOUNTER — TELEMEDICINE (OUTPATIENT)
Dept: PSYCHIATRY | Facility: CLINIC | Age: 67
End: 2022-03-15

## 2022-03-15 VITALS
HEIGHT: 64 IN | SYSTOLIC BLOOD PRESSURE: 133 MMHG | BODY MASS INDEX: 26.46 KG/M2 | DIASTOLIC BLOOD PRESSURE: 74 MMHG | WEIGHT: 155 LBS

## 2022-03-15 DIAGNOSIS — F51.05 INSOMNIA DUE TO MENTAL CONDITION: ICD-10-CM

## 2022-03-15 DIAGNOSIS — F33.2 SEVERE EPISODE OF RECURRENT MAJOR DEPRESSIVE DISORDER, WITHOUT PSYCHOTIC FEATURES: ICD-10-CM

## 2022-03-15 DIAGNOSIS — F41.1 GENERALIZED ANXIETY DISORDER: ICD-10-CM

## 2022-03-15 DIAGNOSIS — F41.0 PANIC ATTACKS: ICD-10-CM

## 2022-03-15 DIAGNOSIS — F33.1 MAJOR DEPRESSIVE DISORDER, RECURRENT EPISODE, MODERATE: Primary | ICD-10-CM

## 2022-03-15 PROCEDURE — 90833 PSYTX W PT W E/M 30 MIN: CPT | Performed by: STUDENT IN AN ORGANIZED HEALTH CARE EDUCATION/TRAINING PROGRAM

## 2022-03-15 PROCEDURE — 99214 OFFICE O/P EST MOD 30 MIN: CPT | Performed by: STUDENT IN AN ORGANIZED HEALTH CARE EDUCATION/TRAINING PROGRAM

## 2022-03-15 RX ORDER — LURASIDONE HYDROCHLORIDE 40 MG/1
40 TABLET, FILM COATED ORAL DAILY
Qty: 30 TABLET | Refills: 2 | Status: SHIPPED | OUTPATIENT
Start: 2022-03-15 | End: 2022-03-15 | Stop reason: SDUPTHER

## 2022-03-15 RX ORDER — LURASIDONE HYDROCHLORIDE 40 MG/1
40 TABLET, FILM COATED ORAL DAILY
Qty: 30 TABLET | Refills: 2 | Status: SHIPPED | OUTPATIENT
Start: 2022-03-15 | End: 2022-03-21

## 2022-03-15 NOTE — PROGRESS NOTES
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 Cholesterol  10/15/2021 122 (A) 0 - 100 mg/dL Final   • VLDL Cholesterol 10/15/2021 29  5 - 40 mg/dL Final   • LDL/HDL Ratio 10/15/2021 2.58   Final   • WBC 10/15/2021 6.03  3.40 - 10.80 10*3/mm3 Final   • RBC 10/15/2021 4.60  3.77 - 5.28 10*6/mm3 Final   • Hemoglobin 10/15/2021 13.9  12.0 - 15.9 g/dL Final   • Hematocrit 10/15/2021 41.7  34.0 - 46.6 % Final   • MCV 10/15/2021 90.7  79.0 - 97.0 fL Final   • MCH 10/15/2021 30.2  26.6 - 33.0 pg Final   • MCHC 10/15/2021 33.3  31.5 - 35.7 g/dL Final   • RDW 10/15/2021 13.4  12.3 - 15.4 % Final   • RDW-SD 10/15/2021 44.6  37.0 - 54.0 fl Final   • MPV 10/15/2021 10.2  6.0 - 12.0 fL Final   • Platelets 10/15/2021 301  140 - 450 10*3/mm3 Final   • Neutrophil % 10/15/2021 57.8  42.7 - 76.0 % Final   • Lymphocyte % 10/15/2021 31.2  19.6 - 45.3 % Final   • Monocyte % 10/15/2021 9.5  5.0 - 12.0 % Final   • Eosinophil % 10/15/2021 0.7  0.3 - 6.2 % Final   • Basophil % 10/15/2021 0.5  0.0 - 1.5 % Final   • Immature Grans % 10/15/2021 0.3  0.0 - 0.5 % Final   • Neutrophils, Absolute 10/15/2021 3.49  1.70 - 7.00 10*3/mm3 Final   • Lymphocytes, Absolute 10/15/2021 1.88  0.70 - 3.10 10*3/mm3 Final   • Monocytes, Absolute 10/15/2021 0.57  0.10 - 0.90 10*3/mm3 Final   • Eosinophils,  Absolute 10/15/2021 0.04  0.00 - 0.40 10*3/mm3 Final   • Basophils, Absolute 10/15/2021 0.03  0.00 - 0.20 10*3/mm3 Final   • Immature Grans, Absolute 10/15/2021 0.02  0.00 - 0.05 10*3/mm3 Final   • nRBC 10/15/2021 0.2  0.0 - 0.2 /100 WBC Final       EKG Results:  No orders to display       Imaging Results:  No Images in the past 120 days found..      Assessment/Plan   Diagnoses and all orders for this visit:    1. Major depressive disorder, recurrent episode, moderate (HCC) (Primary)    2. Generalized anxiety disorder    3. Panic attacks    4. Insomnia due to mental condition    5. Severe episode of recurrent major depressive disorder, without psychotic features (HCC)  -     Discontinue: lurasidone (LATUDA) 40 MG tablet tablet; Take 1 tablet by mouth Daily.  Dispense: 30 tablet; Refill: 2  -     lurasidone (LATUDA) 40 MG tablet tablet; Take 1 tablet by mouth Daily.  Dispense: 30 tablet; Refill: 2        Visit Diagnoses:    ICD-10-CM ICD-9-CM   1. Major depressive disorder, recurrent episode, moderate (HCC)  F33.1 296.32   2. Generalized anxiety disorder  F41.1 300.02   3. Panic attacks  F41.0 300.01   4. Insomnia due to mental condition  F51.05 300.9     327.02   5. Severe episode of recurrent major depressive disorder, without psychotic features (HCC)  F33.2 296.33       Presentation most consistent with major depressive disorder in partial remission, FAREED, rare panic attacks.    3/15: Continue lurasidone 40 mg at night, increase Ambien to 10 mg at night.  Continue low-dose Lamictal, plan to increase further.  Continue titrating off of amitriptyline.  17 minutes of supportive psychotherapy with goal to strengthen defenses, promote problems solving, restore adaptive functioning and provide symptom relief. The therapeutic alliance was strengthened to encourage the patient to express their thoughts and feelings. Esteem building was enhanced through praise, reassurance, normalizing and encouragement. Coping skills were  enhanced to build distress tolerance skills and emotional regulation. Patient given education on medication side effects, diagnosis/illness and relapse symptoms. Plan to continue supportive psychotherapy in next appointment to provide symptom relief.  1 week, urgent    3/10: Effexor has now been stopped.  Continue Latuda and Lamictal at present doses.  I will see her back in a few days and then at 6 weeks.  Bipolar 2?  If so, how come she is tolerating amitriptyline in the evenings?  19 minutes of supportive psychotherapy with goal to strengthen defenses, promote problems solving, restore adaptive functioning and provide symptom relief. The therapeutic alliance was strengthened to encourage the patient to express their thoughts and feelings. Esteem building was enhanced through praise, reassurance, normalizing and encouragement. Coping skills were enhanced to build distress tolerance skills and emotional regulation. Patient given education on medication side effects, diagnosis/illness and relapse symptoms. Plan to continue supportive psychotherapy in next appointment to provide symptom relief.  6 weeks    2/15: Still utilizing lorazepam which means the anxiety is not under control.  Increase Effexor.  17 minutes of supportive psychotherapy with goal to strengthen defenses, promote problems solving, restore adaptive functioning and provide symptom relief. The therapeutic alliance was strengthened to encourage the patient to express their thoughts and feelings. Esteem building was enhanced through praise, reassurance, normalizing and encouragement. Coping skills were enhanced to build distress tolerance skills and emotional regulation. Patient given education on medication side effects, diagnosis/illness and relapse symptoms. Plan to continue supportive psychotherapy in next appointment to provide symptom relief.  4 weeks    1/17: Continued improvement.  No changes, only on effexor 75 mg for 2 weeks.  Tolerating  medications without side effects.  17 minutes of supportive psychotherapy with goal to strengthen defenses, promote problems solving, restore adaptive functioning and provide symptom relief. The therapeutic alliance was strengthened to encourage the patient to express their thoughts and feelings. Esteem building was enhanced through praise, reassurance, normalizing and encouragement. Coping skills were enhanced to build distress tolerance skills and emotional regulation. Patient given education on medication side effects, diagnosis/illness and relapse symptoms. Plan to continue supportive psychotherapy in next appointment to provide symptom relief.  4 wks    1/3: Continued improvement. Increase effexor to target residual dep anx. TMS to start 1/6. 17 minutes of supportive psychotherapy with goal to strengthen defenses, promote problems solving, restore adaptive functioning and provide symptom relief. The therapeutic alliance was strengthened to encourage the patient to express their thoughts and feelings. Esteem building was enhanced through praise, reassurance, normalizing and encouragement. Coping skills were enhanced to build distress tolerance skills and emotional regulation. Patient given education on medication side effects, diagnosis/illness and relapse symptoms. Plan to continue supportive psychotherapy in next appointment to provide symptom relief.  4 wks    12/17: Improved.  Continue medication titration as planned.  17 minutes of supportive psychotherapy with goal to strengthen defenses, promote problems solving, restore adaptive functioning and provide symptom relief. The therapeutic alliance was strengthened to encourage the patient to express their thoughts and feelings. Esteem building was enhanced through praise, reassurance, normalizing and encouragement. Coping skills were enhanced to build distress tolerance skills and emotional regulation. Patient given education on medication side effects,  diagnosis/illness and relapse symptoms. Plan to continue supportive psychotherapy in next appointment to provide symptom relief.  See back in 2 weeks on 3 January in the morning    12/13: See back in 1 week. Taper down on amitriptyline, start effexor. 17 minutes of supportive psychotherapy with goal to strengthen defenses, promote problems solving, restore adaptive functioning and provide symptom relief. The therapeutic alliance was strengthened to encourage the patient to express their thoughts and feelings. Esteem building was enhanced through praise, reassurance, normalizing and encouragement. Coping skills were enhanced to build distress tolerance skills and emotional regulation. Patient given education on medication side effects, diagnosis/illness and relapse symptoms. Plan to continue supportive psychotherapy in next appointment to provide symptom relief.  1 wk    11/15: Taper off escitalopram and increase elavil as the latter is helping her depression; patient has been on elavil 25 mg tid in the past with good success. Alternatively, consider switching to effexor. 18 minutes of supportive psychotherapy with goal to strengthen defenses, promote problems solving, restore adaptive functioning and provide symptom relief. The therapeutic alliance was strengthened to encourage the patient to express their thoughts and feelings. Esteem building was enhanced through praise, reassurance, normalizing and encouragement. Coping skills were enhanced to build distress tolerance skills and emotional regulation. Patient given education on medication side effects, diagnosis/illness and relapse symptoms. Plan to continue supportive psychotherapy in next appointment to provide symptom relief.    11/5: Increase lorazepam, continue celexa and amitriptyline. Consider effexor. 17 minutes of supportive psychotherapy with goal to strengthen defenses, promote problems solving, restore adaptive functioning and provide symptom relief.  The therapeutic alliance was strengthened to encourage the patient to express their thoughts and feelings. Esteem building was enhanced through praise, reassurance, normalizing and encouragement. Coping skills were enhanced to build distress tolerance skills and emotional regulation. Patient given education on medication side effects, diagnosis/illness and relapse symptoms. Plan to continue supportive psychotherapy in next appointment to provide symptom relief.  2 wks    10/19: Re-start elavil. Reduce mirtazapine. Re-start therapy. 2 wks.    10/8: Some residual anxiety and depression reemerging.  Increase mirtazapine.  Patient is now doing her visits alone, rather than with her .  4 weeks.    8/27: Doing well on mirtazapine. 6 wks.    7/30: Abilify causing constipation.  Also caused brain zaps.  Discontinue.  Start mirtazapine to target depression, anxiety.  Willing to try, even though she knows it could make her hungrier.  See back in 4 weeks.  Continue therapy.  Longer term, consider discontinuing amitriptyline.    6/1: Doing well from a mental health standpoint, but having constipation from Abilify. No doubt the low-dose of amitriptyline in the evenings is also contributing to the cholinergic blockade leading to constipation. Patient instructed to half the dose of Abilify. Continue other medications as scheduled.    Previous:  No insomnia on the amitriptyline. Consider doxepin as well, trazodone.  Abilify caused constipation and brain zaps.        PLAN:  98. Risk Assessment: Risk of self-harm acutely remain low. Risk factors include a history of suicidal ideation, mood disorder, anxiety disorder, presence of psychosocial stressors such as colonic dysplasia, COVID-19 pandemic. Protective factors include no history of self-harm or suicide attempts, good social support, willingness to engage in care, improved depressive symptoms. Risk of self-harm chronically also remain low, but could be further elevated in  the event of treatment noncompliance and/or AODA.  99. Medications:   a. CONTINUE Lamictal 25 mg daily. Risks, benefits, alternatives discussed with patient including rash, rebound depressive or manic symptoms if prompt discontinuation, GI upset, agitation, sedation/falls risk.  After discussion of these risks and benefits, patient voiced understanding and agreed to proceed.  b. CONTINUE lorazepam 0.25 to 0.5 mg BID PRN. (rarely uses) Risks, benefits, alternatives discussed with patient including GI upset, sedation, dizziness, respiratory depression, falls risk.  After discussion of these risks and benefits, the patient voiced understanding and agreed to proceed.  c. STATUS POST effexor 37.5 mg daily.  Worsening anxiety.  Tried for several months.  Risks, benefits, alternatives discussed with patient including GI upset, nausea vomiting diarrhea, theoretical decrease of seizure threshold predisposing the patient to a slightly higher seizure risk, headaches, sexual dysfunction, serotonin syndrome, bleeding risk, increased suicidality in patients 24 years and younger.  Also constipation and urinary retention.  After discussion of these risks and benefits, the patient voiced understanding and agreed to proceed.  d. REDUCE amitriptyline 25 to 12.5 mg QHS. Risks, benefits, alternatives, discussed with patient including dry mouth, constipation, dizziness, sedation.  After discussion of these risks and benefits, the patient voiced understanding and agreed to proceed.  e. CONTINUE Ambien and increase to 10 mg nightly. Risks, benefits, side effects discussed with patient including sedation, dizziness, GI upset, hallucinations, sleepwalking, sleep-eating.  After discussion of these risks and benefits, the patient voiced understanding and agreed to proceed.  f. CONTINUE latuda 20 mg nightly. Take with food. Risks, benefits, and alternatives discussed with patient including akathisia, sedation, dizziness/falls risk, nausea, low  risk of weight gain & metabolic risks for diabetes and dyslipidemia, and rare tardive dyskinesia.  After discussion of these risks and benefits, the patient voiced understanding and agreed to proceed. Instructed to take medication with meal of minimum of 350 calories to improve consistent efficacy and absorption.   g. S/P:   i. Mirtazapine 15 mg, 7.5 mg: ineffective; made anxiety and depression worse.  ii. Abilify: brain zaps and constipation  iii. Seroquel made depression worse  iv. celexa 20 mg daily, ineffective after several months  100. Therapy: continue with Gen[facility] at [facility].  101. Labs/Studies: BMP to monitor sodium levels in February was entirely normal, EKG February shows rate 72, sinus, .  102. Follow Up: 1 weeks      TREATMENT PLAN/GOALS: Continue supportive psychotherapy efforts and medications as indicated. Treatment and medication options discussed during today's visit. Patient acknowledged and verbally consented to continue with current treatment plan and was educated on the importance of compliance with treatment and follow-up appointments.    MEDICATION ISSUES:  YVETTE reviewed as expected.  Discussed medication options and treatment plan of prescribed medication as well as the risks, benefits, and side effects including potential falls, possible impaired driving and metabolic adversities among others. Patient is agreeable to call the office with any worsening of symptoms or onset of side effects. Patient is agreeable to call 911 or go to the nearest ER should he/she begin having SI/HI. No medication side effects or related complaints today.     MEDS ORDERED DURING VISIT:    No follow-ups on file.         This document has been electronically signed by Vinny Krishnamurthy MD  March 15, 2022 11:18 EDT       Part of this note may be an electronic transcription/translation of spoken language to printed text using the Dragon Dictation System.

## 2022-03-21 DIAGNOSIS — F51.05 INSOMNIA DUE TO MENTAL CONDITION: Primary | ICD-10-CM

## 2022-03-21 DIAGNOSIS — F33.2 SEVERE EPISODE OF RECURRENT MAJOR DEPRESSIVE DISORDER, WITHOUT PSYCHOTIC FEATURES: ICD-10-CM

## 2022-03-21 RX ORDER — DOXEPIN HYDROCHLORIDE 6 MG/1
6 TABLET ORAL NIGHTLY
Qty: 30 TABLET | Refills: 2 | Status: SHIPPED | OUTPATIENT
Start: 2022-03-21 | End: 2022-04-22

## 2022-03-21 NOTE — PROGRESS NOTES
I called the patient and she is continuing to have trouble sleeping.  She discontinued Ambien as it does nothing.  Compliant on Lamictal 25 mg only.    Patient will see me this Friday.  Start doxepin for insomnia tonight.  All questions answered.  No SI HI ELIOH.

## 2022-03-24 ENCOUNTER — OFFICE VISIT (OUTPATIENT)
Dept: PSYCHIATRY | Facility: CLINIC | Age: 67
End: 2022-03-24

## 2022-03-24 VITALS
DIASTOLIC BLOOD PRESSURE: 84 MMHG | HEIGHT: 64 IN | BODY MASS INDEX: 26.46 KG/M2 | WEIGHT: 155 LBS | SYSTOLIC BLOOD PRESSURE: 146 MMHG

## 2022-03-24 DIAGNOSIS — F41.1 GENERALIZED ANXIETY DISORDER: ICD-10-CM

## 2022-03-24 DIAGNOSIS — F51.05 INSOMNIA DUE TO MENTAL CONDITION: ICD-10-CM

## 2022-03-24 DIAGNOSIS — F41.0 PANIC ATTACKS: ICD-10-CM

## 2022-03-24 DIAGNOSIS — F33.2 SEVERE EPISODE OF RECURRENT MAJOR DEPRESSIVE DISORDER, WITHOUT PSYCHOTIC FEATURES: Primary | ICD-10-CM

## 2022-03-24 PROCEDURE — 90833 PSYTX W PT W E/M 30 MIN: CPT | Performed by: STUDENT IN AN ORGANIZED HEALTH CARE EDUCATION/TRAINING PROGRAM

## 2022-03-24 PROCEDURE — 99214 OFFICE O/P EST MOD 30 MIN: CPT | Performed by: STUDENT IN AN ORGANIZED HEALTH CARE EDUCATION/TRAINING PROGRAM

## 2022-03-24 RX ORDER — DESVENLAFAXINE 25 MG/1
25 TABLET, EXTENDED RELEASE ORAL DAILY
Qty: 30 TABLET | Refills: 2 | Status: SHIPPED | OUTPATIENT
Start: 2022-03-24 | End: 2022-04-22

## 2022-03-24 NOTE — PATIENT INSTRUCTIONS
1.  Please return to clinic at your next scheduled visit.  Contact the clinic ([phone]) at least 24 hours prior in the event you need to cancel.  2.  Do no harm to yourself or others.    3.  Avoid alcohol and drugs.    4.  Take all medications as prescribed.  Please contact the clinic with any concerns. If you are in need of medication refills, please call the clinic at [phone].    5. Should you want to get in touch with your provider, Dr. Vinny Krishnamurthy, please utilize [employer] or contact the office ([phone]), and staff will be able to page Dr. Krishnamurthy directly.  6.  In the event you have personal crisis, contact the following crisis numbers: Suicide Prevention Hotline 1-821.816.5496; [facility] Helpline 3-444-551-LHPR; [facility] Emergency Room [phone]; text HELLO to 287351; or 159.     SPECIFIC RECOMMENDATIONS:     1.      Medications discussed at this encounter:                   - start pristiq     2.      Psychotherapy recommendations:      3.     Return to clinic: 4 weeks

## 2022-03-24 NOTE — PROGRESS NOTES
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 1.2 mg/dL Final   • eGFR Non  Amer 10/15/2021 77  >60 mL/min/1.73 Final   • Globulin 10/15/2021 2.7  gm/dL Final   • A/G Ratio 10/15/2021 1.7  g/dL Final   • BUN/Creatinine Ratio 10/15/2021 18.7  7.0 - 25.0 Final   • Anion Gap 10/15/2021 7.9  5.0 - 15.0 mmol/L Final   • TSH 10/15/2021 0.693  0.270 - 4.200 uIU/mL Final   • Total Cholesterol 10/15/2021 197  0 - 200 mg/dL Final   • Triglycerides 10/15/2021 162 (A) 0 - 150 mg/dL Final   • HDL Cholesterol 10/15/2021 46  40 - 60 mg/dL Final   • LDL Cholesterol  10/15/2021 122 (A) 0 - 100 mg/dL Final   • VLDL Cholesterol 10/15/2021 29  5 - 40 mg/dL Final   • LDL/HDL Ratio 10/15/2021 2.58   Final   • WBC 10/15/2021 6.03  3.40 - 10.80 10*3/mm3 Final   • RBC 10/15/2021 4.60  3.77 - 5.28 10*6/mm3 Final   • Hemoglobin 10/15/2021 13.9  12.0 - 15.9 g/dL Final   • Hematocrit 10/15/2021 41.7  34.0 - 46.6 % Final   • MCV 10/15/2021 90.7  79.0 - 97.0 fL Final   • MCH 10/15/2021 30.2  26.6 - 33.0 pg Final   • MCHC 10/15/2021 33.3  31.5 - 35.7 g/dL Final   • RDW 10/15/2021 13.4  12.3 - 15.4 % Final   • RDW-SD 10/15/2021 44.6  37.0 - 54.0 fl Final   • MPV 10/15/2021 10.2  6.0 - 12.0 fL Final   • Platelets 10/15/2021 301  140 - 450 10*3/mm3 Final   • Neutrophil % 10/15/2021  57.8  42.7 - 76.0 % Final   • Lymphocyte % 10/15/2021 31.2  19.6 - 45.3 % Final   • Monocyte % 10/15/2021 9.5  5.0 - 12.0 % Final   • Eosinophil % 10/15/2021 0.7  0.3 - 6.2 % Final   • Basophil % 10/15/2021 0.5  0.0 - 1.5 % Final   • Immature Grans % 10/15/2021 0.3  0.0 - 0.5 % Final   • Neutrophils, Absolute 10/15/2021 3.49  1.70 - 7.00 10*3/mm3 Final   • Lymphocytes, Absolute 10/15/2021 1.88  0.70 - 3.10 10*3/mm3 Final   • Monocytes, Absolute 10/15/2021 0.57  0.10 - 0.90 10*3/mm3 Final   • Eosinophils, Absolute 10/15/2021 0.04  0.00 - 0.40 10*3/mm3 Final   • Basophils, Absolute 10/15/2021 0.03  0.00 - 0.20 10*3/mm3 Final   • Immature Grans, Absolute 10/15/2021 0.02  0.00 - 0.05 10*3/mm3 Final   • nRBC 10/15/2021 0.2  0.0 - 0.2 /100 WBC Final       EKG Results:  No orders to display       Imaging Results:  No Images in the past 120 days found..      Assessment/Plan   Diagnoses and all orders for this visit:    1. Severe episode of recurrent major depressive disorder, without psychotic features (HCC) (Primary)  -     Desvenlafaxine Succinate ER 25 MG tablet sustained-release 24 hour; Take 1 tablet by mouth Daily.  Dispense: 30 tablet; Refill: 2    2. Insomnia due to mental condition    3. Generalized anxiety disorder  -     Desvenlafaxine Succinate ER 25 MG tablet sustained-release 24 hour; Take 1 tablet by mouth Daily.  Dispense: 30 tablet; Refill: 2    4. Panic attacks        Visit Diagnoses:    ICD-10-CM ICD-9-CM   1. Severe episode of recurrent major depressive disorder, without psychotic features (HCC)  F33.2 296.33   2. Insomnia due to mental condition  F51.05 300.9     327.02   3. Generalized anxiety disorder  F41.1 300.02   4. Panic attacks  F41.0 300.01       Presentation most consistent with major depressive disorder in partial remission, FAREED, rare panic attacks.    3/24: Focus on lower doses.  Start Pristiq.  Depression with anxious distress versus depression and generalized anxiety disorder.  She only  has distractibility, no other symptoms of hypomania.  17 minutes of supportive psychotherapy with goal to strengthen defenses, promote problems solving, restore adaptive functioning and provide symptom relief. The therapeutic alliance was strengthened to encourage the patient to express their thoughts and feelings. Esteem building was enhanced through praise, reassurance, normalizing and encouragement. Coping skills were enhanced to build distress tolerance skills and emotional regulation. Patient given education on medication side effects, diagnosis/illness and relapse symptoms. Plan to continue supportive psychotherapy in next appointment to provide symptom relief.  4 weeks    3/15: Continue lurasidone 40 mg at night, increase Ambien to 10 mg at night.  Continue low-dose Lamictal, plan to increase further.  Continue titrating off of amitriptyline.  17 minutes of supportive psychotherapy with goal to strengthen defenses, promote problems solving, restore adaptive functioning and provide symptom relief. The therapeutic alliance was strengthened to encourage the patient to express their thoughts and feelings. Esteem building was enhanced through praise, reassurance, normalizing and encouragement. Coping skills were enhanced to build distress tolerance skills and emotional regulation. Patient given education on medication side effects, diagnosis/illness and relapse symptoms. Plan to continue supportive psychotherapy in next appointment to provide symptom relief.  1 week, urgent    3/10: Effexor has now been stopped.  Continue Latuda and Lamictal at present doses.  I will see her back in a few days and then at 6 weeks.  Bipolar 2?  If so, how come she is tolerating amitriptyline in the evenings?  19 minutes of supportive psychotherapy with goal to strengthen defenses, promote problems solving, restore adaptive functioning and provide symptom relief. The therapeutic alliance was strengthened to encourage the patient to  express their thoughts and feelings. Esteem building was enhanced through praise, reassurance, normalizing and encouragement. Coping skills were enhanced to build distress tolerance skills and emotional regulation. Patient given education on medication side effects, diagnosis/illness and relapse symptoms. Plan to continue supportive psychotherapy in next appointment to provide symptom relief.  6 weeks    2/15: Still utilizing lorazepam which means the anxiety is not under control.  Increase Effexor.  17 minutes of supportive psychotherapy with goal to strengthen defenses, promote problems solving, restore adaptive functioning and provide symptom relief. The therapeutic alliance was strengthened to encourage the patient to express their thoughts and feelings. Esteem building was enhanced through praise, reassurance, normalizing and encouragement. Coping skills were enhanced to build distress tolerance skills and emotional regulation. Patient given education on medication side effects, diagnosis/illness and relapse symptoms. Plan to continue supportive psychotherapy in next appointment to provide symptom relief.  4 weeks    1/17: Continued improvement.  No changes, only on effexor 75 mg for 2 weeks.  Tolerating medications without side effects.  17 minutes of supportive psychotherapy with goal to strengthen defenses, promote problems solving, restore adaptive functioning and provide symptom relief. The therapeutic alliance was strengthened to encourage the patient to express their thoughts and feelings. Esteem building was enhanced through praise, reassurance, normalizing and encouragement. Coping skills were enhanced to build distress tolerance skills and emotional regulation. Patient given education on medication side effects, diagnosis/illness and relapse symptoms. Plan to continue supportive psychotherapy in next appointment to provide symptom relief.  4 wks    1/3: Continued improvement. Increase effexor to  target residual dep anx. TMS to start 1/6. 17 minutes of supportive psychotherapy with goal to strengthen defenses, promote problems solving, restore adaptive functioning and provide symptom relief. The therapeutic alliance was strengthened to encourage the patient to express their thoughts and feelings. Esteem building was enhanced through praise, reassurance, normalizing and encouragement. Coping skills were enhanced to build distress tolerance skills and emotional regulation. Patient given education on medication side effects, diagnosis/illness and relapse symptoms. Plan to continue supportive psychotherapy in next appointment to provide symptom relief.  4 wks    12/17: Improved.  Continue medication titration as planned.  17 minutes of supportive psychotherapy with goal to strengthen defenses, promote problems solving, restore adaptive functioning and provide symptom relief. The therapeutic alliance was strengthened to encourage the patient to express their thoughts and feelings. Esteem building was enhanced through praise, reassurance, normalizing and encouragement. Coping skills were enhanced to build distress tolerance skills and emotional regulation. Patient given education on medication side effects, diagnosis/illness and relapse symptoms. Plan to continue supportive psychotherapy in next appointment to provide symptom relief.  See back in 2 weeks on 3 January in the morning    12/13: See back in 1 week. Taper down on amitriptyline, start effexor. 17 minutes of supportive psychotherapy with goal to strengthen defenses, promote problems solving, restore adaptive functioning and provide symptom relief. The therapeutic alliance was strengthened to encourage the patient to express their thoughts and feelings. Esteem building was enhanced through praise, reassurance, normalizing and encouragement. Coping skills were enhanced to build distress tolerance skills and emotional regulation. Patient given education on  medication side effects, diagnosis/illness and relapse symptoms. Plan to continue supportive psychotherapy in next appointment to provide symptom relief.  1 wk    11/15: Taper off escitalopram and increase elavil as the latter is helping her depression; patient has been on elavil 25 mg tid in the past with good success. Alternatively, consider switching to effexor. 18 minutes of supportive psychotherapy with goal to strengthen defenses, promote problems solving, restore adaptive functioning and provide symptom relief. The therapeutic alliance was strengthened to encourage the patient to express their thoughts and feelings. Esteem building was enhanced through praise, reassurance, normalizing and encouragement. Coping skills were enhanced to build distress tolerance skills and emotional regulation. Patient given education on medication side effects, diagnosis/illness and relapse symptoms. Plan to continue supportive psychotherapy in next appointment to provide symptom relief.    11/5: Increase lorazepam, continue celexa and amitriptyline. Consider effexor. 17 minutes of supportive psychotherapy with goal to strengthen defenses, promote problems solving, restore adaptive functioning and provide symptom relief. The therapeutic alliance was strengthened to encourage the patient to express their thoughts and feelings. Esteem building was enhanced through praise, reassurance, normalizing and encouragement. Coping skills were enhanced to build distress tolerance skills and emotional regulation. Patient given education on medication side effects, diagnosis/illness and relapse symptoms. Plan to continue supportive psychotherapy in next appointment to provide symptom relief.  2 wks    10/19: Re-start elavil. Reduce mirtazapine. Re-start therapy. 2 wks.    10/8: Some residual anxiety and depression reemerging.  Increase mirtazapine.  Patient is now doing her visits alone, rather than with her .  4 weeks.    8/27: Doing  well on mirtazapine. 6 wks.    7/30: Abilify causing constipation.  Also caused brain zaps.  Discontinue.  Start mirtazapine to target depression, anxiety.  Willing to try, even though she knows it could make her hungrier.  See back in 4 weeks.  Continue therapy.  Longer term, consider discontinuing amitriptyline.    6/1: Doing well from a mental health standpoint, but having constipation from Abilify. No doubt the low-dose of amitriptyline in the evenings is also contributing to the cholinergic blockade leading to constipation. Patient instructed to half the dose of Abilify. Continue other medications as scheduled.    Previous:  No insomnia on the amitriptyline. Consider doxepin as well, trazodone.  Abilify caused constipation and brain zaps.        PLAN:  102. Risk Assessment: Risk of self-harm acutely remain low. Risk factors include a history of suicidal ideation, mood disorder, anxiety disorder, presence of psychosocial stressors such as colonic dysplasia, COVID-19 pandemic. Protective factors include no history of self-harm or suicide attempts, good social support, willingness to engage in care, improved depressive symptoms. Risk of self-harm chronically also remain low, but could be further elevated in the event of treatment noncompliance and/or AODA.  103. Medications:   a. CONTINUE Lamictal 25 mg daily. Risks, benefits, alternatives discussed with patient including rash, rebound depressive or manic symptoms if prompt discontinuation, GI upset, agitation, sedation/falls risk.  After discussion of these risks and benefits, patient voiced understanding and agreed to proceed.  b. CONTINUE lorazepam 0.25 to 0.5 mg BID PRN. (rarely uses) Risks, benefits, alternatives discussed with patient including GI upset, sedation, dizziness, respiratory depression, falls risk.  After discussion of these risks and benefits, the patient voiced understanding and agreed to proceed.  c. START Pristiq 25 mg daily. Risks, benefits,  alternatives discussed with patient including GI upset, nausea vomiting diarrhea, theoretical decrease of seizure threshold predisposing the patient to a slightly higher seizure risk, headaches, sexual dysfunction, serotonin syndrome, bleeding risk, increased suicidality in patients 24 years and younger.  Also constipation and urinary retention.  After discussion of these risks and benefits, the patient voiced understanding and agreed to proceed.  d. STATUS POST effexor 37.5 mg daily.  Worsening anxiety.  Tried for several months.  Risks, benefits, alternatives discussed with patient including GI upset, nausea vomiting diarrhea, theoretical decrease of seizure threshold predisposing the patient to a slightly higher seizure risk, headaches, sexual dysfunction, serotonin syndrome, bleeding risk, increased suicidality in patients 24 years and younger.  Also constipation and urinary retention.  After discussion of these risks and benefits, the patient voiced understanding and agreed to proceed.  e. STOPPED amitriptyline 25 to 12.5 mg QHS. Risks, benefits, alternatives, discussed with patient including dry mouth, constipation, dizziness, sedation.  After discussion of these risks and benefits, the patient voiced understanding and agreed to proceed.  f. STOPPED Ambien and increase to 10 mg nightly. Risks, benefits, side effects discussed with patient including sedation, dizziness, GI upset, hallucinations, sleepwalking, sleep-eating.  After discussion of these risks and benefits, the patient voiced understanding and agreed to proceed.  g. STOPPED latuda 20 mg nightly. Take with food. Risks, benefits, and alternatives discussed with patient including akathisia, sedation, dizziness/falls risk, nausea, low risk of weight gain & metabolic risks for diabetes and dyslipidemia, and rare tardive dyskinesia.  After discussion of these risks and benefits, the patient voiced understanding and agreed to proceed. Instructed to take  medication with meal of minimum of 350 calories to improve consistent efficacy and absorption.   h. S/P:   i. Mirtazapine 15 mg, 7.5 mg: ineffective; made anxiety and depression worse.  ii. Abilify: brain zaps and constipation  iii. Seroquel made depression worse  iv. celexa 20 mg daily, ineffective after several months  104. Therapy: continue with [facility] at [facility].  105. Labs/Studies: BMP to monitor sodium levels in February was entirely normal, EKG February shows rate 72, sinus, .  106. Follow Up: 4 weeks      TREATMENT PLAN/GOALS: Continue supportive psychotherapy efforts and medications as indicated. Treatment and medication options discussed during today's visit. Patient acknowledged and verbally consented to continue with current treatment plan and was educated on the importance of compliance with treatment and follow-up appointments.    MEDICATION ISSUES:  YVETTE reviewed as expected.  Discussed medication options and treatment plan of prescribed medication as well as the risks, benefits, and side effects including potential falls, possible impaired driving and metabolic adversities among others. Patient is agreeable to call the office with any worsening of symptoms or onset of side effects. Patient is agreeable to call 911 or go to the nearest ER should he/she begin having SI/HI. No medication side effects or related complaints today.     MEDS ORDERED DURING VISIT:    Return in about 4 weeks (around 4/21/2022).         This document has been electronically signed by Vinny Krishnamurthy MD  March 24, 2022 13:24 EDT       Part of this note may be an electronic transcription/translation of spoken language to printed text using the Dragon Dictation System.

## 2022-04-03 DIAGNOSIS — F33.40 RECURRENT MAJOR DEPRESSIVE DISORDER IN REMISSION: ICD-10-CM

## 2022-04-04 RX ORDER — CITALOPRAM 20 MG/1
20 TABLET ORAL DAILY
Qty: 90 TABLET | Refills: 1 | OUTPATIENT
Start: 2022-04-04

## 2022-04-11 ENCOUNTER — DOCUMENTATION (OUTPATIENT)
Dept: PSYCHIATRY | Facility: CLINIC | Age: 67
End: 2022-04-11

## 2022-04-11 NOTE — PROGRESS NOTES
"I called the patient to check in on her progress on lexapro. \"I'm doing better than I was before.\" Sleeping well. Taking nyquil to sleep. Utilizing lorazepam frequently. Has been on lexapro for 10 days. She understands that she needs to give it more time. More active. Has no appetite, however.    I mentioned to her the importance of moving to a higher level of care (IOP, inpt). Pt voiced understanding and agreed to proceed.    Jaya: crying episodes have stopped. Better energy.  "

## 2022-04-22 ENCOUNTER — OFFICE VISIT (OUTPATIENT)
Dept: PSYCHIATRY | Facility: CLINIC | Age: 67
End: 2022-04-22

## 2022-04-22 VITALS
HEIGHT: 64 IN | BODY MASS INDEX: 23.9 KG/M2 | WEIGHT: 140 LBS | DIASTOLIC BLOOD PRESSURE: 74 MMHG | SYSTOLIC BLOOD PRESSURE: 120 MMHG

## 2022-04-22 DIAGNOSIS — F41.1 GENERALIZED ANXIETY DISORDER: ICD-10-CM

## 2022-04-22 DIAGNOSIS — F41.0 PANIC ATTACKS: ICD-10-CM

## 2022-04-22 DIAGNOSIS — F51.05 INSOMNIA DUE TO MENTAL CONDITION: Primary | ICD-10-CM

## 2022-04-22 DIAGNOSIS — F33.1 MAJOR DEPRESSIVE DISORDER, RECURRENT EPISODE, MODERATE: ICD-10-CM

## 2022-04-22 PROCEDURE — 99214 OFFICE O/P EST MOD 30 MIN: CPT | Performed by: STUDENT IN AN ORGANIZED HEALTH CARE EDUCATION/TRAINING PROGRAM

## 2022-04-22 PROCEDURE — 90833 PSYTX W PT W E/M 30 MIN: CPT | Performed by: STUDENT IN AN ORGANIZED HEALTH CARE EDUCATION/TRAINING PROGRAM

## 2022-04-22 RX ORDER — LORAZEPAM 1 MG/1
TABLET ORAL
COMMUNITY
Start: 2022-03-24 | End: 2022-06-13 | Stop reason: SDUPTHER

## 2022-04-22 RX ORDER — ESCITALOPRAM OXALATE 10 MG/1
10 TABLET ORAL DAILY
Qty: 30 TABLET | Refills: 2 | Status: SHIPPED | OUTPATIENT
Start: 2022-04-22 | End: 2022-06-16

## 2022-04-22 RX ORDER — ESCITALOPRAM OXALATE 10 MG/1
10 TABLET ORAL DAILY
Qty: 30 TABLET | Refills: 2 | Status: SHIPPED | OUTPATIENT
Start: 2022-04-22 | End: 2022-04-22 | Stop reason: SDUPTHER

## 2022-04-22 RX ORDER — TRAZODONE HYDROCHLORIDE 50 MG/1
100 TABLET ORAL NIGHTLY
Qty: 60 TABLET | Refills: 2 | Status: SHIPPED | OUTPATIENT
Start: 2022-04-22 | End: 2022-05-20

## 2022-04-22 RX ORDER — ESCITALOPRAM OXALATE 5 MG/1
5 TABLET ORAL DAILY
COMMUNITY
Start: 2022-03-31 | End: 2022-04-22 | Stop reason: SDUPTHER

## 2022-04-22 NOTE — PATIENT INSTRUCTIONS
1.  Please return to clinic at your next scheduled visit.  Contact the clinic ([phone]) at least 24 hours prior in the event you need to cancel.  2.  Do no harm to yourself or others.    3.  Avoid alcohol and drugs.    4.  Take all medications as prescribed.  Please contact the clinic with any concerns. If you are in need of medication refills, please call the clinic at [phone].    5. Should you want to get in touch with your provider, Dr. Vinny Krishnamurthy, please utilize [facility] or contact the office ([phone]), and staff will be able to page Dr. Krishnamurthy directly.  6.  In the event you have personal crisis, contact the following crisis numbers: Suicide Prevention Hotline 1-921.281.5361; [facility] Helpline 8-678-154-VXLA; [facility] [phone]; text HELLO to 265958; or 960.     SPECIFIC RECOMMENDATIONS:     1.      Medications discussed at this encounter:                   - increase lexapro and start trazodone     2.      Psychotherapy recommendations:      3.     Return to clinic: 4 weeks, urgent

## 2022-04-22 NOTE — PROGRESS NOTES
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 hyperverbal, not pressured, normal prosidy, Speaks with an accent  · Mood  · : \"I cannot sleep\"  · Affect  · : depressed, on the verge of tears, mood congruent, fair variability  · Thought Content  · : negative suicidal ideations, negative homicidal ideations, negative obsessions  · Perceptions  · : negative auditory hallucinations, negative visual hallucinations  · Thought Process  · : linear  · Insight/Judgement  · : fair/fair  · Cognition  · : grossly intact  · Attention  · : intact      Physical Exam:  Physical Exam    Vital Signs:   /74   Ht 162.6 cm (64\")   Wt 63.5 kg (140 lb)   BMI 24.03 kg/m²      Lab Results:   No visits with results within 6 Month(s) from this visit.   Latest known visit with results is:   Office Visit on 10/15/2021   Component Date Value Ref Range Status   • Glucose 10/15/2021 96  65 - 99 mg/dL Final   • BUN 10/15/2021 14  8 - 23 mg/dL Final   • Creatinine 10/15/2021 0.75  0.57 - 1.00 mg/dL Final   • Sodium 10/15/2021 139  136 - 145 mmol/L Final   • Potassium 10/15/2021 4.4  3.5 - 5.2 mmol/L Final   • Chloride 10/15/2021 103  98 - 107 mmol/L Final   • CO2 10/15/2021 28.1  22.0 - 29.0 mmol/L Final   • Calcium 10/15/2021 9.6  8.6 - 10.5 mg/dL Final   • Total Protein 10/15/2021 7.4  6.0 - 8.5 " g/dL Final   • Albumin 10/15/2021 4.70  3.50 - 5.20 g/dL Final   • ALT (SGPT) 10/15/2021 20  1 - 33 U/L Final   • AST (SGOT) 10/15/2021 24  1 - 32 U/L Final   • Alkaline Phosphatase 10/15/2021 112  39 - 117 U/L Final   • Total Bilirubin 10/15/2021 1.0  0.0 - 1.2 mg/dL Final   • eGFR Non  Amer 10/15/2021 77  >60 mL/min/1.73 Final   • Globulin 10/15/2021 2.7  gm/dL Final   • A/G Ratio 10/15/2021 1.7  g/dL Final   • BUN/Creatinine Ratio 10/15/2021 18.7  7.0 - 25.0 Final   • Anion Gap 10/15/2021 7.9  5.0 - 15.0 mmol/L Final   • TSH 10/15/2021 0.693  0.270 - 4.200 uIU/mL Final   • Total Cholesterol 10/15/2021 197  0 - 200 mg/dL Final   • Triglycerides 10/15/2021 162 (A) 0 - 150 mg/dL Final   • HDL Cholesterol 10/15/2021 46  40 - 60 mg/dL Final   • LDL Cholesterol  10/15/2021 122 (A) 0 - 100 mg/dL Final   • VLDL Cholesterol 10/15/2021 29  5 - 40 mg/dL Final   • LDL/HDL Ratio 10/15/2021 2.58   Final   • WBC 10/15/2021 6.03  3.40 - 10.80 10*3/mm3 Final   • RBC 10/15/2021 4.60  3.77 - 5.28 10*6/mm3 Final   • Hemoglobin 10/15/2021 13.9  12.0 - 15.9 g/dL Final   • Hematocrit 10/15/2021 41.7  34.0 - 46.6 % Final   • MCV 10/15/2021 90.7  79.0 - 97.0 fL Final   • MCH 10/15/2021 30.2  26.6 - 33.0 pg Final   • MCHC 10/15/2021 33.3  31.5 - 35.7 g/dL Final   • RDW 10/15/2021 13.4  12.3 - 15.4 % Final   • RDW-SD 10/15/2021 44.6  37.0 - 54.0 fl Final   • MPV 10/15/2021 10.2  6.0 - 12.0 fL Final   • Platelets 10/15/2021 301  140 - 450 10*3/mm3 Final   • Neutrophil % 10/15/2021 57.8  42.7 - 76.0 % Final   • Lymphocyte % 10/15/2021 31.2  19.6 - 45.3 % Final   • Monocyte % 10/15/2021 9.5  5.0 - 12.0 % Final   • Eosinophil % 10/15/2021 0.7  0.3 - 6.2 % Final   • Basophil % 10/15/2021 0.5  0.0 - 1.5 % Final   • Immature Grans % 10/15/2021 0.3  0.0 - 0.5 % Final   • Neutrophils, Absolute 10/15/2021 3.49  1.70 - 7.00 10*3/mm3 Final   • Lymphocytes, Absolute 10/15/2021 1.88  0.70 - 3.10 10*3/mm3 Final   • Monocytes, Absolute 10/15/2021  0.57  0.10 - 0.90 10*3/mm3 Final   • Eosinophils, Absolute 10/15/2021 0.04  0.00 - 0.40 10*3/mm3 Final   • Basophils, Absolute 10/15/2021 0.03  0.00 - 0.20 10*3/mm3 Final   • Immature Grans, Absolute 10/15/2021 0.02  0.00 - 0.05 10*3/mm3 Final   • nRBC 10/15/2021 0.2  0.0 - 0.2 /100 WBC Final       EKG Results:  No orders to display       Imaging Results:  No Images in the past 120 days found..      Assessment/Plan   Diagnoses and all orders for this visit:    1. 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  Dispense: 30 tablet; Refill: 2  -     traZODone (DESYREL) 50 MG tablet; Take 2 tablets by mouth Every Night.  Dispense: 60 tablet; Refill: 2  -     escitalopram (LEXAPRO) 10 MG tablet; Take 1 tablet by mouth Daily.  Dispense: 30 tablet; Refill: 2    2. Generalized anxiety disorder  -     Discontinue: escitalopram (LEXAPRO) 10 MG tablet; Take 1 tablet by mouth Daily.  Dispense: 30 tablet; Refill: 2  -     escitalopram (LEXAPRO) 10 MG tablet; Take 1 tablet by mouth Daily.  Dispense: 30 tablet; Refill: 2    3. Panic attacks  -     Discontinue: escitalopram (LEXAPRO) 10 MG tablet; Take 1 tablet by mouth Daily.  Dispense: 30 tablet; Refill: 2  -     escitalopram (LEXAPRO) 10 MG tablet; Take 1 tablet by mouth Daily.  Dispense: 30 tablet; Refill: 2    4. Major depressive disorder, recurrent episode, moderate (HCC)  -     Discontinue: escitalopram (LEXAPRO) 10 MG tablet; Take 1 tablet by mouth Daily.  Dispense: 30 tablet; Refill: 2  -     escitalopram (LEXAPRO) 10 MG tablet; Take 1 tablet by mouth Daily.  Dispense: 30 tablet; Refill: 2        Visit Diagnoses:    ICD-10-CM ICD-9-CM   1. Insomnia due to mental condition  F51.05 300.9     327.02   2. Generalized anxiety disorder  F41.1 300.02   3. Panic attacks  F41.0 300.01   4. Major depressive disorder, recurrent episode, moderate (HCC)  F33.1 296.32       Presentation most consistent  with major depressive disorder in partial remission, FAREED, rare panic attacks.    4/22: Some improvement in anx and dep, but still residual symptoms.  Persistent insomnia. Declines IOP, inpatient admission.  Increase Lexapro and start trazodone.  17 minutes of supportive psychotherapy with goal to strengthen defenses, promote problems solving, restore adaptive functioning and provide symptom relief. The therapeutic alliance was strengthened to encourage the patient to express their thoughts and feelings. Esteem building was enhanced through praise, reassurance, normalizing and encouragement. Coping skills were enhanced to build distress tolerance skills and emotional regulation. Patient given education on medication side effects, diagnosis/illness and relapse symptoms. Plan to continue supportive psychotherapy in next appointment to provide symptom relief.  Diagnoses: as above  Symptoms: as above  Functional status: fair  Mental Status Exam: as above    Treatment plan: Medication management and supportive psychotherapy  Prognosis: good  Progress: some, minimal      3/24: Focus on lower doses.  Start Pristiq.  Depression with anxious distress versus depression and generalized anxiety disorder.  She only has distractibility, no other symptoms of hypomania.  17 minutes of supportive psychotherapy with goal to strengthen defenses, promote problems solving, restore adaptive functioning and provide symptom relief. The therapeutic alliance was strengthened to encourage the patient to express their thoughts and feelings. Esteem building was enhanced through praise, reassurance, normalizing and encouragement. Coping skills were enhanced to build distress tolerance skills and emotional regulation. Patient given education on medication side effects, diagnosis/illness and relapse symptoms. Plan to continue supportive psychotherapy in next appointment to provide symptom relief.  4 weeks    3/15: Continue lurasidone 40 mg at  night, increase Ambien to 10 mg at night.  Continue low-dose Lamictal, plan to increase further.  Continue titrating off of amitriptyline.  17 minutes of supportive psychotherapy with goal to strengthen defenses, promote problems solving, restore adaptive functioning and provide symptom relief. The therapeutic alliance was strengthened to encourage the patient to express their thoughts and feelings. Esteem building was enhanced through praise, reassurance, normalizing and encouragement. Coping skills were enhanced to build distress tolerance skills and emotional regulation. Patient given education on medication side effects, diagnosis/illness and relapse symptoms. Plan to continue supportive psychotherapy in next appointment to provide symptom relief.  1 week, urgent    3/10: Effexor has now been stopped.  Continue Latuda and Lamictal at present doses.  I will see her back in a few days and then at 6 weeks.  Bipolar 2?  If so, how come she is tolerating amitriptyline in the evenings?  19 minutes of supportive psychotherapy with goal to strengthen defenses, promote problems solving, restore adaptive functioning and provide symptom relief. The therapeutic alliance was strengthened to encourage the patient to express their thoughts and feelings. Esteem building was enhanced through praise, reassurance, normalizing and encouragement. Coping skills were enhanced to build distress tolerance skills and emotional regulation. Patient given education on medication side effects, diagnosis/illness and relapse symptoms. Plan to continue supportive psychotherapy in next appointment to provide symptom relief.  6 weeks    2/15: Still utilizing lorazepam which means the anxiety is not under control.  Increase Effexor.  17 minutes of supportive psychotherapy with goal to strengthen defenses, promote problems solving, restore adaptive functioning and provide symptom relief. The therapeutic alliance was strengthened to encourage the  patient to express their thoughts and feelings. Esteem building was enhanced through praise, reassurance, normalizing and encouragement. Coping skills were enhanced to build distress tolerance skills and emotional regulation. Patient given education on medication side effects, diagnosis/illness and relapse symptoms. Plan to continue supportive psychotherapy in next appointment to provide symptom relief.  4 weeks    1/17: Continued improvement.  No changes, only on effexor 75 mg for 2 weeks.  Tolerating medications without side effects.  17 minutes of supportive psychotherapy with goal to strengthen defenses, promote problems solving, restore adaptive functioning and provide symptom relief. The therapeutic alliance was strengthened to encourage the patient to express their thoughts and feelings. Esteem building was enhanced through praise, reassurance, normalizing and encouragement. Coping skills were enhanced to build distress tolerance skills and emotional regulation. Patient given education on medication side effects, diagnosis/illness and relapse symptoms. Plan to continue supportive psychotherapy in next appointment to provide symptom relief.  4 wks    1/3: Continued improvement. Increase effexor to target residual dep anx. TMS to start 1/6. 17 minutes of supportive psychotherapy with goal to strengthen defenses, promote problems solving, restore adaptive functioning and provide symptom relief. The therapeutic alliance was strengthened to encourage the patient to express their thoughts and feelings. Esteem building was enhanced through praise, reassurance, normalizing and encouragement. Coping skills were enhanced to build distress tolerance skills and emotional regulation. Patient given education on medication side effects, diagnosis/illness and relapse symptoms. Plan to continue supportive psychotherapy in next appointment to provide symptom relief.  4 wks    12/17: Improved.  Continue medication titration as  planned.  17 minutes of supportive psychotherapy with goal to strengthen defenses, promote problems solving, restore adaptive functioning and provide symptom relief. The therapeutic alliance was strengthened to encourage the patient to express their thoughts and feelings. Esteem building was enhanced through praise, reassurance, normalizing and encouragement. Coping skills were enhanced to build distress tolerance skills and emotional regulation. Patient given education on medication side effects, diagnosis/illness and relapse symptoms. Plan to continue supportive psychotherapy in next appointment to provide symptom relief.  See back in 2 weeks on 3 January in the morning    12/13: See back in 1 week. Taper down on amitriptyline, start effexor. 17 minutes of supportive psychotherapy with goal to strengthen defenses, promote problems solving, restore adaptive functioning and provide symptom relief. The therapeutic alliance was strengthened to encourage the patient to express their thoughts and feelings. Esteem building was enhanced through praise, reassurance, normalizing and encouragement. Coping skills were enhanced to build distress tolerance skills and emotional regulation. Patient given education on medication side effects, diagnosis/illness and relapse symptoms. Plan to continue supportive psychotherapy in next appointment to provide symptom relief.  1 wk    11/15: Taper off escitalopram and increase elavil as the latter is helping her depression; patient has been on elavil 25 mg tid in the past with good success. Alternatively, consider switching to effexor. 18 minutes of supportive psychotherapy with goal to strengthen defenses, promote problems solving, restore adaptive functioning and provide symptom relief. The therapeutic alliance was strengthened to encourage the patient to express their thoughts and feelings. Esteem building was enhanced through praise, reassurance, normalizing and encouragement. Coping  skills were enhanced to build distress tolerance skills and emotional regulation. Patient given education on medication side effects, diagnosis/illness and relapse symptoms. Plan to continue supportive psychotherapy in next appointment to provide symptom relief.    11/5: Increase lorazepam, continue celexa and amitriptyline. Consider effexor. 17 minutes of supportive psychotherapy with goal to strengthen defenses, promote problems solving, restore adaptive functioning and provide symptom relief. The therapeutic alliance was strengthened to encourage the patient to express their thoughts and feelings. Esteem building was enhanced through praise, reassurance, normalizing and encouragement. Coping skills were enhanced to build distress tolerance skills and emotional regulation. Patient given education on medication side effects, diagnosis/illness and relapse symptoms. Plan to continue supportive psychotherapy in next appointment to provide symptom relief.  2 wks    10/19: Re-start elavil. Reduce mirtazapine. Re-start therapy. 2 wks.    10/8: Some residual anxiety and depression reemerging.  Increase mirtazapine.  Patient is now doing her visits alone, rather than with her .  4 weeks.    8/27: Doing well on mirtazapine. 6 wks.    7/30: Abilify causing constipation.  Also caused brain zaps.  Discontinue.  Start mirtazapine to target depression, anxiety.  Willing to try, even though she knows it could make her hungrier.  See back in 4 weeks.  Continue therapy.  Longer term, consider discontinuing amitriptyline.    6/1: Doing well from a mental health standpoint, but having constipation from Abilify. No doubt the low-dose of amitriptyline in the evenings is also contributing to the cholinergic blockade leading to constipation. Patient instructed to half the dose of Abilify. Continue other medications as scheduled.    Previous:  No insomnia on the amitriptyline. Consider doxepin as well, trazodone.  Abilify caused  "constipation and brain zaps.        PLAN:  106. Risk Assessment: Risk of self-harm acutely remain low. Risk factors include a history of suicidal ideation, mood disorder, anxiety disorder, presence of psychosocial stressors such as colonic dysplasia, COVID-19 pandemic. Protective factors include no history of self-harm or suicide attempts, good social support, willingness to engage in care, improved depressive symptoms. Risk of self-harm chronically also remain low, but could be further elevated in the event of treatment noncompliance and/or AODA.  107. Medications:   a. INCREASE lexapro 5 to 10 mg daily. Risks, benefits, alternatives discussed with patient including GI upset, nausea vomiting diarrhea, theoretical decrease of seizure threshold predisposing the patient to a slightly higher seizure risk, headaches, sexual dysfunction, serotonin syndrome, bleeding risk, increased suicidality in patients 24 years and younger.  After discussion of these risks and benefits, the patient voiced understanding and agreed to proceed.  b. START trazodone 50 mg nightly. Risks, benefits, side effects discussed with patient including GI upset, sedation, dizziness/falls risk, grogginess the following day, prolongation of the QTc interval.  After discussion of these risks and benefits, the patient voiced understanding and agreed to proceed.    c. CONTINUE lorazepam 0.25 to 0.5 mg BID PRN. (rarely uses) Risks, benefits, alternatives discussed with patient including GI upset, sedation, dizziness, respiratory depression, falls risk.  After discussion of these risks and benefits, the patient voiced understanding and agreed to proceed.  d. S/P:   i. Mirtazapine 15 mg, 7.5 mg: ineffective; made anxiety and depression worse.  ii. Abilify: brain zaps and constipation  iii. Seroquel made depression worse  iv. celexa 20 mg daily, ineffective after several months  v. Doxepin caused \"brain zaps\"  vi. Latuda was ineffective.  vii. Amitriptyline " 25 mg nightly helpful for sleep but we didn't want her on too many serotonergic meds.  viii. Ambien minimally helpful for insomnia.  ix. Pristiq and effexor worsened anxiety, both low dose.  x. Stopped lamictal for unclear reasons 25 mg.  108. Therapy: continue with [facility] at [facility].  109. Labs/Studies: BMP to monitor sodium levels in February was entirely normal, EKG February shows rate 72, sinus, .  110. Follow Up: 4 weeks, urgent      TREATMENT PLAN/GOALS: Continue supportive psychotherapy efforts and medications as indicated. Treatment and medication options discussed during today's visit. Patient acknowledged and verbally consented to continue with current treatment plan and was educated on the importance of compliance with treatment and follow-up appointments.    MEDICATION ISSUES:  YVETTE reviewed as expected.  Discussed medication options and treatment plan of prescribed medication as well as the risks, benefits, and side effects including potential falls, possible impaired driving and metabolic adversities among others. Patient is agreeable to call the office with any worsening of symptoms or onset of side effects. Patient is agreeable to call 911 or go to the nearest ER should he/she begin having SI/HI. No medication side effects or related complaints today.     MEDS ORDERED DURING VISIT:    Return in about 4 weeks (around 5/20/2022) for urgent.         This document has been electronically signed by Vinny Krishnamurthy MD  April 22, 2022 11:40 EDT       Part of this note may be an electronic transcription/translation of spoken language to printed text using the Dragon Dictation System.

## 2022-05-13 ENCOUNTER — HOSPITAL ENCOUNTER (EMERGENCY)
Facility: HOSPITAL | Age: 67
Discharge: LEFT BEFORE DISCHARGE | End: 2022-05-13

## 2022-05-13 ENCOUNTER — APPOINTMENT (OUTPATIENT)
Dept: GENERAL RADIOLOGY | Facility: HOSPITAL | Age: 67
End: 2022-05-13

## 2022-05-13 VITALS
DIASTOLIC BLOOD PRESSURE: 76 MMHG | WEIGHT: 143.96 LBS | SYSTOLIC BLOOD PRESSURE: 142 MMHG | OXYGEN SATURATION: 100 % | BODY MASS INDEX: 24.58 KG/M2 | HEART RATE: 72 BPM | TEMPERATURE: 98.5 F | RESPIRATION RATE: 20 BRPM | HEIGHT: 64 IN

## 2022-05-13 LAB
ALBUMIN SERPL-MCNC: 4.5 G/DL (ref 3.5–5.2)
ALBUMIN/GLOB SERPL: 1.6 G/DL
ALP SERPL-CCNC: 110 U/L (ref 39–117)
ALT SERPL W P-5'-P-CCNC: 15 U/L (ref 1–33)
ANION GAP SERPL CALCULATED.3IONS-SCNC: 11.4 MMOL/L (ref 5–15)
AST SERPL-CCNC: 15 U/L (ref 1–32)
BACTERIA UR QL AUTO: ABNORMAL /HPF
BASOPHILS # BLD AUTO: 0.03 10*3/MM3 (ref 0–0.2)
BASOPHILS NFR BLD AUTO: 0.3 % (ref 0–1.5)
BILIRUB SERPL-MCNC: 0.9 MG/DL (ref 0–1.2)
BILIRUB UR QL STRIP: NEGATIVE
BUN SERPL-MCNC: 10 MG/DL (ref 8–23)
BUN/CREAT SERPL: 12.2 (ref 7–25)
CALCIUM SPEC-SCNC: 10 MG/DL (ref 8.6–10.5)
CHLORIDE SERPL-SCNC: 101 MMOL/L (ref 98–107)
CLARITY UR: CLEAR
CO2 SERPL-SCNC: 24.6 MMOL/L (ref 22–29)
COD CRY URNS QL: ABNORMAL /HPF
COLOR UR: YELLOW
CREAT SERPL-MCNC: 0.82 MG/DL (ref 0.57–1)
DEPRECATED RDW RBC AUTO: 48.1 FL (ref 37–54)
EGFRCR SERPLBLD CKD-EPI 2021: 78.5 ML/MIN/1.73
EOSINOPHIL # BLD AUTO: 0.04 10*3/MM3 (ref 0–0.4)
EOSINOPHIL NFR BLD AUTO: 0.5 % (ref 0.3–6.2)
ERYTHROCYTE [DISTWIDTH] IN BLOOD BY AUTOMATED COUNT: 14.2 % (ref 12.3–15.4)
GLOBULIN UR ELPH-MCNC: 2.9 GM/DL
GLUCOSE SERPL-MCNC: 116 MG/DL (ref 65–99)
GLUCOSE UR STRIP-MCNC: NEGATIVE MG/DL
HCT VFR BLD AUTO: 43.2 % (ref 34–46.6)
HGB BLD-MCNC: 14 G/DL (ref 12–15.9)
HGB UR QL STRIP.AUTO: NEGATIVE
HOLD SPECIMEN: NORMAL
HOLD SPECIMEN: NORMAL
HYALINE CASTS UR QL AUTO: ABNORMAL /LPF
IMM GRANULOCYTES # BLD AUTO: 0.03 10*3/MM3 (ref 0–0.05)
IMM GRANULOCYTES NFR BLD AUTO: 0.3 % (ref 0–0.5)
KETONES UR QL STRIP: NEGATIVE
LEUKOCYTE ESTERASE UR QL STRIP.AUTO: ABNORMAL
LIPASE SERPL-CCNC: 54 U/L (ref 13–60)
LYMPHOCYTES # BLD AUTO: 1.91 10*3/MM3 (ref 0.7–3.1)
LYMPHOCYTES NFR BLD AUTO: 22.2 % (ref 19.6–45.3)
MCH RBC QN AUTO: 29.9 PG (ref 26.6–33)
MCHC RBC AUTO-ENTMCNC: 32.4 G/DL (ref 31.5–35.7)
MCV RBC AUTO: 92.3 FL (ref 79–97)
MONOCYTES # BLD AUTO: 0.68 10*3/MM3 (ref 0.1–0.9)
MONOCYTES NFR BLD AUTO: 7.9 % (ref 5–12)
NEUTROPHILS NFR BLD AUTO: 5.92 10*3/MM3 (ref 1.7–7)
NEUTROPHILS NFR BLD AUTO: 68.8 % (ref 42.7–76)
NITRITE UR QL STRIP: NEGATIVE
NRBC BLD AUTO-RTO: 0 /100 WBC (ref 0–0.2)
PH UR STRIP.AUTO: 5.5 [PH] (ref 5–8)
PLATELET # BLD AUTO: 317 10*3/MM3 (ref 140–450)
PMV BLD AUTO: 8.8 FL (ref 6–12)
POTASSIUM SERPL-SCNC: 4.3 MMOL/L (ref 3.5–5.2)
PROT SERPL-MCNC: 7.4 G/DL (ref 6–8.5)
PROT UR QL STRIP: NEGATIVE
RBC # BLD AUTO: 4.68 10*6/MM3 (ref 3.77–5.28)
RBC # UR STRIP: ABNORMAL /HPF
REF LAB TEST METHOD: ABNORMAL
SODIUM SERPL-SCNC: 137 MMOL/L (ref 136–145)
SP GR UR STRIP: 1.01 (ref 1–1.03)
SQUAMOUS #/AREA URNS HPF: ABNORMAL /HPF
TROPONIN T SERPL-MCNC: <0.01 NG/ML (ref 0–0.03)
UROBILINOGEN UR QL STRIP: ABNORMAL
WBC # UR STRIP: ABNORMAL /HPF
WBC NRBC COR # BLD: 8.61 10*3/MM3 (ref 3.4–10.8)
WHOLE BLOOD HOLD COAG: NORMAL
WHOLE BLOOD HOLD SPECIMEN: NORMAL

## 2022-05-13 PROCEDURE — 85025 COMPLETE CBC W/AUTO DIFF WBC: CPT

## 2022-05-13 PROCEDURE — 86677 HELICOBACTER PYLORI ANTIBODY: CPT | Performed by: NURSE PRACTITIONER

## 2022-05-13 PROCEDURE — 36415 COLL VENOUS BLD VENIPUNCTURE: CPT

## 2022-05-13 PROCEDURE — 81001 URINALYSIS AUTO W/SCOPE: CPT

## 2022-05-13 PROCEDURE — 83690 ASSAY OF LIPASE: CPT

## 2022-05-13 PROCEDURE — 80053 COMPREHEN METABOLIC PANEL: CPT

## 2022-05-13 PROCEDURE — 99211 OFF/OP EST MAY X REQ PHY/QHP: CPT

## 2022-05-13 PROCEDURE — 93010 ELECTROCARDIOGRAM REPORT: CPT | Performed by: INTERNAL MEDICINE

## 2022-05-13 PROCEDURE — 84484 ASSAY OF TROPONIN QUANT: CPT

## 2022-05-13 PROCEDURE — 93005 ELECTROCARDIOGRAM TRACING: CPT

## 2022-05-13 RX ORDER — SODIUM CHLORIDE 0.9 % (FLUSH) 0.9 %
10 SYRINGE (ML) INJECTION AS NEEDED
Status: DISCONTINUED | OUTPATIENT
Start: 2022-05-13 | End: 2022-05-13 | Stop reason: HOSPADM

## 2022-05-14 ENCOUNTER — HOSPITAL ENCOUNTER (EMERGENCY)
Facility: HOSPITAL | Age: 67
Discharge: HOME | End: 2022-05-14
Attending: EMERGENCY MEDICINE | Admitting: EMERGENCY MEDICINE

## 2022-05-14 ENCOUNTER — APPOINTMENT (OUTPATIENT)
Dept: CT IMAGING | Facility: HOSPITAL | Age: 67
End: 2022-05-14

## 2022-05-14 VITALS
RESPIRATION RATE: 18 BRPM | DIASTOLIC BLOOD PRESSURE: 80 MMHG | HEIGHT: 64 IN | BODY MASS INDEX: 24.62 KG/M2 | OXYGEN SATURATION: 97 % | HEART RATE: 83 BPM | SYSTOLIC BLOOD PRESSURE: 127 MMHG | WEIGHT: 144.18 LBS | TEMPERATURE: 98.1 F

## 2022-05-14 DIAGNOSIS — K21.9 GASTROESOPHAGEAL REFLUX DISEASE WITHOUT ESOPHAGITIS: Primary | ICD-10-CM

## 2022-05-14 DIAGNOSIS — K92.2 LOWER GI BLEEDING: ICD-10-CM

## 2022-05-14 LAB
ALBUMIN SERPL-MCNC: 4.2 G/DL (ref 3.5–5.2)
ALBUMIN/GLOB SERPL: 1.4 G/DL
ALP SERPL-CCNC: 107 U/L (ref 39–117)
ALT SERPL W P-5'-P-CCNC: 13 U/L (ref 1–33)
ANION GAP SERPL CALCULATED.3IONS-SCNC: 11.8 MMOL/L (ref 5–15)
AST SERPL-CCNC: 15 U/L (ref 1–32)
BACTERIA UR QL AUTO: ABNORMAL /HPF
BASOPHILS # BLD AUTO: 0.02 10*3/MM3 (ref 0–0.2)
BASOPHILS NFR BLD AUTO: 0.3 % (ref 0–1.5)
BILIRUB SERPL-MCNC: 1.1 MG/DL (ref 0–1.2)
BILIRUB UR QL STRIP: NEGATIVE
BUN SERPL-MCNC: 10 MG/DL (ref 8–23)
BUN/CREAT SERPL: 13 (ref 7–25)
CALCIUM SPEC-SCNC: 9.7 MG/DL (ref 8.6–10.5)
CHLORIDE SERPL-SCNC: 103 MMOL/L (ref 98–107)
CLARITY UR: CLEAR
CO2 SERPL-SCNC: 24.2 MMOL/L (ref 22–29)
COLOR UR: YELLOW
CREAT SERPL-MCNC: 0.77 MG/DL (ref 0.57–1)
DEPRECATED RDW RBC AUTO: 47.9 FL (ref 37–54)
EGFRCR SERPLBLD CKD-EPI 2021: 84.7 ML/MIN/1.73
EOSINOPHIL # BLD AUTO: 0.03 10*3/MM3 (ref 0–0.4)
EOSINOPHIL NFR BLD AUTO: 0.4 % (ref 0.3–6.2)
ERYTHROCYTE [DISTWIDTH] IN BLOOD BY AUTOMATED COUNT: 14.2 % (ref 12.3–15.4)
GLOBULIN UR ELPH-MCNC: 2.9 GM/DL
GLUCOSE SERPL-MCNC: 109 MG/DL (ref 65–99)
GLUCOSE UR STRIP-MCNC: NEGATIVE MG/DL
H PYLORI IGG SER IA-ACNC: NEGATIVE
HCT VFR BLD AUTO: 41.9 % (ref 34–46.6)
HEMOCCULT STL QL IA: POSITIVE
HGB BLD-MCNC: 13.9 G/DL (ref 12–15.9)
HGB UR QL STRIP.AUTO: ABNORMAL
HOLD SPECIMEN: NORMAL
HOLD SPECIMEN: NORMAL
HYALINE CASTS UR QL AUTO: ABNORMAL /LPF
IMM GRANULOCYTES # BLD AUTO: 0.04 10*3/MM3 (ref 0–0.05)
IMM GRANULOCYTES NFR BLD AUTO: 0.6 % (ref 0–0.5)
KETONES UR QL STRIP: ABNORMAL
LEUKOCYTE ESTERASE UR QL STRIP.AUTO: NEGATIVE
LIPASE SERPL-CCNC: 47 U/L (ref 13–60)
LYMPHOCYTES # BLD AUTO: 1.54 10*3/MM3 (ref 0.7–3.1)
LYMPHOCYTES NFR BLD AUTO: 22.2 % (ref 19.6–45.3)
MCH RBC QN AUTO: 30.7 PG (ref 26.6–33)
MCHC RBC AUTO-ENTMCNC: 33.2 G/DL (ref 31.5–35.7)
MCV RBC AUTO: 92.5 FL (ref 79–97)
MONOCYTES # BLD AUTO: 0.59 10*3/MM3 (ref 0.1–0.9)
MONOCYTES NFR BLD AUTO: 8.5 % (ref 5–12)
NEUTROPHILS NFR BLD AUTO: 4.73 10*3/MM3 (ref 1.7–7)
NEUTROPHILS NFR BLD AUTO: 68 % (ref 42.7–76)
NITRITE UR QL STRIP: NEGATIVE
NRBC BLD AUTO-RTO: 0 /100 WBC (ref 0–0.2)
PH UR STRIP.AUTO: 7 [PH] (ref 5–8)
PLATELET # BLD AUTO: 288 10*3/MM3 (ref 140–450)
PMV BLD AUTO: 8.9 FL (ref 6–12)
POTASSIUM SERPL-SCNC: 3.9 MMOL/L (ref 3.5–5.2)
PROT SERPL-MCNC: 7.1 G/DL (ref 6–8.5)
PROT UR QL STRIP: NEGATIVE
RBC # BLD AUTO: 4.53 10*6/MM3 (ref 3.77–5.28)
RBC # UR STRIP: ABNORMAL /HPF
REF LAB TEST METHOD: ABNORMAL
SODIUM SERPL-SCNC: 139 MMOL/L (ref 136–145)
SP GR UR STRIP: 1.01 (ref 1–1.03)
SQUAMOUS #/AREA URNS HPF: ABNORMAL /HPF
UROBILINOGEN UR QL STRIP: ABNORMAL
WBC # UR STRIP: ABNORMAL /HPF
WBC NRBC COR # BLD: 6.95 10*3/MM3 (ref 3.4–10.8)
WHOLE BLOOD HOLD COAG: NORMAL
WHOLE BLOOD HOLD SPECIMEN: NORMAL

## 2022-05-14 PROCEDURE — 96375 TX/PRO/DX INJ NEW DRUG ADDON: CPT

## 2022-05-14 PROCEDURE — 83690 ASSAY OF LIPASE: CPT | Performed by: EMERGENCY MEDICINE

## 2022-05-14 PROCEDURE — 81001 URINALYSIS AUTO W/SCOPE: CPT | Performed by: NURSE PRACTITIONER

## 2022-05-14 PROCEDURE — 25010000002 ONDANSETRON PER 1 MG: Performed by: NURSE PRACTITIONER

## 2022-05-14 PROCEDURE — 74177 CT ABD & PELVIS W/CONTRAST: CPT

## 2022-05-14 PROCEDURE — 80053 COMPREHEN METABOLIC PANEL: CPT | Performed by: EMERGENCY MEDICINE

## 2022-05-14 PROCEDURE — 85025 COMPLETE CBC W/AUTO DIFF WBC: CPT | Performed by: EMERGENCY MEDICINE

## 2022-05-14 PROCEDURE — 82274 ASSAY TEST FOR BLOOD FECAL: CPT | Performed by: EMERGENCY MEDICINE

## 2022-05-14 PROCEDURE — 0 IOPAMIDOL PER 1 ML: Performed by: EMERGENCY MEDICINE

## 2022-05-14 PROCEDURE — 99283 EMERGENCY DEPT VISIT LOW MDM: CPT

## 2022-05-14 PROCEDURE — 96374 THER/PROPH/DIAG INJ IV PUSH: CPT

## 2022-05-14 PROCEDURE — 25010000002 KETOROLAC TROMETHAMINE PER 15 MG: Performed by: NURSE PRACTITIONER

## 2022-05-14 RX ORDER — ESOMEPRAZOLE MAGNESIUM 20 MG/1
FOR SUSPENSION ORAL
Qty: 45 EACH | Refills: 0 | Status: SHIPPED | OUTPATIENT
Start: 2022-05-14 | End: 2022-05-20

## 2022-05-14 RX ORDER — ALUMINA, MAGNESIA, AND SIMETHICONE 2400; 2400; 240 MG/30ML; MG/30ML; MG/30ML
15 SUSPENSION ORAL ONCE
Status: COMPLETED | OUTPATIENT
Start: 2022-05-14 | End: 2022-05-14

## 2022-05-14 RX ORDER — ONDANSETRON 4 MG/1
4 TABLET, ORALLY DISINTEGRATING ORAL EVERY 6 HOURS PRN
Qty: 20 TABLET | Refills: 0 | Status: SHIPPED | OUTPATIENT
Start: 2022-05-14 | End: 2022-06-17

## 2022-05-14 RX ORDER — ONDANSETRON 2 MG/ML
4 INJECTION INTRAMUSCULAR; INTRAVENOUS ONCE
Status: COMPLETED | OUTPATIENT
Start: 2022-05-14 | End: 2022-05-14

## 2022-05-14 RX ORDER — KETOROLAC TROMETHAMINE 15 MG/ML
15 INJECTION, SOLUTION INTRAMUSCULAR; INTRAVENOUS ONCE
Status: COMPLETED | OUTPATIENT
Start: 2022-05-14 | End: 2022-05-14

## 2022-05-14 RX ORDER — PANTOPRAZOLE SODIUM 40 MG/10ML
80 INJECTION, POWDER, LYOPHILIZED, FOR SOLUTION INTRAVENOUS ONCE
Status: COMPLETED | OUTPATIENT
Start: 2022-05-14 | End: 2022-05-14

## 2022-05-14 RX ORDER — LIDOCAINE HYDROCHLORIDE 20 MG/ML
15 SOLUTION OROPHARYNGEAL ONCE
Status: COMPLETED | OUTPATIENT
Start: 2022-05-14 | End: 2022-05-14

## 2022-05-14 RX ORDER — SODIUM CHLORIDE 0.9 % (FLUSH) 0.9 %
10 SYRINGE (ML) INJECTION AS NEEDED
Status: DISCONTINUED | OUTPATIENT
Start: 2022-05-14 | End: 2022-05-14 | Stop reason: HOSPADM

## 2022-05-14 RX ADMIN — KETOROLAC TROMETHAMINE 15 MG: 15 INJECTION, SOLUTION INTRAMUSCULAR; INTRAVENOUS at 10:06

## 2022-05-14 RX ADMIN — SODIUM CHLORIDE 1000 ML: 9 INJECTION, SOLUTION INTRAVENOUS at 10:02

## 2022-05-14 RX ADMIN — ONDANSETRON 4 MG: 2 INJECTION INTRAMUSCULAR; INTRAVENOUS at 10:05

## 2022-05-14 RX ADMIN — IOPAMIDOL 100 ML: 755 INJECTION, SOLUTION INTRAVENOUS at 12:13

## 2022-05-14 RX ADMIN — PANTOPRAZOLE SODIUM 80 MG: 40 INJECTION, POWDER, FOR SOLUTION INTRAVENOUS at 10:04

## 2022-05-14 RX ADMIN — ALUMINUM HYDROXIDE, MAGNESIUM HYDROXIDE, AND DIMETHICONE 15 ML: 400; 400; 40 SUSPENSION ORAL at 10:00

## 2022-05-14 RX ADMIN — LIDOCAINE HYDROCHLORIDE 15 ML: 20 SOLUTION ORAL at 10:00

## 2022-05-14 NOTE — ED PROVIDER NOTES
Subjective   Pt to ED with complaint of epigastric pain/burning with nausea and decreased appetite for about 2 weeks. Was seen at  5 days ago and given Famotidine and Sucralfate which she states has not helped at all. She came to the ED yesterday evening and triage labs were drawn, but due to the wait she left without being seen. She is awake and alert and in no acute distress.      History provided by:  Patient   used: No        Review of Systems   Constitutional: Negative.  Negative for fever.   HENT: Negative.    Eyes: Negative.    Respiratory: Negative.    Cardiovascular: Negative.  Negative for chest pain.   Gastrointestinal: Positive for abdominal pain and nausea. Negative for abdominal distention, constipation, diarrhea, rectal pain and vomiting.   Endocrine: Negative.    Genitourinary: Negative.    Musculoskeletal: Negative.    Skin: Negative.    Allergic/Immunologic: Negative.    Neurological: Negative.  Negative for light-headedness and headaches.   Hematological: Negative.    Psychiatric/Behavioral: Negative.        Past Medical History:   Diagnosis Date   • Anxiety    • Depression    • Fracture of patella 09/12/2017   • Hyperlipemia    • Osteopenia    • Panic disorder        No Known Allergies    Past Surgical History:   Procedure Laterality Date   • COLONOSCOPY  1/812019    last scope 2020   • COLONOSCOPY N/A 10/29/2021    Procedure: COLONOSCOPY with possible biopies.;  Surgeon: Skyler Fuller MD;  Location: [facility] ENDOSCOPY;  Service: General;  Laterality: N/A;   • KNEE SURGERY  2017    broken knee       Family History   Problem Relation Age of Onset   • Stroke Mother    • Heart disease Mother    • Cancer Brother    • Seizures Son        Social History     Socioeconomic History   • Marital status:    Tobacco Use   • Smoking status: Never Smoker   • Smokeless tobacco: Never Used   Vaping Use   • Vaping Use: Never used   Substance and Sexual Activity   • Alcohol use: Not  Currently   • Drug use: Never   • Sexual activity: Not Currently           Objective   Physical Exam  Vitals and nursing note reviewed.   Constitutional:       Appearance: Normal appearance. She is normal weight.   HENT:      Head: Normocephalic and atraumatic.      Nose: Nose normal.      Mouth/Throat:      Mouth: Mucous membranes are moist.   Eyes:      Pupils: Pupils are equal, round, and reactive to light.   Cardiovascular:      Rate and Rhythm: Normal rate and regular rhythm.      Pulses: Normal pulses.   Pulmonary:      Effort: Pulmonary effort is normal.      Breath sounds: Normal breath sounds.   Abdominal:      General: Abdomen is flat. Bowel sounds are normal.      Palpations: Abdomen is soft.      Tenderness: There is abdominal tenderness in the epigastric area.   Musculoskeletal:         General: Normal range of motion.      Cervical back: Normal range of motion.   Skin:     General: Skin is warm and dry.      Capillary Refill: Capillary refill takes less than 2 seconds.   Neurological:      General: No focal deficit present.      Mental Status: She is alert and oriented to person, place, and time. Mental status is at baseline.   Psychiatric:         Mood and Affect: Mood normal.         Procedures           ED Course                                                 MDM  Number of Diagnoses or Management Options  Gastroesophageal reflux disease without esophagitis: new and requires workup  Lower GI bleeding: new and requires workup  Diagnosis management comments: The patient is resting comfortably and feels better, is alert and in no distress. Repeat examination is unremarkable and benign; in particular, there's no discomfort at McBurney's point and there is no pulsatile mass. The history, exam, diagnostic testing, and current condition does not suggest acute appendicitis, bowel obstruction, acute cholecystitis, bowel perforation, major gastrointestinal bleeding, severe diverticulitis, abdominal aortic  aneurysm, mesenteric ischemia, volvulus, sepsis, or other significant pathology that warrants further testing, continued ED treatment, admission, for surgical evaluation at this point. The vital signs have been stable. The patient does not have uncontrollable pain, intractable vomiting, or other significant symptoms. The patient's condition is stable and appropriate for discharge from the emergency department.       Amount and/or Complexity of Data Reviewed  Clinical lab tests: reviewed and ordered  Tests in the radiology section of CPT®: reviewed and ordered    Risk of Complications, Morbidity, and/or Mortality  Presenting problems: low  Diagnostic procedures: low  Management options: low    Patient Progress  Patient progress: stable      Final diagnoses:   Gastroesophageal reflux disease without esophagitis   Lower GI bleeding       ED Disposition  ED Disposition     ED Disposition   Discharge    Condition   Stable    Comment   --             Kvng Ramirez, APRN  [address]  [facility] [address]  [phone]    Schedule an appointment as soon as possible for a visit       Silvio Hidalgo MD  [address]  [facility] [address]  [phone]    Schedule an appointment as soon as possible for a visit            Medication List      New Prescriptions    esomeprazole 20 MG packet  Commonly known as: NexIUM  Take 40 mg by mouth Every Morning Before Breakfast for 15 days, THEN 20 mg Every Morning Before Breakfast for 15 days.  Start taking on: May 14, 2022     ondansetron ODT 4 MG disintegrating tablet  Commonly known as: ZOFRAN-ODT  Place 1 tablet on the tongue Every 6 (Six) Hours As Needed for Nausea or Vomiting.           Where to Get Your Medications      These medications were sent to [id number] - [place] - [address] AT [facility] [phone] [facility] [phone] FX  [address] [place] [address]    Hours: 24-hours  Phone: [phone]   · esomeprazole 20 MG packet  · ondansetron ODT 4 MG disintegrating tablet          Lilliana Escalona, APRN  05/14/22 6423

## 2022-05-14 NOTE — DISCHARGE INSTRUCTIONS
Take frequent sips of fluids, refer to the provided pt education sheets regarding food choices for GERD as well as GI bleeding. Stop taking the Famotodine and start taking the Esomeprazole as prescribed, You may also take the ondansetron for nausea as needed. Your CT was normal today. Although you cannot see it, you were found to have evidence of some bleeding in your bowels. You will need to follow up with gastroenterology for this, as you may need a colonoscopy for that as well as an EGD if the current medications do not improve your epigastric pain. Follow up with your PCP as soon as is feasible. Return to the ED for any worsening or new symptoms of concern.

## 2022-05-18 ENCOUNTER — OFFICE VISIT (OUTPATIENT)
Dept: FAMILY MEDICINE CLINIC | Facility: CLINIC | Age: 67
End: 2022-05-18

## 2022-05-18 VITALS
OXYGEN SATURATION: 98 % | WEIGHT: 142.25 LBS | BODY MASS INDEX: 24.28 KG/M2 | HEART RATE: 77 BPM | HEIGHT: 64 IN | TEMPERATURE: 97.8 F | SYSTOLIC BLOOD PRESSURE: 116 MMHG | DIASTOLIC BLOOD PRESSURE: 78 MMHG

## 2022-05-18 DIAGNOSIS — R10.30 LOWER ABDOMINAL PAIN: ICD-10-CM

## 2022-05-18 DIAGNOSIS — K21.9 GASTROESOPHAGEAL REFLUX DISEASE, UNSPECIFIED WHETHER ESOPHAGITIS PRESENT: Primary | ICD-10-CM

## 2022-05-18 PROCEDURE — 99214 OFFICE O/P EST MOD 30 MIN: CPT | Performed by: NURSE PRACTITIONER

## 2022-05-18 RX ORDER — ZOLPIDEM TARTRATE 10 MG/1
10 TABLET ORAL DAILY
COMMUNITY
Start: 2022-04-29 | End: 2022-05-20 | Stop reason: SDUPTHER

## 2022-05-18 NOTE — PROGRESS NOTES
Chief Complaint  Follow-up (ER/ Acid reflux)    Subjective          Medical History: has a past medical history of Acid reflux (2022), Anxiety, Depression, Fracture of patella (09/12/2017), Hyperlipemia, Osteopenia, and Panic disorder.     Surgical History: has a past surgical history that includes Knee surgery (2017); Colonoscopy (1/812019); and Colonoscopy (N/A, 10/29/2021).     Family History: family history includes Cancer in her brother; Heart disease in her mother; Seizures in her son; Stroke in her mother.     Social History: reports that she has never smoked. She has never used smokeless tobacco. She reports previous alcohol use. She reports that she does not use drugs.    Shabana Ferguson presents to [facility]  Patient is a 66-year-old female who comes in today for follow-up.  Patient was started on Pristiq about 6 weeks ago.  Per her spouse she was only on it for about 2 to 3 days when she started develop some abdominal concerns and complaints.  She was having mid upper abdominal pain.  Pristiq was discontinued but abdominal pain continued so 5/9/2022 patient went to urgent care.  She was started on Pepcid and Carafate.  Patient states she continued to have abdominal pain to the point on 5/14/2022 she went to the emergency department to seek treatment.  Lab work was completed, CT of abdomen and pelvis was completed.  All test were unremarkable and showed no acute issues.  It was recommended that patient should switch over to Nexium, it was thought that maybe the Pepcid was not strong enough for her mid epigastric abdominal pain.  Insurance would not pay for Pepcid so patient continued on the Pepcid and Carafate, and does feel like the pain is improving.  Patient states that since she has had the CT abdomen and pelvis she has developed some abdominal cramping and gas and bloating.  She has been passing gas and is feeling better with the passing of gas.  Patient has had  "colonoscopy November 2021 per Dr. Fuller it showed hemorrhoids otherwise normal.  Patient continues to be on the Carafate and Pepcid at this time.    Abdominal Pain  This is a new problem. The current episode started 1 to 4 weeks ago. The problem occurs intermittently. The pain is located in the epigastric region. The pain is moderate. The abdominal pain radiates to the LLQ. Associated symptoms include flatus. The pain is relieved by passing flatus and recumbency. She has tried H2 blockers (Carafate) for the symptoms. Prior diagnostic workup includes CT scan. Diverticulosis       Objective   Vital Signs:   /78   Pulse 77   Temp 97.8 °F (36.6 °C)   Ht 162.6 cm (64\")   Wt 64.5 kg (142 lb 4 oz)   SpO2 98%   BMI 24.42 kg/m²     Physical Exam  Vitals reviewed.   Constitutional:       Appearance: Normal appearance. She is well-developed.   HENT:      Head: Normocephalic and atraumatic.   Eyes:      Conjunctiva/sclera: Conjunctivae normal.      Pupils: Pupils are equal, round, and reactive to light.   Cardiovascular:      Rate and Rhythm: Normal rate and regular rhythm.      Heart sounds: No murmur heard.    No friction rub.   Pulmonary:      Effort: Pulmonary effort is normal.      Breath sounds: Normal breath sounds. No wheezing or rhonchi.   Abdominal:      General: Bowel sounds are normal. There is no distension.      Palpations: Abdomen is soft.      Tenderness: There is abdominal tenderness (left lower quad).   Skin:     General: Skin is warm and dry.   Neurological:      Mental Status: She is alert and oriented to person, place, and time.   Psychiatric:         Mood and Affect: Mood and affect normal.         Behavior: Behavior normal.         Thought Content: Thought content normal.         Judgment: Judgment normal.        Result Review :   The following data was reviewed by: MARIE Weinberg on 05/18/2022:  Common labs    Common Labsle 10/15/21 10/15/21 10/15/21 5/13/22 5/13/22 5/14/22 " 5/14/22    1159 1159 1159 1715 1715 1015 1015   Glucose  96   116 (A)  109 (A)   BUN  14   10  10   Creatinine  0.75   0.82  0.77   eGFR Non African Am  77        Sodium  139   137  139   Potassium  4.4   4.3  3.9   Chloride  103   101  103   Calcium  9.6   10.0  9.7   Albumin  4.70   4.50  4.20   Total Bilirubin  1.0   0.9  1.1   Alkaline Phosphatase  112   110  107   AST (SGOT)  24   15  15   ALT (SGPT)  20   15  13   WBC 6.03   8.61  6.95    Hemoglobin 13.9   14.0  13.9    Hematocrit 41.7   43.2  41.9    Platelets 301   317  288    Total Cholesterol   197       Triglycerides   162 (A)       HDL Cholesterol   46       LDL Cholesterol    122 (A)       (A) Abnormal value            Data reviewed: Radiologic studies CT abdomen pelvis     Emergency room notes, urgent care notes       Current Outpatient Medications on File Prior to Visit   Medication Sig Dispense Refill   • cholecalciferol (VITAMIN D3) 25 MCG (1000 UT) tablet Take 1,000 Units by mouth Daily.     • Coenzyme Q10 (CO Q10 PO) Take  by mouth.     • escitalopram (LEXAPRO) 10 MG tablet Take 1 tablet by mouth Daily. 30 tablet 2   • famotidine (PEPCID) 40 MG tablet Take 1 tablet by mouth At Night As Needed for Indigestion or Heartburn. 30 tablet 0   • GARLIC PO garlic 1,000 mg oral capsule take 1 capsule by oral route daily   Active     • Glucosamine Sulfate 500 MG tablet Glucosamine 500 mg oral tablet take 1 tablet by oral route daily   Active     • Inositol 500 MG tablet Take  by mouth.     • LORazepam (ATIVAN) 1 MG tablet TAKE 1 TABLETS BY MOUTH TWICE DAILY AS NEEDED FOR ANXIETY     • MAGNESIUM PO Take  by mouth.     • Multiple Vitamins-Iron (MULTIVITAMIN PLUS IRON ADULT PO) Women's Multivitamin 18 mg iron-400 mcg-500 mg oral tablet take 1 tablet by oral route daily   Active     • Omega-3 Fatty Acids (fish oil) 1000 MG capsule capsule Fish Oil 1,000 mg (120 mg-180 mg) oral capsule take 1 capsule by oral route daily   Suspended     • ondansetron ODT  (ZOFRAN-ODT) 4 MG disintegrating tablet Place 1 tablet on the tongue Every 6 (Six) Hours As Needed for Nausea or Vomiting. 20 tablet 0   • Probiotic Product (PROBIOTIC PO) Take  by mouth.     • sucralfate (CARAFATE) 1 GM/10ML suspension Take 10 mL by mouth 4 (Four) Times a Day With Meals & at Bedtime for 14 days. 560 mL 0   • TURMERIC PO Take  by mouth.     • vitamin E 400 UNIT capsule vitamin E 400 unit oral capsule take 1 capsule by oral route daily   Active     • zolpidem (AMBIEN) 10 MG tablet Take 10 mg by mouth Daily.     • Calcium Carb-Cholecalciferol (OYSCO 500 + D) 500-200 MG-UNIT tablet      • esomeprazole (NexIUM) 20 MG packet Take 40 mg by mouth Every Morning Before Breakfast for 15 days, THEN 20 mg Every Morning Before Breakfast for 15 days. 45 each 0   • Multiple Vitamins-Minerals (ZINC PO) Take  by mouth.     • traZODone (DESYREL) 50 MG tablet Take 2 tablets by mouth Every Night. 60 tablet 2     No current facility-administered medications on file prior to visit.        Assessment and Plan    Diagnoses and all orders for this visit:    1. Gastroesophageal reflux disease, unspecified whether esophagitis present (Primary)  Comments:  Patient would like to hold on gastroenterology, is agreeable to refer over for EGD.  Encourage patient to continue on Pepcid and Carafate.  Discussed strict return precautions.  Patient and spouse verbalized understanding is agreeable with current treatment plan.  Orders:  -     Ambulatory referral for Screening EGD        Follow Up   Return in about 6 weeks (around 6/29/2022) for Recheck.  Patient was given instructions and counseling regarding her condition or for health maintenance advice. Please see specific information pulled into the AVS if appropriate.

## 2022-05-20 ENCOUNTER — OFFICE VISIT (OUTPATIENT)
Dept: PSYCHIATRY | Facility: CLINIC | Age: 67
End: 2022-05-20

## 2022-05-20 VITALS
DIASTOLIC BLOOD PRESSURE: 77 MMHG | WEIGHT: 143.8 LBS | HEIGHT: 64 IN | BODY MASS INDEX: 24.55 KG/M2 | SYSTOLIC BLOOD PRESSURE: 106 MMHG

## 2022-05-20 DIAGNOSIS — F51.05 INSOMNIA DUE TO MENTAL CONDITION: ICD-10-CM

## 2022-05-20 DIAGNOSIS — F33.2 SEVERE EPISODE OF RECURRENT MAJOR DEPRESSIVE DISORDER, WITHOUT PSYCHOTIC FEATURES: Primary | ICD-10-CM

## 2022-05-20 DIAGNOSIS — F41.0 PANIC ATTACKS: ICD-10-CM

## 2022-05-20 DIAGNOSIS — F41.1 GENERALIZED ANXIETY DISORDER: ICD-10-CM

## 2022-05-20 PROCEDURE — 99214 OFFICE O/P EST MOD 30 MIN: CPT | Performed by: STUDENT IN AN ORGANIZED HEALTH CARE EDUCATION/TRAINING PROGRAM

## 2022-05-20 PROCEDURE — 90833 PSYTX W PT W E/M 30 MIN: CPT | Performed by: STUDENT IN AN ORGANIZED HEALTH CARE EDUCATION/TRAINING PROGRAM

## 2022-05-20 RX ORDER — ZOLPIDEM TARTRATE 10 MG/1
10 TABLET ORAL DAILY
Qty: 30 TABLET | Refills: 2 | Status: SHIPPED | OUTPATIENT
Start: 2022-05-20 | End: 2022-06-17 | Stop reason: SDDI

## 2022-05-20 RX ORDER — SUVOREXANT 5 MG/1
5 TABLET, FILM COATED ORAL NIGHTLY
Qty: 30 TABLET | Refills: 2 | Status: SHIPPED | OUTPATIENT
Start: 2022-05-20 | End: 2022-06-09

## 2022-05-20 NOTE — PATIENT INSTRUCTIONS
1.  Please return to clinic at your next scheduled visit.  Contact the clinic ([phone]) at least 24 hours prior in the event you need to cancel.  2.  Do no harm to yourself or others.    3.  Avoid alcohol and drugs.    4.  Take all medications as prescribed.  Please contact the clinic with any concerns. If you are in need of medication refills, please call the clinic at [phone].    5. Should you want to get in touch with your provider, Dr. Vinny Krishnamurthy, please utilize [place] or contact the office ([phone]), and staff will be able to page Dr. Krishnamurthy directly.  6.  In the event you have personal crisis, contact the following crisis numbers: Suicide Prevention Hotline 1-911.274.9548; [facility] Helpline 8-363-516-KKYF; [facility] Emergency Room [phone]; text HELLO to 470383; or 248.     SPECIFIC RECOMMENDATIONS:     1.      Medications discussed at this encounter:                   -Stop Ambien and start Belsomra     2.      Psychotherapy recommendations:      3.     Return to clinic: 4 weeks

## 2022-05-20 NOTE — PROGRESS NOTES
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 Smoking status: Never Smoker   • Smokeless tobacco: Never Used   Vaping Use   • Vaping Use: Never used   Substance and Sexual Activity   • Alcohol use: Not Currently   • Drug use: Never   • Sexual activity: Not Currently         Family History   Problem Relation Age of Onset   • Stroke Mother    • Heart disease Mother    • Cancer Brother    • Seizures Son      Review of Systems:  Review of Systems   Constitutional: Negative for diaphoresis.   HENT: Negative for drooling.    Eyes: Negative for visual disturbance.   Respiratory: Negative for cough and shortness of breath.    Cardiovascular: Negative for chest pain, palpitations and leg swelling.   Gastrointestinal: Negative for nausea and vomiting.   Endocrine: Negative for cold intolerance and heat intolerance.   Genitourinary: Negative for difficulty urinating.   Musculoskeletal: Negative for joint swelling.   Allergic/Immunologic: Negative for immunocompromised state.   Neurological: Negative for dizziness, seizures, speech difficulty and numbness.         · Mental Status Exam  · Appearance  · : sitting in a chair with her , good eye contact, normal street clothes, groomed, in person  · Behavior  · : pleasant and cooperative  · Motor  · : No abnormal  · Speech  · : normal  "rhythm, rate, volume, tone, not hyperverbal, not pressured, normal prosidy, Speaks with an accent  · Mood  · : \"I am getting better\"  · Affect  · : Mild depression, still on the verge of tears at times, mood congruent, fair variability  · Thought Content  · : negative suicidal ideations, negative homicidal ideations, negative obsessions  · Perceptions  · : negative auditory hallucinations, negative visual hallucinations  · Thought Process  · : linear  · Insight/Judgement  · : fair/fair  · Cognition  · : grossly intact  · Attention  · : intact      Physical Exam:  Physical Exam    Vital Signs:   /77   Ht 162.6 cm (64\")   Wt 65.2 kg (143 lb 12.8 oz)   BMI 24.68 kg/m²      Lab Results:   Admission on 05/14/2022, Discharged on 05/14/2022   Component Date Value Ref Range Status   • Glucose 05/14/2022 109 (A) 65 - 99 mg/dL Final   • BUN 05/14/2022 10  8 - 23 mg/dL Final   • Creatinine 05/14/2022 0.77  0.57 - 1.00 mg/dL Final   • Sodium 05/14/2022 139  136 - 145 mmol/L Final   • Potassium 05/14/2022 3.9  3.5 - 5.2 mmol/L Final   • Chloride 05/14/2022 103  98 - 107 mmol/L Final   • CO2 05/14/2022 24.2  22.0 - 29.0 mmol/L Final   • Calcium 05/14/2022 9.7  8.6 - 10.5 mg/dL Final   • Total Protein 05/14/2022 7.1  6.0 - 8.5 g/dL Final   • Albumin 05/14/2022 4.20  3.50 - 5.20 g/dL Final   • ALT (SGPT) 05/14/2022 13  1 - 33 U/L Final   • AST (SGOT) 05/14/2022 15  1 - 32 U/L Final   • Alkaline Phosphatase 05/14/2022 107  39 - 117 U/L Final   • Total Bilirubin 05/14/2022 1.1  0.0 - 1.2 mg/dL Final   • Globulin 05/14/2022 2.9  gm/dL Final   • A/G Ratio 05/14/2022 1.4  g/dL Final   • BUN/Creatinine Ratio 05/14/2022 13.0  7.0 - 25.0 Final   • Anion Gap 05/14/2022 11.8  5.0 - 15.0 mmol/L Final   • eGFR 05/14/2022 84.7  >60.0 mL/min/1.73 Final    National Kidney Foundation and American Society of Nephrology (ASN) Task Force recommended calculation based on the Chronic Kidney Disease Epidemiology Collaboration (CKD-EPI) equation " refit without adjustment for race.   • Lipase 05/14/2022 47  13 - 60 U/L Final   • Extra Tube 05/14/2022 Hold for add-ons.   Final    Auto resulted.   • Extra Tube 05/14/2022 hold for add-on   Final    Auto resulted   • Extra Tube 05/14/2022 Hold for add-ons.   Final    Auto resulted.   • Extra Tube 05/14/2022 Hold for add-ons.   Final    Auto resulted   • WBC 05/14/2022 6.95  3.40 - 10.80 10*3/mm3 Final   • RBC 05/14/2022 4.53  3.77 - 5.28 10*6/mm3 Final   • Hemoglobin 05/14/2022 13.9  12.0 - 15.9 g/dL Final   • Hematocrit 05/14/2022 41.9  34.0 - 46.6 % Final   • MCV 05/14/2022 92.5  79.0 - 97.0 fL Final   • MCH 05/14/2022 30.7  26.6 - 33.0 pg Final   • MCHC 05/14/2022 33.2  31.5 - 35.7 g/dL Final   • RDW 05/14/2022 14.2  12.3 - 15.4 % Final   • RDW-SD 05/14/2022 47.9  37.0 - 54.0 fl Final   • MPV 05/14/2022 8.9  6.0 - 12.0 fL Final   • Platelets 05/14/2022 288  140 - 450 10*3/mm3 Final   • Neutrophil % 05/14/2022 68.0  42.7 - 76.0 % Final   • Lymphocyte % 05/14/2022 22.2  19.6 - 45.3 % Final   • Monocyte % 05/14/2022 8.5  5.0 - 12.0 % Final   • Eosinophil % 05/14/2022 0.4  0.3 - 6.2 % Final   • Basophil % 05/14/2022 0.3  0.0 - 1.5 % Final   • Immature Grans % 05/14/2022 0.6 (A) 0.0 - 0.5 % Final   • Neutrophils, Absolute 05/14/2022 4.73  1.70 - 7.00 10*3/mm3 Final   • Lymphocytes, Absolute 05/14/2022 1.54  0.70 - 3.10 10*3/mm3 Final   • Monocytes, Absolute 05/14/2022 0.59  0.10 - 0.90 10*3/mm3 Final   • Eosinophils, Absolute 05/14/2022 0.03  0.00 - 0.40 10*3/mm3 Final   • Basophils, Absolute 05/14/2022 0.02  0.00 - 0.20 10*3/mm3 Final   • Immature Grans, Absolute 05/14/2022 0.04  0.00 - 0.05 10*3/mm3 Final   • nRBC 05/14/2022 0.0  0.0 - 0.2 /100 WBC Final   • H. pylori IgG 05/13/2022 Negative  Negative, Equivocal Final   • Occult Blood, Fecal by Immunoassay 05/14/2022 Positive (A) Negative Final   • Color, UA 05/14/2022 Yellow  Yellow, Straw Final   • Appearance, UA 05/14/2022 Clear  Clear Final   • pH, UA  05/14/2022 7.0  5.0 - 8.0 Final   • Specific Gravity, UA 05/14/2022 1.010  1.005 - 1.030 Final   • Glucose, UA 05/14/2022 Negative  Negative Final   • Ketones, UA 05/14/2022 Trace (A) Negative Final   • Bilirubin, UA 05/14/2022 Negative  Negative Final   • Blood, UA 05/14/2022 Trace (A) Negative Final   • Protein, UA 05/14/2022 Negative  Negative Final   • Leuk Esterase, UA 05/14/2022 Negative  Negative Final   • Nitrite, UA 05/14/2022 Negative  Negative Final   • Urobilinogen, UA 05/14/2022 0.2 E.U./dL  0.2 - 1.0 E.U./dL Final   • RBC, UA 05/14/2022 0-2 (A) None Seen /HPF Final   • WBC, UA 05/14/2022 None Seen  None Seen /HPF Final    Urine culture not indicated.   • Bacteria, UA 05/14/2022 None Seen  None Seen /HPF Final   • Squamous Epithelial Cells, UA 05/14/2022 0-2  None Seen, 0-2 /HPF Final   • Hyaline Casts, UA 05/14/2022 None Seen  None Seen /LPF Final   • Methodology 05/14/2022 Automated Microscopy   Final       EKG Results:  No orders to display       Imaging Results:  No Images in the past 120 days found..      Assessment & Plan   Diagnoses and all orders for this visit:    1. Severe episode of recurrent major depressive disorder, without psychotic features (HCC) (Primary)    2. Generalized anxiety disorder    3. Panic attacks    4. Insomnia due to mental condition  -     Suvorexant (Belsomra) 5 MG tablet; Take 1 tablet by mouth Every Night.  Dispense: 30 tablet; Refill: 2  -     zolpidem (AMBIEN) 10 MG tablet; Take 1 tablet by mouth Daily.  Dispense: 30 tablet; Refill: 2        Visit Diagnoses:    ICD-10-CM ICD-9-CM   1. Severe episode of recurrent major depressive disorder, without psychotic features (HCC)  F33.2 296.33   2. Generalized anxiety disorder  F41.1 300.02   3. Panic attacks  F41.0 300.01   4. Insomnia due to mental condition  F51.05 300.9     327.02       Presentation most consistent with major depressive disorder in partial remission, FAREED, rare panic attacks.    5/20: Continue Lexapro and  lorazepam.  Try Belsomra in place of Ambien.  19 minutes of supportive psychotherapy with goal to strengthen defenses, promote problems solving, restore adaptive functioning and provide symptom relief. The therapeutic alliance was strengthened to encourage the patient to express their thoughts and feelings. Esteem building was enhanced through praise, reassurance, normalizing and encouragement. Coping skills were enhanced to build distress tolerance skills and emotional regulation. Allowed patient to freely discuss issues without interruption or judgement with unconditional positive regard, active listening skills, and empathy. Provided a safe, confidential environment to facilitate the development of a positive therapeutic relationship and encourage open, honest communication. Assisted patient in identifying risk factors which would indicate the need for higher level of care including thoughts to harm self or others and/or self-harming behavior and encouraged patient to contact this office, call 911, or present to the nearest emergency room should any of these events occur. Assisted patient in processing session content; acknowledged and normalized patient’s thoughts, feelings, and concerns by utilizing a person-centered approach in efforts to build appropriate rapport and a positive therapeutic relationship with open and honest communication. Patient given education on medication side effects, diagnosis/illness and relapse symptoms. Plan to continue supportive psychotherapy in next appointment to provide symptom relief.  Diagnoses: as above  Symptoms: as above  Functional status: Good  Mental Status Exam: as above    Treatment plan: Medication management and supportive psychotherapy  Prognosis: Good  Progress: Some progress since last visit  4 weeks urgent    4/22: Some improvement in anx and dep, but still residual symptoms.  Persistent insomnia. Declines IOP, inpatient admission.  Increase Lexapro and start  trazodone.  17 minutes of supportive psychotherapy with goal to strengthen defenses, promote problems solving, restore adaptive functioning and provide symptom relief. The therapeutic alliance was strengthened to encourage the patient to express their thoughts and feelings. Esteem building was enhanced through praise, reassurance, normalizing and encouragement. Coping skills were enhanced to build distress tolerance skills and emotional regulation. Patient given education on medication side effects, diagnosis/illness and relapse symptoms. Plan to continue supportive psychotherapy in next appointment to provide symptom relief.  Diagnoses: as above  Symptoms: as above  Functional status: fair  Mental Status Exam: as above    Treatment plan: Medication management and supportive psychotherapy  Prognosis: good  Progress: some, minimal      3/24: Focus on lower doses.  Start Pristiq.  Depression with anxious distress versus depression and generalized anxiety disorder.  She only has distractibility, no other symptoms of hypomania.  17 minutes of supportive psychotherapy with goal to strengthen defenses, promote problems solving, restore adaptive functioning and provide symptom relief. The therapeutic alliance was strengthened to encourage the patient to express their thoughts and feelings. Esteem building was enhanced through praise, reassurance, normalizing and encouragement. Coping skills were enhanced to build distress tolerance skills and emotional regulation. Patient given education on medication side effects, diagnosis/illness and relapse symptoms. Plan to continue supportive psychotherapy in next appointment to provide symptom relief.  4 weeks    3/15: Continue lurasidone 40 mg at night, increase Ambien to 10 mg at night.  Continue low-dose Lamictal, plan to increase further.  Continue titrating off of amitriptyline.  17 minutes of supportive psychotherapy with goal to strengthen defenses, promote problems solving,  restore adaptive functioning and provide symptom relief. The therapeutic alliance was strengthened to encourage the patient to express their thoughts and feelings. Esteem building was enhanced through praise, reassurance, normalizing and encouragement. Coping skills were enhanced to build distress tolerance skills and emotional regulation. Patient given education on medication side effects, diagnosis/illness and relapse symptoms. Plan to continue supportive psychotherapy in next appointment to provide symptom relief.  1 week, urgent    3/10: Effexor has now been stopped.  Continue Latuda and Lamictal at present doses.  I will see her back in a few days and then at 6 weeks.  Bipolar 2?  If so, how come she is tolerating amitriptyline in the evenings?  19 minutes of supportive psychotherapy with goal to strengthen defenses, promote problems solving, restore adaptive functioning and provide symptom relief. The therapeutic alliance was strengthened to encourage the patient to express their thoughts and feelings. Esteem building was enhanced through praise, reassurance, normalizing and encouragement. Coping skills were enhanced to build distress tolerance skills and emotional regulation. Patient given education on medication side effects, diagnosis/illness and relapse symptoms. Plan to continue supportive psychotherapy in next appointment to provide symptom relief.  6 weeks    2/15: Still utilizing lorazepam which means the anxiety is not under control.  Increase Effexor.  17 minutes of supportive psychotherapy with goal to strengthen defenses, promote problems solving, restore adaptive functioning and provide symptom relief. The therapeutic alliance was strengthened to encourage the patient to express their thoughts and feelings. Esteem building was enhanced through praise, reassurance, normalizing and encouragement. Coping skills were enhanced to build distress tolerance skills and emotional regulation. Patient given  education on medication side effects, diagnosis/illness and relapse symptoms. Plan to continue supportive psychotherapy in next appointment to provide symptom relief.  4 weeks    1/17: Continued improvement.  No changes, only on effexor 75 mg for 2 weeks.  Tolerating medications without side effects.  17 minutes of supportive psychotherapy with goal to strengthen defenses, promote problems solving, restore adaptive functioning and provide symptom relief. The therapeutic alliance was strengthened to encourage the patient to express their thoughts and feelings. Esteem building was enhanced through praise, reassurance, normalizing and encouragement. Coping skills were enhanced to build distress tolerance skills and emotional regulation. Patient given education on medication side effects, diagnosis/illness and relapse symptoms. Plan to continue supportive psychotherapy in next appointment to provide symptom relief.  4 wks    1/3: Continued improvement. Increase effexor to target residual dep anx. TMS to start 1/6. 17 minutes of supportive psychotherapy with goal to strengthen defenses, promote problems solving, restore adaptive functioning and provide symptom relief. The therapeutic alliance was strengthened to encourage the patient to express their thoughts and feelings. Esteem building was enhanced through praise, reassurance, normalizing and encouragement. Coping skills were enhanced to build distress tolerance skills and emotional regulation. Patient given education on medication side effects, diagnosis/illness and relapse symptoms. Plan to continue supportive psychotherapy in next appointment to provide symptom relief.  4 wks    12/17: Improved.  Continue medication titration as planned.  17 minutes of supportive psychotherapy with goal to strengthen defenses, promote problems solving, restore adaptive functioning and provide symptom relief. The therapeutic alliance was strengthened to encourage the patient to  express their thoughts and feelings. Esteem building was enhanced through praise, reassurance, normalizing and encouragement. Coping skills were enhanced to build distress tolerance skills and emotional regulation. Patient given education on medication side effects, diagnosis/illness and relapse symptoms. Plan to continue supportive psychotherapy in next appointment to provide symptom relief.  See back in 2 weeks on 3 January in the morning    12/13: See back in 1 week. Taper down on amitriptyline, start effexor. 17 minutes of supportive psychotherapy with goal to strengthen defenses, promote problems solving, restore adaptive functioning and provide symptom relief. The therapeutic alliance was strengthened to encourage the patient to express their thoughts and feelings. Esteem building was enhanced through praise, reassurance, normalizing and encouragement. Coping skills were enhanced to build distress tolerance skills and emotional regulation. Patient given education on medication side effects, diagnosis/illness and relapse symptoms. Plan to continue supportive psychotherapy in next appointment to provide symptom relief.  1 wk    11/15: Taper off escitalopram and increase elavil as the latter is helping her depression; patient has been on elavil 25 mg tid in the past with good success. Alternatively, consider switching to effexor. 18 minutes of supportive psychotherapy with goal to strengthen defenses, promote problems solving, restore adaptive functioning and provide symptom relief. The therapeutic alliance was strengthened to encourage the patient to express their thoughts and feelings. Esteem building was enhanced through praise, reassurance, normalizing and encouragement. Coping skills were enhanced to build distress tolerance skills and emotional regulation. Patient given education on medication side effects, diagnosis/illness and relapse symptoms. Plan to continue supportive psychotherapy in next appointment  to provide symptom relief.    11/5: Increase lorazepam, continue celexa and amitriptyline. Consider effexor. 17 minutes of supportive psychotherapy with goal to strengthen defenses, promote problems solving, restore adaptive functioning and provide symptom relief. The therapeutic alliance was strengthened to encourage the patient to express their thoughts and feelings. Esteem building was enhanced through praise, reassurance, normalizing and encouragement. Coping skills were enhanced to build distress tolerance skills and emotional regulation. Patient given education on medication side effects, diagnosis/illness and relapse symptoms. Plan to continue supportive psychotherapy in next appointment to provide symptom relief.  2 wks    10/19: Re-start elavil. Reduce mirtazapine. Re-start therapy. 2 wks.    10/8: Some residual anxiety and depression reemerging.  Increase mirtazapine.  Patient is now doing her visits alone, rather than with her .  4 weeks.    8/27: Doing well on mirtazapine. 6 wks.    7/30: Abilify causing constipation.  Also caused brain zaps.  Discontinue.  Start mirtazapine to target depression, anxiety.  Willing to try, even though she knows it could make her hungrier.  See back in 4 weeks.  Continue therapy.  Longer term, consider discontinuing amitriptyline.    6/1: Doing well from a mental health standpoint, but having constipation from Abilify. No doubt the low-dose of amitriptyline in the evenings is also contributing to the cholinergic blockade leading to constipation. Patient instructed to half the dose of Abilify. Continue other medications as scheduled.    Previous:  No insomnia on the amitriptyline. Consider doxepin as well, trazodone.  Abilify caused constipation and brain zaps.        PLAN:  114. Risk Assessment: Risk of self-harm acutely remain low. Risk factors include a history of suicidal ideation, mood disorder, anxiety disorder, presence of psychosocial stressors such as  colonic dysplasia, COVID-19 pandemic. Protective factors include no history of self-harm or suicide attempts, good social support, willingness to engage in care, improved depressive symptoms. Risk of self-harm chronically also remain low, but could be further elevated in the event of treatment noncompliance and/or AODA.  115. Medications:   a. CONTINUE lexapro 10 mg daily. Risks, benefits, alternatives discussed with patient including GI upset, nausea vomiting diarrhea, theoretical decrease of seizure threshold predisposing the patient to a slightly higher seizure risk, headaches, sexual dysfunction, serotonin syndrome, bleeding risk, increased suicidality in patients 24 years and younger.  After discussion of these risks and benefits, the patient voiced understanding and agreed to proceed.  b. STOP Ambien 10 mg nightly. Risks, benefits, side effects discussed with patient including sedation, dizziness, GI upset, hallucinations, sleepwalking, sleep-eating.  After discussion of these risks and benefits, the patient voiced understanding and agreed to proceed.  c. START belsomra 5 mg nightly. Risks, benefits, side effects discussed with patient including sedation, dizziness, GI upset,  sleepwalking.  After discussion of these risks and benefits, the patient voiced understanding and agreed to proceed.  d. STOP trazodone 50 mg nightly. Risks, benefits, side effects discussed with patient including GI upset, sedation, dizziness/falls risk, grogginess the following day, prolongation of the QTc interval.  After discussion of these risks and benefits, the patient voiced understanding and agreed to proceed.    e. CONTINUE lorazepam 0.25 to 0.5 mg BID PRN. (rarely uses) Risks, benefits, alternatives discussed with patient including GI upset, sedation, dizziness, respiratory depression, falls risk.  After discussion of these risks and benefits, the patient voiced understanding and agreed to proceed.  f. S/P:   i. Mirtazapine 15  "mg, 7.5 mg: ineffective; made anxiety and depression worse.  ii. Abilify: brain zaps and constipation  iii. Seroquel made depression worse  iv. celexa 20 mg daily, ineffective after several months  v. Doxepin caused \"brain zaps\"  vi. Latuda was ineffective.  vii. Amitriptyline 25 mg nightly helpful for sleep but we didn't want her on too many serotonergic meds.  viii. Ambien minimally helpful for insomnia.  ix. Pristiq and effexor worsened anxiety, both low dose.  x. Stopped lamictal for unclear reasons 25 mg.  116. Therapy: continue with [facility] at [facility].  117. Labs/Studies: BMP to monitor sodium levels in February was entirely normal, EKG February shows rate 72, sinus, .  118. Follow Up: 4 weeks, urgent      TREATMENT PLAN/GOALS: Continue supportive psychotherapy efforts and medications as indicated. Treatment and medication options discussed during today's visit. Patient acknowledged and verbally consented to continue with current treatment plan and was educated on the importance of compliance with treatment and follow-up appointments.    MEDICATION ISSUES:  YVETTE reviewed as expected.  Discussed medication options and treatment plan of prescribed medication as well as the risks, benefits, and side effects including potential falls, possible impaired driving and metabolic adversities among others. Patient is agreeable to call the office with any worsening of symptoms or onset of side effects. Patient is agreeable to call 911 or go to the nearest ER should he/she begin having SI/HI. No medication side effects or related complaints today.     MEDS ORDERED DURING VISIT:    Return in about 4 weeks (around 6/17/2022).         This document has been electronically signed by Vinny Krishnamurthy MD  May 20, 2022 12:03 EDT       Part of this note may be an electronic transcription/translation of spoken language to printed text using the Dragon Dictation System.    "

## 2022-05-21 LAB — QT INTERVAL: 422 MS

## 2022-06-02 ENCOUNTER — TELEPHONE (OUTPATIENT)
Dept: FAMILY MEDICINE CLINIC | Facility: CLINIC | Age: 67
End: 2022-06-02

## 2022-06-02 DIAGNOSIS — R56.9 SEIZURE-LIKE ACTIVITY: Primary | ICD-10-CM

## 2022-06-02 NOTE — TELEPHONE ENCOUNTER
Caller: ALESSIO DE JESUS    Relationship to patient: Emergency Contact    Best call back number: [phone]    Patient is needing: PATIENTS  CALLED STATING THE PATIENT IS NEEDING A REFERRAL TO BE SENT TOA NEUROLOGIST, DR ILEANA AVILES, IN [facility]. THE PATIENTS  STATED SHE HAS BEEN HAVING SEIZURE LIKE PANIC ATTACKS AND WOULD LIKE FOR HER TO BE SEEN WITH A NEUROLOIST. THE PATIENTS  WOULD JENNY A CALL BACK TO LET HER KNOW THIS HAS BEEN SENT TO THE OFFICE PLEASE ADVISE THANK YOU.         DR ILEANA AVILES   [address] [place] [address] [place] [address]  [phone]

## 2022-06-09 ENCOUNTER — OFFICE VISIT (OUTPATIENT)
Dept: FAMILY MEDICINE CLINIC | Facility: CLINIC | Age: 67
End: 2022-06-09

## 2022-06-09 VITALS
TEMPERATURE: 97.3 F | OXYGEN SATURATION: 97 % | DIASTOLIC BLOOD PRESSURE: 70 MMHG | SYSTOLIC BLOOD PRESSURE: 112 MMHG | HEIGHT: 64 IN | WEIGHT: 141 LBS | HEART RATE: 73 BPM | BODY MASS INDEX: 24.07 KG/M2

## 2022-06-09 DIAGNOSIS — E78.2 MIXED HYPERLIPIDEMIA: ICD-10-CM

## 2022-06-09 DIAGNOSIS — F33.1 MODERATE EPISODE OF RECURRENT MAJOR DEPRESSIVE DISORDER: ICD-10-CM

## 2022-06-09 DIAGNOSIS — R73.09 ELEVATED GLUCOSE: ICD-10-CM

## 2022-06-09 DIAGNOSIS — Z09 FOLLOW-UP EXAM, 3-6 MONTHS SINCE PREVIOUS EXAM: Primary | ICD-10-CM

## 2022-06-09 LAB
CHOLEST SERPL-MCNC: 265 MG/DL (ref 0–200)
HBA1C MFR BLD: 5.5 % (ref 4.8–5.6)
HDLC SERPL-MCNC: 36 MG/DL (ref 40–60)
LDLC SERPL CALC-MCNC: 159 MG/DL (ref 0–100)
LDLC/HDLC SERPL: 4.32 {RATIO}
TRIGL SERPL-MCNC: 367 MG/DL (ref 0–150)
VLDLC SERPL-MCNC: 70 MG/DL (ref 5–40)

## 2022-06-09 PROCEDURE — 99214 OFFICE O/P EST MOD 30 MIN: CPT | Performed by: NURSE PRACTITIONER

## 2022-06-09 PROCEDURE — 83036 HEMOGLOBIN GLYCOSYLATED A1C: CPT | Performed by: NURSE PRACTITIONER

## 2022-06-09 PROCEDURE — 80061 LIPID PANEL: CPT | Performed by: NURSE PRACTITIONER

## 2022-06-09 RX ORDER — OMEPRAZOLE 20 MG/1
20 TABLET, DELAYED RELEASE ORAL DAILY
Qty: 30 TABLET | Refills: 1 | Status: SHIPPED | OUTPATIENT
Start: 2022-06-09 | End: 2022-08-01

## 2022-06-09 NOTE — PROGRESS NOTES
Chief Complaint  Follow-up and Heartburn    Subjective          Medical History: has a past medical history of Acid reflux (2022), Anxiety, Depression, Fracture of patella (09/12/2017), Hyperlipemia, Osteopenia, and Panic disorder.     Surgical History: has a past surgical history that includes Knee surgery (2017); Colonoscopy (1/812019); and Colonoscopy (N/A, 10/29/2021).     Family History: family history includes Cancer in her brother; Heart disease in her mother; Seizures in her son; Stroke in her mother.     Social History: reports that she has never smoked. She has never used smokeless tobacco. She reports previous alcohol use. She reports that she does not use drugs.    Shabana Ferguson presents to [facility]  Patient is a 66-year-old female has had a long history of anxiety and depression.  Over the years patient's been on multiple medications including Effexor, and Celexa.  Last fall 2021 patient developed worsening anxiety and depression.  She is now currently seeing psychiatry Dr. Krishnamurthy.  She has had major side effects on multiple medications.  Side effects including worsening in anxiety and depression or abdominal pain nausea and vomiting.    Heartburn  She complains of abdominal pain and heartburn. This is a recurrent problem. The current episode started 1 to 4 weeks ago. The problem has been gradually improving. The heartburn does not wake her from sleep. The heartburn does not limit her activity. Exacerbated by: anxiety and depression. Decrease appetite. She has tried a histamine-2 antagonist and a PPI for the symptoms. The treatment provided moderate relief.   Hyperlipidemia  This is a chronic problem. The current episode started more than 1 year ago. Condition status: Has been off the medication since fall of 2021. Current antihyperlipidemic treatment includes statins. Compliance problems: Adherence to medication.        Objective   Vital Signs:   /70    "Pulse 73   Temp 97.3 °F (36.3 °C)   Ht 162.6 cm (64\")   Wt 64 kg (141 lb)   SpO2 97%   BMI 24.20 kg/m²     Physical Exam  Vitals reviewed.   Constitutional:       Appearance: Normal appearance. She is well-developed.   HENT:      Head: Normocephalic and atraumatic.      Right Ear: External ear normal.      Left Ear: External ear normal.   Eyes:      Conjunctiva/sclera: Conjunctivae normal.      Pupils: Pupils are equal, round, and reactive to light.   Cardiovascular:      Rate and Rhythm: Normal rate and regular rhythm.      Heart sounds: No murmur heard.    No friction rub.   Pulmonary:      Effort: Pulmonary effort is normal.      Breath sounds: Normal breath sounds. No wheezing or rhonchi.   Skin:     General: Skin is warm and dry.   Neurological:      Mental Status: She is alert and oriented to person, place, and time.   Psychiatric:         Mood and Affect: Affect normal. Mood is depressed.         Behavior: Behavior normal.         Thought Content: Thought content normal.         Judgment: Judgment normal.        Result Review :   The following data was reviewed by: MARIE Weinberg on 06/09/2022:  Common labs    Common Labsle 10/15/21 10/15/21 10/15/21 5/13/22 5/13/22 5/14/22 5/14/22    1159 1159 1159 1715 1715 1015 1015   Glucose  96   116 (A)  109 (A)   BUN  14   10  10   Creatinine  0.75   0.82  0.77   eGFR Non African Am  77        Sodium  139   137  139   Potassium  4.4   4.3  3.9   Chloride  103   101  103   Calcium  9.6   10.0  9.7   Albumin  4.70   4.50  4.20   Total Bilirubin  1.0   0.9  1.1   Alkaline Phosphatase  112   110  107   AST (SGOT)  24   15  15   ALT (SGPT)  20   15  13   WBC 6.03   8.61  6.95    Hemoglobin 13.9   14.0  13.9    Hematocrit 41.7   43.2  41.9    Platelets 301   317  288    Total Cholesterol   197       Triglycerides   162 (A)       HDL Cholesterol   46       LDL Cholesterol    122 (A)       (A) Abnormal value            Data reviewed: Psychiatry notes, previous " colonoscopy           Current Outpatient Medications on File Prior to Visit   Medication Sig Dispense Refill   • Calcium Carb-Cholecalciferol (OYSCO 500 + D) 500-200 MG-UNIT tablet      • cholecalciferol (VITAMIN D3) 25 MCG (1000 UT) tablet Take 1,000 Units by mouth Daily.     • Coenzyme Q10 (CO Q10 PO) Take  by mouth.     • escitalopram (LEXAPRO) 10 MG tablet Take 1 tablet by mouth Daily. 30 tablet 2   • famotidine (PEPCID) 40 MG tablet Take 1 tablet by mouth At Night As Needed for Indigestion or Heartburn. 30 tablet 0   • GARLIC PO garlic 1,000 mg oral capsule take 1 capsule by oral route daily   Active     • Glucosamine Sulfate 500 MG tablet Glucosamine 500 mg oral tablet take 1 tablet by oral route daily   Active     • LORazepam (ATIVAN) 1 MG tablet TAKE 1 TABLETS BY MOUTH TWICE DAILY AS NEEDED FOR ANXIETY     • MAGNESIUM PO Take  by mouth.     • Multiple Vitamins-Iron (MULTIVITAMIN PLUS IRON ADULT PO) Women's Multivitamin 18 mg iron-400 mcg-500 mg oral tablet take 1 tablet by oral route daily   Active     • Omega-3 Fatty Acids (fish oil) 1000 MG capsule capsule Fish Oil 1,000 mg (120 mg-180 mg) oral capsule take 1 capsule by oral route daily   Suspended     • ondansetron ODT (ZOFRAN-ODT) 4 MG disintegrating tablet Place 1 tablet on the tongue Every 6 (Six) Hours As Needed for Nausea or Vomiting. 20 tablet 0   • Probiotic Product (PROBIOTIC PO) Take  by mouth.     • TURMERIC PO Take  by mouth.     • vitamin E 400 UNIT capsule vitamin E 400 unit oral capsule take 1 capsule by oral route daily   Active     • Inositol 500 MG tablet Take  by mouth.     • zolpidem (AMBIEN) 10 MG tablet Take 1 tablet by mouth Daily. 30 tablet 2   • [DISCONTINUED] Suvorexant (Belsomra) 5 MG tablet Take 1 tablet by mouth Every Night. 30 tablet 2     No current facility-administered medications on file prior to visit.        Assessment and Plan    Diagnoses and all orders for this visit:    1. Follow-up exam, 3-6 months since previous  exam (Primary)    2. Mixed hyperlipidemia  Comments:  We will recheck labs if remain elevated will start back on Lipitor.  Patient is agreeable.  Orders:  -     Lipid panel    3. Elevated glucose  Comments:  We will check A1c to rule out risk of diabetes  Orders:  -     Hemoglobin A1c    4. Moderate episode of recurrent major depressive disorder (HCC)  Comments:  We will check MTHFR mutation, due to patient's intolerance of anxiety and depression medications.  Orders:  -     MTHFR Mutation    Other orders  -     omeprazole OTC (PriLOSEC OTC) 20 MG EC tablet; Take 1 tablet by mouth Daily.  Dispense: 30 tablet; Refill: 1        Follow Up   Return in about 6 months (around 12/9/2022) for Recheck.  Patient was given instructions and counseling regarding her condition or for health maintenance advice. Please see specific information pulled into the AVS if appropriate.

## 2022-06-10 RX ORDER — ATORVASTATIN CALCIUM 20 MG/1
20 TABLET, FILM COATED ORAL DAILY
Qty: 90 TABLET | Refills: 1 | Status: SHIPPED | OUTPATIENT
Start: 2022-06-10 | End: 2022-06-13 | Stop reason: SDUPTHER

## 2022-06-13 DIAGNOSIS — F41.1 GENERALIZED ANXIETY DISORDER: Primary | ICD-10-CM

## 2022-06-13 RX ORDER — ATORVASTATIN CALCIUM 20 MG/1
20 TABLET, FILM COATED ORAL DAILY
Qty: 90 TABLET | Refills: 1 | Status: SHIPPED | OUTPATIENT
Start: 2022-06-13 | End: 2022-12-05

## 2022-06-13 RX ORDER — LORAZEPAM 1 MG/1
1 TABLET ORAL 2 TIMES DAILY PRN
Qty: 60 TABLET | Refills: 2 | Status: SHIPPED | OUTPATIENT
Start: 2022-06-13 | End: 2022-07-11

## 2022-06-13 NOTE — TELEPHONE ENCOUNTER
Pt spouse called medication refill request for Lorazepam 1mg. Upcoming appt on 06/17/2022. Medication order pended.

## 2022-06-16 DIAGNOSIS — F41.0 PANIC ATTACKS: ICD-10-CM

## 2022-06-16 DIAGNOSIS — F51.05 INSOMNIA DUE TO MENTAL CONDITION: ICD-10-CM

## 2022-06-16 DIAGNOSIS — F33.1 MAJOR DEPRESSIVE DISORDER, RECURRENT EPISODE, MODERATE: ICD-10-CM

## 2022-06-16 DIAGNOSIS — F41.1 GENERALIZED ANXIETY DISORDER: ICD-10-CM

## 2022-06-16 LAB — MTHFR GENE MUT ANL BLD/T: NORMAL

## 2022-06-16 RX ORDER — ESCITALOPRAM OXALATE 10 MG/1
10 TABLET ORAL DAILY
Qty: 90 TABLET | Refills: 0 | Status: SHIPPED | OUTPATIENT
Start: 2022-06-16 | End: 2022-09-30 | Stop reason: SDUPTHER

## 2022-06-17 ENCOUNTER — TELEMEDICINE (OUTPATIENT)
Dept: PSYCHIATRY | Facility: CLINIC | Age: 67
End: 2022-06-17

## 2022-06-17 DIAGNOSIS — F51.05 INSOMNIA DUE TO MENTAL CONDITION: ICD-10-CM

## 2022-06-17 DIAGNOSIS — F33.1 MAJOR DEPRESSIVE DISORDER, RECURRENT EPISODE, MODERATE: Primary | ICD-10-CM

## 2022-06-17 DIAGNOSIS — F41.0 PANIC ATTACKS: ICD-10-CM

## 2022-06-17 DIAGNOSIS — K21.00 GASTROESOPHAGEAL REFLUX DISEASE WITH ESOPHAGITIS WITHOUT HEMORRHAGE: ICD-10-CM

## 2022-06-17 DIAGNOSIS — F41.1 GENERALIZED ANXIETY DISORDER: ICD-10-CM

## 2022-06-17 PROCEDURE — 99214 OFFICE O/P EST MOD 30 MIN: CPT | Performed by: STUDENT IN AN ORGANIZED HEALTH CARE EDUCATION/TRAINING PROGRAM

## 2022-06-17 PROCEDURE — 90833 PSYTX W PT W E/M 30 MIN: CPT | Performed by: STUDENT IN AN ORGANIZED HEALTH CARE EDUCATION/TRAINING PROGRAM

## 2022-06-17 RX ORDER — FAMOTIDINE 40 MG/1
40 TABLET, FILM COATED ORAL NIGHTLY PRN
Qty: 30 TABLET | Refills: 0 | Status: SHIPPED | OUTPATIENT
Start: 2022-06-17 | End: 2022-07-14

## 2022-06-17 RX ORDER — BUSPIRONE HYDROCHLORIDE 5 MG/1
5 TABLET ORAL 2 TIMES DAILY
Qty: 60 TABLET | Refills: 2 | Status: SHIPPED | OUTPATIENT
Start: 2022-06-17 | End: 2022-09-30

## 2022-06-17 RX ORDER — LEVOMEFOLATE CALCIUM 7.5 MG
7.5 TABLET ORAL DAILY
Qty: 90 TABLET | Refills: 1 | Status: SHIPPED | OUTPATIENT
Start: 2022-06-17 | End: 2022-06-21 | Stop reason: SDUPTHER

## 2022-06-17 NOTE — PROGRESS NOTES
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 Take 1 tablet by mouth Daily. 30 tablet 1   • Probiotic Product (PROBIOTIC PO) Take  by mouth.     • vitamin E 400 UNIT capsule vitamin E 400 unit oral capsule take 1 capsule by oral route daily   Active     • busPIRone (BUSPAR) 5 MG tablet Take 1 tablet by mouth 2 (Two) Times a Day. 60 tablet 2     No current facility-administered medications for this visit.       Past Medical History:  Past Medical History:   Diagnosis Date   • Acid reflux 2022   • Anxiety    • Depression    • Fracture of patella 09/12/2017   • Hyperlipemia    • Osteopenia    • Panic disorder        Social History     Socioeconomic History   • Marital status:    Tobacco Use   • Smoking status: Never Smoker   • Smokeless tobacco: Never Used   Vaping Use   • Vaping Use: Never used   Substance and Sexual Activity   • Alcohol use: Not Currently   • Drug use: Never   • Sexual activity: Not Currently         Family History   Problem Relation Age of Onset   • Stroke Mother    • Heart disease Mother    • Cancer Brother    • Seizures Son      Review of Systems:  Review of Systems   Constitutional: Negative for diaphoresis.   HENT: Negative for drooling.    Eyes: Negative for visual disturbance.   Respiratory: Negative for cough and  "shortness of breath.    Cardiovascular: Negative for chest pain, palpitations and leg swelling.   Gastrointestinal: Negative for nausea and vomiting.   Endocrine: Negative for cold intolerance and heat intolerance.   Genitourinary: Negative for difficulty urinating.   Musculoskeletal: Negative for joint swelling.   Allergic/Immunologic: Negative for immunocompromised state.   Neurological: Negative for dizziness, seizures, speech difficulty and numbness.         · Mental Status Exam  · Appearance  · : sitting in a chair with her , good eye contact, normal street clothes, groomed, in person  · Behavior  · : pleasant and cooperative  · Motor  · : No abnormal  · Speech  · : normal rhythm, rate, volume, tone, not hyperverbal, not pressured, normal prosidy, Speaks with an accent  · Mood  · : \"all in all better\"  · Affect  · : Mild depression, no tears, mood congruent, fair variability  · Thought Content  · : negative suicidal ideations, negative homicidal ideations, negative obsessions  · Perceptions  · : negative auditory hallucinations, negative visual hallucinations  · Thought Process  · : linear  · Insight/Judgement  · : fair/fair  · Cognition  · : grossly intact  · Attention  · : intact      Physical Exam:  Physical Exam    Vital Signs:   There were no vitals taken for this visit.     Lab Results:   Office Visit on 06/09/2022   Component Date Value Ref Range Status   • Total Cholesterol 06/09/2022 265 (A) 0 - 200 mg/dL Final   • Triglycerides 06/09/2022 367 (A) 0 - 150 mg/dL Final   • HDL Cholesterol 06/09/2022 36 (A) 40 - 60 mg/dL Final   • LDL Cholesterol  06/09/2022 159 (A) 0 - 100 mg/dL Final   • VLDL Cholesterol 06/09/2022 70 (A) 5 - 40 mg/dL Final   • LDL/HDL Ratio 06/09/2022 4.32   Final   • Hemoglobin A1C 06/09/2022 5.50  4.80 - 5.60 % Final   • MTHFR 06/09/2022 Comment   Final    Comment: Result:  c.665C>T (p. [id number]), legacy name: [id number] - Detected, heterozygous  c.1286A>C (p. [id number]), legacy " name: [id number] - Detected,  heterozygous  Interpretation:  This result is not associated with an increased risk for  hyperhomocysteinemia. See Additional Clinical Information and  Comments.  Additional Clinical Information:  Hyperhomocysteinemia is multifactorial involving genetic, clinical,  and environmental risk factors. Reduced enzyme activity of  methylenetetrahydrofolate reductase (MTHFR) is a genetic risk  factor for hyperhomocysteinemia, particularly when serum folate  levels are low. There are two common variants in the MTHFR gene  that can decrease enzyme activity; c.665C>T (p. [id number]), legacy  name [id number], and c.1286A>C (p. [id number]), legacy name [id number]. These  variants do not independently increase risk of conditions related  to hyperhomocysteinemia in the absence of elevated homocysteine  levels. Measurement of total plasma homocysteine is recommended.  Patients                            should share their MTHFR genotype with physicians who are  making decisions regarding chemotherapy treatments that depend on  folate, such as methotrexate.  Guidelines do not recommend genotyping of these two MTHFR variants  in the evaluation of venous thrombosis or obstetric risk due to  limited evidence of clinical utility (PMID: 26825973).  Comments:  Genetic Coordinators are available for health care providers to  discuss results at [phone]).  Test Details:  Variants Analyzed: c.665C>T (p. [id number]), legacy name: [id number] and  c.1286A>C (p. [id number]), legacy name: [id number]  Methods/Limitations:  DNA analysis of the MTHFR gene was performed by PCR amplification  followed by restriction enzyme analysis. The diagnostic sensitivity  is >99%. Results must be combined with clinical information for the  most accurate interpretation. Molecular-based testing is highly  accurate, but as in any laboratory test, diagnostic errors may  occur. False positive or false negative results                            may  occur for  reasons that include genetic variants, blood transfusions, bone  marrow transplantation, somatic or tissue-specific mosaicism,  mislabeled samples, or erroneous representation of family  relationships.  This test was developed and its performance characteristics  determined by [facility]. It has not been cleared or approved by the  Food and Drug Administration.  References:  Warner SE, Jason CJ, Hadley PEREZ. ACMG Practice Guideline: lack of  evidence for MTHFR polymorphism testing. Sarah Med. 2013 Feb;15(2):153-6. doi: 10.1038/gim.2012.165. Epub 2013 Rk 3. PMID:  13790514.  American College of Obstetricians and Gynecologists' Committee on  Practice Bulletins-Obstetrics. ACOG Practice Bulletin No. 197:  Inherited Thrombophilias in Pregnancy. Obstet Gynecol. 2018  Jul;132(1):e18-e34. doi: 10.1097/AOG.[id number]. Erratum in:  Obstet Gynecol. 2018 Oct;132(4):1069. PMID: 54508983.  Rose Mary Michaels, PhD, [facility]  Carlo Fragoso, PhD, [facility]  Hernesto Reese, PhD, FAC  Vishal Waldron, PhD, [facility]  W. Shima Gonzalez, PhD, [facility]  Windy Otto, PhD, [facility]  Carolyn Bean, PhD, [facility]   Admission on 05/14/2022, Discharged on 05/14/2022   Component Date Value Ref Range Status   • Glucose 05/14/2022 109 (A) 65 - 99 mg/dL Final   • BUN 05/14/2022 10  8 - 23 mg/dL Final   • Creatinine 05/14/2022 0.77  0.57 - 1.00 mg/dL Final   • Sodium 05/14/2022 139  136 - 145 mmol/L Final   • Potassium 05/14/2022 3.9  3.5 - 5.2 mmol/L Final   • Chloride 05/14/2022 103  98 - 107 mmol/L Final   • CO2 05/14/2022 24.2  22.0 - 29.0 mmol/L Final   • Calcium 05/14/2022 9.7  8.6 - 10.5 mg/dL Final   • Total Protein 05/14/2022 7.1  6.0 - 8.5 g/dL Final   • Albumin 05/14/2022 4.20  3.50 - 5.20 g/dL Final   • ALT (SGPT) 05/14/2022 13  1 - 33 U/L Final   • AST (SGOT) 05/14/2022 15  1 - 32 U/L Final   • Alkaline Phosphatase 05/14/2022 107  39 - 117 U/L Final   • Total Bilirubin 05/14/2022 1.1  0.0 - 1.2 mg/dL Final   •  Globulin 05/14/2022 2.9  gm/dL Final   • A/G Ratio 05/14/2022 1.4  g/dL Final   • BUN/Creatinine Ratio 05/14/2022 13.0  7.0 - 25.0 Final   • Anion Gap 05/14/2022 11.8  5.0 - 15.0 mmol/L Final   • eGFR 05/14/2022 84.7  >60.0 mL/min/1.73 Final    National Kidney Foundation and American Society of Nephrology (ASN) Task Force recommended calculation based on the Chronic Kidney Disease Epidemiology Collaboration (CKD-EPI) equation refit without adjustment for race.   • Lipase 05/14/2022 47  13 - 60 U/L Final   • Extra Tube 05/14/2022 Hold for add-ons.   Final    Auto resulted.   • Extra Tube 05/14/2022 hold for add-on   Final    Auto resulted   • Extra Tube 05/14/2022 Hold for add-ons.   Final    Auto resulted.   • Extra Tube 05/14/2022 Hold for add-ons.   Final    Auto resulted   • WBC 05/14/2022 6.95  3.40 - 10.80 10*3/mm3 Final   • RBC 05/14/2022 4.53  3.77 - 5.28 10*6/mm3 Final   • Hemoglobin 05/14/2022 13.9  12.0 - 15.9 g/dL Final   • Hematocrit 05/14/2022 41.9  34.0 - 46.6 % Final   • MCV 05/14/2022 92.5  79.0 - 97.0 fL Final   • MCH 05/14/2022 30.7  26.6 - 33.0 pg Final   • MCHC 05/14/2022 33.2  31.5 - 35.7 g/dL Final   • RDW 05/14/2022 14.2  12.3 - 15.4 % Final   • RDW-SD 05/14/2022 47.9  37.0 - 54.0 fl Final   • MPV 05/14/2022 8.9  6.0 - 12.0 fL Final   • Platelets 05/14/2022 288  140 - 450 10*3/mm3 Final   • Neutrophil % 05/14/2022 68.0  42.7 - 76.0 % Final   • Lymphocyte % 05/14/2022 22.2  19.6 - 45.3 % Final   • Monocyte % 05/14/2022 8.5  5.0 - 12.0 % Final   • Eosinophil % 05/14/2022 0.4  0.3 - 6.2 % Final   • Basophil % 05/14/2022 0.3  0.0 - 1.5 % Final   • Immature Grans % 05/14/2022 0.6 (A) 0.0 - 0.5 % Final   • Neutrophils, Absolute 05/14/2022 4.73  1.70 - 7.00 10*3/mm3 Final   • Lymphocytes, Absolute 05/14/2022 1.54  0.70 - 3.10 10*3/mm3 Final   • Monocytes, Absolute 05/14/2022 0.59  0.10 - 0.90 10*3/mm3 Final   • Eosinophils, Absolute 05/14/2022 0.03  0.00 - 0.40 10*3/mm3 Final   • Basophils, Absolute  05/14/2022 0.02  0.00 - 0.20 10*3/mm3 Final   • Immature Grans, Absolute 05/14/2022 0.04  0.00 - 0.05 10*3/mm3 Final   • nRBC 05/14/2022 0.0  0.0 - 0.2 /100 WBC Final   • H. pylori IgG 05/13/2022 Negative  Negative, Equivocal Final   • Occult Blood, Fecal by Immunoassay 05/14/2022 Positive (A) Negative Final   • Color, UA 05/14/2022 Yellow  Yellow, Straw Final   • Appearance, UA 05/14/2022 Clear  Clear Final   • pH, UA 05/14/2022 7.0  5.0 - 8.0 Final   • Specific Gravity, UA 05/14/2022 1.010  1.005 - 1.030 Final   • Glucose, UA 05/14/2022 Negative  Negative Final   • Ketones, UA 05/14/2022 Trace (A) Negative Final   • Bilirubin, UA 05/14/2022 Negative  Negative Final   • Blood, UA 05/14/2022 Trace (A) Negative Final   • Protein, UA 05/14/2022 Negative  Negative Final   • Leuk Esterase, UA 05/14/2022 Negative  Negative Final   • Nitrite, UA 05/14/2022 Negative  Negative Final   • Urobilinogen, UA 05/14/2022 0.2 E.U./dL  0.2 - 1.0 E.U./dL Final   • RBC, UA 05/14/2022 0-2 (A) None Seen /HPF Final   • WBC, UA 05/14/2022 None Seen  None Seen /HPF Final    Urine culture not indicated.   • Bacteria, UA 05/14/2022 None Seen  None Seen /HPF Final   • Squamous Epithelial Cells, UA 05/14/2022 0-2  None Seen, 0-2 /HPF Final   • Hyaline Casts, UA 05/14/2022 None Seen  None Seen /LPF Final   • Methodology 05/14/2022 Automated Microscopy   Final       EKG Results:  No orders to display       Imaging Results:  No Images in the past 120 days found..      Assessment & Plan   Diagnoses and all orders for this visit:    1. Major depressive disorder, recurrent episode, moderate (HCC) (Primary)  -     busPIRone (BUSPAR) 5 MG tablet; Take 1 tablet by mouth 2 (Two) Times a Day.  Dispense: 60 tablet; Refill: 2    2. Generalized anxiety disorder  -     busPIRone (BUSPAR) 5 MG tablet; Take 1 tablet by mouth 2 (Two) Times a Day.  Dispense: 60 tablet; Refill: 2    3. Panic attacks  -     busPIRone (BUSPAR) 5 MG tablet; Take 1 tablet by mouth 2  (Two) Times a Day.  Dispense: 60 tablet; Refill: 2    4. Insomnia due to mental condition  -     busPIRone (BUSPAR) 5 MG tablet; Take 1 tablet by mouth 2 (Two) Times a Day.  Dispense: 60 tablet; Refill: 2        Visit Diagnoses:    ICD-10-CM ICD-9-CM   1. Major depressive disorder, recurrent episode, moderate (HCC)  F33.1 296.32   2. Generalized anxiety disorder  F41.1 300.02   3. Panic attacks  F41.0 300.01   4. Insomnia due to mental condition  F51.05 300.9     327.02       Presentation most consistent with major depressive disorder in partial remission, FAREED, rare panic attacks.    6/17: start buspar, doing better. 17 minutes of supportive psychotherapy with goal to strengthen defenses, promote problems solving, restore adaptive functioning and provide symptom relief. The therapeutic alliance was strengthened to encourage the patient to express their thoughts and feelings. Esteem building was enhanced through praise, reassurance, normalizing and encouragement. Coping skills were enhanced to build distress tolerance skills and emotional regulation. Allowed patient to freely discuss issues without interruption or judgement with unconditional positive regard, active listening skills, and empathy. Provided a safe, confidential environment to facilitate the development of a positive therapeutic relationship and encourage open, honest communication. Assisted patient in identifying risk factors which would indicate the need for higher level of care including thoughts to harm self or others and/or self-harming behavior and encouraged patient to contact this office, call 911, or present to the nearest emergency room should any of these events occur. Assisted patient in processing session content; acknowledged and normalized patient’s thoughts, feelings, and concerns by utilizing a person-centered approach in efforts to build appropriate rapport and a positive therapeutic relationship with open and honest communication.  Patient given education on medication side effects, diagnosis/illness and relapse symptoms. Plan to continue supportive psychotherapy in next appointment to provide symptom relief.  Diagnoses: as above  Symptoms: as above  Functional status: good  Mental Status Exam: as above    Treatment plan: Medication management and supportive psychotherapy  Prognosis: good  Progress: improving slowly  4 wks      5/20: Continue Lexapro and lorazepam.  Try Belsomra in place of Ambien.  19 minutes of supportive psychotherapy with goal to strengthen defenses, promote problems solving, restore adaptive functioning and provide symptom relief. The therapeutic alliance was strengthened to encourage the patient to express their thoughts and feelings. Esteem building was enhanced through praise, reassurance, normalizing and encouragement. Coping skills were enhanced to build distress tolerance skills and emotional regulation. Allowed patient to freely discuss issues without interruption or judgement with unconditional positive regard, active listening skills, and empathy. Provided a safe, confidential environment to facilitate the development of a positive therapeutic relationship and encourage open, honest communication. Assisted patient in identifying risk factors which would indicate the need for higher level of care including thoughts to harm self or others and/or self-harming behavior and encouraged patient to contact this office, call 911, or present to the nearest emergency room should any of these events occur. Assisted patient in processing session content; acknowledged and normalized patient’s thoughts, feelings, and concerns by utilizing a person-centered approach in efforts to build appropriate rapport and a positive therapeutic relationship with open and honest communication. Patient given education on medication side effects, diagnosis/illness and relapse symptoms. Plan to continue supportive psychotherapy in next  appointment to provide symptom relief.  Diagnoses: as above  Symptoms: as above  Functional status: Good  Mental Status Exam: as above    Treatment plan: Medication management and supportive psychotherapy  Prognosis: Good  Progress: Some progress since last visit  4 weeks urgent    4/22: Some improvement in anx and dep, but still residual symptoms.  Persistent insomnia. Declines IOP, inpatient admission.  Increase Lexapro and start trazodone.  17 minutes of supportive psychotherapy with goal to strengthen defenses, promote problems solving, restore adaptive functioning and provide symptom relief. The therapeutic alliance was strengthened to encourage the patient to express their thoughts and feelings. Esteem building was enhanced through praise, reassurance, normalizing and encouragement. Coping skills were enhanced to build distress tolerance skills and emotional regulation. Patient given education on medication side effects, diagnosis/illness and relapse symptoms. Plan to continue supportive psychotherapy in next appointment to provide symptom relief.  Diagnoses: as above  Symptoms: as above  Functional status: fair  Mental Status Exam: as above    Treatment plan: Medication management and supportive psychotherapy  Prognosis: good  Progress: some, minimal      3/24: Focus on lower doses.  Start Pristiq.  Depression with anxious distress versus depression and generalized anxiety disorder.  She only has distractibility, no other symptoms of hypomania.  17 minutes of supportive psychotherapy with goal to strengthen defenses, promote problems solving, restore adaptive functioning and provide symptom relief. The therapeutic alliance was strengthened to encourage the patient to express their thoughts and feelings. Esteem building was enhanced through praise, reassurance, normalizing and encouragement. Coping skills were enhanced to build distress tolerance skills and emotional regulation. Patient given education on  medication side effects, diagnosis/illness and relapse symptoms. Plan to continue supportive psychotherapy in next appointment to provide symptom relief.  4 weeks    3/15: Continue lurasidone 40 mg at night, increase Ambien to 10 mg at night.  Continue low-dose Lamictal, plan to increase further.  Continue titrating off of amitriptyline.  17 minutes of supportive psychotherapy with goal to strengthen defenses, promote problems solving, restore adaptive functioning and provide symptom relief. The therapeutic alliance was strengthened to encourage the patient to express their thoughts and feelings. Esteem building was enhanced through praise, reassurance, normalizing and encouragement. Coping skills were enhanced to build distress tolerance skills and emotional regulation. Patient given education on medication side effects, diagnosis/illness and relapse symptoms. Plan to continue supportive psychotherapy in next appointment to provide symptom relief.  1 week, urgent    3/10: Effexor has now been stopped.  Continue Latuda and Lamictal at present doses.  I will see her back in a few days and then at 6 weeks.  Bipolar 2?  If so, how come she is tolerating amitriptyline in the evenings?  19 minutes of supportive psychotherapy with goal to strengthen defenses, promote problems solving, restore adaptive functioning and provide symptom relief. The therapeutic alliance was strengthened to encourage the patient to express their thoughts and feelings. Esteem building was enhanced through praise, reassurance, normalizing and encouragement. Coping skills were enhanced to build distress tolerance skills and emotional regulation. Patient given education on medication side effects, diagnosis/illness and relapse symptoms. Plan to continue supportive psychotherapy in next appointment to provide symptom relief.  6 weeks    2/15: Still utilizing lorazepam which means the anxiety is not under control.  Increase Effexor.  17 minutes of  supportive psychotherapy with goal to strengthen defenses, promote problems solving, restore adaptive functioning and provide symptom relief. The therapeutic alliance was strengthened to encourage the patient to express their thoughts and feelings. Esteem building was enhanced through praise, reassurance, normalizing and encouragement. Coping skills were enhanced to build distress tolerance skills and emotional regulation. Patient given education on medication side effects, diagnosis/illness and relapse symptoms. Plan to continue supportive psychotherapy in next appointment to provide symptom relief.  4 weeks    1/17: Continued improvement.  No changes, only on effexor 75 mg for 2 weeks.  Tolerating medications without side effects.  17 minutes of supportive psychotherapy with goal to strengthen defenses, promote problems solving, restore adaptive functioning and provide symptom relief. The therapeutic alliance was strengthened to encourage the patient to express their thoughts and feelings. Esteem building was enhanced through praise, reassurance, normalizing and encouragement. Coping skills were enhanced to build distress tolerance skills and emotional regulation. Patient given education on medication side effects, diagnosis/illness and relapse symptoms. Plan to continue supportive psychotherapy in next appointment to provide symptom relief.  4 wks    1/3: Continued improvement. Increase effexor to target residual dep anx. TMS to start 1/6. 17 minutes of supportive psychotherapy with goal to strengthen defenses, promote problems solving, restore adaptive functioning and provide symptom relief. The therapeutic alliance was strengthened to encourage the patient to express their thoughts and feelings. Esteem building was enhanced through praise, reassurance, normalizing and encouragement. Coping skills were enhanced to build distress tolerance skills and emotional regulation. Patient given education on medication  side effects, diagnosis/illness and relapse symptoms. Plan to continue supportive psychotherapy in next appointment to provide symptom relief.  4 wks    12/17: Improved.  Continue medication titration as planned.  17 minutes of supportive psychotherapy with goal to strengthen defenses, promote problems solving, restore adaptive functioning and provide symptom relief. The therapeutic alliance was strengthened to encourage the patient to express their thoughts and feelings. Esteem building was enhanced through praise, reassurance, normalizing and encouragement. Coping skills were enhanced to build distress tolerance skills and emotional regulation. Patient given education on medication side effects, diagnosis/illness and relapse symptoms. Plan to continue supportive psychotherapy in next appointment to provide symptom relief.  See back in 2 weeks on 3 January in the morning    12/13: See back in 1 week. Taper down on amitriptyline, start effexor. 17 minutes of supportive psychotherapy with goal to strengthen defenses, promote problems solving, restore adaptive functioning and provide symptom relief. The therapeutic alliance was strengthened to encourage the patient to express their thoughts and feelings. Esteem building was enhanced through praise, reassurance, normalizing and encouragement. Coping skills were enhanced to build distress tolerance skills and emotional regulation. Patient given education on medication side effects, diagnosis/illness and relapse symptoms. Plan to continue supportive psychotherapy in next appointment to provide symptom relief.  1 wk    11/15: Taper off escitalopram and increase elavil as the latter is helping her depression; patient has been on elavil 25 mg tid in the past with good success. Alternatively, consider switching to effexor. 18 minutes of supportive psychotherapy with goal to strengthen defenses, promote problems solving, restore adaptive functioning and provide symptom  relief. The therapeutic alliance was strengthened to encourage the patient to express their thoughts and feelings. Esteem building was enhanced through praise, reassurance, normalizing and encouragement. Coping skills were enhanced to build distress tolerance skills and emotional regulation. Patient given education on medication side effects, diagnosis/illness and relapse symptoms. Plan to continue supportive psychotherapy in next appointment to provide symptom relief.    11/5: Increase lorazepam, continue celexa and amitriptyline. Consider effexor. 17 minutes of supportive psychotherapy with goal to strengthen defenses, promote problems solving, restore adaptive functioning and provide symptom relief. The therapeutic alliance was strengthened to encourage the patient to express their thoughts and feelings. Esteem building was enhanced through praise, reassurance, normalizing and encouragement. Coping skills were enhanced to build distress tolerance skills and emotional regulation. Patient given education on medication side effects, diagnosis/illness and relapse symptoms. Plan to continue supportive psychotherapy in next appointment to provide symptom relief.  2 wks    10/19: Re-start elavil. Reduce mirtazapine. Re-start therapy. 2 wks.    10/8: Some residual anxiety and depression reemerging.  Increase mirtazapine.  Patient is now doing her visits alone, rather than with her .  4 weeks.    8/27: Doing well on mirtazapine. 6 wks.    7/30: Abilify causing constipation.  Also caused brain zaps.  Discontinue.  Start mirtazapine to target depression, anxiety.  Willing to try, even though she knows it could make her hungrier.  See back in 4 weeks.  Continue therapy.  Longer term, consider discontinuing amitriptyline.    6/1: Doing well from a mental health standpoint, but having constipation from Abilify. No doubt the low-dose of amitriptyline in the evenings is also contributing to the cholinergic blockade  leading to constipation. Patient instructed to half the dose of Abilify. Continue other medications as scheduled.    Previous:  No insomnia on the amitriptyline. Consider doxepin as well, trazodone.  Abilify caused constipation and brain zaps.        PLAN:  126. Risk Assessment: Risk of self-harm acutely remain low. Risk factors include a history of suicidal ideation, mood disorder, anxiety disorder, presence of psychosocial stressors such as colonic dysplasia, COVID-19 pandemic. Protective factors include no history of self-harm or suicide attempts, good social support, willingness to engage in care, improved depressive symptoms. Risk of self-harm chronically also remain low, but could be further elevated in the event of treatment noncompliance and/or AODA.  127. Medications:   a. CONTINUE lexapro 10 mg daily. Risks, benefits, alternatives discussed with patient including GI upset, nausea vomiting diarrhea, theoretical decrease of seizure threshold predisposing the patient to a slightly higher seizure risk, headaches, sexual dysfunction, serotonin syndrome, bleeding risk, increased suicidality in patients 24 years and younger.  After discussion of these risks and benefits, the patient voiced understanding and agreed to proceed.  b. START buspar 5 mg bid. Risks, benefits, alternatives discussed with patient including nausea, GI upset, mild sedation, falls risk.  After discussion of these risks and benefits, the patient voiced understanding and agreed to proceed.  c. STOP Ambien 10 mg nightly. Unclear if helped with insomnia. Weeks. Risks, benefits, side effects discussed with patient including sedation, dizziness, GI upset, hallucinations, sleepwalking, sleep-eating.  After discussion of these risks and benefits, the patient voiced understanding and agreed to proceed.  d. STOP belsomra 5 mg nightly. Didn't help insomnia. Tried for a few days. Risks, benefits, side effects discussed with patient including sedation,  "dizziness, GI upset,  sleepwalking.  After discussion of these risks and benefits, the patient voiced understanding and agreed to proceed.  e. STOP trazodone 50 mg nightly. No help with insomnia. Risks, benefits, side effects discussed with patient including GI upset, sedation, dizziness/falls risk, grogginess the following day, prolongation of the QTc interval.  After discussion of these risks and benefits, the patient voiced understanding and agreed to proceed.    f. CONTINUE lorazepam 1 mg bid PRN. Risks, benefits, alternatives discussed with patient including GI upset, sedation, dizziness, respiratory depression, falls risk.  After discussion of these risks and benefits, the patient voiced understanding and agreed to proceed.  g. S/P:   i. Mirtazapine 15 mg, 7.5 mg: ineffective; made anxiety and depression worse.  ii. Abilify: brain zaps and constipation  iii. Seroquel made depression worse  iv. celexa 20 mg daily, ineffective after several months  v. Doxepin caused \"brain zaps\"  vi. Latuda was ineffective.  vii. Amitriptyline 25 mg nightly helpful for sleep but we didn't want her on too many serotonergic meds.  viii. Ambien minimally helpful for insomnia.  ix. Pristiq and effexor worsened anxiety, both low dose.  x. Stopped lamictal for unclear reasons 25 mg.  128. Therapy: continue with Genevieve at [facility].  129. Labs/Studies: BMP to monitor sodium levels in February was entirely normal, EKG February shows rate 72, sinus, .  130. Follow Up: 4 weeks, urgent      TREATMENT PLAN/GOALS: Continue supportive psychotherapy efforts and medications as indicated. Treatment and medication options discussed during today's visit. Patient acknowledged and verbally consented to continue with current treatment plan and was educated on the importance of compliance with treatment and follow-up appointments.    MEDICATION ISSUES:  YVETTE reviewed as expected.  Discussed medication options and treatment plan of prescribed " medication as well as the risks, benefits, and side effects including potential falls, possible impaired driving and metabolic adversities among others. Patient is agreeable to call the office with any worsening of symptoms or onset of side effects. Patient is agreeable to call 911 or go to the nearest ER should he/she begin having SI/HI. No medication side effects or related complaints today.     MEDS ORDERED DURING VISIT:    Return in about 4 weeks (around 7/15/2022) for urgent.         This document has been electronically signed by Vinny Krishnamurthy MD  June 17, 2022 13:43 EDT       Part of this note may be an electronic transcription/translation of spoken language to printed text using the Dragon Dictation System.

## 2022-06-17 NOTE — TELEPHONE ENCOUNTER
Caller: ALESSIO DE JESUS    Relationship: Emergency Contact    Best call back number: [phone]    Requested Prescriptions:   Requested Prescriptions     Pending Prescriptions Disp Refills   • famotidine (PEPCID) 40 MG tablet 30 tablet 0     Sig: Take 1 tablet by mouth At Night As Needed for Indigestion or Heartburn.        Pharmacy where request should be sent: [id number] - [facility] [place] - [address] [facility] [phone] [facility] [phone] FX     Additional details provided by patient:   PATIENT HAS 6 LEFT AND AND NEEDS A REFILL ASAP PER CALLER PLEASE. THANKS     Does the patient have less than a 3 day supply:  [x] Yes  [] No    Isi Banda Rep   06/17/22 08:25 EDT     JESU DE JESUS IS NOT ON THE  VERBAL.

## 2022-06-17 NOTE — PATIENT INSTRUCTIONS
1.  Please return to clinic at your next scheduled visit.  Contact the clinic ([phone]) at least 24 hours prior in the event you need to cancel.  2.  Do no harm to yourself or others.    3.  Avoid alcohol and drugs.    4.  Take all medications as prescribed.  Please contact the clinic with any concerns. If you are in need of medication refills, please call the clinic at [phone].    5. Should you want to get in touch with your provider, Dr. Vinny Krishnamurthy, please utilize [employer] or contact the office ([phone]), and staff will be able to page Dr. Krishnamurthy directly.  6.  In the event you have personal crisis, contact the following crisis numbers: Suicide Prevention Hotline 1-692.101.2081; [facility] Helpline 6-761-693-IDPG; [facility] [phone]; text HELLO to 694525; or 823.     SPECIFIC RECOMMENDATIONS:     1.      Medications discussed at this encounter:                   -      2.      Psychotherapy recommendations:      3.     Return to clinic: 4 weeks

## 2022-06-21 RX ORDER — LEVOMEFOLATE CALCIUM 7.5 MG
7.5 TABLET ORAL DAILY
Qty: 90 TABLET | Refills: 1 | Status: SHIPPED | OUTPATIENT
Start: 2022-06-21 | End: 2022-07-06 | Stop reason: CLARIF

## 2022-06-21 NOTE — TELEPHONE ENCOUNTER
Pt and  phoned stating [facility] did not receive pt l-methylfolate calcium. Per pt  would like medication submitted to [facility]. Medication resubmitted.

## 2022-07-05 ENCOUNTER — TELEPHONE (OUTPATIENT)
Dept: FAMILY MEDICINE CLINIC | Facility: CLINIC | Age: 67
End: 2022-07-05

## 2022-07-05 NOTE — TELEPHONE ENCOUNTER
Patient's  called and stated [facility] does not carry the l-methylfolate calcium and asked if regular folic acid can be sent in for the patient. Please advise.

## 2022-07-06 RX ORDER — FOLIC ACID 1 MG/1
1 TABLET ORAL DAILY
Qty: 90 TABLET | Refills: 1 | Status: SHIPPED | OUTPATIENT
Start: 2022-07-06 | End: 2023-03-03

## 2022-07-06 NOTE — TELEPHONE ENCOUNTER
L-methylfolate as a form of folic acid but is not the exact same thing.  The methyl folate is more active and natural form of folic acid.  Folic acid itself is more synthetic and at times does not work as well.  I will send over the folic acid if it does not appear to be helping I do recommend to either purchase the L-methylfolate offline or let me send it over to a different pharmacy.

## 2022-07-11 ENCOUNTER — TELEPHONE (OUTPATIENT)
Dept: PSYCHIATRY | Facility: CLINIC | Age: 67
End: 2022-07-11

## 2022-07-11 DIAGNOSIS — F41.0 PANIC ATTACKS: Primary | ICD-10-CM

## 2022-07-11 RX ORDER — DIAZEPAM 5 MG/1
5 TABLET ORAL 2 TIMES DAILY PRN
Qty: 60 TABLET | Refills: 2 | Status: SHIPPED | OUTPATIENT
Start: 2022-07-11 | End: 2022-08-01

## 2022-07-11 NOTE — TELEPHONE ENCOUNTER
Pt's  called on speakerphone with wife.  Wife could be heard in the background crying.   says that wife has been having uncontrollable crying spells with no reason behind them.   asked to speak to the doctor on the phone.  I explained that the doctor is with a patient, but that I would send him a message.

## 2022-07-11 NOTE — TELEPHONE ENCOUNTER
Pt called in, c/o of crying spells. I called her back. These are panic attacks.    Lorazepam helps in the evenings and afternoons. Doesn't help in the mornings.     Only takes buspar in the mornings.     Declines admission. Fearful of the medications she is put on. States she doesn't want to be on a lot of medications. Fearful of side effects.    To better manage panic attacks, switch to longer acting benzo, valium 5 mg bid. Risks, benefits, alternatives discussed with patient including GI upset, sedation, dizziness, respiratory depression, falls risk.  After discussion of these risks and benefits, the patient voiced understanding and agreed to proceed.  Mamadou already reviewed.    All questions answered.  No SI HI AVH.

## 2022-07-14 DIAGNOSIS — K21.00 GASTROESOPHAGEAL REFLUX DISEASE WITH ESOPHAGITIS WITHOUT HEMORRHAGE: ICD-10-CM

## 2022-07-14 RX ORDER — FAMOTIDINE 40 MG/1
40 TABLET, FILM COATED ORAL NIGHTLY PRN
Qty: 30 TABLET | Refills: 0 | Status: SHIPPED | OUTPATIENT
Start: 2022-07-14 | End: 2022-08-01

## 2022-07-18 NOTE — PROGRESS NOTES
Long conversation with . He informed me that patient felt very sedated on diazepam 5 mg; stopped it. However, she then took 2.5 mg this morning, which surprised him.  Either way, it does not appear to be very helpful.  She is at home; he is getting an oil change.    Her son, Nicola, left the house due to her outbursts.  So has her other son, Obdulio.    As already stated, I recommended inpatient admission/higher level care in the past. Patient and  went to the [facility] on Saturday, and they were full. She agreed to do IOP, but not until August. She refuses to go to [facility] and [facility].    Has TMS evaluation tomorrow morning. Next week, they are going to the [facility] clinic in [place] to get a SPECT scan of her brain and pursue multi-disciplinary treatment.    Needs medical workup, including recent B12, folate, TSH, Vit D. Otherwise appears labs are up to date since May.    Called patient at home. Feels depressed and anxious in the mornings. On Friday went to [facility]; all full. Went to the [facility], and they were full but recommended IOP.  Very depressed on the phone, tearful.  Has SI with a plan, but denies intent.  Protective factor is her children and her family who she would never want to hurt.  She states she has never tried to hurt herself in the past.    Patient would be interested in going back to the [facility] again today.  I called them back at [facility], which is where they went to on Saturday, and they do have one opening.  They said they would hold it for me after I explained the situation.  I called the patient back and her  and informed them both, and they are both agreeable to getting her to the [facility] today.  All questions answered.      They will keep me posted.

## 2022-08-01 ENCOUNTER — OFFICE VISIT (OUTPATIENT)
Dept: PSYCHIATRY | Facility: CLINIC | Age: 67
End: 2022-08-01

## 2022-08-01 ENCOUNTER — TRANSCRIBE ORDERS (OUTPATIENT)
Dept: ADMINISTRATIVE | Facility: HOSPITAL | Age: 67
End: 2022-08-01

## 2022-08-01 VITALS
DIASTOLIC BLOOD PRESSURE: 66 MMHG | SYSTOLIC BLOOD PRESSURE: 123 MMHG | BODY MASS INDEX: 24.01 KG/M2 | WEIGHT: 140.6 LBS | HEIGHT: 64 IN

## 2022-08-01 DIAGNOSIS — F41.0 PANIC ATTACKS: ICD-10-CM

## 2022-08-01 DIAGNOSIS — F41.1 GENERALIZED ANXIETY DISORDER: ICD-10-CM

## 2022-08-01 DIAGNOSIS — F51.05 INSOMNIA DUE TO MENTAL CONDITION: ICD-10-CM

## 2022-08-01 DIAGNOSIS — F33.2 SEVERE EPISODE OF RECURRENT MAJOR DEPRESSIVE DISORDER, WITHOUT PSYCHOTIC FEATURES: Primary | ICD-10-CM

## 2022-08-01 DIAGNOSIS — Z12.31 VISIT FOR SCREENING MAMMOGRAM: Primary | ICD-10-CM

## 2022-08-01 PROCEDURE — 99214 OFFICE O/P EST MOD 30 MIN: CPT | Performed by: STUDENT IN AN ORGANIZED HEALTH CARE EDUCATION/TRAINING PROGRAM

## 2022-08-01 PROCEDURE — 90833 PSYTX W PT W E/M 30 MIN: CPT | Performed by: STUDENT IN AN ORGANIZED HEALTH CARE EDUCATION/TRAINING PROGRAM

## 2022-08-01 RX ORDER — OLANZAPINE 2.5 MG/1
TABLET ORAL
COMMUNITY
Start: 2022-07-22 | End: 2022-08-01 | Stop reason: SINTOL

## 2022-08-01 RX ORDER — ARIPIPRAZOLE 2 MG/1
2 TABLET ORAL DAILY
Qty: 30 TABLET | Refills: 2 | Status: SHIPPED | OUTPATIENT
Start: 2022-08-01 | End: 2022-09-30 | Stop reason: SDUPTHER

## 2022-08-01 RX ORDER — LORAZEPAM 1 MG/1
1 TABLET ORAL 2 TIMES DAILY PRN
COMMUNITY
Start: 2022-07-23 | End: 2022-11-07 | Stop reason: SDUPTHER

## 2022-08-01 NOTE — TREATMENT PLAN
Multi-Disciplinary Problems (from Behavioral Health Treatment Plan)    Active Problems     Problem: Anxiety  Start Date: 08/01/22    Problem Details: The patient self-scales this problem as a 7 with 10 being the worst.        Goal Priority Start Date Expected End Date End Date    Patient will develop and implement behavioral and cognitive strategies to reduce anxiety and irrational fears. -- 08/01/22 -- --    Goal Details: Progress toward goal:  Not appropriate to rate progress toward goal since this is the initial treatment plan.        Goal Intervention Frequency Start Date End Date    Help patient explore past emotional issues in relation to present anxiety. Q Month 08/01/22 --    Intervention Details: Duration of treatment until until remission of symptoms.        Goal Intervention Frequency Start Date End Date    Help patient develop an awareness of their cognitive and physical responses to anxiety. Q Month 08/01/22 --    Intervention Details: Duration of treatment until until remission of symptoms.              Problem: Depression  Start Date: 08/01/22    Problem Details: The patient self-scales this problem as a 7 with 10 being the worst.        Goal Priority Start Date Expected End Date End Date    Patient will demonstrate the ability to initiate new constructive life skills outside of sessions on a consistent basis. -- 08/01/22 -- --    Goal Details: Progress toward goal:  Not appropriate to rate progress toward goal since this is the initial treatment plan.        Goal Intervention Frequency Start Date End Date    Assist patient in setting attainable activities of daily living goals. PRN 08/01/22 --    Goal Intervention Frequency Start Date End Date    Provide education about depression Q Month 08/01/22 --    Intervention Details: Duration of treatment until until remission of symptoms.        Goal Intervention Frequency Start Date End Date    Assist patient in developing healthy coping strategies. Q Month  08/01/22 --    Intervention Details: Duration of treatment until until remission of symptoms.                    Reviewed By     Vinny Krishnamurthy MD 08/01/22 0869                 I have discussed and reviewed this treatment plan with the patient.

## 2022-08-01 NOTE — PROGRESS NOTES
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 (OYSCO 500 + D) 500-200 MG-UNIT tablet      • cholecalciferol (VITAMIN D3) 25 MCG (1000 UT) tablet Take 1,000 Units by mouth Daily.     • Coenzyme Q10 (CO Q10 PO) Take  by mouth.     • escitalopram (LEXAPRO) 10 MG tablet TAKE 1 TABLET BY MOUTH DAILY 90 tablet 0   • folic acid (FOLVITE) 1 MG tablet Take 1 tablet by mouth Daily. 90 tablet 1   • GARLIC PO garlic 1,000 mg oral capsule take 1 capsule by oral route daily   Active     • LORazepam (ATIVAN) 1 MG tablet Take 1 mg by mouth 2 (Two) Times a Day As Needed. for anxiety     • Multiple Vitamins-Iron (MULTIVITAMIN PLUS IRON ADULT PO) Women's Multivitamin 18 mg iron-400 mcg-500 mg oral tablet take 1 tablet by oral route daily   Active     • Omega-3 Fatty Acids (fish oil) 1000 MG capsule capsule Fish Oil 1,000 mg (120 mg-180 mg) oral capsule take 1 capsule by oral route daily   Suspended     • Probiotic Product (PROBIOTIC PO) Take  by mouth.     • vitamin E 400 UNIT capsule vitamin E 400 unit oral capsule take 1 capsule by oral route daily   Active     • ARIPiprazole (Abilify) 2 MG tablet Take 1 tablet by mouth Daily. 30 tablet 2   • busPIRone (BUSPAR) 5 MG tablet Take 1 tablet by mouth 2 (Two) Times a Day. 60 tablet 2   • Glucosamine Sulfate 500 MG tablet Glucosamine 500 mg oral tablet take  "1 tablet by oral route daily   Active       No current facility-administered medications for this visit.       Past Medical History:  Past Medical History:   Diagnosis Date   • Acid reflux 2022   • Anxiety    • Depression    • Fracture of patella 09/12/2017   • Hyperlipemia    • Osteopenia    • Panic disorder    • Psychiatric inpatient 07/22/2022    Discharged 07/22/2022 for Anxiety, Depression, and Suicidal Bx's       Social History     Socioeconomic History   • Marital status:    Tobacco Use   • Smoking status: Never Smoker   • Smokeless tobacco: Never Used   Vaping Use   • Vaping Use: Never used   Substance and Sexual Activity   • Alcohol use: Not Currently   • Drug use: Never   • Sexual activity: Not Currently         Family History   Problem Relation Age of Onset   • Stroke Mother    • Heart disease Mother    • Cancer Brother    • Seizures Son      Review of Systems:  Review of Systems   Constitutional: Positive for fatigue. Negative for diaphoresis.   HENT: Negative for drooling.    Eyes: Negative for visual disturbance.   Respiratory: Negative for cough and shortness of breath.    Cardiovascular: Negative for chest pain, palpitations and leg swelling.   Gastrointestinal: Negative for nausea and vomiting.   Endocrine: Positive for cold intolerance. Negative for heat intolerance.   Genitourinary: Negative for difficulty urinating.   Musculoskeletal: Negative for joint swelling.   Allergic/Immunologic: Negative for immunocompromised state.   Neurological: Negative for dizziness, seizures, speech difficulty and numbness.         · Mental Status Exam  · Appearance  · : sitting in a chair with her , good eye contact, normal street clothes, groomed, in person  · Behavior  · : pleasant and cooperative  · Motor  · : No abnormal  · Speech  · : normal rhythm, rate, volume, tone, not hyperverbal, not pressured, normal prosidy, Speaks with an accent  · Mood  · : \"The crying spells have " "stopped\"  · Affect  · : Continues to have mild depression, no tears, mood congruent, fair variability  · Thought Content  · : negative suicidal ideations, negative homicidal ideations, negative obsessions  · Perceptions  · : negative auditory hallucinations, negative visual hallucinations  · Thought Process  · : linear  · Insight/Judgement  · : fair/fair  · Cognition  · : grossly intact  · Attention  · : intact      Physical Exam:  Physical Exam    Vital Signs:   /66   Ht 162.6 cm (64\")   Wt 63.8 kg (140 lb 9.6 oz)   BMI 24.13 kg/m²      Lab Results:   Office Visit on 06/09/2022   Component Date Value Ref Range Status   • Total Cholesterol 06/09/2022 265 (A) 0 - 200 mg/dL Final   • Triglycerides 06/09/2022 367 (A) 0 - 150 mg/dL Final   • HDL Cholesterol 06/09/2022 36 (A) 40 - 60 mg/dL Final   • LDL Cholesterol  06/09/2022 159 (A) 0 - 100 mg/dL Final   • VLDL Cholesterol 06/09/2022 70 (A) 5 - 40 mg/dL Final   • LDL/HDL Ratio 06/09/2022 4.32   Final   • Hemoglobin A1C 06/09/2022 5.50  4.80 - 5.60 % Final   • MTHFR 06/09/2022 Comment   Final    Comment: Result:  c.665C>T (p. [id number]), legacy name: [id number] - Detected, heterozygous  c.1286A>C (p. [id number]), legacy name: [id number] - Detected,  heterozygous  Interpretation:  This result is not associated with an increased risk for  hyperhomocysteinemia. See Additional Clinical Information and  Comments.  Additional Clinical Information:  Hyperhomocysteinemia is multifactorial involving genetic, clinical,  and environmental risk factors. Reduced enzyme activity of  methylenetetrahydrofolate reductase (MTHFR) is a genetic risk  factor for hyperhomocysteinemia, particularly when serum folate  levels are low. There are two common variants in the MTHFR gene  that can decrease enzyme activity; c.665C>T (p. [id number]), legacy  name [id number], and c.1286A>C (p. [id number]), legacy name [id number]. These  variants do not independently increase risk of conditions related  to " hyperhomocysteinemia in the absence of elevated homocysteine  levels. Measurement of total plasma homocysteine is recommended.  Patients                            should share their MTHFR genotype with physicians who are  making decisions regarding chemotherapy treatments that depend on  folate, such as methotrexate.  Guidelines do not recommend genotyping of these two MTHFR variants  in the evaluation of venous thrombosis or obstetric risk due to  limited evidence of clinical utility (PMID: 33577222).  Comments:  Genetic Coordinators are available for health care providers to  discuss results at [phone] ([phone]).  Test Details:  Variants Analyzed: c.665C>T (p. [id number]), legacy name: C677T and  c.1286A>C (p. [id number]), legacy name: [id number]  Methods/Limitations:  DNA analysis of the MTHFR gene was performed by PCR amplification  followed by restriction enzyme analysis. The diagnostic sensitivity  is >99%. Results must be combined with clinical information for the  most accurate interpretation. Molecular-based testing is highly  accurate, but as in any laboratory test, diagnostic errors may  occur. False positive or false negative results                            may occur for  reasons that include genetic variants, blood transfusions, bone  marrow transplantation, somatic or tissue-specific mosaicism,  mislabeled samples, or erroneous representation of family  relationships.  This test was developed and its performance characteristics  determined by [facility]. It has not been cleared or approved by the  Food and Drug Administration.  References:  Warner SE, Gomez CJ, Hadley PEREZ. ACM[facility] Practice Guideline: lack of  evidence for MTHFR polymorphism testing. Sarah Med. 2013 Feb;15(2):153-6. doi: 10.1038/gim.2012.165. Epub 2013 Rk 3. PMID:  94759050.  American College of Obstetricians and Gynecologists' Committee on  Practice Bulletins-Obstetrics. ACOG Practice Bulletin No. 197:  Inherited Thrombophilias in  Pregnancy. Obstet Gynecol. 2018  Jul;132(1):e18-e34. doi: 10.1097/AOG.[id number]. Erratum in:  Obstet Gynecol. 2018 Oct;132(4):1069. PMID: 19262446.  Rose Mary Michaels, PhD, [facility]  Carlo Fragoso, PhD, [facility]  Hernesto Reese, PhD, [facility]  Vishal Waldron, PhD, [facility]  JT Gonzalez, PhD, [facility]  Windy Otto, PhD, [facility]  Carolyn Bean, PhD, [facility]   Admission on 05/14/2022, Discharged on 05/14/2022   Component Date Value Ref Range Status   • Glucose 05/14/2022 109 (A) 65 - 99 mg/dL Final   • BUN 05/14/2022 10  8 - 23 mg/dL Final   • Creatinine 05/14/2022 0.77  0.57 - 1.00 mg/dL Final   • Sodium 05/14/2022 139  136 - 145 mmol/L Final   • Potassium 05/14/2022 3.9  3.5 - 5.2 mmol/L Final   • Chloride 05/14/2022 103  98 - 107 mmol/L Final   • CO2 05/14/2022 24.2  22.0 - 29.0 mmol/L Final   • Calcium 05/14/2022 9.7  8.6 - 10.5 mg/dL Final   • Total Protein 05/14/2022 7.1  6.0 - 8.5 g/dL Final   • Albumin 05/14/2022 4.20  3.50 - 5.20 g/dL Final   • ALT (SGPT) 05/14/2022 13  1 - 33 U/L Final   • AST (SGOT) 05/14/2022 15  1 - 32 U/L Final   • Alkaline Phosphatase 05/14/2022 107  39 - 117 U/L Final   • Total Bilirubin 05/14/2022 1.1  0.0 - 1.2 mg/dL Final   • Globulin 05/14/2022 2.9  gm/dL Final   • A/G Ratio 05/14/2022 1.4  g/dL Final   • BUN/Creatinine Ratio 05/14/2022 13.0  7.0 - 25.0 Final   • Anion Gap 05/14/2022 11.8  5.0 - 15.0 mmol/L Final   • eGFR 05/14/2022 84.7  >60.0 mL/min/1.73 Final    National Kidney Foundation and American Society of Nephrology (ASN) Task Force recommended calculation based on the Chronic Kidney Disease Epidemiology Collaboration (CKD-EPI) equation refit without adjustment for race.   • Lipase 05/14/2022 47  13 - 60 U/L Final   • Extra Tube 05/14/2022 Hold for add-ons.   Final    Auto resulted.   • Extra Tube 05/14/2022 hold for add-on   Final    Auto resulted   • Extra Tube 05/14/2022 Hold for add-ons.   Final    Auto resulted.   • Extra Tube 05/14/2022 Hold  for add-ons.   Final    Auto resulted   • WBC 05/14/2022 6.95  3.40 - 10.80 10*3/mm3 Final   • RBC 05/14/2022 4.53  3.77 - 5.28 10*6/mm3 Final   • Hemoglobin 05/14/2022 13.9  12.0 - 15.9 g/dL Final   • Hematocrit 05/14/2022 41.9  34.0 - 46.6 % Final   • MCV 05/14/2022 92.5  79.0 - 97.0 fL Final   • MCH 05/14/2022 30.7  26.6 - 33.0 pg Final   • MCHC 05/14/2022 33.2  31.5 - 35.7 g/dL Final   • RDW 05/14/2022 14.2  12.3 - 15.4 % Final   • RDW-SD 05/14/2022 47.9  37.0 - 54.0 fl Final   • MPV 05/14/2022 8.9  6.0 - 12.0 fL Final   • Platelets 05/14/2022 288  140 - 450 10*3/mm3 Final   • Neutrophil % 05/14/2022 68.0  42.7 - 76.0 % Final   • Lymphocyte % 05/14/2022 22.2  19.6 - 45.3 % Final   • Monocyte % 05/14/2022 8.5  5.0 - 12.0 % Final   • Eosinophil % 05/14/2022 0.4  0.3 - 6.2 % Final   • Basophil % 05/14/2022 0.3  0.0 - 1.5 % Final   • Immature Grans % 05/14/2022 0.6 (A) 0.0 - 0.5 % Final   • Neutrophils, Absolute 05/14/2022 4.73  1.70 - 7.00 10*3/mm3 Final   • Lymphocytes, Absolute 05/14/2022 1.54  0.70 - 3.10 10*3/mm3 Final   • Monocytes, Absolute 05/14/2022 0.59  0.10 - 0.90 10*3/mm3 Final   • Eosinophils, Absolute 05/14/2022 0.03  0.00 - 0.40 10*3/mm3 Final   • Basophils, Absolute 05/14/2022 0.02  0.00 - 0.20 10*3/mm3 Final   • Immature Grans, Absolute 05/14/2022 0.04  0.00 - 0.05 10*3/mm3 Final   • nRBC 05/14/2022 0.0  0.0 - 0.2 /100 WBC Final   • H. pylori IgG 05/13/2022 Negative  Negative, Equivocal Final   • Occult Blood, Fecal by Immunoassay 05/14/2022 Positive (A) Negative Final   • Color, UA 05/14/2022 Yellow  Yellow, Straw Final   • Appearance, UA 05/14/2022 Clear  Clear Final   • pH, UA 05/14/2022 7.0  5.0 - 8.0 Final   • Specific Gravity, UA 05/14/2022 1.010  1.005 - 1.030 Final   • Glucose, UA 05/14/2022 Negative  Negative Final   • Ketones, UA 05/14/2022 Trace (A) Negative Final   • Bilirubin, UA 05/14/2022 Negative  Negative Final   • Blood, UA 05/14/2022 Trace (A) Negative Final   • Protein, UA  05/14/2022 Negative  Negative Final   • Leuk Esterase, UA 05/14/2022 Negative  Negative Final   • Nitrite, UA 05/14/2022 Negative  Negative Final   • Urobilinogen, UA 05/14/2022 0.2 E.U./dL  0.2 - 1.0 E.U./dL Final   • RBC, UA 05/14/2022 0-2 (A) None Seen /HPF Final   • WBC, UA 05/14/2022 None Seen  None Seen /HPF Final    Urine culture not indicated.   • Bacteria, UA 05/14/2022 None Seen  None Seen /HPF Final   • Squamous Epithelial Cells, UA 05/14/2022 0-2  None Seen, 0-2 /HPF Final   • Hyaline Casts, UA 05/14/2022 None Seen  None Seen /LPF Final   • Methodology 05/14/2022 Automated Microscopy   Final       EKG Results:  No orders to display       Imaging Results:  No Images in the past 120 days found..      Assessment & Plan   Diagnoses and all orders for this visit:    1. Severe episode of recurrent major depressive disorder, without psychotic features (HCC) (Primary)  -     ARIPiprazole (Abilify) 2 MG tablet; Take 1 tablet by mouth Daily.  Dispense: 30 tablet; Refill: 2    2. Generalized anxiety disorder    3. Panic attacks    4. Insomnia due to mental condition        Visit Diagnoses:    ICD-10-CM ICD-9-CM   1. Severe episode of recurrent major depressive disorder, without psychotic features (HCC)  F33.2 296.33   2. Generalized anxiety disorder  F41.1 300.02   3. Panic attacks  F41.0 300.01   4. Insomnia due to mental condition  F51.05 300.9     327.02       INITIAL presentation most consistent with major depressive disorder in partial remission, FAREED, rare panic attacks.    8/1: Try another mood stabilizer in place of olanzapine.  Will trial Abilify again.  Also consider Geodon.  18 minutes of supportive psychotherapy with goal to strengthen defenses, promote problems solving, restore adaptive functioning and provide symptom relief. The therapeutic alliance was strengthened to encourage the patient to express their thoughts and feelings. Esteem building was enhanced through praise, reassurance, normalizing and  encouragement. Coping skills were enhanced to build distress tolerance skills and emotional regulation. Allowed patient to freely discuss issues without interruption or judgement with unconditional positive regard, active listening skills, and empathy. Provided a safe, confidential environment to facilitate the development of a positive therapeutic relationship and encourage open, honest communication. Assisted patient in identifying risk factors which would indicate the need for higher level of care including thoughts to harm self or others and/or self-harming behavior and encouraged patient to contact this office, call 911, or present to the nearest emergency room should any of these events occur. Assisted patient in processing session content; acknowledged and normalized patient’s thoughts, feelings, and concerns by utilizing a person-centered approach in efforts to build appropriate rapport and a positive therapeutic relationship with open and honest communication. Patient given education on medication side effects, diagnosis/illness and relapse symptoms. Plan to continue supportive psychotherapy in next appointment to provide symptom relief.  Diagnoses: as above  Symptoms: as above  Functional status: Fair to good  Mental Status Exam: as above    Treatment plan: Medication management and supportive psychotherapy  Prognosis: Fair to good  Progress: Slightly improved  4 weeks, urgent    6/17: start buspar, doing better. 17 minutes of supportive psychotherapy with goal to strengthen defenses, promote problems solving, restore adaptive functioning and provide symptom relief. The therapeutic alliance was strengthened to encourage the patient to express their thoughts and feelings. Esteem building was enhanced through praise, reassurance, normalizing and encouragement. Coping skills were enhanced to build distress tolerance skills and emotional regulation. Allowed patient to freely discuss issues without interruption  or judgement with unconditional positive regard, active listening skills, and empathy. Provided a safe, confidential environment to facilitate the development of a positive therapeutic relationship and encourage open, honest communication. Assisted patient in identifying risk factors which would indicate the need for higher level of care including thoughts to harm self or others and/or self-harming behavior and encouraged patient to contact this office, call 911, or present to the nearest emergency room should any of these events occur. Assisted patient in processing session content; acknowledged and normalized patient’s thoughts, feelings, and concerns by utilizing a person-centered approach in efforts to build appropriate rapport and a positive therapeutic relationship with open and honest communication. Patient given education on medication side effects, diagnosis/illness and relapse symptoms. Plan to continue supportive psychotherapy in next appointment to provide symptom relief.  Diagnoses: as above  Symptoms: as above  Functional status: good  Mental Status Exam: as above    Treatment plan: Medication management and supportive psychotherapy  Prognosis: good  Progress: improving slowly  4 wks      5/20: Continue Lexapro and lorazepam.  Try Belsomra in place of Ambien.  19 minutes of supportive psychotherapy with goal to strengthen defenses, promote problems solving, restore adaptive functioning and provide symptom relief. The therapeutic alliance was strengthened to encourage the patient to express their thoughts and feelings. Esteem building was enhanced through praise, reassurance, normalizing and encouragement. Coping skills were enhanced to build distress tolerance skills and emotional regulation. Allowed patient to freely discuss issues without interruption or judgement with unconditional positive regard, active listening skills, and empathy. Provided a safe, confidential environment to facilitate the  development of a positive therapeutic relationship and encourage open, honest communication. Assisted patient in identifying risk factors which would indicate the need for higher level of care including thoughts to harm self or others and/or self-harming behavior and encouraged patient to contact this office, call 911, or present to the nearest emergency room should any of these events occur. Assisted patient in processing session content; acknowledged and normalized patient’s thoughts, feelings, and concerns by utilizing a person-centered approach in efforts to build appropriate rapport and a positive therapeutic relationship with open and honest communication. Patient given education on medication side effects, diagnosis/illness and relapse symptoms. Plan to continue supportive psychotherapy in next appointment to provide symptom relief.  Diagnoses: as above  Symptoms: as above  Functional status: Good  Mental Status Exam: as above    Treatment plan: Medication management and supportive psychotherapy  Prognosis: Good  Progress: Some progress since last visit  4 weeks urgent    4/22: Some improvement in anx and dep, but still residual symptoms.  Persistent insomnia. Declines IOP, inpatient admission.  Increase Lexapro and start trazodone.  17 minutes of supportive psychotherapy with goal to strengthen defenses, promote problems solving, restore adaptive functioning and provide symptom relief. The therapeutic alliance was strengthened to encourage the patient to express their thoughts and feelings. Esteem building was enhanced through praise, reassurance, normalizing and encouragement. Coping skills were enhanced to build distress tolerance skills and emotional regulation. Patient given education on medication side effects, diagnosis/illness and relapse symptoms. Plan to continue supportive psychotherapy in next appointment to provide symptom relief.  Diagnoses: as above  Symptoms: as above  Functional status:  fair  Mental Status Exam: as above    Treatment plan: Medication management and supportive psychotherapy  Prognosis: good  Progress: some, minimal      3/24: Focus on lower doses.  Start Pristiq.  Depression with anxious distress versus depression and generalized anxiety disorder.  She only has distractibility, no other symptoms of hypomania.  17 minutes of supportive psychotherapy with goal to strengthen defenses, promote problems solving, restore adaptive functioning and provide symptom relief. The therapeutic alliance was strengthened to encourage the patient to express their thoughts and feelings. Esteem building was enhanced through praise, reassurance, normalizing and encouragement. Coping skills were enhanced to build distress tolerance skills and emotional regulation. Patient given education on medication side effects, diagnosis/illness and relapse symptoms. Plan to continue supportive psychotherapy in next appointment to provide symptom relief.  4 weeks    3/15: Continue lurasidone 40 mg at night, increase Ambien to 10 mg at night.  Continue low-dose Lamictal, plan to increase further.  Continue titrating off of amitriptyline.  17 minutes of supportive psychotherapy with goal to strengthen defenses, promote problems solving, restore adaptive functioning and provide symptom relief. The therapeutic alliance was strengthened to encourage the patient to express their thoughts and feelings. Esteem building was enhanced through praise, reassurance, normalizing and encouragement. Coping skills were enhanced to build distress tolerance skills and emotional regulation. Patient given education on medication side effects, diagnosis/illness and relapse symptoms. Plan to continue supportive psychotherapy in next appointment to provide symptom relief.  1 week, urgent    3/10: Effexor has now been stopped.  Continue Latuda and Lamictal at present doses.  I will see her back in a few days and then at 6 weeks.  Bipolar 2?   If so, how come she is tolerating amitriptyline in the evenings?  19 minutes of supportive psychotherapy with goal to strengthen defenses, promote problems solving, restore adaptive functioning and provide symptom relief. The therapeutic alliance was strengthened to encourage the patient to express their thoughts and feelings. Esteem building was enhanced through praise, reassurance, normalizing and encouragement. Coping skills were enhanced to build distress tolerance skills and emotional regulation. Patient given education on medication side effects, diagnosis/illness and relapse symptoms. Plan to continue supportive psychotherapy in next appointment to provide symptom relief.  6 weeks    2/15: Still utilizing lorazepam which means the anxiety is not under control.  Increase Effexor.  17 minutes of supportive psychotherapy with goal to strengthen defenses, promote problems solving, restore adaptive functioning and provide symptom relief. The therapeutic alliance was strengthened to encourage the patient to express their thoughts and feelings. Esteem building was enhanced through praise, reassurance, normalizing and encouragement. Coping skills were enhanced to build distress tolerance skills and emotional regulation. Patient given education on medication side effects, diagnosis/illness and relapse symptoms. Plan to continue supportive psychotherapy in next appointment to provide symptom relief.  4 weeks    1/17: Continued improvement.  No changes, only on effexor 75 mg for 2 weeks.  Tolerating medications without side effects.  17 minutes of supportive psychotherapy with goal to strengthen defenses, promote problems solving, restore adaptive functioning and provide symptom relief. The therapeutic alliance was strengthened to encourage the patient to express their thoughts and feelings. Esteem building was enhanced through praise, reassurance, normalizing and encouragement. Coping skills were enhanced to build  distress tolerance skills and emotional regulation. Patient given education on medication side effects, diagnosis/illness and relapse symptoms. Plan to continue supportive psychotherapy in next appointment to provide symptom relief.  4 wks    1/3: Continued improvement. Increase effexor to target residual dep anx. TMS to start 1/6. 17 minutes of supportive psychotherapy with goal to strengthen defenses, promote problems solving, restore adaptive functioning and provide symptom relief. The therapeutic alliance was strengthened to encourage the patient to express their thoughts and feelings. Esteem building was enhanced through praise, reassurance, normalizing and encouragement. Coping skills were enhanced to build distress tolerance skills and emotional regulation. Patient given education on medication side effects, diagnosis/illness and relapse symptoms. Plan to continue supportive psychotherapy in next appointment to provide symptom relief.  4 wks    12/17: Improved.  Continue medication titration as planned.  17 minutes of supportive psychotherapy with goal to strengthen defenses, promote problems solving, restore adaptive functioning and provide symptom relief. The therapeutic alliance was strengthened to encourage the patient to express their thoughts and feelings. Esteem building was enhanced through praise, reassurance, normalizing and encouragement. Coping skills were enhanced to build distress tolerance skills and emotional regulation. Patient given education on medication side effects, diagnosis/illness and relapse symptoms. Plan to continue supportive psychotherapy in next appointment to provide symptom relief.  See back in 2 weeks on 3 January in the morning    12/13: See back in 1 week. Taper down on amitriptyline, start effexor. 17 minutes of supportive psychotherapy with goal to strengthen defenses, promote problems solving, restore adaptive functioning and provide symptom relief. The therapeutic  alliance was strengthened to encourage the patient to express their thoughts and feelings. Esteem building was enhanced through praise, reassurance, normalizing and encouragement. Coping skills were enhanced to build distress tolerance skills and emotional regulation. Patient given education on medication side effects, diagnosis/illness and relapse symptoms. Plan to continue supportive psychotherapy in next appointment to provide symptom relief.  1 wk    11/15: Taper off escitalopram and increase elavil as the latter is helping her depression; patient has been on elavil 25 mg tid in the past with good success. Alternatively, consider switching to effexor. 18 minutes of supportive psychotherapy with goal to strengthen defenses, promote problems solving, restore adaptive functioning and provide symptom relief. The therapeutic alliance was strengthened to encourage the patient to express their thoughts and feelings. Esteem building was enhanced through praise, reassurance, normalizing and encouragement. Coping skills were enhanced to build distress tolerance skills and emotional regulation. Patient given education on medication side effects, diagnosis/illness and relapse symptoms. Plan to continue supportive psychotherapy in next appointment to provide symptom relief.    11/5: Increase lorazepam, continue celexa and amitriptyline. Consider effexor. 17 minutes of supportive psychotherapy with goal to strengthen defenses, promote problems solving, restore adaptive functioning and provide symptom relief. The therapeutic alliance was strengthened to encourage the patient to express their thoughts and feelings. Esteem building was enhanced through praise, reassurance, normalizing and encouragement. Coping skills were enhanced to build distress tolerance skills and emotional regulation. Patient given education on medication side effects, diagnosis/illness and relapse symptoms. Plan to continue supportive psychotherapy in  next appointment to provide symptom relief.  2 wks    10/19: Re-start elavil. Reduce mirtazapine. Re-start therapy. 2 wks.    10/8: Some residual anxiety and depression reemerging.  Increase mirtazapine.  Patient is now doing her visits alone, rather than with her .  4 weeks.    8/27: Doing well on mirtazapine. 6 wks.    7/30: Abilify causing constipation.  Also caused brain zaps.  Discontinue.  Start mirtazapine to target depression, anxiety.  Willing to try, even though she knows it could make her hungrier.  See back in 4 weeks.  Continue therapy.  Longer term, consider discontinuing amitriptyline.    6/1: Doing well from a mental health standpoint, but having constipation from Abilify. No doubt the low-dose of amitriptyline in the evenings is also contributing to the cholinergic blockade leading to constipation. Patient instructed to half the dose of Abilify. Continue other medications as scheduled.    Previous:  No insomnia on the amitriptyline. Consider doxepin as well, trazodone.  Abilify caused constipation and brain zaps.        PLAN:  138. Risk Assessment: Risk of self-harm acutely remain low. Risk factors include a history of suicidal ideation, mood disorder, anxiety disorder, presence of psychosocial stressors such as colonic dysplasia, COVID-19 pandemic. Protective factors include no history of self-harm or suicide attempts, good social support, willingness to engage in care, improved depressive symptoms. Risk of self-harm chronically also remain low, but could be further elevated in the event of treatment noncompliance and/or AODA.  139. Medications:   a. CONTINUE lexapro 10 mg daily. Risks, benefits, alternatives discussed with patient including GI upset, nausea vomiting diarrhea, theoretical decrease of seizure threshold predisposing the patient to a slightly higher seizure risk, headaches, sexual dysfunction, serotonin syndrome, bleeding risk, increased suicidality in patients 24 years and  younger.  After discussion of these risks and benefits, the patient voiced understanding and agreed to proceed.  b. START Abilify 2 mg daily. Risks, benefits, alternatives discussed with patient including increased energy, exacerbation of irritability, akathisia, GI upset, orthostatic hypotension, increased appetite.  After discussion of these risks and benefits, the patient voiced understanding and agreed to proceed.  c. STOP buspar 5 mg bid. Risks, benefits, alternatives discussed with patient including nausea, GI upset, mild sedation, falls risk.  After discussion of these risks and benefits, the patient voiced understanding and agreed to proceed.  d. STOP Ambien 10 mg nightly. Unclear if helped with insomnia. Weeks. Risks, benefits, side effects discussed with patient including sedation, dizziness, GI upset, hallucinations, sleepwalking, sleep-eating.  After discussion of these risks and benefits, the patient voiced understanding and agreed to proceed.  e. STOP belsomra 5 mg nightly. Didn't help insomnia. Tried for a few days. Risks, benefits, side effects discussed with patient including sedation, dizziness, GI upset,  sleepwalking.  After discussion of these risks and benefits, the patient voiced understanding and agreed to proceed.  f. STOP trazodone 50 mg nightly. No help with insomnia. Risks, benefits, side effects discussed with patient including GI upset, sedation, dizziness/falls risk, grogginess the following day, prolongation of the QTc interval.  After discussion of these risks and benefits, the patient voiced understanding and agreed to proceed.    g. CONTINUE lorazepam 1 mg bid PRN. Risks, benefits, alternatives discussed with patient including GI upset, sedation, dizziness, respiratory depression, falls risk.  After discussion of these risks and benefits, the patient voiced understanding and agreed to proceed.  h. S/P:   i. Mirtazapine 15 mg, 7.5 mg: ineffective; made anxiety and depression  "worse.  ii. Abilify: brain zaps and constipation  iii. Seroquel made depression worse  iv. celexa 20 mg daily, ineffective after several months  v. Doxepin caused \"brain zaps\"  vi. Latuda was ineffective.  vii. Amitriptyline 25 mg nightly helpful for sleep but we didn't want her on too many serotonergic meds.  viii. Ambien minimally helpful for insomnia.  ix. Pristiq and effexor worsened anxiety, both low dose.  x. Stopped lamictal for unclear reasons 25 mg.  140. Therapy: continue with Gen[facility] at [facility].  141. Labs/Studies: BMP to monitor sodium levels in February was entirely normal, EKG February shows rate 72, sinus, .  142. Follow Up: 4 weeks, urgent      TREATMENT PLAN/GOALS: Continue supportive psychotherapy efforts and medications as indicated. Treatment and medication options discussed during today's visit. Patient acknowledged and verbally consented to continue with current treatment plan and was educated on the importance of compliance with treatment and follow-up appointments.    MEDICATION ISSUES:  YVETTE reviewed as expected.  Discussed medication options and treatment plan of prescribed medication as well as the risks, benefits, and side effects including potential falls, possible impaired driving and metabolic adversities among others. Patient is agreeable to call the office with any worsening of symptoms or onset of side effects. Patient is agreeable to call 911 or go to the nearest ER should he/she begin having SI/HI. No medication side effects or related complaints today.     MEDS ORDERED DURING VISIT:    Return in about 4 weeks (around 8/29/2022) for urgent.         This document has been electronically signed by Vinny Krishnamurthy MD  August 1, 2022 13:46 EDT       Part of this note may be an electronic transcription/translation of spoken language to printed text using the Dragon Dictation System.    "

## 2022-08-08 ENCOUNTER — TELEPHONE (OUTPATIENT)
Dept: PSYCHIATRY | Facility: CLINIC | Age: 67
End: 2022-08-08

## 2022-08-08 ENCOUNTER — APPOINTMENT (OUTPATIENT)
Dept: MAMMOGRAPHY | Facility: HOSPITAL | Age: 67
End: 2022-08-08

## 2022-08-08 NOTE — TELEPHONE ENCOUNTER
"I called the patient. Still having crying spells. Pt's daughter upset with her that she left the hospital earlier.    Per Jaya, patient has received 2 \"toxic\" texts from their daughter telling pt she needs to go back to inpatient. Daughter blames Jaya, her father, for patient leaving inpt earlier. Jaya feels patient is doing well. Pt has been cooking. Nicola' birthday was yesterday. Feels abilify is working for her. Explained history between daughter and Mom. Fear that Jen will cut her off again.    Per Jaya, patient is yielding to pressure from her daughter, but is actually doing well.    Spoke with Shabana again. Agrees with Jaya's thoughts. Feels better on the abilify. \"Doing a little better.\" No brain zaps and constipation on the abilify.          "

## 2022-08-08 NOTE — TELEPHONE ENCOUNTER
Pt's  called stating the the patient would like provider to give them a call when he can, pt is talking about going back to inpatient facility and wanted providers thoughts and input before she did,  stated he wasn't sure since she was just in 2 weeks ago

## 2022-08-09 ENCOUNTER — TRANSCRIBE ORDERS (OUTPATIENT)
Dept: ADMINISTRATIVE | Facility: HOSPITAL | Age: 67
End: 2022-08-09

## 2022-08-09 DIAGNOSIS — R56.9 SEIZURES: Primary | ICD-10-CM

## 2022-08-15 ENCOUNTER — TRANSCRIBE ORDERS (OUTPATIENT)
Dept: LAB | Facility: HOSPITAL | Age: 67
End: 2022-08-15

## 2022-08-15 ENCOUNTER — LAB (OUTPATIENT)
Dept: LAB | Facility: HOSPITAL | Age: 67
End: 2022-08-15

## 2022-08-15 ENCOUNTER — HOSPITAL ENCOUNTER (OUTPATIENT)
Dept: NEUROLOGY | Facility: HOSPITAL | Age: 67
Discharge: HOME | End: 2022-08-15

## 2022-08-15 DIAGNOSIS — F33.1 MAJOR DEPRESSIVE DISORDER, RECURRENT EPISODE, MODERATE: ICD-10-CM

## 2022-08-15 DIAGNOSIS — F41.1 GENERALIZED ANXIETY DISORDER: ICD-10-CM

## 2022-08-15 DIAGNOSIS — G93.32 CHRONIC FATIGUE SYNDROME: ICD-10-CM

## 2022-08-15 DIAGNOSIS — G93.32 CHRONIC FATIGUE SYNDROME: Primary | ICD-10-CM

## 2022-08-15 DIAGNOSIS — R56.9 SEIZURES: ICD-10-CM

## 2022-08-15 LAB
25(OH)D3 SERPL-MCNC: 56.8 NG/ML (ref 30–100)
ALBUMIN SERPL-MCNC: 4.3 G/DL (ref 3.5–5.2)
ALBUMIN/GLOB SERPL: 1.6 G/DL
ALP SERPL-CCNC: 106 U/L (ref 39–117)
ALT SERPL W P-5'-P-CCNC: 7 U/L (ref 1–33)
ANION GAP SERPL CALCULATED.3IONS-SCNC: 10.6 MMOL/L (ref 5–15)
AST SERPL-CCNC: 11 U/L (ref 1–32)
BASOPHILS # BLD AUTO: 0.03 10*3/MM3 (ref 0–0.2)
BASOPHILS NFR BLD AUTO: 0.5 % (ref 0–1.5)
BILIRUB SERPL-MCNC: 1.1 MG/DL (ref 0–1.2)
BUN SERPL-MCNC: 13 MG/DL (ref 8–23)
BUN/CREAT SERPL: 17.3 (ref 7–25)
CALCIUM SPEC-SCNC: 9.6 MG/DL (ref 8.6–10.5)
CHLORIDE SERPL-SCNC: 101 MMOL/L (ref 98–107)
CHOLEST SERPL-MCNC: 158 MG/DL (ref 0–200)
CO2 SERPL-SCNC: 26.4 MMOL/L (ref 22–29)
CREAT SERPL-MCNC: 0.75 MG/DL (ref 0.57–1)
DEPRECATED RDW RBC AUTO: 46.4 FL (ref 37–54)
EGFRCR SERPLBLD CKD-EPI 2021: 87.4 ML/MIN/1.73
EOSINOPHIL # BLD AUTO: 0.03 10*3/MM3 (ref 0–0.4)
EOSINOPHIL NFR BLD AUTO: 0.5 % (ref 0.3–6.2)
ERYTHROCYTE [DISTWIDTH] IN BLOOD BY AUTOMATED COUNT: 14 % (ref 12.3–15.4)
ESTRADIOL SERPL HS-MCNC: <5 PG/ML
FERRITIN SERPL-MCNC: 272 NG/ML (ref 13–150)
FOLATE SERPL-MCNC: >20 NG/ML (ref 4.78–24.2)
GLOBULIN UR ELPH-MCNC: 2.7 GM/DL
GLUCOSE SERPL-MCNC: 96 MG/DL (ref 65–99)
HBA1C MFR BLD: 5.7 % (ref 4.8–5.6)
HCT VFR BLD AUTO: 42.9 % (ref 34–46.6)
HDLC SERPL-MCNC: 54 MG/DL (ref 40–60)
HGB BLD-MCNC: 13.8 G/DL (ref 12–15.9)
IMM GRANULOCYTES # BLD AUTO: 0.02 10*3/MM3 (ref 0–0.05)
IMM GRANULOCYTES NFR BLD AUTO: 0.3 % (ref 0–0.5)
LDLC SERPL CALC-MCNC: 86 MG/DL (ref 0–100)
LDLC/HDLC SERPL: 1.55 {RATIO}
LYMPHOCYTES # BLD AUTO: 1.75 10*3/MM3 (ref 0.7–3.1)
LYMPHOCYTES NFR BLD AUTO: 27.7 % (ref 19.6–45.3)
MAGNESIUM SERPL-MCNC: 2.3 MG/DL (ref 1.6–2.4)
MCH RBC QN AUTO: 29.1 PG (ref 26.6–33)
MCHC RBC AUTO-ENTMCNC: 32.2 G/DL (ref 31.5–35.7)
MCV RBC AUTO: 90.5 FL (ref 79–97)
MONOCYTES # BLD AUTO: 0.48 10*3/MM3 (ref 0.1–0.9)
MONOCYTES NFR BLD AUTO: 7.6 % (ref 5–12)
NEUTROPHILS NFR BLD AUTO: 4 10*3/MM3 (ref 1.7–7)
NEUTROPHILS NFR BLD AUTO: 63.4 % (ref 42.7–76)
NRBC BLD AUTO-RTO: 0 /100 WBC (ref 0–0.2)
PLATELET # BLD AUTO: 299 10*3/MM3 (ref 140–450)
PMV BLD AUTO: 9.7 FL (ref 6–12)
POTASSIUM SERPL-SCNC: 4.2 MMOL/L (ref 3.5–5.2)
PROGEST SERPL-MCNC: 0.12 NG/ML
PROT SERPL-MCNC: 7 G/DL (ref 6–8.5)
RBC # BLD AUTO: 4.74 10*6/MM3 (ref 3.77–5.28)
SODIUM SERPL-SCNC: 138 MMOL/L (ref 136–145)
T3FREE SERPL-MCNC: 2.83 PG/ML (ref 2–4.4)
T4 FREE SERPL-MCNC: 1.14 NG/DL (ref 0.93–1.7)
TRIGL SERPL-MCNC: 101 MG/DL (ref 0–150)
TSH SERPL DL<=0.05 MIU/L-ACNC: 0.83 UIU/ML (ref 0.27–4.2)
VIT B12 BLD-MCNC: 402 PG/ML (ref 211–946)
VLDLC SERPL-MCNC: 18 MG/DL (ref 5–40)
WBC NRBC COR # BLD: 6.31 10*3/MM3 (ref 3.4–10.8)

## 2022-08-15 PROCEDURE — 83735 ASSAY OF MAGNESIUM: CPT

## 2022-08-15 PROCEDURE — 82306 VITAMIN D 25 HYDROXY: CPT

## 2022-08-15 PROCEDURE — 84402 ASSAY OF FREE TESTOSTERONE: CPT

## 2022-08-15 PROCEDURE — 84481 FREE ASSAY (FT-3): CPT

## 2022-08-15 PROCEDURE — 80053 COMPREHEN METABOLIC PANEL: CPT

## 2022-08-15 PROCEDURE — 82670 ASSAY OF TOTAL ESTRADIOL: CPT

## 2022-08-15 PROCEDURE — 84439 ASSAY OF FREE THYROXINE: CPT

## 2022-08-15 PROCEDURE — 82542 COL CHROMOTOGRAPHY QUAL/QUAN: CPT

## 2022-08-15 PROCEDURE — 36415 COLL VENOUS BLD VENIPUNCTURE: CPT

## 2022-08-15 PROCEDURE — 84443 ASSAY THYROID STIM HORMONE: CPT

## 2022-08-15 PROCEDURE — 82728 ASSAY OF FERRITIN: CPT

## 2022-08-15 PROCEDURE — 80061 LIPID PANEL: CPT

## 2022-08-15 PROCEDURE — 82746 ASSAY OF FOLIC ACID SERUM: CPT

## 2022-08-15 PROCEDURE — 85025 COMPLETE CBC W/AUTO DIFF WBC: CPT

## 2022-08-15 PROCEDURE — 84144 ASSAY OF PROGESTERONE: CPT

## 2022-08-15 PROCEDURE — 82627 DEHYDROEPIANDROSTERONE: CPT

## 2022-08-15 PROCEDURE — 95816 EEG AWAKE AND DROWSY: CPT

## 2022-08-15 PROCEDURE — 82607 VITAMIN B-12: CPT

## 2022-08-15 PROCEDURE — 84403 ASSAY OF TOTAL TESTOSTERONE: CPT

## 2022-08-15 PROCEDURE — 83036 HEMOGLOBIN GLYCOSYLATED A1C: CPT

## 2022-08-16 ENCOUNTER — TELEPHONE (OUTPATIENT)
Dept: FAMILY MEDICINE CLINIC | Facility: CLINIC | Age: 67
End: 2022-08-16

## 2022-08-16 LAB — DHEA-S SERPL-MCNC: 65.7 UG/DL (ref 20.4–186.6)

## 2022-08-18 LAB
TESTOST FREE SERPL-MCNC: 0.8 PG/ML (ref 0–4.2)
TESTOST SERPL-MCNC: 8 NG/DL (ref 3–67)

## 2022-08-20 PROCEDURE — U0004 COV-19 TEST NON-CDC HGH THRU: HCPCS | Performed by: FAMILY MEDICINE

## 2022-08-22 ENCOUNTER — TELEPHONE (OUTPATIENT)
Dept: FAMILY MEDICINE CLINIC | Facility: CLINIC | Age: 67
End: 2022-08-22

## 2022-08-22 DIAGNOSIS — H91.90 HEARING LOSS, UNSPECIFIED HEARING LOSS TYPE, UNSPECIFIED LATERALITY: Primary | ICD-10-CM

## 2022-08-22 NOTE — TELEPHONE ENCOUNTER
Pt  phoned requesting ENT referral to Dr. Atkinson for hearing loss. Referral pending provider response. Please advise.

## 2022-08-28 LAB
ARACHIDONIC ACID, C20:4W6: 14.1 % BY WT (ref 8.6–15.6)
ARACHIDONIC ACID/EPA RATIO: 10.1 (ref 3.7–40.7)
DPA: 1.1 % BY WT (ref 0.8–1.8)
DPA: 3.1 % BY WT (ref 1.4–5.1)
EPA ENTSUB RBC: 1.4 % BY WT (ref 0.2–2.3)
LINOLEIC ACID, C18:2W6: 20 % BY WT (ref 18.6–29.5)
OMEGACHECK (TM): 5.6 % BY WT
W3 FA SERPL-SCNC: 5.6 % BY WT
W6 FA SERPL-SCNC: 37.2 % BY WT
W6 FA/W3 FA SERPL-SRTO: 6.6 (ref 3.7–14.4)

## 2022-08-29 ENCOUNTER — HOSPITAL ENCOUNTER (OUTPATIENT)
Dept: MRI IMAGING | Facility: HOSPITAL | Age: 67
Discharge: HOME | End: 2022-08-29
Admitting: PSYCHIATRY & NEUROLOGY

## 2022-08-29 DIAGNOSIS — R56.9 SEIZURES: ICD-10-CM

## 2022-08-29 PROCEDURE — 0 GADOBENATE DIMEGLUMINE 529 MG/ML SOLUTION: Performed by: PSYCHIATRY & NEUROLOGY

## 2022-08-29 PROCEDURE — A9577 INJ MULTIHANCE: HCPCS | Performed by: PSYCHIATRY & NEUROLOGY

## 2022-08-29 PROCEDURE — 70553 MRI BRAIN STEM W/O & W/DYE: CPT

## 2022-08-29 RX ADMIN — GADOBENATE DIMEGLUMINE 13 ML: 529 INJECTION, SOLUTION INTRAVENOUS at 11:52

## 2022-08-30 ENCOUNTER — OFFICE VISIT (OUTPATIENT)
Dept: PSYCHIATRY | Facility: CLINIC | Age: 67
End: 2022-08-30

## 2022-08-30 VITALS — WEIGHT: 143 LBS | HEIGHT: 64 IN | BODY MASS INDEX: 24.41 KG/M2

## 2022-08-30 DIAGNOSIS — F41.1 GENERALIZED ANXIETY DISORDER: Primary | ICD-10-CM

## 2022-08-30 DIAGNOSIS — F51.05 INSOMNIA DUE TO MENTAL CONDITION: ICD-10-CM

## 2022-08-30 DIAGNOSIS — F33.1 MAJOR DEPRESSIVE DISORDER, RECURRENT EPISODE, MODERATE: ICD-10-CM

## 2022-08-30 DIAGNOSIS — F41.0 PANIC ATTACKS: ICD-10-CM

## 2022-08-30 PROCEDURE — 99214 OFFICE O/P EST MOD 30 MIN: CPT | Performed by: STUDENT IN AN ORGANIZED HEALTH CARE EDUCATION/TRAINING PROGRAM

## 2022-08-30 PROCEDURE — 90833 PSYTX W PT W E/M 30 MIN: CPT | Performed by: STUDENT IN AN ORGANIZED HEALTH CARE EDUCATION/TRAINING PROGRAM

## 2022-08-30 NOTE — PROGRESS NOTES
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 so restless that it is hard to sit still: Not at all  Feeling afraid as if something awful might happen: Not at all  Becoming easily annoyed or irritable: Not at all  FAREED 7 Total Score: 6  If you checked any problems, how difficult have these problems made it for you to do your work, take care of things at home, or get along with other people: Somewhat difficult    Past Surgical History:  Past Surgical History:   Procedure Laterality Date   • COLONOSCOPY  1/812019    last scope 2020   • COLONOSCOPY N/A 10/29/2021    Procedure: COLONOSCOPY with possible biopies.;  Surgeon: Skyler Fuller MD;  Location: [facility] ENDOSCOPY;  Service: General;  Laterality: N/A;   • KNEE SURGERY  2017    broken knee       Problem List:  Patient Active Problem List   Diagnosis   • Anxiety   • Depression   • History of fracture of patella   • Hyperlipemia   • Osteopenia   • Ulcerative lesion   • Constipation   • History of colonic polyps       Allergy:   No Known Allergies     Discontinued Medications:  There are no discontinued medications.    Current Medications:   Current Outpatient Medications   Medication Sig Dispense Refill   • ARIPiprazole (Abilify) 2 MG tablet Take 1 tablet by mouth Daily. 30 tablet 2   • atorvastatin (LIPITOR) 20 MG tablet Take 1 tablet by mouth  Daily for 90 days. 90 tablet 1   • Calcium Carb-Cholecalciferol (OYSCO 500 + D) 500-200 MG-UNIT tablet      • cholecalciferol (VITAMIN D3) 25 MCG (1000 UT) tablet Take 1,000 Units by mouth Daily.     • Coenzyme Q10 (CO Q10 PO) Take  by mouth.     • escitalopram (LEXAPRO) 10 MG tablet TAKE 1 TABLET BY MOUTH DAILY 90 tablet 0   • folic acid (FOLVITE) 1 MG tablet Take 1 tablet by mouth Daily. 90 tablet 1   • gabapentin (NEURONTIN) 600 MG tablet Take 600 mg by mouth Every Night.     • GARLIC PO garlic 1,000 mg oral capsule take 1 capsule by oral route daily   Active     • Glucosamine Sulfate 500 MG tablet Glucosamine 500 mg oral tablet take 1 tablet by oral route daily   Active     • LORazepam (ATIVAN) 1 MG tablet Take 1 mg by mouth 2 (Two) Times a Day As Needed. for anxiety     • Multiple Vitamins-Iron (MULTIVITAMIN PLUS IRON ADULT PO) Women's Multivitamin 18 mg iron-400 mcg-500 mg oral tablet take 1 tablet by oral route daily   Active     • Omega-3 Fatty Acids (fish oil) 1000 MG capsule capsule 3,000 mg 3 (Three) Times a Day With Meals. PT(PATIENT) REPORTS SHE TAKES MEDICATION MORNING/LUNCH/EVENING     • Probiotic Product (PROBIOTIC PO) Take  by mouth.     • vitamin E 400 UNIT capsule vitamin E 400 unit oral capsule take 1 capsule by oral route daily   Active     • busPIRone (BUSPAR) 5 MG tablet Take 1 tablet by mouth 2 (Two) Times a Day. 60 tablet 2     No current facility-administered medications for this visit.       Past Medical History:  Past Medical History:   Diagnosis Date   • Acid reflux 2022   • Anxiety    • Depression    • Fracture of patella 09/12/2017   • Hyperlipemia    • Osteopenia    • Panic disorder    • Psychiatric inpatient 07/22/2022    Discharged 07/22/2022 for Anxiety, Depression, and Suicidal Bx's       Social History     Socioeconomic History   • Marital status:    Tobacco Use   • Smoking status: Never Smoker   • Smokeless tobacco: Never Used   Vaping Use   • Vaping Use: Never used  "  Substance and Sexual Activity   • Alcohol use: Not Currently   • Drug use: Never   • Sexual activity: Not Currently         Family History   Problem Relation Age of Onset   • Stroke Mother    • Heart disease Mother    • Cancer Brother    • Seizures Son      Review of Systems:  Review of Systems   Constitutional: Negative for diaphoresis.   HENT: Negative for drooling.    Eyes: Negative for visual disturbance.   Respiratory: Negative for cough and shortness of breath.    Cardiovascular: Negative for chest pain, palpitations and leg swelling.   Gastrointestinal: Negative for nausea and vomiting.   Endocrine: Negative for heat intolerance.   Genitourinary: Negative for difficulty urinating.   Musculoskeletal: Negative for joint swelling.   Allergic/Immunologic: Negative for immunocompromised state.   Neurological: Positive for dizziness. Negative for seizures, speech difficulty and numbness.         · Mental Status Exam  · Appearance  · : sitting in a chair with her , good eye contact, normal street clothes, groomed, in person  · Behavior  · : pleasant and cooperative  · Motor  · : No abnormal  · Speech  · : normal rhythm, rate, volume, tone, not hyperverbal, not pressured, normal prosidy, Speaks with an accent  · Mood  · : \"I'm so much better\"  · Affect  · : nearly euthymic, no tears, mood congruent, fair variability  · Thought Content  · : negative suicidal ideations, negative homicidal ideations, negative obsessions  · Perceptions  · : negative auditory hallucinations, negative visual hallucinations  · Thought Process  · : linear  · Insight/Judgement  · : fair/fair  · Cognition  · : grossly intact  · Attention  · : intact      Physical Exam:  Physical Exam    Vital Signs:   Ht 162.6 cm (64\")   Wt 64.9 kg (143 lb)   BMI 24.55 kg/m²      Lab Results:   Admission on 08/20/2022, Discharged on 08/20/2022   Component Date Value Ref Range Status   • SARS Antigen 08/20/2022 Not Detected  Not Detected, Presumptive " Negative Final   • Internal Control 08/20/2022 Passed  Passed Final   • Lot Number 08/20/2022 [id number]   Final   • Expiration Date 08/20/2022 92,[id number]   Final   • Rapid Influenza A Ag 08/20/2022 Negative  Negative Final   • Rapid Influenza B Ag 08/20/2022 Negative  Negative Final   • Internal Control 08/20/2022 Passed  Passed Final   • Lot Number 08/20/2022 [id number]   Final   • Expiration Date 08/20/2022 120,[id number]   Final   • COVID19 08/20/2022 Not Detected  Not Detected - Ref. Range Final   Lab on 08/15/2022   Component Date Value Ref Range Status   • OmegaCheck 08/15/2022 5.6  >5.4 % by wt Final    Relative Risk: LOW  Increasing blood levels of long-chain n-3 fatty acids are  associated with a lower risk of sudden cardiac death (1).  Based on the top (75th percentile) and bottom (25th  percentile) quartiles of the CHL reference population, the  following risk categories were established for OmegaCheck:  A cut-off of >=5.5% by wt defines a population at low  relative risk, 3.8-5.4% by wt defines a population at  moderate relative risk, and <=3.7% by wt defines a  population at high relative risk of sudden cardiac death.  The totality of the scientific evidence demonstrates that  when consumption of fish oils is limited to 3 g/day or less  of EPA and DHA, there is no significant risk for increased  bleeding time beyond the normal range. A daily dosage of 1  gram of EPA and DHA lowers the circulating triglycerides by  about 7-10% within 2 to 3 weeks. (Reference: 1-Esteban et al.  NEJM. 2002; 346: 5531-4448).   • Arachidonic Acid/EPA Ratio 08/15/2022 10.1  3.7 - 40.7 Final   • Omega-6/Omega-3 Ratio 08/15/2022 6.6  3.7 - 14.4 Final   • Omega-3 Total 08/15/2022 5.6  % by wt Final   • EPA 08/15/2022 1.4  0.2 - 2.3 % by wt Final   • DPA 08/15/2022 1.1  0.8 - 1.8 % by wt Final   • DPA 08/15/2022 3.1  1.4 - 5.1 % by wt Final   • Omega-6 Total 08/15/2022 37.2  % by wt Final    [facility] measures a number of omega-6 fatty  acids  with AA and LA being the two most abundant forms reported.   • Arachidonic Acid, C20:4w6 08/15/2022 14.1  8.6 - 15.6 % by wt Final   • Linoleic Acid, C18:2w6 08/15/2022 20.0  18.6 - 29.5 % by wt Final    This test is performed by a Liquid Chromatography-Tandem  Mass Spectrometry (LC/MS/MS) method. This test was developed  and its performance characteristics determined by the  [facility] It has not been cleared or approved  by the U.S. FDA. [facility] is regulated under  Clinical Laboratory Improvement Amendments (CLIA) as  qualified to perform high-complexity testing. This test is  used for clinical purposes.  It should not be regarded as  investigational or for research.   • Vitamin B-12 08/15/2022 402  211 - 946 pg/mL Final   • Folate 08/15/2022 >20.00  4.78 - 24.20 ng/mL Final   • 25 Hydroxy, Vitamin D 08/15/2022 56.8  30.0 - 100.0 ng/ml Final   • Ferritin 08/15/2022 272.00 (A) 13.00 - 150.00 ng/mL Final   • Magnesium 08/15/2022 2.3  1.6 - 2.4 mg/dL Final   • Glucose 08/15/2022 96  65 - 99 mg/dL Final   • BUN 08/15/2022 13  8 - 23 mg/dL Final   • Creatinine 08/15/2022 0.75  0.57 - 1.00 mg/dL Final   • Sodium 08/15/2022 138  136 - 145 mmol/L Final   • Potassium 08/15/2022 4.2  3.5 - 5.2 mmol/L Final   • Chloride 08/15/2022 101  98 - 107 mmol/L Final   • CO2 08/15/2022 26.4  22.0 - 29.0 mmol/L Final   • Calcium 08/15/2022 9.6  8.6 - 10.5 mg/dL Final   • Total Protein 08/15/2022 7.0  6.0 - 8.5 g/dL Final   • Albumin 08/15/2022 4.30  3.50 - 5.20 g/dL Final   • ALT (SGPT) 08/15/2022 7  1 - 33 U/L Final   • AST (SGOT) 08/15/2022 11  1 - 32 U/L Final   • Alkaline Phosphatase 08/15/2022 106  39 - 117 U/L Final   • Total Bilirubin 08/15/2022 1.1  0.0 - 1.2 mg/dL Final   • Globulin 08/15/2022 2.7  gm/dL Final   • A/G Ratio 08/15/2022 1.6  g/dL Final   • BUN/Creatinine Ratio 08/15/2022 17.3  7.0 - 25.0 Final   • Anion Gap 08/15/2022 10.6  5.0 - 15.0 mmol/L Final   • eGFR 08/15/2022 87.4   >60.0 mL/min/1.73 Final    National Kidney Foundation and American Society of Nephrology (ASN) Task Force recommended calculation based on the Chronic Kidney Disease Epidemiology Collaboration (CKD-EPI) equation refit without adjustment for race.   • Total Cholesterol 08/15/2022 158  0 - 200 mg/dL Final   • Triglycerides 08/15/2022 101  0 - 150 mg/dL Final   • HDL Cholesterol 08/15/2022 54  40 - 60 mg/dL Final   • LDL Cholesterol  08/15/2022 86  0 - 100 mg/dL Final   • VLDL Cholesterol 08/15/2022 18  5 - 40 mg/dL Final   • LDL/HDL Ratio 08/15/2022 1.55   Final   • Hemoglobin A1C 08/15/2022 5.70 (A) 4.80 - 5.60 % Final   • TSH 08/15/2022 0.826  0.270 - 4.200 uIU/mL Final   • T3, Free 08/15/2022 2.83  2.00 - 4.40 pg/mL Final   • Free T4 08/15/2022 1.14  0.93 - 1.70 ng/dL Final   • DHEA-Sulfate 08/15/2022 65.7  20.4 - 186.6 ug/dL Final   • Testosterone, Total 08/15/2022 8  3 - 67 ng/dL Final   • Testosterone, Free 08/15/2022 0.8  0.0 - 4.2 pg/mL Final   • Estradiol 08/15/2022 <5.0  pg/mL Final   • Progesterone 08/15/2022 0.12  ng/mL Final   • WBC 08/15/2022 6.31  3.40 - 10.80 10*3/mm3 Final   • RBC 08/15/2022 4.74  3.77 - 5.28 10*6/mm3 Final   • Hemoglobin 08/15/2022 13.8  12.0 - 15.9 g/dL Final   • Hematocrit 08/15/2022 42.9  34.0 - 46.6 % Final   • MCV 08/15/2022 90.5  79.0 - 97.0 fL Final   • MCH 08/15/2022 29.1  26.6 - 33.0 pg Final   • MCHC 08/15/2022 32.2  31.5 - 35.7 g/dL Final   • RDW 08/15/2022 14.0  12.3 - 15.4 % Final   • RDW-SD 08/15/2022 46.4  37.0 - 54.0 fl Final   • MPV 08/15/2022 9.7  6.0 - 12.0 fL Final   • Platelets 08/15/2022 299  140 - 450 10*3/mm3 Final   • Neutrophil % 08/15/2022 63.4  42.7 - 76.0 % Final   • Lymphocyte % 08/15/2022 27.7  19.6 - 45.3 % Final   • Monocyte % 08/15/2022 7.6  5.0 - 12.0 % Final   • Eosinophil % 08/15/2022 0.5  0.3 - 6.2 % Final   • Basophil % 08/15/2022 0.5  0.0 - 1.5 % Final   • Immature Grans % 08/15/2022 0.3  0.0 - 0.5 % Final   • Neutrophils, Absolute 08/15/2022  4.00  1.70 - 7.00 10*3/mm3 Final   • Lymphocytes, Absolute 08/15/2022 1.75  0.70 - 3.10 10*3/mm3 Final   • Monocytes, Absolute 08/15/2022 0.48  0.10 - 0.90 10*3/mm3 Final   • Eosinophils, Absolute 08/15/2022 0.03  0.00 - 0.40 10*3/mm3 Final   • Basophils, Absolute 08/15/2022 0.03  0.00 - 0.20 10*3/mm3 Final   • Immature Grans, Absolute 08/15/2022 0.02  0.00 - 0.05 10*3/mm3 Final   • nRBC 08/15/2022 0.0  0.0 - 0.2 /100 WBC Final   Office Visit on 06/09/2022   Component Date Value Ref Range Status   • Total Cholesterol 06/09/2022 265 (A) 0 - 200 mg/dL Final   • Triglycerides 06/09/2022 367 (A) 0 - 150 mg/dL Final   • HDL Cholesterol 06/09/2022 36 (A) 40 - 60 mg/dL Final   • LDL Cholesterol  06/09/2022 159 (A) 0 - 100 mg/dL Final   • VLDL Cholesterol 06/09/2022 70 (A) 5 - 40 mg/dL Final   • LDL/HDL Ratio 06/09/2022 4.32   Final   • Hemoglobin A1C 06/09/2022 5.50  4.80 - 5.60 % Final   • MTHFR 06/09/2022 Comment   Final    Comment: Result:  c.665C>T (p. [id number]), legacy name: [id number] - Detected, heterozygous  c.1286A>C (p. [id number]), legacy name: [id number] - Detected,  heterozygous  Interpretation:  This result is not associated with an increased risk for  hyperhomocysteinemia. See Additional Clinical Information and  Comments.  Additional Clinical Information:  Hyperhomocysteinemia is multifactorial involving genetic, clinical,  and environmental risk factors. Reduced enzyme activity of  methylenetetrahydrofolate reductase (MTHFR) is a genetic risk  factor for hyperhomocysteinemia, particularly when serum folate  levels are low. There are two common variants in the MTHFR gene  that can decrease enzyme activity; c.665C>T (p. [id number]), legacy  name [id number], and c.1286A>C (p. [id number]), legacy name [id number]. These  variants do not independently increase risk of conditions related  to hyperhomocysteinemia in the absence of elevated homocysteine  levels. Measurement of total plasma homocysteine is recommended.  Patients                             should share their MTHFR genotype with physicians who are  making decisions regarding chemotherapy treatments that depend on  folate, such as methotrexate.  Guidelines do not recommend genotyping of these two MTHFR variants  in the evaluation of venous thrombosis or obstetric risk due to  limited evidence of clinical utility (PMID: 63318068).  Comments:  Genetic Coordinators are available for health care providers to  discuss results at [phone] ([phone]).  Test Details:  Variants Analyzed: c.665C>T (p. [id number]), legacy name: C677T and  c.1286A>C (p. [id number]), legacy name: [id number]  Methods/Limitations:  DNA analysis of the MTHFR gene was performed by PCR amplification  followed by restriction enzyme analysis. The diagnostic sensitivity  is >99%. Results must be combined with clinical information for the  most accurate interpretation. Molecular-based testing is highly  accurate, but as in any laboratory test, diagnostic errors may  occur. False positive or false negative results                            may occur for  reasons that include genetic variants, blood transfusions, bone  marrow transplantation, somatic or tissue-specific mosaicism,  mislabeled samples, or erroneous representation of family  relationships.  This test was developed and its performance characteristics  determined by [facility]. It has not been cleared or approved by the  Food and Drug Administration.  References:  Aydeekey SE, Gomez CJ, Hadley PEREZ. ACMG Practice Guideline: lack of  evidence for MTHFR polymorphism testing. Sarah Med. 2013 Feb;15(2):153-6. doi: 10.1038/gim.2012.165. Epub 2013 Rk 3. PMID:  [id number].  American College of Obstetricians and Gynecologists' Committee on  Practice Bulletins-Obstetrics. ACOG Practice Bulletin No. 197:  Inherited Thrombophilias in Pregnancy. Obstet Gynecol. 2018  Jul;132(1):e18-e34. doi: 10.1097/AOG.[id number]. Erratum in:  Obstet Gynecol. 2018 Oct;132(4):1069. PMID:  [id number].  Rose Mary Michaels, PhD, [facility]  Carlo Fragoso, PhD, FAC  Hernesto Reese, PhD, [facility]  Vishal Waldron, PhD, [facility]  JT Gonzalez, PhD, [facility]  Windy Otto, PhD, [facility]  Carolyn Bean, PhD, FAC   Admission on 05/14/2022, Discharged on 05/14/2022   Component Date Value Ref Range Status   • Glucose 05/14/2022 109 (A) 65 - 99 mg/dL Final   • BUN 05/14/2022 10  8 - 23 mg/dL Final   • Creatinine 05/14/2022 0.77  0.57 - 1.00 mg/dL Final   • Sodium 05/14/2022 139  136 - 145 mmol/L Final   • Potassium 05/14/2022 3.9  3.5 - 5.2 mmol/L Final   • Chloride 05/14/2022 103  98 - 107 mmol/L Final   • CO2 05/14/2022 24.2  22.0 - 29.0 mmol/L Final   • Calcium 05/14/2022 9.7  8.6 - 10.5 mg/dL Final   • Total Protein 05/14/2022 7.1  6.0 - 8.5 g/dL Final   • Albumin 05/14/2022 4.20  3.50 - 5.20 g/dL Final   • ALT (SGPT) 05/14/2022 13  1 - 33 U/L Final   • AST (SGOT) 05/14/2022 15  1 - 32 U/L Final   • Alkaline Phosphatase 05/14/2022 107  39 - 117 U/L Final   • Total Bilirubin 05/14/2022 1.1  0.0 - 1.2 mg/dL Final   • Globulin 05/14/2022 2.9  gm/dL Final   • A/G Ratio 05/14/2022 1.4  g/dL Final   • BUN/Creatinine Ratio 05/14/2022 13.0  7.0 - 25.0 Final   • Anion Gap 05/14/2022 11.8  5.0 - 15.0 mmol/L Final   • eGFR 05/14/2022 84.7  >60.0 mL/min/1.73 Final    National Kidney Foundation and American Society of Nephrology (ASN) Task Force recommended calculation based on the Chronic Kidney Disease Epidemiology Collaboration (CKD-EPI) equation refit without adjustment for race.   • Lipase 05/14/2022 47  13 - 60 U/L Final   • Extra Tube 05/14/2022 Hold for add-ons.   Final    Auto resulted.   • Extra Tube 05/14/2022 hold for add-on   Final    Auto resulted   • Extra Tube 05/14/2022 Hold for add-ons.   Final    Auto resulted.   • Extra Tube 05/14/2022 Hold for add-ons.   Final    Auto resulted   • WBC 05/14/2022 6.95  3.40 - 10.80 10*3/mm3 Final   • RBC 05/14/2022 4.53  3.77 - 5.28 10*6/mm3 Final   •  Hemoglobin 05/14/2022 13.9  12.0 - 15.9 g/dL Final   • Hematocrit 05/14/2022 41.9  34.0 - 46.6 % Final   • MCV 05/14/2022 92.5  79.0 - 97.0 fL Final   • MCH 05/14/2022 30.7  26.6 - 33.0 pg Final   • MCHC 05/14/2022 33.2  31.5 - 35.7 g/dL Final   • RDW 05/14/2022 14.2  12.3 - 15.4 % Final   • RDW-SD 05/14/2022 47.9  37.0 - 54.0 fl Final   • MPV 05/14/2022 8.9  6.0 - 12.0 fL Final   • Platelets 05/14/2022 288  140 - 450 10*3/mm3 Final   • Neutrophil % 05/14/2022 68.0  42.7 - 76.0 % Final   • Lymphocyte % 05/14/2022 22.2  19.6 - 45.3 % Final   • Monocyte % 05/14/2022 8.5  5.0 - 12.0 % Final   • Eosinophil % 05/14/2022 0.4  0.3 - 6.2 % Final   • Basophil % 05/14/2022 0.3  0.0 - 1.5 % Final   • Immature Grans % 05/14/2022 0.6 (A) 0.0 - 0.5 % Final   • Neutrophils, Absolute 05/14/2022 4.73  1.70 - 7.00 10*3/mm3 Final   • Lymphocytes, Absolute 05/14/2022 1.54  0.70 - 3.10 10*3/mm3 Final   • Monocytes, Absolute 05/14/2022 0.59  0.10 - 0.90 10*3/mm3 Final   • Eosinophils, Absolute 05/14/2022 0.03  0.00 - 0.40 10*3/mm3 Final   • Basophils, Absolute 05/14/2022 0.02  0.00 - 0.20 10*3/mm3 Final   • Immature Grans, Absolute 05/14/2022 0.04  0.00 - 0.05 10*3/mm3 Final   • nRBC 05/14/2022 0.0  0.0 - 0.2 /100 WBC Final   • H. pylori IgG 05/13/2022 Negative  Negative, Equivocal Final   • Occult Blood, Fecal by Immunoassay 05/14/2022 Positive (A) Negative Final   • Color, UA 05/14/2022 Yellow  Yellow, Straw Final   • Appearance, UA 05/14/2022 Clear  Clear Final   • pH, UA 05/14/2022 7.0  5.0 - 8.0 Final   • Specific Gravity, UA 05/14/2022 1.010  1.005 - 1.030 Final   • Glucose, UA 05/14/2022 Negative  Negative Final   • Ketones, UA 05/14/2022 Trace (A) Negative Final   • Bilirubin, UA 05/14/2022 Negative  Negative Final   • Blood, UA 05/14/2022 Trace (A) Negative Final   • Protein, UA 05/14/2022 Negative  Negative Final   • Leuk Esterase, UA 05/14/2022 Negative  Negative Final   • Nitrite, UA 05/14/2022 Negative  Negative Final   •  Urobilinogen, UA 05/14/2022 0.2 E.U./dL  0.2 - 1.0 E.U./dL Final   • RBC, UA 05/14/2022 0-2 (A) None Seen /HPF Final   • WBC, UA 05/14/2022 None Seen  None Seen /HPF Final    Urine culture not indicated.   • Bacteria, UA 05/14/2022 None Seen  None Seen /HPF Final   • Squamous Epithelial Cells, UA 05/14/2022 0-2  None Seen, 0-2 /HPF Final   • Hyaline Casts, UA 05/14/2022 None Seen  None Seen /LPF Final   • Methodology 05/14/2022 Automated Microscopy   Final       EKG Results:  No orders to display       Imaging Results:  No Images in the past 120 days found..      Assessment & Plan   Diagnoses and all orders for this visit:    1. Generalized anxiety disorder (Primary)    2. Panic attacks    3. Insomnia due to mental condition    4. Major depressive disorder, recurrent episode, moderate (HCC)        Visit Diagnoses:    ICD-10-CM ICD-9-CM   1. Generalized anxiety disorder  F41.1 300.02   2. Panic attacks  F41.0 300.01   3. Insomnia due to mental condition  F51.05 300.9     327.02   4. Major depressive disorder, recurrent episode, moderate (HCC)  F33.1 296.32       INITIAL presentation most consistent with major depressive disorder in partial remission, FAREED, rare panic attacks.    8/30: Remarkably well. Initial insomnia. 17 minutes of supportive psychotherapy with goal to strengthen defenses, promote problems solving, restore adaptive functioning and provide symptom relief. The therapeutic alliance was strengthened to encourage the patient to express their thoughts and feelings. Esteem building was enhanced through praise, reassurance, normalizing and encouragement. Coping skills were enhanced to build distress tolerance skills and emotional regulation. Allowed patient to freely discuss issues without interruption or judgement with unconditional positive regard, active listening skills, and empathy. Provided a safe, confidential environment to facilitate the development of a positive therapeutic relationship and  encourage open, honest communication. Assisted patient in identifying risk factors which would indicate the need for higher level of care including thoughts to harm self or others and/or self-harming behavior and encouraged patient to contact this office, call 911, or present to the nearest emergency room should any of these events occur. Assisted patient in processing session content; acknowledged and normalized patient’s thoughts, feelings, and concerns by utilizing a person-centered approach in efforts to build appropriate rapport and a positive therapeutic relationship with open and honest communication. Patient given education on medication side effects, diagnosis/illness and relapse symptoms. Plan to continue supportive psychotherapy in next appointment to provide symptom relief.  Diagnoses: as above  Symptoms: as above  Functional status: good  Mental Status Exam: as above    Treatment plan: Medication management and supportive psychotherapy  Prognosis: good  Progress: much better  4 wks      8/1: Try another mood stabilizer in place of olanzapine.  Will trial Abilify again.  Also consider Geodon.  18 minutes of supportive psychotherapy Treatment plan: Medication management and supportive psychotherapy  Prognosis: Fair to good  Progress: Slightly improved  4 weeks, urgent    6/17: start buspar, doing better. 17 minutes of supportive psychotherapy Treatment plan: Medication management and supportive psychotherapy  Prognosis: good  Progress: improving slowly  4 wks      5/20: Continue Lexapro and lorazepam.  Try Belsomra in place of Ambien.  19 minutes of supportive psychotherapyTreatment plan: Medication management and supportive psychotherapy  Prognosis: Good  Progress: Some progress since last visit  4 weeks urgent    4/22: Some improvement in anx and dep, but still residual symptoms.  Persistent insomnia. Declines IOP, inpatient admission.  Increase Lexapro and start trazodone.  17 minutes of supportive  psychotherapy Treatment plan: Medication management and supportive psychotherapy  Prognosis: good  Progress: some, minimal      3/24: Focus on lower doses.  Start Pristiq.  Depression with anxious distress versus depression and generalized anxiety disorder.  She only has distractibility, no other symptoms of hypomania.  17 minutes of supportive psychotherapy  4 weeks    3/15: Continue lurasidone 40 mg at night, increase Ambien to 10 mg at night.  Continue low-dose Lamictal, plan to increase further.  Continue titrating off of amitriptyline.  17 minutes of supportive psychotherapy1 week, urgent    3/10: Effexor has now been stopped.  Continue Latuda and Lamictal at present doses.  I will see her back in a few days and then at 6 weeks.  Bipolar 2?  If so, how come she is tolerating amitriptyline in the evenings?  19 minutes of supportive psychotherapy 6 weeks    2/15: Still utilizing lorazepam which means the anxiety is not under control.  Increase Effexor.  17 minutes of supportive psychotherapy 4 weeks    1/17: Continued improvement.  No changes, only on effexor 75 mg for 2 weeks.  Tolerating medications without side effects.  17 minutes of supportive psychotherapy   4 wks    1/3: Continued improvement. Increase effexor to target residual dep anx. TMS to start 1/6. 17 minutes of supportive psychotherapy  4 wks    12/17: Improved.  Continue medication titration as planned.  17 minutes of supportive psychotherapy  See back in 2 weeks on 3 January in the morning    12/13: See back in 1 week. Taper down on amitriptyline, start effexor. 17 minutes of supportive psychotherapy  1 wk    11/15: Taper off escitalopram and increase elavil as the latter is helping her depression; patient has been on elavil 25 mg tid in the past with good success. Alternatively, consider switching to effexor. 18 minutes of supportive psychotherapy     11/5: Increase lorazepam, continue celexa and amitriptyline. Consider effexor. 17 minutes of  supportive psychotherapy2 wks    10/19: Re-start elavil. Reduce mirtazapine. Re-start therapy. 2 wks.    10/8: Some residual anxiety and depression reemerging.  Increase mirtazapine.  Patient is now doing her visits alone, rather than with her .  4 weeks.    8/27: Doing well on mirtazapine. 6 wks.    7/30: Abilify causing constipation.  Also caused brain zaps.  Discontinue.  Start mirtazapine to target depression, anxiety.  Willing to try, even though she knows it could make her hungrier.  See back in 4 weeks.  Continue therapy.  Longer term, consider discontinuing amitriptyline.    6/1: Doing well from a mental health standpoint, but having constipation from Abilify. No doubt the low-dose of amitriptyline in the evenings is also contributing to the cholinergic blockade leading to constipation. Patient instructed to half the dose of Abilify. Continue other medications as scheduled.        PLAN:  150. Risk Assessment: Risk of self-harm acutely remain low. Risk factors include a history of suicidal ideation, mood disorder, anxiety disorder, presence of psychosocial stressors such as colonic dysplasia, COVID-19 pandemic. Protective factors include no history of self-harm or suicide attempts, good social support, willingness to engage in care, improved depressive symptoms. Risk of self-harm chronically also remain low, but could be further elevated in the event of treatment noncompliance and/or AODA.  151. Medications:   a. CONTINUE lexapro 10 mg daily. Risks, benefits, alternatives discussed with patient including GI upset, nausea vomiting diarrhea, theoretical decrease of seizure threshold predisposing the patient to a slightly higher seizure risk, headaches, sexual dysfunction, serotonin syndrome, bleeding risk, increased suicidality in patients 24 years and younger.  After discussion of these risks and benefits, the patient voiced understanding and agreed to proceed.  b. CONTINUE Abilify 2 mg daily. Risks,  "benefits, alternatives discussed with patient including increased energy, exacerbation of irritability, akathisia, GI upset, orthostatic hypotension, increased appetite.  After discussion of these risks and benefits, the patient voiced understanding and agreed to proceed.  c. AGREE with gabapentin 600 mg nightly.   d. RESTART Ambien 10 mg nightly. Unclear if helped with insomnia. Weeks. Risks, benefits, side effects discussed with patient including sedation, dizziness, GI upset, hallucinations, sleepwalking, sleep-eating.  After discussion of these risks and benefits, the patient voiced understanding and agreed to proceed.  e. STOP trazodone 50 mg nightly. No help with insomnia. Risks, benefits, side effects discussed with patient including GI upset, sedation, dizziness/falls risk, grogginess the following day, prolongation of the QTc interval.  After discussion of these risks and benefits, the patient voiced understanding and agreed to proceed.    f. CONTINUE lorazepam 1 mg bid PRN. Risks, benefits, alternatives discussed with patient including GI upset, sedation, dizziness, respiratory depression, falls risk.  After discussion of these risks and benefits, the patient voiced understanding and agreed to proceed.  g. S/P:   i. buspar 5 mg bid was ineffective.  ii. Mirtazapine 15 mg, 7.5 mg: ineffective; made anxiety and depression worse.  iii. Abilify: brain zaps and constipation  iv. Seroquel made depression worse  v. celexa 20 mg daily, ineffective after several months  vi. Doxepin caused \"brain zaps\"  vii. Latuda was ineffective.  viii. Amitriptyline 25 mg nightly helpful for sleep but we didn't want her on too many serotonergic meds.  ix. Ambien minimally helpful for insomnia.  x. Pristiq and effexor worsened anxiety, both low dose.  xi. Stopped lamictal for unclear reasons 25 mg.  xii. belsomra 5 mg nightly didn't help with insomnia.  152. Therapy: continue with Genevieve at [facility]  153. Labs/Studies: BMP to monitor " sodium levels in February was entirely normal, EKG February shows rate 72, sinus, .  154. Follow Up: 4 weeks, urgent      TREATMENT PLAN/GOALS: Continue supportive psychotherapy efforts and medications as indicated. Treatment and medication options discussed during today's visit. Patient acknowledged and verbally consented to continue with current treatment plan and was educated on the importance of compliance with treatment and follow-up appointments.    MEDICATION ISSUES:  YVETTE reviewed as expected.  Discussed medication options and treatment plan of prescribed medication as well as the risks, benefits, and side effects including potential falls, possible impaired driving and metabolic adversities among others. Patient is agreeable to call the office with any worsening of symptoms or onset of side effects. Patient is agreeable to call 911 or go to the nearest ER should he/she begin having SI/HI. No medication side effects or related complaints today.     MEDS ORDERED DURING VISIT:    Return in about 4 weeks (around 9/27/2022) for urgent.         This document has been electronically signed by Vinny Krishnamurthy MD  August 30, 2022 08:42 EDT       Part of this note may be an electronic transcription/translation of spoken language to printed text using the Dragon Dictation System.

## 2022-08-30 NOTE — PATIENT INSTRUCTIONS
1.  Please return to clinic at your next scheduled visit.  Contact the clinic ([phone]) at least 24 hours prior in the event you need to cancel.  2.  Do no harm to yourself or others.    3.  Avoid alcohol and drugs.    4.  Take all medications as prescribed.  Please contact the clinic with any concerns. If you are in need of medication refills, please call the clinic at [phone].    5. Should you want to get in touch with your provider, Dr. Vinny Krishnamurthy, please utilize "[employer] or contact the office ([phone]), and staff will be able to page Dr. Krishnamurthy directly.  6.  In the event you have personal crisis, contact the following crisis numbers: Suicide Prevention Hotline 1-340.768.2326; [facility] Helpline 5-706-079-KVHY; [facility] Emergency Room [phone]; text HELLO to 379700; or 915.     SPECIFIC RECOMMENDATIONS:     1.      Medications discussed at this encounter:                   - restart ambien     2.      Psychotherapy recommendations:      3.     Return to clinic: 4 weeks

## 2022-09-30 ENCOUNTER — OFFICE VISIT (OUTPATIENT)
Dept: PSYCHIATRY | Facility: CLINIC | Age: 67
End: 2022-09-30

## 2022-09-30 VITALS
BODY MASS INDEX: 24.24 KG/M2 | WEIGHT: 142 LBS | HEIGHT: 64 IN | SYSTOLIC BLOOD PRESSURE: 131 MMHG | DIASTOLIC BLOOD PRESSURE: 60 MMHG

## 2022-09-30 DIAGNOSIS — F41.1 GENERALIZED ANXIETY DISORDER: ICD-10-CM

## 2022-09-30 DIAGNOSIS — F41.0 PANIC ATTACKS: ICD-10-CM

## 2022-09-30 DIAGNOSIS — F51.05 INSOMNIA DUE TO MENTAL CONDITION: ICD-10-CM

## 2022-09-30 DIAGNOSIS — F33.1 MODERATE EPISODE OF RECURRENT MAJOR DEPRESSIVE DISORDER: Primary | ICD-10-CM

## 2022-09-30 DIAGNOSIS — F33.2 SEVERE EPISODE OF RECURRENT MAJOR DEPRESSIVE DISORDER, WITHOUT PSYCHOTIC FEATURES: ICD-10-CM

## 2022-09-30 PROCEDURE — 90833 PSYTX W PT W E/M 30 MIN: CPT | Performed by: STUDENT IN AN ORGANIZED HEALTH CARE EDUCATION/TRAINING PROGRAM

## 2022-09-30 PROCEDURE — 99214 OFFICE O/P EST MOD 30 MIN: CPT | Performed by: STUDENT IN AN ORGANIZED HEALTH CARE EDUCATION/TRAINING PROGRAM

## 2022-09-30 RX ORDER — GABAPENTIN 300 MG/1
600 CAPSULE ORAL
COMMUNITY
Start: 2022-09-06 | End: 2022-10-31 | Stop reason: SDUPTHER

## 2022-09-30 RX ORDER — ESCITALOPRAM OXALATE 10 MG/1
10 TABLET ORAL DAILY
Qty: 90 TABLET | Refills: 1 | Status: SHIPPED | OUTPATIENT
Start: 2022-09-30 | End: 2022-09-30 | Stop reason: SDUPTHER

## 2022-09-30 RX ORDER — ARIPIPRAZOLE 2 MG/1
2 TABLET ORAL DAILY
Qty: 7 TABLET | Refills: 0 | Status: SHIPPED | OUTPATIENT
Start: 2022-09-30 | End: 2022-12-12 | Stop reason: SDUPTHER

## 2022-09-30 RX ORDER — GABAPENTIN 100 MG/1
100 CAPSULE ORAL NIGHTLY
Qty: 30 CAPSULE | Refills: 2 | OUTPATIENT
Start: 2022-09-30 | End: 2022-10-31 | Stop reason: SDUPTHER

## 2022-09-30 RX ORDER — ESCITALOPRAM OXALATE 10 MG/1
10 TABLET ORAL DAILY
Qty: 90 TABLET | Refills: 1 | Status: SHIPPED | OUTPATIENT
Start: 2022-09-30 | End: 2023-01-20

## 2022-09-30 RX ORDER — GABAPENTIN 100 MG/1
CAPSULE ORAL
COMMUNITY
Start: 2022-09-06 | End: 2022-09-30 | Stop reason: SDUPTHER

## 2022-09-30 RX ORDER — ARIPIPRAZOLE 2 MG/1
2 TABLET ORAL DAILY
Qty: 90 TABLET | Refills: 1 | Status: SHIPPED | OUTPATIENT
Start: 2022-09-30 | End: 2022-09-30 | Stop reason: SDUPTHER

## 2022-09-30 RX ORDER — ARIPIPRAZOLE 2 MG/1
2 TABLET ORAL DAILY
Qty: 7 TABLET | Refills: 0 | OUTPATIENT
Start: 2022-09-30

## 2022-09-30 NOTE — PROGRESS NOTES
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 Depression Screening  PHQ-9 Total Score:      Little interest or pleasure in doing things?     Feeling down, depressed, or hopeless?     Trouble falling or staying asleep, or sleeping too much?     Feeling tired or having little energy?     Poor appetite or overeating?     Feeling bad about yourself - or that you are a failure or have let yourself or your family down?     Trouble concentrating on things, such as reading the newspaper or watching television?     Moving or speaking so slowly that other people could have noticed? Or the opposite - being so fidgety or restless that you have been moving around a lot more than usual?     Thoughts that you would be better off dead, or of hurting yourself in some way?     PHQ-9 Total Score       FAREED-7       Past Surgical History:  Past Surgical History:   Procedure Laterality Date   • COLONOSCOPY      last scope    • COLONOSCOPY N/A 10/29/2021    Procedure: COLONOSCOPY with possible biopies.;  Surgeon: Skyler Fuller MD;  Location: [facility] ENDOSCOPY;  Service: General;  Laterality: N/A;   • KNEE SURGERY      broken knee       Problem List:  Patient Active Problem List   Diagnosis   • Anxiety   • Depression   • History of fracture of patella   • Hyperlipemia   • Osteopenia   • Ulcerative lesion   •  Constipation   • History of colonic polyps   • Tinnitus of right ear   • Sensorineural hearing loss (SNHL) of both ears       Allergy:   No Known Allergies     Discontinued Medications:  Medications Discontinued During This Encounter   Medication Reason   • busPIRone (BUSPAR) 5 MG tablet *Therapy completed   • gabapentin (NEURONTIN) 600 MG tablet *Re-Entry   • gabapentin (NEURONTIN) 100 MG capsule Duplicate order   • escitalopram (LEXAPRO) 10 MG tablet Reorder   • ARIPiprazole (Abilify) 2 MG tablet Reorder   • ARIPiprazole (Abilify) 2 MG tablet Reorder   • escitalopram (LEXAPRO) 10 MG tablet Reorder       Current Medications:   Current Outpatient Medications   Medication Sig Dispense Refill   • ARIPiprazole (Abilify) 2 MG tablet Take 1 tablet by mouth Daily. 7 tablet 0   • Calcium Carb-Cholecalciferol (OYSCO 500 + D) 500-200 MG-UNIT tablet      • cholecalciferol (VITAMIN D3) 25 MCG (1000 UT) tablet Take 1,000 Units by mouth Daily.     • Coenzyme Q10 (CO Q10 PO) Take  by mouth.     • escitalopram (LEXAPRO) 10 MG tablet Take 1 tablet by mouth Daily. 90 tablet 1   • folic acid (FOLVITE) 1 MG tablet Take 1 tablet by mouth Daily. 90 tablet 1   • gabapentin (NEURONTIN) 100 MG capsule Take 1 capsule by mouth Every Night. 30 capsule 2   • gabapentin (NEURONTIN) 300 MG capsule Take 600 mg by mouth every night at bedtime.     • GAMMA AMINOBUTYRIC ACID PO Take  by mouth.     • GARLIC PO garlic 1,000 mg oral capsule take 1 capsule by oral route daily   Active     • Glucosamine Sulfate 500 MG tablet Glucosamine 500 mg oral tablet take 1 tablet by oral route daily   Active     • LORazepam (ATIVAN) 1 MG tablet Take 1 mg by mouth 2 (Two) Times a Day As Needed. for anxiety     • Multiple Vitamins-Iron (MULTIVITAMIN PLUS IRON ADULT PO) Women's Multivitamin 18 mg iron-400 mcg-500 mg oral tablet take 1 tablet by oral route daily   Active     • Omega-3 Fatty Acids (fish oil) 1000 MG capsule capsule 3,000 mg 3 (Three) Times a Day With  Meals. PT(PATIENT) REPORTS SHE TAKES MEDICATION MORNING/LUNCH/EVENING     • Probiotic Product (PROBIOTIC PO) Take  by mouth.     • vitamin E 400 UNIT capsule vitamin E 400 unit oral capsule take 1 capsule by oral route daily   Active       No current facility-administered medications for this visit.       Past Medical History:  Past Medical History:   Diagnosis Date   • Acid reflux 2022   • Anxiety    • Depression    • Fracture of patella 09/12/2017   • Hyperlipemia    • Osteopenia    • Panic disorder    • Psychiatric inpatient 07/22/2022    Discharged 07/22/2022 for Anxiety, Depression, and Suicidal Bx's       Social History     Socioeconomic History   • Marital status:    Tobacco Use   • Smoking status: Never   • Smokeless tobacco: Never   Vaping Use   • Vaping Use: Never used   Substance and Sexual Activity   • Alcohol use: Not Currently   • Drug use: Never   • Sexual activity: Not Currently         Family History   Problem Relation Age of Onset   • Stroke Mother    • Heart disease Mother    • Cancer Brother    • Seizures Son      Review of Systems:  Review of Systems   Constitutional: Negative for diaphoresis.   HENT: Negative for drooling.    Eyes: Negative for visual disturbance.   Respiratory: Negative for cough and shortness of breath.    Cardiovascular: Negative for chest pain, palpitations and leg swelling.   Gastrointestinal: Negative for nausea and vomiting.   Endocrine: Negative for heat intolerance.   Genitourinary: Negative for difficulty urinating.   Musculoskeletal: Negative for joint swelling.   Allergic/Immunologic: Negative for immunocompromised state.   Neurological: Negative for dizziness, seizures, speech difficulty and numbness.         · Mental Status Exam  · Appearance  · : sitting in a chair with her , good eye contact, normal street clothes, groomed, in person  · Behavior  · : pleasant and cooperative  · Motor  · : No abnormal  · Speech  · : normal rhythm, rate, volume,  "tone, not hyperverbal, not pressured, normal prosidy, Speaks with an accent  · Mood  · : \"I'm better, still good\"  · Affect  · : nearly euthymic, just slightly constricted, no tears, mood congruent, fair variability  · Thought Content  · : negative suicidal ideations, negative homicidal ideations, negative obsessions  · Perceptions  · : negative auditory hallucinations, negative visual hallucinations  · Thought Process  · : linear  · Insight/Judgement  · : fair/fair  · Cognition  · : grossly intact  · Attention  · : intact      Physical Exam:  Physical Exam    Vital Signs:   /60   Ht 162.6 cm (64\")   Wt 64.4 kg (142 lb)   BMI 24.37 kg/m²      Lab Results:   Admission on 08/20/2022, Discharged on 08/20/2022   Component Date Value Ref Range Status   • SARS Antigen 08/20/2022 Not Detected  Not Detected, Presumptive Negative Final   • Internal Control 08/20/2022 Passed  Passed Final   • Lot Number 08/20/2022 [id number]   Final   • Expiration Date 08/20/2022 92,[id number]   Final   • Rapid Influenza A Ag 08/20/2022 Negative  Negative Final   • Rapid Influenza B Ag 08/20/2022 Negative  Negative Final   • Internal Control 08/20/2022 Passed  Passed Final   • Lot Number 08/20/2022 [id number]   Final   • Expiration Date 08/20/2022 120,[id number]   Final   • COVID19 08/20/2022 Not Detected  Not Detected - Ref. Range Final   Lab on 08/15/2022   Component Date Value Ref Range Status   • OmegaCheck 08/15/2022 5.6  >5.4 % by wt Final    Relative Risk: LOW  Increasing blood levels of long-chain n-3 fatty acids are  associated with a lower risk of sudden cardiac death (1).  Based on the top (75th percentile) and bottom (25th  percentile) quartiles of the CHL reference population, the  following risk categories were established for OmegaCheck:  A cut-off of >=5.5% by wt defines a population at low  relative risk, 3.8-5.4% by wt defines a population at  moderate relative risk, and <=3.7% by wt defines a  population at high relative risk of " sudden cardiac death.  The totality of the scientific evidence demonstrates that  when consumption of fish oils is limited to 3 g/day or less  of EPA and DHA, there is no significant risk for increased  bleeding time beyond the normal range. A daily dosage of 1  gram of EPA and DHA lowers the circulating triglycerides by  about 7-10% within 2 to 3 weeks. (Reference: 1-Allison.  [facility]. 2002; 346: 6981-3156).   • Arachidonic Acid/EPA Ratio 08/15/2022 10.1  3.7 - 40.7 Final   • Omega-6/Omega-3 Ratio 08/15/2022 6.6  3.7 - 14.4 Final   • Omega-3 Total 08/15/2022 5.6  % by wt Final   • EPA 08/15/2022 1.4  0.2 - 2.3 % by wt Final   • DPA 08/15/2022 1.1  0.8 - 1.8 % by wt Final   • DPA 08/15/2022 3.1  1.4 - 5.1 % by wt Final   • Omega-6 Total 08/15/2022 37.2  % by wt Final    [facility] measures a number of omega-6 fatty acids  with AA and LA being the two most abundant forms reported.   • Arachidonic Acid, C20:4w6 08/15/2022 14.1  8.6 - 15.6 % by wt Final   • Linoleic Acid, C18:2w6 08/15/2022 20.0  18.6 - 29.5 % by wt Final    This test is performed by a Liquid Chromatography-Tandem  Mass Spectrometry (LC/MS/MS) method. This test was developed  and its performance characteristics determined by the  [facility] It has not been cleared or approved  by the U.S. FDA. The [facility] is regulated under  Clinical Laboratory Improvement Amendments (CLIA) as  qualified to perform high-complexity testing. This test is  used for clinical purposes.  It should not be regarded as  investigational or for research.   • Vitamin B-12 08/15/2022 402  211 - 946 pg/mL Final   • Folate 08/15/2022 >20.00  4.78 - 24.20 ng/mL Final   • 25 Hydroxy, Vitamin D 08/15/2022 56.8  30.0 - 100.0 ng/ml Final   • Ferritin 08/15/2022 272.00 (H)  13.00 - 150.00 ng/mL Final   • Magnesium 08/15/2022 2.3  1.6 - 2.4 mg/dL Final   • Glucose 08/15/2022 96  65 - 99 mg/dL Final   • BUN 08/15/2022 13  8 - 23 mg/dL Final   • Creatinine  08/15/2022 0.75  0.57 - 1.00 mg/dL Final   • Sodium 08/15/2022 138  136 - 145 mmol/L Final   • Potassium 08/15/2022 4.2  3.5 - 5.2 mmol/L Final   • Chloride 08/15/2022 101  98 - 107 mmol/L Final   • CO2 08/15/2022 26.4  22.0 - 29.0 mmol/L Final   • Calcium 08/15/2022 9.6  8.6 - 10.5 mg/dL Final   • Total Protein 08/15/2022 7.0  6.0 - 8.5 g/dL Final   • Albumin 08/15/2022 4.30  3.50 - 5.20 g/dL Final   • ALT (SGPT) 08/15/2022 7  1 - 33 U/L Final   • AST (SGOT) 08/15/2022 11  1 - 32 U/L Final   • Alkaline Phosphatase 08/15/2022 106  39 - 117 U/L Final   • Total Bilirubin 08/15/2022 1.1  0.0 - 1.2 mg/dL Final   • Globulin 08/15/2022 2.7  gm/dL Final   • A/G Ratio 08/15/2022 1.6  g/dL Final   • BUN/Creatinine Ratio 08/15/2022 17.3  7.0 - 25.0 Final   • Anion Gap 08/15/2022 10.6  5.0 - 15.0 mmol/L Final   • eGFR 08/15/2022 87.4  >60.0 mL/min/1.73 Final    National Kidney Foundation and American Society of Nephrology (ASN) Task Force recommended calculation based on the Chronic Kidney Disease Epidemiology Collaboration (CKD-EPI) equation refit without adjustment for race.   • Total Cholesterol 08/15/2022 158  0 - 200 mg/dL Final   • Triglycerides 08/15/2022 101  0 - 150 mg/dL Final   • HDL Cholesterol 08/15/2022 54  40 - 60 mg/dL Final   • LDL Cholesterol  08/15/2022 86  0 - 100 mg/dL Final   • VLDL Cholesterol 08/15/2022 18  5 - 40 mg/dL Final   • LDL/HDL Ratio 08/15/2022 1.55   Final   • Hemoglobin A1C 08/15/2022 5.70 (H)  4.80 - 5.60 % Final   • TSH 08/15/2022 0.826  0.270 - 4.200 uIU/mL Final   • T3, Free 08/15/2022 2.83  2.00 - 4.40 pg/mL Final   • Free T4 08/15/2022 1.14  0.93 - 1.70 ng/dL Final   • DHEA-Sulfate 08/15/2022 65.7  20.4 - 186.6 ug/dL Final   • Testosterone, Total 08/15/2022 8  3 - 67 ng/dL Final   • Testosterone, Free 08/15/2022 0.8  0.0 - 4.2 pg/mL Final   • Estradiol 08/15/2022 <5.0  pg/mL Final   • Progesterone 08/15/2022 0.12  ng/mL Final   • WBC 08/15/2022 6.31  3.40 - 10.80 10*3/mm3 Final   • RBC  08/15/2022 4.74  3.77 - 5.28 10*6/mm3 Final   • Hemoglobin 08/15/2022 13.8  12.0 - 15.9 g/dL Final   • Hematocrit 08/15/2022 42.9  34.0 - 46.6 % Final   • MCV 08/15/2022 90.5  79.0 - 97.0 fL Final   • MCH 08/15/2022 29.1  26.6 - 33.0 pg Final   • MCHC 08/15/2022 32.2  31.5 - 35.7 g/dL Final   • RDW 08/15/2022 14.0  12.3 - 15.4 % Final   • RDW-SD 08/15/2022 46.4  37.0 - 54.0 fl Final   • MPV 08/15/2022 9.7  6.0 - 12.0 fL Final   • Platelets 08/15/2022 299  140 - 450 10*3/mm3 Final   • Neutrophil % 08/15/2022 63.4  42.7 - 76.0 % Final   • Lymphocyte % 08/15/2022 27.7  19.6 - 45.3 % Final   • Monocyte % 08/15/2022 7.6  5.0 - 12.0 % Final   • Eosinophil % 08/15/2022 0.5  0.3 - 6.2 % Final   • Basophil % 08/15/2022 0.5  0.0 - 1.5 % Final   • Immature Grans % 08/15/2022 0.3  0.0 - 0.5 % Final   • Neutrophils, Absolute 08/15/2022 4.00  1.70 - 7.00 10*3/mm3 Final   • Lymphocytes, Absolute 08/15/2022 1.75  0.70 - 3.10 10*3/mm3 Final   • Monocytes, Absolute 08/15/2022 0.48  0.10 - 0.90 10*3/mm3 Final   • Eosinophils, Absolute 08/15/2022 0.03  0.00 - 0.40 10*3/mm3 Final   • Basophils, Absolute 08/15/2022 0.03  0.00 - 0.20 10*3/mm3 Final   • Immature Grans, Absolute 08/15/2022 0.02  0.00 - 0.05 10*3/mm3 Final   • nRBC 08/15/2022 0.0  0.0 - 0.2 /100 WBC Final   Office Visit on 06/09/2022   Component Date Value Ref Range Status   • Total Cholesterol 06/09/2022 265 (H)  0 - 200 mg/dL Final   • Triglycerides 06/09/2022 367 (H)  0 - 150 mg/dL Final   • HDL Cholesterol 06/09/2022 36 (L)  40 - 60 mg/dL Final   • LDL Cholesterol  06/09/2022 159 (H)  0 - 100 mg/dL Final   • VLDL Cholesterol 06/09/2022 70 (H)  5 - 40 mg/dL Final   • LDL/HDL Ratio 06/09/2022 4.32   Final   • Hemoglobin A1C 06/09/2022 5.50  4.80 - 5.60 % Final   • MTHFR 06/09/2022 Comment   Final    Comment: Result:  c.665C>T (p. [id number]), legacy name: [id number] - Detected, heterozygous  c.1286A>C (p. [id number]), legacy name: [id number] -  Detected,  heterozygous  Interpretation:  This result is not associated with an increased risk for  hyperhomocysteinemia. See Additional Clinical Information and  Comments.  Additional Clinical Information:  Hyperhomocysteinemia is multifactorial involving genetic, clinical,  and environmental risk factors. Reduced enzyme activity of  methylenetetrahydrofolate reductase (MTHFR) is a genetic risk  factor for hyperhomocysteinemia, particularly when serum folate  levels are low. There are two common variants in the MTHFR gene  that can decrease enzyme activity; c.665C>T (p. [id number]), legacy  name [id number], and c.1286A>C (p. [id number]), legacy name [id number]. These  variants do not independently increase risk of conditions related  to hyperhomocysteinemia in the absence of elevated homocysteine  levels. Measurement of total plasma homocysteine is recommended.  Patients                            should share their MTHFR genotype with physicians who are  making decisions regarding chemotherapy treatments that depend on  folate, such as methotrexate.  Guidelines do not recommend genotyping of these two MTHFR variants  in the evaluation of venous thrombosis or obstetric risk due to  limited evidence of clinical utility (PMID: 92977248).  Comments:  Genetic Coordinators are available for health care providers to  discuss results at [phone] ([phone]).  Test Details:  Variants Analyzed: c.665C>T (p. [id number]), legacy name: [id number] and  c.1286A>C (p. [id number]), legacy name: [id number]  Methods/Limitations:  DNA analysis of the MTHFR gene was performed by PCR amplification  followed by restriction enzyme analysis. The diagnostic sensitivity  is >99%. Results must be combined with clinical information for the  most accurate interpretation. Molecular-based testing is highly  accurate, but as in any laboratory test, diagnostic errors may  occur. False positive or false negative results                            may occur for  reasons  that include genetic variants, blood transfusions, bone  marrow transplantation, somatic or tissue-specific mosaicism,  mislabeled samples, or erroneous representation of family  relationships.  This test was developed and its performance characteristics  determined by [employer]. It has not been cleared or approved by the  Food and Drug Administration.  References:  Warner SE, Jason CJ, Hadley PEREZ. ACMG Practice Guideline: lack of  evidence for MTHFR polymorphism testing. Sarah Med. 2013 Feb;15(2):153-6. doi: 10.1038/gim.2012.165. Epub 2013 Rk 3. PMID:  45071144.  American College of Obstetricians and Gynecologists' Committee on  Practice Bulletins-Obstetrics. ACOG Practice Bulletin No. 197:  Inherited Thrombophilias in Pregnancy. Obstet Gynecol. 2018  Jul;132(1):e18-e34. doi: 10.1097/AOG.[id number]. Erratum in:  Obstet Gynecol. 2018 Oct;132(4):1069. PMID: 34652363.  Rose Mary Michaels, PhD, [facility]  Carlo Fragoso, PhD, [facility]  Hernesto Reese, PhD, [facility]  Vishal Waldron, PhD, [facility]  W. Sihma Gonzalez, PhD, [facility]  Windy Otto, PhD, [facility]  Carolyn Bean, PhD, [facility]   Admission on 05/14/2022, Discharged on 05/14/2022   Component Date Value Ref Range Status   • Glucose 05/14/2022 109 (H)  65 - 99 mg/dL Final   • BUN 05/14/2022 10  8 - 23 mg/dL Final   • Creatinine 05/14/2022 0.77  0.57 - 1.00 mg/dL Final   • Sodium 05/14/2022 139  136 - 145 mmol/L Final   • Potassium 05/14/2022 3.9  3.5 - 5.2 mmol/L Final   • Chloride 05/14/2022 103  98 - 107 mmol/L Final   • CO2 05/14/2022 24.2  22.0 - 29.0 mmol/L Final   • Calcium 05/14/2022 9.7  8.6 - 10.5 mg/dL Final   • Total Protein 05/14/2022 7.1  6.0 - 8.5 g/dL Final   • Albumin 05/14/2022 4.20  3.50 - 5.20 g/dL Final   • ALT (SGPT) 05/14/2022 13  1 - 33 U/L Final   • AST (SGOT) 05/14/2022 15  1 - 32 U/L Final   • Alkaline Phosphatase 05/14/2022 107  39 - 117 U/L Final   • Total Bilirubin 05/14/2022 1.1  0.0 - 1.2 mg/dL Final   • Globulin  05/14/2022 2.9  gm/dL Final   • A/G Ratio 05/14/2022 1.4  g/dL Final   • BUN/Creatinine Ratio 05/14/2022 13.0  7.0 - 25.0 Final   • Anion Gap 05/14/2022 11.8  5.0 - 15.0 mmol/L Final   • eGFR 05/14/2022 84.7  >60.0 mL/min/1.73 Final    National Kidney Foundation and American Society of Nephrology (ASN) Task Force recommended calculation based on the Chronic Kidney Disease Epidemiology Collaboration (CKD-EPI) equation refit without adjustment for race.   • Lipase 05/14/2022 47  13 - 60 U/L Final   • Extra Tube 05/14/2022 Hold for add-ons.   Final    Auto resulted.   • Extra Tube 05/14/2022 hold for add-on   Final    Auto resulted   • Extra Tube 05/14/2022 Hold for add-ons.   Final    Auto resulted.   • Extra Tube 05/14/2022 Hold for add-ons.   Final    Auto resulted   • WBC 05/14/2022 6.95  3.40 - 10.80 10*3/mm3 Final   • RBC 05/14/2022 4.53  3.77 - 5.28 10*6/mm3 Final   • Hemoglobin 05/14/2022 13.9  12.0 - 15.9 g/dL Final   • Hematocrit 05/14/2022 41.9  34.0 - 46.6 % Final   • MCV 05/14/2022 92.5  79.0 - 97.0 fL Final   • MCH 05/14/2022 30.7  26.6 - 33.0 pg Final   • MCHC 05/14/2022 33.2  31.5 - 35.7 g/dL Final   • RDW 05/14/2022 14.2  12.3 - 15.4 % Final   • RDW-SD 05/14/2022 47.9  37.0 - 54.0 fl Final   • MPV 05/14/2022 8.9  6.0 - 12.0 fL Final   • Platelets 05/14/2022 288  140 - 450 10*3/mm3 Final   • Neutrophil % 05/14/2022 68.0  42.7 - 76.0 % Final   • Lymphocyte % 05/14/2022 22.2  19.6 - 45.3 % Final   • Monocyte % 05/14/2022 8.5  5.0 - 12.0 % Final   • Eosinophil % 05/14/2022 0.4  0.3 - 6.2 % Final   • Basophil % 05/14/2022 0.3  0.0 - 1.5 % Final   • Immature Grans % 05/14/2022 0.6 (H)  0.0 - 0.5 % Final   • Neutrophils, Absolute 05/14/2022 4.73  1.70 - 7.00 10*3/mm3 Final   • Lymphocytes, Absolute 05/14/2022 1.54  0.70 - 3.10 10*3/mm3 Final   • Monocytes, Absolute 05/14/2022 0.59  0.10 - 0.90 10*3/mm3 Final   • Eosinophils, Absolute 05/14/2022 0.03  0.00 - 0.40 10*3/mm3 Final   • Basophils, Absolute  05/14/2022 0.02  0.00 - 0.20 10*3/mm3 Final   • Immature Grans, Absolute 05/14/2022 0.04  0.00 - 0.05 10*3/mm3 Final   • nRBC 05/14/2022 0.0  0.0 - 0.2 /100 WBC Final   • H. pylori IgG 05/13/2022 Negative  Negative, Equivocal Final   • Occult Blood, Fecal by Immunoassay 05/14/2022 Positive (A)  Negative Final   • Color, UA 05/14/2022 Yellow  Yellow, Straw Final   • Appearance, UA 05/14/2022 Clear  Clear Final   • pH, UA 05/14/2022 7.0  5.0 - 8.0 Final   • Specific Gravity, UA 05/14/2022 1.010  1.005 - 1.030 Final   • Glucose, UA 05/14/2022 Negative  Negative Final   • Ketones, UA 05/14/2022 Trace (A)  Negative Final   • Bilirubin, UA 05/14/2022 Negative  Negative Final   • Blood, UA 05/14/2022 Trace (A)  Negative Final   • Protein, UA 05/14/2022 Negative  Negative Final   • Leuk Esterase, UA 05/14/2022 Negative  Negative Final   • Nitrite, UA 05/14/2022 Negative  Negative Final   • Urobilinogen, UA 05/14/2022 0.2 E.U./dL  0.2 - 1.0 E.U./dL Final   • RBC, UA 05/14/2022 0-2 (A)  None Seen /HPF Final   • WBC, UA 05/14/2022 None Seen  None Seen /HPF Final    Urine culture not indicated.   • Bacteria, UA 05/14/2022 None Seen  None Seen /HPF Final   • Squamous Epithelial Cells, UA 05/14/2022 0-2  None Seen, 0-2 /HPF Final   • Hyaline Casts, UA 05/14/2022 None Seen  None Seen /LPF Final   • Methodology 05/14/2022 Automated Microscopy   Final       EKG Results:  No orders to display       Imaging Results:  No Images in the past 120 days found..      Assessment & Plan   Diagnoses and all orders for this visit:    1. Moderate episode of recurrent major depressive disorder (HCC) (Primary)  -     Discontinue: ARIPiprazole (Abilify) 2 MG tablet; Take 1 tablet by mouth Daily.  Dispense: 90 tablet; Refill: 1  -     ARIPiprazole (Abilify) 2 MG tablet; Take 1 tablet by mouth Daily.  Dispense: 7 tablet; Refill: 0    2. Generalized anxiety disorder  -     gabapentin (NEURONTIN) 100 MG capsule; Take 1 capsule by mouth Every Night.   Dispense: 30 capsule; Refill: 2  -     Discontinue: escitalopram (LEXAPRO) 10 MG tablet; Take 1 tablet by mouth Daily.  Dispense: 90 tablet; Refill: 1  -     escitalopram (LEXAPRO) 10 MG tablet; Take 1 tablet by mouth Daily.  Dispense: 90 tablet; Refill: 1    3. Panic attacks  -     gabapentin (NEURONTIN) 100 MG capsule; Take 1 capsule by mouth Every Night.  Dispense: 30 capsule; Refill: 2  -     Discontinue: escitalopram (LEXAPRO) 10 MG tablet; Take 1 tablet by mouth Daily.  Dispense: 90 tablet; Refill: 1  -     escitalopram (LEXAPRO) 10 MG tablet; Take 1 tablet by mouth Daily.  Dispense: 90 tablet; Refill: 1    4. Insomnia due to mental condition  -     Discontinue: escitalopram (LEXAPRO) 10 MG tablet; Take 1 tablet by mouth Daily.  Dispense: 90 tablet; Refill: 1  -     escitalopram (LEXAPRO) 10 MG tablet; Take 1 tablet by mouth Daily.  Dispense: 90 tablet; Refill: 1        Visit Diagnoses:    ICD-10-CM ICD-9-CM   1. Moderate episode of recurrent major depressive disorder (HCC)  F33.1 296.32   2. Generalized anxiety disorder  F41.1 300.02   3. Panic attacks  F41.0 300.01   4. Insomnia due to mental condition  F51.05 300.9     327.02       INITIAL presentation most consistent with major depressive disorder in partial remission, FAREED, rare panic attacks.    9/30: well, stable, some constriction today. No changes. 17 minutes of supportive psychotherapy with goal to strengthen defenses, promote problems solving, restore adaptive functioning and provide symptom relief. The therapeutic alliance was strengthened to encourage the patient to express their thoughts and feelings. Esteem building was enhanced through praise, reassurance, normalizing and encouragement. Coping skills were enhanced to build distress tolerance skills and emotional regulation. Allowed patient to freely discuss issues without interruption or judgement with unconditional positive regard, active listening skills, and empathy. Provided a safe,  confidential environment to facilitate the development of a positive therapeutic relationship and encourage open, honest communication. Assisted patient in identifying risk factors which would indicate the need for higher level of care including thoughts to harm self or others and/or self-harming behavior and encouraged patient to contact this office, call 911, or present to the nearest emergency room should any of these events occur. Assisted patient in processing session content; acknowledged and normalized patient’s thoughts, feelings, and concerns by utilizing a person-centered approach in efforts to build appropriate rapport and a positive therapeutic relationship with open and honest communication. Patient given education on medication side effects, diagnosis/illness and relapse symptoms. Plan to continue supportive psychotherapy in next appointment to provide symptom relief.  Diagnoses: as above  Symptoms: as above  Functional status: Good  Mental Status Exam: as above    Treatment plan: Medication management and supportive psychotherapy  Prognosis: Good  Progress: Stable, close to goal  4 weeks    8/30: Remarkably well. Initial insomnia. 17 minutes of supportive psychotherapyTreatment plan: Medication management and supportive psychotherapy  Prognosis: good  Progress: much better  4 wks    8/1: Try another mood stabilizer in place of olanzapine.  Will trial Abilify again.  Also consider Geodon.  18 minutes of supportive psychotherapy Treatment plan: Medication management and supportive psychotherapy  Prognosis: Fair to good  Progress: Slightly improved  4 weeks, urgent    6/17: start buspar, doing better. 17 minutes of supportive psychotherapy Treatment plan: Medication management and supportive psychotherapy  Prognosis: good  Progress: improving slowly  4 wks      5/20: Continue Lexapro and lorazepam.  Try Belsomra in place of Ambien.  19 minutes of supportive psychotherapyTreatment plan: Medication  management and supportive psychotherapy  Prognosis: Good  Progress: Some progress since last visit  4 weeks urgent    4/22: Some improvement in anx and dep, but still residual symptoms.  Persistent insomnia. Declines IOP, inpatient admission.  Increase Lexapro and start trazodone.  17 minutes of supportive psychotherapy Treatment plan: Medication management and supportive psychotherapy  Prognosis: good  Progress: some, minimal      3/24: Focus on lower doses.  Start Pristiq.  Depression with anxious distress versus depression and generalized anxiety disorder.  She only has distractibility, no other symptoms of hypomania.  17 minutes of supportive psychotherapy  4 weeks    3/15: Continue lurasidone 40 mg at night, increase Ambien to 10 mg at night.  Continue low-dose Lamictal, plan to increase further.  Continue titrating off of amitriptyline.  17 minutes of supportive psychotherapy1 week, urgent    3/10: Effexor has now been stopped.  Continue Latuda and Lamictal at present doses.  I will see her back in a few days and then at 6 weeks.  Bipolar 2?  If so, how come she is tolerating amitriptyline in the evenings?  19 minutes of supportive psychotherapy 6 weeks    2/15: Still utilizing lorazepam which means the anxiety is not under control.  Increase Effexor.  17 minutes of supportive psychotherapy 4 weeks    1/17: Continued improvement.  No changes, only on effexor 75 mg for 2 weeks.  Tolerating medications without side effects.  17 minutes of supportive psychotherapy   4 wks    1/3: Continued improvement. Increase effexor to target residual dep anx. TMS to start 1/6. 17 minutes of supportive psychotherapy  4 wks    12/17: Improved.  Continue medication titration as planned.  17 minutes of supportive psychotherapy  See back in 2 weeks on 3 January in the morning    12/13: See back in 1 week. Taper down on amitriptyline, start effexor. 17 minutes of supportive psychotherapy  1 wk    11/15: Taper off escitalopram and  increase elavil as the latter is helping her depression; patient has been on elavil 25 mg tid in the past with good success. Alternatively, consider switching to effexor. 18 minutes of supportive psychotherapy     11/5: Increase lorazepam, continue celexa and amitriptyline. Consider effexor. 17 minutes of supportive psychotherapy2 wks    10/19: Re-start elavil. Reduce mirtazapine. Re-start therapy. 2 wks.    10/8: Some residual anxiety and depression reemerging.  Increase mirtazapine.  Patient is now doing her visits alone, rather than with her .  4 weeks.    8/27: Doing well on mirtazapine. 6 wks.    7/30: Abilify causing constipation.  Also caused brain zaps.  Discontinue.  Start mirtazapine to target depression, anxiety.  Willing to try, even though she knows it could make her hungrier.  See back in 4 weeks.  Continue therapy.  Longer term, consider discontinuing amitriptyline.    6/1: Doing well from a mental health standpoint, but having constipation from Abilify. No doubt the low-dose of amitriptyline in the evenings is also contributing to the cholinergic blockade leading to constipation. Patient instructed to half the dose of Abilify. Continue other medications as scheduled.        PLAN:  163. Risk Assessment: Risk of self-harm acutely remain low. Risk factors include a history of suicidal ideation, mood disorder, anxiety disorder, presence of psychosocial stressors such as colonic dysplasia, COVID-19 pandemic. Protective factors include no history of self-harm or suicide attempts, good social support, willingness to engage in care, improved depressive symptoms. Risk of self-harm chronically also remain low, but could be further elevated in the event of treatment noncompliance and/or AODA.  164. Medications:   a. CONTINUE lexapro 10 mg daily. Risks, benefits, alternatives discussed with patient including GI upset, nausea vomiting diarrhea, theoretical decrease of seizure threshold predisposing the  patient to a slightly higher seizure risk, headaches, sexual dysfunction, serotonin syndrome, bleeding risk, increased suicidality in patients 24 years and younger.  After discussion of these risks and benefits, the patient voiced understanding and agreed to proceed.  b. CONTINUE gabapentin 600 mg nightly, 100 mg in the afternoon. Risks, benefits, alternatives discussed with patient including sedation, dizziness/falls risk, GI upset, weight gain.  After discussion of these risks and benefits, the patient voiced understanding and agreed to proceed. UDS ordered, Mamadou reviewed.  c. CONTINUE Abilify 2 mg daily. Risks, benefits, alternatives discussed with patient including increased energy, exacerbation of irritability, akathisia, GI upset, orthostatic hypotension, increased appetite.  After discussion of these risks and benefits, the patient voiced understanding and agreed to proceed.  d. AGREE with gabapentin 600 mg nightly.   e. STOPPED Ambien 10 mg nightly. Unclear if helped with insomnia. Weeks. Risks, benefits, side effects discussed with patient including sedation, dizziness, GI upset, hallucinations, sleepwalking, sleep-eating.  After discussion of these risks and benefits, the patient voiced understanding and agreed to proceed.  f. STOP trazodone 50 mg nightly. No help with insomnia. Risks, benefits, side effects discussed with patient including GI upset, sedation, dizziness/falls risk, grogginess the following day, prolongation of the QTc interval.  After discussion of these risks and benefits, the patient voiced understanding and agreed to proceed.    g. CONTINUE lorazepam 1 mg bid PRN. Risks, benefits, alternatives discussed with patient including GI upset, sedation, dizziness, respiratory depression, falls risk.  After discussion of these risks and benefits, the patient voiced understanding and agreed to proceed.  h. S/P:   i. buspar 5 mg bid was ineffective.  ii. Mirtazapine 15 mg, 7.5 mg: ineffective;  "made anxiety and depression worse.  iii. Abilify: brain zaps and constipation  iv. Seroquel made depression worse  v. celexa 20 mg daily, ineffective after several months  vi. Doxepin caused \"brain zaps\"  vii. Latuda was ineffective.  viii. Amitriptyline 25 mg nightly helpful for sleep but we didn't want her on too many serotonergic meds.  ix. Ambien minimally helpful for insomnia.  x. Pristiq and effexor worsened anxiety, both low dose.  xi. Stopped lamictal for unclear reasons 25 mg.  xii. belsomra 5 mg nightly didn't help with insomnia.  165. Therapy: continue with Genevieve at [facility].  166. Labs/Studies: BMP to monitor sodium levels in February was entirely normal, EKG February shows rate 72, sinus, .  167. Follow Up: 4 weeks, urgent      TREATMENT PLAN/GOALS: Continue supportive psychotherapy efforts and medications as indicated. Treatment and medication options discussed during today's visit. Patient acknowledged and verbally consented to continue with current treatment plan and was educated on the importance of compliance with treatment and follow-up appointments.    MEDICATION ISSUES:  YVETTE reviewed as expected.  Discussed medication options and treatment plan of prescribed medication as well as the risks, benefits, and side effects including potential falls, possible impaired driving and metabolic adversities among others. Patient is agreeable to call the office with any worsening of symptoms or onset of side effects. Patient is agreeable to call 911 or go to the nearest ER should he/she begin having SI/HI. No medication side effects or related complaints today.     MEDS ORDERED DURING VISIT:    Return in about 4 weeks (around 10/28/2022) for urgent.         This document has been electronically signed by Vinny Krishnamurthy MD  October 11, 2022 09:05 EDT       Part of this note may be an electronic transcription/translation of spoken language to printed text using the Dragon Dictation System.    "

## 2022-09-30 NOTE — PATIENT INSTRUCTIONS
1.  Please return to clinic at your next scheduled visit.  Contact the clinic ([phone]) at least 24 hours prior in the event you need to cancel.  2.  Do no harm to yourself or others.    3.  Avoid alcohol and drugs.    4.  Take all medications as prescribed.  Please contact the clinic with any concerns. If you are in need of medication refills, please call the clinic at [phone].    5. Should you want to get in touch with your provider, Dr. Vinny Krishnamurthy, please utilize [employer] or contact the office ([phone]), and staff will be able to page Dr. Krishnamurthy directly.  6.  In the event you have personal crisis, contact the following crisis numbers: Suicide Prevention Hotline 1-554.145.9401; [facility] Helpline 6-506-552-SNLP; [facility] Emergency Room [phone]; text HELLO to 561667; or 100.     SPECIFIC RECOMMENDATIONS:     1.      Medications discussed at this encounter:                   - no changes     2.      Psychotherapy recommendations:      3.     Return to clinic: 4 weeks

## 2022-10-06 ENCOUNTER — OFFICE VISIT (OUTPATIENT)
Dept: OTOLARYNGOLOGY | Facility: CLINIC | Age: 67
End: 2022-10-06

## 2022-10-06 VITALS — HEIGHT: 64 IN | BODY MASS INDEX: 24.89 KG/M2 | WEIGHT: 145.8 LBS | TEMPERATURE: 96.9 F

## 2022-10-06 DIAGNOSIS — H93.11 TINNITUS OF RIGHT EAR: ICD-10-CM

## 2022-10-06 DIAGNOSIS — H61.23 BILATERAL IMPACTED CERUMEN: ICD-10-CM

## 2022-10-06 DIAGNOSIS — H90.3 SENSORINEURAL HEARING LOSS (SNHL) OF BOTH EARS: Primary | ICD-10-CM

## 2022-10-06 PROCEDURE — 69210 REMOVE IMPACTED EAR WAX UNI: CPT | Performed by: OTOLARYNGOLOGY

## 2022-10-06 PROCEDURE — 99204 OFFICE O/P NEW MOD 45 MIN: CPT | Performed by: OTOLARYNGOLOGY

## 2022-10-10 ENCOUNTER — HOSPITAL ENCOUNTER (OUTPATIENT)
Dept: MAMMOGRAPHY | Facility: HOSPITAL | Age: 67
Discharge: HOME | End: 2022-10-10
Admitting: NURSE PRACTITIONER

## 2022-10-10 DIAGNOSIS — Z12.31 VISIT FOR SCREENING MAMMOGRAM: ICD-10-CM

## 2022-10-10 PROCEDURE — 77063 BREAST TOMOSYNTHESIS BI: CPT

## 2022-10-10 PROCEDURE — 77067 SCR MAMMO BI INCL CAD: CPT

## 2022-10-17 DIAGNOSIS — F41.1 GENERALIZED ANXIETY DISORDER: ICD-10-CM

## 2022-10-17 DIAGNOSIS — F51.05 INSOMNIA DUE TO MENTAL CONDITION: ICD-10-CM

## 2022-10-17 DIAGNOSIS — F41.0 PANIC ATTACKS: ICD-10-CM

## 2022-10-31 ENCOUNTER — OFFICE VISIT (OUTPATIENT)
Dept: PSYCHIATRY | Facility: CLINIC | Age: 67
End: 2022-10-31

## 2022-10-31 VITALS
WEIGHT: 146.6 LBS | DIASTOLIC BLOOD PRESSURE: 70 MMHG | SYSTOLIC BLOOD PRESSURE: 136 MMHG | HEIGHT: 64 IN | BODY MASS INDEX: 25.03 KG/M2

## 2022-10-31 DIAGNOSIS — F41.0 PANIC ATTACKS: ICD-10-CM

## 2022-10-31 DIAGNOSIS — F33.1 MODERATE EPISODE OF RECURRENT MAJOR DEPRESSIVE DISORDER: Primary | ICD-10-CM

## 2022-10-31 DIAGNOSIS — F41.1 GENERALIZED ANXIETY DISORDER: ICD-10-CM

## 2022-10-31 DIAGNOSIS — F51.05 INSOMNIA DUE TO MENTAL CONDITION: ICD-10-CM

## 2022-10-31 PROCEDURE — 99214 OFFICE O/P EST MOD 30 MIN: CPT | Performed by: STUDENT IN AN ORGANIZED HEALTH CARE EDUCATION/TRAINING PROGRAM

## 2022-10-31 PROCEDURE — 90833 PSYTX W PT W E/M 30 MIN: CPT | Performed by: STUDENT IN AN ORGANIZED HEALTH CARE EDUCATION/TRAINING PROGRAM

## 2022-10-31 RX ORDER — GABAPENTIN 300 MG/1
600 CAPSULE ORAL
Qty: 60 CAPSULE | Refills: 2 | Status: SHIPPED | OUTPATIENT
Start: 2022-11-09 | End: 2022-11-18 | Stop reason: SDUPTHER

## 2022-10-31 RX ORDER — GABAPENTIN 100 MG/1
100 CAPSULE ORAL DAILY
Qty: 30 CAPSULE | Refills: 2 | OUTPATIENT
Start: 2022-10-31 | End: 2022-11-18 | Stop reason: SDUPTHER

## 2022-10-31 NOTE — PATIENT INSTRUCTIONS
1.  Please return to clinic at your next scheduled visit.  Contact the clinic ([phone]) at least 24 hours prior in the event you need to cancel.  2.  Do no harm to yourself or others.    3.  Avoid alcohol and drugs.    4.  Take all medications as prescribed.  Please contact the clinic with any concerns. If you are in need of medication refills, please call the clinic at [phone].    5. Should you want to get in touch with your provider, Dr. Vinny Krishnamurthy, please utilize [employer] or contact the office ([phone]), and staff will be able to page Dr. Krishnamurthy directly.  6.  In the event you have personal crisis, contact the following crisis numbers: Suicide Prevention Hotline 1-104.823.5058; [facility] Helpline 1-156-957-[facility]; [facility] Emergency Room [phone]; text HELLO to 795416; or 037.     SPECIFIC RECOMMENDATIONS:     1.      Medications discussed at this encounter:                   - increase abilify     2.      Psychotherapy recommendations:      3.     Return to clinic: 1 weeks

## 2022-10-31 NOTE — PROGRESS NOTES
"Subjective   Shabana Ferguson is a 66 y.o. female who presents today for follow up    Referring Provider:  Kvng Ramirez APRN  [address]  [address]  [place] [address]    Chief Complaint:  mdd margarita    History of Present Illness:     Shabana Ferguson is a 64 year old /White female, hx of anxiety and depression, fractured patella, HLD, osteopenia, ulcer referred by MARIE Brice, for anxiety and depression management.   Chart: Psychotropic medications: amitriptyline 25 mg QHS, Celexa 20 mg p.o. daily, lorazepam 0.5 p.o. twice daily as needed anxiety.      \"Shabana\" and Jaya    10/31: In person interview:  1. Chart review: No new.  2. Planning: No changes at last visit.  3. \"I feel anxiety in the afternoon.\"  a. Stressors:  i. Nicola, her son, is moving out. Stressful.  ii. Pt wants to end the marriage; they've discussed it numerous times recently.  1. Couples therapy: they've tried in the past, more than once  4. Mood/Depression:  a. Depressed mood in the afternoons, not mornings  b. Jaya: trip to [place] was fun but stressful and \"it was in the end a major setback\"  i. Last night was very destructive: sleepless  5. Anxiety:  a. [facility]; recommend taking gabapentin 100 mg in afternoon rather than morning.  b. Worrying, irritable, insomnia, on edge  6. Panic attacks:  a. Yes, in the afternoon, controlled on ativan  7. Energy: down  8. Concentration: poor  9. Sleeping: initial insomnia  10. Eating: 3 lb  8/22  11. Refills:  12. Substances:  13. Therapy:  14. Medication compliant: y  15. No SI HI AVH.      9/30: In person interview:  16. Chart review: Recently discharged from [facility] outpatient.  a. Patient was depressed from October of last last year to essentially a couple months ago when she started to improve.  Consider light box.  17. Planning: Restarted Ambien at last visit.  Patient was improving at last visit.  Consider light " "box  18. \"Good.\"  a. I don't need to take the ambien, I just need the gabapentin  b. TMS helping as well, 10 more sessions, \"really helping\"  c. Discussed light box  19. Mood/Depression: stable, well  20. Anxiety:  a. Anxiety in the afternoons, takes lorazepam daily at 6 pm  i. Takes care of the brain zaps  21. Panic attacks: n  22. Sleeping: well on the gabapentin  23. Eating: stable  24. Refills: y  25. Substances:  26. Therapy: IOP completed  a. Very beneficial, \"I never thought I would like it.\" I would recommend it to anyone.  27. Medication compliant: y  28. No SI HI AVH.      8/30: In person interview:  29. Chart review: Seen by urgent care August 20 for back pain.  COVID-negative.  Also had some cervical adenopathy. EEG is abnormal.  30. Planning: Trial of Abilify appears to be working well.  This is according to the conversation I had with both of them on August 8.  31. \"I'm better... I feel good when I get up in the morning.\"  a. P1, G6  b. Doing TMS  c. Then IOP.  d. The problem I still have is sleeping.  e. Initial insomnia for 2 hours each night.  f. Afternoons, gets anxious. Uses ativan rarely. Use has gone down dramatically.  g. Prescribed gabapentin by [facility]. Helping her anxiety.  h. Jaya \"quality of life has gone WAY up.\"  i. Sees neurology in early September.  32. Mood/Depression: improved  33. Anxiety: improved  34. Panic attacks: rare  35. Eating: stable  36. Refills: n  37. Substances: n  38. Therapy: def  39. Medication compliant: y  40. No SI HI AVH.      8/1: In person interview:  41. Chart review: Patient admitted to the [facility] July 18.  Awaiting records.  42. Planning: This is a follow-up appointment after her admission to the [facility].  43. \" I am better.\"  a. Patient reported she was extremely afraid initially of being admitted, however admission was a very \"positive\" experience for her, and \"I felt at peace.\"  b. Could not tolerate olanzapine 2.5 mg nightly as it gave her nightmares. " " She has stopped this medication.  c. She has set up intensive outpatient at [facility], has already gone to [facility] last week, and also has TMS scheduled.  d. Per her , \"the crying spells have stopped.\"  44. Mood/Depression: Slightly improved, still has days of depression.  Reports that some days she is perfect, then other days she becomes very depressed and anxious, but lorazepam helps at that time.  45. Panic attacks: Still has occasionally  46. Sleeping: Still has some trouble sleeping, but recently has been using delta 8 to sleep  47. Eating: Stable, now eating  48. Refills: No  49. Substances: No  50. Therapy: Continuing  51. Medication compliant: Yes  52. No SI HI AVH.      6/17: In person interview:  53. Chart review: Seen by primary care June 9.  Apparently experiencing multiple side effects from anxiety and depression medications.  Looks like the results of the testing are not associated with an increased risk for hyper homocystinemia (MTHFR gene assay).  54. Planning: Belsomra was too expensive.  55. \"All in all I'm better.\"  a. Still has nervous stomach; getting help with that from PCP.  b. Sleeping better  c. Taking ativan in the am consistently and also at night to sleep (rarely).  d. Low mood in afternoon with crying spells  56. Mood/Depression: stable   57. Anxiety: under control  58. Sleeping: well  59. Eating: stable  60. Refills: n  61. Substances: n  62. Therapy: has a counselor at [facility] now, plus [facility]  63. Medication compliant: y  64. No SI HI AVH.      5/20: In person interview:  65. Chart review: Patient presented in the emergency room for abdominal pain, she did not want a wait so she left.  Saw primary care for abdominal pain which patient believes is related to starting Pristiq a month ago.  Despite having stopped it long ago, she continues to have abdominal symptoms.  Somatization?  66. Planning: Try Belsomra for insomnia.  67. \" I am getting better.\"  a.  " "agrees.  Patient is becoming more functional.  b. Depression and anxiety have improved.  Still residual symptoms.  She has only been on the higher dose of Lexapro for about 4 weeks ago.  c. Still having trouble with insomnia but it is controlled on Ambien most nights.  Some nights it is not.  Some nights she does not take Ambien.  d. Utilizing half a milligram of lorazepam twice daily most days.  e. More active around the house.  f. Famotidine is helping with abdominal pain.  68. Refills: Yes  69. Substances: No  70. Therapy: Deferred  71. Medication compliant: Yes  72. No SI HI AVH.      4/22: In person interview:  Chart review: 4/11: I called the patient to check in on her progress on lexapro. \"I'm doing better than I was before.\" Sleeping well. Taking nyquil to sleep. Utilizing lorazepam frequently. Has been on lexapro for 10 days. She understands that she needs to give it more time. More active. Has no appetite, however. I mentioned to her the importance of moving to a higher level of care (IOP, inpt). Pt voiced understanding and agreed to proceed. Jaya: crying episodes have stopped. Better energy.  73. \"I can't sleep.\"  a. Persistent insomnia, anxious before bed, unclear why. Has tried ambien which sometimes helps. Also over the counter meds.   b. Irritable, restless, worrying.   c. Still depressed, but this has improved  d. Compliant on lexapro  e. Still having panic attacks.  Utilizing lorazepam.  f. Per , she has made significant improvement over the last few months, but still has a way to go.  Patient agrees.  g. Declines IOP, inpatient admission  74. Therapy: Deferred  75. Medication compliant: To some extent yes  76. No SI HI AVH.      3/24: In person interview: With her   77. Chart review: Patient is now discontinued all medications except for Lamictal 25 mg a day.  I started her on doxepin in the evenings for insomnia on March 21.  78. \"I think I need lower doses.\"  a. Persistent crying " "spells, and anxiety. Utilizing ativan.  b. Doxepin is helping with insomnia.  c. Patient reports that she knows of someone in her 70s who struggled with depression for years until they came up with a solution.  This gave the patient hope.  She noted that the patient was on very low doses of Celexa and other psychotropic medication.  79. Medication compliant: y  80. No SI HI AVH.      3/15: In person interview: With her   1. Chart review: Patient had another panic attack 3/14 morning.  I advised her to utilize the Ativan liberally as she is not always using it.  Compliant on Latuda 40 mg a day.  She notes that there are some periods where she feels \"completely normal.\"  Then other times, she completely breaks down and has depression and anxiety. Having trouble sleeping for the last 4 days.  Target this with Ambien 5 mg nightly. Risks, benefits, side effects discussed with patient including sedation, dizziness, GI upset, hallucinations, sleepwalking, sleep-eating.  After discussion of these risks and benefits, the patient voiced understanding and agreed to proceed. Continue Latuda and Lamictal. Start titrating off of amitriptyline by taking half a tab, 12.5 mg in other words, nightly.  81. \" The Ambien kept me asleep for a couple hours.\"  a. Patient slept for two hours, got up, ate something, and did some reading.  Then went back to sleep.  Notes that she did not feel anxious during this time.  b. When she woke the next morning she felt very tired.  c. Utilizing Ativan more liberally to prevent panic attacks.  d. Some limitation today about how her family is distancing themselves from her due to her depression.  Feels like they should be more supportive.  e. Wants to switch to a different therapist.  She understands that we will have a therapist start next month.  Would like to wait until that time.  Problems with connecting to her present therapist.  82. Therapy: Continuing  83. Medication compliant: " "Yes  84. No SI HI AVH.      3/10: In person: With her   Interview:  85. Chart review: We have reduced the dose of Effexor to 37.5 mg a day, and started Lamictal 25 mg daily.  Continuing Latuda at its present dose of 20 mg a day.  October labs show reassuring CMP, CBC.  86. \" I stopped the Effexor.\"  a. Patient states she is already feeling better.   agrees.  b. Did not take Effexor this morning and is not experiencing panic and anxiety in the afternoon like she has been for the past several days.  c. Tolerating the Lamictal without side effects.  d. No crying spells  e. Affect is still depressed  87. Medication compliant: Yes  88. No SI HI AVH.      2/15:Virtual visit via Zoom audio and video due to the COVID-19 pandemic.  Patient is accepting of and agreeable to visit.  The visit consisted of the patient and I. The patient is at home, and I am at the office.  Interview:  89. Chart review: DEXA scan this month.  Normal in the lumbar spine and hips, it is decreased by 0.9% in the femoral necks compared to 2007.  90. \"Yesterday had a breakdown and cried for hours.\"  a. Otherwise has been doing very well.  b. Still utilizing lorazepam once a day.  c. Has done TMS for what sounds like about a week and a half.  No major changes that she can identify.  d. Sleeping well  e. Still some uncontrolled worrying, irritability, restlessness.  91. Medication compliant: Yes  92. No SI HI AVH.      1/17: Virtual visit via Zoom audio and video due to the COVID-19 pandemic.  Patient is accepting of and agreeable to visit.  The visit consisted of the patient and I.  Interview:  93. Chart review: No new chart developments since January 3.  EKG in October reveals 68, sinus, .  94. \"Doing even better than I was 2 weeks ago.\"  a. Still has mood swings, but not nearly as bad as before.  b. Have not needed to use lorazepam most days.  c. Starting TMS this week.  95. Depression/Mood: much better  96. Anxiety: much " "better  97. Refills: n  98. Sleeping: well  99. Eating: stable  100. Substances: n  101. Therapy: continuing  102. Medication compliant: y  103. No SI HI AVH.      1/3/22: Virtual visit via Zoom audio and video due to the COVID-19 pandemic.  Patient is accepting of and agreeable to visit.  The visit consisted of the patient and I.  Interview:  104. Chart review: No new chart developments since December 17.  105. \"Doing much better.\"  a. Sleeping well at night.  b. Some anxiety sometimes, but has days where she doesn't have anxiety.  c. No panic attacks  d. No crying spells.  e. Some days doesn't take lorazepam, other times takes a half a pill; when she does, it works very well.  f. Some anxiety looking up TMS last night, took a lorazepam and felt much better.   g. No holiday disruption at all. Handled the holidays well.   h. Approved for TMS.   106. Depression/Mood:  1. Depressed mood: much better  2. Seasonal pattern: denies  3. Severity: mild  4. Insomnia: denies  5. Duration: months  107. Anxiety:  1. Uncontrolled worrying: y  2. Severity: mild  3. Restlessness/feeling on edge: y  4. Irritability: y  5. Insomnia: n  6. Duration: months  7. Panic attacks: still has them rarely  108. Refills: y  109. Sleeping: y  110. Eating: stable  111. Substances: n  112. Therapy: With Genevieve at [facility] (continuing)  113. Medication compliant: y  114. No SI HI AVH.      6/1: Interview: Continued improvement. Doing well from d and a perspective. Persistent constipation that began with abilify. Still not taking lorazepam as her anxiety is under control. Denies SI HI AVH.  concurs.    9/11/20 H&P: Patient presented today with her . Both provided extensive history regarding her depression and anxiety. Patient had been seen by  at [facility] at [facility] for roughly a year and a half. Patient reported that Dr. Gordon had become convinced that she had bipolar disorder, which both she and her  disagree with. Patient and " " deny any episodes in the past where she experienced, for an extended length of time lasting at least several days, no need for sleep, rapid speech, risk-taking behaviors. Per the  and wife pair, her previous psychiatrist insisted on her being on a mood stabilizer.   Patient reports that she had been stable on Celexa for \"several years.\" In 2019, January, the patient underwent a colonoscopy where dysplasia was revealed. This led to significant anxiety and a \"breakdown.\" The dysplasia was subsequently removed. Due to the heightened anxiety the patient's psychiatrist took the patient off of Celexa and started her on Lexapro. The patient did not tolerate Lexapro well, she continued to remain anxious, she began to engage in therapy in addition to medication management. The Lexapro was subsequently switched to Viibryd. The Viibryd did not work. The patient also began to experience panic attacks including shortness of breath, crying, tachycardia, palpitations. The panic attacks were new and had never happened before.   In January 2020 the patient experienced another breakdown and sunk into a deeper depression. The COVID-19 pandemic only worsened her situation. In February the patient's , a physician, decided to become her full-time caretaker as she would become anxious and panicky without him present. The patient also experienced intermittent bouts of suicidal ideation.   Recently, the patient and  demanded of their psychiatrist to take her off of Viibryd and Seroquel. They actually tapered her off of Seroquel themselves. They asked him to switch her back to Celexa. She has been on Celexa now for the last 3 days and is already begun to experience improvement. She states that the Seroquel led to having hallucinations and actually worsened insomnia. Last night she did not take Seroquel for the first time in many months and she slept very well. Her mood is slightly improved. Regarding her anxiety, " she endorses muscle tension and fatigue restlessness irritability and a history of insomnia. Regarding her depression she denies SI HI AVH, denies anhedonia denies guilt, notes some decreased energy, psychomotor retardation, and a history of insomnia. They had also tried acupuncture in the past with some success. Psychiatric review of systems is negative for psychosis nancy, positive for anxiety and depression. Possibly positive for  depression and postpartum depression. The patient is medication compliant.       Past Psychiatric History:  Began Psychiatric Treatment: Several years   Dx: Depression and anxiety   Psychiatrist: Doctor Gordon for the last year and a half   Therapist: Sees a therapist at [facility] History: Denies, Although she came close to needing admission recently.   Medication Trials: See HPI. Seroquel, Prozac which was too activating, Zoloft which was too activating, Celexa which worked well, Effexor which worked well, Lexapro which did not work, Viibryd which did not work.   Self-Harm: Denies   Suicide Attempts: Denies   OB/GYN HISTORY:  Sexually Active: OB/GYN history deferred   Substance Abuse History:  Types: Denies all, including illicit   Social History:  Marital Status:    Employed: No     Kids: 4   House: House    Hx: Denies   Family History:  Suicide Attempts: Deferred today, Due to lack of time   Suicide Completions: Deferred   Substance Use: Deferred   Psychiatric Conditions: Deferred    depression, psychosis, anxiety: Deferred   Developmental History:  Born: Deferred   Siblings: Deferred   Access to Weapons: Denies   Thought Content: no suicidal ideation, no homicidal ideation, no auditory hallucinations, no visual hallucinations, and     PHQ-9 Depression Screening  PHQ-9 Total Score:      Little interest or pleasure in doing things?     Feeling down, depressed, or hopeless?     Trouble falling or staying asleep, or sleeping too much?     Feeling  tired or having little energy?     Poor appetite or overeating?     Feeling bad about yourself - or that you are a failure or have let yourself or your family down?     Trouble concentrating on things, such as reading the newspaper or watching television?     Moving or speaking so slowly that other people could have noticed? Or the opposite - being so fidgety or restless that you have been moving around a lot more than usual?     Thoughts that you would be better off dead, or of hurting yourself in some way?     PHQ-9 Total Score       FAREED-7       Past Surgical History:  Past Surgical History:   Procedure Laterality Date   • COLONOSCOPY  [id number]/[id number]    last scope 2020   • COLONOSCOPY N/A 10/29/2021    Procedure: COLONOSCOPY with possible biopies.;  Surgeon: Skyler Fuller MD;  Location: [facility] ENDOSCOPY;  Service: General;  Laterality: N/A;   • KNEE SURGERY  2017    broken knee       Problem List:  Patient Active Problem List   Diagnosis   • Anxiety   • Depression   • History of fracture of patella   • Hyperlipemia   • Osteopenia   • Ulcerative lesion   • Constipation   • History of colonic polyps   • Tinnitus of right ear   • Sensorineural hearing loss (SNHL) of both ears       Allergy:   No Known Allergies     Discontinued Medications:  Medications Discontinued During This Encounter   Medication Reason   • Glucosamine Sulfate 500 MG tablet *Therapy completed   • gabapentin (NEURONTIN) 300 MG capsule Reorder   • gabapentin (NEURONTIN) 100 MG capsule Reorder       Current Medications:   Current Outpatient Medications   Medication Sig Dispense Refill   • ARIPiprazole (Abilify) 2 MG tablet Take 1 tablet by mouth Daily. 7 tablet 0   • Calcium Carb-Cholecalciferol (OYSCO 500 + D) 500-200 MG-UNIT tablet      • cholecalciferol (VITAMIN D3) 25 MCG (1000 UT) tablet Take 1,000 Units by mouth Daily.     • Coenzyme Q10 (CO Q10 PO) Take  by mouth.     • escitalopram (LEXAPRO) 10 MG tablet Take 1 tablet by mouth Daily. 90  tablet 1   • folic acid (FOLVITE) 1 MG tablet Take 1 tablet by mouth Daily. 90 tablet 1   • gabapentin (NEURONTIN) 100 MG capsule Take 1 capsule by mouth Daily. 30 capsule 2   • [START ON 11/9/2022] gabapentin (NEURONTIN) 300 MG capsule Take 2 capsules by mouth every night at bedtime. 60 capsule 2   • GAMMA AMINOBUTYRIC ACID PO Take  by mouth.     • GARLIC PO garlic 1,000 mg oral capsule take 1 capsule by oral route daily   Active     • LORazepam (ATIVAN) 1 MG tablet Take 1 mg by mouth 2 (Two) Times a Day As Needed. for anxiety     • Multiple Vitamins-Iron (MULTIVITAMIN PLUS IRON ADULT PO) Women's Multivitamin 18 mg iron-400 mcg-500 mg oral tablet take 1 tablet by oral route daily   Active     • Omega-3 Fatty Acids (fish oil) 1000 MG capsule capsule 3,000 mg 3 (Three) Times a Day With Meals. PT(PATIENT) REPORTS SHE TAKES MEDICATION MORNING/LUNCH/EVENING     • Probiotic Product (PROBIOTIC PO) Take  by mouth.     • vitamin E 400 UNIT capsule vitamin E 400 unit oral capsule take 1 capsule by oral route daily   Active       No current facility-administered medications for this visit.       Past Medical History:  Past Medical History:   Diagnosis Date   • Acid reflux 2022   • Anxiety    • Depression    • Fracture of patella 09/12/2017   • Hyperlipemia    • Osteopenia    • Panic disorder    • Psychiatric inpatient 07/22/2022    Discharged 07/22/2022 for Anxiety, Depression, and Suicidal Bx's       Social History     Socioeconomic History   • Marital status:    Tobacco Use   • Smoking status: Never   • Smokeless tobacco: Never   Vaping Use   • Vaping Use: Never used   Substance and Sexual Activity   • Alcohol use: Not Currently   • Drug use: Never   • Sexual activity: Not Currently         Family History   Problem Relation Age of Onset   • Stroke Mother    • Heart disease Mother    • Cancer Brother    • Seizures Son      Review of Systems:  Review of Systems   Constitutional: Negative for diaphoresis.   HENT:  "Negative for drooling.    Eyes: Negative for visual disturbance.   Respiratory: Negative for cough and shortness of breath.    Cardiovascular: Negative for chest pain, palpitations and leg swelling.   Gastrointestinal: Negative for nausea and vomiting.   Endocrine: Negative for heat intolerance.   Genitourinary: Negative for difficulty urinating.   Musculoskeletal: Negative for joint swelling.   Allergic/Immunologic: Negative for immunocompromised state.   Neurological: Negative for dizziness, seizures, speech difficulty and numbness.         · Mental Status Exam  · Appearance  · : sitting in a chair with her , good eye contact, normal street clothes, groomed, in person  · Behavior  · : pleasant and cooperative  · Motor  · : No abnormal  · Speech  · : normal rhythm, rate, volume, tone, not hyperverbal, not pressured, normal prosidy, Speaks with an accent  · Mood  · : \"I'm anxious\"  · Affect  · : constricted, no tears, mood congruent, fair variability  · Thought Content  · : negative suicidal ideations, negative homicidal ideations, negative obsessions  · Perceptions  · : negative auditory hallucinations, negative visual hallucinations  · Thought Process  · : linear  · Insight/Judgement  · : fair/fair  · Cognition  · : grossly intact  · Attention  · : intact      Physical Exam:  Physical Exam    Vital Signs:   /70   Ht 162.6 cm (64\")   Wt 66.5 kg (146 lb 9.6 oz)   BMI 25.16 kg/m²      Lab Results:   Admission on 08/20/2022, Discharged on 08/20/2022   Component Date Value Ref Range Status   • SARS Antigen 08/20/2022 Not Detected  Not Detected, Presumptive Negative Final   • Internal Control 08/20/2022 Passed  Passed Final   • Lot Number 08/20/2022 [id number]   Final   • Expiration Date 08/20/2022 92,[id number]   Final   • Rapid Influenza A Ag 08/20/2022 Negative  Negative Final   • Rapid Influenza B Ag 08/20/2022 Negative  Negative Final   • Internal Control 08/20/2022 Passed  Passed Final   • Lot Number " 08/20/2022 [id number]   Final   • Expiration Date 08/20/2022 120,[id number]   Final   • COVID19 08/20/2022 Not Detected  Not Detected - Ref. Range Final   Lab on 08/15/2022   Component Date Value Ref Range Status   • OmegaCheck 08/15/2022 5.6  >5.4 % by wt Final    Relative Risk: LOW  Increasing blood levels of long-chain n-3 fatty acids are  associated with a lower risk of sudden cardiac death (1).  Based on the top (75th percentile) and bottom (25th  percentile) quartiles of the CHL reference population, the  following risk categories were established for OmegaCheck:  A cut-off of >=5.5% by wt defines a population at low  relative risk, 3.8-5.4% by wt defines a population at  moderate relative risk, and <=3.7% by wt defines a  population at high relative risk of sudden cardiac death.  The totality of the scientific evidence demonstrates that  when consumption of fish oils is limited to 3 g/day or less  of EPA and DHA, there is no significant risk for increased  bleeding time beyond the normal range. A daily dosage of 1  gram of EPA and DHA lowers the circulating triglycerides by  about 7-10% within 2 to 3 weeks. (Reference: 1-Esteban et al.  NE. 2002; 346: 0480-4053).   • Arachidonic Acid/EPA Ratio 08/15/2022 10.1  3.7 - 40.7 Final   • Omega-6/Omega-3 Ratio 08/15/2022 6.6  3.7 - 14.4 Final   • Omega-3 Total 08/15/2022 5.6  % by wt Final   • EPA 08/15/2022 1.4  0.2 - 2.3 % by wt Final   • DPA 08/15/2022 1.1  0.8 - 1.8 % by wt Final   • DPA 08/15/2022 3.1  1.4 - 5.1 % by wt Final   • Omega-6 Total 08/15/2022 37.2  % by wt Final    [facility] measures a number of omega-6 fatty acids  with AA and LA being the two most abundant forms reported.   • Arachidonic Acid, C20:4w6 08/15/2022 14.1  8.6 - 15.6 % by wt Final   • Linoleic Acid, C18:2w6 08/15/2022 20.0  18.6 - 29.5 % by wt Final    This test is performed by a Liquid Chromatography-Tandem  Mass Spectrometry (LC/MS/MS) method. This test was developed  and its  performance characteristics determined by the  [facility] It has not been cleared or approved  by the U.S[place] FDA. The [facility] is regulated under  Clinical Laboratory Improvement Amendments (CLIA) as  qualified to perform high-complexity testing. This test is  used for clinical purposes.  It should not be regarded as  investigational or for research.   • Vitamin B-12 08/15/2022 402  211 - 946 pg/mL Final   • Folate 08/15/2022 >20.00  4.78 - 24.20 ng/mL Final   • 25 Hydroxy, Vitamin D 08/15/2022 56.8  30.0 - 100.0 ng/ml Final   • Ferritin 08/15/2022 272.00 (H)  13.00 - 150.00 ng/mL Final   • Magnesium 08/15/2022 2.3  1.6 - 2.4 mg/dL Final   • Glucose 08/15/2022 96  65 - 99 mg/dL Final   • BUN 08/15/2022 13  8 - 23 mg/dL Final   • Creatinine 08/15/2022 0.75  0.57 - 1.00 mg/dL Final   • Sodium 08/15/2022 138  136 - 145 mmol/L Final   • Potassium 08/15/2022 4.2  3.5 - 5.2 mmol/L Final   • Chloride 08/15/2022 101  98 - 107 mmol/L Final   • CO2 08/15/2022 26.4  22.0 - 29.0 mmol/L Final   • Calcium 08/15/2022 9.6  8.6 - 10.5 mg/dL Final   • Total Protein 08/15/2022 7.0  6.0 - 8.5 g/dL Final   • Albumin 08/15/2022 4.30  3.50 - 5.20 g/dL Final   • ALT (SGPT) 08/15/2022 7  1 - 33 U/L Final   • AST (SGOT) 08/15/2022 11  1 - 32 U/L Final   • Alkaline Phosphatase 08/15/2022 106  39 - 117 U/L Final   • Total Bilirubin 08/15/2022 1.1  0.0 - 1.2 mg/dL Final   • Globulin 08/15/2022 2.7  gm/dL Final   • A/G Ratio 08/15/2022 1.6  g/dL Final   • BUN/Creatinine Ratio 08/15/2022 17.3  7.0 - 25.0 Final   • Anion Gap 08/15/2022 10.6  5.0 - 15.0 mmol/L Final   • eGFR 08/15/2022 87.4  >60.0 mL/min/1.73 Final    National Kidney Foundation and American Society of Nephrology (ASN) Task Force recommended calculation based on the Chronic Kidney Disease Epidemiology Collaboration (CKD-EPI) equation refit without adjustment for race.   • Total Cholesterol 08/15/2022 158  0 - 200 mg/dL Final   • Triglycerides 08/15/2022 101  0  - 150 mg/dL Final   • HDL Cholesterol 08/15/2022 54  40 - 60 mg/dL Final   • LDL Cholesterol  08/15/2022 86  0 - 100 mg/dL Final   • VLDL Cholesterol 08/15/2022 18  5 - 40 mg/dL Final   • LDL/HDL Ratio 08/15/2022 1.55   Final   • Hemoglobin A1C 08/15/2022 5.70 (H)  4.80 - 5.60 % Final   • TSH 08/15/2022 0.826  0.270 - 4.200 uIU/mL Final   • T3, Free 08/15/2022 2.83  2.00 - 4.40 pg/mL Final   • Free T4 08/15/2022 1.14  0.93 - 1.70 ng/dL Final   • DHEA-Sulfate 08/15/2022 65.7  20.4 - 186.6 ug/dL Final   • Testosterone, Total 08/15/2022 8  3 - 67 ng/dL Final   • Testosterone, Free 08/15/2022 0.8  0.0 - 4.2 pg/mL Final   • Estradiol 08/15/2022 <5.0  pg/mL Final   • Progesterone 08/15/2022 0.12  ng/mL Final   • WBC 08/15/2022 6.31  3.40 - 10.80 10*3/mm3 Final   • RBC 08/15/2022 4.74  3.77 - 5.28 10*6/mm3 Final   • Hemoglobin 08/15/2022 13.8  12.0 - 15.9 g/dL Final   • Hematocrit 08/15/2022 42.9  34.0 - 46.6 % Final   • MCV 08/15/2022 90.5  79.0 - 97.0 fL Final   • MCH 08/15/2022 29.1  26.6 - 33.0 pg Final   • MCHC 08/15/2022 32.2  31.5 - 35.7 g/dL Final   • RDW 08/15/2022 14.0  12.3 - 15.4 % Final   • RDW-SD 08/15/2022 46.4  37.0 - 54.0 fl Final   • MPV 08/15/2022 9.7  6.0 - 12.0 fL Final   • Platelets 08/15/2022 299  140 - 450 10*3/mm3 Final   • Neutrophil % 08/15/2022 63.4  42.7 - 76.0 % Final   • Lymphocyte % 08/15/2022 27.7  19.6 - 45.3 % Final   • Monocyte % 08/15/2022 7.6  5.0 - 12.0 % Final   • Eosinophil % 08/15/2022 0.5  0.3 - 6.2 % Final   • Basophil % 08/15/2022 0.5  0.0 - 1.5 % Final   • Immature Grans % 08/15/2022 0.3  0.0 - 0.5 % Final   • Neutrophils, Absolute 08/15/2022 4.00  1.70 - 7.00 10*3/mm3 Final   • Lymphocytes, Absolute 08/15/2022 1.75  0.70 - 3.10 10*3/mm3 Final   • Monocytes, Absolute 08/15/2022 0.48  0.10 - 0.90 10*3/mm3 Final   • Eosinophils, Absolute 08/15/2022 0.03  0.00 - 0.40 10*3/mm3 Final   • Basophils, Absolute 08/15/2022 0.03  0.00 - 0.20 10*3/mm3 Final   • Immature Grans, Absolute  08/15/2022 0.02  0.00 - 0.05 10*3/mm3 Final   • nRBC 08/15/2022 0.0  0.0 - 0.2 /100 WBC Final   Office Visit on 06/09/2022   Component Date Value Ref Range Status   • Total Cholesterol 06/09/2022 265 (H)  0 - 200 mg/dL Final   • Triglycerides 06/09/2022 367 (H)  0 - 150 mg/dL Final   • HDL Cholesterol 06/09/2022 36 (L)  40 - 60 mg/dL Final   • LDL Cholesterol  06/09/2022 159 (H)  0 - 100 mg/dL Final   • VLDL Cholesterol 06/09/2022 70 (H)  5 - 40 mg/dL Final   • LDL/HDL Ratio 06/09/2022 4.32   Final   • Hemoglobin A1C 06/09/2022 5.50  4.80 - 5.60 % Final   • MTHFR 06/09/2022 Comment   Final    Comment: Result:  c.665C>T (p. [id number]), legacy name: [id number] - Detected, heterozygous  c.1286A>C (p. [id number]), legacy name: [id number] - Detected,  heterozygous  Interpretation:  This result is not associated with an increased risk for  hyperhomocysteinemia. See Additional Clinical Information and  Comments.  Additional Clinical Information:  Hyperhomocysteinemia is multifactorial involving genetic, clinical,  and environmental risk factors. Reduced enzyme activity of  methylenetetrahydrofolate reductase (MTHFR) is a genetic risk  factor for hyperhomocysteinemia, particularly when serum folate  levels are low. There are two common variants in the MTHFR gene  that can decrease enzyme activity; c.665C>T (p. [id number]), legacy  name [id number], and c.1286A>C (p. [id number]), legacy name [id number]. These  variants do not independently increase risk of conditions related  to hyperhomocysteinemia in the absence of elevated homocysteine  levels. Measurement of total plasma homocysteine is recommended.  Patients                            should share their MTHFR genotype with physicians who are  making decisions regarding chemotherapy treatments that depend on  folate, such as methotrexate.  Guidelines do not recommend genotyping of these two MTHFR variants  in the evaluation of venous thrombosis or obstetric risk due to  limited evidence of  clinical utility (PMID: 46081845).  Comments:  Genetic Coordinators are available for health care providers to  discuss results at [phone]  Test Details:  Variants Analyzed: c.665C>T (p. [id number]), legacy name: C677T and  c.1286A>C (p. [id number]), legacy name: [id number]  Methods/Limitations:  DNA analysis of the MTHFR gene was performed by PCR amplification  followed by restriction enzyme analysis. The diagnostic sensitivity  is >99%. Results must be combined with clinical information for the  most accurate interpretation. Molecular-based testing is highly  accurate, but as in any laboratory test, diagnostic errors may  occur. False positive or false negative results                            may occur for  reasons that include genetic variants, blood transfusions, bone  marrow transplantation, somatic or tissue-specific mosaicism,  mislabeled samples, or erroneous representation of family  relationships.  This test was developed and its performance characteristics  determined by [facility]. It has not been cleared or approved by the  Food and Drug Administration.  References:  Warner SE, Jason CJ, Hadley PEREZ. ACMG Practice Guideline: lack of  evidence for MTHFR polymorphism testing. Sarah Med. 2013 Feb;15(2):153-6. doi: 10.1038/gim.2012.165. Epub 2013 Rk 3. PMID:  15645834.  American College of Obstetricians and Gynecologists' Committee on  Practice Bulletins-Obstetrics. ACOG Practice Bulletin No. 197:  Inherited Thrombophilias in Pregnancy. Obstet Gynecol. 2018  Jul;132(1):e18-e34. doi: 10.1097/AOG.[id number]. Erratum in:  Obstet Gynecol. 2018 Oct;132(4):1069. PMID: 60195282.  Rose Mary Michaels, PhD, [facility]  Carlo Fragoso, PhD, FAC  Hernesto Reese, PhD, FAC  Vishal Waldron, PhD, FAC  JT Gonzalez, PhD, FAC  Windy Otto, PhD, FAC  Carolyn Bean, PhD, FAC   Admission on 05/14/2022, Discharged on 05/14/2022   Component Date Value Ref Range Status   •  Glucose 05/14/2022 109 (H)  65 - 99 mg/dL Final   • BUN 05/14/2022 10  8 - 23 mg/dL Final   • Creatinine 05/14/2022 0.77  0.57 - 1.00 mg/dL Final   • Sodium 05/14/2022 139  136 - 145 mmol/L Final   • Potassium 05/14/2022 3.9  3.5 - 5.2 mmol/L Final   • Chloride 05/14/2022 103  98 - 107 mmol/L Final   • CO2 05/14/2022 24.2  22.0 - 29.0 mmol/L Final   • Calcium 05/14/2022 9.7  8.6 - 10.5 mg/dL Final   • Total Protein 05/14/2022 7.1  6.0 - 8.5 g/dL Final   • Albumin 05/14/2022 4.20  3.50 - 5.20 g/dL Final   • ALT (SGPT) 05/14/2022 13  1 - 33 U/L Final   • AST (SGOT) 05/14/2022 15  1 - 32 U/L Final   • Alkaline Phosphatase 05/14/2022 107  39 - 117 U/L Final   • Total Bilirubin 05/14/2022 1.1  0.0 - 1.2 mg/dL Final   • Globulin 05/14/2022 2.9  gm/dL Final   • A/G Ratio 05/14/2022 1.4  g/dL Final   • BUN/Creatinine Ratio 05/14/2022 13.0  7.0 - 25.0 Final   • Anion Gap 05/14/2022 11.8  5.0 - 15.0 mmol/L Final   • eGFR 05/14/2022 84.7  >60.0 mL/min/1.73 Final    National Kidney Foundation and American Society of Nephrology (ASN) Task Force recommended calculation based on the Chronic Kidney Disease Epidemiology Collaboration (CKD-EPI) equation refit without adjustment for race.   • Lipase 05/14/2022 47  13 - 60 U/L Final   • Extra Tube 05/14/2022 Hold for add-ons.   Final    Auto resulted.   • Extra Tube 05/14/2022 hold for add-on   Final    Auto resulted   • Extra Tube 05/14/2022 Hold for add-ons.   Final    Auto resulted.   • Extra Tube 05/14/2022 Hold for add-ons.   Final    Auto resulted   • WBC 05/14/2022 6.95  3.40 - 10.80 10*3/mm3 Final   • RBC 05/14/2022 4.53  3.77 - 5.28 10*6/mm3 Final   • Hemoglobin 05/14/2022 13.9  12.0 - 15.9 g/dL Final   • Hematocrit 05/14/2022 41.9  34.0 - 46.6 % Final   • MCV 05/14/2022 92.5  79.0 - 97.0 fL Final   • MCH 05/14/2022 30.7  26.6 - 33.0 pg Final   • MCHC 05/14/2022 33.2  31.5 - 35.7 g/dL Final   • RDW 05/14/2022 14.2  12.3 - 15.4 % Final   • RDW-SD 05/14/2022 47.9  37.0 - 54.0 fl  Final   • MPV 05/14/2022 8.9  6.0 - 12.0 fL Final   • Platelets 05/14/2022 288  140 - 450 10*3/mm3 Final   • Neutrophil % 05/14/2022 68.0  42.7 - 76.0 % Final   • Lymphocyte % 05/14/2022 22.2  19.6 - 45.3 % Final   • Monocyte % 05/14/2022 8.5  5.0 - 12.0 % Final   • Eosinophil % 05/14/2022 0.4  0.3 - 6.2 % Final   • Basophil % 05/14/2022 0.3  0.0 - 1.5 % Final   • Immature Grans % 05/14/2022 0.6 (H)  0.0 - 0.5 % Final   • Neutrophils, Absolute 05/14/2022 4.73  1.70 - 7.00 10*3/mm3 Final   • Lymphocytes, Absolute 05/14/2022 1.54  0.70 - 3.10 10*3/mm3 Final   • Monocytes, Absolute 05/14/2022 0.59  0.10 - 0.90 10*3/mm3 Final   • Eosinophils, Absolute 05/14/2022 0.03  0.00 - 0.40 10*3/mm3 Final   • Basophils, Absolute 05/14/2022 0.02  0.00 - 0.20 10*3/mm3 Final   • Immature Grans, Absolute 05/14/2022 0.04  0.00 - 0.05 10*3/mm3 Final   • nRBC 05/14/2022 0.0  0.0 - 0.2 /100 WBC Final   • H. pylori IgG 05/13/2022 Negative  Negative, Equivocal Final   • Occult Blood, Fecal by Immunoassay 05/14/2022 Positive (A)  Negative Final   • Color, UA 05/14/2022 Yellow  Yellow, Straw Final   • Appearance, UA 05/14/2022 Clear  Clear Final   • pH, UA 05/14/2022 7.0  5.0 - 8.0 Final   • Specific Gravity, UA 05/14/2022 1.010  1.005 - 1.030 Final   • Glucose, UA 05/14/2022 Negative  Negative Final   • Ketones, UA 05/14/2022 Trace (A)  Negative Final   • Bilirubin, UA 05/14/2022 Negative  Negative Final   • Blood, UA 05/14/2022 Trace (A)  Negative Final   • Protein, UA 05/14/2022 Negative  Negative Final   • Leuk Esterase, UA 05/14/2022 Negative  Negative Final   • Nitrite, UA 05/14/2022 Negative  Negative Final   • Urobilinogen, UA 05/14/2022 0.2 E.U./dL  0.2 - 1.0 E.U./dL Final   • RBC, UA 05/14/2022 0-2 (A)  None Seen /HPF Final   • WBC, UA 05/14/2022 None Seen  None Seen /HPF Final    Urine culture not indicated.   • Bacteria, UA 05/14/2022 None Seen  None Seen /HPF Final   • Squamous Epithelial Cells, UA 05/14/2022 0-2  None Seen, 0-2  /HPF Final   • Hyaline Casts, UA 05/14/2022 None Seen  None Seen /LPF Final   • Methodology 05/14/2022 Automated Microscopy   Final       EKG Results:  No orders to display       Imaging Results:  No Images in the past 120 days found..      Assessment & Plan   Diagnoses and all orders for this visit:    1. Moderate episode of recurrent major depressive disorder (HCC) (Primary)  -     Ambulatory Referral to Behavioral Health  -     gabapentin (NEURONTIN) 300 MG capsule; Take 2 capsules by mouth every night at bedtime.  Dispense: 60 capsule; Refill: 2    2. Generalized anxiety disorder  -     Ambulatory Referral to Behavioral [facility]  -     gabapentin (NEURONTIN) 100 MG capsule; Take 1 capsule by mouth Daily.  Dispense: 30 capsule; Refill: 2  -     gabapentin (NEURONTIN) 300 MG capsule; Take 2 capsules by mouth every night at bedtime.  Dispense: 60 capsule; Refill: 2    3. Panic attacks  -     Ambulatory Referral to Behavioral [facility]  -     gabapentin (NEURONTIN) 100 MG capsule; Take 1 capsule by mouth Daily.  Dispense: 30 capsule; Refill: 2  -     gabapentin (NEURONTIN) 300 MG capsule; Take 2 capsules by mouth every night at bedtime.  Dispense: 60 capsule; Refill: 2    4. Insomnia due to mental condition  -     Ambulatory Referral to Behavioral Health  -     gabapentin (NEURONTIN) 300 MG capsule; Take 2 capsules by mouth every night at bedtime.  Dispense: 60 capsule; Refill: 2        Visit Diagnoses:    ICD-10-CM ICD-9-CM   1. Moderate episode of recurrent major depressive disorder (HCC)  F33.1 296.32   2. Generalized anxiety disorder  F41.1 300.02   3. Panic attacks  F41.0 300.01   4. Insomnia due to mental condition  F51.05 300.9     327.02       INITIAL presentation most consistent with major depressive disorder in partial remission, FAREED, rare panic attacks.    10/31: Couples therapy, increase Abilify, close follow-up.  I feel that their marriage issues have come to a head recently, and this stressor could largely  explain patient's depression and anxiety.  Patient mentioned Celexa again, she is likely interested in switching.  25 minutes of supportive psychotherapy with goal to strengthen defenses, promote problems solving, restore adaptive functioning and provide symptom relief. The therapeutic alliance was strengthened to encourage the patient to express their thoughts and feelings. Esteem building was enhanced through praise, reassurance, normalizing and encouragement. Coping skills were enhanced to build distress tolerance skills and emotional regulation. Allowed patient to freely discuss issues without interruption or judgement with unconditional positive regard, active listening skills, and empathy. Provided a safe, confidential environment to facilitate the development of a positive therapeutic relationship and encourage open, honest communication. Assisted patient in identifying risk factors which would indicate the need for higher level of care including thoughts to harm self or others and/or self-harming behavior and encouraged patient to contact this office, call 911, or present to the nearest emergency room should any of these events occur. Assisted patient in processing session content; acknowledged and normalized patient’s thoughts, feelings, and concerns by utilizing a person-centered approach in efforts to build appropriate rapport and a positive therapeutic relationship with open and honest communication. Patient given education on medication side effects, diagnosis/illness and relapse symptoms. Plan to continue supportive psychotherapy in next appointment to provide symptom relief.  Diagnoses: as above  Symptoms: as above  Functional status: Fair to good  Mental Status Exam: as above    Treatment plan: Medication management and supportive psychotherapy  Prognosis: Fair to good  Progress: Worsening depression and anxiety related to recent trip to [place]  1 week, highly urgent    9/30: well, stable, some  constriction today. No changes. 17 minutes of supportive psychotherapy  Treatment plan: Medication management and supportive psychotherapy  Prognosis: Good  Progress: Stable, close to goal  4 weeks    8/30: Remarkably well. Initial insomnia. 17 minutes of supportive psychotherapyTreatment plan: Medication management and supportive psychotherapy  Prognosis: good  Progress: much better  4 wks    8/1: Try another mood stabilizer in place of olanzapine.  Will trial Abilify again.  Also consider Geodon.  18 minutes of supportive psychotherapy Treatment plan: Medication management and supportive psychotherapy  Prognosis: Fair to good  Progress: Slightly improved  4 weeks, urgent    6/17: start buspar, doing better. 17 minutes of supportive psychotherapy Treatment plan: Medication management and supportive psychotherapy  Prognosis: good  Progress: improving slowly  4 wks      5/20: Continue Lexapro and lorazepam.  Try Belsomra in place of Ambien.  19 minutes of supportive psychotherapyTreatment plan: Medication management and supportive psychotherapy  Prognosis: Good  Progress: Some progress since last visit  4 weeks urgent    4/22: Some improvement in anx and dep, but still residual symptoms.  Persistent insomnia. Declines IOP, inpatient admission.  Increase Lexapro and start trazodone.  17 minutes of supportive psychotherapy Treatment plan: Medication management and supportive psychotherapy  Prognosis: good  Progress: some, minimal      3/24: Focus on lower doses.  Start Pristiq.  Depression with anxious distress versus depression and generalized anxiety disorder.  She only has distractibility, no other symptoms of hypomania.  17 minutes of supportive psychotherapy  4 weeks    3/15: Continue lurasidone 40 mg at night, increase Ambien to 10 mg at night.  Continue low-dose Lamictal, plan to increase further.  Continue titrating off of amitriptyline.  17 minutes of supportive psychotherapy1 week, urgent    3/10: Effexor has  now been stopped.  Continue Latuda and Lamictal at present doses.  I will see her back in a few days and then at 6 weeks.  Bipolar 2?  If so, how come she is tolerating amitriptyline in the evenings?  19 minutes of supportive psychotherapy 6 weeks    2/15: Still utilizing lorazepam which means the anxiety is not under control.  Increase Effexor.  17 minutes of supportive psychotherapy 4 weeks    1/17: Continued improvement.  No changes, only on effexor 75 mg for 2 weeks.  Tolerating medications without side effects.  17 minutes of supportive psychotherapy   4 wks    1/3: Continued improvement. Increase effexor to target residual dep anx. TMS to start 1/6. 17 minutes of supportive psychotherapy  4 wks        PLAN:  115. Risk Assessment: Risk of self-harm acutely remain low. Risk factors include a history of suicidal ideation, mood disorder, anxiety disorder, presence of psychosocial stressors such as colonic dysplasia, COVID-19 pandemic. Protective factors include no history of self-harm or suicide attempts, good social support, willingness to engage in care, improved depressive symptoms. Risk of self-harm chronically also remain low, but could be further elevated in the event of treatment noncompliance and/or AODA.  116. Medications:   a. CONTINUE lexapro 10 mg daily. Risks, benefits, alternatives discussed with patient including GI upset, nausea vomiting diarrhea, theoretical decrease of seizure threshold predisposing the patient to a slightly higher seizure risk, headaches, sexual dysfunction, serotonin syndrome, bleeding risk, increased suicidality in patients 24 years and younger.  After discussion of these risks and benefits, the patient voiced understanding and agreed to proceed.  b. CONTINUE gabapentin 600 mg nightly, 100 mg in the afternoon. Risks, benefits, alternatives discussed with patient including sedation, dizziness/falls risk, GI upset, weight gain.  After discussion of these risks and benefits, the  "patient voiced understanding and agreed to proceed. UDS ordered, Mamadou reviewed.  c. INCREASE Abilify 2 to 4 mg daily. Risks, benefits, alternatives discussed with patient including increased energy, exacerbation of irritability, akathisia, GI upset, orthostatic hypotension, increased appetite.  After discussion of these risks and benefits, the patient voiced understanding and agreed to proceed.  d. STOPPED Ambien 10 mg nightly. Unclear if helped with insomnia. Weeks. Risks, benefits, side effects discussed with patient including sedation, dizziness, GI upset, hallucinations, sleepwalking, sleep-eating.  After discussion of these risks and benefits, the patient voiced understanding and agreed to proceed.  e. CONTINUE lorazepam 1 mg bid PRN. Risks, benefits, alternatives discussed with patient including GI upset, sedation, dizziness, respiratory depression, falls risk.  After discussion of these risks and benefits, the patient voiced understanding and agreed to proceed.  f. S/P:   i. buspar 5 mg bid was ineffective.  ii. Trazodone 50 mg nightly not helpful for insomnia.  iii. Mirtazapine 15 mg, 7.5 mg: ineffective; made anxiety and depression worse.  iv. Abilify: brain zaps and constipation  v. Seroquel made depression worse  vi. celexa 20 mg daily, ineffective after several months  vii. Doxepin caused \"brain zaps\"  viii. Latuda was ineffective.  ix. Amitriptyline 25 mg nightly helpful for sleep but we didn't want her on too many serotonergic meds.  x. Ambien minimally helpful for insomnia.  xi. Pristiq and effexor worsened anxiety, both low dose.  xii. Stopped lamictal for unclear reasons 25 mg.  xiii. belsomra 5 mg nightly didn't help with insomnia.  117. Therapy: continue with Genevieve at [facility].  118. Labs/Studies: BMP to monitor sodium levels in February was entirely normal, EKG February shows rate 72, sinus, .  119. Follow Up: 1 weeks, urgent      TREATMENT PLAN/GOALS: Continue supportive psychotherapy " efforts and medications as indicated. Treatment and medication options discussed during today's visit. Patient acknowledged and verbally consented to continue with current treatment plan and was educated on the importance of compliance with treatment and follow-up appointments.    MEDICATION ISSUES:  YVETTE reviewed as expected.  Discussed medication options and treatment plan of prescribed medication as well as the risks, benefits, and side effects including potential falls, possible impaired driving and metabolic adversities among others. Patient is agreeable to call the office with any worsening of symptoms or onset of side effects. Patient is agreeable to call 911 or go to the nearest ER should he/she begin having SI/HI. No medication side effects or related complaints today.     MEDS ORDERED DURING VISIT:    Return in about 1 week (around 11/7/2022).         This document has been electronically signed by Vinny Krishnamurthy MD  October 31, 2022 09:04 EDT       Part of this note may be an electronic transcription/translation of spoken language to printed text using the Dragon Dictation System.

## 2022-11-07 ENCOUNTER — OFFICE VISIT (OUTPATIENT)
Dept: PSYCHIATRY | Facility: CLINIC | Age: 67
End: 2022-11-07

## 2022-11-07 VITALS
WEIGHT: 143.6 LBS | HEIGHT: 64 IN | HEART RATE: 84 BPM | SYSTOLIC BLOOD PRESSURE: 126 MMHG | DIASTOLIC BLOOD PRESSURE: 63 MMHG | BODY MASS INDEX: 24.52 KG/M2

## 2022-11-07 DIAGNOSIS — F51.05 INSOMNIA DUE TO MENTAL CONDITION: ICD-10-CM

## 2022-11-07 DIAGNOSIS — F41.1 GENERALIZED ANXIETY DISORDER: ICD-10-CM

## 2022-11-07 DIAGNOSIS — F41.0 PANIC ATTACKS: ICD-10-CM

## 2022-11-07 DIAGNOSIS — F41.0 PANIC DISORDER: ICD-10-CM

## 2022-11-07 DIAGNOSIS — F33.1 MODERATE EPISODE OF RECURRENT MAJOR DEPRESSIVE DISORDER: Primary | ICD-10-CM

## 2022-11-07 PROCEDURE — 90833 PSYTX W PT W E/M 30 MIN: CPT | Performed by: STUDENT IN AN ORGANIZED HEALTH CARE EDUCATION/TRAINING PROGRAM

## 2022-11-07 PROCEDURE — 99214 OFFICE O/P EST MOD 30 MIN: CPT | Performed by: STUDENT IN AN ORGANIZED HEALTH CARE EDUCATION/TRAINING PROGRAM

## 2022-11-07 RX ORDER — LORAZEPAM 1 MG/1
1 TABLET ORAL 2 TIMES DAILY PRN
Qty: 60 TABLET | Refills: 5 | Status: SHIPPED | OUTPATIENT
Start: 2022-11-07

## 2022-11-07 NOTE — PATIENT INSTRUCTIONS
1.  Please return to clinic at your next scheduled visit.  Contact the clinic ([phone]) at least 24 hours prior in the event you need to cancel.  2.  Do no harm to yourself or others.    3.  Avoid alcohol and drugs.    4.  Take all medications as prescribed.  Please contact the clinic with any concerns. If you are in need of medication refills, please call the clinic at [phone].    5. Should you want to get in touch with your provider, Dr. Vinny Krishnamurthy, please utilize [employer] or contact the office ([phone]), and staff will be able to page Dr. Krishnamurthy directly.  6.  In the event you have personal crisis, contact the following crisis numbers: Suicide Prevention Hotline 1-367.795.9104; [facility] Helpline 0-446-441-IWQB; [facility] Emergency Room [phone]; text HELLO to 845953; or 336.     SPECIFIC RECOMMENDATIONS:     1.      Medications discussed at this encounter:                   - increase gabapentin     2.      Psychotherapy recommendations:      3.     Return to clinic: 2 weeks

## 2022-11-07 NOTE — PROGRESS NOTES
"Subjective   Shabana Ferguson is a 66 y.o. female who presents today for follow up    Referring Provider:  Vinny Krishnamurthy MD  [address]  [address]  [place] [address]    Chief Complaint:  mdd margarita    History of Present Illness:     Shabana Ferguson is a 64 year old /White female, hx of anxiety and depression, fractured patella, HLD, osteopenia, ulcer referred by MARIE Brice, for anxiety and depression management.   Chart: Psychotropic medications: amitriptyline 25 mg QHS, Celexa 20 mg p.o. daily, lorazepam 0.5 p.o. twice daily as needed anxiety.      \"Shabana\" and Jaya    11/7: In person interview:  1. Chart review: No new.  2. Planning: Increased Abilify and referred to couples therapy a week ago.  3. \"I'm having a hard time to concentrate and sit still.\"  a. Jaya:  i. Couldn't go back to sleep last night  ii. Nicola's move was this past Friday  iii. Moved animals to his house on Saturday  iv. He feels there is less negativity, but she is not sure  4. Mood/Depression:  a. Depressed mood  5. Anxiety:  a. Worrying a lot, very nervous especially in the afternoons  b. Pacing  6. Panic attacks: y, controlled on lorazepam  7. Energy: stable, not at goal  8. Concentration:   a. Some issues with concentration  9. Sleeping:  a. Always wakes at 4-6 am, maintenance insomnia  b. But slept well last night 7-6 am.  10. Eating: 3 lb wl since 10/31  11. Refills:  y  12. Substances: n  13. Therapy: started marriage counseling, next appnt next week  14. Medication compliant: y  15. No SI HI AVH.      10/31: In person interview:  16. Chart review: No new.  17. Planning: No changes at last visit.  18. \"I feel anxiety in the afternoon.\"  a. Stressors:  i. Nicola, her son, is moving out. Stressful.  ii. Pt wants to end the marriage; they've discussed it numerous times recently.  1. Couples therapy: they've tried in the past, more than once, problems with each therapist " "(patient)  19. Mood/Depression:  a. Depressed mood in the afternoons, not mornings  b. Jaya: trip to [place] was fun but stressful and \"it was in the end a major setback\"  i. Last night was very destructive: sleepless  20. Anxiety:  a. [facility]; recommend taking gabapentin 100 mg in afternoon rather than morning.  i. Because it is costing so much money, they are not going back.  b. Worrying, irritable, insomnia, on edge  21. Panic attacks:  a. Yes, in the afternoon, controlled on ativan  22. Energy: down  23. Concentration: poor  24. Sleeping: initial insomnia  25. Eating: 3 lb wg 8/22  26. Refills: No  27. Substances: No  28. Therapy: Yes continuing  29. Medication compliant: y  30. No SI HI AVH.      9/30: In person interview:  31. Chart review: Recently discharged from [facility].  a. Patient was depressed from October of last last year to essentially a couple months ago when she started to improve.  Consider light box.  32. Planning: Restarted Ambien at last visit.  Patient was improving at last visit.  Consider light box  33. \"Good.\"  a. I don't need to take the ambien, I just need the gabapentin  b. TMS helping as well, 10 more sessions, \"really helping\"  c. Discussed light box  34. Mood/Depression: stable, well  35. Anxiety:  a. Anxiety in the afternoons, takes lorazepam daily at 6 pm  i. Takes care of the brain zaps  36. Panic attacks: n  37. Sleeping: well on the gabapentin  38. Eating: stable  39. Refills: y  40. Substances:  41. Therapy: IOP completed  a. Very beneficial, \"I never thought I would like it.\" I would recommend it to anyone.  42. Medication compliant: y  43. No SI HI AVH.      8/30: In person interview:  44. Chart review: Seen by urgent care August 20 for back pain.  COVID-negative.  Also had some cervical adenopathy. EEG is abnormal.  45. Planning: Trial of Abilify appears to be working well.  This is according to the conversation I had with both " "of them on August 8.  46. \"I'm better... I feel good when I get up in the morning.\"  a. P1, G6  b. Doing TMS  c. Then IOP.  d. The problem I still have is sleeping.  e. Initial insomnia for 2 hours each night.  f. Afternoons, gets anxious. Uses ativan rarely. Use has gone down dramatically.  g. Prescribed gabapentin by [facility]. Helping her anxiety.  h. Jaya \"quality of life has gone WAY up.\"  i. Sees neurology in early September.  47. Mood/Depression: improved  48. Anxiety: improved  49. Panic attacks: rare  50. Eating: stable  51. Refills: n  52. Substances: n  53. Therapy: def  54. Medication compliant: y  55. No SI EMI BALLARDH.      8/1: In person interview:  56. Chart review: Patient admitted to the [facility] July 18.  Awaiting records.  57. Planning: This is a follow-up appointment after her admission to the [facility].  58. \" I am better.\"  a. Patient reported she was extremely afraid initially of being admitted, however admission was a very \"positive\" experience for her, and \"I felt at peace.\"  b. Could not tolerate olanzapine 2.5 mg nightly as it gave her nightmares.  She has stopped this medication.  c. She has set up intensive outpatient at [facility], has already gone to [facility] last week, and also has TMS scheduled.  d. Per her , \"the crying spells have stopped.\"  59. Mood/Depression: Slightly improved, still has days of depression.  Reports that some days she is perfect, then other days she becomes very depressed and anxious, but lorazepam helps at that time.  60. Panic attacks: Still has occasionally  61. Sleeping: Still has some trouble sleeping, but recently has been using delta 8 to sleep  62. Eating: Stable, now eating  63. Refills: No  64. Substances: No  65. Therapy: Continuing  66. Medication compliant: Yes  67. No SI HI AVH.      6/17: In person interview:  68. Chart review: Seen by primary care June 9.  Apparently experiencing multiple side effects from anxiety and depression " "medications.  Looks like the results of the testing are not associated with an increased risk for hyper homocystinemia (MTHFR gene assay).  69. Planning: Belsomra was too expensive.  70. \"All in all I'm better.\"  a. Still has nervous stomach; getting help with that from PCP.  b. Sleeping better  c. Taking ativan in the am consistently and also at night to sleep (rarely).  d. Low mood in afternoon with crying spells  71. Mood/Depression: stable   72. Anxiety: under control  73. Sleeping: well  74. Eating: stable  75. Refills: n  76. Substances: n  77. Therapy: has a counselor at [facility] now, plus [facility]  78. Medication compliant: y  79. No SI HI AVH.      5/20: In person interview:  80. Chart review: Patient presented in the emergency room for abdominal pain, she did not want a wait so she left.  Saw primary care for abdominal pain which patient believes is related to starting Pristiq a month ago.  Despite having stopped it long ago, she continues to have abdominal symptoms.  Somatization?  81. Planning: Try Belsomra for insomnia.  82. \" I am getting better.\"  a.  agrees.  Patient is becoming more functional.  b. Depression and anxiety have improved.  Still residual symptoms.  She has only been on the higher dose of Lexapro for about 4 weeks ago.  c. Still having trouble with insomnia but it is controlled on Ambien most nights.  Some nights it is not.  Some nights she does not take Ambien.  d. Utilizing half a milligram of lorazepam twice daily most days.  e. More active around the house.  f. Famotidine is helping with abdominal pain.  83. Refills: Yes  84. Substances: No  85. Therapy: Deferred  86. Medication compliant: Yes  87. No SI HI AVH.      4/22: In person interview:  Chart review: 4/11: I called the patient to check in on her progress on lexapro. \"I'm doing better than I was before.\" Sleeping well. Taking nyquil to sleep. Utilizing lorazepam frequently. Has been on lexapro for 10 days. She understands " "that she needs to give it more time. More active. Has no appetite, however. I mentioned to her the importance of moving to a higher level of care (IOP, inpt). Pt voiced understanding and agreed to proceed. Jaya: crying episodes have stopped. Better energy.  88. \"I can't sleep.\"  a. Persistent insomnia, anxious before bed, unclear why. Has tried ambien which sometimes helps. Also over the counter meds.   b. Irritable, restless, worrying.   c. Still depressed, but this has improved  d. Compliant on lexapro  e. Still having panic attacks.  Utilizing lorazepam.  f. Per , she has made significant improvement over the last few months, but still has a way to go.  Patient agrees.  g. Declines IOP, inpatient admission  89. Therapy: Deferred  90. Medication compliant: To some extent yes  91. No SI HI AVH.      3/24: In person interview: With her   92. Chart review: Patient is now discontinued all medications except for Lamictal 25 mg a day.  I started her on doxepin in the evenings for insomnia on March 21.  93. \"I think I need lower doses.\"  a. Persistent crying spells, and anxiety. Utilizing ativan.  b. Doxepin is helping with insomnia.  c. Patient reports that she knows of someone in her 70s who struggled with depression for years until they came up with a solution.  This gave the patient hope.  She noted that the patient was on very low doses of Celexa and other psychotropic medication.  94. Medication compliant: y  95. No SI HI AVH.      3/15: In person interview: With her   1. Chart review: Patient had another panic attack 3/14 morning.  I advised her to utilize the Ativan liberally as she is not always using it.  Compliant on Latuda 40 mg a day.  She notes that there are some periods where she feels \"completely normal.\"  Then other times, she completely breaks down and has depression and anxiety. Having trouble sleeping for the last 4 days.  Target this with Ambien 5 mg nightly. Risks, benefits, " "side effects discussed with patient including sedation, dizziness, GI upset, hallucinations, sleepwalking, sleep-eating.  After discussion of these risks and benefits, the patient voiced understanding and agreed to proceed. Continue Latuda and Lamictal. Start titrating off of amitriptyline by taking half a tab, 12.5 mg in other words, nightly.  96. \" The Ambien kept me asleep for a couple hours.\"  a. Patient slept for two hours, got up, ate something, and did some reading.  Then went back to sleep.  Notes that she did not feel anxious during this time.  b. When she woke the next morning she felt very tired.  c. Utilizing Ativan more liberally to prevent panic attacks.  d. Some limitation today about how her family is distancing themselves from her due to her depression.  Feels like they should be more supportive.  e. Wants to switch to a different therapist.  She understands that we will have a therapist start next month.  Would like to wait until that time.  Problems with connecting to her present therapist.  97. Therapy: Continuing  98. Medication compliant: Yes  99. No SI HI AVH.      3/10: In person: With her   Interview:  100. Chart review: We have reduced the dose of Effexor to 37.5 mg a day, and started Lamictal 25 mg daily.  Continuing Latuda at its present dose of 20 mg a day.  October labs show reassuring CMP, CBC.  101. \" I stopped the Effexor.\"  a. Patient states she is already feeling better.   agrees.  b. Did not take Effexor this morning and is not experiencing panic and anxiety in the afternoon like she has been for the past several days.  c. Tolerating the Lamictal without side effects.  d. No crying spells  e. Affect is still depressed  102. Medication compliant: Yes  103. No SI HI AVH.      2/15:Virtual visit via Zoom audio and video due to the COVID-19 pandemic.  Patient is accepting of and agreeable to visit.  The visit consisted of the patient and I. The patient is at home, and I " "am at the office.  Interview:  104. Chart review: DEXA scan this month.  Normal in the lumbar spine and hips, it is decreased by 0.9% in the femoral necks compared to 2007.  105. \"Yesterday had a breakdown and cried for hours.\"  a. Otherwise has been doing very well.  b. Still utilizing lorazepam once a day.  c. Has done TMS for what sounds like about a week and a half.  No major changes that she can identify.  d. Sleeping well  e. Still some uncontrolled worrying, irritability, restlessness.  106. Medication compliant: Yes  107. No SI HI AVH.      1/17: Virtual visit via Zoom audio and video due to the COVID-19 pandemic.  Patient is accepting of and agreeable to visit.  The visit consisted of the patient and I.  Interview:  108. Chart review: No new chart developments since January 3.  EKG in October reveals 68, sinus, .  109. \"Doing even better than I was 2 weeks ago.\"  a. Still has mood swings, but not nearly as bad as before.  b. Have not needed to use lorazepam most days.  c. Starting TMS this week.  110. Depression/Mood: much better  111. Anxiety: much better  112. Refills: n  113. Sleeping: well  114. Eating: stable  115. Substances: n  116. Therapy: continuing  117. Medication compliant: y  118. No SI HI AVH.      1/3/22: Virtual visit via Zoom audio and video due to the COVID-19 pandemic.  Patient is accepting of and agreeable to visit.  The visit consisted of the patient and I.  Interview:  119. Chart review: No new chart developments since December 17.  120. \"Doing much better.\"  a. Sleeping well at night.  b. Some anxiety sometimes, but has days where she doesn't have anxiety.  c. No panic attacks  d. No crying spells.  e. Some days doesn't take lorazepam, other times takes a half a pill; when she does, it works very well.  f. Some anxiety looking up TMS last night, took a lorazepam and felt much better.   g. No holiday disruption at all. Handled the holidays well.   h. Approved for TMS. " "  121. Depression/Mood:  1. Depressed mood: much better  2. Seasonal pattern: denies  3. Severity: mild  4. Insomnia: denies  5. Duration: months  122. Anxiety:  1. Uncontrolled worrying: y  2. Severity: mild  3. Restlessness/feeling on edge: y  4. Irritability: y  5. Insomnia: n  6. Duration: months  7. Panic attacks: still has them rarely  123. Refills: y  124. Sleeping: y  125. Eating: stable  126. Substances: n  127. Therapy: With Genevieve at [facility] (continuing)  128. Medication compliant: y  129. No SI HI AVH.      6/1: Interview: Continued improvement. Doing well from d and a perspective. Persistent constipation that began with abilify. Still not taking lorazepam as her anxiety is under control. Denies SI HI AVH.  concurs.    9/11/20 H&P: Patient presented today with her . Both provided extensive history regarding her depression and anxiety. Patient had been seen by  at [facility] for roughly a year and a half. Patient reported that Dr. Gordon had become convinced that she had bipolar disorder, which both she and her  disagree with. Patient and  deny any episodes in the past where she experienced, for an extended length of time lasting at least several days, no need for sleep, rapid speech, risk-taking behaviors. Per the  and wife pair, her previous psychiatrist insisted on her being on a mood stabilizer.   Patient reports that she had been stable on Celexa for \"several years.\" In 2019, January, the patient underwent a colonoscopy where dysplasia was revealed. This led to significant anxiety and a \"breakdown.\" The dysplasia was subsequently removed. Due to the heightened anxiety the patient's psychiatrist took the patient off of Celexa and started her on Lexapro. The patient did not tolerate Lexapro well, she continued to remain anxious, she began to engage in therapy in addition to medication management. The Lexapro was subsequently switched to Viibryd. The Viibryd did not " work. The patient also began to experience panic attacks including shortness of breath, crying, tachycardia, palpitations. The panic attacks were new and had never happened before.   In 2020 the patient experienced another breakdown and sunk into a deeper depression. The COVID-19 pandemic only worsened her situation. In February the patient's , a physician, decided to become her full-time caretaker as she would become anxious and panicky without him present. The patient also experienced intermittent bouts of suicidal ideation.   Recently, the patient and  demanded of their psychiatrist to take her off of Viibryd and Seroquel. They actually tapered her off of Seroquel themselves. They asked him to switch her back to Celexa. She has been on Celexa now for the last 3 days and is already begun to experience improvement. She states that the Seroquel led to having hallucinations and actually worsened insomnia. Last night she did not take Seroquel for the first time in many months and she slept very well. Her mood is slightly improved. Regarding her anxiety, she endorses muscle tension and fatigue restlessness irritability and a history of insomnia. Regarding her depression she denies SI HI AVH, denies anhedonia denies guilt, notes some decreased energy, psychomotor retardation, and a history of insomnia. They had also tried acupuncture in the past with some success. Psychiatric review of systems is negative for psychosis nancy, positive for anxiety and depression. Possibly positive for  depression and postpartum depression. The patient is medication compliant.       Past Psychiatric History:  Began Psychiatric Treatment: Several years   Dx: Depression and anxiety   Psychiatrist: Doctor Gordon for the last year and a half   Therapist: Sees a therapist at [facility]   Admissions History: Denies, Although she came close to needing admission recently.   Medication Trials: See HPI. Seroquel, Prozac  which was too activating, Zoloft which was too activating, Celexa which worked well, Effexor which worked well, Lexapro which did not work, Viibryd which did not work.   Self-Harm: Denies   Suicide Attempts: Denies   OB/GYN HISTORY:  Sexually Active: OB/GYN history deferred   Substance Abuse History:  Types: Denies all, including illicit   Social History:  Marital Status:    Employed: No     Kids: 4   House: House    Hx: Denies   Family History:  Suicide Attempts: Deferred today, Due to lack of time   Suicide Completions: Deferred   Substance Use: Deferred   Psychiatric Conditions: Deferred    depression, psychosis, anxiety: Deferred   Developmental History:  Born: Deferred   Siblings: Deferred   Access to Weapons: Denies   Thought Content: no suicidal ideation, no homicidal ideation, no auditory hallucinations, no visual hallucinations, and     PHQ-9 Depression Screening  PHQ-9 Total Score:      Little interest or pleasure in doing things?     Feeling down, depressed, or hopeless?     Trouble falling or staying asleep, or sleeping too much?     Feeling tired or having little energy?     Poor appetite or overeating?     Feeling bad about yourself - or that you are a failure or have let yourself or your family down?     Trouble concentrating on things, such as reading the newspaper or watching television?     Moving or speaking so slowly that other people could have noticed? Or the opposite - being so fidgety or restless that you have been moving around a lot more than usual?     Thoughts that you would be better off dead, or of hurting yourself in some way?     PHQ-9 Total Score       FAREED-7  Feeling nervous, anxious or on edge: More than half the days  Not being able to stop or control worrying: Several days  Worrying too much about different things: Several days  Trouble Relaxing: More than half the days  Being so restless that it is hard to sit still: More than half the days  Feeling afraid  as if something awful might happen: More than half the days  Becoming easily annoyed or irritable: More than half the days  FAREED 7 Total Score: 12  If you checked any problems, how difficult have these problems made it for you to do your work, take care of things at home, or get along with other people: Very difficult    Past Surgical History:  Past Surgical History:   Procedure Laterality Date   • COLONOSCOPY  [id number]/[id number]    last scope 2020   • COLONOSCOPY N/A 10/29/2021    Procedure: COLONOSCOPY with possible biopies.;  Surgeon: Skyler Fuller MD;  Location: [facility] END[facility];  Service: General;  Laterality: N/A;   • KNEE SURGERY  2017    broken knee       Problem List:  Patient Active Problem List   Diagnosis   • Anxiety   • Depression   • History of fracture of patella   • Hyperlipemia   • Osteopenia   • Ulcerative lesion   • Constipation   • History of colonic polyps   • Tinnitus of right ear   • Sensorineural hearing loss (SNHL) of both ears       Allergy:   No Known Allergies     Discontinued Medications:  Medications Discontinued During This Encounter   Medication Reason   • LORazepam (ATIVAN) 1 MG tablet Reorder       Current Medications:   Current Outpatient Medications   Medication Sig Dispense Refill   • ARIPiprazole (Abilify) 2 MG tablet Take 1 tablet by mouth Daily. 7 tablet 0   • Calcium Carb-Cholecalciferol (OYSCO 500 + D) 500-200 MG-UNIT tablet      • Coenzyme Q10 (CO Q10 PO) Take  by mouth.     • escitalopram (LEXAPRO) 10 MG tablet Take 1 tablet by mouth Daily. 90 tablet 1   • folic acid (FOLVITE) 1 MG tablet Take 1 tablet by mouth Daily. 90 tablet 1   • gabapentin (NEURONTIN) 100 MG capsule Take 1 capsule by mouth Daily. 30 capsule 2   • [START ON 11/9/2022] gabapentin (NEURONTIN) 300 MG capsule Take 2 capsules by mouth every night at bedtime. 60 capsule 2   • GAMMA AMINOBUTYRIC ACID PO Take  by mouth.     • GARLIC PO garlic 1,000 mg oral capsule take 1 capsule by oral route daily   Active     •  LORazepam (ATIVAN) 1 MG tablet Take 1 tablet by mouth 2 (Two) Times a Day As Needed for Anxiety. for anxiety 60 tablet 5   • Multiple Vitamins-Iron (MULTIVITAMIN PLUS IRON ADULT PO) Women's Multivitamin 18 mg iron-400 mcg-500 mg oral tablet take 1 tablet by oral route daily   Active     • Omega-3 Fatty Acids (fish oil) 1000 MG capsule capsule 3,000 mg 3 (Three) Times a Day With Meals. PT(PATIENT) REPORTS SHE TAKES MEDICATION MORNING/LUNCH/EVENING     • Probiotic Product (PROBIOTIC PO) Take  by mouth.     • vitamin E 400 UNIT capsule vitamin E 400 unit oral capsule take 1 capsule by oral route daily   Active     • cholecalciferol (VITAMIN D3) 25 MCG (1000 UT) tablet Take 1,000 Units by mouth Daily.       No current facility-administered medications for this visit.       Past Medical History:  Past Medical History:   Diagnosis Date   • Acid reflux 2022   • Anxiety    • Depression    • Fracture of patella 09/12/2017   • Hyperlipemia    • Osteopenia    • Panic disorder    • Psychiatric inpatient 07/22/2022    Discharged 07/22/2022 for Anxiety, Depression, and Suicidal Bx's       Social History     Socioeconomic History   • Marital status:    Tobacco Use   • Smoking status: Never   • Smokeless tobacco: Never   Vaping Use   • Vaping Use: Never used   Substance and Sexual Activity   • Alcohol use: Not Currently   • Drug use: Never   • Sexual activity: Not Currently         Family History   Problem Relation Age of Onset   • Stroke Mother    • Heart disease Mother    • No Known Problems Father    • No Known Problems Sister    • Cancer Brother    • No Known Problems Maternal Aunt    • No Known Problems Paternal Aunt    • No Known Problems Maternal Uncle    • No Known Problems Paternal Uncle    • No Known Problems Maternal Grandfather    • No Known Problems Maternal Grandmother    • No Known Problems Paternal Grandfather    • No Known Problems Paternal Grandmother    • No Known Problems Cousin    • Seizures Son    • No  "Known Problems Other    • ADD / ADHD Neg Hx    • Alcohol abuse Neg Hx    • Anxiety disorder Neg Hx    • Bipolar disorder Neg Hx    • Dementia Neg Hx    • Depression Neg Hx    • Drug abuse Neg Hx    • OCD Neg Hx    • Paranoid behavior Neg Hx    • Schizophrenia Neg Hx    • Self-Injurious Behavior  Neg Hx    • Suicide Attempts Neg Hx      Review of Systems:  Review of Systems   Constitutional: Negative for diaphoresis and fatigue.   HENT: Negative for drooling.    Eyes: Negative for visual disturbance.   Respiratory: Negative for cough and shortness of breath.    Cardiovascular: Negative for chest pain, palpitations and leg swelling.   Gastrointestinal: Negative for diarrhea, nausea and vomiting.   Endocrine: Positive for cold intolerance. Negative for heat intolerance.   Genitourinary: Negative for difficulty urinating.   Musculoskeletal: Negative for joint swelling.   Allergic/Immunologic: Negative for immunocompromised state.   Neurological: Positive for headaches. Negative for dizziness, seizures, syncope, speech difficulty, light-headedness and numbness.         · Mental Status Exam  · Appearance  · : sitting in a chair with her , good eye contact, normal street clothes, groomed, in person  · Behavior  · : pleasant and cooperative  · Motor  · : No abnormal  · Speech  · : normal rhythm, rate, volume, tone, not hyperverbal, not pressured, normal prosidy, Speaks with an accent  · Mood  · : \"I'm anxious\"  · Affect  · : constricted, no tears, mood congruent, fair variability  · Thought Content  · : negative suicidal ideations, negative homicidal ideations, negative obsessions  · Perceptions  · : negative auditory hallucinations, negative visual hallucinations  · Thought Process  · : linear  · Insight/Judgement  · : fair/fair  · Cognition  · : grossly intact  · Attention  · : intact      Physical Exam:  Physical Exam    Vital Signs:   /63   Pulse 84   Ht 162.6 cm (64\")   Wt 65.1 kg (143 lb 9.6 oz)   BMI " 24.65 kg/m²      Lab Results:   Admission on 08/20/2022, Discharged on 08/20/2022   Component Date Value Ref Range Status   • SARS Antigen 08/20/2022 Not Detected  Not Detected, Presumptive Negative Final   • Internal Control 08/20/2022 Passed  Passed Final   • Lot Number 08/20/2022 [id number]   Final   • Expiration Date 08/20/2022 92,[id number]   Final   • Rapid Influenza A Ag 08/20/2022 Negative  Negative Final   • Rapid Influenza B Ag 08/20/2022 Negative  Negative Final   • Internal Control 08/20/2022 Passed  Passed Final   • Lot Number 08/20/2022 [id number]   Final   • Expiration Date 08/20/2022 120,[id number]   Final   • COVID19 08/20/2022 Not Detected  Not Detected - Ref. Range Final   Lab on 08/15/2022   Component Date Value Ref Range Status   • OmegaCheck 08/15/2022 5.6  >5.4 % by wt Final    Relative Risk: LOW  Increasing blood levels of long-chain n-3 fatty acids are  associated with a lower risk of sudden cardiac death (1).  Based on the top (75th percentile) and bottom (25th  percentile) quartiles of the CHL reference population, the  following risk categories were established for OmegaCheck:  A cut-off of >=5.5% by wt defines a population at low  relative risk, 3.8-5.4% by wt defines a population at  moderate relative risk, and <=3.7% by wt defines a  population at high relative risk of sudden cardiac death.  The totality of the scientific evidence demonstrates that  when consumption of fish oils is limited to 3 g/day or less  of EPA and DHA, there is no significant risk for increased  bleeding time beyond the normal range. A daily dosage of 1  gram of EPA and DHA lowers the circulating triglycerides by  about 7-10% within 2 to 3 weeks. (Reference: 1-Esteban et al.  NEJM. 2002; 346: 8686-7757).   • Arachidonic Acid/EPA Ratio 08/15/2022 10.1  3.7 - 40.7 Final   • Omega-6/Omega-3 Ratio 08/15/2022 6.6  3.7 - 14.4 Final   • Omega-3 Total 08/15/2022 5.6  % by wt Final   • EPA 08/15/2022 1.4  0.2 - 2.3 % by wt Final   • DPA  08/15/2022 1.1  0.8 - 1.8 % by wt Final   • DPA 08/15/2022 3.1  1.4 - 5.1 % by wt Final   • Omega-6 Total 08/15/2022 37.2  % by wt Final    [facility] measures a number of omega-6 fatty acids  with AA and LA being the two most abundant forms reported.   • Arachidonic Acid, C20:4w6 08/15/2022 14.1  8.6 - 15.6 % by wt Final   • Linoleic Acid, C18:2w6 08/15/2022 20.0  18.6 - 29.5 % by wt Final    This test is performed by a Liquid Chromatography-Tandem  Mass Spectrometry (LC/MS/MS) method. This test was developed  and its performance characteristics determined by the  [facility] It has not been cleared or approved  by the U.S. FDA. The [facility] is regulated under  Clinical Laboratory Improvement Amendments (CLIA) as  qualified to perform high-complexity testing. This test is  used for clinical purposes.  It should not be regarded as  investigational or for research.   • Vitamin B-12 08/15/2022 402  211 - 946 pg/mL Final   • Folate 08/15/2022 >20.00  4.78 - 24.20 ng/mL Final   • 25 Hydroxy, Vitamin D 08/15/2022 56.8  30.0 - 100.0 ng/ml Final   • Ferritin 08/15/2022 272.00 (H)  13.00 - 150.00 ng/mL Final   • Magnesium 08/15/2022 2.3  1.6 - 2.4 mg/dL Final   • Glucose 08/15/2022 96  65 - 99 mg/dL Final   • BUN 08/15/2022 13  8 - 23 mg/dL Final   • Creatinine 08/15/2022 0.75  0.57 - 1.00 mg/dL Final   • Sodium 08/15/2022 138  136 - 145 mmol/L Final   • Potassium 08/15/2022 4.2  3.5 - 5.2 mmol/L Final   • Chloride 08/15/2022 101  98 - 107 mmol/L Final   • CO2 08/15/2022 26.4  22.0 - 29.0 mmol/L Final   • Calcium 08/15/2022 9.6  8.6 - 10.5 mg/dL Final   • Total Protein 08/15/2022 7.0  6.0 - 8.5 g/dL Final   • Albumin 08/15/2022 4.30  3.50 - 5.20 g/dL Final   • ALT (SGPT) 08/15/2022 7  1 - 33 U/L Final   • AST (SGOT) 08/15/2022 11  1 - 32 U/L Final   • Alkaline Phosphatase 08/15/2022 106  39 - 117 U/L Final   • Total Bilirubin 08/15/2022 1.1  0.0 - 1.2 mg/dL Final   • Globulin 08/15/2022 2.7   gm/dL Final   • A/G Ratio 08/15/2022 1.6  g/dL Final   • BUN/Creatinine Ratio 08/15/2022 17.3  7.0 - 25.0 Final   • Anion Gap 08/15/2022 10.6  5.0 - 15.0 mmol/L Final   • eGFR 08/15/2022 87.4  >60.0 mL/min/1.73 Final    National Kidney Foundation and American Society of Nephrology (ASN) Task Force recommended calculation based on the Chronic Kidney Disease Epidemiology Collaboration (CKD-EPI) equation refit without adjustment for race.   • Total Cholesterol 08/15/2022 158  0 - 200 mg/dL Final   • Triglycerides 08/15/2022 101  0 - 150 mg/dL Final   • HDL Cholesterol 08/15/2022 54  40 - 60 mg/dL Final   • LDL Cholesterol  08/15/2022 86  0 - 100 mg/dL Final   • VLDL Cholesterol 08/15/2022 18  5 - 40 mg/dL Final   • LDL/HDL Ratio 08/15/2022 1.55   Final   • Hemoglobin A1C 08/15/2022 5.70 (H)  4.80 - 5.60 % Final   • TSH 08/15/2022 0.826  0.270 - 4.200 uIU/mL Final   • T3, Free 08/15/2022 2.83  2.00 - 4.40 pg/mL Final   • Free T4 08/15/2022 1.14  0.93 - 1.70 ng/dL Final   • DHEA-Sulfate 08/15/2022 65.7  20.4 - 186.6 ug/dL Final   • Testosterone, Total 08/15/2022 8  3 - 67 ng/dL Final   • Testosterone, Free 08/15/2022 0.8  0.0 - 4.2 pg/mL Final   • Estradiol 08/15/2022 <5.0  pg/mL Final   • Progesterone 08/15/2022 0.12  ng/mL Final   • WBC 08/15/2022 6.31  3.40 - 10.80 10*3/mm3 Final   • RBC 08/15/2022 4.74  3.77 - 5.28 10*6/mm3 Final   • Hemoglobin 08/15/2022 13.8  12.0 - 15.9 g/dL Final   • Hematocrit 08/15/2022 42.9  34.0 - 46.6 % Final   • MCV 08/15/2022 90.5  79.0 - 97.0 fL Final   • MCH 08/15/2022 29.1  26.6 - 33.0 pg Final   • MCHC 08/15/2022 32.2  31.5 - 35.7 g/dL Final   • RDW 08/15/2022 14.0  12.3 - 15.4 % Final   • RDW-SD 08/15/2022 46.4  37.0 - 54.0 fl Final   • MPV 08/15/2022 9.7  6.0 - 12.0 fL Final   • Platelets 08/15/2022 299  140 - 450 10*3/mm3 Final   • Neutrophil % 08/15/2022 63.4  42.7 - 76.0 % Final   • Lymphocyte % 08/15/2022 27.7  19.6 - 45.3 % Final   • Monocyte % 08/15/2022 7.6  5.0 - 12.0 % Final    • Eosinophil % 08/15/2022 0.5  0.3 - 6.2 % Final   • Basophil % 08/15/2022 0.5  0.0 - 1.5 % Final   • Immature Grans % 08/15/2022 0.3  0.0 - 0.5 % Final   • Neutrophils, Absolute 08/15/2022 4.00  1.70 - 7.00 10*3/mm3 Final   • Lymphocytes, Absolute 08/15/2022 1.75  0.70 - 3.10 10*3/mm3 Final   • Monocytes, Absolute 08/15/2022 0.48  0.10 - 0.90 10*3/mm3 Final   • Eosinophils, Absolute 08/15/2022 0.03  0.00 - 0.40 10*3/mm3 Final   • Basophils, Absolute 08/15/2022 0.03  0.00 - 0.20 10*3/mm3 Final   • Immature Grans, Absolute 08/15/2022 0.02  0.00 - 0.05 10*3/mm3 Final   • nRBC 08/15/2022 0.0  0.0 - 0.2 /100 WBC Final   Office Visit on 06/09/2022   Component Date Value Ref Range Status   • Total Cholesterol 06/09/2022 265 (H)  0 - 200 mg/dL Final   • Triglycerides 06/09/2022 367 (H)  0 - 150 mg/dL Final   • HDL Cholesterol 06/09/2022 36 (L)  40 - 60 mg/dL Final   • LDL Cholesterol  06/09/2022 159 (H)  0 - 100 mg/dL Final   • VLDL Cholesterol 06/09/2022 70 (H)  5 - 40 mg/dL Final   • LDL/HDL Ratio 06/09/2022 4.32   Final   • Hemoglobin A1C 06/09/2022 5.50  4.80 - 5.60 % Final   • MTHFR 06/09/2022 Comment   Final    Comment: Result:  c.665C>T (p. [id number]), legacy name: [id number] - Detected, heterozygous  c.1286A>C (p. [id number]), legacy name: [id number] - Detected,  heterozygous  Interpretation:  This result is not associated with an increased risk for  hyperhomocysteinemia. See Additional Clinical Information and  Comments.  Additional Clinical Information:  Hyperhomocysteinemia is multifactorial involving genetic, clinical,  and environmental risk factors. Reduced enzyme activity of  methylenetetrahydrofolate reductase (MTHFR) is a genetic risk  factor for hyperhomocysteinemia, particularly when serum folate  levels are low. There are two common variants in the MTHFR gene  that can decrease enzyme activity; c.665C>T (p. [id number]), legacy  name [id number], and c.1286A>C (p. [id number]), legacy name [id number]. These  variants do not  independently increase risk of conditions related  to hyperhomocysteinemia in the absence of elevated homocysteine  levels. Measurement of total plasma homocysteine is recommended.  Patients                            should share their MTHFR genotype with physicians who are  making decisions regarding chemotherapy treatments that depend on  folate, such as methotrexate.  Guidelines do not recommend genotyping of these two MTHFR variants  in the evaluation of venous thrombosis or obstetric risk due to  limited evidence of clinical utility (PMID: 72497431).  Comments:  Genetic Coordinators are available for health care providers to  discuss results at [phone] ([phone]).  Test Details:  Variants Analyzed: c.665C>T (p. [id number]), legacy name: C677T and  c.1286A>C (p. [id number]), legacy name: [id number]  Methods/Limitations:  DNA analysis of the MTHFR gene was performed by PCR amplification  followed by restriction enzyme analysis. The diagnostic sensitivity  is >99%. Results must be combined with clinical information for the  most accurate interpretation. Molecular-based testing is highly  accurate, but as in any laboratory test, diagnostic errors may  occur. False positive or false negative results                            may occur for  reasons that include genetic variants, blood transfusions, bone  marrow transplantation, somatic or tissue-specific mosaicism,  mislabeled samples, or erroneous representation of family  relationships.  This test was developed and its performance characteristics  determined by [facility]. It has not been cleared or approved by the  Food and Drug Administration.  References:  Warner SE, Jason CJ, Hadley PEREZ. ACMG Practice Guideline: lack of  evidence for MTHFR polymorphism testing. Sarah Med. 2013 Feb;15(2):153-6. doi: 10.1038/gim.2012.165. Epub 2013 Rk 3. PMID:  [id number].  American College of Obstetricians and Gynecologists' Committee on  Practice Bulletins-Obstetrics. ACOG  Practice Bulletin No. 197:  Inherited Thrombophilias in Pregnancy. Obstet Gynecol. 2018  Jul;132(1):e18-e34. doi: 10.1097/AOG.[id number]. Erratum in:  Obstet Gynecol. 2018 Oct;132(4):1069. PMID: [id number].  Rose Mary Michaels, PhD, [facility]  Carlo Fragoso, PhD, [facility]  Hernesto Reese, PhD, [facility]  Vishal Waldron, PhD, [facility]  JT Gonzalez, PhD, [facility]  Windy Otto, PhD, [facility]  Carolyn Bean, PhD, [facility]   Admission on 05/14/2022, Discharged on 05/14/2022   Component Date Value Ref Range Status   • Glucose 05/14/2022 109 (H)  65 - 99 mg/dL Final   • BUN 05/14/2022 10  8 - 23 mg/dL Final   • Creatinine 05/14/2022 0.77  0.57 - 1.00 mg/dL Final   • Sodium 05/14/2022 139  136 - 145 mmol/L Final   • Potassium 05/14/2022 3.9  3.5 - 5.2 mmol/L Final   • Chloride 05/14/2022 103  98 - 107 mmol/L Final   • CO2 05/14/2022 24.2  22.0 - 29.0 mmol/L Final   • Calcium 05/14/2022 9.7  8.6 - 10.5 mg/dL Final   • Total Protein 05/14/2022 7.1  6.0 - 8.5 g/dL Final   • Albumin 05/14/2022 4.20  3.50 - 5.20 g/dL Final   • ALT (SGPT) 05/14/2022 13  1 - 33 U/L Final   • AST (SGOT) 05/14/2022 15  1 - 32 U/L Final   • Alkaline Phosphatase 05/14/2022 107  39 - 117 U/L Final   • Total Bilirubin 05/14/2022 1.1  0.0 - 1.2 mg/dL Final   • Globulin 05/14/2022 2.9  gm/dL Final   • A/G Ratio 05/14/2022 1.4  g/dL Final   • BUN/Creatinine Ratio 05/14/2022 13.0  7.0 - 25.0 Final   • Anion Gap 05/14/2022 11.8  5.0 - 15.0 mmol/L Final   • eGFR 05/14/2022 84.7  >60.0 mL/min/1.73 Final    National Kidney Foundation and American Society of Nephrology (ASN) Task Force recommended calculation based on the Chronic Kidney Disease Epidemiology Collaboration (CKD-EPI) equation refit without adjustment for race.   • Lipase 05/14/2022 47  13 - 60 U/L Final   • Extra Tube 05/14/2022 Hold for add-ons.   Final    Auto resulted.   • Extra Tube 05/14/2022 hold for add-on   Final    Auto resulted   • Extra Tube 05/14/2022 Hold for add-ons.    Final    Auto resulted.   • Extra Tube 05/14/2022 Hold for add-ons.   Final    Auto resulted   • WBC 05/14/2022 6.95  3.40 - 10.80 10*3/mm3 Final   • RBC 05/14/2022 4.53  3.77 - 5.28 10*6/mm3 Final   • Hemoglobin 05/14/2022 13.9  12.0 - 15.9 g/dL Final   • Hematocrit 05/14/2022 41.9  34.0 - 46.6 % Final   • MCV 05/14/2022 92.5  79.0 - 97.0 fL Final   • MCH 05/14/2022 30.7  26.6 - 33.0 pg Final   • MCHC 05/14/2022 33.2  31.5 - 35.7 g/dL Final   • RDW 05/14/2022 14.2  12.3 - 15.4 % Final   • RDW-SD 05/14/2022 47.9  37.0 - 54.0 fl Final   • MPV 05/14/2022 8.9  6.0 - 12.0 fL Final   • Platelets 05/14/2022 288  140 - 450 10*3/mm3 Final   • Neutrophil % 05/14/2022 68.0  42.7 - 76.0 % Final   • Lymphocyte % 05/14/2022 22.2  19.6 - 45.3 % Final   • Monocyte % 05/14/2022 8.5  5.0 - 12.0 % Final   • Eosinophil % 05/14/2022 0.4  0.3 - 6.2 % Final   • Basophil % 05/14/2022 0.3  0.0 - 1.5 % Final   • Immature Grans % 05/14/2022 0.6 (H)  0.0 - 0.5 % Final   • Neutrophils, Absolute 05/14/2022 4.73  1.70 - 7.00 10*3/mm3 Final   • Lymphocytes, Absolute 05/14/2022 1.54  0.70 - 3.10 10*3/mm3 Final   • Monocytes, Absolute 05/14/2022 0.59  0.10 - 0.90 10*3/mm3 Final   • Eosinophils, Absolute 05/14/2022 0.03  0.00 - 0.40 10*3/mm3 Final   • Basophils, Absolute 05/14/2022 0.02  0.00 - 0.20 10*3/mm3 Final   • Immature Grans, Absolute 05/14/2022 0.04  0.00 - 0.05 10*3/mm3 Final   • nRBC 05/14/2022 0.0  0.0 - 0.2 /100 WBC Final   • H. pylori IgG 05/13/2022 Negative  Negative, Equivocal Final   • Occult Blood, Fecal by Immunoassay 05/14/2022 Positive (A)  Negative Final   • Color, UA 05/14/2022 Yellow  Yellow, Straw Final   • Appearance, UA 05/14/2022 Clear  Clear Final   • pH, UA 05/14/2022 7.0  5.0 - 8.0 Final   • Specific Gravity, UA 05/14/2022 1.010  1.005 - 1.030 Final   • Glucose, UA 05/14/2022 Negative  Negative Final   • Ketones, UA 05/14/2022 Trace (A)  Negative Final   • Bilirubin, UA 05/14/2022 Negative  Negative Final   • Blood, UA  05/14/2022 Trace (A)  Negative Final   • Protein, UA 05/14/2022 Negative  Negative Final   • Leuk Esterase, UA 05/14/2022 Negative  Negative Final   • Nitrite, UA 05/14/2022 Negative  Negative Final   • Urobilinogen, UA 05/14/2022 0.2 E.U./dL  0.2 - 1.0 E.U./dL Final   • RBC, UA 05/14/2022 0-2 (A)  None Seen /HPF Final   • WBC, UA 05/14/2022 None Seen  None Seen /HPF Final    Urine culture not indicated.   • Bacteria, UA 05/14/2022 None Seen  None Seen /HPF Final   • Squamous Epithelial Cells, UA 05/14/2022 0-2  None Seen, 0-2 /HPF Final   • Hyaline Casts, UA 05/14/2022 None Seen  None Seen /LPF Final   • Methodology 05/14/2022 Automated Microscopy   Final       EKG Results:  No orders to display       Imaging Results:  No Images in the past 120 days found..      Assessment & Plan   Diagnoses and all orders for this visit:    1. Moderate episode of recurrent major depressive disorder (HCC) (Primary)    2. Generalized anxiety disorder  -     LORazepam (ATIVAN) 1 MG tablet; Take 1 tablet by mouth 2 (Two) Times a Day As Needed for Anxiety. for anxiety  Dispense: 60 tablet; Refill: 5    3. Panic attacks  -     LORazepam (ATIVAN) 1 MG tablet; Take 1 tablet by mouth 2 (Two) Times a Day As Needed for Anxiety. for anxiety  Dispense: 60 tablet; Refill: 5    4. Panic disorder  -     LORazepam (ATIVAN) 1 MG tablet; Take 1 tablet by mouth 2 (Two) Times a Day As Needed for Anxiety. for anxiety  Dispense: 60 tablet; Refill: 5    5. Insomnia due to mental condition        Visit Diagnoses:    ICD-10-CM ICD-9-CM   1. Moderate episode of recurrent major depressive disorder (HCC)  F33.1 296.32   2. Generalized anxiety disorder  F41.1 300.02   3. Panic attacks  F41.0 300.01   4. Panic disorder  F41.0 300.01   5. Insomnia due to mental condition  F51.05 300.9     327.02       INITIAL presentation most consistent with major depressive disorder in partial remission, FAREED, rare panic attacks.    11/7: Residual anx (at noon), consider adding  gabapentin in the mornings. and dep. Increase gabapentin in the afternoons. No other changes. 17 minutes of supportive psychotherapy with goal to strengthen defenses, promote problems solving, restore adaptive functioning and provide symptom relief. The therapeutic alliance was strengthened to encourage the patient to express their thoughts and feelings. Esteem building was enhanced through praise, reassurance, normalizing and encouragement. Coping skills were enhanced to build distress tolerance skills and emotional regulation. Allowed patient to freely discuss issues without interruption or judgement with unconditional positive regard, active listening skills, and empathy. Provided a safe, confidential environment to facilitate the development of a positive therapeutic relationship and encourage open, honest communication. Assisted patient in identifying risk factors which would indicate the need for higher level of care including thoughts to harm self or others and/or self-harming behavior and encouraged patient to contact this office, call 911, or present to the nearest emergency room should any of these events occur. Assisted patient in processing session content; acknowledged and normalized patient’s thoughts, feelings, and concerns by utilizing a person-centered approach in efforts to build appropriate rapport and a positive therapeutic relationship with open and honest communication. Patient given education on medication side effects, diagnosis/illness and relapse symptoms. Plan to continue supportive psychotherapy in next appointment to provide symptom relief.  Diagnoses: as above  Symptoms: as above  Functional status: good  Mental Status Exam: as above    Treatment plan: Medication management and supportive psychotherapy  Prognosis: good  Progress: possibly improved, if not stable  2 wks      10/31: Couples therapy, increase Abilify, close follow-up.  I feel that their marriage issues have come to a head  recently, and this stressor could largely explain patient's depression and anxiety.  Patient mentioned Celexa again, she is likely interested in switching.  25 minutes of supportive psychotherapy  Treatment plan: Medication management and supportive psychotherapy  Prognosis: Fair to good  Progress: Worsening depression and anxiety related to recent trip to [place]  1 week, highly urgent    9/30: well, stable, some constriction today. No changes. 17 minutes of supportive psychotherapy  Treatment plan: Medication management and supportive psychotherapy  Prognosis: Good  Progress: Stable, close to goal  4 weeks    8/30: Remarkably well. Initial insomnia. 17 minutes of supportive psychotherapyTreatment plan: Medication management and supportive psychotherapy  Prognosis: good  Progress: much better  4 wks    8/1: Try another mood stabilizer in place of olanzapine.  Will trial Abilify again.  Also consider Geodon.  18 minutes of supportive psychotherapy Treatment plan: Medication management and supportive psychotherapy  Prognosis: Fair to good  Progress: Slightly improved  4 weeks, urgent    6/17: start buspar, doing better. 17 minutes of supportive psychotherapy Treatment plan: Medication management and supportive psychotherapy  Prognosis: good  Progress: improving slowly  4 wks      5/20: Continue Lexapro and lorazepam.  Try Belsomra in place of Ambien.  19 minutes of supportive psychotherapyTreatment plan: Medication management and supportive psychotherapy  Prognosis: Good  Progress: Some progress since last visit  4 weeks urgent    4/22: Some improvement in anx and dep, but still residual symptoms.  Persistent insomnia. Declines IOP, inpatient admission.  Increase Lexapro and start trazodone.  17 minutes of supportive psychotherapy Treatment plan: Medication management and supportive psychotherapy  Prognosis: good  Progress: some, minimal      3/24: Focus on lower doses.  Start Pristiq.  Depression with anxious  distress versus depression and generalized anxiety disorder.  She only has distractibility, no other symptoms of hypomania.  17 minutes of supportive psychotherapy  4 weeks    3/15: Continue lurasidone 40 mg at night, increase Ambien to 10 mg at night.  Continue low-dose Lamictal, plan to increase further.  Continue titrating off of amitriptyline.  17 minutes of supportive psychotherapy1 week, urgent    3/10: Effexor has now been stopped.  Continue Latuda and Lamictal at present doses.  I will see her back in a few days and then at 6 weeks.  Bipolar 2?  If so, how come she is tolerating amitriptyline in the evenings?  19 minutes of supportive psychotherapy 6 weeks    2/15: Still utilizing lorazepam which means the anxiety is not under control.  Increase Effexor.  17 minutes of supportive psychotherapy 4 weeks    1/17: Continued improvement.  No changes, only on effexor 75 mg for 2 weeks.  Tolerating medications without side effects.  17 minutes of supportive psychotherapy   4 wks    1/3: Continued improvement. Increase effexor to target residual dep anx. TMS to start 1/6. 17 minutes of supportive psychotherapy  4 wks        PLAN:  130. Risk Assessment: Risk of self-harm acutely remain low. Risk factors include a history of suicidal ideation, mood disorder, anxiety disorder, presence of psychosocial stressors such as colonic dysplasia, COVID-19 pandemic. Protective factors include no history of self-harm or suicide attempts, good social support, willingness to engage in care, improved depressive symptoms. Risk of self-harm chronically also remain low, but could be further elevated in the event of treatment noncompliance and/or AODA.  131. Medications:   a. CONTINUE lexapro 10 mg daily. Risks, benefits, alternatives discussed with patient including GI upset, nausea vomiting diarrhea, theoretical decrease of seizure threshold predisposing the patient to a slightly higher seizure risk, headaches, sexual dysfunction,  "serotonin syndrome, bleeding risk, increased suicidality in patients 24 years and younger.  After discussion of these risks and benefits, the patient voiced understanding and agreed to proceed.  b. CONTINUE gabapentin 600 mg nightly, INCREASE 100 to 200 mg in the afternoon. Risks, benefits, alternatives discussed with patient including sedation, dizziness/falls risk, GI upset, weight gain.  After discussion of these risks and benefits, the patient voiced understanding and agreed to proceed. UDS ordered, Mamadou reviewed.  c. CONTINUE Abilify 4 mg daily. Risks, benefits, alternatives discussed with patient including increased energy, exacerbation of irritability, akathisia, GI upset, orthostatic hypotension, increased appetite.  After discussion of these risks and benefits, the patient voiced understanding and agreed to proceed.  d. CONTINUE lorazepam 1 mg bid PRN. Risks, benefits, alternatives discussed with patient including GI upset, sedation, dizziness, respiratory depression, falls risk.  After discussion of these risks and benefits, the patient voiced understanding and agreed to proceed.  e. S/P:   i. buspar 5 mg bid was ineffective.  ii. Trazodone 50 mg nightly not helpful for insomnia.  iii. Mirtazapine 15 mg, 7.5 mg: ineffective; made anxiety and depression worse.  iv. Abilify: brain zaps and constipation  v. Seroquel made depression worse  vi. celexa 20 mg daily, ineffective after several months  vii. Doxepin caused \"brain zaps\"  viii. Latuda was ineffective.  ix. Amitriptyline 25 mg nightly helpful for sleep but we didn't want her on too many serotonergic meds.  x. Ambien minimally helpful for insomnia.  xi. Pristiq and effexor worsened anxiety, both low dose.  xii. Stopped lamictal for unclear reasons 25 mg.  xiii. belsomra 5 mg nightly didn't help with insomnia.  132. Therapy: continue with Genevieve at [facility].  Referred for couples therapy 10/2022  a. Status post IOP at [facility] " 10/2022  133. Labs/Studies: BMP to monitor sodium levels in February was entirely normal, EKG February shows rate 72, sinus, .  134. Follow Up: 2 weeks      TREATMENT PLAN/GOALS: Continue supportive psychotherapy efforts and medications as indicated. Treatment and medication options discussed during today's visit. Patient acknowledged and verbally consented to continue with current treatment plan and was educated on the importance of compliance with treatment and follow-up appointments.    MEDICATION ISSUES:  YVETTE reviewed as expected.  Discussed medication options and treatment plan of prescribed medication as well as the risks, benefits, and side effects including potential falls, possible impaired driving and metabolic adversities among others. Patient is agreeable to call the office with any worsening of symptoms or onset of side effects. Patient is agreeable to call 911 or go to the nearest ER should he/she begin having SI/HI. No medication side effects or related complaints today.     MEDS ORDERED DURING VISIT:    Return in about 2 weeks (around 11/21/2022) for urgent or highly.         This document has been electronically signed by Vinny Krishnamurthy MD  November 7, 2022 10:07 EST       Part of this note may be an electronic transcription/translation of spoken language to printed text using the Dragon Dictation System.

## 2022-11-14 ENCOUNTER — OFFICE VISIT (OUTPATIENT)
Dept: FAMILY MEDICINE CLINIC | Facility: CLINIC | Age: 67
End: 2022-11-14

## 2022-11-14 VITALS
TEMPERATURE: 97.3 F | HEART RATE: 84 BPM | SYSTOLIC BLOOD PRESSURE: 126 MMHG | DIASTOLIC BLOOD PRESSURE: 74 MMHG | WEIGHT: 143 LBS | HEIGHT: 64 IN | OXYGEN SATURATION: 97 % | BODY MASS INDEX: 24.41 KG/M2

## 2022-11-14 DIAGNOSIS — Z00.00 ENCOUNTER FOR SUBSEQUENT ANNUAL WELLNESS VISIT (AWV) IN MEDICARE PATIENT: Primary | ICD-10-CM

## 2022-11-14 DIAGNOSIS — Z91.89 STREPTOCOCCUS PNEUMONIAE VACCINATION INDICATED: ICD-10-CM

## 2022-11-14 PROCEDURE — 90677 PCV20 VACCINE IM: CPT | Performed by: NURSE PRACTITIONER

## 2022-11-14 PROCEDURE — G0439 PPPS, SUBSEQ VISIT: HCPCS | Performed by: NURSE PRACTITIONER

## 2022-11-14 PROCEDURE — 1159F MED LIST DOCD IN RCRD: CPT | Performed by: NURSE PRACTITIONER

## 2022-11-14 PROCEDURE — G0009 ADMIN PNEUMOCOCCAL VACCINE: HCPCS | Performed by: NURSE PRACTITIONER

## 2022-11-14 PROCEDURE — 1170F FXNL STATUS ASSESSED: CPT | Performed by: NURSE PRACTITIONER

## 2022-11-14 NOTE — PROGRESS NOTES
The ABCs of the Annual Wellness Visit  Subsequent Medicare Wellness Visit    Chief Complaint   Patient presents with   • Medicare Wellness-subsequent      Subjective    History of Present Illness:  Shabana Ferguson is a 66 y.o. female who presents for a Subsequent Medicare Wellness Visit.    The following portions of the patient's history were reviewed and   updated as appropriate: allergies, current medications, past family history, past medical history, past social history, past surgical history and problem list.    Compared to one year ago, the patient feels her physical   health is better.    Compared to one year ago, the patient feels her mental   health is better.    Recent Hospitalizations:  She was admitted within the past 365 days at [facility] July 2022 4 days for [facility] hospital.       Current Medical Providers:  Patient Care Team:  Kvng Ramirez APRN as PCP - General (Nurse Practitioner)    Outpatient Medications Prior to Visit   Medication Sig Dispense Refill   • ARIPiprazole (Abilify) 2 MG tablet Take 1 tablet by mouth Daily. 7 tablet 0   • Calcium Carb-Cholecalciferol (OYSCO 500 + D) 500-200 MG-UNIT tablet      • cholecalciferol (VITAMIN D3) 25 MCG (1000 UT) tablet Take 1,000 Units by mouth Daily.     • Coenzyme Q10 (CO Q10 PO) Take  by mouth.     • escitalopram (LEXAPRO) 10 MG tablet Take 1 tablet by mouth Daily. 90 tablet 1   • folic acid (FOLVITE) 1 MG tablet Take 1 tablet by mouth Daily. 90 tablet 1   • gabapentin (NEURONTIN) 100 MG capsule Take 1 capsule by mouth Daily. (Patient taking differently: Take 2 capsules by mouth Daily. In the after noon) 30 capsule 2   • gabapentin (NEURONTIN) 300 MG capsule Take 2 capsules by mouth every night at bedtime. 60 capsule 2   • GAMMA AMINOBUTYRIC ACID PO Take  by mouth.     • GARLIC PO garlic 1,000 mg oral capsule take 1 capsule by oral route daily   Active     • LORazepam (ATIVAN) 1 MG tablet Take 1 tablet by mouth 2 (Two) Times a Day As  "Needed for Anxiety. for anxiety 60 tablet 5   • Multiple Vitamins-Iron (MULTIVITAMIN PLUS IRON ADULT PO) Women's Multivitamin 18 mg iron-400 mcg-500 mg oral tablet take 1 tablet by oral route daily   Active     • Omega-3 Fatty Acids (fish oil) 1000 MG capsule capsule 3,000 mg 3 (Three) Times a Day With Meals. PT(PATIENT) REPORTS SHE TAKES MEDICATION MORNING/LUNCH/EVENING     • Probiotic Product (PROBIOTIC PO) Take  by mouth.     • vitamin E 400 UNIT capsule vitamin E 400 unit oral capsule take 1 capsule by oral route daily   Active       No facility-administered medications prior to visit.       No opioid medication identified on active medication list. I have reviewed chart for other potential  high risk medication/s and harmful drug interactions in the elderly.          Aspirin is not on active medication list.  Aspirin use is indicated based on review of current medical condition/s. Pros and cons of this therapy have been discussed with this patient. Benefits of this medication outweigh potential harm.  Patient has been instructed to start taking this medication..    Patient Active Problem List   Diagnosis   • Anxiety   • Depression   • History of fracture of patella   • Hyperlipemia   • Osteopenia   • Ulcerative lesion   • Constipation   • History of colonic polyps   • Tinnitus of right ear   • Sensorineural hearing loss (SNHL) of both ears     Advance Care Planning  Advance Directive is not on file.  ACP discussion was held with the patient during this visit. Patient has an advance directive (not in EMR), copy requested.          Objective    Vitals:    11/14/22 1356   BP: 126/74   Pulse: 84   Temp: 97.3 °F (36.3 °C)   SpO2: 97%   Weight: 64.9 kg (143 lb)   Height: 162.6 cm (64\")     Estimated body mass index is 24.55 kg/m² as calculated from the following:    Height as of this encounter: 162.6 cm (64\").    Weight as of this encounter: 64.9 kg (143 lb).    BMI is within normal parameters. No other follow-up for " BMI required.      Does the patient have evidence of cognitive impairment? No    Physical Exam  Vitals reviewed.   Constitutional:       Appearance: Normal appearance. She is well-developed.   HENT:      Head: Normocephalic and atraumatic.   Eyes:      Conjunctiva/sclera: Conjunctivae normal.      Pupils: Pupils are equal, round, and reactive to light.   Cardiovascular:      Rate and Rhythm: Normal rate and regular rhythm.      Heart sounds: No murmur heard.  Pulmonary:      Effort: Pulmonary effort is normal.      Breath sounds: Normal breath sounds. No wheezing or rhonchi.   Skin:     General: Skin is warm and dry.   Neurological:      Mental Status: She is alert and oriented to person, place, and time.   Psychiatric:         Mood and Affect: Mood and affect normal.         Behavior: Behavior normal.         Thought Content: Thought content normal.         Judgment: Judgment normal.             Patient does have an elevated depression screening she is actively working with psychiatry to help with this she has an appointment with Dr. Krishnamurthy 11/18/2022 for follow-up.    HEALTH RISK ASSESSMENT    Smoking Status:  Social History     Tobacco Use   Smoking Status Never   Smokeless Tobacco Never     Alcohol Consumption:  Social History     Substance and Sexual Activity   Alcohol Use Not Currently     Fall Risk Screen:    STEADI Fall Risk Assessment was completed, and patient is at LOW risk for falls.Assessment completed on:11/14/2022    Depression Screening:  PHQ-2/PHQ-9 Depression Screening 11/14/2022   Retired PHQ-9 Total Score -   Retired Total Score -   Little Interest or Pleasure in Doing Things 2-->more than half the days   Feeling Down, Depressed or Hopeless 2-->more than half the days   Trouble Falling or Staying Asleep, or Sleeping Too Much 2-->more than half the days   Feeling Tired or Having Little Energy 2-->more than half the days   Poor Appetite or Overeating 2-->more than half the days   Feeling Bad about  Yourself - or that You are a Failure or Have Let Yourself or Your Family Down 2-->more than half the days   Trouble Concentrating on Things, Such as Reading the Newspaper or Watching Television 2-->more than half the days   Moving or Speaking So Slowly that Other People Could Have Noticed? Or the Opposite - Being So Fidgety 0-->not at all   Thoughts that You Would be Better Off Dead or of Hurting Yourself in Some Way 0-->not at all   PHQ-9: Brief Depression Severity Measure Score 14   If You Checked Off Any Problems, How Difficult Have These Problems Made It For You to Do Your Work, Take Care of Things at Home, or Get Along with Other People? very difficult       Health Habits and Functional and Cognitive Screening:  Functional & Cognitive Status 11/14/2022   Do you have difficulty preparing food and eating? No   Do you have difficulty bathing yourself, getting dressed or grooming yourself? No   Do you have difficulty using the toilet? No   Do you have difficulty moving around from place to place? No   Do you have trouble with steps or getting out of a bed or a chair? No   Current Diet Well Balanced Diet   Dental Exam Up to date   Eye Exam Up to date   Exercise (times per week) 7 times per week   Current Exercises Include Walking   Do you need help using the phone?  No   Are you deaf or do you have serious difficulty hearing?  Yes   Do you need help with transportation? No   Do you need help shopping? No   Do you need help preparing meals?  No   Do you need help with housework?  No   Do you need help with laundry? No   Do you need help taking your medications? No   Do you need help managing money? No   Do you ever drive or ride in a car without wearing a seat belt? No   Have you felt unusual stress, anger or loneliness in the last month? No   Who do you live with? Spouse   If you need help, do you have trouble finding someone available to you? No   Have you been bothered in the last four weeks by sexual problems? No    Do you have difficulty concentrating, remembering or making decisions? Yes       Age-appropriate Screening Schedule:  Refer to the list below for future screening recommendations based on patient's age, sex and/or medical conditions. Orders for these recommended tests are listed in the plan section. The patient has been provided with a written plan.    Health Maintenance   Topic Date Due   • TDAP/TD VACCINES (1 - Tdap) 02/16/2023 (Originally 12/13/1974)   • LIPID PANEL  08/15/2023   • DXA SCAN  02/03/2024   • MAMMOGRAM  10/10/2024   • INFLUENZA VACCINE  Completed   • ZOSTER VACCINE  Completed              Assessment & Plan   CMS Preventative Services Quick Reference  Risk Factors Identified During Encounter  Depression/Dysphoria  Hearing Problem  The above risks/problems have been discussed with the patient.  Follow up actions/plans if indicated are seen below in the Assessment/Plan Section.  Pertinent information has been shared with the patient in the After Visit Summary.    Diagnoses and all orders for this visit:    1. Encounter for subsequent annual wellness visit (AWV) in Medicare patient (Primary)    2. Streptococcus pneumoniae vaccination indicated  -     Pneumococcal Conjugate Vaccine 20-Valent (PCV20)        Follow Up:   Return in about 1 year (around 11/14/2023) for Medicare Wellness.     An After Visit Summary and PPPS were made available to the patient.

## 2022-11-18 ENCOUNTER — OFFICE VISIT (OUTPATIENT)
Dept: PSYCHIATRY | Facility: CLINIC | Age: 67
End: 2022-11-18

## 2022-11-18 VITALS
HEART RATE: 74 BPM | SYSTOLIC BLOOD PRESSURE: 132 MMHG | WEIGHT: 145 LBS | DIASTOLIC BLOOD PRESSURE: 72 MMHG | HEIGHT: 64 IN | BODY MASS INDEX: 24.75 KG/M2

## 2022-11-18 DIAGNOSIS — F41.1 GENERALIZED ANXIETY DISORDER: ICD-10-CM

## 2022-11-18 DIAGNOSIS — F33.1 MODERATE EPISODE OF RECURRENT MAJOR DEPRESSIVE DISORDER: Primary | ICD-10-CM

## 2022-11-18 DIAGNOSIS — F41.0 PANIC DISORDER: ICD-10-CM

## 2022-11-18 DIAGNOSIS — F41.0 PANIC ATTACKS: ICD-10-CM

## 2022-11-18 DIAGNOSIS — F51.05 INSOMNIA DUE TO MENTAL CONDITION: ICD-10-CM

## 2022-11-18 PROCEDURE — 90833 PSYTX W PT W E/M 30 MIN: CPT | Performed by: STUDENT IN AN ORGANIZED HEALTH CARE EDUCATION/TRAINING PROGRAM

## 2022-11-18 PROCEDURE — 99214 OFFICE O/P EST MOD 30 MIN: CPT | Performed by: STUDENT IN AN ORGANIZED HEALTH CARE EDUCATION/TRAINING PROGRAM

## 2022-11-18 RX ORDER — GABAPENTIN 100 MG/1
200 CAPSULE ORAL DAILY
Qty: 30 CAPSULE | Refills: 2 | Status: SHIPPED | OUTPATIENT
Start: 2022-11-18 | End: 2022-12-09 | Stop reason: DRUGHIGH

## 2022-11-18 RX ORDER — AMITRIPTYLINE HYDROCHLORIDE 25 MG/1
25 TABLET, FILM COATED ORAL NIGHTLY
Qty: 30 TABLET | Refills: 2 | Status: SHIPPED | OUTPATIENT
Start: 2022-11-18 | End: 2022-12-12 | Stop reason: SDUPTHER

## 2022-11-18 RX ORDER — GABAPENTIN 100 MG/1
200 CAPSULE ORAL DAILY
Qty: 30 CAPSULE | Refills: 2 | OUTPATIENT
Start: 2022-11-18 | End: 2022-11-18 | Stop reason: SDUPTHER

## 2022-11-18 RX ORDER — GABAPENTIN 300 MG/1
CAPSULE ORAL
Qty: 120 CAPSULE | Refills: 2 | Status: SHIPPED | OUTPATIENT
Start: 2022-11-18 | End: 2023-01-20 | Stop reason: SDUPTHER

## 2022-11-18 NOTE — PATIENT INSTRUCTIONS
1.  Please return to clinic at your next scheduled visit.  Contact the clinic ([phone]) at least 24 hours prior in the event you need to cancel.  2.  Do no harm to yourself or others.    3.  Avoid alcohol and drugs.    4.  Take all medications as prescribed.  Please contact the clinic with any concerns. If you are in need of medication refills, please call the clinic at [phone].    5. Should you want to get in touch with your provider, Dr. Vinny Krishnamurthy, please utilize [employer] or contact the office ([phone]), and staff will be able to page Dr. Krishnamurthy directly.  6.  In the event you have personal crisis, contact the following crisis numbers: Suicide Prevention Hotline 1-825.763.7059; [facility] Helpline 9-391-296-YTOA; [facility] Emergency Room [phone]; text HELLO to 677239; or 046.     SPECIFIC RECOMMENDATIONS:     1.      Medications discussed at this encounter:                   -      2.      Psychotherapy recommendations:      3.     Return to clinic: 4 weeks

## 2022-11-18 NOTE — PROGRESS NOTES
"Subjective   Shabana Ferguson is a 66 y.o. female who presents today for follow up    Referring Provider:  Kvng Ramirez APRN  [address]  [address]  [place] [address]    Chief Complaint:  mdd margarita    History of Present Illness:     Shabana Ferguson is a 64 year old /White female, hx of anxiety and depression, fractured patella, HLD, osteopenia, ulcer referred by MARIE Brice, for anxiety and depression management.   Chart: Psychotropic medications: amitriptyline 25 mg QHS, Celexa 20 mg p.o. daily, lorazepam 0.5 p.o. twice daily as needed anxiety.      \"Shabana\" and Jaya    11/18: In person interview:  1. Chart review: Seen by primary care November 14.  PHQ-9 is 14, very difficult.  Feels her mental health is better than it was a year ago, however.  2. Planning: They have started marriage counseling.  Increased gabapentin in the afternoons to 200 mg a day.  Reintroduce amitriptyline in the evenings.  3. \"I'm suffering.\"  a. Monday has a big trip to [place]. Just got back from [place] trip, which \"was traumatic.\"  b. Nicola didn't join them for dinner last night -- upsetting for her.  4. Mood/Depression:  a. Depressed mood  5. Anxiety:  a. Restless, irritable, on edge -- lorazepam works. Starts at noon.  b. Very clingy, don't want to be by myself, throughout the day  6. Panic attacks: Controlled on Ativan  7. Energy: Down  8. Concentration: Still not a goal  9. Sleeping: Initial insomnia  10. Eating: Stable weight  11. Refills: Yes  12. Substances: No  13. Therapy: continuing marriage counseling, have to pay out of pocket  14. Medication compliant: y  15. No SI HI AVH.      11/7: In person interview:  16. Chart review: No new.  17. Planning: Increased Abilify and referred to couples therapy a week ago.  18. \"I'm having a hard time to concentrate and sit still.\"  [place]:  i. Couldn't go back to sleep last night  ii. Nicola's move was this past Friday  iii. Moved animals to his " "house on Saturday  iv. He feels there is less negativity, but she is not sure  19. Mood/Depression:  a. Depressed mood  20. Anxiety:  a. Worrying a lot, very nervous especially in the afternoons  b. Pacing  21. Panic attacks: y, controlled on lorazepam  22. Energy: stable, not at goal  23. Concentration:   a. Some issues with concentration  24. Sleeping:  a. Always wakes at 4-6 am, maintenance insomnia  b. But slept well last night 7-6 am.  25. Eating: 3 lb wl since 10/31  26. Refills:  y  27. Substances: n  28. Therapy: started marriage counseling, next appnt next week  29. Medication compliant: y  30. No SI HI AVH.      10/31: In person interview:  31. Chart review: No new.  32. Planning: No changes at last visit.  33. \"I feel anxiety in the afternoon.\"  a. Stressors:  i. Nicola, her son, is moving out. Stressful.  ii. Pt wants to end the marriage; they've discussed it numerous times recently.  1. Couples therapy: they've tried in the past, more than once, problems with each therapist (patient)  34. Mood/Depression:  a. Depressed mood in the afternoons, not mornings  b. Jaya: trip to [place] was fun but stressful and \"it was in the end a major setback\"  i. Last night was very destructive: sleepless  35. Anxiety:  a. [facility]; recommend taking gabapentin 100 mg in afternoon rather than morning.  i. Because it is costing so much money, they are not going back.  b. Worrying, irritable, insomnia, on edge  36. Panic attacks:  a. Yes, in the afternoon, controlled on ativan  37. Energy: down  38. Concentration: poor  39. Sleeping: initial insomnia  40. Eating: 3 lb wg 8/22  41. Refills: No  42. Substances: No  43. Therapy: Yes continuing  44. Medication compliant: y  45. No SI HI AVH.      9/30: In person interview:  46. Chart review: Recently discharged from [facility] outpatient.  a. Patient was depressed from October of last last year to essentially a couple months ago when she started " "to improve.  Consider light box.  47. Planning: Restarted Ambien at last visit.  Patient was improving at last visit.  Consider light box  48. \"Good.\"  a. I don't need to take the ambien, I just need the gabapentin  b. TMS helping as well, 10 more sessions, \"really helping\"  c. Discussed light box  49. Mood/Depression: stable, well  50. Anxiety:  a. Anxiety in the afternoons, takes lorazepam daily at 6 pm  i. Takes care of the brain zaps  51. Panic attacks: n  52. Sleeping: well on the gabapentin  53. Eating: stable  54. Refills: y  55. Substances:  56. Therapy: IOP completed  a. Very beneficial, \"I never thought I would like it.\" I would recommend it to anyone.  57. Medication compliant: y  58. No SI HI AVH.      8/30: In person interview:  59. Chart review: Seen by urgent care August 20 for back pain.  COVID-negative.  Also had some cervical adenopathy. EEG is abnormal.  60. Planning: Trial of Abilify appears to be working well.  This is according to the conversation I had with both of them on August 8.  61. \"I'm better... I feel good when I get up in the morning.\"  a. P1, G6  b. Doing TMS  c. Then IOP.  d. The problem I still have is sleeping.  e. Initial insomnia for 2 hours each night.  f. Afternoons, gets anxious. Uses ativan rarely. Use has gone down dramatically.  g. Prescribed gabapentin by [facility]. Helping her anxiety.  h. Jaya \"quality of life has gone WAY up.\"  i. Sees neurology in early September.  62. Mood/Depression: improved  63. Anxiety: improved  64. Panic attacks: rare  65. Eating: stable  66. Refills: n  67. Substances: n  68. Therapy: def  69. Medication compliant: y  70. No SI HI AVH.      8/1: In person interview:  71. Chart review: Patient admitted to the [facility] July 18.  Awaiting records.  72. Planning: This is a follow-up appointment after her admission to the [facility].  73. \" I am better.\"  a. Patient reported she was extremely afraid initially of being admitted, however admission was a " "very \"positive\" experience for her, and \"I felt at peace.\"  b. Could not tolerate olanzapine 2.5 mg nightly as it gave her nightmares.  She has stopped this medication.  c. She has set up intensive outpatient at [facility], has already gone to [facility] last week, and also has TMS scheduled.  d. Per her , \"the crying spells have stopped.\"  74. Mood/Depression: Slightly improved, still has days of depression.  Reports that some days she is perfect, then other days she becomes very depressed and anxious, but lorazepam helps at that time.  75. Panic attacks: Still has occasionally  76. Sleeping: Still has some trouble sleeping, but recently has been using delta 8 to sleep  77. Eating: Stable, now eating  78. Refills: No  79. Substances: No  80. Therapy: Continuing  81. Medication compliant: Yes  82. No SI HI AVH.      6/17: In person interview:  83. Chart review: Seen by primary care June 9.  Apparently experiencing multiple side effects from anxiety and depression medications.  Looks like the results of the testing are not associated with an increased risk for hyper homocystinemia (MTHFR gene assay).  84. Planning: Belsomra was too expensive.  85. \"All in all I'm better.\"  a. Still has nervous stomach; getting help with that from PCP.  b. Sleeping better  c. Taking ativan in the am consistently and also at night to sleep (rarely).  d. Low mood in afternoon with crying spells  86. Mood/Depression: stable   87. Anxiety: under control  88. Sleeping: well  89. Eating: stable  90. Refills: n  91. Substances: n  92. Therapy: has a counselor at [facility] now, plus [facility]  93. Medication compliant: y  94. No SI HI AVH.      5/20: In person interview:  95. Chart review: Patient presented in the emergency room for abdominal pain, she did not want a wait so she left.  Saw primary care for abdominal pain which patient believes is related to starting Pristiq a month ago.  Despite having stopped it long ago, she " "continues to have abdominal symptoms.  Somatization?  96. Planning: Try Belsomra for insomnia.  97. \" I am getting better.\"  a.  agrees.  Patient is becoming more functional.  b. Depression and anxiety have improved.  Still residual symptoms.  She has only been on the higher dose of Lexapro for about 4 weeks ago.  c. Still having trouble with insomnia but it is controlled on Ambien most nights.  Some nights it is not.  Some nights she does not take Ambien.  d. Utilizing half a milligram of lorazepam twice daily most days.  e. More active around the house.  f. Famotidine is helping with abdominal pain.  98. Refills: Yes  99. Substances: No  100. Therapy: Deferred  101. Medication compliant: Yes  102. No SI HI AVH.      4/22: In person interview:  Chart review: 4/11: I called the patient to check in on her progress on lexapro. \"I'm doing better than I was before.\" Sleeping well. Taking nyquil to sleep. Utilizing lorazepam frequently. Has been on lexapro for 10 days. She understands that she needs to give it more time. More active. Has no appetite, however. I mentioned to her the importance of moving to a higher level of care (IOP, inpt). Pt voiced understanding and agreed to proceed. Jaya: crying episodes have stopped. Better energy.  103. \"I can't sleep.\"  a. Persistent insomnia, anxious before bed, unclear why. Has tried ambien which sometimes helps. Also over the counter meds.   b. Irritable, restless, worrying.   c. Still depressed, but this has improved  d. Compliant on lexapro  e. Still having panic attacks.  Utilizing lorazepam.  f. Per , she has made significant improvement over the last few months, but still has a way to go.  Patient agrees.  g. Declines IOP, inpatient admission  104. Therapy: Deferred  105. Medication compliant: To some extent yes  106. No SI HI AVH.      3/24: In person interview: With her   107. Chart review: Patient is now discontinued all medications except for " "Lamictal 25 mg a day.  I started her on doxepin in the evenings for insomnia on March 21.  108. \"I think I need lower doses.\"  a. Persistent crying spells, and anxiety. Utilizing ativan.  b. Doxepin is helping with insomnia.  c. Patient reports that she knows of someone in her 70s who struggled with depression for years until they came up with a solution.  This gave the patient hope.  She noted that the patient was on very low doses of Celexa and other psychotropic medication.  109. Medication compliant: y  110. No SI HI AVH.      3/15: In person interview: With her   1. Chart review: Patient had another panic attack 3/14 morning.  I advised her to utilize the Ativan liberally as she is not always using it.  Compliant on Latuda 40 mg a day.  She notes that there are some periods where she feels \"completely normal.\"  Then other times, she completely breaks down and has depression and anxiety. Having trouble sleeping for the last 4 days.  Target this with Ambien 5 mg nightly. Risks, benefits, side effects discussed with patient including sedation, dizziness, GI upset, hallucinations, sleepwalking, sleep-eating.  After discussion of these risks and benefits, the patient voiced understanding and agreed to proceed. Continue Latuda and Lamictal. Start titrating off of amitriptyline by taking half a tab, 12.5 mg in other words, nightly.  111. \" The Ambien kept me asleep for a couple hours.\"  a. Patient slept for two hours, got up, ate something, and did some reading.  Then went back to sleep.  Notes that she did not feel anxious during this time.  b. When she woke the next morning she felt very tired.  c. Utilizing Ativan more liberally to prevent panic attacks.  d. Some limitation today about how her family is distancing themselves from her due to her depression.  Feels like they should be more supportive.  e. Wants to switch to a different therapist.  She understands that we will have a therapist start next month. " " Would like to wait until that time.  Problems with connecting to her present therapist.  112. Therapy: Continuing  113. Medication compliant: Yes  114. No SI HI AVH.      3/10: In person: With her   Interview:  115. Chart review: We have reduced the dose of Effexor to 37.5 mg a day, and started Lamictal 25 mg daily.  Continuing Latuda at its present dose of 20 mg a day.  October labs show reassuring CMP, CBC.  116. \" I stopped the Effexor.\"  a. Patient states she is already feeling better.   agrees.  b. Did not take Effexor this morning and is not experiencing panic and anxiety in the afternoon like she has been for the past several days.  c. Tolerating the Lamictal without side effects.  d. No crying spells  e. Affect is still depressed  117. Medication compliant: Yes  118. No SI HI AVH.      2/15:Virtual visit via Zoom audio and video due to the COVID-19 pandemic.  Patient is accepting of and agreeable to visit.  The visit consisted of the patient and I. The patient is at home, and I am at the office.  Interview:  119. Chart review: DEXA scan this month.  Normal in the lumbar spine and hips, it is decreased by 0.9% in the femoral necks compared to 2007.  120. \"Yesterday had a breakdown and cried for hours.\"  a. Otherwise has been doing very well.  b. Still utilizing lorazepam once a day.  c. Has done TMS for what sounds like about a week and a half.  No major changes that she can identify.  d. Sleeping well  e. Still some uncontrolled worrying, irritability, restlessness.  121. Medication compliant: Yes  122. No SI HI AVH.      1/17: Virtual visit via Zoom audio and video due to the COVID-19 pandemic.  Patient is accepting of and agreeable to visit.  The visit consisted of the patient and I.  Interview:  123. Chart review: No new chart developments since January 3.  EKG in October reveals 68, sinus, .  124. \"Doing even better than I was 2 weeks ago.\"  a. Still has mood swings, but not nearly " "as bad as before.  b. Have not needed to use lorazepam most days.  c. Starting TMS this week.  125. Depression/Mood: much better  126. Anxiety: much better  127. Refills: n  128. Sleeping: well  129. Eating: stable  130. Substances: n  131. Therapy: continuing  132. Medication compliant: y  133. No SI HI AVH.      1/3/22: Virtual visit via Zoom audio and video due to the COVID-19 pandemic.  Patient is accepting of and agreeable to visit.  The visit consisted of the patient and I.  Interview:  134. Chart review: No new chart developments since December 17.  135. \"Doing much better.\"  a. Sleeping well at night.  b. Some anxiety sometimes, but has days where she doesn't have anxiety.  c. No panic attacks  d. No crying spells.  e. Some days doesn't take lorazepam, other times takes a half a pill; when she does, it works very well.  f. Some anxiety looking up TMS last night, took a lorazepam and felt much better.   g. No holiday disruption at all. Handled the holidays well.   h. Approved for TMS.   136. Depression/Mood:  1. Depressed mood: much better  2. Seasonal pattern: denies  3. Severity: mild  4. Insomnia: denies  5. Duration: months  137. Anxiety:  1. Uncontrolled worrying: y  2. Severity: mild  3. Restlessness/feeling on edge: y  4. Irritability: y  5. Insomnia: n  6. Duration: months  7. Panic attacks: still has them rarely  138. Refills: y  139. Sleeping: y  140. Eating: stable  141. Substances: n  142. Therapy: With Genevieve at [facility] (continuing)  143. Medication compliant: y  144. No SI HI AVH.      6/1: Interview: Continued improvement. Doing well from d and a perspective. Persistent constipation that began with abilify. Still not taking lorazepam as her anxiety is under control. Denies SI HI AVH.  concurs.    9/11/20 H&P: Patient presented today with her . Both provided extensive history regarding her depression and anxiety. Patient had been seen by  at [facility] at [facility] for roughly a year and a " "half. Patient reported that Dr. Gordon had become convinced that she had bipolar disorder, which both she and her  disagree with. Patient and  deny any episodes in the past where she experienced, for an extended length of time lasting at least several days, no need for sleep, rapid speech, risk-taking behaviors. Per the  and wife pair, her previous psychiatrist insisted on her being on a mood stabilizer.   Patient reports that she had been stable on Celexa for \"several years.\" In 2019, January, the patient underwent a colonoscopy where dysplasia was revealed. This led to significant anxiety and a \"breakdown.\" The dysplasia was subsequently removed. Due to the heightened anxiety the patient's psychiatrist took the patient off of Celexa and started her on Lexapro. The patient did not tolerate Lexapro well, she continued to remain anxious, she began to engage in therapy in addition to medication management. The Lexapro was subsequently switched to Viibryd. The Viibryd did not work. The patient also began to experience panic attacks including shortness of breath, crying, tachycardia, palpitations. The panic attacks were new and had never happened before.   In January 2020 the patient experienced another breakdown and sunk into a deeper depression. The COVID-19 pandemic only worsened her situation. In February the patient's , a physician, decided to become her full-time caretaker as she would become anxious and panicky without him present. The patient also experienced intermittent bouts of suicidal ideation.   Recently, the patient and  demanded of their psychiatrist to take her off of Viibryd and Seroquel. They actually tapered her off of Seroquel themselves. They asked him to switch her back to Celexa. She has been on Celexa now for the last 3 days and is already begun to experience improvement. She states that the Seroquel led to having hallucinations and actually worsened insomnia. " Last night she did not take Seroquel for the first time in many months and she slept very well. Her mood is slightly improved. Regarding her anxiety, she endorses muscle tension and fatigue restlessness irritability and a history of insomnia. Regarding her depression she denies SI HI AVH, denies anhedonia denies guilt, notes some decreased energy, psychomotor retardation, and a history of insomnia. They had also tried acupuncture in the past with some success. Psychiatric review of systems is negative for psychosis nancy, positive for anxiety and depression. Possibly positive for  depression and postpartum depression. The patient is medication compliant.       Past Psychiatric History:  Began Psychiatric Treatment: Several years   Dx: Depression and anxiety   Psychiatrist: Doctor Gordon for the last year and a half   Therapist: Sees a therapist at [facility] History: Denies, Although she came close to needing admission recently.   Medication Trials: See HPI. Seroquel, Prozac which was too activating, Zoloft which was too activating, Celexa which worked well, Effexor which worked well, Lexapro which did not work, Viibryd which did not work.   Self-Harm: Denies   Suicide Attempts: Denies   OB/GYN HISTORY:  Sexually Active: OB/GYN history deferred   Substance Abuse History:  Types: Denies all, including illicit   Social History:  Marital Status:    Employed: No     Kids: 4   House: House    Hx: Denies   Family History:  Suicide Attempts: Deferred today, Due to lack of time   Suicide Completions: Deferred   Substance Use: Deferred   Psychiatric Conditions: Deferred    depression, psychosis, anxiety: Deferred   Developmental History:  Born: Deferred   Siblings: Deferred   Access to Weapons: Denies   Thought Content: no suicidal ideation, no homicidal ideation, no auditory hallucinations, no visual hallucinations, and     PHQ-9 Depression Screening  PHQ-9 Total Score:      Little  interest or pleasure in doing things?     Feeling down, depressed, or hopeless?     Trouble falling or staying asleep, or sleeping too much?     Feeling tired or having little energy?     Poor appetite or overeating?     Feeling bad about yourself - or that you are a failure or have let yourself or your family down?     Trouble concentrating on things, such as reading the newspaper or watching television?     Moving or speaking so slowly that other people could have noticed? Or the opposite - being so fidgety or restless that you have been moving around a lot more than usual?     Thoughts that you would be better off dead, or of hurting yourself in some way?     PHQ-9 Total Score       FAREED-7       Past Surgical History:  Past Surgical History:   Procedure Laterality Date   • COLONOSCOPY  [id number]/[id number]    last scope 2020   • COLONOSCOPY N/A 10/29/2021    Procedure: COLONOSCOPY with possible biopies.;  Surgeon: Skyler Fuller MD;  Location: [facility] ENDOSCOPY;  Service: General;  Laterality: N/A;   • KNEE SURGERY  2017    broken knee       Problem List:  Patient Active Problem List   Diagnosis   • Anxiety   • Depression   • History of fracture of patella   • Hyperlipemia   • Osteopenia   • Ulcerative lesion   • Constipation   • History of colonic polyps   • Tinnitus of right ear   • Sensorineural hearing loss (SNHL) of both ears       Allergy:   No Known Allergies     Discontinued Medications:  Medications Discontinued During This Encounter   Medication Reason   • GAMMA AMINOBUTYRIC ACID PO *Therapy completed   • gabapentin (NEURONTIN) 300 MG capsule Reorder   • gabapentin (NEURONTIN) 100 MG capsule Reorder   • gabapentin (NEURONTIN) 100 MG capsule Reorder       Current Medications:   Current Outpatient Medications   Medication Sig Dispense Refill   • ARIPiprazole (Abilify) 2 MG tablet Take 1 tablet by mouth Daily. 7 tablet 0   • Calcium Carb-Cholecalciferol (OYSCO 500 + D) 500-200 MG-UNIT tablet      • cholecalciferol  (VITAMIN D3) 25 MCG (1000 UT) tablet Take 1,000 Units by mouth Daily.     • Coenzyme Q10 (CO Q10 PO) Take  by mouth.     • escitalopram (LEXAPRO) 10 MG tablet Take 1 tablet by mouth Daily. 90 tablet 1   • folic acid (FOLVITE) 1 MG tablet Take 1 tablet by mouth Daily. 90 tablet 1   • gabapentin (NEURONTIN) 100 MG capsule Take 2 capsules by mouth Daily. IN THE AFTERNOON 30 capsule 2   • gabapentin (NEURONTIN) 300 MG capsule 300 mg qam, 300 mg qpm, 600 mg qhs 120 capsule 2   • GARLIC PO garlic 1,000 mg oral capsule take 1 capsule by oral route daily   Active     • LORazepam (ATIVAN) 1 MG tablet Take 1 tablet by mouth 2 (Two) Times a Day As Needed for Anxiety. for anxiety 60 tablet 5   • Multiple Vitamins-Iron (MULTIVITAMIN PLUS IRON ADULT PO) Women's Multivitamin 18 mg iron-400 mcg-500 mg oral tablet take 1 tablet by oral route daily   Active     • Omega-3 Fatty Acids (fish oil) 1000 MG capsule capsule 3,000 mg 3 (Three) Times a Day With Meals. PT(PATIENT) REPORTS SHE TAKES MEDICATION MORNING/LUNCH/EVENING     • Probiotic Product (PROBIOTIC PO) Take  by mouth.     • vitamin E 400 UNIT capsule vitamin E 400 unit oral capsule take 1 capsule by oral route daily   Active     • amitriptyline (ELAVIL) 25 MG tablet Take 1 tablet by mouth Every Night. 30 tablet 2     No current facility-administered medications for this visit.       Past Medical History:  Past Medical History:   Diagnosis Date   • Acid reflux 2022   • Anxiety    • Depression    • Fracture of patella 09/12/2017   • Hyperlipemia    • Osteopenia    • Panic disorder    • Psychiatric inpatient 07/22/2022    Discharged 07/22/2022 for Anxiety, Depression, and Suicidal Bx's       Social History     Socioeconomic History   • Marital status:    Tobacco Use   • Smoking status: Never   • Smokeless tobacco: Never   Vaping Use   • Vaping Use: Never used   Substance and Sexual Activity   • Alcohol use: Not Currently   • Drug use: Never   • Sexual activity: Not  Currently         Family History   Problem Relation Age of Onset   • Stroke Mother    • Heart disease Mother    • No Known Problems Father    • No Known Problems Sister    • Cancer Brother    • No Known Problems Maternal Aunt    • No Known Problems Paternal Aunt    • No Known Problems Maternal Uncle    • No Known Problems Paternal Uncle    • No Known Problems Maternal Grandfather    • No Known Problems Maternal Grandmother    • No Known Problems Paternal Grandfather    • No Known Problems Paternal Grandmother    • No Known Problems Cousin    • Seizures Son    • No Known Problems Other    • ADD / ADHD Neg Hx    • Alcohol abuse Neg Hx    • Anxiety disorder Neg Hx    • Bipolar disorder Neg Hx    • Dementia Neg Hx    • Depression Neg Hx    • Drug abuse Neg Hx    • OCD Neg Hx    • Paranoid behavior Neg Hx    • Schizophrenia Neg Hx    • Self-Injurious Behavior  Neg Hx    • Suicide Attempts Neg Hx      Review of Systems:  Review of Systems   Constitutional: Positive for fatigue. Negative for diaphoresis.   HENT: Negative for drooling.    Eyes: Negative for visual disturbance.   Respiratory: Negative for cough and shortness of breath.    Cardiovascular: Positive for palpitations. Negative for chest pain and leg swelling.   Gastrointestinal: Negative for diarrhea, nausea and vomiting.   Endocrine: Positive for cold intolerance. Negative for heat intolerance.   Genitourinary: Negative for difficulty urinating.   Musculoskeletal: Negative for joint swelling.   Allergic/Immunologic: Negative for immunocompromised state.   Neurological: Negative for dizziness, seizures, syncope, speech difficulty, light-headedness, numbness and headaches.         · Mental Status Exam  · Appearance  · : sitting in a chair with her , good eye contact, normal street clothes, groomed, in person  · Behavior  · : pleasant and cooperative  · Motor  · : No abnormal  · Speech  · : normal rhythm, rate, volume, tone, not hyperverbal, not pressured,  "normal prosidy, Speaks with an accent  · Mood  · : \"I am struggling\"  · Affect  · : No change: Constricted, no tears, mood congruent, fair variability  · Thought Content  · : negative suicidal ideations, negative homicidal ideations, negative obsessions  · Perceptions  · : negative auditory hallucinations, negative visual hallucinations  · Thought Process  · : linear  · Insight/Judgement  · : fair/fair  · Cognition  · : grossly intact  · Attention  · : intact      Physical Exam:  Physical Exam    Vital Signs:   /72   Pulse 74   Ht 162.6 cm (64\")   Wt 65.8 kg (145 lb)   BMI 24.89 kg/m²      Lab Results:   Admission on 08/20/2022, Discharged on 08/20/2022   Component Date Value Ref Range Status   • SARS Antigen 08/20/2022 Not Detected  Not Detected, Presumptive Negative Final   • Internal Control 08/20/2022 Passed  Passed Final   • Lot Number 08/20/2022 [id number]   Final   • Expiration Date 08/20/2022 92,[id number]   Final   • Rapid Influenza A Ag 08/20/2022 Negative  Negative Final   • Rapid Influenza B Ag 08/20/2022 Negative  Negative Final   • Internal Control 08/20/2022 Passed  Passed Final   • Lot Number 08/20/2022 [id number]   Final   • Expiration Date 08/20/2022 [id number]   Final   • COVID19 08/20/2022 Not Detected  Not Detected - Ref. Range Final   Lab on 08/15/2022   Component Date Value Ref Range Status   • OmegaCheck 08/15/2022 5.6  >5.4 % by wt Final    Relative Risk: LOW  Increasing blood levels of long-chain n-3 fatty acids are  associated with a lower risk of sudden cardiac death (1).  Based on the top (75th percentile) and bottom (25th  percentile) quartiles of the CHL reference population, the  following risk categories were established for OmegaCheck:  A cut-off of >=5.5% by wt defines a population at low  relative risk, 3.8-5.4% by wt defines a population at  moderate relative risk, and <=3.7% by wt defines a  population at high relative risk of sudden cardiac death.  The totality of the scientific " evidence demonstrates that  when consumption of fish oils is limited to 3 g/day or less  of EPA and DHA, there is no significant risk for increased  bleeding time beyond the normal range. A daily dosage of 1  gram of EPA and DHA lowers the circulating triglycerides by  about 7-10% within 2 to 3 weeks. (Reference: 1-Esteban carey al.  [facility]. 2002; 346: 9892-7874).   • Arachidonic Acid/EPA Ratio 08/15/2022 10.1  3.7 - 40.7 Final   • Omega-6/Omega-3 Ratio 08/15/2022 6.6  3.7 - 14.4 Final   • Omega-3 Total 08/15/2022 5.6  % by wt Final   • EPA 08/15/2022 1.4  0.2 - 2.3 % by wt Final   • DPA 08/15/2022 1.1  0.8 - 1.8 % by wt Final   • DPA 08/15/2022 3.1  1.4 - 5.1 % by wt Final   • Omega-6 Total 08/15/2022 37.2  % by wt Final    [facility] measures a number of omega-6 fatty acids  with AA and LA being the two most abundant forms reported.   • Arachidonic Acid, C20:4w6 08/15/2022 14.1  8.6 - 15.6 % by wt Final   • Linoleic Acid, C18:2w6 08/15/2022 20.0  18.6 - 29.5 % by wt Final    This test is performed by a Liquid Chromatography-Tandem  Mass Spectrometry (LC/MS/MS) method. This test was developed  and its performance characteristics determined by the  [facility] It has not been cleared or approved  by the U.S. FDA. The [facility] is regulated under  Clinical Laboratory Improvement Amendments (CLIA) as  qualified to perform high-complexity testing. This test is  used for clinical purposes.  It should not be regarded as  investigational or for research.   • Vitamin B-12 08/15/2022 402  211 - 946 pg/mL Final   • Folate 08/15/2022 >20.00  4.78 - 24.20 ng/mL Final   • 25 Hydroxy, Vitamin D 08/15/2022 56.8  30.0 - 100.0 ng/ml Final   • Ferritin 08/15/2022 272.00 (H)  13.00 - 150.00 ng/mL Final   • Magnesium 08/15/2022 2.3  1.6 - 2.4 mg/dL Final   • Glucose 08/15/2022 96  65 - 99 mg/dL Final   • BUN 08/15/2022 13  8 - 23 mg/dL Final   • Creatinine 08/15/2022 0.75  0.57 - 1.00 mg/dL Final   • Sodium  08/15/2022 138  136 - 145 mmol/L Final   • Potassium 08/15/2022 4.2  3.5 - 5.2 mmol/L Final   • Chloride 08/15/2022 101  98 - 107 mmol/L Final   • CO2 08/15/2022 26.4  22.0 - 29.0 mmol/L Final   • Calcium 08/15/2022 9.6  8.6 - 10.5 mg/dL Final   • Total Protein 08/15/2022 7.0  6.0 - 8.5 g/dL Final   • Albumin 08/15/2022 4.30  3.50 - 5.20 g/dL Final   • ALT (SGPT) 08/15/2022 7  1 - 33 U/L Final   • AST (SGOT) 08/15/2022 11  1 - 32 U/L Final   • Alkaline Phosphatase 08/15/2022 106  39 - 117 U/L Final   • Total Bilirubin 08/15/2022 1.1  0.0 - 1.2 mg/dL Final   • Globulin 08/15/2022 2.7  gm/dL Final   • A/G Ratio 08/15/2022 1.6  g/dL Final   • BUN/Creatinine Ratio 08/15/2022 17.3  7.0 - 25.0 Final   • Anion Gap 08/15/2022 10.6  5.0 - 15.0 mmol/L Final   • eGFR 08/15/2022 87.4  >60.0 mL/min/1.73 Final    National Kidney Foundation and American Society of Nephrology (ASN) Task Force recommended calculation based on the Chronic Kidney Disease Epidemiology Collaboration (CKD-EPI) equation refit without adjustment for race.   • Total Cholesterol 08/15/2022 158  0 - 200 mg/dL Final   • Triglycerides 08/15/2022 101  0 - 150 mg/dL Final   • HDL Cholesterol 08/15/2022 54  40 - 60 mg/dL Final   • LDL Cholesterol  08/15/2022 86  0 - 100 mg/dL Final   • VLDL Cholesterol 08/15/2022 18  5 - 40 mg/dL Final   • LDL/HDL Ratio 08/15/2022 1.55   Final   • Hemoglobin A1C 08/15/2022 5.70 (H)  4.80 - 5.60 % Final   • TSH 08/15/2022 0.826  0.270 - 4.200 uIU/mL Final   • T3, Free 08/15/2022 2.83  2.00 - 4.40 pg/mL Final   • Free T4 08/15/2022 1.14  0.93 - 1.70 ng/dL Final   • DHEA-Sulfate 08/15/2022 65.7  20.4 - 186.6 ug/dL Final   • Testosterone, Total 08/15/2022 8  3 - 67 ng/dL Final   • Testosterone, Free 08/15/2022 0.8  0.0 - 4.2 pg/mL Final   • Estradiol 08/15/2022 <5.0  pg/mL Final   • Progesterone 08/15/2022 0.12  ng/mL Final   • WBC 08/15/2022 6.31  3.40 - 10.80 10*3/mm3 Final   • RBC 08/15/2022 4.74  3.77 - 5.28 10*6/mm3 Final   •  Hemoglobin 08/15/2022 13.8  12.0 - 15.9 g/dL Final   • Hematocrit 08/15/2022 42.9  34.0 - 46.6 % Final   • MCV 08/15/2022 90.5  79.0 - 97.0 fL Final   • MCH 08/15/2022 29.1  26.6 - 33.0 pg Final   • MCHC 08/15/2022 32.2  31.5 - 35.7 g/dL Final   • RDW 08/15/2022 14.0  12.3 - 15.4 % Final   • RDW-SD 08/15/2022 46.4  37.0 - 54.0 fl Final   • MPV 08/15/2022 9.7  6.0 - 12.0 fL Final   • Platelets 08/15/2022 299  140 - 450 10*3/mm3 Final   • Neutrophil % 08/15/2022 63.4  42.7 - 76.0 % Final   • Lymphocyte % 08/15/2022 27.7  19.6 - 45.3 % Final   • Monocyte % 08/15/2022 7.6  5.0 - 12.0 % Final   • Eosinophil % 08/15/2022 0.5  0.3 - 6.2 % Final   • Basophil % 08/15/2022 0.5  0.0 - 1.5 % Final   • Immature Grans % 08/15/2022 0.3  0.0 - 0.5 % Final   • Neutrophils, Absolute 08/15/2022 4.00  1.70 - 7.00 10*3/mm3 Final   • Lymphocytes, Absolute 08/15/2022 1.75  0.70 - 3.10 10*3/mm3 Final   • Monocytes, Absolute 08/15/2022 0.48  0.10 - 0.90 10*3/mm3 Final   • Eosinophils, Absolute 08/15/2022 0.03  0.00 - 0.40 10*3/mm3 Final   • Basophils, Absolute 08/15/2022 0.03  0.00 - 0.20 10*3/mm3 Final   • Immature Grans, Absolute 08/15/2022 0.02  0.00 - 0.05 10*3/mm3 Final   • nRBC 08/15/2022 0.0  0.0 - 0.2 /100 WBC Final   Office Visit on 06/09/2022   Component Date Value Ref Range Status   • Total Cholesterol 06/09/2022 265 (H)  0 - 200 mg/dL Final   • Triglycerides 06/09/2022 367 (H)  0 - 150 mg/dL Final   • HDL Cholesterol 06/09/2022 36 (L)  40 - 60 mg/dL Final   • LDL Cholesterol  06/09/2022 159 (H)  0 - 100 mg/dL Final   • VLDL Cholesterol 06/09/2022 70 (H)  5 - 40 mg/dL Final   • LDL/HDL Ratio 06/09/2022 4.32   Final   • Hemoglobin A1C 06/09/2022 5.50  4.80 - 5.60 % Final   • MTHFR 06/09/2022 Comment   Final    Comment: Result:  c.665C>T (p. [id number]), legacy name: [id number] - Detected, heterozygous  c.1286A>C (p. [id number]), legacy name: [id number] - Detected,  heterozygous  Interpretation:  This result is not associated with an  increased risk for  hyperhomocysteinemia. See Additional Clinical Information and  Comments.  Additional Clinical Information:  Hyperhomocysteinemia is multifactorial involving genetic, clinical,  and environmental risk factors. Reduced enzyme activity of  methylenetetrahydrofolate reductase (MTHFR) is a genetic risk  factor for hyperhomocysteinemia, particularly when serum folate  levels are low. There are two common variants in the MTHFR gene  that can decrease enzyme activity; c.665C>T (p. [id number]), legacy  name [id number], and c.1286A>C (p. [id number]), legacy name [id number]. These  variants do not independently increase risk of conditions related  to hyperhomocysteinemia in the absence of elevated homocysteine  levels. Measurement of total plasma homocysteine is recommended.  Patients                            should share their MTHFR genotype with physicians who are  making decisions regarding chemotherapy treatments that depend on  folate, such as methotrexate.  Guidelines do not recommend genotyping of these two MTHFR variants  in the evaluation of venous thrombosis or obstetric risk due to  limited evidence of clinical utility (PMID: 41362487).  Comments:  Genetic Coordinators are available for health care providers to  discuss results at [phone] ([phone]).  Test Details:  Variants Analyzed: c.665C>T (p. [id number]), legacy name: C677T and  c.1286A>C (p. [id number]), legacy name: [id number]  Methods/Limitations:  DNA analysis of the MTHFR gene was performed by PCR amplification  followed by restriction enzyme analysis. The diagnostic sensitivity  is >99%. Results must be combined with clinical information for the  most accurate interpretation. Molecular-based testing is highly  accurate, but as in any laboratory test, diagnostic errors may  occur. False positive or false negative results                            may occur for  reasons that include genetic variants, blood transfusions, bone  marrow  transplantation, somatic or tissue-specific mosaicism,  mislabeled samples, or erroneous representation of family  relationships.  This test was developed and its performance characteristics  determined by [employer]. It has not been cleared or approved by the  Food and Drug Administration.  References:  Warner SE, Jason CJ, Hadley PEREZ. [facility] Practice Guideline: lack of  evidence for MTHFR polymorphism testing. Sarah Med. 2013  Feb;15(2):153-6. doi: 10.1038/gim.2012.165. Epub 2013 Rk 3. PMID:  66113025.  American College of Obstetricians and Gynecologists' Committee on  Practice Bulletins-Obstetrics. ACOG Practice Bulletin No. 197:  Inherited Thrombophilias in Pregnancy. Obstet Gynecol. 2018  Jul;132(1):e18-e34. doi: 10.1097/AOG.[id number]. Erratum in:  Obstet Gynecol. 2018 Oct;132(4):1069. PMID: 34699314.  Rose Mary Michaels, PhD, FAC  Carlo Fragoso, PhD, FAC  Hernesto Reese, PhD, FACMG  Vishal Waldron, PhD, FACMG  JT Gonzalez, PhD, FACMG  Windy Otto, PhD, FAC  Carolyn Bean, PhD, FAC       EKG Results:  No orders to display       Imaging Results:  No Images in the past 120 days found..      Assessment & Plan   Diagnoses and all orders for this visit:    1. Moderate episode of recurrent major depressive disorder (HCC) (Primary)  -     gabapentin (NEURONTIN) 300 MG capsule; 300 mg qam, 300 mg qpm, 600 mg qhs  Dispense: 120 capsule; Refill: 2  -     amitriptyline (ELAVIL) 25 MG tablet; Take 1 tablet by mouth Every Night.  Dispense: 30 tablet; Refill: 2    2. Generalized anxiety disorder  -     gabapentin (NEURONTIN) 300 MG capsule; 300 mg qam, 300 mg qpm, 600 mg qhs  Dispense: 120 capsule; Refill: 2  -     amitriptyline (ELAVIL) 25 MG tablet; Take 1 tablet by mouth Every Night.  Dispense: 30 tablet; Refill: 2  -     Discontinue: gabapentin (NEURONTIN) 100 MG capsule; Take 2 capsules by mouth Daily. IN THE AFTERNOON  Dispense: 30 capsule; Refill: 2  -     gabapentin  (NEURONTIN) 100 MG capsule; Take 2 capsules by mouth Daily. IN THE AFTERNOON  Dispense: 30 capsule; Refill: 2    3. Panic disorder  -     amitriptyline (ELAVIL) 25 MG tablet; Take 1 tablet by mouth Every Night.  Dispense: 30 tablet; Refill: 2    4. Panic attacks  -     gabapentin (NEURONTIN) 300 MG capsule; 300 mg qam, 300 mg qpm, 600 mg qhs  Dispense: 120 capsule; Refill: 2  -     amitriptyline (ELAVIL) 25 MG tablet; Take 1 tablet by mouth Every Night.  Dispense: 30 tablet; Refill: 2  -     Discontinue: gabapentin (NEURONTIN) 100 MG capsule; Take 2 capsules by mouth Daily. IN THE AFTERNOON  Dispense: 30 capsule; Refill: 2  -     gabapentin (NEURONTIN) 100 MG capsule; Take 2 capsules by mouth Daily. IN THE AFTERNOON  Dispense: 30 capsule; Refill: 2    5. Insomnia due to mental condition  -     gabapentin (NEURONTIN) 300 MG capsule; 300 mg qam, 300 mg qpm, 600 mg qhs  Dispense: 120 capsule; Refill: 2  -     amitriptyline (ELAVIL) 25 MG tablet; Take 1 tablet by mouth Every Night.  Dispense: 30 tablet; Refill: 2        Visit Diagnoses:    ICD-10-CM ICD-9-CM   1. Moderate episode of recurrent major depressive disorder (HCC)  F33.1 296.32   2. Generalized anxiety disorder  F41.1 300.02   3. Panic disorder  F41.0 300.01   4. Panic attacks  F41.0 300.01   5. Insomnia due to mental condition  F51.05 300.9     327.02       INITIAL presentation most consistent with major depressive disorder in partial remission, FAREED, rare panic attacks.    11/18: Residual anxiety, residual depression.  Increase gabapentin, start amitriptyline at night for initial insomnia.  Close follow-up. 19 minutes of supportive psychotherapy with goal to strengthen defenses, promote problems solving, restore adaptive functioning and provide symptom relief. The therapeutic alliance was strengthened to encourage the patient to express their thoughts and feelings. Esteem building was enhanced through praise, reassurance, normalizing and encouragement. Coping  skills were enhanced to build distress tolerance skills and emotional regulation. Allowed patient to freely discuss issues without interruption or judgement with unconditional positive regard, active listening skills, and empathy. Provided a safe, confidential environment to facilitate the development of a positive therapeutic relationship and encourage open, honest communication. Assisted patient in identifying risk factors which would indicate the need for higher level of care including thoughts to harm self or others and/or self-harming behavior and encouraged patient to contact this office, call 911, or present to the nearest emergency room should any of these events occur. Assisted patient in processing session content; acknowledged and normalized patient’s thoughts, feelings, and concerns by utilizing a person-centered approach in efforts to build appropriate rapport and a positive therapeutic relationship with open and honest communication. Patient given education on medication side effects, diagnosis/illness and relapse symptoms. Plan to continue supportive psychotherapy in next appointment to provide symptom relief.  Diagnoses: as above  Symptoms: as above  Functional status: Good  Mental Status Exam: as above    Treatment plan: Medication management and supportive psychotherapy  Prognosis: Good  Progress: No improvement, residual depression and anxiety insomnia  3 weeks    11/7: Residual anx (at noon), consider adding gabapentin in the mornings. and dep. Increase gabapentin in the afternoons. No other changes. 17 minutes of supportive psychotherapy with goal to strengthen defenses, promote problems solving, restore adaptive functioning and provide symptom relief. The therapeutic alliance was strengthened to encourage the patient to express their thoughts and feelings. Esteem building was enhanced through praise, reassurance, normalizing and encouragement. Coping skills were enhanced to build distress  tolerance skills and emotional regulation. Allowed patient to freely discuss issues without interruption or judgement with unconditional positive regard, active listening skills, and empathy. Provided a safe, confidential environment to facilitate the development of a positive therapeutic relationship and encourage open, honest communication. Assisted patient in identifying risk factors which would indicate the need for higher level of care including thoughts to harm self or others and/or self-harming behavior and encouraged patient to contact this office, call 911, or present to the nearest emergency room should any of these events occur. Assisted patient in processing session content; acknowledged and normalized patient’s thoughts, feelings, and concerns by utilizing a person-centered approach in efforts to build appropriate rapport and a positive therapeutic relationship with open and honest communication. Patient given education on medication side effects, diagnosis/illness and relapse symptoms. Plan to continue supportive psychotherapy in next appointment to provide symptom relief.  Diagnoses: as above  Symptoms: as above  Functional status: good  Mental Status Exam: as above    Treatment plan: Medication management and supportive psychotherapy  Prognosis: good  Progress: possibly improved, if not stable  2 wks      10/31: Couples therapy, increase Abilify, close follow-up.  I feel that their marriage issues have come to a head recently, and this stressor could largely explain patient's depression and anxiety.  Patient mentioned Celexa again, she is likely interested in switching.  25 minutes of supportive psychotherapy  Treatment plan: Medication management and supportive psychotherapy  Prognosis: Fair to good  Progress: Worsening depression and anxiety related to recent trip to [place]  1 week, highly urgent    9/30: well, stable, some constriction today. No changes. 17 minutes of supportive  psychotherapy  Treatment plan: Medication management and supportive psychotherapy  Prognosis: Good  Progress: Stable, close to goal  4 weeks    8/30: Remarkably well. Initial insomnia. 17 minutes of supportive psychotherapyTreatment plan: Medication management and supportive psychotherapy  Prognosis: good  Progress: much better  4 wks    8/1: Try another mood stabilizer in place of olanzapine.  Will trial Abilify again.  Also consider Geodon.  18 minutes of supportive psychotherapy Treatment plan: Medication management and supportive psychotherapy  Prognosis: Fair to good  Progress: Slightly improved  4 weeks, urgent    6/17: start buspar, doing better. 17 minutes of supportive psychotherapy Treatment plan: Medication management and supportive psychotherapy  Prognosis: good  Progress: improving slowly  4 wks      5/20: Continue Lexapro and lorazepam.  Try Belsomra in place of Ambien.  19 minutes of supportive psychotherapyTreatment plan: Medication management and supportive psychotherapy  Prognosis: Good  Progress: Some progress since last visit  4 weeks urgent    4/22: Some improvement in anx and dep, but still residual symptoms.  Persistent insomnia. Declines IOP, inpatient admission.  Increase Lexapro and start trazodone.  17 minutes of supportive psychotherapy Treatment plan: Medication management and supportive psychotherapy  Prognosis: good  Progress: some, minimal      3/24: Focus on lower doses.  Start Pristiq.  Depression with anxious distress versus depression and generalized anxiety disorder.  She only has distractibility, no other symptoms of hypomania.  17 minutes of supportive psychotherapy  4 weeks    3/15: Continue lurasidone 40 mg at night, increase Ambien to 10 mg at night.  Continue low-dose Lamictal, plan to increase further.  Continue titrating off of amitriptyline.  17 minutes of supportive psychotherapy1 week, urgent    3/10: Effexor has now been stopped.  Continue Latuda and Lamictal at  present doses.  I will see her back in a few days and then at 6 weeks.  Bipolar 2?  If so, how come she is tolerating amitriptyline in the evenings?  19 minutes of supportive psychotherapy 6 weeks    2/15: Still utilizing lorazepam which means the anxiety is not under control.  Increase Effexor.  17 minutes of supportive psychotherapy 4 weeks    1/17: Continued improvement.  No changes, only on effexor 75 mg for 2 weeks.  Tolerating medications without side effects.  17 minutes of supportive psychotherapy   4 wks    1/3: Continued improvement. Increase effexor to target residual dep anx. TMS to start 1/6. 17 minutes of supportive psychotherapy  4 wks        PLAN:  145. Risk Assessment: Risk of self-harm acutely remain low. Risk factors include a history of suicidal ideation, mood disorder, anxiety disorder, presence of psychosocial stressors such as colonic dysplasia, COVID-19 pandemic. Protective factors include no history of self-harm or suicide attempts, good social support, willingness to engage in care, improved depressive symptoms. Risk of self-harm chronically also remain low, but could be further elevated in the event of treatment noncompliance and/or AODA.  146. Medications:   a. CONTINUE lexapro 10 mg daily. Risks, benefits, alternatives discussed with patient including GI upset, nausea vomiting diarrhea, theoretical decrease of seizure threshold predisposing the patient to a slightly higher seizure risk, headaches, sexual dysfunction, serotonin syndrome, bleeding risk, increased suicidality in patients 24 years and younger.  After discussion of these risks and benefits, the patient voiced understanding and agreed to proceed.  b. INCREASE gabapentin 600 mg nightly, INCREASE 100 to 200 mg in the afternoon. Risks, benefits, alternatives discussed with patient including sedation, dizziness/falls risk, GI upset, weight gain.  After discussion of these risks and benefits, the patient voiced understanding and  "agreed to proceed. UDS ordered, Mamadou reviewed.  c. CONTINUE Abilify 4 mg daily. Risks, benefits, alternatives discussed with patient including increased energy, exacerbation of irritability, akathisia, GI upset, orthostatic hypotension, increased appetite.  After discussion of these risks and benefits, the patient voiced understanding and agreed to proceed.  d. START amitriptyline 25 mg nightly. Risks, benefits, alternatives discussed with patient including sedation, dizziness, falls, GI upset, constipation, urinary retention, dry mouth.  After discussion of these risks and benefits, the patient voiced understanding and agreed to proceed.  e. CONTINUE lorazepam 1 mg bid PRN. Risks, benefits, alternatives discussed with patient including GI upset, sedation, dizziness, respiratory depression, falls risk.  After discussion of these risks and benefits, the patient voiced understanding and agreed to proceed.  f. S/P:   i. buspar 5 mg bid was ineffective.  ii. Trazodone 50 mg nightly not helpful for insomnia.  iii. Mirtazapine 15 mg, 7.5 mg: ineffective; made anxiety and depression worse.  iv. Abilify: brain zaps and constipation  v. Seroquel made depression worse  vi. celexa 20 mg daily, ineffective after several months  vii. Doxepin caused \"brain zaps\"  viii. Latuda was ineffective.  ix. Amitriptyline 25 mg nightly helpful for sleep but we didn't want her on too many serotonergic meds.  x. Ambien minimally helpful for insomnia.  xi. Pristiq and effexor worsened anxiety, both low dose.  xii. Stopped lamictal for unclear reasons 25 mg.  xiii. belsomra 5 mg nightly didn't help with insomnia.  147. Therapy: continue with Genevieve at [facility] (formerly [facility]).  Referred for couples therapy 10/2022  a. Status post IOP at [facility] 10/2022  148. Labs/Studies: BMP to monitor sodium levels in February was entirely normal, EKG February shows rate 72, sinus, .  149. Follow Up: 2 weeks      TREATMENT PLAN/GOALS: Continue supportive " psychotherapy efforts and medications as indicated. Treatment and medication options discussed during today's visit. Patient acknowledged and verbally consented to continue with current treatment plan and was educated on the importance of compliance with treatment and follow-up appointments.    MEDICATION ISSUES:  YVETTE reviewed as expected.  Discussed medication options and treatment plan of prescribed medication as well as the risks, benefits, and side effects including potential falls, possible impaired driving and metabolic adversities among others. Patient is agreeable to call the office with any worsening of symptoms or onset of side effects. Patient is agreeable to call 911 or go to the nearest ER should he/she begin having SI/HI. No medication side effects or related complaints today.     MEDS ORDERED DURING VISIT:    Return in about 3 weeks (around 12/9/2022) for 1:20.         This document has been electronically signed by Vinny Krishnamurthy MD  November 18, 2022 10:12 EST       Part of this note may be an electronic transcription/translation of spoken language to printed text using the Dragon Dictation System.

## 2022-12-05 RX ORDER — ATORVASTATIN CALCIUM 20 MG/1
TABLET, FILM COATED ORAL
Qty: 90 TABLET | Refills: 1 | Status: SHIPPED | OUTPATIENT
Start: 2022-12-05

## 2022-12-05 NOTE — TELEPHONE ENCOUNTER
Requested Prescriptions     Pending Prescriptions Disp Refills   • atorvastatin (LIPITOR) 20 MG tablet [Pharmacy Med Name: ATORVASTATIN 20MG TABLETS] 90 tablet 1     Sig: TAKE 1 TABLET BY MOUTH DAILY     LOV: Office Visit with Kvng Ramirez APRN (11/14/2022)    Labs: Lipid Panel (08/15/2022 10:48)    Pharmacy: [id number] [place], [place] - [address] AT [facility] [phone] [facility] [phone]   [phone]

## 2022-12-09 ENCOUNTER — OFFICE VISIT (OUTPATIENT)
Dept: PSYCHIATRY | Facility: CLINIC | Age: 67
End: 2022-12-09

## 2022-12-09 VITALS
HEART RATE: 78 BPM | DIASTOLIC BLOOD PRESSURE: 63 MMHG | HEIGHT: 64 IN | WEIGHT: 145.8 LBS | BODY MASS INDEX: 24.89 KG/M2 | SYSTOLIC BLOOD PRESSURE: 128 MMHG

## 2022-12-09 DIAGNOSIS — F33.1 MODERATE EPISODE OF RECURRENT MAJOR DEPRESSIVE DISORDER: Primary | ICD-10-CM

## 2022-12-09 DIAGNOSIS — F41.1 GENERALIZED ANXIETY DISORDER: ICD-10-CM

## 2022-12-09 DIAGNOSIS — F41.0 PANIC ATTACKS: ICD-10-CM

## 2022-12-09 DIAGNOSIS — F41.0 PANIC DISORDER: ICD-10-CM

## 2022-12-09 DIAGNOSIS — F51.05 INSOMNIA DUE TO MENTAL CONDITION: ICD-10-CM

## 2022-12-09 PROCEDURE — 99214 OFFICE O/P EST MOD 30 MIN: CPT | Performed by: STUDENT IN AN ORGANIZED HEALTH CARE EDUCATION/TRAINING PROGRAM

## 2022-12-09 PROCEDURE — 90833 PSYTX W PT W E/M 30 MIN: CPT | Performed by: STUDENT IN AN ORGANIZED HEALTH CARE EDUCATION/TRAINING PROGRAM

## 2022-12-09 NOTE — PROGRESS NOTES
"Subjective   Shabana Ferguson is a 66 y.o. female who presents today for follow up    Referring Provider:  Kvng Ramirez, MARIE  [address]  [address]  [place] [address]    Chief Complaint:  mdd margarita    History of Present Illness:     Shabana Ferguson is a 64 year old /White female, hx of anxiety and depression, fractured patella, HLD, osteopenia, ulcer referred by MARIE Brice, for anxiety and depression management.   Chart: Psychotropic medications: amitriptyline 25 mg QHS, Celexa 20 mg p.o. daily, lorazepam 0.5 p.o. twice daily as needed anxiety.      \"Shabana\" and Jaya    12/9: In person interview:  1. Chart review: No new.  2. Planning: Increased gabapentin and started amitriptyline at night.  3. \"I'm doing a lot better.\"  a. Sleeping better on amitriptyline  b. Pt wants to get involved in volunteering, [facility] [employer], every Tues and Thurs  4. Mood/Depression: better  5. Anxiety: less worrying  a. Trip was a success; they saw one of the grandchildren they haven't seen in a while. Patient smiled and laughed during this discussion. Cooked for him.  b. Taking less lorazepam  6. Panic attacks: n  7. Energy: improving  8. Concentration: improving  9. Sleeping: well  10. Eating: stable  11. Refills: n  12. Substances: n  13. Therapy: continuing therapy and couples therapy  14. Medication compliant: y  15. No SI HI AVH.      11/18: In person interview:  16. Chart review: Seen by primary care November 14.  PHQ-9 is 14, very difficult.  Feels her mental health is better than it was a year ago, however.  17. Planning: They have started marriage counseling.  Increased gabapentin in the afternoons to 200 mg a day.  Reintroduce amitriptyline in the evenings.  18. \"I'm suffering.\"  a. Monday has a big trip to [place]. Just got back from [place] trip, which \"was traumatic.\"  b. [place] didn't join them for dinner last night -- upsetting for her.  19. Mood/Depression:  a. Depressed " "mood  20. Anxiety:  a. Restless, irritable, on edge -- lorazepam works. Starts at noon.  b. Very clingy, don't want to be by myself, throughout the day  21. Panic attacks: Controlled on Ativan  22. Energy: Down  23. Concentration: Still not a goal  24. Sleeping: Initial insomnia  25. Eating: Stable weight  26. Refills: Yes  27. Substances: No  28. Therapy: continuing marriage counseling, have to pay out of pocket  29. Medication compliant: y  30. No SI HI AVH.      11/7: In person interview:  31. Chart review: No new.  32. Planning: Increased Abilify and referred to couples therapy a week ago.  33. \"I'm having a hard time to concentrate and sit still.\"  a. Jaya:  i. Couldn't go back to sleep last night  ii. Nicola's move was this past Friday  iii. Moved animals to his house on Saturday  iv. He feels there is less negativity, but she is not sure  34. Mood/Depression:  a. Depressed mood  35. Anxiety:  a. Worrying a lot, very nervous especially in the afternoons  b. Pacing  36. Panic attacks: y, controlled on lorazepam  37. Energy: stable, not at goal  38. Concentration:   a. Some issues with concentration  39. Sleeping:  a. Always wakes at 4-6 am, maintenance insomnia  b. But slept well last night 7-6 am.  40. Eating: 3 lb wl since 10/31  41. Refills:  y  42. Substances: n  43. Therapy: started marriage counseling, next appnt next week  44. Medication compliant: y  45. No SI HI AVH.      10/31: In person interview:  46. Chart review: No new.  47. Planning: No changes at last visit.  48. \"I feel anxiety in the afternoon.\"  a. Stressors:  i. Nicola, her son, is moving out. Stressful.  ii. Pt wants to end the marriage; they've discussed it numerous times recently.  1. Couples therapy: they've tried in the past, more than once, problems with each therapist (patient)  49. Mood/Depression:  a. Depressed mood in the afternoons, not mornings  b. Jaya: trip to [place] was fun but stressful and \"it was in the end a major " "setback\"  i. Last night was very destructive: sleepless  50. Anxiety:  a. [facility] to [facility]; recommend taking gabapentin 100 mg in afternoon rather than morning.  i. Because it is costing so much money, they are not going back.  b. Worrying, irritable, insomnia, on edge  51. Panic attacks:  a. Yes, in the afternoon, controlled on ativan  52. Energy: down  53. Concentration: poor  54. Sleeping: initial insomnia  55. Eating: 3 lb wg 8/22  56. Refills: No  57. Substances: No  58. Therapy: Yes continuing  59. Medication compliant: y  60. No SI HI AVH.      9/30: In person interview:  61. Chart review: Recently discharged from [facility] outpatient.  a. Patient was depressed from October of last last year to essentially a couple months ago when she started to improve.  Consider light box.  62. Planning: Restarted Ambien at last visit.  Patient was improving at last visit.  Consider light box  63. \"Good.\"  a. I don't need to take the ambien, I just need the gabapentin  b. TMS helping as well, 10 more sessions, \"really helping\"  c. Discussed light box  64. Mood/Depression: stable, well  65. Anxiety:  a. Anxiety in the afternoons, takes lorazepam daily at 6 pm  i. Takes care of the brain zaps  66. Panic attacks: n  67. Sleeping: well on the gabapentin  68. Eating: stable  69. Refills: y  70. Substances:  71. Therapy: IOP completed  a. Very beneficial, \"I never thought I would like it.\" I would recommend it to anyone.  72. Medication compliant: y  73. No SI HI AVH.      8/30: In person interview:  74. Chart review: Seen by urgent care August 20 for back pain.  COVID-negative.  Also had some cervical adenopathy. EEG is abnormal.  75. Planning: Trial of Abilify appears to be working well.  This is according to the conversation I had with both of them on August 8.  76. \"I'm better... I feel good when I get up in the morning.\"  a. P1, G6  b. Doing TMS  c. Then IOP.  d. The problem I still have is " "sleeping.  e. Initial insomnia for 2 hours each night.  f. Afternoons, gets anxious. Uses ativan rarely. Use has gone down dramatically.  g. Prescribed gabapentin by [facility]. Helping her anxiety.  h. Jaya \"quality of life has gone WAY up.\"  i. Sees neurology in early September.  77. Mood/Depression: improved  78. Anxiety: improved  79. Panic attacks: rare  80. Eating: stable  81. Refills: n  82. Substances: n  83. Therapy: def  84. Medication compliant: y  85. No SI HI AVH.      8/1: In person interview:  86. Chart review: Patient admitted to the [facility] July 18.  Awaiting records.  87. Planning: This is a follow-up appointment after her admission to the [facility].  88. \" I am better.\"  a. Patient reported she was extremely afraid initially of being admitted, however admission was a very \"positive\" experience for her, and \"I felt at peace.\"  b. Could not tolerate olanzapine 2.5 mg nightly as it gave her nightmares.  She has stopped this medication.  c. She has set up intensive outpatient at [facility], has already gone to [facility] last week, and also has TMS scheduled.  d. Per her , \"the crying spells have stopped.\"  89. Mood/Depression: Slightly improved, still has days of depression.  Reports that some days she is perfect, then other days she becomes very depressed and anxious, but lorazepam helps at that time.  90. Panic attacks: Still has occasionally  91. Sleeping: Still has some trouble sleeping, but recently has been using delta 8 to sleep  92. Eating: Stable, now eating  93. Refills: No  94. Substances: No  95. Therapy: Continuing  96. Medication compliant: Yes  97. No SI HI AVH.      6/17: In person interview:  98. Chart review: Seen by primary care June 9.  Apparently experiencing multiple side effects from anxiety and depression medications.  Looks like the results of the testing are not associated with an increased risk for hyper homocystinemia (MTHFR gene assay).  99. Planning: Belsomra " "was too expensive.  100. \"All in all I'm better.\"  a. Still has nervous stomach; getting help with that from PCP.  b. Sleeping better  c. Taking ativan in the am consistently and also at night to sleep (rarely).  d. Low mood in afternoon with crying spells  101. Mood/Depression: stable   102. Anxiety: under control  103. Sleeping: well  104. Eating: stable  105. Refills: n  106. Substances: n  107. Therapy: has a counselor at [facility] now, plus [facility]  108. Medication compliant: y  109. No SI HI AVH.      5/20: In person interview:  110. Chart review: Patient presented in the emergency room for abdominal pain, she did not want a wait so she left.  Saw primary care for abdominal pain which patient believes is related to starting Pristiq a month ago.  Despite having stopped it long ago, she continues to have abdominal symptoms.  Somatization?  111. Planning: Try Belsomra for insomnia.  112. \" I am getting better.\"  a.  agrees.  Patient is becoming more functional.  b. Depression and anxiety have improved.  Still residual symptoms.  She has only been on the higher dose of Lexapro for about 4 weeks ago.  c. Still having trouble with insomnia but it is controlled on Ambien most nights.  Some nights it is not.  Some nights she does not take Ambien.  d. Utilizing half a milligram of lorazepam twice daily most days.  e. More active around the house.  f. Famotidine is helping with abdominal pain.  113. Refills: Yes  114. Substances: No  115. Therapy: Deferred  116. Medication compliant: Yes  117. No SI HI AV.      4/22: In person interview:  Chart review: 4/11: I called the patient to check in on her progress on lexapro. \"I'm doing better than I was before.\" Sleeping well. Taking nyquil to sleep. Utilizing lorazepam frequently. Has been on lexapro for 10 days. She understands that she needs to give it more time. More active. Has no appetite, however. I mentioned to her the importance of moving to a higher level of care " "(IOP, inpt). Pt voiced understanding and agreed to proceed. Jaya: crying episodes have stopped. Better energy.  118. \"I can't sleep.\"  a. Persistent insomnia, anxious before bed, unclear why. Has tried ambien which sometimes helps. Also over the counter meds.   b. Irritable, restless, worrying.   c. Still depressed, but this has improved  d. Compliant on lexapro  e. Still having panic attacks.  Utilizing lorazepam.  f. Per , she has made significant improvement over the last few months, but still has a way to go.  Patient agrees.  g. Declines IOP, inpatient admission  119. Therapy: Deferred  120. Medication compliant: To some extent yes  121. No SI HI AVH.      3/24: In person interview: With her   122. Chart review: Patient is now discontinued all medications except for Lamictal 25 mg a day.  I started her on doxepin in the evenings for insomnia on March 21.  123. \"I think I need lower doses.\"  a. Persistent crying spells, and anxiety. Utilizing ativan.  b. Doxepin is helping with insomnia.  c. Patient reports that she knows of someone in her 70s who struggled with depression for years until they came up with a solution.  This gave the patient hope.  She noted that the patient was on very low doses of Celexa and other psychotropic medication.  124. Medication compliant: y  125. No SI HI AVH.      3/15: In person interview: With her   1. Chart review: Patient had another panic attack 3/14 morning.  I advised her to utilize the Ativan liberally as she is not always using it.  Compliant on Latuda 40 mg a day.  She notes that there are some periods where she feels \"completely normal.\"  Then other times, she completely breaks down and has depression and anxiety. Having trouble sleeping for the last 4 days.  Target this with Ambien 5 mg nightly. Risks, benefits, side effects discussed with patient including sedation, dizziness, GI upset, hallucinations, sleepwalking, sleep-eating.  After discussion " "of these risks and benefits, the patient voiced understanding and agreed to proceed. Continue Latuda and Lamictal. Start titrating off of amitriptyline by taking half a tab, 12.5 mg in other words, nightly.  126. \" The Ambien kept me asleep for a couple hours.\"  a. Patient slept for two hours, got up, ate something, and did some reading.  Then went back to sleep.  Notes that she did not feel anxious during this time.  b. When she woke the next morning she felt very tired.  c. Utilizing Ativan more liberally to prevent panic attacks.  d. Some limitation today about how her family is distancing themselves from her due to her depression.  Feels like they should be more supportive.  e. Wants to switch to a different therapist.  She understands that we will have a therapist start next month.  Would like to wait until that time.  Problems with connecting to her present therapist.  127. Therapy: Continuing  128. Medication compliant: Yes  129. No SI HI AVH.      3/10: In person: With her   Interview:  130. Chart review: We have reduced the dose of Effexor to 37.5 mg a day, and started Lamictal 25 mg daily.  Continuing Latuda at its present dose of 20 mg a day.  October labs show reassuring CMP, CBC.  131. \" I stopped the Effexor.\"  a. Patient states she is already feeling better.   agrees.  b. Did not take Effexor this morning and is not experiencing panic and anxiety in the afternoon like she has been for the past several days.  c. Tolerating the Lamictal without side effects.  d. No crying spells  e. Affect is still depressed  132. Medication compliant: Yes  133. No SI HI AVH.      2/15:Virtual visit via Zoom audio and video due to the COVID-19 pandemic.  Patient is accepting of and agreeable to visit.  The visit consisted of the patient and I. The patient is at home, and I am at the office.  Interview:  134. Chart review: DEXA scan this month.  Normal in the lumbar spine and hips, it is decreased by 0.9% " "in the femoral necks compared to 2007.  135. \"Yesterday had a breakdown and cried for hours.\"  a. Otherwise has been doing very well.  b. Still utilizing lorazepam once a day.  c. Has done TMS for what sounds like about a week and a half.  No major changes that she can identify.  d. Sleeping well  e. Still some uncontrolled worrying, irritability, restlessness.  136. Medication compliant: Yes  137. No SI HI AVH.      1/17: Virtual visit via Zoom audio and video due to the COVID-19 pandemic.  Patient is accepting of and agreeable to visit.  The visit consisted of the patient and I.  Interview:  138. Chart review: No new chart developments since January 3.  EKG in October reveals 68, sinus, .  139. \"Doing even better than I was 2 weeks ago.\"  a. Still has mood swings, but not nearly as bad as before.  b. Have not needed to use lorazepam most days.  c. Starting TMS this week.  140. Depression/Mood: much better  141. Anxiety: much better  142. Refills: n  143. Sleeping: well  144. Eating: stable  145. Substances: n  146. Therapy: continuing  147. Medication compliant: y  148. No SI HI AVH.      1/3/22: Virtual visit via Zoom audio and video due to the COVID-19 pandemic.  Patient is accepting of and agreeable to visit.  The visit consisted of the patient and I.  Interview:  149. Chart review: No new chart developments since December 17.  150. \"Doing much better.\"  a. Sleeping well at night.  b. Some anxiety sometimes, but has days where she doesn't have anxiety.  c. No panic attacks  d. No crying spells.  e. Some days doesn't take lorazepam, other times takes a half a pill; when she does, it works very well.  f. Some anxiety looking up TMS last night, took a lorazepam and felt much better.   g. No holiday disruption at all. Handled the holidays well.   h. Approved for TMS.   151. Depression/Mood:  1. Depressed mood: much better  2. Seasonal pattern: denies  3. Severity: mild  4. Insomnia: denies  5. Duration: " "months  152. Anxiety:  1. Uncontrolled worrying: y  2. Severity: mild  3. Restlessness/feeling on edge: y  4. Irritability: y  5. Insomnia: n  6. Duration: months  7. Panic attacks: still has them rarely  153. Refills: y  154. Sleeping: y  155. Eating: stable  156. Substances: n  157. Therapy: With Genevieve at [facility] (continuing)  158. Medication compliant: y  159. No SI HI AVH.      6/1: Interview: Continued improvement. Doing well from d and a perspective. Persistent constipation that began with abilify. Still not taking lorazepam as her anxiety is under control. Denies SI HI AVH.  concurs.    9/11/20 H&P: Patient presented today with her . Both provided extensive history regarding her depression and anxiety. Patient had been seen by  at [facility] for roughly a year and a half. Patient reported that Dr. Gordon had become convinced that she had bipolar disorder, which both she and her  disagree with. Patient and  deny any episodes in the past where she experienced, for an extended length of time lasting at least several days, no need for sleep, rapid speech, risk-taking behaviors. Per the  and wife pair, her previous psychiatrist insisted on her being on a mood stabilizer.   Patient reports that she had been stable on Celexa for \"several years.\" In 2019, January, the patient underwent a colonoscopy where dysplasia was revealed. This led to significant anxiety and a \"breakdown.\" The dysplasia was subsequently removed. Due to the heightened anxiety the patient's psychiatrist took the patient off of Celexa and started her on Lexapro. The patient did not tolerate Lexapro well, she continued to remain anxious, she began to engage in therapy in addition to medication management. The Lexapro was subsequently switched to Viibryd. The Viibryd did not work. The patient also began to experience panic attacks including shortness of breath, crying, tachycardia, palpitations. The panic attacks " were new and had never happened before.   In 2020 the patient experienced another breakdown and sunk into a deeper depression. The COVID-19 pandemic only worsened her situation. In February the patient's , a physician, decided to become her full-time caretaker as she would become anxious and panicky without him present. The patient also experienced intermittent bouts of suicidal ideation.   Recently, the patient and  demanded of their psychiatrist to take her off of Viibryd and Seroquel. They actually tapered her off of Seroquel themselves. They asked him to switch her back to Celexa. She has been on Celexa now for the last 3 days and is already begun to experience improvement. She states that the Seroquel led to having hallucinations and actually worsened insomnia. Last night she did not take Seroquel for the first time in many months and she slept very well. Her mood is slightly improved. Regarding her anxiety, she endorses muscle tension and fatigue restlessness irritability and a history of insomnia. Regarding her depression she denies SI HI AVH, denies anhedonia denies guilt, notes some decreased energy, psychomotor retardation, and a history of insomnia. They had also tried acupuncture in the past with some success. Psychiatric review of systems is negative for psychosis nancy, positive for anxiety and depression. Possibly positive for  depression and postpartum depression. The patient is medication compliant.       Past Psychiatric History:  Began Psychiatric Treatment: Several years   Dx: Depression and anxiety   Psychiatrist: Doctor Gordon for the last year and a half   Therapist: Sees a therapist at [facility]   Admissions History: Denies, Although she came close to needing admission recently.   Medication Trials: See HPI. Seroquel, Prozac which was too activating, Zoloft which was too activating, Celexa which worked well, Effexor which worked well, Lexapro which did not work,  Viibryd which did not work.   Self-Harm: Denies   Suicide Attempts: Denies   OB/GYN HISTORY:  Sexually Active: OB/GYN history deferred   Substance Abuse History:  Types: Denies all, including illicit   Social History:  Marital Status:    Employed: No     Kids: 4   House: House    Hx: Denies   Family History:  Suicide Attempts: Deferred today, Due to lack of time   Suicide Completions: Deferred   Substance Use: Deferred   Psychiatric Conditions: Deferred    depression, psychosis, anxiety: Deferred   Developmental History:  Born: Deferred   Siblings: Deferred   Access to Weapons: Denies   Thought Content: no suicidal ideation, no homicidal ideation, no auditory hallucinations, no visual hallucinations, and     PHQ-9 Depression Screening  PHQ-9 Total Score:      Little interest or pleasure in doing things?     Feeling down, depressed, or hopeless?     Trouble falling or staying asleep, or sleeping too much?     Feeling tired or having little energy?     Poor appetite or overeating?     Feeling bad about yourself - or that you are a failure or have let yourself or your family down?     Trouble concentrating on things, such as reading the newspaper or watching television?     Moving or speaking so slowly that other people could have noticed? Or the opposite - being so fidgety or restless that you have been moving around a lot more than usual?     Thoughts that you would be better off dead, or of hurting yourself in some way?     PHQ-9 Total Score       FAREED-7       Past Surgical History:  Past Surgical History:   Procedure Laterality Date   • COLONOSCOPY      last scope    • COLONOSCOPY N/A 10/29/2021    Procedure: COLONOSCOPY with possible biopies.;  Surgeon: Skyler Fuller MD;  Location: [facility] ENDOSCOPY;  Service: General;  Laterality: N/A;   • KNEE SURGERY  2017    broken knee       Problem List:  Patient Active Problem List   Diagnosis   • Anxiety   • Depression   • History of  fracture of patella   • Hyperlipemia   • Osteopenia   • Ulcerative lesion   • Constipation   • History of colonic polyps   • Tinnitus of right ear   • Sensorineural hearing loss (SNHL) of both ears       Allergy:   No Known Allergies     Discontinued Medications:  Medications Discontinued During This Encounter   Medication Reason   • gabapentin (NEURONTIN) 100 MG capsule Dose adjustment       Current Medications:   Current Outpatient Medications   Medication Sig Dispense Refill   • amitriptyline (ELAVIL) 25 MG tablet Take 1 tablet by mouth Every Night. 30 tablet 2   • ARIPiprazole (Abilify) 2 MG tablet Take 1 tablet by mouth Daily. 7 tablet 0   • atorvastatin (LIPITOR) 20 MG tablet TAKE 1 TABLET BY MOUTH DAILY 90 tablet 1   • cholecalciferol (VITAMIN D3) 25 MCG (1000 UT) tablet Take 1,000 Units by mouth Daily.     • Coenzyme Q10 (CO Q10 PO) Take  by mouth.     • escitalopram (LEXAPRO) 10 MG tablet Take 1 tablet by mouth Daily. 90 tablet 1   • folic acid (FOLVITE) 1 MG tablet Take 1 tablet by mouth Daily. 90 tablet 1   • gabapentin (NEURONTIN) 300 MG capsule 300 mg qam, 300 mg qpm, 600 mg qhs 120 capsule 2   • GARLIC PO garlic 1,000 mg oral capsule take 1 capsule by oral route daily   Active     • LORazepam (ATIVAN) 1 MG tablet Take 1 tablet by mouth 2 (Two) Times a Day As Needed for Anxiety. for anxiety 60 tablet 5   • Multiple Vitamins-Iron (MULTIVITAMIN PLUS IRON ADULT PO) Women's Multivitamin 18 mg iron-400 mcg-500 mg oral tablet take 1 tablet by oral route daily   Active     • Omega-3 Fatty Acids (fish oil) 1000 MG capsule capsule 3,000 mg 3 (Three) Times a Day With Meals. PT(PATIENT) REPORTS SHE TAKES MEDICATION MORNING/LUNCH/EVENING     • Probiotic Product (PROBIOTIC PO) Take  by mouth.     • vitamin E 400 UNIT capsule vitamin E 400 unit oral capsule take 1 capsule by oral route daily   Active     • Calcium Carb-Cholecalciferol (OYSCO 500 + D) 500-200 MG-UNIT tablet        No current facility-administered  medications for this visit.       Past Medical History:  Past Medical History:   Diagnosis Date   • Acid reflux 2022   • Anxiety    • Depression    • Fracture of patella 09/12/2017   • Hyperlipemia    • Osteopenia    • Panic disorder    • Psychiatric inpatient 07/22/2022    Discharged 07/22/2022 for Anxiety, Depression, and Suicidal Bx's       Social History     Socioeconomic History   • Marital status:    Tobacco Use   • Smoking status: Never   • Smokeless tobacco: Never   Vaping Use   • Vaping Use: Never used   Substance and Sexual Activity   • Alcohol use: Not Currently   • Drug use: Never   • Sexual activity: Not Currently         Family History   Problem Relation Age of Onset   • Stroke Mother    • Heart disease Mother    • No Known Problems Father    • No Known Problems Sister    • Cancer Brother    • No Known Problems Maternal Aunt    • No Known Problems Paternal Aunt    • No Known Problems Maternal Uncle    • No Known Problems Paternal Uncle    • No Known Problems Maternal Grandfather    • No Known Problems Maternal Grandmother    • No Known Problems Paternal Grandfather    • No Known Problems Paternal Grandmother    • No Known Problems Cousin    • Seizures Son    • No Known Problems Other    • ADD / ADHD Neg Hx    • Alcohol abuse Neg Hx    • Anxiety disorder Neg Hx    • Bipolar disorder Neg Hx    • Dementia Neg Hx    • Depression Neg Hx    • Drug abuse Neg Hx    • OCD Neg Hx    • Paranoid behavior Neg Hx    • Schizophrenia Neg Hx    • Self-Injurious Behavior  Neg Hx    • Suicide Attempts Neg Hx      Review of Systems:  Review of Systems   Constitutional: Negative for diaphoresis and fatigue.   HENT: Negative for drooling.    Eyes: Negative for visual disturbance.   Respiratory: Negative for cough and shortness of breath.    Cardiovascular: Positive for palpitations. Negative for chest pain and leg swelling.   Gastrointestinal: Negative for diarrhea, nausea and vomiting.   Endocrine: Negative for cold  "intolerance and heat intolerance.   Genitourinary: Negative for difficulty urinating.   Musculoskeletal: Negative for joint swelling.   Allergic/Immunologic: Negative for immunocompromised state.   Neurological: Negative for dizziness, seizures, syncope, speech difficulty, light-headedness, numbness and headaches.         · Mental Status Exam  · Appearance  · : sitting in a chair with her , good eye contact, normal street clothes, groomed, in person  · Behavior  · : pleasant and cooperative  · Motor  · : No abnormal  · Speech  · : normal rhythm, rate, volume, tone, not hyperverbal, not pressured, normal prosidy, Speaks with an accent  · Mood  · : \"much better\"  · Affect  · : nearly euthymic, mood congruent, fair variability  · Thought Content  · : negative suicidal ideations, negative homicidal ideations, negative obsessions  · Perceptions  · : negative auditory hallucinations, negative visual hallucinations  · Thought Process  · : linear  · Insight/Judgement  · : fair/fair  · Cognition  · : grossly intact  · Attention  · : intact      Physical Exam:  Physical Exam    Vital Signs:   /63   Pulse 78   Ht 162.6 cm (64\")   Wt 66.1 kg (145 lb 12.8 oz)   BMI 25.03 kg/m²      Lab Results:   Admission on 08/20/2022, Discharged on 08/20/2022   Component Date Value Ref Range Status   • SARS Antigen 08/20/2022 Not Detected  Not Detected, Presumptive Negative Final   • Internal Control 08/20/2022 Passed  Passed Final   • Lot Number 08/20/2022 [id number]   Final   • Expiration Date 08/20/2022 92,[id number]   Final   • Rapid Influenza A Ag 08/20/2022 Negative  Negative Final   • Rapid Influenza B Ag 08/20/2022 Negative  Negative Final   • Internal Control 08/20/2022 Passed  Passed Final   • Lot Number 08/20/2022 [id number]   Final   • Expiration Date 08/20/2022 120,[id number]   Final   • COVID19 08/20/2022 Not Detected  Not Detected - Ref. Range Final   Lab on 08/15/2022   Component Date Value Ref Range Status   • OmegaCheck " 08/15/2022 5.6  >5.4 % by wt Final    Relative Risk: LOW  Increasing blood levels of long-chain n-3 fatty acids are  associated with a lower risk of sudden cardiac death (1).  Based on the top (75th percentile) and bottom (25th  percentile) quartiles of the CHL reference population, the  following risk categories were established for OmegaCheck:  A cut-off of >=5.5% by wt defines a population at low  relative risk, 3.8-5.4% by wt defines a population at  moderate relative risk, and <=3.7% by wt defines a  population at high relative risk of sudden cardiac death.  The totality of the scientific evidence demonstrates that  when consumption of fish oils is limited to 3 g/day or less  of EPA and DHA, there is no significant risk for increased  bleeding time beyond the normal range. A daily dosage of 1  gram of EPA and DHA lowers the circulating triglycerides by  about 7-10% within 2 to 3 weeks. (Reference: 1-Esteban et al.  [facility]. 2002; 346: 2923-9709).   • Arachidonic Acid/EPA Ratio 08/15/2022 10.1  3.7 - 40.7 Final   • Omega-6/Omega-3 Ratio 08/15/2022 6.6  3.7 - 14.4 Final   • Omega-3 Total 08/15/2022 5.6  % by wt Final   • EPA 08/15/2022 1.4  0.2 - 2.3 % by wt Final   • DPA 08/15/2022 1.1  0.8 - 1.8 % by wt Final   • DPA 08/15/2022 3.1  1.4 - 5.1 % by wt Final   • Omega-6 Total 08/15/2022 37.2  % by wt Final    [facility] measures a number of omega-6 fatty acids  with AA and LA being the two most abundant forms reported.   • Arachidonic Acid, C20:4w6 08/15/2022 14.1  8.6 - 15.6 % by wt Final   • Linoleic Acid, C18:2w6 08/15/2022 20.0  18.6 - 29.5 % by wt Final    This test is performed by a Liquid Chromatography-Tandem  Mass Spectrometry (LC/MS/MS) method. This test was developed  and its performance characteristics determined by the  [facility] It has not been cleared or approved  by the U.S. FDA. The [facility] is regulated under  Clinical Laboratory Improvement Amendments (CLIA)  as  qualified to perform high-complexity testing. This test is  used for clinical purposes.  It should not be regarded as  investigational or for research.   • Vitamin B-12 08/15/2022 402  211 - 946 pg/mL Final   • Folate 08/15/2022 >20.00  4.78 - 24.20 ng/mL Final   • 25 Hydroxy, Vitamin D 08/15/2022 56.8  30.0 - 100.0 ng/ml Final   • Ferritin 08/15/2022 272.00 (H)  13.00 - 150.00 ng/mL Final   • Magnesium 08/15/2022 2.3  1.6 - 2.4 mg/dL Final   • Glucose 08/15/2022 96  65 - 99 mg/dL Final   • BUN 08/15/2022 13  8 - 23 mg/dL Final   • Creatinine 08/15/2022 0.75  0.57 - 1.00 mg/dL Final   • Sodium 08/15/2022 138  136 - 145 mmol/L Final   • Potassium 08/15/2022 4.2  3.5 - 5.2 mmol/L Final   • Chloride 08/15/2022 101  98 - 107 mmol/L Final   • CO2 08/15/2022 26.4  22.0 - 29.0 mmol/L Final   • Calcium 08/15/2022 9.6  8.6 - 10.5 mg/dL Final   • Total Protein 08/15/2022 7.0  6.0 - 8.5 g/dL Final   • Albumin 08/15/2022 4.30  3.50 - 5.20 g/dL Final   • ALT (SGPT) 08/15/2022 7  1 - 33 U/L Final   • AST (SGOT) 08/15/2022 11  1 - 32 U/L Final   • Alkaline Phosphatase 08/15/2022 106  39 - 117 U/L Final   • Total Bilirubin 08/15/2022 1.1  0.0 - 1.2 mg/dL Final   • Globulin 08/15/2022 2.7  gm/dL Final   • A/G Ratio 08/15/2022 1.6  g/dL Final   • BUN/Creatinine Ratio 08/15/2022 17.3  7.0 - 25.0 Final   • Anion Gap 08/15/2022 10.6  5.0 - 15.0 mmol/L Final   • eGFR 08/15/2022 87.4  >60.0 mL/min/1.73 Final    National Kidney Foundation and American Society of Nephrology (ASN) Task Force recommended calculation based on the Chronic Kidney Disease Epidemiology Collaboration (CKD-EPI) equation refit without adjustment for race.   • Total Cholesterol 08/15/2022 158  0 - 200 mg/dL Final   • Triglycerides 08/15/2022 101  0 - 150 mg/dL Final   • HDL Cholesterol 08/15/2022 54  40 - 60 mg/dL Final   • LDL Cholesterol  08/15/2022 86  0 - 100 mg/dL Final   • VLDL Cholesterol 08/15/2022 18  5 - 40 mg/dL Final   • LDL/HDL Ratio 08/15/2022 1.55    Final   • Hemoglobin A1C 08/15/2022 5.70 (H)  4.80 - 5.60 % Final   • TSH 08/15/2022 0.826  0.270 - 4.200 uIU/mL Final   • T3, Free 08/15/2022 2.83  2.00 - 4.40 pg/mL Final   • Free T4 08/15/2022 1.14  0.93 - 1.70 ng/dL Final   • DHEA-Sulfate 08/15/2022 65.7  20.4 - 186.6 ug/dL Final   • Testosterone, Total 08/15/2022 8  3 - 67 ng/dL Final   • Testosterone, Free 08/15/2022 0.8  0.0 - 4.2 pg/mL Final   • Estradiol 08/15/2022 <5.0  pg/mL Final   • Progesterone 08/15/2022 0.12  ng/mL Final   • WBC 08/15/2022 6.31  3.40 - 10.80 10*3/mm3 Final   • RBC 08/15/2022 4.74  3.77 - 5.28 10*6/mm3 Final   • Hemoglobin 08/15/2022 13.8  12.0 - 15.9 g/dL Final   • Hematocrit 08/15/2022 42.9  34.0 - 46.6 % Final   • MCV 08/15/2022 90.5  79.0 - 97.0 fL Final   • MCH 08/15/2022 29.1  26.6 - 33.0 pg Final   • MCHC 08/15/2022 32.2  31.5 - 35.7 g/dL Final   • RDW 08/15/2022 14.0  12.3 - 15.4 % Final   • RDW-SD 08/15/2022 46.4  37.0 - 54.0 fl Final   • MPV 08/15/2022 9.7  6.0 - 12.0 fL Final   • Platelets 08/15/2022 299  140 - 450 10*3/mm3 Final   • Neutrophil % 08/15/2022 63.4  42.7 - 76.0 % Final   • Lymphocyte % 08/15/2022 27.7  19.6 - 45.3 % Final   • Monocyte % 08/15/2022 7.6  5.0 - 12.0 % Final   • Eosinophil % 08/15/2022 0.5  0.3 - 6.2 % Final   • Basophil % 08/15/2022 0.5  0.0 - 1.5 % Final   • Immature Grans % 08/15/2022 0.3  0.0 - 0.5 % Final   • Neutrophils, Absolute 08/15/2022 4.00  1.70 - 7.00 10*3/mm3 Final   • Lymphocytes, Absolute 08/15/2022 1.75  0.70 - 3.10 10*3/mm3 Final   • Monocytes, Absolute 08/15/2022 0.48  0.10 - 0.90 10*3/mm3 Final   • Eosinophils, Absolute 08/15/2022 0.03  0.00 - 0.40 10*3/mm3 Final   • Basophils, Absolute 08/15/2022 0.03  0.00 - 0.20 10*3/mm3 Final   • Immature Grans, Absolute 08/15/2022 0.02  0.00 - 0.05 10*3/mm3 Final   • nRBC 08/15/2022 0.0  0.0 - 0.2 /100 WBC Final       EKG Results:  No orders to display       Imaging Results:  No Images in the past 120 days found..      Assessment & Plan    Diagnoses and all orders for this visit:    1. Moderate episode of recurrent major depressive disorder (HCC) (Primary)    2. Generalized anxiety disorder    3. Panic disorder    4. Panic attacks    5. Insomnia due to mental condition        Visit Diagnoses:    ICD-10-CM ICD-9-CM   1. Moderate episode of recurrent major depressive disorder (HCC)  F33.1 296.32   2. Generalized anxiety disorder  F41.1 300.02   3. Panic disorder  F41.0 300.01   4. Panic attacks  F41.0 300.01   5. Insomnia due to mental condition  F51.05 300.9     327.02       INITIAL presentation most consistent with major depressive disorder in partial remission, FAREED, rare panic attacks.    12/9: Significant improvement. No changes. 17 minutes of supportive psychotherapy with goal to strengthen defenses, promote problems solving, restore adaptive functioning and provide symptom relief. The therapeutic alliance was strengthened to encourage the patient to express their thoughts and feelings. Esteem building was enhanced through praise, reassurance, normalizing and encouragement. Coping skills were enhanced to build distress tolerance skills and emotional regulation. Allowed patient to freely discuss issues without interruption or judgement with unconditional positive regard, active listening skills, and empathy. Provided a safe, confidential environment to facilitate the development of a positive therapeutic relationship and encourage open, honest communication. Assisted patient in identifying risk factors which would indicate the need for higher level of care including thoughts to harm self or others and/or self-harming behavior and encouraged patient to contact this office, call 911, or present to the nearest emergency room should any of these events occur. Assisted patient in processing session content; acknowledged and normalized patient’s thoughts, feelings, and concerns by utilizing a person-centered approach in efforts to build appropriate  rapport and a positive therapeutic relationship with open and honest communication. Patient given education on medication side effects, diagnosis/illness and relapse symptoms. Plan to continue supportive psychotherapy in next appointment to provide symptom relief.  Diagnoses: as above  Symptoms: as above  Functional status: good  Mental Status Exam: as above    Treatment plan: Medication management and supportive psychotherapy  Prognosis: good  Progress: improved  6 wks      11/18: Residual anxiety, residual depression.  Increase gabapentin, start amitriptyline at night for initial insomnia.  Close follow-up. 19 minutes of supportive psychotherapy  Treatment plan: Medication management and supportive psychotherapy  Prognosis: Good  Progress: No improvement, residual depression and anxiety insomnia  3 weeks    11/7: Residual anx (at noon), consider adding gabapentin in the mornings. and dep. Increase gabapentin in the afternoons. No other changes. 17 minutes of supportive psychotherapy  Treatment plan: Medication management and supportive psychotherapy  Prognosis: good  Progress: possibly improved, if not stable  2 wks      10/31: Couples therapy, increase Abilify, close follow-up.  I feel that their marriage issues have come to a head recently, and this stressor could largely explain patient's depression and anxiety.  Patient mentioned Celexa again, she is likely interested in switching.  25 minutes of supportive psychotherapy  Treatment plan: Medication management and supportive psychotherapy  Prognosis: Fair to good  Progress: Worsening depression and anxiety related to recent trip to [place]  1 week, highly urgent    9/30: well, stable, some constriction today. No changes. 17 minutes of supportive psychotherapy  Treatment plan: Medication management and supportive psychotherapy  Prognosis: Good  Progress: Stable, close to goal  4 weeks    8/30: Remarkably well. Initial insomnia. 17 minutes of supportive  psychotherapyTreatment plan: Medication management and supportive psychotherapy  Prognosis: good  Progress: much better  4 wks    8/1: Try another mood stabilizer in place of olanzapine.  Will trial Abilify again.  Also consider Geodon.  18 minutes of supportive psychotherapy Treatment plan: Medication management and supportive psychotherapy  Prognosis: Fair to good  Progress: Slightly improved  4 weeks, urgent    6/17: start buspar, doing better. 17 minutes of supportive psychotherapy Treatment plan: Medication management and supportive psychotherapy  Prognosis: good  Progress: improving slowly  4 wks      5/20: Continue Lexapro and lorazepam.  Try Belsomra in place of Ambien.  19 minutes of supportive psychotherapyTreatment plan: Medication management and supportive psychotherapy  Prognosis: Good  Progress: Some progress since last visit  4 weeks urgent    4/22: Some improvement in anx and dep, but still residual symptoms.  Persistent insomnia. Declines IOP, inpatient admission.  Increase Lexapro and start trazodone.  17 minutes of supportive psychotherapy Treatment plan: Medication management and supportive psychotherapy  Prognosis: good  Progress: some, minimal      3/24: Focus on lower doses.  Start Pristiq.  Depression with anxious distress versus depression and generalized anxiety disorder.  She only has distractibility, no other symptoms of hypomania.  17 minutes of supportive psychotherapy  4 weeks    3/15: Continue lurasidone 40 mg at night, increase Ambien to 10 mg at night.  Continue low-dose Lamictal, plan to increase further.  Continue titrating off of amitriptyline.  17 minutes of supportive psychotherapy1 week, urgent    3/10: Effexor has now been stopped.  Continue Latuda and Lamictal at present doses.  I will see her back in a few days and then at 6 weeks.  Bipolar 2?  If so, how come she is tolerating amitriptyline in the evenings?  19 minutes of supportive psychotherapy 6 weeks    2/15: Still  utilizing lorazepam which means the anxiety is not under control.  Increase Effexor.  17 minutes of supportive psychotherapy 4 weeks    1/17: Continued improvement.  No changes, only on effexor 75 mg for 2 weeks.  Tolerating medications without side effects.  17 minutes of supportive psychotherapy   4 wks    1/3: Continued improvement. Increase effexor to target residual dep anx. TMS to start 1/6. 17 minutes of supportive psychotherapy  4 wks        PLAN:  160. Risk Assessment: Risk of self-harm acutely remain low. Risk factors include a history of suicidal ideation, mood disorder, anxiety disorder, presence of psychosocial stressors such as colonic dysplasia, COVID-19 pandemic. Protective factors include no history of self-harm or suicide attempts, good social support, willingness to engage in care, improved depressive symptoms. Risk of self-harm chronically also remain low, but could be further elevated in the event of treatment noncompliance and/or AODA.  161. Medications:   a. CONTINUE lexapro 10 mg daily. Risks, benefits, alternatives discussed with patient including GI upset, nausea vomiting diarrhea, theoretical decrease of seizure threshold predisposing the patient to a slightly higher seizure risk, headaches, sexual dysfunction, serotonin syndrome, bleeding risk, increased suicidality in patients 24 years and younger.  After discussion of these risks and benefits, the patient voiced understanding and agreed to proceed.  b. CONTINUE gabapentin 300 mg qam, 300 qpm, 600 mg nightly. Risks, benefits, alternatives discussed with patient including sedation, dizziness/falls risk, GI upset, weight gain.  After discussion of these risks and benefits, the patient voiced understanding and agreed to proceed. UDS ordered, Mamadou reviewed.  c. CONTINUE Abilify 4 mg daily. Risks, benefits, alternatives discussed with patient including increased energy, exacerbation of irritability, akathisia, GI upset, orthostatic  "hypotension, increased appetite.  After discussion of these risks and benefits, the patient voiced understanding and agreed to proceed.  d. CONTINUE amitriptyline 25 mg nightly. Risks, benefits, alternatives discussed with patient including sedation, dizziness, falls, GI upset, constipation, urinary retention, dry mouth.  After discussion of these risks and benefits, the patient voiced understanding and agreed to proceed.  e. CONTINUE lorazepam 1 mg bid PRN. Risks, benefits, alternatives discussed with patient including GI upset, sedation, dizziness, respiratory depression, falls risk.  After discussion of these risks and benefits, the patient voiced understanding and agreed to proceed.  f. S/P:   i. buspar 5 mg bid was ineffective.  ii. Trazodone 50 mg nightly not helpful for insomnia.  iii. Mirtazapine 15 mg, 7.5 mg: ineffective; made anxiety and depression worse.  iv. Abilify: brain zaps and constipation  v. Seroquel made depression worse  vi. celexa 20 mg daily, ineffective after several months  vii. Doxepin caused \"brain zaps\"  viii. Latuda was ineffective.  ix. Amitriptyline 25 mg nightly helpful for sleep but we didn't want her on too many serotonergic meds.  x. Ambien minimally helpful for insomnia.  xi. Pristiq and effexor worsened anxiety, both low dose.  xii. Stopped lamictal for unclear reasons 25 mg.  xiii. belsomra 5 mg nightly didn't help with insomnia.  162. Therapy: continue with Genevieve at [facility].  Referred for couples therapy 10/2022  a. Status post IOP at [facility] 10/2022  163. Labs/Studies: BMP to monitor sodium levels in February was entirely normal, EKG February shows rate 72, sinus, .  164. Follow Up: 6 weeks      TREATMENT PLAN/GOALS: Continue supportive psychotherapy efforts and medications as indicated. Treatment and medication options discussed during today's visit. Patient acknowledged and verbally consented to continue with current treatment plan and was educated on the " importance of compliance with treatment and follow-up appointments.    MEDICATION ISSUES:  YVETTE reviewed as expected.  Discussed medication options and treatment plan of prescribed medication as well as the risks, benefits, and side effects including potential falls, possible impaired driving and metabolic adversities among others. Patient is agreeable to call the office with any worsening of symptoms or onset of side effects. Patient is agreeable to call 911 or go to the nearest ER should he/she begin having SI/HI. No medication side effects or related complaints today.     MEDS ORDERED DURING VISIT:    Return in about 6 weeks (around 1/20/2023).         This document has been electronically signed by Vinny Krishnamurthy MD  December 9, 2022 13:40 EST       Part of this note may be an electronic transcription/translation of spoken language to printed text using the Dragon Dictation System.

## 2022-12-09 NOTE — PATIENT INSTRUCTIONS
1.  Please return to clinic at your next scheduled visit.  Contact the clinic ([phone]) at least 24 hours prior in the event you need to cancel.  2.  Do no harm to yourself or others.    3.  Avoid alcohol and drugs.    4.  Take all medications as prescribed.  Please contact the clinic with any concerns. If you are in need of medication refills, please call the clinic at [phone].    5. Should you want to get in touch with your provider, Dr. Vinny Krishnamurthy, please utilize [employer] or contact the office ([phone]), and staff will be able to page Dr. Krishnamurthy directly.  6.  In the event you have personal crisis, contact the following crisis numbers: Suicide Prevention Hotline 1-789.335.3791; [facility] Helpline 5-901-336-KLBP; [facility] [phone]; text HELLO to 409702; or 663.     SPECIFIC RECOMMENDATIONS:     1.      Medications discussed at this encounter:                   - no changes     2.      Psychotherapy recommendations:      3.     Return to clinic: 6 weeks

## 2022-12-12 DIAGNOSIS — F41.0 PANIC ATTACKS: ICD-10-CM

## 2022-12-12 DIAGNOSIS — F33.1 MODERATE EPISODE OF RECURRENT MAJOR DEPRESSIVE DISORDER: ICD-10-CM

## 2022-12-12 DIAGNOSIS — F41.1 GENERALIZED ANXIETY DISORDER: ICD-10-CM

## 2022-12-12 DIAGNOSIS — F51.05 INSOMNIA DUE TO MENTAL CONDITION: ICD-10-CM

## 2022-12-12 DIAGNOSIS — F41.0 PANIC DISORDER: ICD-10-CM

## 2022-12-12 RX ORDER — ARIPIPRAZOLE 2 MG/1
4 TABLET ORAL DAILY
Qty: 180 TABLET | Refills: 0 | Status: SHIPPED | OUTPATIENT
Start: 2022-12-12 | End: 2023-01-20 | Stop reason: SDUPTHER

## 2022-12-12 RX ORDER — AMITRIPTYLINE HYDROCHLORIDE 25 MG/1
25 TABLET, FILM COATED ORAL NIGHTLY
Qty: 90 TABLET | Refills: 0 | Status: SHIPPED | OUTPATIENT
Start: 2022-12-12 | End: 2023-01-20 | Stop reason: SDUPTHER

## 2022-12-12 RX ORDER — ARIPIPRAZOLE 2 MG/1
4 TABLET ORAL DAILY
Qty: 60 TABLET | Refills: 2 | Status: SHIPPED | OUTPATIENT
Start: 2022-12-12 | End: 2022-12-12 | Stop reason: SDUPTHER

## 2022-12-12 NOTE — TELEPHONE ENCOUNTER
PT AND  CALLED AND SAID THAT IT NEEDS TO BE SENT TO THE [facility] AND THEY ALSO NEED A REFILL ON AMITRIPTYLINE AND THEY NEED 90 DAY SUPPLIES ON BOTH.    PENDED

## 2022-12-12 NOTE — TELEPHONE ENCOUNTER
Patient's  came in to the office and said that the patient's Abilify dosage had been changed, but a new prescription has not yet been sent to the pharmacy.   wants a prescription reflecting the dosage change sent to the pharmacy.  Please advise

## 2022-12-12 NOTE — TELEPHONE ENCOUNTER
PT(PATIENT) EC VERIFIED     CONTACTED PT(PATIENT) PER PROVIDER'S INSTRUCTIONS    PER PT(PATIENT) , MEDICATION NEEDS TO GO TO [facility] [address] IN [facility]     PROVIDER PLEASE ADVISE

## 2023-01-04 ENCOUNTER — TELEPHONE (OUTPATIENT)
Dept: PSYCHIATRY | Facility: CLINIC | Age: 68
End: 2023-01-04
Payer: MEDICARE

## 2023-01-04 NOTE — TELEPHONE ENCOUNTER
PATIENT WAS REFERRED FOR COUPLE THERAPY ON 10-, CALLED NEXT STEP AND SPOKE WITH SALAZAR AND CONFIRMED FIRST APPT WITH TREVOR AMANDA ON 11/15/2022 AT 10 AM

## 2023-01-20 ENCOUNTER — OFFICE VISIT (OUTPATIENT)
Dept: PSYCHIATRY | Facility: CLINIC | Age: 68
End: 2023-01-20
Payer: MEDICARE

## 2023-01-20 VITALS
DIASTOLIC BLOOD PRESSURE: 62 MMHG | WEIGHT: 149.2 LBS | BODY MASS INDEX: 25.47 KG/M2 | SYSTOLIC BLOOD PRESSURE: 112 MMHG | HEIGHT: 64 IN

## 2023-01-20 DIAGNOSIS — F41.0 PANIC DISORDER: ICD-10-CM

## 2023-01-20 DIAGNOSIS — F51.05 INSOMNIA DUE TO MENTAL CONDITION: ICD-10-CM

## 2023-01-20 DIAGNOSIS — F41.0 PANIC ATTACKS: ICD-10-CM

## 2023-01-20 DIAGNOSIS — F33.1 MODERATE EPISODE OF RECURRENT MAJOR DEPRESSIVE DISORDER: Primary | ICD-10-CM

## 2023-01-20 DIAGNOSIS — F41.1 GENERALIZED ANXIETY DISORDER: ICD-10-CM

## 2023-01-20 PROCEDURE — 90833 PSYTX W PT W E/M 30 MIN: CPT | Performed by: STUDENT IN AN ORGANIZED HEALTH CARE EDUCATION/TRAINING PROGRAM

## 2023-01-20 PROCEDURE — 99214 OFFICE O/P EST MOD 30 MIN: CPT | Performed by: STUDENT IN AN ORGANIZED HEALTH CARE EDUCATION/TRAINING PROGRAM

## 2023-01-20 RX ORDER — GABAPENTIN 300 MG/1
CAPSULE ORAL
Qty: 120 CAPSULE | Refills: 5 | Status: SHIPPED | OUTPATIENT
Start: 2023-01-25

## 2023-01-20 RX ORDER — AMITRIPTYLINE HYDROCHLORIDE 25 MG/1
25 TABLET, FILM COATED ORAL NIGHTLY
Qty: 90 TABLET | Refills: 1 | Status: SHIPPED | OUTPATIENT
Start: 2023-01-20 | End: 2023-03-03

## 2023-01-20 RX ORDER — ARIPIPRAZOLE 2 MG/1
4 TABLET ORAL DAILY
Qty: 180 TABLET | Refills: 1 | Status: SHIPPED | OUTPATIENT
Start: 2023-01-20

## 2023-01-20 RX ORDER — ESCITALOPRAM OXALATE 10 MG/1
10 TABLET ORAL DAILY
Qty: 90 TABLET | Refills: 1 | Status: SHIPPED | OUTPATIENT
Start: 2023-01-20

## 2023-01-20 NOTE — PATIENT INSTRUCTIONS
1.  Please return to clinic at your next scheduled visit.  Contact the clinic ([phone]) at least 24 hours prior in the event you need to cancel.  2.  Do no harm to yourself or others.    3.  Avoid alcohol and drugs.    4.  Take all medications as prescribed.  Please contact the clinic with any concerns. If you are in need of medication refills, please call the clinic at [phone].    5. Should you want to get in touch with your provider, Dr. Vinny Krishnamurthy, please utilize [employer] or contact the office ([phone]), and staff will be able to page Dr. Krishnamurthy directly.  6.  In the event you have personal crisis, contact the following crisis numbers: Suicide Prevention Hotline 1-709.746.5564; [facility] Helpline 6-639-305-EIRN; [facility] [phone]; text HELLO to 618511; or 488.     SPECIFIC RECOMMENDATIONS:     1.      Medications discussed at this encounter:                   - no changes     2.      Psychotherapy recommendations:      3.     Return to clinic: 6 weeks

## 2023-01-20 NOTE — PROGRESS NOTES
"Subjective   Shabana Ferguson is a 67 y.o. female who presents today for follow up    Referring Provider:  Kvng Ramirez APRN  [address]  [address]  [place] [address]    Chief Complaint:  mdd margarita    History of Present Illness:     Shabana Ferguson is a 64 year old /White female, hx of anxiety and depression, fractured patella, HLD, osteopenia, ulcer referred by MARIE Brice, for anxiety and depression management.   Chart: Psychotropic medications: amitriptyline 25 mg QHS, Celexa 20 mg p.o. daily, lorazepam 0.5 p.o. twice daily as needed anxiety.      \"Shabana\" and Jaya    : In person interview:  1. Chart review: No new.    2. Planning: No changes at last visit.  a. Are they still doing couples therapy?  3. \"I'm ok.\"  a. Stable, once in a while I go a little down, but then get back up.  b. Continuing to do couples therapy  4. Mood/Depression: minimal depressed mood  5. Anxiety: sometimes, but controlled  a. Takes half an ativan some nights, otherwise has anxiety in the morning  b. Also has brain zaps at night if doesn't take it -- this is the biggest reason  c. Volunteering at [employer]  d. Both are volunteering at [employer]. Also seeing some pts.  6. Panic attacks: n  7. Energy: good  8. Concentration: good  9. Sleeping: well  10. Eatin lb wg   11. Refills: y  12. Substances: n  13. Therapy: individual and couples (every 2 weeks)  14. Medication compliant: y  15. No SI HI AVH.      : In person interview:  16. Chart review: No new.  17. Planning: Increased gabapentin and started amitriptyline at night.  18. \"I'm doing a lot better.\"  a. Sleeping better on amitriptyline  b. Pt wants to get involved in volunteering, [employer], every Tues and Thurs  19. Mood/Depression: better  20. Anxiety: less worrying  a. Trip was a success; they saw one of the grandchildren they haven't seen in a while. Patient smiled and laughed during this discussion. " "Cooked for him.  b. Taking less lorazepam  21. Panic attacks: n  22. Energy: improving  23. Concentration: improving  24. Sleeping: well  25. Eating: stable  26. Refills: n  27. Substances: n  28. Therapy: continuing therapy and couples therapy  29. Medication compliant: y  30. No SI HI AVH.      11/18: In person interview:  31. Chart review: Seen by primary care November 14.  PHQ-9 is 14, very difficult.  Feels her mental health is better than it was a year ago, however.  32. Planning: They have started marriage counseling.  Increased gabapentin in the afternoons to 200 mg a day.  Reintroduce amitriptyline in the evenings.  33. \"I'm suffering.\"  a. Monday has a big trip to [place]. Just got back from [place] trip, which \"was traumatic.\"  b. Nicoal didn't join them for dinner last night -- upsetting for her.  34. Mood/Depression:  a. Depressed mood  35. Anxiety:  a. Restless, irritable, on edge -- lorazepam works. Starts at noon.  b. Very clingy, don't want to be by myself, throughout the day  36. Panic attacks: Controlled on Ativan  37. Energy: Down  38. Concentration: Still not a goal  39. Sleeping: Initial insomnia  40. Eating: Stable weight  41. Refills: Yes  42. Substances: No  43. Therapy: continuing marriage counseling, have to pay out of pocket  44. Medication compliant: y  45. No SI HI AVH.      11/7: In person interview:  46. Chart review: No new.  47. Planning: Increased Abilify and referred to couples therapy a week ago.  48. \"I'm having a hard time to concentrate and sit still.\"  [place]:  i. Couldn't go back to sleep last night  ii. Nicola's move was this past Friday  iii. Moved animals to his house on Saturday  iv. He feels there is less negativity, but she is not sure  49. Mood/Depression:  a. Depressed mood  50. Anxiety:  a. Worrying a lot, very nervous especially in the afternoons  b. Pacing  51. Panic attacks: y, controlled on lorazepam  52. Energy: stable, not at goal  53. Concentration: " "  a. Some issues with concentration  54. Sleeping:  a. Always wakes at 4-6 am, maintenance insomnia  b. But slept well last night 7-6 am.  55. Eating: 3 lb wl since 10/31  56. Refills:  y  57. Substances: n  58. Therapy: started marriage counseling, next appnt next week  59. Medication compliant: y  60. No SI HI AVH.      10/31: In person interview:  61. Chart review: No new.  62. Planning: No changes at last visit.  63. \"I feel anxiety in the afternoon.\"  a. Stressors:  i. Nicola, her son, is moving out. Stressful.  ii. Pt wants to end the marriage; they've discussed it numerous times recently.  1. Couples therapy: they've tried in the past, more than once, problems with each therapist (patient)  64. Mood/Depression:  a. Depressed mood in the afternoons, not mornings  b. Jaya: trip to [place] was fun but stressful and \"it was in the end a major setback\"  i. Last night was very destructive: sleepless  65. Anxiety:  a. [facility]; recommend taking gabapentin 100 mg in afternoon rather than morning.  i. Because it is costing so much money, they are not going back.  b. Worrying, irritable, insomnia, on edge  66. Panic attacks:  a. Yes, in the afternoon, controlled on ativan  67. Energy: down  68. Concentration: poor  69. Sleeping: initial insomnia  70. Eating: 3 lb wg 8/22  71. Refills: No  72. Substances: No  73. Therapy: Yes continuing  74. Medication compliant: y  75. No SI HI AVH.      9/30: In person interview:  76. Chart review: Recently discharged from [facility] outpatient.  a. Patient was depressed from October of last last year to essentially a couple months ago when she started to improve.  Consider light box.  77. Planning: Restarted Ambien at last visit.  Patient was improving at last visit.  Consider light box  78. \"Good.\"  a. I don't need to take the ambien, I just need the gabapentin  b. TMS helping as well, 10 more sessions, \"really helping\"  c. Discussed light " "box  79. Mood/Depression: stable, well  80. Anxiety:  a. Anxiety in the afternoons, takes lorazepam daily at 6 pm  i. Takes care of the brain zaps  81. Panic attacks: n  82. Sleeping: well on the gabapentin  83. Eating: stable  84. Refills: y  85. Substances:  86. Therapy: IOP completed  a. Very beneficial, \"I never thought I would like it.\" I would recommend it to anyone.  87. Medication compliant: y  88. No SI HI AVH.      8/30: In person interview:  89. Chart review: Seen by urgent care August 20 for back pain.  COVID-negative.  Also had some cervical adenopathy. EEG is abnormal.  90. Planning: Trial of Abilify appears to be working well.  This is according to the conversation I had with both of them on August 8.  91. \"I'm better... I feel good when I get up in the morning.\"  a. P1, G6  b. Doing TMS  c. Then IOP.  d. The problem I still have is sleeping.  e. Initial insomnia for 2 hours each night.  f. Afternoons, gets anxious. Uses ativan rarely. Use has gone down dramatically.  g. Prescribed gabapentin by [facility]. Helping her anxiety.  h. Jaya \"quality of life has gone WAY up.\"  i. Sees neurology in early September.  92. Mood/Depression: improved  93. Anxiety: improved  94. Panic attacks: rare  95. Eating: stable  96. Refills: n  97. Substances: n  98. Therapy: def  99. Medication compliant: y  100. No SI HI AVH.      8/1: In person interview:  101. Chart review: Patient admitted to the [facility] July 18.  Awaiting records.  102. Planning: This is a follow-up appointment after her admission to the [facility].  103. \" I am better.\"  a. Patient reported she was extremely afraid initially of being admitted, however admission was a very \"positive\" experience for her, and \"I felt at peace.\"  b. Could not tolerate olanzapine 2.5 mg nightly as it gave her nightmares.  She has stopped this medication.  c. She has set up intensive outpatient at [facility], has already gone to [facility] last week, and also has TMS " "scheduled.  d. Per her , \"the crying spells have stopped.\"  104. Mood/Depression: Slightly improved, still has days of depression.  Reports that some days she is perfect, then other days she becomes very depressed and anxious, but lorazepam helps at that time.  105. Panic attacks: Still has occasionally  106. Sleeping: Still has some trouble sleeping, but recently has been using delta 8 to sleep  107. Eating: Stable, now eating  108. Refills: No  109. Substances: No  110. Therapy: Continuing  111. Medication compliant: Yes  112. No SI HI AVH.      6/17: In person interview:  113. Chart review: Seen by primary care June 9.  Apparently experiencing multiple side effects from anxiety and depression medications.  Looks like the results of the testing are not associated with an increased risk for hyper homocystinemia (MTHFR gene assay).  114. Planning: Belsomra was too expensive.  115. \"All in all I'm better.\"  a. Still has nervous stomach; getting help with that from PCP.  b. Sleeping better  c. Taking ativan in the am consistently and also at night to sleep (rarely).  d. Low mood in afternoon with crying spells  116. Mood/Depression: stable   117. Anxiety: under control  118. Sleeping: well  119. Eating: stable  120. Refills: n  121. Substances: n  122. Therapy: has a counselor at [facility] now, plus [facility]  123. Medication compliant: y  124. No SI HI AVH.      5/20: In person interview:  125. Chart review: Patient presented in the emergency room for abdominal pain, she did not want a wait so she left.  Saw primary care for abdominal pain which patient believes is related to starting Pristiq a month ago.  Despite having stopped it long ago, she continues to have abdominal symptoms.  Somatization?  126. Planning: Try Belsomra for insomnia.  127. \" I am getting better.\"  a.  agrees.  Patient is becoming more functional.  b. Depression and anxiety have improved.  Still residual symptoms.  She has only been on " "the higher dose of Lexapro for about 4 weeks ago.  c. Still having trouble with insomnia but it is controlled on Ambien most nights.  Some nights it is not.  Some nights she does not take Ambien.  d. Utilizing half a milligram of lorazepam twice daily most days.  e. More active around the house.  f. Famotidine is helping with abdominal pain.  128. Refills: Yes  129. Substances: No  130. Therapy: Deferred  131. Medication compliant: Yes  132. No SI HI AVH.      4/22: In person interview:  Chart review: 4/11: I called the patient to check in on her progress on lexapro. \"I'm doing better than I was before.\" Sleeping well. Taking nyquil to sleep. Utilizing lorazepam frequently. Has been on lexapro for 10 days. She understands that she needs to give it more time. More active. Has no appetite, however. I mentioned to her the importance of moving to a higher level of care (IOP, inpt). Pt voiced understanding and agreed to proceed. Jaya: crying episodes have stopped. Better energy.  133. \"I can't sleep.\"  a. Persistent insomnia, anxious before bed, unclear why. Has tried ambien which sometimes helps. Also over the counter meds.   b. Irritable, restless, worrying.   c. Still depressed, but this has improved  d. Compliant on lexapro  e. Still having panic attacks.  Utilizing lorazepam.  f. Per , she has made significant improvement over the last few months, but still has a way to go.  Patient agrees.  g. Declines IOP, inpatient admission  134. Therapy: Deferred  135. Medication compliant: To some extent yes  136. No SI HI AVH.      3/24: In person interview: With her   137. Chart review: Patient is now discontinued all medications except for Lamictal 25 mg a day.  I started her on doxepin in the evenings for insomnia on March 21.  138. \"I think I need lower doses.\"  a. Persistent crying spells, and anxiety. Utilizing ativan.  b. Doxepin is helping with insomnia.  c. Patient reports that she knows of someone in " "her 70s who struggled with depression for years until they came up with a solution.  This gave the patient hope.  She noted that the patient was on very low doses of Celexa and other psychotropic medication.  139. Medication compliant: y  140. No SI HI AVH.      3/15: In person interview: With her   1. Chart review: Patient had another panic attack 3/14 morning.  I advised her to utilize the Ativan liberally as she is not always using it.  Compliant on Latuda 40 mg a day.  She notes that there are some periods where she feels \"completely normal.\"  Then other times, she completely breaks down and has depression and anxiety. Having trouble sleeping for the last 4 days.  Target this with Ambien 5 mg nightly. Risks, benefits, side effects discussed with patient including sedation, dizziness, GI upset, hallucinations, sleepwalking, sleep-eating.  After discussion of these risks and benefits, the patient voiced understanding and agreed to proceed. Continue Latuda and Lamictal. Start titrating off of amitriptyline by taking half a tab, 12.5 mg in other words, nightly.  141. \" The Ambien kept me asleep for a couple hours.\"  a. Patient slept for two hours, got up, ate something, and did some reading.  Then went back to sleep.  Notes that she did not feel anxious during this time.  b. When she woke the next morning she felt very tired.  c. Utilizing Ativan more liberally to prevent panic attacks.  d. Some limitation today about how her family is distancing themselves from her due to her depression.  Feels like they should be more supportive.  e. Wants to switch to a different therapist.  She understands that we will have a therapist start next month.  Would like to wait until that time.  Problems with connecting to her present therapist.  142. Therapy: Continuing  143. Medication compliant: Yes  144. No SI HI AVH.      3/10: In person: With her   Interview:  145. Chart review: We have reduced the dose of Effexor " "to 37.5 mg a day, and started Lamictal 25 mg daily.  Continuing Latuda at its present dose of 20 mg a day.  October labs show reassuring CMP, CBC.  146. \" I stopped the Effexor.\"  a. Patient states she is already feeling better.   agrees.  b. Did not take Effexor this morning and is not experiencing panic and anxiety in the afternoon like she has been for the past several days.  c. Tolerating the Lamictal without side effects.  d. No crying spells  e. Affect is still depressed  147. Medication compliant: Yes  148. No SI HI AVH.      2/15:Virtual visit via Zoom audio and video due to the COVID-19 pandemic.  Patient is accepting of and agreeable to visit.  The visit consisted of the patient and I. The patient is at home, and I am at the office.  Interview:  149. Chart review: DEXA scan this month.  Normal in the lumbar spine and hips, it is decreased by 0.9% in the femoral necks compared to 2007.  150. \"Yesterday had a breakdown and cried for hours.\"  a. Otherwise has been doing very well.  b. Still utilizing lorazepam once a day.  c. Has done TMS for what sounds like about a week and a half.  No major changes that she can identify.  d. Sleeping well  e. Still some uncontrolled worrying, irritability, restlessness.  151. Medication compliant: Yes  152. No SI HI AVH.      1/17: Virtual visit via Zoom audio and video due to the COVID-19 pandemic.  Patient is accepting of and agreeable to visit.  The visit consisted of the patient and I.  Interview:  153. Chart review: No new chart developments since January 3.  EKG in October reveals 67, sinus, .  154. \"Doing even better than I was 2 weeks ago.\"  a. Still has mood swings, but not nearly as bad as before.  b. Have not needed to use lorazepam most days.  c. Starting TMS this week.  155. Depression/Mood: much better  156. Anxiety: much better  157. Refills: n  158. Sleeping: well  159. Eating: stable  160. Substances: n  161. Therapy: " "continuing  162. Medication compliant: y  163. No SI HI AVH.      1/3/22: Virtual visit via Zoom audio and video due to the COVID-19 pandemic.  Patient is accepting of and agreeable to visit.  The visit consisted of the patient and I.  Interview:  164. Chart review: No new chart developments since December 17.  165. \"Doing much better.\"  a. Sleeping well at night.  b. Some anxiety sometimes, but has days where she doesn't have anxiety.  c. No panic attacks  d. No crying spells.  e. Some days doesn't take lorazepam, other times takes a half a pill; when she does, it works very well.  f. Some anxiety looking up TMS last night, took a lorazepam and felt much better.   g. No holiday disruption at all. Handled the holidays well.   h. Approved for TMS.   166. Depression/Mood:  1. Depressed mood: much better  2. Seasonal pattern: denies  3. Severity: mild  4. Insomnia: denies  5. Duration: months  167. Anxiety:  1. Uncontrolled worrying: y  2. Severity: mild  3. Restlessness/feeling on edge: y  4. Irritability: y  5. Insomnia: n  6. Duration: months  7. Panic attacks: still has them rarely  168. Refills: y  169. Sleeping: y  170. Eating: stable  171. Substances: n  172. Therapy: With Genevieve at [facility] (continuing)  173. Medication compliant: y  174. No SI HI AVH.      6/1: Interview: Continued improvement. Doing well from d and a perspective. Persistent constipation that began with abilify. Still not taking lorazepam as her anxiety is under control. Denies SI HI AVH.  concurs.    9/11/20 H&P: Patient presented today with her . Both provided extensive history regarding her depression and anxiety. Patient had been seen by  at [facility] for roughly a year and a half. Patient reported that Dr. Gordon had become convinced that she had bipolar disorder, which both she and her  disagree with. Patient and  deny any episodes in the past where she experienced, for an extended length of time lasting at " "least several days, no need for sleep, rapid speech, risk-taking behaviors. Per the  and wife pair, her previous psychiatrist insisted on her being on a mood stabilizer.   Patient reports that she had been stable on Celexa for \"several years.\" In 2019, January, the patient underwent a colonoscopy where dysplasia was revealed. This led to significant anxiety and a \"breakdown.\" The dysplasia was subsequently removed. Due to the heightened anxiety the patient's psychiatrist took the patient off of Celexa and started her on Lexapro. The patient did not tolerate Lexapro well, she continued to remain anxious, she began to engage in therapy in addition to medication management. The Lexapro was subsequently switched to Viibryd. The Viibryd did not work. The patient also began to experience panic attacks including shortness of breath, crying, tachycardia, palpitations. The panic attacks were new and had never happened before.   In January 2020 the patient experienced another breakdown and sunk into a deeper depression. The COVID-19 pandemic only worsened her situation. In February the patient's , a physician, decided to become her full-time caretaker as she would become anxious and panicky without him present. The patient also experienced intermittent bouts of suicidal ideation.   Recently, the patient and  demanded of their psychiatrist to take her off of Viibryd and Seroquel. They actually tapered her off of Seroquel themselves. They asked him to switch her back to Celexa. She has been on Celexa now for the last 3 days and is already begun to experience improvement. She states that the Seroquel led to having hallucinations and actually worsened insomnia. Last night she did not take Seroquel for the first time in many months and she slept very well. Her mood is slightly improved. Regarding her anxiety, she endorses muscle tension and fatigue restlessness irritability and a history of insomnia. Regarding " her depression she denies SI HI AVH, denies anhedonia denies guilt, notes some decreased energy, psychomotor retardation, and a history of insomnia. They had also tried acupuncture in the past with some success. Psychiatric review of systems is negative for psychosis nancy, positive for anxiety and depression. Possibly positive for  depression and postpartum depression. The patient is medication compliant.       Past Psychiatric History:  Began Psychiatric Treatment: Several years   Dx: Depression and anxiety   Psychiatrist: Doctor Gordon for the last year and a half   Therapist: Sees a therapist at [facility] History: Denies, Although she came close to needing admission recently.   Medication Trials: See HPI. Seroquel, Prozac which was too activating, Zoloft which was too activating, Celexa which worked well, Effexor which worked well, Lexapro which did not work, Viibryd which did not work.   Self-Harm: Denies   Suicide Attempts: Denies   OB/GYN HISTORY:  Sexually Active: OB/GYN history deferred   Substance Abuse History:  Types: Denies all, including illicit   Social History:  Marital Status:    Employed: No     Kids: 4   House: House    Hx: Denies   Family History:  Suicide Attempts: Deferred today, Due to lack of time   Suicide Completions: Deferred   Substance Use: Deferred   Psychiatric Conditions: Deferred    depression, psychosis, anxiety: Deferred   Developmental History:  Born: Deferred   Siblings: Deferred   Access to Weapons: Denies   Thought Content: no suicidal ideation, no homicidal ideation, no auditory hallucinations, no visual hallucinations, and     PHQ-9 Depression Screening  PHQ-9 Total Score:      Little interest or pleasure in doing things?     Feeling down, depressed, or hopeless?     Trouble falling or staying asleep, or sleeping too much?     Feeling tired or having little energy?     Poor appetite or overeating?     Feeling bad about yourself - or  that you are a failure or have let yourself or your family down?     Trouble concentrating on things, such as reading the newspaper or watching television?     Moving or speaking so slowly that other people could have noticed? Or the opposite - being so fidgety or restless that you have been moving around a lot more than usual?     Thoughts that you would be better off dead, or of hurting yourself in some way?     PHQ-9 Total Score       FAREED-7       Past Surgical History:  Past Surgical History:   Procedure Laterality Date   • COLONOSCOPY  [id number]/[id number]    last scope 2020   • COLONOSCOPY N/A 10/29/2021    Procedure: COLONOSCOPY with possible biopies.;  Surgeon: Skyler Fuller MD;  Location: [facility] ENDOSCOPY;  Service: General;  Laterality: N/A;   • KNEE SURGERY  2017    broken knee       Problem List:  Patient Active Problem List   Diagnosis   • Anxiety   • Depression   • History of fracture of patella   • Hyperlipemia   • Osteopenia   • Ulcerative lesion   • Constipation   • History of colonic polyps   • Tinnitus of right ear   • Sensorineural hearing loss (SNHL) of both ears       Allergy:   No Known Allergies     Discontinued Medications:  Medications Discontinued During This Encounter   Medication Reason   • gabapentin (NEURONTIN) 300 MG capsule Reorder   • amitriptyline (ELAVIL) 25 MG tablet Reorder   • ARIPiprazole (Abilify) 2 MG tablet Reorder       Current Medications:   Current Outpatient Medications   Medication Sig Dispense Refill   • amitriptyline (ELAVIL) 25 MG tablet Take 1 tablet by mouth Every Night. 90 tablet 1   • ARIPiprazole (Abilify) 2 MG tablet Take 2 tablets by mouth Daily. 180 tablet 1   • atorvastatin (LIPITOR) 20 MG tablet TAKE 1 TABLET BY MOUTH DAILY 90 tablet 1   • Calcium Carb-Cholecalciferol (OYSCO 500 + D) 500-200 MG-UNIT tablet      • cholecalciferol (VITAMIN D3) 25 MCG (1000 UT) tablet Take 1,000 Units by mouth Daily.     • Coenzyme Q10 (CO Q10 PO) Take  by mouth.     • folic acid  (FOLVITE) 1 MG tablet Take 1 tablet by mouth Daily. 90 tablet 1   • [START ON 1/25/2023] gabapentin (NEURONTIN) 300 MG capsule 300 mg qam, 300 mg qpm, 600 mg qhs 120 capsule 5   • GARLIC PO garlic 1,000 mg oral capsule take 1 capsule by oral route daily   Active     • LORazepam (ATIVAN) 1 MG tablet Take 1 tablet by mouth 2 (Two) Times a Day As Needed for Anxiety. for anxiety 60 tablet 5   • Multiple Vitamins-Iron (MULTIVITAMIN PLUS IRON ADULT PO) Women's Multivitamin 18 mg iron-400 mcg-500 mg oral tablet take 1 tablet by oral route daily   Active     • Omega-3 Fatty Acids (fish oil) 1000 MG capsule capsule 3,000 mg 3 (Three) Times a Day With Meals. PT(PATIENT) REPORTS SHE TAKES MEDICATION MORNING/LUNCH/EVENING     • Probiotic Product (PROBIOTIC PO) Take  by mouth.     • vitamin E 400 UNIT capsule vitamin E 400 unit oral capsule take 1 capsule by oral route daily   Active     • escitalopram (LEXAPRO) 10 MG tablet TAKE 1 TABLET BY MOUTH DAILY 90 tablet 1     No current facility-administered medications for this visit.       Past Medical History:  Past Medical History:   Diagnosis Date   • Acid reflux 2022   • Anxiety    • Depression    • Fracture of patella 09/12/2017   • Hyperlipemia    • Osteopenia    • Panic disorder    • Psychiatric inpatient 07/22/2022    Discharged 07/22/2022 for Anxiety, Depression, and Suicidal Bx's       Social History     Socioeconomic History   • Marital status:    Tobacco Use   • Smoking status: Never   • Smokeless tobacco: Never   Vaping Use   • Vaping Use: Never used   Substance and Sexual Activity   • Alcohol use: Not Currently   • Drug use: Never   • Sexual activity: Not Currently         Family History   Problem Relation Age of Onset   • Stroke Mother    • Heart disease Mother    • No Known Problems Father    • No Known Problems Sister    • Cancer Brother    • No Known Problems Maternal Aunt    • No Known Problems Paternal Aunt    • No Known Problems Maternal Uncle    • No  "Known Problems Paternal Uncle    • No Known Problems Maternal Grandfather    • No Known Problems Maternal Grandmother    • No Known Problems Paternal Grandfather    • No Known Problems Paternal Grandmother    • No Known Problems Cousin    • Seizures Son    • No Known Problems Other    • ADD / ADHD Neg Hx    • Alcohol abuse Neg Hx    • Anxiety disorder Neg Hx    • Bipolar disorder Neg Hx    • Dementia Neg Hx    • Depression Neg Hx    • Drug abuse Neg Hx    • OCD Neg Hx    • Paranoid behavior Neg Hx    • Schizophrenia Neg Hx    • Self-Injurious Behavior  Neg Hx    • Suicide Attempts Neg Hx      Review of Systems:  Review of Systems   Constitutional: Negative for diaphoresis and fatigue.   HENT: Negative for drooling.    Eyes: Negative for visual disturbance.   Respiratory: Negative for cough and shortness of breath.    Cardiovascular: Negative for chest pain, palpitations and leg swelling.   Gastrointestinal: Negative for diarrhea, nausea and vomiting.   Endocrine: Negative for cold intolerance and heat intolerance.   Genitourinary: Negative for difficulty urinating.   Musculoskeletal: Negative for joint swelling.   Allergic/Immunologic: Negative for immunocompromised state.   Neurological: Negative for dizziness, seizures, syncope, speech difficulty, light-headedness, numbness and headaches.         · Mental Status Exam  · Appearance  · : sitting in a chair with her , good eye contact, normal street clothes, groomed, in person  · Behavior  · : pleasant and cooperative  · Motor  · : No abnormal  · Speech  · : normal rhythm, rate, volume, tone, not hyperverbal, not pressured, normal prosidy, Speaks with an accent  · Mood  · : \"still stable\"  · Affect  · : nearly euthymic, mood congruent, fair variability  · Thought Content  · : negative suicidal ideations, negative homicidal ideations, negative obsessions  · Perceptions  · : negative auditory hallucinations, negative visual hallucinations  · Thought Process  · : " "linear  · Insight/Judgement  · : fair/fair  · Cognition  · : grossly intact  · Attention  · : intact      Physical Exam:  Physical Exam    Vital Signs:   /62   Ht 162.6 cm (64\")   Wt 67.7 kg (149 lb 3.2 oz)   BMI 25.61 kg/m²      Lab Results:   Admission on 08/20/2022, Discharged on 08/20/2022   Component Date Value Ref Range Status   • SARS Antigen 08/20/2022 Not Detected  Not Detected, Presumptive Negative Final   • Internal Control 08/20/2022 Passed  Passed Final   • Lot Number 08/20/2022 [id number]   Final   • Expiration Date 08/20/2022 92,[id number]   Final   • Rapid Influenza A Ag 08/20/2022 Negative  Negative Final   • Rapid Influenza B Ag 08/20/2022 Negative  Negative Final   • Internal Control 08/20/2022 Passed  Passed Final   • Lot Number 08/20/2022 [id number]   Final   • Expiration Date 08/20/2022 [id number]   Final   • COVID19 08/20/2022 Not Detected  Not Detected - Ref. Range Final   Lab on 08/15/2022   Component Date Value Ref Range Status   • OmegaCheck 08/15/2022 5.6  >5.4 % by wt Final    Relative Risk: LOW  Increasing blood levels of long-chain n-3 fatty acids are  associated with a lower risk of sudden cardiac death (1).  Based on the top (75th percentile) and bottom (25th  percentile) quartiles of the CHL reference population, the  following risk categories were established for OmegaCheck:  A cut-off of >=5.5% by wt defines a population at low  relative risk, 3.8-5.4% by wt defines a population at  moderate relative risk, and <=3.7% by wt defines a  population at high relative risk of sudden cardiac death.  The totality of the scientific evidence demonstrates that  when consumption of fish oils is limited to 3 g/day or less  of EPA and DHA, there is no significant risk for increased  bleeding time beyond the normal range. A daily dosage of 1  gram of EPA and DHA lowers the circulating triglycerides by  about 7-10% within 2 to 3 weeks. (Reference: 1-Esteban carey al.  NEJ. 2002; 346: 6607-3651).   • " Arachidonic Acid/EPA Ratio 08/15/2022 10.1  3.7 - 40.7 Final   • Omega-6/Omega-3 Ratio 08/15/2022 6.6  3.7 - 14.4 Final   • Omega-3 Total 08/15/2022 5.6  % by wt Final   • EPA 08/15/2022 1.4  0.2 - 2.3 % by wt Final   • DPA 08/15/2022 1.1  0.8 - 1.8 % by wt Final   • DPA 08/15/2022 3.1  1.4 - 5.1 % by wt Final   • Omega-6 Total 08/15/2022 37.2  % by wt Final    [facility] measures a number of omega-6 fatty acids  with AA and LA being the two most abundant forms reported.   • Arachidonic Acid, C20:4w6 08/15/2022 14.1  8.6 - 15.6 % by wt Final   • Linoleic Acid, C18:2w6 08/15/2022 20.0  18.6 - 29.5 % by wt Final    This test is performed by a Liquid Chromatography-Tandem  Mass Spectrometry (LC/MS/MS) method. This test was developed  and its performance characteristics determined by the  [facility] It has not been cleared or approved  by the U.S. FDA. The [facility] is regulated under  Clinical Laboratory Improvement Amendments (CLIA) as  qualified to perform high-complexity testing. This test is  used for clinical purposes.  It should not be regarded as  investigational or for research.   • Vitamin B-12 08/15/2022 402  211 - 946 pg/mL Final   • Folate 08/15/2022 >20.00  4.78 - 24.20 ng/mL Final   • 25 Hydroxy, Vitamin D 08/15/2022 56.8  30.0 - 100.0 ng/ml Final   • Ferritin 08/15/2022 272.00 (H)  13.00 - 150.00 ng/mL Final   • Magnesium 08/15/2022 2.3  1.6 - 2.4 mg/dL Final   • Glucose 08/15/2022 96  65 - 99 mg/dL Final   • BUN 08/15/2022 13  8 - 23 mg/dL Final   • Creatinine 08/15/2022 0.75  0.57 - 1.00 mg/dL Final   • Sodium 08/15/2022 138  136 - 145 mmol/L Final   • Potassium 08/15/2022 4.2  3.5 - 5.2 mmol/L Final   • Chloride 08/15/2022 101  98 - 107 mmol/L Final   • CO2 08/15/2022 26.4  22.0 - 29.0 mmol/L Final   • Calcium 08/15/2022 9.6  8.6 - 10.5 mg/dL Final   • Total Protein 08/15/2022 7.0  6.0 - 8.5 g/dL Final   • Albumin 08/15/2022 4.30  3.50 - 5.20 g/dL Final   • ALT (SGPT)  08/15/2022 7  1 - 33 U/L Final   • AST (SGOT) 08/15/2022 11  1 - 32 U/L Final   • Alkaline Phosphatase 08/15/2022 106  39 - 117 U/L Final   • Total Bilirubin 08/15/2022 1.1  0.0 - 1.2 mg/dL Final   • Globulin 08/15/2022 2.7  gm/dL Final   • A/G Ratio 08/15/2022 1.6  g/dL Final   • BUN/Creatinine Ratio 08/15/2022 17.3  7.0 - 25.0 Final   • Anion Gap 08/15/2022 10.6  5.0 - 15.0 mmol/L Final   • eGFR 08/15/2022 87.4  >60.0 mL/min/1.73 Final    National Kidney Foundation and American Society of Nephrology (ASN) Task Force recommended calculation based on the Chronic Kidney Disease Epidemiology Collaboration (CKD-EPI) equation refit without adjustment for race.   • Total Cholesterol 08/15/2022 158  0 - 200 mg/dL Final   • Triglycerides 08/15/2022 101  0 - 150 mg/dL Final   • HDL Cholesterol 08/15/2022 54  40 - 60 mg/dL Final   • LDL Cholesterol  08/15/2022 86  0 - 100 mg/dL Final   • VLDL Cholesterol 08/15/2022 18  5 - 40 mg/dL Final   • LDL/HDL Ratio 08/15/2022 1.55   Final   • Hemoglobin A1C 08/15/2022 5.70 (H)  4.80 - 5.60 % Final   • TSH 08/15/2022 0.826  0.270 - 4.200 uIU/mL Final   • T3, Free 08/15/2022 2.83  2.00 - 4.40 pg/mL Final   • Free T4 08/15/2022 1.14  0.93 - 1.70 ng/dL Final   • DHEA-Sulfate 08/15/2022 65.7  20.4 - 186.6 ug/dL Final   • Testosterone, Total 08/15/2022 8  3 - 67 ng/dL Final   • Testosterone, Free 08/15/2022 0.8  0.0 - 4.2 pg/mL Final   • Estradiol 08/15/2022 <5.0  pg/mL Final   • Progesterone 08/15/2022 0.12  ng/mL Final   • WBC 08/15/2022 6.31  3.40 - 10.80 10*3/mm3 Final   • RBC 08/15/2022 4.74  3.77 - 5.28 10*6/mm3 Final   • Hemoglobin 08/15/2022 13.8  12.0 - 15.9 g/dL Final   • Hematocrit 08/15/2022 42.9  34.0 - 46.6 % Final   • MCV 08/15/2022 90.5  79.0 - 97.0 fL Final   • MCH 08/15/2022 29.1  26.6 - 33.0 pg Final   • MCHC 08/15/2022 32.2  31.5 - 35.7 g/dL Final   • RDW 08/15/2022 14.0  12.3 - 15.4 % Final   • RDW-SD 08/15/2022 46.4  37.0 - 54.0 fl Final   • MPV 08/15/2022 9.7  6.0 -  12.0 fL Final   • Platelets 08/15/2022 299  140 - 450 10*3/mm3 Final   • Neutrophil % 08/15/2022 63.4  42.7 - 76.0 % Final   • Lymphocyte % 08/15/2022 27.7  19.6 - 45.3 % Final   • Monocyte % 08/15/2022 7.6  5.0 - 12.0 % Final   • Eosinophil % 08/15/2022 0.5  0.3 - 6.2 % Final   • Basophil % 08/15/2022 0.5  0.0 - 1.5 % Final   • Immature Grans % 08/15/2022 0.3  0.0 - 0.5 % Final   • Neutrophils, Absolute 08/15/2022 4.00  1.70 - 7.00 10*3/mm3 Final   • Lymphocytes, Absolute 08/15/2022 1.75  0.70 - 3.10 10*3/mm3 Final   • Monocytes, Absolute 08/15/2022 0.48  0.10 - 0.90 10*3/mm3 Final   • Eosinophils, Absolute 08/15/2022 0.03  0.00 - 0.40 10*3/mm3 Final   • Basophils, Absolute 08/15/2022 0.03  0.00 - 0.20 10*3/mm3 Final   • Immature Grans, Absolute 08/15/2022 0.02  0.00 - 0.05 10*3/mm3 Final   • nRBC 08/15/2022 0.0  0.0 - 0.2 /100 WBC Final       EKG Results:  No orders to display       Imaging Results:  No Images in the past 120 days found..      Assessment & Plan   Diagnoses and all orders for this visit:    1. Moderate episode of recurrent major depressive disorder (HCC) (Primary)  -     amitriptyline (ELAVIL) 25 MG tablet; Take 1 tablet by mouth Every Night.  Dispense: 90 tablet; Refill: 1  -     gabapentin (NEURONTIN) 300 MG capsule; 300 mg qam, 300 mg qpm, 600 mg qhs  Dispense: 120 capsule; Refill: 5  -     ARIPiprazole (Abilify) 2 MG tablet; Take 2 tablets by mouth Daily.  Dispense: 180 tablet; Refill: 1    2. Generalized anxiety disorder  -     amitriptyline (ELAVIL) 25 MG tablet; Take 1 tablet by mouth Every Night.  Dispense: 90 tablet; Refill: 1  -     gabapentin (NEURONTIN) 300 MG capsule; 300 mg qam, 300 mg qpm, 600 mg qhs  Dispense: 120 capsule; Refill: 5    3. Panic disorder  -     amitriptyline (ELAVIL) 25 MG tablet; Take 1 tablet by mouth Every Night.  Dispense: 90 tablet; Refill: 1    4. Panic attacks  -     amitriptyline (ELAVIL) 25 MG tablet; Take 1 tablet by mouth Every Night.  Dispense: 90 tablet;  Refill: 1  -     gabapentin (NEURONTIN) 300 MG capsule; 300 mg qam, 300 mg qpm, 600 mg qhs  Dispense: 120 capsule; Refill: 5    5. Insomnia due to mental condition  -     amitriptyline (ELAVIL) 25 MG tablet; Take 1 tablet by mouth Every Night.  Dispense: 90 tablet; Refill: 1  -     gabapentin (NEURONTIN) 300 MG capsule; 300 mg qam, 300 mg qpm, 600 mg qhs  Dispense: 120 capsule; Refill: 5        Visit Diagnoses:    ICD-10-CM ICD-9-CM   1. Moderate episode of recurrent major depressive disorder (HCC)  F33.1 296.32   2. Generalized anxiety disorder  F41.1 300.02   3. Panic disorder  F41.0 300.01   4. Panic attacks  F41.0 300.01   5. Insomnia due to mental condition  F51.05 300.9     327.02       INITIAL presentation most consistent with major depressive disorder in partial remission, FAREED, rare panic attacks.    1/20: Well, stable, no changes. 17 minutes of supportive psychotherapy with goal to strengthen defenses, promote problems solving, restore adaptive functioning and provide symptom relief. The therapeutic alliance was strengthened to encourage the patient to express their thoughts and feelings. Esteem building was enhanced through praise, reassurance, normalizing and encouragement. Coping skills were enhanced to build distress tolerance skills and emotional regulation. Allowed patient to freely discuss issues without interruption or judgement with unconditional positive regard, active listening skills, and empathy. Provided a safe, confidential environment to facilitate the development of a positive therapeutic relationship and encourage open, honest communication. Assisted patient in identifying risk factors which would indicate the need for higher level of care including thoughts to harm self or others and/or self-harming behavior and encouraged patient to contact this office, call 911, or present to the nearest emergency room should any of these events occur. Assisted patient in processing session content;  acknowledged and normalized patient’s thoughts, feelings, and concerns by utilizing a person-centered approach in efforts to build appropriate rapport and a positive therapeutic relationship with open and honest communication. Patient given education on medication side effects, diagnosis/illness and relapse symptoms. Plan to continue supportive psychotherapy in next appointment to provide symptom relief.  Diagnoses: as above  Symptoms: as above  Functional status: good  Mental Status Exam: as above    Treatment plan: Medication management and supportive psychotherapy  Prognosis: good  Progress: well, stable  6 wks      12/9: Significant improvement. No changes. 17 minutes of supportive psychotherapy   Prognosis: good  Progress: improved  6 wks      11/18: Residual anxiety, residual depression.  Increase gabapentin, start amitriptyline at night for initial insomnia.  Close follow-up. 19 minutes of supportive psychotherapy  Treatment plan: Medication management and supportive psychotherapy  Prognosis: Good  Progress: No improvement, residual depression and anxiety insomnia  3 weeks      11/7: Residual anx (at noon), consider adding gabapentin in the mornings. and dep. Increase gabapentin in the afternoons. No other changes. 17 minutes of supportive psychotherapy  Treatment plan: Medication management and supportive psychotherapy  Prognosis: good  Progress: possibly improved, if not stable  2 wks      10/31: Couples therapy, increase Abilify, close follow-up.  I feel that their marriage issues have come to a head recently, and this stressor could largely explain patient's depression and anxiety.  Patient mentioned Celexa again, she is likely interested in switching.  25 minutes of supportive psychotherapy  Treatment plan: Medication management and supportive psychotherapy  Prognosis: Fair to good  Progress: Worsening depression and anxiety related to recent trip to [place]  1 week, highly urgent    9/30: well, stable,  some constriction today. No changes. 17 minutes of supportive psychotherapy  Treatment plan: Medication management and supportive psychotherapy  Prognosis: Good  Progress: Stable, close to goal  4 weeks    8/30: Remarkably well. Initial insomnia. 17 minutes of supportive psychotherapyTreatment plan: Medication management and supportive psychotherapy  Prognosis: good  Progress: much better  4 wks    8/1: Try another mood stabilizer in place of olanzapine.  Will trial Abilify again.  Also consider Geodon.  18 minutes of supportive psychotherapy Treatment plan: Medication management and supportive psychotherapy  Prognosis: Fair to good  Progress: Slightly improved  4 weeks, urgent    6/17: start buspar, doing better. 17 minutes of supportive psychotherapy Treatment plan: Medication management and supportive psychotherapy  Prognosis: good  Progress: improving slowly  4 wks      5/20: Continue Lexapro and lorazepam.  Try Belsomra in place of Ambien.  19 minutes of supportive psychotherapyTreatment plan: Medication management and supportive psychotherapy  Prognosis: Good  Progress: Some progress since last visit  4 weeks urgent    4/22: Some improvement in anx and dep, but still residual symptoms.  Persistent insomnia. Declines IOP, inpatient admission.  Increase Lexapro and start trazodone.  17 minutes of supportive psychotherapy Treatment plan: Medication management and supportive psychotherapy  Prognosis: good  Progress: some, minimal      3/24: Focus on lower doses.  Start Pristiq.  Depression with anxious distress versus depression and generalized anxiety disorder.  She only has distractibility, no other symptoms of hypomania.  17 minutes of supportive psychotherapy  4 weeks    3/15: Continue lurasidone 40 mg at night, increase Ambien to 10 mg at night.  Continue low-dose Lamictal, plan to increase further.  Continue titrating off of amitriptyline.  17 minutes of supportive psychotherapy1 week, urgent    3/10:  Effexor has now been stopped.  Continue Latuda and Lamictal at present doses.  I will see her back in a few days and then at 6 weeks.  Bipolar 2?  If so, how come she is tolerating amitriptyline in the evenings?  19 minutes of supportive psychotherapy 6 weeks    2/15: Still utilizing lorazepam which means the anxiety is not under control.  Increase Effexor.  17 minutes of supportive psychotherapy 4 weeks    1/17: Continued improvement.  No changes, only on effexor 75 mg for 2 weeks.  Tolerating medications without side effects.  17 minutes of supportive psychotherapy   4 wks    1/3: Continued improvement. Increase effexor to target residual dep anx. TMS to start 1/6. 17 minutes of supportive psychotherapy  4 wks        PLAN:  175. Risk Assessment: Risk of self-harm acutely remain low. Risk factors include a history of suicidal ideation, mood disorder, anxiety disorder, presence of psychosocial stressors such as colonic dysplasia, COVID-19 pandemic. Protective factors include no history of self-harm or suicide attempts, good social support, willingness to engage in care, improved depressive symptoms. Risk of self-harm chronically also remain low, but could be further elevated in the event of treatment noncompliance and/or AODA.  176. Medications:   a. CONTINUE lexapro 10 mg daily. Risks, benefits, alternatives discussed with patient including GI upset, nausea vomiting diarrhea, theoretical decrease of seizure threshold predisposing the patient to a slightly higher seizure risk, headaches, sexual dysfunction, serotonin syndrome, bleeding risk, increased suicidality in patients 24 years and younger.  After discussion of these risks and benefits, the patient voiced understanding and agreed to proceed.  b. CONTINUE gabapentin 300 mg qam, 300 qpm, 600 mg nightly. Risks, benefits, alternatives discussed with patient including sedation, dizziness/falls risk, GI upset, weight gain.  After discussion of these risks and  "benefits, the patient voiced understanding and agreed to proceed. UDS ordered, Mamadou reviewed.  c. CONTINUE Abilify 4 mg daily. Risks, benefits, alternatives discussed with patient including increased energy, exacerbation of irritability, akathisia, GI upset, orthostatic hypotension, increased appetite.  After discussion of these risks and benefits, the patient voiced understanding and agreed to proceed.  d. CONTINUE amitriptyline 25 mg nightly. Risks, benefits, alternatives discussed with patient including sedation, dizziness, falls, GI upset, constipation, urinary retention, dry mouth.  After discussion of these risks and benefits, the patient voiced understanding and agreed to proceed.  e. CONTINUE lorazepam 1 mg bid PRN. Risks, benefits, alternatives discussed with patient including GI upset, sedation, dizziness, respiratory depression, falls risk.  After discussion of these risks and benefits, the patient voiced understanding and agreed to proceed.  f. S/P:   i. buspar 5 mg bid was ineffective.  ii. Trazodone 50 mg nightly not helpful for insomnia.  iii. Mirtazapine 15 mg, 7.5 mg: ineffective; made anxiety and depression worse.  iv. Abilify: brain zaps and constipation  v. Seroquel made depression worse  vi. celexa 20 mg daily, ineffective after several months  vii. Doxepin caused \"brain zaps\"  viii. Latuda was ineffective.  ix. Amitriptyline 25 mg nightly helpful for sleep but we didn't want her on too many serotonergic meds.  x. Ambien minimally helpful for insomnia.  xi. Pristiq and effexor worsened anxiety, both low dose.  xii. Stopped lamictal for unclear reasons 25 mg.  xiii. belsomra 5 mg nightly didn't help with insomnia.  177. Therapy: continue with Genevieve at [facility].  Referred for couples therapy 10/2022  a. Status post IOP at [facility] 10/2022  178. Labs/Studies: BMP to monitor sodium levels in February was entirely normal, EKG February shows rate 72, sinus, .  179. Follow Up: 6 " weeks      TREATMENT PLAN/GOALS: Continue supportive psychotherapy efforts and medications as indicated. Treatment and medication options discussed during today's visit. Patient acknowledged and verbally consented to continue with current treatment plan and was educated on the importance of compliance with treatment and follow-up appointments.    MEDICATION ISSUES:  YVETTE reviewed as expected.  Discussed medication options and treatment plan of prescribed medication as well as the risks, benefits, and side effects including potential falls, possible impaired driving and metabolic adversities among others. Patient is agreeable to call the office with any worsening of symptoms or onset of side effects. Patient is agreeable to call 911 or go to the nearest ER should he/she begin having SI/HI. No medication side effects or related complaints today.     MEDS ORDERED DURING VISIT:    Return in about 6 weeks (around 3/3/2023).         This document has been electronically signed by Vinny Krishnamurthy MD  January 20, 2023 09:50 EST       Part of this note may be an electronic transcription/translation of spoken language to printed text using the Dragon Dictation System.

## 2023-03-03 ENCOUNTER — OFFICE VISIT (OUTPATIENT)
Dept: PSYCHIATRY | Facility: CLINIC | Age: 68
End: 2023-03-03
Payer: MEDICARE

## 2023-03-03 VITALS
BODY MASS INDEX: 25.81 KG/M2 | SYSTOLIC BLOOD PRESSURE: 106 MMHG | HEIGHT: 64 IN | WEIGHT: 151.2 LBS | HEART RATE: 88 BPM | DIASTOLIC BLOOD PRESSURE: 58 MMHG

## 2023-03-03 DIAGNOSIS — F41.1 GENERALIZED ANXIETY DISORDER: Primary | ICD-10-CM

## 2023-03-03 DIAGNOSIS — F41.0 PANIC DISORDER: ICD-10-CM

## 2023-03-03 DIAGNOSIS — F51.05 INSOMNIA DUE TO MENTAL CONDITION: ICD-10-CM

## 2023-03-03 DIAGNOSIS — F33.1 MODERATE EPISODE OF RECURRENT MAJOR DEPRESSIVE DISORDER: ICD-10-CM

## 2023-03-03 DIAGNOSIS — F41.0 PANIC ATTACKS: ICD-10-CM

## 2023-03-03 DIAGNOSIS — F33.40 RECURRENT MAJOR DEPRESSIVE DISORDER IN REMISSION: ICD-10-CM

## 2023-03-03 DIAGNOSIS — F41.1 GENERALIZED ANXIETY DISORDER: ICD-10-CM

## 2023-03-03 PROCEDURE — 99214 OFFICE O/P EST MOD 30 MIN: CPT | Performed by: STUDENT IN AN ORGANIZED HEALTH CARE EDUCATION/TRAINING PROGRAM

## 2023-03-03 PROCEDURE — 90833 PSYTX W PT W E/M 30 MIN: CPT | Performed by: STUDENT IN AN ORGANIZED HEALTH CARE EDUCATION/TRAINING PROGRAM

## 2023-03-03 RX ORDER — AMITRIPTYLINE HYDROCHLORIDE 25 MG/1
25 TABLET, FILM COATED ORAL NIGHTLY
Qty: 90 TABLET | Refills: 1 | Status: SHIPPED | OUTPATIENT
Start: 2023-03-03

## 2023-03-03 NOTE — PROGRESS NOTES
"Subjective   Shabana Ferguson is a 67 y.o. female who presents today for follow up    Referring Provider:  Kvng Ramirez APRN  [address]  [address]  [place] [address]    Chief Complaint:  mdd margarita    History of Present Illness:     Shabana Ferguson is a 64 year old /White female, hx of anxiety and depression, fractured patella, HLD, osteopenia, ulcer referred by MARIE Brice, for anxiety and depression management.   Chart: Psychotropic medications: amitriptyline 25 mg QHS, Celexa 20 mg p.o. daily, lorazepam 0.5 p.o. twice daily as needed anxiety.      \"Shabana\" and Jaya    3/3: In person interview:  1. Chart review: No new.  2. Planning: Well, stable, no changes.  3. \"Friend's father .\"  a. 98 yo.  b. \"I'm doing good.\"  c. Once in a while I have trouble sleeping. Mostly I sleep well.  d. Tried to wean down on ativan, but has brain zaps.  e. Jaya: things are doing well.  f. I'm doing things now like pictures  g. Family is doing better. Relationships getting better.   4. Mood/Depression: stable, fairly well  5. Anxiety: some bouts, minimal  6. Panic attacks: controlled on ativan  7. Energy: Stable  8. Concentration: Stable  9. Sleeping: Well  10. Eating: Stable  11. Refills: Yes  12. Substances: Denies  13. Therapy:  a. Their couples therapist left. Referred to someone else, who doesn't take their insurance. Trying [facility]; has an appnt.  14. Medication compliant: Yes  15. No SI HI AVH.      : In person interview:  16. Chart review: No new.    17. Planning: No changes at last visit.  a. Are they still doing couples therapy?  18. \"I'm ok.\"  a. Stable, once in a while I go a little down, but then get back up.  b. Continuing to do couples therapy  19. Mood/Depression: minimal depressed mood  20. Anxiety: sometimes, but controlled  a. Takes half an ativan some nights, otherwise has anxiety in the morning  b. Also has brain zaps at night if doesn't take it -- this is the " "biggest reason  c. Volunteering at [employer]  d. Both are volunteering at [employer]. Also seeing some pts.  21. Panic attacks: n  22. Energy: good  23. Concentration: good  24. Sleeping: well  25. Eatin lb wg   26. Refills: y  27. Substances: n  28. Therapy: individual and couples (every 2 weeks)  29. Medication compliant: y  30. No SI HI AVH.      : In person interview:  31. Chart review: No new.  32. Planning: Increased gabapentin and started amitriptyline at night.  33. \"I'm doing a lot better.\"  a. Sleeping better on amitriptyline  b. Pt wants to get involved in volunteering, [employer], every Tues and Th  34. Mood/Depression: better  35. Anxiety: less worrying  a. Trip was a success; they saw one of the grandchildren they haven't seen in a while. Patient smiled and laughed during this discussion. Cooked for him.  b. Taking less lorazepam  36. Panic attacks: n  37. Energy: improving  38. Concentration: improving  39. Sleeping: well  40. Eating: stable  41. Refills: n  42. Substances: n  43. Therapy: continuing therapy and couples therapy  44. Medication compliant: y  45. No SI HI AVH.      : In person interview:  46. Chart review: Seen by primary care .  PHQ-9 is 14, very difficult.  Feels her mental health is better than it was a year ago, however.  47. Planning: They have started marriage counseling.  Increased gabapentin in the afternoons to 200 mg a day.  Reintroduce amitriptyline in the evenings.  48. \"I'm suffering.\"  a. Monday has a big trip to [place]. Just got back from [place] trip, which \"was traumatic.\"  b. Nicola didn't join them for dinner last night -- upsetting for her.  49. Mood/Depression:  a. Depressed mood  50. Anxiety:  a. Restless, irritable, on edge -- lorazepam works. Starts at noon.  b. Very clingy, don't want to be by myself, throughout the day  51. Panic attacks: Controlled on Ativan  52. Energy: Down  53. Concentration: Still not a " "goal  54. Sleeping: Initial insomnia  55. Eating: Stable weight  56. Refills: Yes  57. Substances: No  58. Therapy: continuing marriage counseling, have to pay out of pocket  59. Medication compliant: y  60. No SI HI AVH.      11/7: In person interview:  61. Chart review: No new.  62. Planning: Increased Abilify and referred to couples therapy a week ago.  63. \"I'm having a hard time to concentrate and sit still.\"  a. Jaya:  i. Couldn't go back to sleep last night  ii. Nicola's move was this past Friday  iii. Moved animals to his house on Saturday  iv. He feels there is less negativity, but she is not sure  64. Mood/Depression:  a. Depressed mood  65. Anxiety:  a. Worrying a lot, very nervous especially in the afternoons  b. Pacing  66. Panic attacks: y, controlled on lorazepam  67. Energy: stable, not at goal  68. Concentration:   a. Some issues with concentration  69. Sleeping:  a. Always wakes at 4-6 am, maintenance insomnia  b. But slept well last night 7-6 am.  70. Eating: 3 lb wl since 10/31  71. Refills:  y  72. Substances: n  73. Therapy: started marriage counseling, next appnt next week  74. Medication compliant: y  75. No SI EMI BALLARDH.      10/31: In person interview:  76. Chart review: No new.  77. Planning: No changes at last visit.  78. \"I feel anxiety in the afternoon.\"  a. Stressors:  i. Nicola, her son, is moving out. Stressful.  ii. Pt wants to end the marriage; they've discussed it numerous times recently.  1. Couples therapy: they've tried in the past, more than once, problems with each therapist (patient)  79. Mood/Depression:  a. Depressed mood in the afternoons, not mornings  bSimona Jaya: trip to [place] was fun but stressful and \"it was in the end a major setback\"  i. Last night was very destructive: sleepless  80. Anxiety:  a. [facility]; recommend taking gabapentin 100 mg in afternoon rather than morning.  i. Because it is costing so much money, they are not going " "back.  b. Worrying, irritable, insomnia, on edge  81. Panic attacks:  a. Yes, in the afternoon, controlled on ativan  82. Energy: down  83. Concentration: poor  84. Sleeping: initial insomnia  85. Eating: 3 lb wg 8/22  86. Refills: No  87. Substances: No  88. Therapy: Yes continuing  89. Medication compliant: y  90. No SI HI AVH.      9/30: In person interview:  91. Chart review: Recently discharged from [facility] outpatient.  a. Patient was depressed from October of last last year to essentially a couple months ago when she started to improve.  Consider light box.  92. Planning: Restarted Ambien at last visit.  Patient was improving at last visit.  Consider light box  93. \"Good.\"  a. I don't need to take the ambien, I just need the gabapentin  b. TMS helping as well, 10 more sessions, \"really helping\"  c. Discussed light box  94. Mood/Depression: stable, well  95. Anxiety:  a. Anxiety in the afternoons, takes lorazepam daily at 6 pm  i. Takes care of the brain zaps  96. Panic attacks: n  97. Sleeping: well on the gabapentin  98. Eating: stable  99. Refills: y  100. Substances:  101. Therapy: IOP completed  a. Very beneficial, \"I never thought I would like it.\" I would recommend it to anyone.  102. Medication compliant: y  103. No SI HI AVH.      8/30: In person interview:  104. Chart review: Seen by urgent care August 20 for back pain.  COVID-negative.  Also had some cervical adenopathy. EEG is abnormal.  105. Planning: Trial of Abilify appears to be working well.  This is according to the conversation I had with both of them on August 8.  106. \"I'm better... I feel good when I get up in the morning.\"  a. P1, G6  b. Doing TMS  c. Then IOP.  d. The problem I still have is sleeping.  e. Initial insomnia for 2 hours each night.  f. Afternoons, gets anxious. Uses ativan rarely. Use has gone down dramatically.  g. Prescribed gabapentin by [facility]. Helping her anxiety.  h. Jaya \"quality of life has " "gone WAY up.\"  i. Sees neurology in early September.  107. Mood/Depression: improved  108. Anxiety: improved  109. Panic attacks: rare  110. Eating: stable  111. Refills: n  112. Substances: n  113. Therapy: def  114. Medication compliant: y  115. No SI [facility].      8/1: In person interview:  116. Chart review: Patient admitted to the [facility] July 18.  Awaiting records.  117. Planning: This is a follow-up appointment after her admission to the [facility].  118. \" I am better.\"  a. Patient reported she was extremely afraid initially of being admitted, however admission was a very \"positive\" experience for her, and \"I felt at peace.\"  b. Could not tolerate olanzapine 2.5 mg nightly as it gave her nightmares.  She has stopped this medication.  c. She has set up intensive outpatient at [facility], has already gone to [facility] last week, and also has TMS scheduled.  d. Per her , \"the crying spells have stopped.\"  119. Mood/Depression: Slightly improved, still has days of depression.  Reports that some days she is perfect, then other days she becomes very depressed and anxious, but lorazepam helps at that time.  120. Panic attacks: Still has occasionally  121. Sleeping: Still has some trouble sleeping, but recently has been using delta 8 to sleep  122. Eating: Stable, now eating  123. Refills: No  124. Substances: No  125. Therapy: Continuing  126. Medication compliant: Yes  127. No SI HI AV.      6/17: In person interview:  128. Chart review: Seen by primary care June 9.  Apparently experiencing multiple side effects from anxiety and depression medications.  Looks like the results of the testing are not associated with an increased risk for hyper homocystinemia (MTHFR gene assay).  129. Planning: Belsomra was too expensive.  130. \"All in all I'm better.\"  a. Still has nervous stomach; getting help with that from PCP.  b. Sleeping better  c. Taking ativan in the am consistently and also at night to sleep " "(rarely).  d. Low mood in afternoon with crying spells  131. Mood/Depression: stable   132. Anxiety: under control  133. Sleeping: well  134. Eating: stable  135. Refills: n  136. Substances: n  137. Therapy: has a counselor at [facility] now, plus [facility]  138. Medication compliant: y  139. No SI HI AVH.      5/20: In person interview:  140. Chart review: Patient presented in the emergency room for abdominal pain, she did not want a wait so she left.  Saw primary care for abdominal pain which patient believes is related to starting Pristiq a month ago.  Despite having stopped it long ago, she continues to have abdominal symptoms.  Somatization?  141. Planning: Try Belsomra for insomnia.  142. \" I am getting better.\"  a.  agrees.  Patient is becoming more functional.  b. Depression and anxiety have improved.  Still residual symptoms.  She has only been on the higher dose of Lexapro for about 4 weeks ago.  c. Still having trouble with insomnia but it is controlled on Ambien most nights.  Some nights it is not.  Some nights she does not take Ambien.  d. Utilizing half a milligram of lorazepam twice daily most days.  e. More active around the house.  f. Famotidine is helping with abdominal pain.  143. Refills: Yes  144. Substances: No  145. Therapy: Deferred  146. Medication compliant: Yes  147. No SI HI AVH.      4/22: In person interview:  Chart review: 4/11: I called the patient to check in on her progress on lexapro. \"I'm doing better than I was before.\" Sleeping well. Taking nyquil to sleep. Utilizing lorazepam frequently. Has been on lexapro for 10 days. She understands that she needs to give it more time. More active. Has no appetite, however. I mentioned to her the importance of moving to a higher level of care (IOP, inpt). Pt voiced understanding and agreed to proceed. Jaya: crying episodes have stopped. Better energy.  148. \"I can't sleep.\"  a. Persistent insomnia, anxious before bed, unclear why. Has " "tried ambien which sometimes helps. Also over the counter meds.   b. Irritable, restless, worrying.   c. Still depressed, but this has improved  d. Compliant on lexapro  e. Still having panic attacks.  Utilizing lorazepam.  f. Per , she has made significant improvement over the last few months, but still has a way to go.  Patient agrees.  g. Declines IOP, inpatient admission  149. Therapy: Deferred  150. Medication compliant: To some extent yes  151. No SI HI AVH.      3/24: In person interview: With her   152. Chart review: Patient is now discontinued all medications except for Lamictal 25 mg a day.  I started her on doxepin in the evenings for insomnia on March 21.  153. \"I think I need lower doses.\"  a. Persistent crying spells, and anxiety. Utilizing ativan.  b. Doxepin is helping with insomnia.  c. Patient reports that she knows of someone in her 70s who struggled with depression for years until they came up with a solution.  This gave the patient hope.  She noted that the patient was on very low doses of Celexa and other psychotropic medication.  154. Medication compliant: y  155. No SI HI AVH.      3/15: In person interview: With her   1. Chart review: Patient had another panic attack 3/14 morning.  I advised her to utilize the Ativan liberally as she is not always using it.  Compliant on Latuda 40 mg a day.  She notes that there are some periods where she feels \"completely normal.\"  Then other times, she completely breaks down and has depression and anxiety. Having trouble sleeping for the last 4 days.  Target this with Ambien 5 mg nightly. Risks, benefits, side effects discussed with patient including sedation, dizziness, GI upset, hallucinations, sleepwalking, sleep-eating.  After discussion of these risks and benefits, the patient voiced understanding and agreed to proceed. Continue Latuda and Lamictal. Start titrating off of amitriptyline by taking half a tab, 12.5 mg in other words, " "nightly.  156. \" The Ambien kept me asleep for a couple hours.\"  a. Patient slept for two hours, got up, ate something, and did some reading.  Then went back to sleep.  Notes that she did not feel anxious during this time.  b. When she woke the next morning she felt very tired.  c. Utilizing Ativan more liberally to prevent panic attacks.  d. Some limitation today about how her family is distancing themselves from her due to her depression.  Feels like they should be more supportive.  e. Wants to switch to a different therapist.  She understands that we will have a therapist start next month.  Would like to wait until that time.  Problems with connecting to her present therapist.  157. Therapy: Continuing  158. Medication compliant: Yes  159. No SI HI AVH.      3/10: In person: With her   Interview:  160. Chart review: We have reduced the dose of Effexor to 37.5 mg a day, and started Lamictal 25 mg daily.  Continuing Latuda at its present dose of 20 mg a day.  October labs show reassuring CMP, CBC.  161. \" I stopped the Effexor.\"  a. Patient states she is already feeling better.   agrees.  b. Did not take Effexor this morning and is not experiencing panic and anxiety in the afternoon like she has been for the past several days.  c. Tolerating the Lamictal without side effects.  d. No crying spells  e. Affect is still depressed  162. Medication compliant: Yes  163. No SI HI AVH.      2/15:Virtual visit via Zoom audio and video due to the COVID-19 pandemic.  Patient is accepting of and agreeable to visit.  The visit consisted of the patient and I. The patient is at home, and I am at the office.  Interview:  164. Chart review: DEXA scan this month.  Normal in the lumbar spine and hips, it is decreased by 0.9% in the femoral necks compared to 2007.  165. \"Yesterday had a breakdown and cried for hours.\"  a. Otherwise has been doing very well.  b. Still utilizing lorazepam once a day.  c. Has done TMS for " "what sounds like about a week and a half.  No major changes that she can identify.  d. Sleeping well  e. Still some uncontrolled worrying, irritability, restlessness.  166. Medication compliant: Yes  167. No SI HI AVH.      1/17: Virtual visit via Zoom audio and video due to the COVID-19 pandemic.  Patient is accepting of and agreeable to visit.  The visit consisted of the patient and I.  Interview:  168. Chart review: No new chart developments since January 3.  EKG in October reveals 67, sinus, .  169. \"Doing even better than I was 2 weeks ago.\"  a. Still has mood swings, but not nearly as bad as before.  b. Have not needed to use lorazepam most days.  c. Starting TMS this week.  170. Depression/Mood: much better  171. Anxiety: much better  172. Refills: n  173. Sleeping: well  174. Eating: stable  175. Substances: n  176. Therapy: continuing  177. Medication compliant: y  178. No SI HI AVH.      1/3/22: Virtual visit via Zoom audio and video due to the COVID-19 pandemic.  Patient is accepting of and agreeable to visit.  The visit consisted of the patient and I.  Interview:  179. Chart review: No new chart developments since December 17.  180. \"Doing much better.\"  a. Sleeping well at night.  b. Some anxiety sometimes, but has days where she doesn't have anxiety.  c. No panic attacks  d. No crying spells.  e. Some days doesn't take lorazepam, other times takes a half a pill; when she does, it works very well.  f. Some anxiety looking up TMS last night, took a lorazepam and felt much better.   g. No holiday disruption at all. Handled the holidays well.   h. Approved for TMS.   181. Depression/Mood:  1. Depressed mood: much better  2. Seasonal pattern: denies  3. Severity: mild  4. Insomnia: denies  5. Duration: months  182. Anxiety:  1. Uncontrolled worrying: y  2. Severity: mild  3. Restlessness/feeling on edge: y  4. Irritability: y  5. Insomnia: n  6. Duration: months  7. Panic attacks: still has them " "rarely  183. Refills: y  184. Sleeping: y  185. Eating: stable  186. Substances: n  187. Therapy: With Genevieve at [facility] (continuing)  188. Medication compliant: y  189. No SI HI AVH.      6/1: Interview: Continued improvement. Doing well from d and a perspective. Persistent constipation that began with abilify. Still not taking lorazepam as her anxiety is under control. Denies SI HI AVH.  concurs.    9/11/20 H&P: Patient presented today with her . Both provided extensive history regarding her depression and anxiety. Patient had been seen by  at [facility] at [facility] for roughly a year and a half. Patient reported that Dr. Gordon had become convinced that she had bipolar disorder, which both she and her  disagree with. Patient and  deny any episodes in the past where she experienced, for an extended length of time lasting at least several days, no need for sleep, rapid speech, risk-taking behaviors. Per the  and wife pair, her previous psychiatrist insisted on her being on a mood stabilizer.   Patient reports that she had been stable on Celexa for \"several years.\" In 2019, January, the patient underwent a colonoscopy where dysplasia was revealed. This led to significant anxiety and a \"breakdown.\" The dysplasia was subsequently removed. Due to the heightened anxiety the patient's psychiatrist took the patient off of Celexa and started her on Lexapro. The patient did not tolerate Lexapro well, she continued to remain anxious, she began to engage in therapy in addition to medication management. The Lexapro was subsequently switched to Viibryd. The Viibryd did not work. The patient also began to experience panic attacks including shortness of breath, crying, tachycardia, palpitations. The panic attacks were new and had never happened before.   In January 2020 the patient experienced another breakdown and sunk into a deeper depression. The COVID-19 pandemic only worsened her situation. In " February the patient's , a physician, decided to become her full-time caretaker as she would become anxious and panicky without him present. The patient also experienced intermittent bouts of suicidal ideation.   Recently, the patient and  demanded of their psychiatrist to take her off of Viibryd and Seroquel. They actually tapered her off of Seroquel themselves. They asked him to switch her back to Celexa. She has been on Celexa now for the last 3 days and is already begun to experience improvement. She states that the Seroquel led to having hallucinations and actually worsened insomnia. Last night she did not take Seroquel for the first time in many months and she slept very well. Her mood is slightly improved. Regarding her anxiety, she endorses muscle tension and fatigue restlessness irritability and a history of insomnia. Regarding her depression she denies SI HI AVH, denies anhedonia denies guilt, notes some decreased energy, psychomotor retardation, and a history of insomnia. They had also tried acupuncture in the past with some success. Psychiatric review of systems is negative for psychosis nancy, positive for anxiety and depression. Possibly positive for  depression and postpartum depression. The patient is medication compliant.       Past Psychiatric History:  Began Psychiatric Treatment: Several years   Dx: Depression and anxiety   Psychiatrist: Doctor Gordon for the last year and a half   Therapist: Sees a therapist at [facility] History: Denies, Although she came close to needing admission recently.   Medication Trials: See HPI. Seroquel, Prozac which was too activating, Zoloft which was too activating, Celexa which worked well, Effexor which worked well, Lexapro which did not work, Viibryd which did not work.   Self-Harm: Denies   Suicide Attempts: Denies   OB/GYN HISTORY:  Sexually Active: OB/GYN history deferred   Substance Abuse History:  Types: Denies all, including  illicit   Social History:  Marital Status:    Employed: No     Kids: 4   House: House    Hx: Denies   Family History:  Suicide Attempts: Deferred today, Due to lack of time   Suicide Completions: Deferred   Substance Use: Deferred   Psychiatric Conditions: Deferred    depression, psychosis, anxiety: Deferred   Developmental History:  Born: Deferred   Siblings: Deferred   Access to Weapons: Denies   Thought Content: no suicidal ideation, no homicidal ideation, no auditory hallucinations, no visual hallucinations, and     PHQ-9 Depression Screening  PHQ-9 Total Score:      Little interest or pleasure in doing things?     Feeling down, depressed, or hopeless?     Trouble falling or staying asleep, or sleeping too much?     Feeling tired or having little energy?     Poor appetite or overeating?     Feeling bad about yourself - or that you are a failure or have let yourself or your family down?     Trouble concentrating on things, such as reading the newspaper or watching television?     Moving or speaking so slowly that other people could have noticed? Or the opposite - being so fidgety or restless that you have been moving around a lot more than usual?     Thoughts that you would be better off dead, or of hurting yourself in some way?     PHQ-9 Total Score       FAREED-7       Past Surgical History:  Past Surgical History:   Procedure Laterality Date   • COLONOSCOPY      last scope    • COLONOSCOPY N/A 10/29/2021    Procedure: COLONOSCOPY with possible biopies.;  Surgeon: Skyler Fuller MD;  Location: [facility] ENDOSCOPY;  Service: General;  Laterality: N/A;   • KNEE SURGERY  2017    broken knee       Problem List:  Patient Active Problem List   Diagnosis   • Anxiety   • Depression   • History of fracture of patella   • Hyperlipemia   • Osteopenia   • Ulcerative lesion   • Constipation   • History of colonic polyps   • Tinnitus of right ear   • Sensorineural hearing loss (SNHL) of both  ears       Allergy:   No Known Allergies     Discontinued Medications:  Medications Discontinued During This Encounter   Medication Reason   • folic acid (FOLVITE) 1 MG tablet *Therapy completed       Current Medications:   Current Outpatient Medications   Medication Sig Dispense Refill   • amitriptyline (ELAVIL) 25 MG tablet Take 1 tablet by mouth Every Night. 90 tablet 1   • ARIPiprazole (Abilify) 2 MG tablet Take 2 tablets by mouth Daily. 180 tablet 1   • atorvastatin (LIPITOR) 20 MG tablet TAKE 1 TABLET BY MOUTH DAILY 90 tablet 1   • Calcium Carb-Cholecalciferol (OYSCO 500 + D) 500-200 MG-UNIT tablet      • cholecalciferol (VITAMIN D3) 25 MCG (1000 UT) tablet Take 1 tablet by mouth Daily.     • Coenzyme Q10 (CO Q10 PO) Take  by mouth.     • escitalopram (LEXAPRO) 10 MG tablet TAKE 1 TABLET BY MOUTH DAILY 90 tablet 1   • gabapentin (NEURONTIN) 300 MG capsule 300 mg qam, 300 mg qpm, 600 mg qhs 120 capsule 5   • GARLIC PO garlic 1,000 mg oral capsule take 1 capsule by oral route daily   Active     • LORazepam (ATIVAN) 1 MG tablet Take 1 tablet by mouth 2 (Two) Times a Day As Needed for Anxiety. for anxiety 60 tablet 5   • Multiple Vitamins-Iron (MULTIVITAMIN PLUS IRON ADULT PO) Women's Multivitamin 18 mg iron-400 mcg-500 mg oral tablet take 1 tablet by oral route daily   Active     • Omega-3 Fatty Acids (fish oil) 1000 MG capsule capsule 3 capsules 3 (Three) Times a Day With Meals. PT(PATIENT) REPORTS SHE TAKES MEDICATION MORNING/LUNCH/EVENING     • Probiotic Product (PROBIOTIC PO) Take  by mouth.     • vitamin E 400 UNIT capsule vitamin E 400 unit oral capsule take 1 capsule by oral route daily   Active       No current facility-administered medications for this visit.       Past Medical History:  Past Medical History:   Diagnosis Date   • Acid reflux 2022   • Anxiety    • Depression    • Fracture of patella 09/12/2017   • Hyperlipemia    • Osteopenia    • Panic disorder    • Psychiatric inpatient 07/22/2022     Discharged 07/22/2022 for Anxiety, Depression, and Suicidal Bx's       Social History     Socioeconomic History   • Marital status:    Tobacco Use   • Smoking status: Never   • Smokeless tobacco: Never   Vaping Use   • Vaping Use: Never used   Substance and Sexual Activity   • Alcohol use: Not Currently   • Drug use: Never   • Sexual activity: Not Currently         Family History   Problem Relation Age of Onset   • Stroke Mother    • Heart disease Mother    • No Known Problems Father    • No Known Problems Sister    • Cancer Brother    • No Known Problems Maternal Aunt    • No Known Problems Paternal Aunt    • No Known Problems Maternal Uncle    • No Known Problems Paternal Uncle    • No Known Problems Maternal Grandfather    • No Known Problems Maternal Grandmother    • No Known Problems Paternal Grandfather    • No Known Problems Paternal Grandmother    • No Known Problems Cousin    • Seizures Son    • No Known Problems Other    • ADD / ADHD Neg Hx    • Alcohol abuse Neg Hx    • Anxiety disorder Neg Hx    • Bipolar disorder Neg Hx    • Dementia Neg Hx    • Depression Neg Hx    • Drug abuse Neg Hx    • OCD Neg Hx    • Paranoid behavior Neg Hx    • Schizophrenia Neg Hx    • Self-Injurious Behavior  Neg Hx    • Suicide Attempts Neg Hx      Review of Systems:  Review of Systems   Constitutional: Negative for diaphoresis and fatigue.   HENT: Negative for drooling.    Eyes: Negative for visual disturbance.   Respiratory: Negative for cough and shortness of breath.    Cardiovascular: Negative for chest pain, palpitations and leg swelling.   Gastrointestinal: Negative for diarrhea, nausea and vomiting.   Endocrine: Negative for cold intolerance and heat intolerance.   Genitourinary: Negative for difficulty urinating.   Musculoskeletal: Negative for joint swelling.   Allergic/Immunologic: Negative for immunocompromised state.   Neurological: Negative for dizziness, seizures, syncope, speech difficulty,  "light-headedness, numbness and headaches.         · Mental Status Exam  · Appearance  · : sitting in a chair with her , good eye contact, normal street clothes, groomed, in person  · Behavior  · : pleasant and cooperative  · Motor  · : No abnormal  · Speech  · : normal rhythm, rate, volume, tone, not hyperverbal, not pressured, normal prosidy, Speaks with an accent  · Mood  · : \"Good\"  · Affect  · : Baseline: Nearly euthymic, very slight constriction, mood congruent, fair variability  · Thought Content  · : negative suicidal ideations, negative homicidal ideations, negative obsessions  · Perceptions  · : negative auditory hallucinations, negative visual hallucinations  · Thought Process  · : linear  · Insight/Judgement  · : fair/fair  · Cognition  · : grossly intact  · Attention  · : intact      Physical Exam:  Physical Exam    Vital Signs:   /58   Pulse 88   Ht 162.6 cm (64\")   Wt 68.6 kg (151 lb 3.2 oz)   BMI 25.95 kg/m²      Lab Results:   No visits with results within 6 Month(s) from this visit.   Latest known visit with results is:   Admission on 08/20/2022, Discharged on 08/20/2022   Component Date Value Ref Range Status   • SARS Antigen 08/20/2022 Not Detected  Not Detected, Presumptive Negative Final   • Internal Control 08/20/2022 Passed  Passed Final   • Lot Number 08/20/2022 [id number]   Final   • Expiration Date 08/20/2022 92,[id number]   Final   • Rapid Influenza A Ag 08/20/2022 Negative  Negative Final   • Rapid Influenza B Ag 08/20/2022 Negative  Negative Final   • Internal Control 08/20/2022 Passed  Passed Final   • Lot Number 08/20/2022 [id number]   Final   • Expiration Date 08/20/2022 [id number]   Final   • COVID19 08/20/2022 Not Detected  Not Detected - Ref. Range Final       EKG Results:  No orders to display       Imaging Results:  No Images in the past 120 days found..      Assessment & Plan   Diagnoses and all orders for this visit:    1. Generalized anxiety disorder (Primary)    2. Panic " disorder    3. Panic attacks    4. Insomnia due to mental condition    5. Recurrent major depressive disorder in remission ([facility])        Visit Diagnoses:    ICD-10-CM ICD-9-CM   1. Generalized anxiety disorder  F41.1 300.02   2. Panic disorder  F41.0 300.01   3. Panic attacks  F41.0 300.01   4. Insomnia due to mental condition  F51.05 300.9     327.02   5. Recurrent major depressive disorder in remission ([facility])  F33.40 296.35       INITIAL presentation most consistent with major depressive disorder in partial remission, FAREED, rare panic attacks.    3/3: Stable, well, no changes.  17 minutes of supportive psychotherapy with goal to strengthen defenses, promote problems solving, restore adaptive functioning and provide symptom relief. The therapeutic alliance was strengthened to encourage the patient to express their thoughts and feelings. Esteem building was enhanced through praise, reassurance, normalizing and encouragement. Coping skills were enhanced to build distress tolerance skills and emotional regulation. Allowed patient to freely discuss issues without interruption or judgement with unconditional positive regard, active listening skills, and empathy. Provided a safe, confidential environment to facilitate the development of a positive therapeutic relationship and encourage open, honest communication. Assisted patient in identifying risk factors which would indicate the need for higher level of care including thoughts to harm self or others and/or self-harming behavior and encouraged patient to contact this office, call 911, or present to the nearest emergency room should any of these events occur. Assisted patient in processing session content; acknowledged and normalized patient’s thoughts, feelings, and concerns by utilizing a person-centered approach in efforts to build appropriate rapport and a positive therapeutic relationship with open and honest communication. Patient given education on medication side  effects, diagnosis/illness and relapse symptoms. Plan to continue supportive psychotherapy in next appointment to provide symptom relief.  Diagnoses: as above  Symptoms: as above  Functional status: Good  Mental Status Exam: as above    Treatment plan: Medication management and supportive psychotherapy  Prognosis: Good   Progress: Stable, well  6 weeks    1/20: Well, stable, no changes. 17   Prognosis: good  Progress: well, stable  6 wks      12/9: Significant improvement. No changes. 17 minutes of supportive psychotherapy   Prognosis: good  Progress: improved  6 wks      11/18: Residual anxiety, residual depression.  Increase gabapentin, start amitriptyline at night for initial insomnia.  Close follow-up. 19 minutes of supportive psychotherapy  Treatment plan: Medication management and supportive psychotherapy  Prognosis: Good  Progress: No improvement, residual depression and anxiety insomnia  3 weeks      11/7: Residual anx (at noon), consider adding gabapentin in the mornings. and dep. Increase gabapentin in the afternoons. No other changes. 17 minutes of supportive psychotherapy  Treatment plan: Medication management and supportive psychotherapy  Prognosis: good  Progress: possibly improved, if not stable  2 wks      10/31: Couples therapy, increase Abilify, close follow-up.  I feel that their marriage issues have come to a head recently, and this stressor could largely explain patient's depression and anxiety.  Patient mentioned Celexa again, she is likely interested in switching.  25 minutes of supportive psychotherapy  Treatment plan: Medication management and supportive psychotherapy  Prognosis: Fair to good  Progress: Worsening depression and anxiety related to recent trip to [place]  1 week, highly urgent    9/30: well, stable, some constriction today. No changes. 17 minutes of supportive psychotherapy  Treatment plan: Medication management and supportive psychotherapy  Prognosis: Good  Progress: Stable,  close to goal  4 weeks    8/30: Remarkably well. Initial insomnia. 17 minutes of supportive psychotherapyTreatment plan: Medication management and supportive psychotherapy  Prognosis: good  Progress: much better  4 wks    8/1: Try another mood stabilizer in place of olanzapine.  Will trial Abilify again.  Also consider Geodon.  18 minutes of supportive psychotherapy Treatment plan: Medication management and supportive psychotherapy  Prognosis: Fair to good  Progress: Slightly improved  4 weeks, urgent    6/17: start buspar, doing better. 17 minutes of supportive psychotherapy Treatment plan: Medication management and supportive psychotherapy  Prognosis: good  Progress: improving slowly  4 wks      5/20: Continue Lexapro and lorazepam.  Try Belsomra in place of Ambien.  19 minutes of supportive psychotherapyTreatment plan: Medication management and supportive psychotherapy  Prognosis: Good  Progress: Some progress since last visit  4 weeks urgent    4/22: Some improvement in anx and dep, but still residual symptoms.  Persistent insomnia. Declines IOP, inpatient admission.  Increase Lexapro and start trazodone.  17 minutes of supportive psychotherapy Treatment plan: Medication management and supportive psychotherapy  Prognosis: good  Progress: some, minimal      3/24: Focus on lower doses.  Start Pristiq.  Depression with anxious distress versus depression and generalized anxiety disorder.  She only has distractibility, no other symptoms of hypomania.  17 minutes of supportive psychotherapy  4 weeks    3/15: Continue lurasidone 40 mg at night, increase Ambien to 10 mg at night.  Continue low-dose Lamictal, plan to increase further.  Continue titrating off of amitriptyline.  17 minutes of supportive psychotherapy1 week, urgent    3/10: Effexor has now been stopped.  Continue Latuda and Lamictal at present doses.  I will see her back in a few days and then at 6 weeks.  Bipolar 2?  If so, how come she is tolerating  amitriptyline in the evenings?  19 minutes of supportive psychotherapy 6 weeks    2/15: Still utilizing lorazepam which means the anxiety is not under control.  Increase Effexor.  17 minutes of supportive psychotherapy 4 weeks    1/17: Continued improvement.  No changes, only on effexor 75 mg for 2 weeks.  Tolerating medications without side effects.  17 minutes of supportive psychotherapy   4 wks    1/3: Continued improvement. Increase effexor to target residual dep anx. TMS to start 1/6. 17 minutes of supportive psychotherapy  4 wks        PLAN:  190. Risk Assessment: Risk of self-harm acutely remain low. Risk factors include a history of suicidal ideation, mood disorder, anxiety disorder, presence of psychosocial stressors such as colonic dysplasia, COVID-19 pandemic. Protective factors include no history of self-harm or suicide attempts, good social support, willingness to engage in care, improved depressive symptoms. Risk of self-harm chronically also remain low, but could be further elevated in the event of treatment noncompliance and/or AODA.  191. Medications:   a. CONTINUE lexapro 10 mg daily. Risks, benefits, alternatives discussed with patient including GI upset, nausea vomiting diarrhea, theoretical decrease of seizure threshold predisposing the patient to a slightly higher seizure risk, headaches, sexual dysfunction, serotonin syndrome, bleeding risk, increased suicidality in patients 24 years and younger.  After discussion of these risks and benefits, the patient voiced understanding and agreed to proceed.  b. CONTINUE gabapentin 300 mg qam, 300 qpm, 600 mg nightly. Risks, benefits, alternatives discussed with patient including sedation, dizziness/falls risk, GI upset, weight gain.  After discussion of these risks and benefits, the patient voiced understanding and agreed to proceed. LAVERNE troy, Mamadou reviewed.  c. CONTINUE Abilify 4 mg daily. Risks, benefits, alternatives discussed with patient  "including increased energy, exacerbation of irritability, akathisia, GI upset, orthostatic hypotension, increased appetite.  After discussion of these risks and benefits, the patient voiced understanding and agreed to proceed.  d. CONTINUE amitriptyline 25 mg nightly. Risks, benefits, alternatives discussed with patient including sedation, dizziness, falls, GI upset, constipation, urinary retention, dry mouth.  After discussion of these risks and benefits, the patient voiced understanding and agreed to proceed.  e. CONTINUE lorazepam 1 mg bid PRN. Risks, benefits, alternatives discussed with patient including GI upset, sedation, dizziness, respiratory depression, falls risk.  After discussion of these risks and benefits, the patient voiced understanding and agreed to proceed.  f. S/P:   i. buspar 5 mg bid was ineffective.  ii. Trazodone 50 mg nightly not helpful for insomnia.  iii. Mirtazapine 15 mg, 7.5 mg: ineffective; made anxiety and depression worse.  iv. Abilify: brain zaps and constipation  v. Seroquel made depression worse  vi. celexa 20 mg daily, ineffective after several months  vii. Doxepin caused \"brain zaps\"  viii. Latuda was ineffective.  ix. Amitriptyline 25 mg nightly helpful for sleep but we didn't want her on too many serotonergic meds.  x. Ambien minimally helpful for insomnia.  xi. Pristiq and effexor worsened anxiety, both low dose.  xii. Stopped lamictal for unclear reasons 25 mg.  xiii. belsomra 5 mg nightly didn't help with insomnia.  192. Therapy: continue with Genevieve at [facility].  Referred for couples therapy 10/2022  a. Status post IOP at [facility] 10/2022  193. Labs/Studies: BMP to monitor sodium levels in February was entirely normal, EKG February shows rate 72, sinus, .    TREATMENT PLAN/GOALS: Continue supportive psychotherapy efforts and medications as indicated. Treatment and medication options discussed during today's visit. Patient acknowledged and verbally consented to " continue with current treatment plan and was educated on the importance of compliance with treatment and follow-up appointments.    MEDICATION ISSUES:  YVETTE reviewed as expected.  Discussed medication options and treatment plan of prescribed medication as well as the risks, benefits, and side effects including potential falls, possible impaired driving and metabolic adversities among others. Patient is agreeable to call the office with any worsening of symptoms or onset of side effects. Patient is agreeable to call 911 or go to the nearest ER should he/she begin having SI/HI. No medication side effects or related complaints today.     MEDS ORDERED DURING VISIT:    Return in about 6 weeks (around 4/14/2023).         This document has been electronically signed by Vinny Krishnamurthy MD  March 3, 2023 10:40 EST       Part of this note may be an electronic transcription/translation of spoken language to printed text using the Dragon Dictation System.

## 2023-03-03 NOTE — PATIENT INSTRUCTIONS
1.  Please return to clinic at your next scheduled visit.  Contact the clinic ([phone]) at least 24 hours prior in the event you need to cancel.  2.  Do no harm to yourself or others.    3.  Avoid alcohol and drugs.    4.  Take all medications as prescribed.  Please contact the clinic with any concerns. If you are in need of medication refills, please call the clinic at [phone].    5. Should you want to get in touch with your provider, Dr. Vinny Krishnamurthy, please utilize [employer] or contact the office ([phone]), and staff will be able to page Dr. Krishnamurthy directly.  6.  In the event you have personal crisis, contact the following crisis numbers: Suicide Prevention Hotline 1-351.304.3200; [facility] Helpline 6-286-430-GCXO; [facility] Emergency Room [phone]; text HELLO to 004851; or 643.     SPECIFIC RECOMMENDATIONS:     1.      Medications discussed at this encounter:                   -No changes     2.      Psychotherapy recommendations:

## 2023-03-14 ENCOUNTER — OFFICE VISIT (OUTPATIENT)
Dept: ORTHOPEDIC SURGERY | Facility: CLINIC | Age: 68
End: 2023-03-14
Payer: MEDICARE

## 2023-03-14 VITALS — HEIGHT: 64 IN | WEIGHT: 124 LBS | OXYGEN SATURATION: 95 % | HEART RATE: 85 BPM | BODY MASS INDEX: 21.17 KG/M2

## 2023-03-14 DIAGNOSIS — M25.511 RIGHT SHOULDER PAIN, UNSPECIFIED CHRONICITY: ICD-10-CM

## 2023-03-14 DIAGNOSIS — M25.511 RIGHT SHOULDER PAIN, UNSPECIFIED CHRONICITY: Primary | ICD-10-CM

## 2023-03-14 PROCEDURE — 99203 OFFICE O/P NEW LOW 30 MIN: CPT | Performed by: ORTHOPAEDIC SURGERY

## 2023-03-14 RX ORDER — MELOXICAM 15 MG/1
15 TABLET ORAL DAILY
Qty: 30 TABLET | Refills: 1 | Status: SHIPPED | OUTPATIENT
Start: 2023-03-14

## 2023-03-14 NOTE — PROGRESS NOTES
"Chief Complaint  Initial Evaluation and Pain of the Right Upper Arm     Subjective      Shabana Ferguson presents to [facility] for evaluation of right shoulder, upper arm. She reports pain over the last two months. Patient denies injury of the shoulder, pain over the lateral shoulder. She reports a tear of the biceps and underwent conservative treatment with this. She was doing some better but recently has aggravated the shoulder doing some volunteer work.   She has not underwent a recent xray, no injections of this shoulder. She reports pain with movement.     No Known Allergies     Social History     Socioeconomic History   • Marital status:    Tobacco Use   • Smoking status: Never   • Smokeless tobacco: Never   Vaping Use   • Vaping Use: Never used   Substance and Sexual Activity   • Alcohol use: Not Currently   • Drug use: Never   • Sexual activity: Not Currently        Review of Systems     Objective   Vital Signs:   Pulse 85   Ht 162.6 cm (64\")   Wt 56.2 kg (124 lb)   SpO2 95%   BMI 21.28 kg/m²       Physical Exam  Constitutional:       Appearance: Normal appearance. Patient is well-developed and normal weight.   HENT:      Head: Normocephalic.      Right Ear: Hearing and external ear normal.      Left Ear: Hearing and external ear normal.      Nose: Nose normal.   Eyes:      Conjunctiva/sclera: Conjunctivae normal.   Cardiovascular:      Rate and Rhythm: Normal rate.   Pulmonary:      Effort: Pulmonary effort is normal.      Breath sounds: No wheezing or rales.   Abdominal:      Palpations: Abdomen is soft.      Tenderness: There is no abdominal tenderness.   Musculoskeletal:      Cervical back: Normal range of motion.   Skin:     Findings: No rash.   Neurological:      Mental Status: Patient is alert and oriented to person, place, and time.   Psychiatric:         Mood and Affect: Mood and affect normal.         Judgment: Judgment normal.       Ortho Exam  "     Forward elevation 150 degrees, pain with movement, abduction 40 degrees, IR to low back, positive impingement signs, neuro intact, sensation intact, mild tenderness to palpation, mildly positive Neer and Chen, intact elbow ROM      Procedures      Imaging Results (Most Recent)     Procedure Component Value Units Date/Time    XR Scapula Right [[id number]] Resulted: 03/14/23 1452     Updated: 03/14/23 1454         X-Ray Report:  Right scapula X-Ray  Indication: Evaluation of right arm  AP/Lateral view(s)  Findings: reveals no acute fracture, degenerative changes are noted  Prior studies available for comparison: no       Result Review :         No results found.           Assessment and Plan     Diagnoses and all orders for this visit:    1. Right shoulder pain (Primary)  -     XR Scapula Right            Call or return if worsening symptoms.    Follow Up     Discussed conservative treatment with MRI vs injection and exercises. She wished to proceed with exercises and MRI, declined the injection today. She was also given a antiinflammatory prescription .       Patient was given instructions and counseling regarding her condition or for health maintenance advice. Please see specific information pulled into the AVS if appropriate.     Scribed for Kedar Simpson MD by Crystal Finch.  03/14/23   14:44 EDT    I have personally performed the services described in this document as scribed by the above individual and it is both accurate and complete. Kedar Simpson MD 03/16/23

## 2023-03-23 DIAGNOSIS — M25.511 RIGHT SHOULDER PAIN, UNSPECIFIED CHRONICITY: ICD-10-CM

## 2023-03-28 ENCOUNTER — OFFICE VISIT (OUTPATIENT)
Dept: ORTHOPEDIC SURGERY | Facility: CLINIC | Age: 68
End: 2023-03-28
Payer: MEDICARE

## 2023-03-28 VITALS — HEIGHT: 64 IN | WEIGHT: 124 LBS | BODY MASS INDEX: 21.17 KG/M2

## 2023-03-28 DIAGNOSIS — M75.101 TEAR OF RIGHT ROTATOR CUFF, UNSPECIFIED TEAR EXTENT, UNSPECIFIED WHETHER TRAUMATIC: Primary | ICD-10-CM

## 2023-03-28 RX ORDER — DICLOFENAC SODIUM 75 MG/1
75 TABLET, DELAYED RELEASE ORAL 2 TIMES DAILY
Qty: 60 TABLET | Refills: 1 | Status: SHIPPED | OUTPATIENT
Start: 2023-03-28

## 2023-03-28 RX ADMIN — METHYLPREDNISOLONE ACETATE 80 MG: 80 INJECTION, SUSPENSION INTRA-ARTICULAR; INTRALESIONAL; INTRAMUSCULAR; SOFT TISSUE at 16:00

## 2023-03-28 RX ADMIN — LIDOCAINE HYDROCHLORIDE 5 ML: 10 INJECTION, SOLUTION EPIDURAL; INFILTRATION; INTRACAUDAL; PERINEURAL at 16:00

## 2023-03-28 NOTE — PROGRESS NOTES
"Chief Complaint  Pain and Follow-up of the Right Shoulder     Subjective      Shabana Ferguson presents to [facility] for follow up of the right shoulder. She reports pain over the last 4-6  months. Patient denies injury of the shoulder, pain over the lateral shoulder. She reports a tear of the biceps and underwent conservative treatment with this. She was doing some better but recently has aggravated the shoulder doing some volunteer work.       No Known Allergies     Social History     Socioeconomic History   • Marital status:    Tobacco Use   • Smoking status: Never   • Smokeless tobacco: Never   Vaping Use   • Vaping Use: Never used   Substance and Sexual Activity   • Alcohol use: Not Currently   • Drug use: Never   • Sexual activity: Not Currently        Review of Systems     Objective   Vital Signs:   Ht 162.6 cm (64\")   Wt 56.2 kg (124 lb)   BMI 21.28 kg/m²       Physical Exam  Constitutional:       Appearance: Normal appearance. Patient is well-developed and normal weight.   HENT:      Head: Normocephalic.      Right Ear: Hearing and external ear normal.      Left Ear: Hearing and external ear normal.      Nose: Nose normal.   Eyes:      Conjunctiva/sclera: Conjunctivae normal.   Cardiovascular:      Rate and Rhythm: Normal rate.   Pulmonary:      Effort: Pulmonary effort is normal.      Breath sounds: No wheezing or rales.   Abdominal:      Palpations: Abdomen is soft.      Tenderness: There is no abdominal tenderness.   Musculoskeletal:      Cervical back: Normal range of motion.   Skin:     Findings: No rash.   Neurological:      Mental Status: Patient is alert and oriented to person, place, and time.   Psychiatric:         Mood and Affect: Mood and affect normal.         Judgment: Judgment normal.       Ortho Exam      .RIGHT SHOULDER Forward flexion 150. Abduction 40. External rotation 40. Internal rotation to side of hip. Supraspinatus strength 3/5. " Infraspinatus Strength 3/5. Infrared subscap 3/5.Sensation intact to light touch, median, radial, ulnar nerve. Positive AIN, PIN, ulnar nerve motor. Positive pulses. Good strength in triceps, biceps, deltoid, wrist extensors and wrist flexors.       Large Joint RIGHT SHOULDER  Date/Time: 3/28/2023 4:00 PM  Consent given by: patient  Site marked: site marked  Timeout: Immediately prior to procedure a time out was called to verify the correct patient, procedure, equipment, support staff and site/side marked as required   Supporting Documentation  Indications: pain   Procedure Details  Location: shoulder (RIGHT ) -   Needle gauge: 21G.  Medications administered: 5 mL lidocaine PF 1% 1 %; 80 mg methylPREDNISolone acetate 80 MG/ML  Patient tolerance: patient tolerated the procedure well with no immediate complications            Imaging Results (Most Recent)     None           Result Review :     [facility] IMAGING 3/21/23  IMPRESSION:  1. Full-thickness, near fullwidth tear of the supraspinatus tendon, retracted at least 4 cm over the medial third of the humeral head.   Suspected high-grade partial-thickness tearing of the anterior insertional infraspinatus tendon. Mild loss of supraspinatus and   infraspinatus muscle bulk. 2. High-grade longitudinal partial-thickness tearing throughout the long head biceps tendon, involving   greater than 50% thickness. 3. Degeneration/fraying throughout the superior and posterior glenoid labrum. 4. High-grade chondral   thinning of the posterior glenoid and moderate to high-grade chondral thinning of the central humeral head. Small inferior humeral   head marginal osteophyte formation. 5. Small to moderate glenohumeral effusion with mild synovial proliferation. 6. Mild AC joint   arthrosis with a small subacromial spur. Slightly high riding humeral head produces narrowing of the supraspinatus outlet. 7. Mild to   moderate inflammation of the subacromial/subdeltoid bursa.    XR Scapula  Right    Result Date: 3/16/2023  Narrative: X-Ray Report: Right scapula X-Ray Indication: Evaluation of right arm AP/Lateral view(s) Findings: reveals no acute fracture, degenerative changes are noted Prior studies available for comparison: no             Assessment and Plan     Diagnoses and all orders for this visit:    1. Tear of right rotator cuff, unspecified tear extent, unspecified whether traumatic (Primary)        Discussed the treatment plan with the patient. I reviewed the MRI results with the patient. Prescribed anti inflammatory. Prescribed physical therapy. Discussed the risks and benefits of conservative measures.  The patient expressed understanding and wished to proceed with a right shoulder steroid injection.     Call or return if worsening symptoms.    Follow Up     PRN      Patient was given instructions and counseling regarding her condition or for health maintenance advice. Please see specific information pulled into the AVS if appropriate.     Scribed for Kedar Simpson MD by Bouchra Campos MA.  03/28/23   15:07 EDT    I have personally performed the services described in this document as scribed by the above individual and it is both accurate and complete. Kedar Simpson MD 03/30/23

## 2023-03-30 ENCOUNTER — TREATMENT (OUTPATIENT)
Dept: PHYSICAL THERAPY | Facility: CLINIC | Age: 68
End: 2023-03-30
Payer: MEDICARE

## 2023-03-30 DIAGNOSIS — M25.511 RIGHT SHOULDER PAIN, UNSPECIFIED CHRONICITY: Primary | ICD-10-CM

## 2023-03-30 DIAGNOSIS — R29.898 WEAKNESS OF RIGHT UPPER EXTREMITY: ICD-10-CM

## 2023-03-30 DIAGNOSIS — M25.611 DECREASED ROM OF RIGHT SHOULDER: ICD-10-CM

## 2023-03-30 PROCEDURE — 97110 THERAPEUTIC EXERCISES: CPT | Performed by: PHYSICAL THERAPIST

## 2023-03-30 PROCEDURE — 97161 PT EVAL LOW COMPLEX 20 MIN: CPT | Performed by: PHYSICAL THERAPIST

## 2023-03-30 RX ORDER — LIDOCAINE HYDROCHLORIDE 10 MG/ML
5 INJECTION, SOLUTION EPIDURAL; INFILTRATION; INTRACAUDAL; PERINEURAL
Status: COMPLETED | OUTPATIENT
Start: 2023-03-28 | End: 2023-03-28

## 2023-03-30 RX ORDER — METHYLPREDNISOLONE ACETATE 80 MG/ML
80 INJECTION, SUSPENSION INTRA-ARTICULAR; INTRALESIONAL; INTRAMUSCULAR; SOFT TISSUE
Status: COMPLETED | OUTPATIENT
Start: 2023-03-28 | End: 2023-03-28

## 2023-03-30 NOTE — PROGRESS NOTES
Physical Therapy Initial Evaluation and Plan of Care     [address] [place] [id number]    Patient: Shabana Ferguson   : 1955  Diagnosis/ICD-10 Code:  Right shoulder pain, unspecified chronicity [M25.511]  Referring practitioner: Kedar Simpson MD  Date of Initial Visit: 3/30/2023  Today's Date: 3/30/2023  Patient seen for 1 sessions           Subjective Questionnaire: QuickDASH: 27 = 20-39% limitation      Subjective: Pt presents with Right shoulder pain, lifting heavy stuff doing volunteer work a couple months ago and had pain in the shoulder running down to the elbow. MRI revealed torn RTC and retracted so no plans for surgery. Biceps torn, but this has improved since last imaged. Right handed.     MRI results below:  [facility] IMAGING 3/21/23  IMPRESSION:  1. Full-thickness, near fullwidth tear of the supraspinatus tendon, retracted at least 4 cm over the medial third of the humeral head.   Suspected high-grade partial-thickness tearing of the anterior insertional infraspinatus tendon. Mild loss of supraspinatus and infraspinatus muscle bulk.   2. High-grade longitudinal partial-thickness tearing throughout the long head biceps tendon, involving greater than 50% thickness.   3. Degeneration/fraying throughout the superior and posterior glenoid labrum.   4. High-grade chondral thinning of the posterior glenoid and moderate to high-grade chondral thinning of the central humeral head. Small inferior humeral head marginal osteophyte formation.   5. Small to moderate glenohumeral effusion with mild synovial proliferation. 6. Mild AC joint arthrosis with a small subacromial spur. Slightly high riding humeral head produces narrowing of the supraspinatus outlet.   7. Mild to moderate inflammation of the subacromial/subdeltoid bursa.    Pt occupation: retired, volunteers     Current Pain 0/10  Best Pain: 0/10  Worst Pain: 8/10  Quality of pain: sharp, ache     Post treatment pain: 2/10      Aggravating Factors: lifting overhead, dressing, reaching out  Easing Factors: rest, injection     Past Medical Hx: seizures (on medication), osteopenia, anxiety    Patient Goals: Reduce pain in right shoulder.      Objective          Postural Observations    Additional Postural Observation Details  Increased thoracic kyphosis, rounded shoulders bilaterally.    Tenderness     Additional Tenderness Details  Negative for tenderness    Cervical/Thoracic Screen   Cervical range of motion within normal limits with the following exceptions: Stiffness    Active Range of Motion   Left Shoulder   Flexion: 170 degrees   Abduction: 170 degrees   External rotation BTH: T4   Internal rotation BTB: T5     Right Shoulder   Flexion: 115 degrees with pain  Abduction: 90 degrees with pain  External rotation BTH: T1 with pain  Internal rotation BTB: T8 with pain    Passive Range of Motion     Right Shoulder   Flexion: 155 degrees   Abduction: 130 degrees   External rotation 45°: 80 degrees   Internal rotation 45°: 80 degrees     Strength/Myotome Testing     Left Shoulder   Normal muscle strength    Right Shoulder     Planes of Motion   Flexion: 4-   Extension: 4-   Abduction: 4-   External rotation at 0°: 3+   Internal rotation at 0°: 4-   Horizontal abduction: 3+       See Exercise, Manual, and Modality Logs for complete treatment.     Assessment & Plan     Assessment  Impairments: abnormal muscle firing, abnormal or restricted ROM, activity intolerance, impaired physical strength and pain with function  Functional Limitations: carrying objects, lifting, sleeping, reaching behind back and reaching overhead  Assessment details: The patient presents to physical therapy with complaints of right shoulder pain with MRI confirming RTC repair and biceps tear. The patient presents with associated shoulder weakness most notably with posterior RTC testing, stiffness, and functional deficits (QuickDASH). The patient would benefit from skilled  PT intervention to address the above mentioned functional limitations.     Prognosis: good    Goals  Plan Goals: SHOULDER  PROBLEMS:     1. The patient has limited ROM of the right shoulder.    LTG 1: 12 weeks:  The patient will demonstrate AROM 150 degrees of shoulder flexion, 140 degrees of shoulder abduction to allow the patient to reach into upper kitchen cabinets and manipulate clothing behind the back with greater ease.    STATUS:  New   STG 1a: 6 weeks:  The patient will demonstrate AROM 130degrees of  shoulder flexion, 120 degrees of shoulder abduction.    STATUS:  New   TREATMENT: Manual therapy, therapeutic exercise, home exercise instruction, and modalities as needed to include: electrical stimulation, ultrasound, moist heat, and ice.    2. The patient has limited strength of the right shoulder.   LTG 2: 12 weeks:  The patient will demonstrate 4+ /5 strength for shoulder flexion, abduction, and internal rotation in order to demonstrate improved shoulder stability.    STATUS:  New   STG 2a: 6 weeks:  The patient will demonstrate 4 /5 strength for shoulder flexion, abduction, external rotation, and internal rotation.    STATUS:  New   STG2b:  6 weeks:  The patient will be independent with home exercises.     STATUS:  New   TREATMENT: Manual therapy, therapeutic exercise, home exercise instruction, and modalities as needed to include: electrical stimulation, ultrasound, moist heat, and ice.     3. The patient complains of pain to the right shoulder.   LTG 3: 12 weeks:  The patient will report a pain rating of 0 /10 or better at its worst in order to improve sleep quality and tolerance to performance of activities of daily living.    STATUS:  New   STG 3a: 6 weeks:  The patient will report a pain rating of 4 /10 or better at its worst.     STATUS:  New   TREATMENT: Manual therapy, therapeutic exercise, home exercise instruction, and modalities as needed to include: electrical stimulation, ultrasound, moist  heat, and ice.    4. Carrying, Moving, and Handling Objects Functional Limitation     LTG 4: 12 weeks:  The patient will demonstrate 1-19% limitation by achieving a score of 12-19 on the QuickDASH.    STATUS:  New   STG 4a: 6 weeks:  The patient will demonstrate 20-39 % limitation by achieving a score of 20-27 on the QuickDASH.      STATUS:  New   TREATMENT:  Manual therapy, therapeutic exercise, home exercise instruction, and modalities as needed to include: moist heat, electrical stimulation, and ultrasound.       Plan  Therapy options: will be seen for skilled therapy services  Planned modality interventions: TENS, cryotherapy, thermotherapy (hydrocollator packs) and dry needling  Planned therapy interventions: functional ROM exercises, home exercise program, joint mobilization, manual therapy, soft tissue mobilization, stretching and strengthening  Frequency: 3x week  Duration in weeks: 12  Treatment plan discussed with: patient        Visit Diagnoses:    ICD-10-CM ICD-9-CM   1. Right shoulder pain, unspecified chronicity  M25.511 719.41   2. Weakness of right upper extremity  R29.898 729.89   3. Decreased ROM of right shoulder  M25.611 719.51       History # of Personal Factors and/or Comorbidities: MODERATE (1-2)  Examination of Body System(s): # of elements: MODERATE (3)  Clinical Presentation: STABLE   Clinical Decision Making: LOW       Timed:         Manual Therapy:    0     mins  55219;     Therapeutic Exercise:    15     mins  82338;     Neuromuscular Bernei:    0    mins  97230;    Therapeutic Activity:     0     mins  25153;     Gait Trainin     mins  68404;     Ultrasound:     0     mins  47527;    Ionto                               0    mins   89076  Self Care                       0     mins   99947        Un-Timed:  Electrical Stimulation:    0     mins  62383 ( );  Dry Needling     0     mins self-pay  Canalith Repos    0     mins 06554  Traction     0     mins 93998  Low Eval      30     Mins  37185  Mod Eval     0     Mins  87259  High Eval                       0     Mins  36385  Re-Eval                           0    mins  91973    Timed Treatment:   15   mins   Total Treatment:     45   mins    PT SIGNATURE: Valentin Avelar PT     Electronically signed 3/30/2023    [place] License: PT - [id number]     Initial Certification  Certification Period: 3/30/2023 thru 6/27/2023  I certify that the therapy services are furnished while this patient is under my care.  The services outlined above are required by this patient, and will be reviewed every 90 days.     PHYSICIAN: Kedar Simpson MD   NPI: [id number]                                        DATE:     Please sign and return via fax to [phone]. Thank you, [facility].

## 2023-04-03 ENCOUNTER — TREATMENT (OUTPATIENT)
Dept: PHYSICAL THERAPY | Facility: CLINIC | Age: 68
End: 2023-04-03
Payer: MEDICARE

## 2023-04-03 DIAGNOSIS — R29.898 WEAKNESS OF RIGHT UPPER EXTREMITY: ICD-10-CM

## 2023-04-03 DIAGNOSIS — M25.611 DECREASED ROM OF RIGHT SHOULDER: ICD-10-CM

## 2023-04-03 DIAGNOSIS — M25.511 RIGHT SHOULDER PAIN, UNSPECIFIED CHRONICITY: Primary | ICD-10-CM

## 2023-04-03 PROCEDURE — 97530 THERAPEUTIC ACTIVITIES: CPT | Performed by: PHYSICAL THERAPIST

## 2023-04-03 PROCEDURE — 97110 THERAPEUTIC EXERCISES: CPT | Performed by: PHYSICAL THERAPIST

## 2023-04-03 NOTE — PROGRESS NOTES
Physical Therapy Daily Treatment Note      Patient: Shabana Ferguson   : 1955  Referring practitioner: Kedar Simpson MD  Date of Initial Visit: Type: THERAPY  Noted: 3/30/2023  Today's Date: 4/3/2023  Patient seen for 2 sessions           Subjective Questionnaire:       Subjective Evaluation    History of Present Illness    Subjective comment: When asked about pain pt stated it hurts with certain movements.        Objective   See Exercise, Manual, and Modality Logs for complete treatment.       Assessment & Plan     Assessment    Assessment details: Pt reports compliance with HEP and raising her arm above her head is getting better.  Pt tolerated progressed strengthening well.  Continue with POC.        Visit Diagnoses:    ICD-10-CM ICD-9-CM   1. Right shoulder pain, unspecified chronicity  M25.511 719.41   2. Weakness of right upper extremity  R29.898 729.89   3. Decreased ROM of right shoulder  M25.611 719.51       Progress per Plan of Care and Progress strengthening /stabilization /functional activity           Timed:  Manual Therapy:         mins  55921;  Therapeutic Exercise:    22     mins  10843;     Neuromuscular Bernie:        mins  44196;    Therapeutic Activity:     8     mins  85749;     Gait Training:           mins  20024;     Ultrasound:          mins  89514;    Electrical Stimulation:         mins  41439 ( );  Aquatic Therapy          mins  31973    Untimed:  Electrical Stimulation:         mins  37983 ( );  Mechanical Traction:         mins  75240;     Timed Treatment:   30   mins   Total Treatment:     30   mins    Electronically signed    Lexi Muir PTA  Physical Therapist Assistant    [place] license: [id number]

## 2023-04-10 ENCOUNTER — TREATMENT (OUTPATIENT)
Dept: PHYSICAL THERAPY | Facility: CLINIC | Age: 68
End: 2023-04-10
Payer: MEDICARE

## 2023-04-10 DIAGNOSIS — R29.898 WEAKNESS OF RIGHT UPPER EXTREMITY: ICD-10-CM

## 2023-04-10 DIAGNOSIS — M25.611 DECREASED ROM OF RIGHT SHOULDER: ICD-10-CM

## 2023-04-10 DIAGNOSIS — M25.511 RIGHT SHOULDER PAIN, UNSPECIFIED CHRONICITY: Primary | ICD-10-CM

## 2023-04-10 PROCEDURE — 97530 THERAPEUTIC ACTIVITIES: CPT | Performed by: PHYSICAL THERAPIST

## 2023-04-10 PROCEDURE — 97110 THERAPEUTIC EXERCISES: CPT | Performed by: PHYSICAL THERAPIST

## 2023-04-10 NOTE — PROGRESS NOTES
Outpatient Physical Therapy  [address] [facility] [place] [address]                 Physical Therapy Daily Treatment Note    Patient: Shabana Ferguson   : 1955  Diagnosis/ICD-10 Code:  Right shoulder pain, unspecified chronicity [M25.511]  Referring practitioner: Kedar Simpson MD  Date of Initial Visit: Type: THERAPY  Noted: 3/30/2023  Today's Date: 4/10/2023  Patient seen for 3 sessions             Subjective   Shabana Ferguson reports: 2/10 pain in right shoulder with movement located in bicep and sometimes in the triceps.      Objective   No complaints of increased pain or discomfort.     See Exercise, Manual, and Modality Logs for complete treatment.     Assessment/Plan  Shabana progressing as evident by decreased overall shoulder pain. Pt tolerated exercises well, just general fatigue. Pt would benefit from skilled PT to address Range of Motion  and Strength deficits, pain management and any concerns with ADLs.     Progress per Plan of Care       Timed:  Manual Therapy:    0     mins  55452;  Therapeutic Exercise:    14     mins  87176;     Neuromuscular Bernie:    0    mins  59993;    Therapeutic Activity:     10     mins  22347;     Gait Trainin     mins  84804;    Aquatic Therapy:     0     mins  79980;       Untimed:  Electrical Stimulation:    0     mins  27875 ( );  Mechanical Traction:    0     mins  62258;       Timed Treatment:   24   mins   Total Treatment:     24   mins      Electronically signed:   Bryanna Dunn PTA  Physical Therapist Assistant  [facility] License #: [id number]

## 2023-04-14 ENCOUNTER — TREATMENT (OUTPATIENT)
Dept: PHYSICAL THERAPY | Facility: CLINIC | Age: 68
End: 2023-04-14
Payer: MEDICARE

## 2023-04-14 DIAGNOSIS — M25.611 DECREASED ROM OF RIGHT SHOULDER: ICD-10-CM

## 2023-04-14 DIAGNOSIS — R29.898 WEAKNESS OF RIGHT UPPER EXTREMITY: ICD-10-CM

## 2023-04-14 DIAGNOSIS — M25.511 RIGHT SHOULDER PAIN, UNSPECIFIED CHRONICITY: Primary | ICD-10-CM

## 2023-04-14 PROCEDURE — 97530 THERAPEUTIC ACTIVITIES: CPT | Performed by: PHYSICAL THERAPIST

## 2023-04-14 PROCEDURE — 97110 THERAPEUTIC EXERCISES: CPT | Performed by: PHYSICAL THERAPIST

## 2023-04-14 NOTE — PROGRESS NOTES
Physical Therapy Daily Treatment Note      Patient: Shabana Ferguson   : 1955  Referring practitioner: Kedar Simpson MD  Date of Initial Visit: Type: THERAPY  Noted: 3/30/2023  Today's Date: 2023  Patient seen for 4 sessions           Subjective Questionnaire:       Subjective Evaluation    History of Present Illness    Subjective comment: Pt reports 0/10  currrently but has pain with reaching up and with turning the page of a book.       Objective   See Exercise, Manual, and Modality Logs for complete treatment.       Assessment & Plan     Assessment    Assessment details: Pt tolerated strengthening well and is progressing.  Continue with POC.        Visit Diagnoses:    ICD-10-CM ICD-9-CM   1. Right shoulder pain, unspecified chronicity  M25.511 719.41   2. Weakness of right upper extremity  R29.898 729.89   3. Decreased ROM of right shoulder  M25.611 719.51       Progress per Plan of Care and Progress strengthening /stabilization /functional activity           Timed:  Manual Therapy:         mins  53663;  Therapeutic Exercise:    15     mins  00374;     Neuromuscular Bernie:        mins  66185;    Therapeutic Activity:     10     mins  12372;     Gait Training:           mins  26893;     Ultrasound:          mins  65055;    Electrical Stimulation:         mins  47026 ( );  Aquatic Therapy          mins  85683    Untimed:  Electrical Stimulation:         mins  97738 ( );  Mechanical Traction:         mins  13183;     Timed Treatment:   25   mins   Total Treatment:     25   mins    Electronically signed    Lexi Muir PTA  Physical Therapist Assistant    [place] license: [id number]

## 2023-04-17 ENCOUNTER — TREATMENT (OUTPATIENT)
Dept: PHYSICAL THERAPY | Facility: CLINIC | Age: 68
End: 2023-04-17
Payer: MEDICARE

## 2023-04-17 DIAGNOSIS — M25.611 DECREASED ROM OF RIGHT SHOULDER: ICD-10-CM

## 2023-04-17 DIAGNOSIS — M25.511 RIGHT SHOULDER PAIN, UNSPECIFIED CHRONICITY: Primary | ICD-10-CM

## 2023-04-17 DIAGNOSIS — R29.898 WEAKNESS OF RIGHT UPPER EXTREMITY: ICD-10-CM

## 2023-04-17 PROCEDURE — 97110 THERAPEUTIC EXERCISES: CPT | Performed by: PHYSICAL THERAPIST

## 2023-04-17 PROCEDURE — 97530 THERAPEUTIC ACTIVITIES: CPT | Performed by: PHYSICAL THERAPIST

## 2023-04-17 NOTE — PROGRESS NOTES
Outpatient Physical Therapy  [address] [facility], [place] [address]                 Physical Therapy Daily Treatment Note    Patient: Shabana Ferguson   : 1955  Diagnosis/ICD-10 Code:  Right shoulder pain, unspecified chronicity [M25.511]  Referring practitioner: Kedar Simpson MD  Date of Initial Visit: Type: THERAPY  Noted: 3/30/2023  Today's Date: 2023  Patient seen for 5 sessions             Subjective   Shabana Ferguson reports: some days are better than others; today is a good day. Patient states she was sore in the front of her shoulder after last visit.     Pain: 0/10 pain, in right shoulder at time of arrival.     Objective     See Exercise, Manual, and Modality Logs for complete treatment.     Assessment/Plan  Shabana reports her shoulder pain is not as frequent as it was but patient had some increased discomfort with external rotation exercies. Pt would benefit from skilled PT to address Range of Motion and Strength deficits, pain management and any concerns with ADLs.     Progress per Plan of Care       Timed:  Manual Therapy:    0     mins  35671;  Therapeutic Exercise:    13     mins  25725;     Neuromuscular Bernie:    0    mins  79050;    Therapeutic Activity:     10     mins  10856;     Gait Trainin     mins  08858;    Aquatic Therapy:     0     mins  16133;       Untimed:  Electrical Stimulation:    0     mins  88209 ( );  Mechanical Traction:    0     mins  01434;       Timed Treatment:   23   mins   Total Treatment:     23   mins      Electronically signed:   Bryanna Dunn PTA  Physical Therapist Assistant  Kylie ANIKET License #: [id number]   [facility]     Outpatient Follow Up Note    CHIEF COMPLAINT / HPI: Hospital Follow Up secondary to coronary artery disease, CHF and Ventricular tachycardia     Hospital record has been reviewed  Hospital Course progressed as follows per discharge summary:     Hospital Course:   Patient is a 59-year-old male with a past medical history of diabetes, coronary artery disease, cardiomyopathy status post AICD placement, CKD, cirrhosis and B-cell lymphoma who presented to the ER with chest pain and palpitations.  He was found to have ventricular tachycardia and underwent electrocardioversion.  Patient was also found to be in shock.  Cardiology was consulted and patient was placed on amiodarone drip.  Post cardioversion patient blood pressure was low and central line placed and patient was started on epinephrine.  Patient went into cardiogenic shock with acute kidney injury.  Nephrology was consulted. Pelon resolved.  AICD shock rate was reset .       Wide-complex tachycardia /ventricular tachycardia-EP adjusted parameters for the AICD  -Cardioverted in the ER.   - amio stop due to toxic effect on liver   -AICD interrogated   -EP to evaluate-     Hypotension/ cardiogenic shock -improved  -improved, off pressors  -Midodrine added     CHF-ischemic cardiomyopathy  -Last EF < 20 %  -She has baseline weight is 186 pounds.  On admission weight is 196 pounds, at the time of discharge 193  -wt trending down slowly    Coronary artery disease  -S/p stent in the LAD  -Continue Plavix and statin     Acute hypoxic respiratory failure-wean oxygen as tolerated  -Patient is hypoxic to the low 60s on admission  -Chest x-ray shows cardiomegaly and small pleural effusion     PELON on CKD- - improved   -Consult nephrology  creatinine on admission 2.4, decreased about 3.2 baseline creatinine 1.4  - cardiorenal      Shock liver  -LFTs trending down     Brenda Severe is 75 y.o. male who presents today for a routine follow up after a recent hospitalization related to the above mentioned issues. Subjective:   Mark Balderrama has a significant past medical history of Diffuse B-cell lymphoma in remission presented as mass on liver (follows with onc), alcoholic liver cirrhosis, DM, HTN, HLD, CAD s/p several PCIs, dilated cardiomyopathy. He underwent ICD implantation per Dr. Gwynda Baumgarten 2015  in addition to what is mentioned above. Since the time of discharge, the patient admits their symptoms have not changed. He says \"I feel fine\". When he was in VT (when going into hospital) he noted feeling pounding in his chest.   Today, he denies significant chest pain. There is MCCLELLAN which shows no change. He does reports waking up last night trying to catch his breath, this is new. Has HOSSEIN but is noncompliant with CPAP. The patient does not have swelling. He has baseline weight of around 186 pounds.  On admission weight is 196 pounds, at the time of discharge 193. Wt in office 200lbs today (does have several layers of clothes on and boots). Does not weight himself at home. Denies any light headedness or dizziness. Denies palpations. He feels like he is getting is strength back since being home from hospital. Mostly c/o persistent hiccups. Home BP /50-60s. With regard to medication therapy the patient has been compliant with prescribed regimen. They have tolerated therapy to date.      Past Medical History:   Diagnosis Date    Cardiomyopathy ([address]     [facility]     ETOH abuse    Diabetes mellitus ([facility]     History of abdominal paracentesis 2020    Hyperlipidemia     Hypertension     Lymphoma ([facility]     currently in remission    NSVT (nonsustained ventricular tachycardia) (HCC)     Sleep apnea     unable to tolerate CPAP    Syncope      Social History:    Social History     Tobacco Use   Smoking Status Former Smoker    Last attempt to quit: 1975    Years since quittin.8   Smokeless Tobacco Never Used     Current Medications:  Current Outpatient Medications   Medication Sig Dispense Refill    midodrine (PROAMATINE) 5 MG tablet Take 1 tablet by mouth 3 times daily (with meals) 90 tablet 3    tamsulosin (FLOMAX) 0.4 MG capsule Take 0.4 mg by mouth nightly      furosemide (LASIX) 40 MG tablet Take 40 mg by mouth every other day Switched to every other day on 10/12/20 for hypotension      MAGNESIUM OXIDE 400 PO Take 400 mg by mouth 2 times daily       Calcium Carb-Cholecalciferol (CALCIUM 1000 + D PO) Take 1,000 mg by mouth 2 times daily      famotidine (PEPCID) 20 MG tablet Take 20 mg by mouth 2 times daily       aspirin 81 MG tablet Take 81 mg by mouth daily      vitamin D (CHOLECALCIFEROL) 1000 UNITS TABS tablet Take 1,000 Units by mouth daily      loperamide (IMODIUM) 2 MG capsule Take 2 mg by mouth 4 times daily as needed for Diarrhea      nitroGLYCERIN (NITROSTAT) 0.4 MG SL tablet Place 0.4 mg under the tongue every 5 minutes as needed for Chest pain up to max of 3 total doses. If no relief after 1 dose, call 911.  glipiZIDE (GLUCOTROL) 5 MG tablet Take 7.5 mg by mouth 2 times daily (before meals)       atorvastatin (LIPITOR) 80 MG tablet Take 80 mg by mouth daily.  clopidogrel (PLAVIX) 75 MG tablet Take 75 mg by mouth daily. No current facility-administered medications for this visit. REVIEW OF SYSTEMS:   CONSTITUTIONAL: No major weight gain or loss, night sweats, fever, fatigue, or weakness. HEENT: No new vision difficulties or ringing in the ears. RESPIRATORY: No new SOB, PND, orthopnea or cough. CARDIOVASCULAR: See HPI  GI: No N/V/D, constipation, or abdominal pain. No black/tarry stools  : No urinary urgency, incontinence, or hematuria. SKIN: No cyanosis or skin lesions. MUSCULOSKELETAL: No new muscle or joint pain. NEUROLOGICAL: No syncope or TIA-like symptoms.   PSYCHIATRIC: No anxiety, pain, insomnia or depression    Objective:   PHYSICAL EXAM:        Vitals:    10/27/20 1433 10/27/20 1450   BP: 120/64 113/77   Site: Left Upper Arm    Position: Sitting    Cuff Size: Medium Adult    Pulse: 66    SpO2: 96%    Weight: 200 lb (90.7 kg)    Height: 5' 9\" (1.753 m)       VITALS:  /77   Pulse 66   Ht 5' 9\" (1.753 m)   Wt 200 lb (90.7 kg)   SpO2 96%   BMI 29.53 kg/m²     CONSTITUTIONAL: Cooperative, no apparent distress, and appears well nourished / developed  NEUROLOGIC:  Awake and orientated to person, place, and time. PSYCH: Calm affect. SKIN: Warm and dry. HEENT: Sclera non-icteric, normocephalic, neck supple. RESPIRATORY:  No increased work of breathing and clear to auscultation, no crackles or wheezing. CARDIOVASCULAR:  Regular rate and rhythm without murmur. Normal S1 and S2. No gallops or rubs. Normal PMI. No elevation of JVP. Normal carotid pulses with no bruits. GI:  Normal bowel sounds. Abd rounded. Non-tender to palpation. EXT: No edema. No calf tenderness. Pulses are present bilaterally.     DATA:    Lab Results   Component Value Date    ALT 2,432 (H) 10/19/2020    AST 2,849 (H) 10/19/2020    ALKPHOS 162 (H) 10/19/2020    BILITOT 3.1 (H) 10/19/2020     Lab Results   Component Value Date    CREATININE 1.2 10/22/2020    BUN 34 (H) 10/22/2020     10/22/2020    K 3.7 10/22/2020    CL 99 10/22/2020    CO2 24 10/22/2020     No results found for: TSH, F3UKFOP, E9AGQBL, THYROIDAB  Lab Results   Component Value Date    WBC 5.9 10/22/2020    HGB 13.0 (L) 10/22/2020    HCT 39.1 (L) 10/22/2020    MCV 93.6 10/22/2020    PLT 62 (L) 10/22/2020     No components found for: CHLPL  Lab Results   Component Value Date    TRIG 255 (H) 01/15/2010     Lab Results   Component Value Date    HDL 35 (L) 01/15/2010     Lab Results   Component Value Date    LDLCALC 147 (H) 01/15/2010     Lab Results   Component Value Date    LABVLDL 51 01/15/2010     Radiology Review:  Pertinent images / reports were reviewed as a part of this visit and reveals the following:    Echo: 3/2019  Summary  Left ventricle - normal in size, reduced function with EF of 40%, global   hypokinesis, more severe hypokinesis of inferior wall  Mitral valve - trivial regurgitation  Aortic valve - sclerotic  Tricuspid valve - trivial regurgitation with PASP of 32mmHg  Pacer / ICD wire is visualized in the right ventricle and right atrium. Last Echo: 1/9/20  Summary   Dilated left ventricle. Normal wall thickness. Severely decreased left ventricular systolic function with an estimated EF <20%. E/e'=14. Mild mitral regurgitation. Biatrial enlargement. Aortic valve appears sclerotic but opens adequately. No stenosis or insufficiency. The right ventricle is enlarged and decreased in function. Moderate tricuspid regurgitation. PASP 37mmHg. Trivial pulmonic regurgitation. There is a trivial anterior pericardial effusion noted. There is fluid around the liver. IVC size is normal (<2.1 cm) but collapses < 50% with respiration consistent with elevated RA pressure (8 mmHg). Last SPECT Stress Test: 10/2018  Summary         No EKG evidence for ischemia with lexiscan         Reduced LV systolic function with EF of 44% (ectopy may affect accuracy)         There is normal isotope uptake at stress and rest. There is no evidence of     myocardial ischemia or scar. Last Angiogram: 8/2012   Cath with patent LAD ,marginal and CFX stents. Chronic RCA occlusion. EF 40% with LVEDP 20    Device Interrogation:   9/27/20  Transmission shows normal sensing and pacing function. Pt had ATP therapy for  VT 9/27/20.    10/27/20  Possible OptiVol fluid accumulation: 06-Sep-2020 -- ongoing. RV Capture Management: Actual safety margin (2.4 X) > programmed margin (2.0 X). Patient Activity less than 1 hr/day for 1 weeks. VS 99.8%,  0.2%  Noted significant amount of PVCs. Assessment:     1.  Coronary artery disease   ~ stable ; no c/o CP   ~ [facility] 12/2004: PCI to mid CX and JANINA with DEUCE, EF 35%  ~ [facility] 9/2007: PCI to mid LAD with DEUCE   ~ [facility] 8/2012: patent stents, known  of RCA with L to R collaterals   ~ SPECT 10/2018: no evidence of ischemia or scar   ~ ASA/ plavix/ statin   ~ no BB d/t hypotension     2. Cardiomyopathy   ~ Echo in 3/2019: EF 40%  ~ Last Echo 1/2020: EF <20%  ~ no BB/ ARB/ACE / nakita 2/2 hypotension     3. Ventricular Tachycardia   ~ found to be in [address] [place] [address] in ER 10/14 s/p cardioversion. Post CV pt was hypotensive and was given fluid boluses, started on IV Amio and Levo.     ~ Device was reprogrammed to detect VT rate of 162. Not d/c'd on amio 2/2 liver cirrhosis/ sig elevated LFTs   ~ hx of VT and tachycardia, EP recommended EPS in past but was postponed d/t pt being treated with chemo at that time   ~ interrogation today showed one episode of NSVT that only lasted 5 beats (since last interrogation 10/20)     4. S/p Single Chamber ICD implantation  ~ placed 6/2015 secondary to ICM, LV dysfunction, and VT on MCOT   ~ follows with Dr. Keisha Perry     5. Chronic systolic CHF  ~ stable ; + c/o SOB, PND  ~ Pro BNP 59793 (10/17) > recheck   ~ baseline wt 186lbs ; admit wt 196lbs, d/c wt 193lbs, today 200lbs   ~ lasix 40mg every other day (d/t hypotension)  ~ Optival today fluid index elevated and thoracic impedence down     6. Hypertension   ~ hypotensive in hospital and was d/c'd on midodrine 5mg TID (coreg stopped at d/c)  ~ BP stable today and by home readings     7. Hyperlipidemia   ~ last lipids not up to date in our system, usually drawn through [place]   ~ lipitor 80    8. Chronic kidney disease   ~ last Cr stable at 1.2    9. Liver cirrhosis   ~ from ETOH/ cardiac   ~ hx of paracentesis 2/2020  ~ follows with Dr. Steffi Sandhoff with [address] [place] [address] [facility]    ~ LFTs significantly elevated in hospital 2/2 liver shock   ~ has visit with GI 11/4    10. Hx of Lymphoma   ~ presented as mass on liver; followed at [facility]    Patient  is stable since hospital discharge. Plan:     1.  CMP and BNP today

## 2023-04-21 ENCOUNTER — TREATMENT (OUTPATIENT)
Dept: PHYSICAL THERAPY | Facility: CLINIC | Age: 68
End: 2023-04-21
Payer: MEDICARE

## 2023-04-21 DIAGNOSIS — M25.511 RIGHT SHOULDER PAIN, UNSPECIFIED CHRONICITY: Primary | ICD-10-CM

## 2023-04-21 DIAGNOSIS — R29.898 WEAKNESS OF RIGHT UPPER EXTREMITY: ICD-10-CM

## 2023-04-21 DIAGNOSIS — M25.611 DECREASED ROM OF RIGHT SHOULDER: ICD-10-CM

## 2023-04-21 PROCEDURE — 97530 THERAPEUTIC ACTIVITIES: CPT | Performed by: PHYSICAL THERAPIST

## 2023-04-21 PROCEDURE — 97110 THERAPEUTIC EXERCISES: CPT | Performed by: PHYSICAL THERAPIST

## 2023-04-21 NOTE — PROGRESS NOTES
Progress Assessment  [address] [place] [id number]        Patient: Shabana Ferguson   : 1955  Diagnosis/ICD-10 Code:  Right shoulder pain, unspecified chronicity [M25.511]  Referring practitioner: Kedar Simpson MD  Date of Initial Visit: Type: THERAPY  Noted: 3/30/2023  Today's Date: 2023  Patient seen for 6 sessions      Subjective:     Subjective Questionnaire: QuickDASH: 15 = 1-19% limitation  Clinical Progress: improved  Home Program Compliance: Yes  Treatment has included: therapeutic exercise, manual therapy and therapeutic activity    Subjective : Shabana Ferguson reports: she is doing well overall, though still has soreness in the shoulder at times when reaching or lifting. But she has been able to sleep better even on that side. She feels like she is doing well with the exercises.    Current Pain 0/10  Worst pain 4/10        Objective   Active Range of Motion   Left Shoulder   Flexion: 170 degrees   Abduction: 170 degrees   External rotation BTH: T4   Internal rotation BTB: T5     Right Shoulder   Flexion: 162 degrees  Abduction: 137 degrees  External rotation BTH: T2   Internal rotation BTB: T6      Passive Range of Motion      Right Shoulder   Flexion: 165 degrees   Abduction: 130 degrees   External rotation 45°: 80 degrees   Internal rotation 45°: 80 degrees      Strength/Myotome Testing     Left Shoulder   Normal muscle strength    Right Shoulder      Planes of Motion   Flexion: 4   Extension: 4   Abduction: 4  External rotation at 0°: 4-  Internal rotation at 0°: 4+  Horizontal abduction: 4-      Assessment & Plan     Assessment  Impairments: abnormal muscle firing, abnormal or restricted ROM, activity intolerance, impaired physical strength and pain with function  Functional Limitations: carrying objects, lifting, sleeping, reaching behind back and reaching overhead  Assessment details: The patient presents to physical therapy with complaints of right shoulder pain with  MRI confirming RTC repair and biceps tear. The patient presents with associated shoulder weakness most notably with posterior RTC testing, stiffness, and functional deficits (QuickDASH). The patient would benefit from skilled PT intervention to address the above mentioned functional limitations. She has progressed very well, as evident by her improved active ROM with the right shoulder. She is also demonstrating improved strength overall, even with the evidence of tearing in the shoulder rotator cuff. Will take a week to try HEP, then likely DC unless she has an exacerbation of symptoms.     Prognosis: good    Goals  Plan Goals: SHOULDER  PROBLEMS:     1. The patient has limited ROM of the right shoulder.    LTG 1: 12 weeks:  The patient will demonstrate AROM 150 degrees of shoulder flexion, 140 degrees of shoulder abduction to allow the patient to reach into upper kitchen cabinets and manipulate clothing behind the back with greater ease.    STATUS:  Not met   STG 1a: 6 weeks:  The patient will demonstrate AROM 130 degrees of  shoulder flexion, 120 degrees of shoulder abduction.    STATUS:  MET   TREATMENT: Manual therapy, therapeutic exercise, home exercise instruction, and modalities as needed to include: electrical stimulation, ultrasound, moist heat, and ice.    2. The patient has limited strength of the right shoulder.   LTG 2: 12 weeks:  The patient will demonstrate 4+ /5 strength for shoulder flexion, abduction, and internal rotation in order to demonstrate improved shoulder stability.    STATUS:  Not met   STG 2a: 6 weeks:  The patient will demonstrate 4 /5 strength for shoulder flexion, abduction, external rotation, and internal rotation.    STATUS: Met except for ER   STG2b:  6 weeks:  The patient will be independent with home exercises.     STATUS:  Met, ongoing   TREATMENT: Manual therapy, therapeutic exercise, home exercise instruction, and modalities as needed to include: electrical stimulation,  ultrasound, moist heat, and ice.     3. The patient complains of pain to the right shoulder.   LTG 3: 12 weeks:  The patient will report a pain rating of 0 /10 or better at its worst in order to improve sleep quality and tolerance to performance of activities of daily living.    STATUS:  Not met   STG 3a: 6 weeks:  The patient will report a pain rating of 4 /10 or better at its worst.     STATUS:  MEt   TREATMENT: Manual therapy, therapeutic exercise, home exercise instruction, and modalities as needed to include: electrical stimulation, ultrasound, moist heat, and ice.    4. Carrying, Moving, and Handling Objects Functional Limitation     LTG 4: 12 weeks:  The patient will demonstrate 1-19% limitation by achieving a score of 12-19 on the QuickDASH.    STATUS:  MET   STG 4a: 6 weeks:  The patient will demonstrate 20-39 % limitation by achieving a score of 20-27 on the QuickDASH.      STATUS:  MET   TREATMENT:  Manual therapy, therapeutic exercise, home exercise instruction, and modalities as needed to include: moist heat, electrical stimulation, and ultrasound.       Plan  Therapy options: will be seen for skilled therapy services  Planned modality interventions: TENS, cryotherapy, thermotherapy (hydrocollator packs) and dry needling  Planned therapy interventions: functional ROM exercises, home exercise program, joint mobilization, manual therapy, soft tissue mobilization, stretching and strengthening  Frequency: 3x week  Duration in weeks: 12  Treatment plan discussed with: patient      Progress toward previous goals: Partially Met    See Exercise, Manual, and Modality Logs for complete treatment.         Recommendations: Continue as planned  Timeframe: 1 month  Prognosis to achieve goals: good    PT Signature: Valentin Avelar PT    Electronically signed 4/21/2023    [place] License: PT - [id number]     Based upon review of the patient's progress and continued therapy plan, it is my medical opinion that Shabana Ferguson  should continue physical therapy treatment at [facility]  [address] [place] [address] [phone].      Timed:         Manual Therapy:    0     mins  24026;     Therapeutic Exercise:    27     mins  86136;     Neuromuscular Bernie:    0    mins  91434;    Therapeutic Activity:     12     mins  80884;     Gait Trainin     mins  26698;     Ultrasound:     0     mins  18103;    Self Care                       0     mins   82337  Aquatic                          0     mins 45534        Timed Treatment:   39   mins   Total Treatment:     39   mins      I certify that the therapy services are furnished while this patient is under my care.  The services outlined above are required by this patient, and will be reviewed every 90 days.

## 2023-04-28 ENCOUNTER — OFFICE VISIT (OUTPATIENT)
Dept: PSYCHIATRY | Facility: CLINIC | Age: 68
End: 2023-04-28
Payer: MEDICARE

## 2023-04-28 ENCOUNTER — TREATMENT (OUTPATIENT)
Dept: PHYSICAL THERAPY | Facility: CLINIC | Age: 68
End: 2023-04-28
Payer: MEDICARE

## 2023-04-28 VITALS
SYSTOLIC BLOOD PRESSURE: 111 MMHG | BODY MASS INDEX: 26.43 KG/M2 | HEIGHT: 61 IN | HEART RATE: 82 BPM | WEIGHT: 140 LBS | DIASTOLIC BLOOD PRESSURE: 67 MMHG

## 2023-04-28 DIAGNOSIS — F41.0 PANIC ATTACKS: ICD-10-CM

## 2023-04-28 DIAGNOSIS — M25.511 RIGHT SHOULDER PAIN, UNSPECIFIED CHRONICITY: Primary | ICD-10-CM

## 2023-04-28 DIAGNOSIS — F51.05 INSOMNIA DUE TO MENTAL CONDITION: ICD-10-CM

## 2023-04-28 DIAGNOSIS — F41.0 PANIC DISORDER: ICD-10-CM

## 2023-04-28 DIAGNOSIS — R29.898 WEAKNESS OF RIGHT UPPER EXTREMITY: ICD-10-CM

## 2023-04-28 DIAGNOSIS — M25.611 DECREASED ROM OF RIGHT SHOULDER: ICD-10-CM

## 2023-04-28 DIAGNOSIS — F41.1 GENERALIZED ANXIETY DISORDER: ICD-10-CM

## 2023-04-28 DIAGNOSIS — F33.1 MODERATE EPISODE OF RECURRENT MAJOR DEPRESSIVE DISORDER: ICD-10-CM

## 2023-04-28 DIAGNOSIS — F33.40 RECURRENT MAJOR DEPRESSIVE DISORDER IN REMISSION: Primary | ICD-10-CM

## 2023-04-28 PROCEDURE — 97110 THERAPEUTIC EXERCISES: CPT | Performed by: PHYSICAL THERAPIST

## 2023-04-28 PROCEDURE — 97530 THERAPEUTIC ACTIVITIES: CPT | Performed by: PHYSICAL THERAPIST

## 2023-04-28 NOTE — PATIENT INSTRUCTIONS
1.  Please return to clinic at your next scheduled visit.  Contact the clinic ([phone]) at least 24 hours prior in the event you need to cancel.  2.  Do no harm to yourself or others.    3.  Avoid alcohol and drugs.    4.  Take all medications as prescribed.  Please contact the clinic with any concerns. If you are in need of medication refills, please call the clinic at [phone].    5. Should you want to get in touch with your provider, Dr. Vinny Krishnamurthy, please utilize [employer] or contact the office ([phone]), and staff will be able to page Dr. Krishnamurthy directly.  6.  In the event you have personal crisis, contact the following crisis numbers: Suicide Prevention Hotline 1-822.298.4460; [facility] Helpline 8-161-718-NGVI; [facility] Emergency Room [phone]; text HELLO to 048576; or 129.     SPECIFIC RECOMMENDATIONS:     1.      Medications discussed at this encounter:                   - no changes     2.      Psychotherapy recommendations:

## 2023-04-28 NOTE — PROGRESS NOTES
"Subjective   Shabana Ferguson is a 67 y.o. female who presents today for follow up    Referring Provider:  No referring provider defined for this encounter.    Chief Complaint:  mdd margarita    History of Present Illness:     Shabana Ferguson is a 64 year old /White female, hx of anxiety and depression, fractured patella, HLD, osteopenia, ulcer referred by MARIE Brice, for anxiety and depression management.   Chart: Psychotropic medications: amitriptyline 25 mg QHS, Celexa 20 mg p.o. daily, lorazepam 0.5 p.o. twice daily as needed anxiety.      \"Shabana\" and Jaya    : In person interview:  1. Chart review: getting PT for right shoulder rotator cuff tear.  2. Planning: stable no changes.  3. \"I'm praying and I give thanks to God.\"  a. \"I'm doing a lot better.\"  4. Mood/Depression: only if I get stressed out  5. Anxiety: sometimes get stressed out.  a. Takes an ativan, takes one in the evening (half). About half a day.  6. Panic attacks: denies  7. Energy: good  8. Concentration: stable  9. Sleeping: good, well  10. Eatin lbs  11. Refills: y  12. Substances: n  13. Therapy: continuing  14. Medication compliant: y  15. SE: n  16. No SI HI AVH.      3/3: In person interview:  17. Chart review: No new.  18. Planning: Well, stable, no changes.  19. \"Friend's father .\"  a. 96 yo.  b. \"I'm doing good.\"  c. Once in a while I have trouble sleeping. Mostly I sleep well.  d. Tried to wean down on ativan, but has brain zaps.  e. Jaya: things are doing well.  f. I'm doing things now like pictures  g. Family is doing better. Relationships getting better.   20. Mood/Depression: stable, fairly well  21. Anxiety: some bouts, minimal  22. Panic attacks: controlled on ativan  23. Energy: Stable  24. Concentration: Stable  25. Sleeping: Well  26. Eating: Stable  27. Refills: Yes  28. Substances: Denies  29. Therapy:  a. Their couples therapist left. Referred to someone else, who doesn't take their " "insurance. Trying [facility]; has an appnt.  30. Medication compliant: Yes  31. No SI HI AVH.        : In person interview:  32. Chart review: Seen by orthopedic surgery for right rotator cuff tear, getting physical therapy for this.  MRI of the right shoulder ordered.  33. Planning: Stable, well, no changes.   34. \"  35. Mood/Depression:  36. Anxiety:  37. Panic attacks:  38. Energy:  39. Concentration:  40. Sleepin. Eatin. Refills:  43. Substances:  44. Therapy:  45. Medication compliant:  46. SE:   47. No SI HI AVH.      : In person interview:  48. Chart review: No new.    49. Planning: No changes at last visit.  a. Are they still doing couples therapy?  50. \"I'm ok.\"  a. Stable, once in a while I go a little down, but then get back up.  b. Continuing to do couples therapy  51. Mood/Depression: minimal depressed mood  52. Anxiety: sometimes, but controlled  a. Takes half an ativan some nights, otherwise has anxiety in the morning  b. Also has brain zaps at night if doesn't take it -- this is the biggest reason  c. Volunteering at [employer]  d. Both are volunteering at [employer]. Also seeing some pts.  53. Panic attacks: n  54. Energy: good  55. Concentration: good  56. Sleeping: well  57. Eatin lb wg   58. Refills: y  59. Substances: n  60. Therapy: individual and couples (every 2 weeks)  61. Medication compliant: y  62. No SI HI AVH.      : In person interview:  63. Chart review: No new.  64. Planning: Increased gabapentin and started amitriptyline at night.  65. \"I'm doing a lot better.\"  a. Sleeping better on amitriptyline  b. Pt wants to get involved in volunteering, [employer], every Tues and Thurs  66. Mood/Depression: better  67. Anxiety: less worrying  a. Trip was a success; they saw one of the grandchildren they haven't seen in a while. Patient smiled and laughed during this discussion. Cooked for him.  b. Taking less lorazepam  68. Panic attacks: " "n  69. Energy: improving  70. Concentration: improving  71. Sleeping: well  72. Eating: stable  73. Refills: n  74. Substances: n  75. Therapy: continuing therapy and couples therapy  76. Medication compliant: y  77. No SI HI AV.      11/18: In person interview:  78. Chart review: Seen by primary care November 14.  PHQ-9 is 14, very difficult.  Feels her mental health is better than it was a year ago, however.  79. Planning: They have started marriage counseling.  Increased gabapentin in the afternoons to 200 mg a day.  Reintroduce amitriptyline in the evenings.  80. \"I'm suffering.\"  a. Monday has a big trip to [place]. Just got back from [place] trip, which \"was traumatic.\"  b. Nicola didn't join them for dinner last night -- upsetting for her.  81. Mood/Depression:  a. Depressed mood  82. Anxiety:  a. Restless, irritable, on edge -- lorazepam works. Starts at noon.  b. Very clingy, don't want to be by myself, throughout the day  83. Panic attacks: Controlled on Ativan  84. Energy: Down  85. Concentration: Still not a goal  86. Sleeping: Initial insomnia  87. Eating: Stable weight  88. Refills: Yes  89. Substances: No  90. Therapy: continuing marriage counseling, have to pay out of pocket  91. Medication compliant: y  92. No SI HI AV.      11/7: In person interview:  93. Chart review: No new.  94. Planning: Increased Abilify and referred to couples therapy a week ago.  95. \"I'm having a hard time to concentrate and sit still.\"  [place]:  i. Couldn't go back to sleep last night  ii. Nicola's move was this past Friday  iii. Moved animals to his house on Saturday  iv. He feels there is less negativity, but she is not sure  96. Mood/Depression:  a. Depressed mood  97. Anxiety:  a. Worrying a lot, very nervous especially in the afternoons  b. Pacing  98. Panic attacks: y, controlled on lorazepam  99. Energy: stable, not at goal  100. Concentration:   a. Some issues with concentration  101. Sleeping:  a. Always wakes " "at 4-6 am, maintenance insomnia  b. But slept well last night 7-6 am.  102. Eating: 3 lb wl since 10/31  103. Refills:  y  104. Substances: n  105. Therapy: started marriage counseling, next appnt next week  106. Medication compliant: y  107. No SI HI AVH.      10/31: In person interview:  108. Chart review: No new.  109. Planning: No changes at last visit.  110. \"I feel anxiety in the afternoon.\"  a. Stressors:  i. Nicola, her son, is moving out. Stressful.  ii. Pt wants to end the marriage; they've discussed it numerous times recently.  1. Couples therapy: they've tried in the past, more than once, problems with each therapist (patient)  111. Mood/Depression:  a. Depressed mood in the afternoons, not mornings  b. Jaya: trip to [place] was fun but stressful and \"it was in the end a major setback\"  i. Last night was very destructive: sleepless  112. Anxiety:  a. [facility]; recommend taking gabapentin 100 mg in afternoon rather than morning.  i. Because it is costing so much money, they are not going back.  b. Worrying, irritable, insomnia, on edge  113. Panic attacks:  a. Yes, in the afternoon, controlled on ativan  114. Energy: down  115. Concentration: poor  116. Sleeping: initial insomnia  117. Eating: 3 lb wg 8/22  118. Refills: No  119. Substances: No  120. Therapy: Yes continuing  121. Medication compliant: y  122. No SI HI AVH.      9/30: In person interview:  123. Chart review: Recently discharged from [facility] intensive outpatient.  a. Patient was depressed from October of last last year to essentially a couple months ago when she started to improve.  Consider light box.  124. Planning: Restarted Ambien at last visit.  Patient was improving at last visit.  Consider light box  125. \"Good.\"  a. I don't need to take the ambien, I just need the gabapentin  b. TMS helping as well, 10 more sessions, \"really helping\"  c. Discussed light box  126. Mood/Depression: stable, " "well  127. Anxiety:  a. Anxiety in the afternoons, takes lorazepam daily at 6 pm  i. Takes care of the brain zaps  128. Panic attacks: n  129. Sleeping: well on the gabapentin  130. Eating: stable  131. Refills: y  132. Substances:  133. Therapy: IOP completed  a. Very beneficial, \"I never thought I would like it.\" I would recommend it to anyone.  134. Medication compliant: y  135. No SI HI AVH.      8/30: In person interview:  136. Chart review: Seen by urgent care August 20 for back pain.  COVID-negative.  Also had some cervical adenopathy. EEG is abnormal.  137. Planning: Trial of Abilify appears to be working well.  This is according to the conversation I had with both of them on August 8.  138. \"I'm better... I feel good when I get up in the morning.\"  a. P1, G6  b. Doing TMS  c. Then IOP.  d. The problem I still have is sleeping.  e. Initial insomnia for 2 hours each night.  f. Afternoons, gets anxious. Uses ativan rarely. Use has gone down dramatically.  g. Prescribed gabapentin by [facility]. Helping her anxiety.  h. Jaya \"quality of life has gone WAY up.\"  i. Sees neurology in early September.  139. Mood/Depression: improved  140. Anxiety: improved  141. Panic attacks: rare  142. Eating: stable  143. Refills: n  144. Substances: n  145. Therapy: def  146. Medication compliant: y  147. No SI HI AVH.      8/1: In person interview:  148. Chart review: Patient admitted to the [facility] July 18.  Awaiting records.  149. Planning: This is a follow-up appointment after her admission to the [facility].  150. \" I am better.\"  a. Patient reported she was extremely afraid initially of being admitted, however admission was a very \"positive\" experience for her, and \"I felt at peace.\"  b. Could not tolerate olanzapine 2.5 mg nightly as it gave her nightmares.  She has stopped this medication.  c. She has set up intensive outpatient at [facility], has already gone to the [facility] last week, and also has TMS " "scheduled.  d. Per her , \"the crying spells have stopped.\"  151. Mood/Depression: Slightly improved, still has days of depression.  Reports that some days she is perfect, then other days she becomes very depressed and anxious, but lorazepam helps at that time.  152. Panic attacks: Still has occasionally  153. Sleeping: Still has some trouble sleeping, but recently has been using delta 8 to sleep  154. Eating: Stable, now eating  155. Refills: No  156. Substances: No  157. Therapy: Continuing  158. Medication compliant: Yes  159. No SI HI AVH.      6/17: In person interview:  160. Chart review: Seen by primary care June 9.  Apparently experiencing multiple side effects from anxiety and depression medications.  Looks like the results of the testing are not associated with an increased risk for hyper homocystinemia (MTHFR gene assay).  161. Planning: Belsomra was too expensive.  162. \"All in all I'm better.\"  a. Still has nervous stomach; getting help with that from PCP.  b. Sleeping better  c. Taking ativan in the am consistently and also at night to sleep (rarely).  d. Low mood in afternoon with crying spells  163. Mood/Depression: stable   164. Anxiety: under control  165. Sleeping: well  166. Eating: stable  167. Refills: n  168. Substances: n  169. Therapy: has a counselor at [facility] now, plus [facility]  170. Medication compliant: y  171. No SI HI AVH.      5/20: In person interview:  172. Chart review: Patient presented in the emergency room for abdominal pain, she did not want a wait so she left.  Saw primary care for abdominal pain which patient believes is related to starting Pristiq a month ago.  Despite having stopped it long ago, she continues to have abdominal symptoms.  Somatization?  173. Planning: Try Belsomra for insomnia.  174. \" I am getting better.\"  a.  agrees.  Patient is becoming more functional.  b. Depression and anxiety have improved.  Still residual symptoms.  She has only been on " "the higher dose of Lexapro for about 4 weeks ago.  c. Still having trouble with insomnia but it is controlled on Ambien most nights.  Some nights it is not.  Some nights she does not take Ambien.  d. Utilizing half a milligram of lorazepam twice daily most days.  e. More active around the house.  f. Famotidine is helping with abdominal pain.  175. Refills: Yes  176. Substances: No  177. Therapy: Deferred  178. Medication compliant: Yes  179. No SI HI AVH.      4/22: In person interview:  Chart review: 4/11: I called the patient to check in on her progress on lexapro. \"I'm doing better than I was before.\" Sleeping well. Taking nyquil to sleep. Utilizing lorazepam frequently. Has been on lexapro for 10 days. She understands that she needs to give it more time. More active. Has no appetite, however. I mentioned to her the importance of moving to a higher level of care (IOP, inpt). Pt voiced understanding and agreed to proceed. Jaya: crying episodes have stopped. Better energy.  180. \"I can't sleep.\"  a. Persistent insomnia, anxious before bed, unclear why. Has tried ambien which sometimes helps. Also over the counter meds.   b. Irritable, restless, worrying.   c. Still depressed, but this has improved  d. Compliant on lexapro  e. Still having panic attacks.  Utilizing lorazepam.  f. Per , she has made significant improvement over the last few months, but still has a way to go.  Patient agrees.  g. Declines IOP, inpatient admission  181. Therapy: Deferred  182. Medication compliant: To some extent yes  183. No SI HI AVH.      3/24: In person interview: With her   184. Chart review: Patient is now discontinued all medications except for Lamictal 25 mg a day.  I started her on doxepin in the evenings for insomnia on March 21.  185. \"I think I need lower doses.\"  a. Persistent crying spells, and anxiety. Utilizing ativan.  b. Doxepin is helping with insomnia.  c. Patient reports that she knows of someone in " "her 70s who struggled with depression for years until they came up with a solution.  This gave the patient hope.  She noted that the patient was on very low doses of Celexa and other psychotropic medication.  186. Medication compliant: y  187. No SI HI AVH.      3/15: In person interview: With her   1. Chart review: Patient had another panic attack 3/14 morning.  I advised her to utilize the Ativan liberally as she is not always using it.  Compliant on Latuda 40 mg a day.  She notes that there are some periods where she feels \"completely normal.\"  Then other times, she completely breaks down and has depression and anxiety. Having trouble sleeping for the last 4 days.  Target this with Ambien 5 mg nightly. Risks, benefits, side effects discussed with patient including sedation, dizziness, GI upset, hallucinations, sleepwalking, sleep-eating.  After discussion of these risks and benefits, the patient voiced understanding and agreed to proceed. Continue Latuda and Lamictal. Start titrating off of amitriptyline by taking half a tab, 12.5 mg in other words, nightly.  188. \" The Ambien kept me asleep for a couple hours.\"  a. Patient slept for two hours, got up, ate something, and did some reading.  Then went back to sleep.  Notes that she did not feel anxious during this time.  b. When she woke the next morning she felt very tired.  c. Utilizing Ativan more liberally to prevent panic attacks.  d. Some limitation today about how her family is distancing themselves from her due to her depression.  Feels like they should be more supportive.  e. Wants to switch to a different therapist.  She understands that we will have a therapist start next month.  Would like to wait until that time.  Problems with connecting to her present therapist.  189. Therapy: Continuing  190. Medication compliant: Yes  191. No SI HI AVH.      3/10: In person: With her   Interview:  192. Chart review: We have reduced the dose of Effexor " "to 37.5 mg a day, and started Lamictal 25 mg daily.  Continuing Latuda at its present dose of 20 mg a day.  October labs show reassuring CMP, CBC.  193. \" I stopped the Effexor.\"  a. Patient states she is already feeling better.   agrees.  b. Did not take Effexor this morning and is not experiencing panic and anxiety in the afternoon like she has been for the past several days.  c. Tolerating the Lamictal without side effects.  d. No crying spells  e. Affect is still depressed  194. Medication compliant: Yes  195. No SI HI AVH.      2/15:Virtual visit via Zoom audio and video due to the COVID-19 pandemic.  Patient is accepting of and agreeable to visit.  The visit consisted of the patient and I. The patient is at home, and I am at the office.  Interview:  196. Chart review: DEXA scan this month.  Normal in the lumbar spine and hips, it is decreased by 0.9% in the femoral necks compared to 2007.  197. \"Yesterday had a breakdown and cried for hours.\"  a. Otherwise has been doing very well.  b. Still utilizing lorazepam once a day.  c. Has done TMS for what sounds like about a week and a half.  No major changes that she can identify.  d. Sleeping well  e. Still some uncontrolled worrying, irritability, restlessness.  198. Medication compliant: Yes  199. No SI HI AVH.      1/17: Virtual visit via Zoom audio and video due to the COVID-19 pandemic.  Patient is accepting of and agreeable to visit.  The visit consisted of the patient and I.  Interview:  200. Chart review: No new chart developments since January 3.  EKG in October reveals 67, sinus, .  201. \"Doing even better than I was 2 weeks ago.\"  a. Still has mood swings, but not nearly as bad as before.  b. Have not needed to use lorazepam most days.  c. Starting TMS this week.  202. Depression/Mood: much better  203. Anxiety: much better  204. Refills: n  205. Sleeping: well  206. Eating: stable  207. Substances: n  208. Therapy: " "continuing  209. Medication compliant: y  210. No SI HI AVH.      1/3/22: Virtual visit via Zoom audio and video due to the COVID-19 pandemic.  Patient is accepting of and agreeable to visit.  The visit consisted of the patient and I.  Interview:  211. Chart review: No new chart developments since December 17.  212. \"Doing much better.\"  a. Sleeping well at night.  b. Some anxiety sometimes, but has days where she doesn't have anxiety.  c. No panic attacks  d. No crying spells.  e. Some days doesn't take lorazepam, other times takes a half a pill; when she does, it works very well.  f. Some anxiety looking up TMS last night, took a lorazepam and felt much better.   g. No holiday disruption at all. Handled the holidays well.   h. Approved for TMS.   213. Depression/Mood:  1. Depressed mood: much better  2. Seasonal pattern: denies  3. Severity: mild  4. Insomnia: denies  5. Duration: months  214. Anxiety:  1. Uncontrolled worrying: y  2. Severity: mild  3. Restlessness/feeling on edge: y  4. Irritability: y  5. Insomnia: n  6. Duration: months  7. Panic attacks: still has them rarely  215. Refills: y  216. Sleeping: y  217. Eating: stable  218. Substances: n  219. Therapy: With Genevieve at [facility] (continuing)  220. Medication compliant: y  221. No SI HI AVH.      6/1: Interview: Continued improvement. Doing well from d and a perspective. Persistent constipation that began with abilify. Still not taking lorazepam as her anxiety is under control. Denies SI HI AVH.  concurs.    9/11/20 H&P: Patient presented today with her . Both provided extensive history regarding her depression and anxiety. Patient had been seen by  at [facility] for roughly a year and a half. Patient reported that Dr. Gordon had become convinced that she had bipolar disorder, which both she and her  disagree with. Patient and  deny any episodes in the past where she experienced, for an extended length of time lasting at " "least several days, no need for sleep, rapid speech, risk-taking behaviors. Per the  and wife pair, her previous psychiatrist insisted on her being on a mood stabilizer.   Patient reports that she had been stable on Celexa for \"several years.\" In 2019, January, the patient underwent a colonoscopy where dysplasia was revealed. This led to significant anxiety and a \"breakdown.\" The dysplasia was subsequently removed. Due to the heightened anxiety the patient's psychiatrist took the patient off of Celexa and started her on Lexapro. The patient did not tolerate Lexapro well, she continued to remain anxious, she began to engage in therapy in addition to medication management. The Lexapro was subsequently switched to Viibryd. The Viibryd did not work. The patient also began to experience panic attacks including shortness of breath, crying, tachycardia, palpitations. The panic attacks were new and had never happened before.   In January 2020 the patient experienced another breakdown and sunk into a deeper depression. The COVID-19 pandemic only worsened her situation. In February the patient's , a physician, decided to become her full-time caretaker as she would become anxious and panicky without him present. The patient also experienced intermittent bouts of suicidal ideation.   Recently, the patient and  demanded of their psychiatrist to take her off of Viibryd and Seroquel. They actually tapered her off of Seroquel themselves. They asked him to switch her back to Celexa. She has been on Celexa now for the last 3 days and is already begun to experience improvement. She states that the Seroquel led to having hallucinations and actually worsened insomnia. Last night she did not take Seroquel for the first time in many months and she slept very well. Her mood is slightly improved. Regarding her anxiety, she endorses muscle tension and fatigue restlessness irritability and a history of insomnia. Regarding " her depression she denies SI HI AVH, denies anhedonia denies guilt, notes some decreased energy, psychomotor retardation, and a history of insomnia. They had also tried acupuncture in the past with some success. Psychiatric review of systems is negative for psychosis nancy, positive for anxiety and depression. Possibly positive for  depression and postpartum depression. The patient is medication compliant.       Past Psychiatric History:  Began Psychiatric Treatment: Several years   Dx: Depression and anxiety   Psychiatrist: Doctor Gordon for the last year and a half   Therapist: Sees a therapist at [facility] History: Denies, Although she came close to needing admission recently.   Medication Trials: See HPI. Seroquel, Prozac which was too activating, Zoloft which was too activating, Celexa which worked well, Effexor which worked well, Lexapro which did not work, Viibryd which did not work.   Self-Harm: Denies   Suicide Attempts: Denies   OB/GYN HISTORY:  Sexually Active: OB/GYN history deferred   Substance Abuse History:  Types: Denies all, including illicit   Social History:  Marital Status:    Employed: No     Kids: 4   House: House    Hx: Denies   Family History:  Suicide Attempts: Deferred today, Due to lack of time   Suicide Completions: Deferred   Substance Use: Deferred   Psychiatric Conditions: Deferred    depression, psychosis, anxiety: Deferred   Developmental History:  Born: Deferred   Siblings: Deferred   Access to Weapons: Denies   Thought Content: no suicidal ideation, no homicidal ideation, no auditory hallucinations, no visual hallucinations, and     PHQ-9 Depression Screening  PHQ-9 Total Score:      Little interest or pleasure in doing things?     Feeling down, depressed, or hopeless?     Trouble falling or staying asleep, or sleeping too much?     Feeling tired or having little energy?     Poor appetite or overeating?     Feeling bad about yourself - or  that you are a failure or have let yourself or your family down?     Trouble concentrating on things, such as reading the newspaper or watching television?     Moving or speaking so slowly that other people could have noticed? Or the opposite - being so fidgety or restless that you have been moving around a lot more than usual?     Thoughts that you would be better off dead, or of hurting yourself in some way?     PHQ-9 Total Score       FAREED-7       Past Surgical History:  Past Surgical History:   Procedure Laterality Date   • COLONOSCOPY  [id number]    last scope 2020   • COLONOSCOPY N/A 10/29/2021    Procedure: COLONOSCOPY with possible biopies.;  Surgeon: Skyler Fuller MD;  Location: [facility] ENDOSCOPY;  Service: General;  Laterality: N/A;   • KNEE SURGERY  2017    broken knee       Problem List:  Patient Active Problem List   Diagnosis   • Anxiety   • Depression   • History of fracture of patella   • Hyperlipemia   • Osteopenia   • Ulcerative lesion   • Constipation   • History of colonic polyps   • Tinnitus of right ear   • Sensorineural hearing loss (SNHL) of both ears   • Right shoulder pain       Allergy:   No Known Allergies     Discontinued Medications:  There are no discontinued medications.    Current Medications:   Current Outpatient Medications   Medication Sig Dispense Refill   • amitriptyline (ELAVIL) 25 MG tablet TAKE 1 TABLET BY MOUTH EVERY NIGHT 90 tablet 1   • ARIPiprazole (Abilify) 2 MG tablet Take 2 tablets by mouth Daily. 180 tablet 1   • atorvastatin (LIPITOR) 20 MG tablet TAKE 1 TABLET BY MOUTH DAILY 90 tablet 1   • Calcium Carb-Cholecalciferol (OYSCO 500 + D) 500-200 MG-UNIT tablet      • cholecalciferol (VITAMIN D3) 25 MCG (1000 UT) tablet Take 1 tablet by mouth Daily.     • Coenzyme Q10 (CO Q10 PO) Take  by mouth.     • escitalopram (LEXAPRO) 10 MG tablet TAKE 1 TABLET BY MOUTH DAILY 90 tablet 1   • gabapentin (NEURONTIN) 300 MG capsule 300 mg qam, 300 mg qpm, 600 mg qhs 120 capsule 5    • GARLIC PO garlic 1,000 mg oral capsule take 1 capsule by oral route daily   Active     • LORazepam (ATIVAN) 1 MG tablet Take 1 tablet by mouth 2 (Two) Times a Day As Needed for Anxiety. for anxiety 60 tablet 5   • Multiple Vitamins-Iron (MULTIVITAMIN PLUS IRON ADULT PO) Women's Multivitamin 18 mg iron-400 mcg-500 mg oral tablet take 1 tablet by oral route daily   Active     • Omega-3 Fatty Acids (fish oil) 1000 MG capsule capsule 3 capsules 3 (Three) Times a Day With Meals. PT(PATIENT) REPORTS SHE TAKES MEDICATION MORNING/LUNCH/EVENING     • Probiotic Product (PROBIOTIC PO) Take  by mouth.     • vitamin E 400 UNIT capsule vitamin E 400 unit oral capsule take 1 capsule by oral route daily   Active     • diclofenac (VOLTAREN) 75 MG EC tablet Take 1 tablet by mouth 2 (Two) Times a Day. (Patient not taking: Reported on 4/28/2023) 60 tablet 1   • meloxicam (Mobic) 15 MG tablet Take 1 tablet by mouth Daily. (Patient not taking: Reported on 4/28/2023) 30 tablet 1     No current facility-administered medications for this visit.       Past Medical History:  Past Medical History:   Diagnosis Date   • Acid reflux 2022   • Anxiety    • Depression    • Fracture of patella 09/12/2017   • Hyperlipemia    • Osteopenia    • Panic disorder    • Psychiatric inpatient 07/22/2022    Discharged 07/22/2022 for Anxiety, Depression, and Suicidal Bx's   • Seizures     reports seizure activity, but under control.       Social History     Socioeconomic History   • Marital status:    Tobacco Use   • Smoking status: Never   • Smokeless tobacco: Never   Vaping Use   • Vaping Use: Never used   Substance and Sexual Activity   • Alcohol use: Not Currently   • Drug use: Never   • Sexual activity: Not Currently         Family History   Problem Relation Age of Onset   • Stroke Mother    • Heart disease Mother    • No Known Problems Father    • No Known Problems Sister    • Cancer Brother    • No Known Problems Maternal Aunt    • No Known  "Problems Paternal Aunt    • No Known Problems Maternal Uncle    • No Known Problems Paternal Uncle    • No Known Problems Maternal Grandfather    • No Known Problems Maternal Grandmother    • No Known Problems Paternal Grandfather    • No Known Problems Paternal Grandmother    • No Known Problems Cousin    • Seizures Son    • No Known Problems Other    • ADD / ADHD Neg Hx    • Alcohol abuse Neg Hx    • Anxiety disorder Neg Hx    • Bipolar disorder Neg Hx    • Dementia Neg Hx    • Depression Neg Hx    • Drug abuse Neg Hx    • OCD Neg Hx    • Paranoid behavior Neg Hx    • Schizophrenia Neg Hx    • Self-Injurious Behavior  Neg Hx    • Suicide Attempts Neg Hx      Review of Systems:  Review of Systems   Constitutional: Negative for diaphoresis and fatigue.   HENT: Negative for drooling.    Eyes: Negative for visual disturbance.   Respiratory: Negative for cough and shortness of breath.    Cardiovascular: Negative for chest pain, palpitations and leg swelling.   Gastrointestinal: Negative for diarrhea, nausea and vomiting.   Endocrine: Negative for cold intolerance and heat intolerance.   Genitourinary: Negative for difficulty urinating.   Musculoskeletal: Negative for joint swelling.   Allergic/Immunologic: Negative for immunocompromised state.   Neurological: Negative for dizziness, seizures, syncope, speech difficulty, light-headedness, numbness and headaches.         · Mental Status Exam  · Appearance  · : sitting in a chair with her , good eye contact, normal street clothes, groomed, in person  · Behavior  · : pleasant and cooperative  · Motor  · : No abnormal  · Speech  · : normal rhythm, rate, volume, tone, not hyperverbal, not pressured, normal prosidy, Speaks with an accent  · Mood  · : \"Happy that I'm doing so much better\"  · Affect  · : euthymic, very slight constriction, mood congruent, fair variability  · Thought Content  · : negative suicidal ideations, negative homicidal ideations, negative " "obsessions  · Perceptions  · : negative auditory hallucinations, negative visual hallucinations  · Thought Process  · : linear  · Insight/Judgement  · : fair/fair  · Cognition  · : grossly intact  · Attention  · : intact      Physical Exam:  Physical Exam    Vital Signs:   /67   Pulse 82   Ht 154.9 cm (61\")   Wt 63.5 kg (140 lb)   BMI 26.45 kg/m²      Lab Results:   No visits with results within 6 Month(s) from this visit.   Latest known visit with results is:   Admission on 08/20/2022, Discharged on 08/20/2022   Component Date Value Ref Range Status   • SARS Antigen 08/20/2022 Not Detected  Not Detected, Presumptive Negative Final   • Internal Control 08/20/2022 Passed  Passed Final   • Lot Number 08/20/2022 [id number]   Final   • Expiration Date 08/20/2022 92,[id number]   Final   • Rapid Influenza A Ag 08/20/2022 Negative  Negative Final   • Rapid Influenza B Ag 08/20/2022 Negative  Negative Final   • Internal Control 08/20/2022 Passed  Passed Final   • Lot Number 08/20/2022 [id number]   Final   • Expiration Date 08/20/2022 [id number]   Final   • COVID19 08/20/2022 Not Detected  Not Detected - Ref. Range Final       EKG Results:  No orders to display       Imaging Results:  No Images in the past 120 days found..      Assessment & Plan   Diagnoses and all orders for this visit:    1. Recurrent major depressive disorder in remission (Primary)    2. Generalized anxiety disorder    3. Panic disorder    4. Panic attacks    5. Insomnia due to mental condition    6. Moderate episode of recurrent major depressive disorder        Visit Diagnoses:    ICD-10-CM ICD-9-CM   1. Recurrent major depressive disorder in remission  F33.40 296.35   2. Generalized anxiety disorder  F41.1 300.02   3. Panic disorder  F41.0 300.01   4. Panic attacks  F41.0 300.01   5. Insomnia due to mental condition  F51.05 300.9     327.02   6. Moderate episode of recurrent major depressive disorder  F33.1 296.32       INITIAL presentation most consistent " with major depressive disorder in partial remission, FAREED, rare panic attacks.      4/28: Euthymic well. No changes. 16 minutes of supportive psychotherapy with goal to strengthen defenses, promote problems solving, restore adaptive functioning and provide symptom relief. The therapeutic alliance was strengthened to encourage the patient to express their thoughts and feelings. Esteem building was enhanced through praise, reassurance, normalizing and encouragement. Coping skills were enhanced to build distress tolerance skills and emotional regulation. Allowed patient to freely discuss issues without interruption or judgement with unconditional positive regard, active listening skills, and empathy. Provided a safe, confidential environment to facilitate the development of a positive therapeutic relationship and encourage open, honest communication. Assisted patient in identifying risk factors which would indicate the need for higher level of care including thoughts to harm self or others and/or self-harming behavior and encouraged patient to contact this office, call 911, or present to the nearest emergency room should any of these events occur. Assisted patient in processing session content; acknowledged and normalized patient’s thoughts, feelings, and concerns by utilizing a person-centered approach in efforts to build appropriate rapport and a positive therapeutic relationship with open and honest communication. Patient given education on medication side effects, diagnosis/illness and relapse symptoms. Plan to continue supportive psychotherapy in next appointment to provide symptom relief.  Diagnoses: as above  Symptoms: as above  Functional status: good  Mental Status Exam: as above    Treatment plan: Medication management and supportive psychotherapy  Prognosis: good  Progress: stable, well  2 mos        3/3: Stable, well, no changes.  17   Progress: Stable, well  6 weeks    1/20: Well, stable, no changes. 17    Prognosis: good  Progress: well, stable  6 wks      12/9: Significant improvement. No changes. 17 minutes of supportive psychotherapy   Prognosis: good  Progress: improved  6 wks      11/18: Residual anxiety, residual depression.  Increase gabapentin, start amitriptyline at night for initial insomnia.  Close follow-up. 19 minutes of supportive psychotherapy  Treatment plan: Medication management and supportive psychotherapy  Prognosis: Good  Progress: No improvement, residual depression and anxiety insomnia  3 weeks      11/7: Residual anx (at noon), consider adding gabapentin in the mornings. and dep. Increase gabapentin in the afternoons. No other changes. 17 minutes of supportive psychotherapy  Treatment plan: Medication management and supportive psychotherapy  Prognosis: good  Progress: possibly improved, if not stable  2 wks      10/31: Couples therapy, increase Abilify, close follow-up.  I feel that their marriage issues have come to a head recently, and this stressor could largely explain patient's depression and anxiety.  Patient mentioned Celexa again, she is likely interested in switching.  25 minutes of supportive psychotherapy  Treatment plan: Medication management and supportive psychotherapy  Prognosis: Fair to good  Progress: Worsening depression and anxiety related to recent trip to [place]  1 week, highly urgent    9/30: well, stable, some constriction today. No changes. 17 minutes of supportive psychotherapy  Treatment plan: Medication management and supportive psychotherapy  Prognosis: Good  Progress: Stable, close to goal  4 weeks    8/30: Remarkably well. Initial insomnia. 17 minutes of supportive psychotherapyTreatment plan: Medication management and supportive psychotherapy  Prognosis: good  Progress: much better  4 wks    8/1: Try another mood stabilizer in place of olanzapine.  Will trial Abilify again.  Also consider Geodon.  18 minutes of supportive psychotherapy Treatment plan:  Medication management and supportive psychotherapy  Prognosis: Fair to good  Progress: Slightly improved  4 weeks, urgent    6/17: start buspar, doing better. 17 minutes of supportive psychotherapy Treatment plan: Medication management and supportive psychotherapy  Prognosis: good  Progress: improving slowly  4 wks      5/20: Continue Lexapro and lorazepam.  Try Belsomra in place of Ambien.  19 minutes of supportive psychotherapyTreatment plan: Medication management and supportive psychotherapy  Prognosis: Good  Progress: Some progress since last visit  4 weeks urgent    4/22: Some improvement in anx and dep, but still residual symptoms.  Persistent insomnia. Declines IOP, inpatient admission.  Increase Lexapro and start trazodone.  17 minutes of supportive psychotherapy Treatment plan: Medication management and supportive psychotherapy  Prognosis: good  Progress: some, minimal      3/24: Focus on lower doses.  Start Pristiq.  Depression with anxious distress versus depression and generalized anxiety disorder.  She only has distractibility, no other symptoms of hypomania.  17 minutes of supportive psychotherapy  4 weeks    3/15: Continue lurasidone 40 mg at night, increase Ambien to 10 mg at night.  Continue low-dose Lamictal, plan to increase further.  Continue titrating off of amitriptyline.  17 minutes of supportive psychotherapy1 week, urgent    3/10: Effexor has now been stopped.  Continue Latuda and Lamictal at present doses.  I will see her back in a few days and then at 6 weeks.  Bipolar 2?  If so, how come she is tolerating amitriptyline in the evenings?  19 minutes of supportive psychotherapy 6 weeks    2/15: Still utilizing lorazepam which means the anxiety is not under control.  Increase Effexor.  17 minutes of supportive psychotherapy 4 weeks    1/17: Continued improvement.  No changes, only on effexor 75 mg for 2 weeks.  Tolerating medications without side effects.  17 minutes of supportive  psychotherapy   4 wks    1/3: Continued improvement. Increase effexor to target residual dep anx. TMS to start 1/6. 17 minutes of supportive psychotherapy  4 wks        PLAN:  222. Risk Assessment: Risk of self-harm acutely remain low. Risk factors include a history of suicidal ideation, mood disorder, anxiety disorder, presence of psychosocial stressors such as colonic dysplasia, COVID-19 pandemic. Protective factors include no history of self-harm or suicide attempts, good social support, willingness to engage in care, improved depressive symptoms. Risk of self-harm chronically also remain low, but could be further elevated in the event of treatment noncompliance and/or AODA.  223. Medications:   a. CONTINUE lexapro 10 mg daily. Risks, benefits, alternatives discussed with patient including GI upset, nausea vomiting diarrhea, theoretical decrease of seizure threshold predisposing the patient to a slightly higher seizure risk, headaches, sexual dysfunction, serotonin syndrome, bleeding risk, increased suicidality in patients 24 years and younger.  After discussion of these risks and benefits, the patient voiced understanding and agreed to proceed.  b. CONTINUE gabapentin 300 mg qam, 300 qpm, 600 mg nightly. Risks, benefits, alternatives discussed with patient including sedation, dizziness/falls risk, GI upset, weight gain.  After discussion of these risks and benefits, the patient voiced understanding and agreed to proceed. LAVERNE ordered, Mamadou reviewed.  c. CONTINUE Abilify 4 mg daily. Risks, benefits, alternatives discussed with patient including increased energy, exacerbation of irritability, akathisia, GI upset, orthostatic hypotension, increased appetite.  After discussion of these risks and benefits, the patient voiced understanding and agreed to proceed.  d. CONTINUE amitriptyline 25 mg nightly. Risks, benefits, alternatives discussed with patient including sedation, dizziness, falls, GI upset, constipation,  "urinary retention, dry mouth.  After discussion of these risks and benefits, the patient voiced understanding and agreed to proceed.  e. CONTINUE lorazepam 1 mg bid PRN. Risks, benefits, alternatives discussed with patient including GI upset, sedation, dizziness, respiratory depression, falls risk.  After discussion of these risks and benefits, the patient voiced understanding and agreed to proceed.  f. S/P:   i. buspar 5 mg bid was ineffective.  ii. Trazodone 50 mg nightly not helpful for insomnia.  iii. Mirtazapine 15 mg, 7.5 mg: ineffective; made anxiety and depression worse.  iv. Abilify: brain zaps and constipation  v. Seroquel made depression worse  vi. celexa 20 mg daily, ineffective after several months  vii. Doxepin caused \"brain zaps\"  viii. Latuda was ineffective.  ix. Amitriptyline 25 mg nightly helpful for sleep but we didn't want her on too many serotonergic meds.  x. Ambien minimally helpful for insomnia.  xi. Pristiq and effexor worsened anxiety, both low dose.  xii. Stopped lamictal for unclear reasons 25 mg.  xiii. belsomra 5 mg nightly didn't help with insomnia.  224. Therapy: continue with Genevieve at [facility].  Referred for couples therapy 10/2022  a. Status post IOP at [facility] 10/2022  225. Labs/Studies: BMP to monitor sodium levels in February was entirely normal, EKG February shows rate 72, sinus, .    TREATMENT PLAN/GOALS: Continue supportive psychotherapy efforts and medications as indicated. Treatment and medication options discussed during today's visit. Patient acknowledged and verbally consented to continue with current treatment plan and was educated on the importance of compliance with treatment and follow-up appointments.    MEDICATION ISSUES:  YVETTE reviewed as expected.  Discussed medication options and treatment plan of prescribed medication as well as the risks, benefits, and side effects including potential falls, possible impaired driving and metabolic adversities among " others. Patient is agreeable to call the office with any worsening of symptoms or onset of side effects. Patient is agreeable to call 911 or go to the nearest ER should he/she begin having SI/HI. No medication side effects or related complaints today.     MEDS ORDERED DURING VISIT:    Return in about 2 months (around 6/28/2023).         This document has been electronically signed by Vinny Krishnamurthy MD  April 28, 2023 10:24 EDT       Part of this note may be an electronic transcription/translation of spoken language to printed text using the Dragon Dictation System.

## 2023-04-28 NOTE — PROGRESS NOTES
Physical Therapy Daily Treatment Note      Patient: Shabana Ferguson   : 1955  Referring practitioner: Kedar Simpson MD  Date of Initial Visit: Type: THERAPY  Noted: 3/30/2023  Today's Date: 2023  Patient seen for 7 sessions           Subjective Questionnaire:       Subjective Evaluation    History of Present Illness    Subjective comment: Pt reported no pain with strengthening but has pain with ABD and exsternal rotation and external rotation.       Objective          Active Range of Motion     Right Shoulder   Flexion: 162 degrees   Abduction: 137 degrees   External rotation BTH: T2   Internal rotation BTB: T5     Strength/Myotome Testing     Right Shoulder     Planes of Motion   Flexion: 4   Extension: 4   Abduction: 4   External rotation at 0°: 4+   Internal rotation at 0°: 4+   Horizontal abduction: 4+       See Exercise, Manual, and Modality Logs for complete treatment.       Assessment & Plan     Assessment    Assessment details: Pt reported having no pain currently even with strengthening stating she has pain raising her arm in ABD with external rotation and with external rotation.  Pt reports she is compliant with HEP.  Pt reports she feels much better and is more functional since starting PT.  Pt reported she knows if her muscles tear further she will need surgery but does not need it currently.  Pt and PT discussed at last session that she would discharge today if she didn't have any issues from last progress note to today and pt reports she does not.  Pt is discharged after this session.        Visit Diagnoses:    ICD-10-CM ICD-9-CM   1. Right shoulder pain, unspecified chronicity  M25.511 719.41   2. Weakness of right upper extremity  R29.898 729.89   3. Decreased ROM of right shoulder  M25.611 719.51       Other:  Pt will discharge today.         Timed:  Manual Therapy:         mins  97801;  Therapeutic Exercise:    21     mins  91237;     Neuromuscular Bernie:        mins  32007;     Therapeutic Activity:     8     mins  26514;     Gait Training:           mins  66450;     Ultrasound:          mins  74122;    Electrical Stimulation:         mins  30651 ( );  Aquatic Therapy          mins  74675    Untimed:  Electrical Stimulation:         mins  24228 ( );  Mechanical Traction:         mins  58583;     Timed Treatment:   29   mins   Total Treatment:     29   mins    Electronically signed    Lexi Muir PTA  Physical Therapist Assistant    MARYJANE license: [id number]

## 2023-05-09 ENCOUNTER — OFFICE VISIT (OUTPATIENT)
Dept: ORTHOPEDIC SURGERY | Facility: CLINIC | Age: 68
End: 2023-05-09
Payer: MEDICARE

## 2023-05-09 VITALS — WEIGHT: 140 LBS | BODY MASS INDEX: 26.43 KG/M2 | HEIGHT: 61 IN

## 2023-05-09 DIAGNOSIS — M75.101 TEAR OF RIGHT ROTATOR CUFF, UNSPECIFIED TEAR EXTENT, UNSPECIFIED WHETHER TRAUMATIC: Primary | ICD-10-CM

## 2023-05-09 NOTE — PROGRESS NOTES
"Chief Complaint  Pain and Follow-up of the Right Shoulder     Subjective      Shabana Ferguson presents to [facility] for follow up of the right shoulder. She had an injection last time and has been in physical therapy.  She reports it is about the same.  To review She reports pain over the last 4-6  months. Patient denies injury of the shoulder, pain over the lateral shoulder. She reports a tear of the biceps and underwent conservative treatment with this. She was doing some better but recently has aggravated the shoulder doing some volunteer work.     No Known Allergies     Social History     Socioeconomic History   • Marital status:    Tobacco Use   • Smoking status: Never   • Smokeless tobacco: Never   Vaping Use   • Vaping Use: Never used   Substance and Sexual Activity   • Alcohol use: Not Currently   • Drug use: Never   • Sexual activity: Not Currently        Review of Systems     Objective   Vital Signs:   Ht 154.9 cm (61\")   Wt 63.5 kg (140 lb)   BMI 26.45 kg/m²       Physical Exam  Constitutional:       Appearance: Normal appearance. Patient is well-developed and normal weight.   HENT:      Head: Normocephalic.      Right Ear: Hearing and external ear normal.      Left Ear: Hearing and external ear normal.      Nose: Nose normal.   Eyes:      Conjunctiva/sclera: Conjunctivae normal.   Cardiovascular:      Rate and Rhythm: Normal rate.   Pulmonary:      Effort: Pulmonary effort is normal.      Breath sounds: No wheezing or rales.   Abdominal:      Palpations: Abdomen is soft.      Tenderness: There is no abdominal tenderness.   Musculoskeletal:      Cervical back: Normal range of motion.   Skin:     Findings: No rash.   Neurological:      Mental Status: Patient is alert and oriented to person, place, and time.   Psychiatric:         Mood and Affect: Mood and affect normal.         Judgment: Judgment normal.       Ortho Exam      .RIGHT SHOULDER Forward flexion 170. " Abduction 150. External rotation 60. Internal rotation to T8. Supraspinatus strength 3/5. Infraspinatus Strength 3/5. Infrared subscap 3/5.Sensation intact to light touch, median, radial, ulnar nerve. Positive AIN, PIN, ulnar nerve motor. Positive pulses. Good strength in triceps, biceps, deltoid, wrist extensors and wrist flexors.     Procedures      Imaging Results (Most Recent)     None           Result Review :     [facility] 3/21/23  IMPRESSION:  1. Full-thickness, near fullwidth tear of the supraspinatus tendon, retracted at least 4 cm over the medial third of the humeral head.   Suspected high-grade partial-thickness tearing of the anterior insertional infraspinatus tendon. Mild loss of supraspinatus and   infraspinatus muscle bulk. 2. High-grade longitudinal partial-thickness tearing throughout the long head biceps tendon, involving   greater than 50% thickness. 3. Degeneration/fraying throughout the superior and posterior glenoid labrum. 4. High-grade chondral   thinning of the posterior glenoid and moderate to high-grade chondral thinning of the central humeral head. Small inferior humeral   head marginal osteophyte formation. 5. Small to moderate glenohumeral effusion with mild synovial proliferation. 6. Mild AC joint   arthrosis with a small subacromial spur. Slightly high riding humeral head produces narrowing of the supraspinatus outlet. 7. Mild to   moderate inflammation of the subacromial/subdeltoid bursa.     Assessment and Plan     Diagnoses and all orders for this visit:    1. Tear of right rotator cuff, unspecified tear extent, unspecified whether traumatic (Primary)        Discussed the treatment plan with the patient. Discussed the treatment options with the patient, operative vs non-operative.     Continue HEP exercises.      Discussed injections and anti inflammatories if her pain flares up.      Call or return if worsening symptoms.    Follow Up     PRN      Patient was given  instructions and counseling regarding her condition or for health maintenance advice. Please see specific information pulled into the AVS if appropriate.     Scribed for Kedar Simpson MD by Bouchra Campos MA.  05/09/23   13:40 EDT    I have personally performed the services described in this document as scribed by the above individual and it is both accurate and complete. Kedar Simpson MD 05/09/23

## 2023-07-25 DIAGNOSIS — F33.1 MODERATE EPISODE OF RECURRENT MAJOR DEPRESSIVE DISORDER: ICD-10-CM

## 2023-07-25 DIAGNOSIS — F41.1 GENERALIZED ANXIETY DISORDER: ICD-10-CM

## 2023-07-25 DIAGNOSIS — F41.0 PANIC ATTACKS: ICD-10-CM

## 2023-07-25 DIAGNOSIS — F51.05 INSOMNIA DUE TO MENTAL CONDITION: ICD-10-CM

## 2023-07-25 RX ORDER — ARIPIPRAZOLE 2 MG/1
4 TABLET ORAL DAILY
Qty: 180 TABLET | Refills: 1 | Status: SHIPPED | OUTPATIENT
Start: 2023-07-25

## 2023-07-25 RX ORDER — GABAPENTIN 300 MG/1
CAPSULE ORAL
Qty: 120 CAPSULE | Refills: 5 | Status: SHIPPED | OUTPATIENT
Start: 2023-07-25

## 2023-08-31 ENCOUNTER — OFFICE VISIT (OUTPATIENT)
Dept: PSYCHIATRY | Facility: CLINIC | Age: 68
End: 2023-08-31
Payer: MEDICARE

## 2023-08-31 VITALS
HEIGHT: 61 IN | SYSTOLIC BLOOD PRESSURE: 122 MMHG | BODY MASS INDEX: 29.53 KG/M2 | WEIGHT: 156.4 LBS | DIASTOLIC BLOOD PRESSURE: 67 MMHG | HEART RATE: 87 BPM

## 2023-08-31 DIAGNOSIS — F51.05 INSOMNIA DUE TO MENTAL CONDITION: ICD-10-CM

## 2023-08-31 DIAGNOSIS — F41.1 GENERALIZED ANXIETY DISORDER: ICD-10-CM

## 2023-08-31 DIAGNOSIS — F41.0 PANIC DISORDER: ICD-10-CM

## 2023-08-31 DIAGNOSIS — F33.1 MODERATE EPISODE OF RECURRENT MAJOR DEPRESSIVE DISORDER: Primary | ICD-10-CM

## 2023-08-31 DIAGNOSIS — F41.0 PANIC ATTACKS: ICD-10-CM

## 2023-08-31 DIAGNOSIS — F33.40 RECURRENT MAJOR DEPRESSIVE DISORDER IN REMISSION: ICD-10-CM

## 2023-08-31 RX ORDER — ESCITALOPRAM OXALATE 10 MG/1
10 TABLET ORAL DAILY
Qty: 90 TABLET | Refills: 3 | Status: SHIPPED | OUTPATIENT
Start: 2023-08-31

## 2023-08-31 RX ORDER — LORAZEPAM 1 MG/1
1 TABLET ORAL 2 TIMES DAILY PRN
Qty: 60 TABLET | Refills: 5 | Status: SHIPPED | OUTPATIENT
Start: 2023-08-31

## 2023-08-31 NOTE — PATIENT INSTRUCTIONS
1.  Please return to clinic at your next scheduled visit.  Contact the clinic ([phone]) at least 24 hours prior in the event you need to cancel.  2.  Do no harm to yourself or others.    3.  Avoid alcohol and drugs.    4.  Take all medications as prescribed.  Please contact the clinic with any concerns. If you are in need of medication refills, please call the clinic at [phone].    5. Should you want to get in touch with your provider, Dr. Vinny Krishnamurthy, please utilize [employer] or contact the office ([phone]), and staff will be able to page Dr. Krishnamurthy directly.  6.  In the event you have personal crisis, contact the following crisis numbers: Suicide Prevention Hotline 1-931.634.8340; [facility] Helpline 9-220-622-[place]; [facility] Emergency Room [phone]; text HELLO to 814974; or 347.

## 2023-08-31 NOTE — PROGRESS NOTES
"Subjective   Shabana Ferguson is a 67 y.o. female who presents today for follow up    Referring Provider:  No referring provider defined for this encounter.    Chief Complaint:  mdd margarita    History of Present Illness:     Shabana Ferguson is a 64 year old /White female, hx of anxiety and depression, fractured patella, HLD, osteopenia, ulcer referred by MARIE Brice, for anxiety and depression management.   Chart: Psychotropic medications: amitriptyline 25 mg QHS, Celexa 20 mg p.o. daily, lorazepam 0.5 p.o. twice daily as needed anxiety.      \"Shabana\" and Jaya    8/31: In person interview:  Chart review:     Seen by orthopedics and physical therapy.  Planning:     Euthymic at last visit.  \"I have good times... but sometimes it comes back.\"  Recently I'm good  2-3 weeks ago the stress made it worse. Obdulio and his family went to [place].  She became anxious and didn't want to go and had to miss the wedding  There was some guilt tripping by family  Rarely uses ativan  Mood/Depression: can push away rare feelings of hopelessness.  Anxiety: still feel restless sometimes.  Panic attacks: rare, controlled on bzd  Energy: good  Concentration: good  Sleeping:  Takes half a lorazepam at night to sleep, along with other night meds.  Much better  Eating: stable  Refills: y  Substances: def  Therapy: started group therapy with other women around her age.    Medication compliant: y  SE: n  No SI HI AVH.      6/30: In person interview:  Chart review: Seen by orthopedics and physical therapy.  Planning: Euthymic at last visit.  \"I'm most of the time, good.\"    Mood/Depression: can push away rare feelings of hopelessness.  Anxiety: still feel restless sometimes.  Panic attacks: rare, controlled on bzd  Energy: good  Concentration: good  Sleeping:  Takes half a lorazepam at night to sleep, along with other night meds.  Eating: stable  Refills: y  Substances: def  Therapy: started group therapy with other " "women around her age.  Medication compliant: y  SE: n  No SI HI AVH.      : In person interview:  Chart review: getting PT for right shoulder rotator cuff tear.  Planning: stable no changes.  \"I'm praying and I give thanks to God.\"  \"I'm doing a lot better.\"  Mood/Depression: only if I get stressed out  Anxiety: sometimes get stressed out.  Takes an ativan, takes one in the evening (half). About half a day.  Panic attacks: denies  Energy: good  Concentration: stable  Sleeping: good, well  Eatin lbs  Refills: y  Substances: n  Therapy: continuing  Medication compliant: y  SE: n  No SI HI AVH.      3/3: In person interview:  Chart review: No new.  Planning: Well, stable, no changes.  \"Friend's father .\"  96 yo.  \"I'm doing good.\"  Once in a while I have trouble sleeping. Mostly I sleep well.  Tried to wean down on ativan, but has brain zaps.  Jaya: things are doing well.  I'm doing things now like pictures  Family is doing better. Relationships getting better.   Mood/Depression: stable, fairly well  Anxiety: some bouts, minimal  Panic attacks: controlled on ativan  Energy: Stable  Concentration: Stable  Sleeping: Well  Eating: Stable  Refills: Yes  Substances: Denies  Therapy:  Their couples therapist left. Referred to someone else, who doesn't take their insurance. Trying [facility]; has an appnt.  Medication compliant: Yes  No SI HI AVH.        : In person interview:  Chart review: Seen by orthopedic surgery for right rotator cuff tear, getting physical therapy for this.  MRI of the right shoulder ordered.  Planning: Stable, well, no changes.   \"  Mood/Depression:  Anxiety:  Panic attacks:  Energy:  Concentration:  Sleeping:  Eating:  Refills:  Substances:  Therapy:  Medication compliant:  SE:   No SI HI AVH.      : In person interview:  Chart review: No new.    Planning: No changes at last visit.  Are they still doing couples therapy?  \"I'm ok.\"  Stable, once in a while I go a little down, but " "then get back up.  Continuing to do couples therapy  Mood/Depression: minimal depressed mood  Anxiety: sometimes, but controlled  Takes half an ativan some nights, otherwise has anxiety in the morning  Also has brain zaps at night if doesn't take it -- this is the biggest reason  Volunteering at [facility] [employer]  Both are volunteering at [employer]. Also seeing some pts.  Panic attacks: n  Energy: good  Concentration: good  Sleeping: well  Eatin lb wg   Refills: y  Substances: n  Therapy: individual and couples (every 2 weeks)  Medication compliant: y  No SI HI AVH.      : In person interview:  Chart review: No new.  Planning: Increased gabapentin and started amitriptyline at night.  \"I'm doing a lot better.\"  Sleeping better on amitriptyline  Pt wants to get involved in volunteering, [employer], every Tues and Thurs  Mood/Depression: better  Anxiety: less worrying  Trip was a success; they saw one of the grandchildren they haven't seen in a while. Patient smiled and laughed during this discussion. Cooked for him.  Taking less lorazepam  Panic attacks: n  Energy: improving  Concentration: improving  Sleeping: well  Eating: stable  Refills: n  Substances: n  Therapy: continuing therapy and couples therapy  Medication compliant: y  No SI HI AVH.      : In person interview:  Chart review: Seen by primary care .  PHQ-9 is 14, very difficult.  Feels her mental health is better than it was a year ago, however.  Planning: They have started marriage counseling.  Increased gabapentin in the afternoons to 200 mg a day.  Reintroduce amitriptyline in the evenings.  \"I'm suffering.\"  Monday has a big trip to [place]. Just got back from [place] trip, which \"was traumatic.\"  Nicola didn't join them for dinner last night -- upsetting for her.  Mood/Depression:  Depressed mood  Anxiety:  Restless, irritable, on edge -- lorazepam works. Starts at noon.  Very clingy, don't want to be by myself, " "throughout the day  Panic attacks: Controlled on Ativan  Energy: Down  Concentration: Still not a goal  Sleeping: Initial insomnia  Eating: Stable weight  Refills: Yes  Substances: No  Therapy: continuing marriage counseling, have to pay out of pocket  Medication compliant: y  No SI HI AVH.      11/7: In person interview:  Chart review: No new.  Planning: Increased Abilify and referred to couples therapy a week ago.  \"I'm having a hard time to concentrate and sit still.\"  Jaya:  Couldn't go back to sleep last night  Nicola's move was this past Friday  Moved animals to his house on Saturday  He feels there is less negativity, but she is not sure  Mood/Depression:  Depressed mood  Anxiety:  Worrying a lot, very nervous especially in the afternoons  Pacing  Panic attacks: y, controlled on lorazepam  Energy: stable, not at goal  Concentration:   Some issues with concentration  Sleeping:  Always wakes at 4-6 am, maintenance insomnia  But slept well last night 7-6 am.  Eating: 3 lb wl since 10/31  Refills:  y  Substances: n  Therapy: started marriage counseling, next appnt next week  Medication compliant: y  No MARLA MIDDLETON AVH.      10/31: In person interview:  Chart review: No new.  Planning: No changes at last visit.  \"I feel anxiety in the afternoon.\"  Stressors:  Nicola, her son, is moving out. Stressful.  Pt wants to end the marriage; they've discussed it numerous times recently.  Couples therapy: they've tried in the past, more than once, problems with each therapist (patient)  Mood/Depression:  Depressed mood in the afternoons, not mornings  Jaya: trip to [place] was fun but stressful and \"it was in the end a major setback\"  Last night was very destructive: sleepless  Anxiety:  [facility]ent [facility]; recommend taking gabapentin 100 mg in afternoon rather than morning.  Because it is costing so much money, they are not going back.  Worrying, irritable, insomnia, on edge  Panic attacks:  Yes, in the afternoon, " "controlled on ativan  Energy: down  Concentration: poor  Sleeping: initial insomnia  Eating: 3 lb wg 8/22  Refills: No  Substances: No  Therapy: Yes continuing  Medication compliant: y  No MARLA BALLARDH.      9/30: In person interview:  Chart review: Recently discharged from [facility] outpatient.  Patient was depressed from October of last last year to essentially a couple months ago when she started to improve.  Consider light box.  Planning: Restarted Ambien at last visit.  Patient was improving at last visit.  Consider light box  \"Good.\"  I don't need to take the ambien, I just need the gabapentin  TMS helping as well, 10 more sessions, \"really helping\"  Discussed light box  Mood/Depression: stable, well  Anxiety:  Anxiety in the afternoons, takes lorazepam daily at 6 pm  Takes care of the brain zaps  Panic attacks: n  Sleeping: well on the gabapentin  Eating: stable  Refills: y  Substances:  Therapy: IOP completed  Very beneficial, \"I never thought I would like it.\" I would recommend it to anyone.  Medication compliant: y  No MARLA BALLARDH.      8/30: In person interview:  Chart review: Seen by urgent care August 20 for back pain.  COVID-negative.  Also had some cervical adenopathy. EEG is abnormal.  Planning: Trial of Abilify appears to be working well.  This is according to the conversation I had with both of them on August 8.  \"I'm better... I feel good when I get up in the morning.\"  P1, G6  Doing TMS  Then IOP.  The problem I still have is sleeping.  Initial insomnia for 2 hours each night.  Afternoons, gets anxious. Uses ativan rarely. Use has gone down dramatically.  Prescribed gabapentin by [facility]. Helping her anxiety.  Jaya \"quality of life has gone WAY up.\"  Sees neurology in early September.  Mood/Depression: improved  Anxiety: improved  Panic attacks: rare  Eating: stable  Refills: n  Substances: n  Therapy: def  Medication compliant: y  No MARLA BALLARDH.      8/1: In person " "interview:  Chart review: Patient admitted to the [facility] July 18.  Awaiting records.  Planning: This is a follow-up appointment after her admission to the [facility].  \" I am better.\"  Patient reported she was extremely afraid initially of being admitted, however admission was a very \"positive\" experience for her, and \"I felt at peace.\"  Could not tolerate olanzapine 2.5 mg nightly as it gave her nightmares.  She has stopped this medication.  She has set up intensive outpatient at [facility], has already gone to [facility] last week, and also has TMS scheduled.  Per her , \"the crying spells have stopped.\"  Mood/Depression: Slightly improved, still has days of depression.  Reports that some days she is perfect, then other days she becomes very depressed and anxious, but lorazepam helps at that time.  Panic attacks: Still has occasionally  Sleeping: Still has some trouble sleeping, but recently has been using delta 8 to sleep  Eating: Stable, now eating  Refills: No  Substances: No  Therapy: Continuing  Medication compliant: Yes  No MARLA VINCENT.      6/17: In person interview:  Chart review: Seen by primary care June 9.  Apparently experiencing multiple side effects from anxiety and depression medications.  Looks like the results of the testing are not associated with an increased risk for hyper homocystinemia (MTHFR gene assay).  Planning: Belsomra was too expensive.  \"All in all I'm better.\"  Still has nervous stomach; getting help with that from PCP.  Sleeping better  Taking ativan in the am consistently and also at night to sleep (rarely).  Low mood in afternoon with crying spells  Mood/Depression: stable   Anxiety: under control  Sleeping: well  Eating: stable  Refills: n  Substances: n  Therapy: has a counselor at [facility] now, plus [facility]  Medication compliant: y  No MARLA VINCENT.      5/20: In person interview:  Chart review: Patient presented in the emergency room for abdominal pain, she did not want a wait " "so she left.  Saw primary care for abdominal pain which patient believes is related to starting Pristiq a month ago.  Despite having stopped it long ago, she continues to have abdominal symptoms.  Somatization?  Planning: Try Belsomra for insomnia.  \" I am getting better.\"   agrees.  Patient is becoming more functional.  Depression and anxiety have improved.  Still residual symptoms.  She has only been on the higher dose of Lexapro for about 4 weeks ago.  Still having trouble with insomnia but it is controlled on Ambien most nights.  Some nights it is not.  Some nights she does not take Ambien.  Utilizing half a milligram of lorazepam twice daily most days.  More active around the house.  Famotidine is helping with abdominal pain.  Refills: Yes  Substances: No  Therapy: Deferred  Medication compliant: Yes  No SI HI AVH.      4/22: In person interview:  Chart review: 4/11: I called the patient to check in on her progress on lexapro. \"I'm doing better than I was before.\" Sleeping well. Taking nyquil to sleep. Utilizing lorazepam frequently. Has been on lexapro for 10 days. She understands that she needs to give it more time. More active. Has no appetite, however. I mentioned to her the importance of moving to a higher level of care (IOP, inpt). Pt voiced understanding and agreed to proceed. Jaya: crying episodes have stopped. Better energy.  \"I can't sleep.\"  Persistent insomnia, anxious before bed, unclear why. Has tried ambien which sometimes helps. Also over the counter meds.   Irritable, restless, worrying.   Still depressed, but this has improved  Compliant on lexapro  Still having panic attacks.  Utilizing lorazepam.  Per , she has made significant improvement over the last few months, but still has a way to go.  Patient agrees.  Declines IOP, inpatient admission  Therapy: Deferred  Medication compliant: To some extent yes  No SI HI AVH.      3/24: In person interview: With her   Chart " "review: Patient is now discontinued all medications except for Lamictal 25 mg a day.  I started her on doxepin in the evenings for insomnia on March 21.  \"I think I need lower doses.\"  Persistent crying spells, and anxiety. Utilizing ativan.  Doxepin is helping with insomnia.  Patient reports that she knows of someone in her 70s who struggled with depression for years until they came up with a solution.  This gave the patient hope.  She noted that the patient was on very low doses of Celexa and other psychotropic medication.  Medication compliant: y  No SI HI AVH.      3/15: In person interview: With her   Chart review: Patient had another panic attack 3/14 morning.  I advised her to utilize the Ativan liberally as she is not always using it.  Compliant on Latuda 40 mg a day.  She notes that there are some periods where she feels \"completely normal.\"  Then other times, she completely breaks down and has depression and anxiety. Having trouble sleeping for the last 4 days.  Target this with Ambien 5 mg nightly. Risks, benefits, side effects discussed with patient including sedation, dizziness, GI upset, hallucinations, sleepwalking, sleep-eating.  After discussion of these risks and benefits, the patient voiced understanding and agreed to proceed. Continue Latuda and Lamictal. Start titrating off of amitriptyline by taking half a tab, 12.5 mg in other words, nightly.  \" The Ambien kept me asleep for a couple hours.\"  Patient slept for two hours, got up, ate something, and did some reading.  Then went back to sleep.  Notes that she did not feel anxious during this time.  When she woke the next morning she felt very tired.  Utilizing Ativan more liberally to prevent panic attacks.  Some limitation today about how her family is distancing themselves from her due to her depression.  Feels like they should be more supportive.  Wants to switch to a different therapist.  She understands that we will have a therapist " "start next month.  Would like to wait until that time.  Problems with connecting to her present therapist.  Therapy: Continuing  Medication compliant: Yes  No SI HI AVH.      3/10: In person: With her   Interview:  Chart review: We have reduced the dose of Effexor to 37.5 mg a day, and started Lamictal 25 mg daily.  Continuing Latuda at its present dose of 20 mg a day.  October labs show reassuring CMP, CBC.  \" I stopped the Effexor.\"  Patient states she is already feeling better.   agrees.  Did not take Effexor this morning and is not experiencing panic and anxiety in the afternoon like she has been for the past several days.  Tolerating the Lamictal without side effects.  No crying spells  Affect is still depressed  Medication compliant: Yes  No SI HI AVH.      2/15:Virtual visit via Zoom audio and video due to the COVID-19 pandemic.  Patient is accepting of and agreeable to visit.  The visit consisted of the patient and I. The patient is at home, and I am at the office.  Interview:  Chart review: DEXA scan this month.  Normal in the lumbar spine and hips, it is decreased by 0.9% in the femoral necks compared to 2007.  \"Yesterday had a breakdown and cried for hours.\"  Otherwise has been doing very well.  Still utilizing lorazepam once a day.  Has done TMS for what sounds like about a week and a half.  No major changes that she can identify.  Sleeping well  Still some uncontrolled worrying, irritability, restlessness.  Medication compliant: Yes  No SI HI AVH.      1/17: Virtual visit via Zoom audio and video due to the COVID-19 pandemic.  Patient is accepting of and agreeable to visit.  The visit consisted of the patient and I.  Interview:  Chart review: No new chart developments since January 3.  EKG in October reveals 67, sinus, .  \"Doing even better than I was 2 weeks ago.\"  Still has mood swings, but not nearly as bad as before.  Have not needed to use lorazepam most days.  Starting TMS " "this week.  Depression/Mood: much better  Anxiety: much better  Refills: n  Sleeping: well  Eating: stable  Substances: n  Therapy: continuing  Medication compliant: y  No SI HI AVH.      1/3/22: Virtual visit via Zoom audio and video due to the COVID-19 pandemic.  Patient is accepting of and agreeable to visit.  The visit consisted of the patient and I.  Interview:  Chart review: No new chart developments since December 17.  \"Doing much better.\"  Sleeping well at night.  Some anxiety sometimes, but has days where she doesn't have anxiety.  No panic attacks  No crying spells.  Some days doesn't take lorazepam, other times takes a half a pill; when she does, it works very well.  Some anxiety looking up TMS last night, took a lorazepam and felt much better.   No holiday disruption at all. Handled the holidays well.   Approved for TMS.   Depression/Mood:  Depressed mood: much better  Seasonal pattern: denies  Severity: mild  Insomnia: denies  Duration: months  Anxiety:  Uncontrolled worrying: y  Severity: mild  Restlessness/feeling on edge: y  Irritability: y  Insomnia: n  Duration: months  Panic attacks: still has them rarely  Refills: y  Sleeping: y  Eating: stable  Substances: n  Therapy: With Genevieve at [facility] (continuing)  Medication compliant: y  No SI HI AVH.      6/1: Interview: Continued improvement. Doing well from d and a perspective. Persistent constipation that began with abilify. Still not taking lorazepam as her anxiety is under control. Denies SI HI AVH.  concurs.    9/11/20 H&P: Patient presented today with her . Both provided extensive history regarding her depression and anxiety. Patient had been seen by  at [facility] for roughly a year and a half. Patient reported that Dr. Gordon had become convinced that she had bipolar disorder, which both she and her  disagree with. Patient and  deny any episodes in the past where she experienced, for an extended length of time " "lasting at least several days, no need for sleep, rapid speech, risk-taking behaviors. Per the  and wife pair, her previous psychiatrist insisted on her being on a mood stabilizer.   Patient reports that she had been stable on Celexa for \"several years.\" In 2019, January, the patient underwent a colonoscopy where dysplasia was revealed. This led to significant anxiety and a \"breakdown.\" The dysplasia was subsequently removed. Due to the heightened anxiety the patient's psychiatrist took the patient off of Celexa and started her on Lexapro. The patient did not tolerate Lexapro well, she continued to remain anxious, she began to engage in therapy in addition to medication management. The Lexapro was subsequently switched to Viibryd. The Viibryd did not work. The patient also began to experience panic attacks including shortness of breath, crying, tachycardia, palpitations. The panic attacks were new and had never happened before.   In January 2020 the patient experienced another breakdown and sunk into a deeper depression. The COVID-19 pandemic only worsened her situation. In February the patient's , a physician, decided to become her full-time caretaker as she would become anxious and panicky without him present. The patient also experienced intermittent bouts of suicidal ideation.   Recently, the patient and  demanded of their psychiatrist to take her off of Viibryd and Seroquel. They actually tapered her off of Seroquel themselves. They asked him to switch her back to Celexa. She has been on Celexa now for the last 3 days and is already begun to experience improvement. She states that the Seroquel led to having hallucinations and actually worsened insomnia. Last night she did not take Seroquel for the first time in many months and she slept very well. Her mood is slightly improved. Regarding her anxiety, she endorses muscle tension and fatigue restlessness irritability and a history of " insomnia. Regarding her depression she denies SI HI AVH, denies anhedonia denies guilt, notes some decreased energy, psychomotor retardation, and a history of insomnia. They had also tried acupuncture in the past with some success. Psychiatric review of systems is negative for psychosis nancy, positive for anxiety and depression. Possibly positive for  depression and postpartum depression. The patient is medication compliant.       Past Psychiatric History:  Began Psychiatric Treatment: Several years   Dx: Depression and anxiety   Psychiatrist: Doctor Gordon for the last year and a half   Therapist: Sees a therapist at [facility] History: Denies, Although she came close to needing admission recently.   Medication Trials: See HPI. Seroquel, Prozac which was too activating, Zoloft which was too activating, Celexa which worked well, Effexor which worked well, Lexapro which did not work, Viibryd which did not work.   Self-Harm: Denies   Suicide Attempts: Denies   OB/GYN HISTORY:  Sexually Active: OB/GYN history deferred   Substance Abuse History:  Types: Denies all, including illicit   Social History:  Marital Status:    Employed: No     Kids: 4   House: House    Hx: Denies   Family History:  Suicide Attempts: Deferred today, Due to lack of time   Suicide Completions: Deferred   Substance Use: Deferred   Psychiatric Conditions: Deferred    depression, psychosis, anxiety: Deferred   Developmental History:  Born: Deferred   Siblings: Deferred   Access to Weapons: Denies   Thought Content: no suicidal ideation, no homicidal ideation, no auditory hallucinations, no visual hallucinations, and     PHQ-9 Depression Screening  PHQ-9 Total Score:      Little interest or pleasure in doing things?     Feeling down, depressed, or hopeless?     Trouble falling or staying asleep, or sleeping too much?     Feeling tired or having little energy?     Poor appetite or overeating?     Feeling bad  about yourself - or that you are a failure or have let yourself or your family down?     Trouble concentrating on things, such as reading the newspaper or watching television?     Moving or speaking so slowly that other people could have noticed? Or the opposite - being so fidgety or restless that you have been moving around a lot more than usual?     Thoughts that you would be better off dead, or of hurting yourself in some way?     PHQ-9 Total Score       FAREED-7       Past Surgical History:  Past Surgical History:   Procedure Laterality Date    COLONOSCOPY  1/[id number]    last scope 2020    COLONOSCOPY N/A 10/29/2021    Procedure: COLONOSCOPY with possible biopies.;  Surgeon: Skyler Fuller MD;  Location: [facility] ENDOSCOPY;  Service: General;  Laterality: N/A;    KNEE SURGERY  2017    broken knee       Problem List:  Patient Active Problem List   Diagnosis    Anxiety    Depression    History of fracture of patella    Hyperlipemia    Osteopenia    Ulcerative lesion    Constipation    History of colonic polyps    Tinnitus of right ear    Sensorineural hearing loss (SNHL) of both ears    Right shoulder pain       Allergy:   No Known Allergies     Discontinued Medications:  Medications Discontinued During This Encounter   Medication Reason    escitalopram (LEXAPRO) 10 MG tablet Reorder    LORazepam (ATIVAN) 1 MG tablet Reorder       Current Medications:   Current Outpatient Medications   Medication Sig Dispense Refill    amitriptyline (ELAVIL) 25 MG tablet TAKE 1 TABLET BY MOUTH EVERY NIGHT 90 tablet 1    ARIPiprazole (Abilify) 2 MG tablet Take 2 tablets by mouth Daily. 180 tablet 1    atorvastatin (LIPITOR) 20 MG tablet TAKE 1 TABLET BY MOUTH DAILY 90 tablet 1    Calcium Carb-Cholecalciferol (OYSCO 500 + D) 500-200 MG-UNIT tablet       cholecalciferol (VITAMIN D3) 25 MCG (1000 UT) tablet Take 1 tablet by mouth Daily.      Coenzyme Q10 (CO Q10 PO) Take  by mouth.      escitalopram (LEXAPRO) 10 MG tablet Take 1 tablet  by mouth Daily. 90 tablet 3    gabapentin (NEURONTIN) 300 MG capsule 300 mg qam, 300 mg qpm, 600 mg qhs 120 capsule 5    GARLIC PO garlic 1,000 mg oral capsule take 1 capsule by oral route daily   Active      LORazepam (ATIVAN) 1 MG tablet Take 1 tablet by mouth 2 (Two) Times a Day As Needed for Anxiety. for anxiety 60 tablet 5    Multiple Vitamins-Iron (MULTIVITAMIN PLUS IRON ADULT PO) Women's Multivitamin 18 mg iron-400 mcg-500 mg oral tablet take 1 tablet by oral route daily   Active      Omega-3 Fatty Acids (fish oil) 1000 MG capsule capsule 3 capsules 3 (Three) Times a Day With Meals. PT(PATIENT) REPORTS SHE TAKES MEDICATION MORNING/LUNCH/EVENING      Probiotic Product (PROBIOTIC PO) Take  by mouth.      vitamin E 400 UNIT capsule vitamin E 400 unit oral capsule take 1 capsule by oral route daily   Active      diclofenac (VOLTAREN) 75 MG EC tablet Take 1 tablet by mouth 2 (Two) Times a Day. (Patient not taking: Reported on 4/28/2023) 60 tablet 1    meloxicam (Mobic) 15 MG tablet Take 1 tablet by mouth Daily. (Patient not taking: Reported on 4/28/2023) 30 tablet 1     No current facility-administered medications for this visit.       Past Medical History:  Past Medical History:   Diagnosis Date    Acid reflux 2022    Anxiety     Depression     Fracture of patella 09/12/2017    Hyperlipemia     Osteopenia     Panic disorder     Psychiatric inpatient 07/22/2022    Discharged 07/22/2022 for Anxiety, Depression, and Suicidal Bx's    Seizures     reports seizure activity, but under control.       Social History     Socioeconomic History    Marital status:    Tobacco Use    Smoking status: Never    Smokeless tobacco: Never   Vaping Use    Vaping Use: Never used   Substance and Sexual Activity    Alcohol use: Not Currently    Drug use: Never    Sexual activity: Not Currently         Family History   Problem Relation Age of Onset    Stroke Mother     Heart disease Mother     No Known Problems Father     No Known  "Problems Sister     Cancer Brother     No Known Problems Maternal Aunt     No Known Problems Paternal Aunt     No Known Problems Maternal Uncle     No Known Problems Paternal Uncle     No Known Problems Maternal Grandfather     No Known Problems Maternal Grandmother     No Known Problems Paternal Grandfather     No Known Problems Paternal Grandmother     No Known Problems Cousin     Seizures Son     No Known Problems Other     ADD / ADHD Neg Hx     Alcohol abuse Neg Hx     Anxiety disorder Neg Hx     Bipolar disorder Neg Hx     Dementia Neg Hx     Depression Neg Hx     Drug abuse Neg Hx     OCD Neg Hx     Paranoid behavior Neg Hx     Schizophrenia Neg Hx     Self-Injurious Behavior  Neg Hx     Suicide Attempts Neg Hx      Review of Systems:  Review of Systems   Constitutional:  Negative for diaphoresis and fatigue.   HENT:  Negative for drooling.    Eyes:  Negative for visual disturbance.   Respiratory:  Negative for cough and shortness of breath.    Cardiovascular:  Negative for chest pain, palpitations and leg swelling.   Gastrointestinal:  Negative for diarrhea, nausea and vomiting.   Endocrine: Negative for cold intolerance and heat intolerance.   Genitourinary:  Negative for difficulty urinating.   Musculoskeletal:  Negative for joint swelling.   Allergic/Immunologic: Negative for immunocompromised state.   Neurological:  Positive for numbness. Negative for dizziness, seizures, syncope, speech difficulty, light-headedness and headaches.       Mental Status Exam  Appearance  : sitting in a chair with her , good eye contact, normal street clothes, groomed, in person  Behavior  : pleasant and cooperative  Motor  : No abnormal  Speech  : normal rhythm, rate, volume, tone, not hyperverbal, not pressured, normal prosidy, Speaks with an accent  Mood  : \"Recently I'm good\"  Affect  : euthymic, very slight constriction, mood congruent, fair variability  Thought Content  : negative suicidal ideations, negative " "homicidal ideations, negative obsessions  Perceptions  : negative auditory hallucinations, negative visual hallucinations  Thought Process  : linear  Insight/Judgement  : fair/fair  Cognition  : grossly intact  Attention  : intact      Physical Exam:  Physical Exam    Vital Signs:   /67   Pulse 87   Ht 154.9 cm (60.98\")   Wt 70.9 kg (156 lb 6.4 oz)   BMI 29.57 kg/mý      Lab Results:   No visits with results within 6 Month(s) from this visit.   Latest known visit with results is:   Admission on 08/20/2022, Discharged on 08/20/2022   Component Date Value Ref Range Status    SARS Antigen 08/20/2022 Not Detected  Not Detected, Presumptive Negative Final    Internal Control 08/20/2022 Passed  Passed Final    Lot Number 08/20/2022 [id number]   Final    Expiration Date 08/20/2022 92,423   Final    Rapid Influenza A Ag 08/20/2022 Negative  Negative Final    Rapid Influenza B Ag 08/20/2022 Negative  Negative Final    Internal Control 08/20/2022 Passed  Passed Final    Lot Number 08/20/2022 [id number]   Final    Expiration Date 08/20/2022 120,423   Final    COVID19 08/20/2022 Not Detected  Not Detected - Ref. Range Final       EKG Results:  No orders to display       Imaging Results:  No Images in the past 120 days found..      Assessment & Plan   Diagnoses and all orders for this visit:    1. Recurrent major depressive disorder in remission (Primary)    2. Panic attacks  -     escitalopram (LEXAPRO) 10 MG tablet; Take 1 tablet by mouth Daily.  Dispense: 90 tablet; Refill: 3  -     LORazepam (ATIVAN) 1 MG tablet; Take 1 tablet by mouth 2 (Two) Times a Day As Needed for Anxiety. for anxiety  Dispense: 60 tablet; Refill: 5    3. Panic disorder  -     LORazepam (ATIVAN) 1 MG tablet; Take 1 tablet by mouth 2 (Two) Times a Day As Needed for Anxiety. for anxiety  Dispense: 60 tablet; Refill: 5    4. Insomnia due to mental condition  -     escitalopram (LEXAPRO) 10 MG tablet; Take 1 tablet by mouth Daily.  Dispense: 90 tablet; " Refill: 3    5. Generalized anxiety disorder  -     escitalopram (LEXAPRO) 10 MG tablet; Take 1 tablet by mouth Daily.  Dispense: 90 tablet; Refill: 3  -     LORazepam (ATIVAN) 1 MG tablet; Take 1 tablet by mouth 2 (Two) Times a Day As Needed for Anxiety. for anxiety  Dispense: 60 tablet; Refill: 5        Visit Diagnoses:    ICD-10-CM ICD-9-CM   1. Recurrent major depressive disorder in remission  F33.40 296.35   2. Panic attacks  F41.0 300.01   3. Panic disorder  F41.0 300.01   4. Insomnia due to mental condition  F51.05 300.9     327.02   5. Generalized anxiety disorder  F41.1 300.02       INITIAL presentation most consistent with major depressive disorder in partial remission, FAREED, rare panic attacks.      8/31: Stable, well, no changes. Light box, we discussed it.    Allowed patient to freely discuss and process issues, such as:  Her feelings of hopelessness and restlessness and how they recur at times. Her fear of her mood getting worse again.  Discussed utilizing distraction, avoiding isolation when has feelings of hopelessness, or restless. Being around grandkids essentially treats it. We talked about the importance of engaging others and how it relates to mental health. Recently went on a trip alone to [place] (drove!). Discussed bringing up a good memory to bring herself out of those feelings of hopelessness and restlessness (her children when young, her parents coming over).  ...without interruption or judgement with unconditional positive   ... using Rogerian psychotherapeutic techniques including unconditional positive regard, reflective listening, and demonstrating clear empathy.     Time (minutes) spent providing supportive psychotherapy: 17    Diagnoses: as above  Symptoms: as above  Functional status: mild impairment  Mental Status Exam: as above    Treatment plan: Medication management and supportive psychotherapy  Prognosis: good  Progress: stable, well  2m      6/30: Stable, well. No changes.  17   Progress: stable, well  2 mos      4/28: Euthymic well. No changes. 16 minutes of supportive psychotherapy with goal to strengthen defenses, promote problems solving, restore adaptive functioning and provide symptom relief. The therapeutic alliance was strengthened to encourage the patient to express their thoughts and feelings. Esteem building was enhanced through praise, reassurance, normalizing and encouragement. Coping skills were enhanced to build distress tolerance skills and emotional regulation. Allowed patient to freely discuss issues without interruption or judgement with unconditional positive regard, active listening skills, and empathy. Provided a safe, confidential environment to facilitate the development of a positive therapeutic relationship and encourage open, honest communication. Assisted patient in identifying risk factors which would indicate the need for higher level of care including thoughts to harm self or others and/or self-harming behavior and encouraged patient to contact this office, call 911, or present to the nearest emergency room should any of these events occur. Assisted patient in processing session content; acknowledged and normalized patient's thoughts, feelings, and concerns by utilizing a person-centered approach in efforts to build appropriate rapport and a positive therapeutic relationship with open and honest communication. Patient given education on medication side effects, diagnosis/illness and relapse symptoms. Plan to continue supportive psychotherapy in next appointment to provide symptom relief.  Diagnoses: as above  Symptoms: as above  Functional status: good  Mental Status Exam: as above    Treatment plan: Medication management and supportive psychotherapy  Prognosis: good  Progress: stable, well  2 mos        3/3: Stable, well, no changes.  17   Progress: Stable, well  6 weeks    1/20: Well, stable, no changes. 17   Prognosis: good  Progress: well, stable  6  wks      12/9: Significant improvement. No changes. 17 minutes of supportive psychotherapy   Prognosis: good  Progress: improved  6 wks      11/18: Residual anxiety, residual depression.  Increase gabapentin, start amitriptyline at night for initial insomnia.  Close follow-up. 19 minutes of supportive psychotherapy  Treatment plan: Medication management and supportive psychotherapy  Prognosis: Good  Progress: No improvement, residual depression and anxiety insomnia  3 weeks      11/7: Residual anx (at noon), consider adding gabapentin in the mornings. and dep. Increase gabapentin in the afternoons. No other changes. 17 minutes of supportive psychotherapy  Treatment plan: Medication management and supportive psychotherapy  Prognosis: good  Progress: possibly improved, if not stable  2 wks      10/31: Couples therapy, increase Abilify, close follow-up.  I feel that their marriage issues have come to a head recently, and this stressor could largely explain patient's depression and anxiety.  Patient mentioned Celexa again, she is likely interested in switching.  25 minutes of supportive psychotherapy  Treatment plan: Medication management and supportive psychotherapy  Prognosis: Fair to good  Progress: Worsening depression and anxiety related to recent trip to [place]  1 week, highly urgent    9/30: well, stable, some constriction today. No changes. 17 minutes of supportive psychotherapy  Treatment plan: Medication management and supportive psychotherapy  Prognosis: Good  Progress: Stable, close to goal  4 weeks    8/30: Remarkably well. Initial insomnia. 17 minutes of supportive psychotherapyTreatment plan: Medication management and supportive psychotherapy  Prognosis: good  Progress: much better  4 wks    8/1: Try another mood stabilizer in place of olanzapine.  Will trial Abilify again.  Also consider Geodon.  18 minutes of supportive psychotherapy Treatment plan: Medication management and supportive  psychotherapy  Prognosis: Fair to good  Progress: Slightly improved  4 weeks, urgent    6/17: start buspar, doing better. 17 minutes of supportive psychotherapy Treatment plan: Medication management and supportive psychotherapy  Prognosis: good  Progress: improving slowly  4 wks      5/20: Continue Lexapro and lorazepam.  Try Belsomra in place of Ambien.  19 minutes of supportive psychotherapyTreatment plan: Medication management and supportive psychotherapy  Prognosis: Good  Progress: Some progress since last visit  4 weeks urgent    4/22: Some improvement in anx and dep, but still residual symptoms.  Persistent insomnia. Declines IOP, inpatient admission.  Increase Lexapro and start trazodone.  17 minutes of supportive psychotherapy Treatment plan: Medication management and supportive psychotherapy  Prognosis: good  Progress: some, minimal      3/24: Focus on lower doses.  Start Pristiq.  Depression with anxious distress versus depression and generalized anxiety disorder.  She only has distractibility, no other symptoms of hypomania.  17 minutes of supportive psychotherapy  4 weeks    3/15: Continue lurasidone 40 mg at night, increase Ambien to 10 mg at night.  Continue low-dose Lamictal, plan to increase further.  Continue titrating off of amitriptyline.  17 minutes of supportive psychotherapy1 week, urgent    3/10: Effexor has now been stopped.  Continue Latuda and Lamictal at present doses.  I will see her back in a few days and then at 6 weeks.  Bipolar 2?  If so, how come she is tolerating amitriptyline in the evenings?  19 minutes of supportive psychotherapy 6 weeks    2/15: Still utilizing lorazepam which means the anxiety is not under control.  Increase Effexor.  17 minutes of supportive psychotherapy 4 weeks    1/17: Continued improvement.  No changes, only on effexor 75 mg for 2 weeks.  Tolerating medications without side effects.  17 minutes of supportive psychotherapy   4 wks    1/3: Continued  improvement. Increase effexor to target residual dep anx. TMS to start 1/6. 17 minutes of supportive psychotherapy  4 wks        PLAN:  Risk Assessment: Risk of self-harm acutely remain low. Risk factors include a history of suicidal ideation, mood disorder, anxiety disorder, presence of psychosocial stressors such as colonic dysplasia, COVID-19 pandemic. Protective factors include no history of self-harm or suicide attempts, good social support, willingness to engage in care, improved depressive symptoms. Risk of self-harm chronically also remain low, but could be further elevated in the event of treatment noncompliance and/or AODA.  Medications:   CONTINUE lexapro 10 mg daily. Risks, benefits, alternatives discussed with patient including GI upset, nausea vomiting diarrhea, theoretical decrease of seizure threshold predisposing the patient to a slightly higher seizure risk, headaches, sexual dysfunction, serotonin syndrome, bleeding risk, increased suicidality in patients 24 years and younger.  After discussion of these risks and benefits, the patient voiced understanding and agreed to proceed.  CONTINUE gabapentin 300 mg qam, 300 qpm, 600 mg nightly. Risks, benefits, alternatives discussed with patient including sedation, dizziness/falls risk, GI upset, weight gain.  After discussion of these risks and benefits, the patient voiced understanding and agreed to proceed. UDS ordered, Mamadou reviewed.  CONTINUE Abilify 4 mg daily. Risks, benefits, alternatives discussed with patient including increased energy, exacerbation of irritability, akathisia, GI upset, orthostatic hypotension, increased appetite.  After discussion of these risks and benefits, the patient voiced understanding and agreed to proceed.  CONTINUE amitriptyline 25 mg nightly. Risks, benefits, alternatives discussed with patient including sedation, dizziness, falls, GI upset, constipation, urinary retention, dry mouth.  After discussion of these risks  "and benefits, the patient voiced understanding and agreed to proceed.  CONTINUE lorazepam 1 mg bid PRN. Risks, benefits, alternatives discussed with patient including GI upset, sedation, dizziness, respiratory depression, falls risk.  After discussion of these risks and benefits, the patient voiced understanding and agreed to proceed.  S/P:   buspar 5 mg bid was ineffective.  Trazodone 50 mg nightly not helpful for insomnia.  Mirtazapine 15 mg, 7.5 mg: ineffective; made anxiety and depression worse.  Abilify: brain zaps and constipation  Seroquel made depression worse  celexa 20 mg daily, ineffective after several months  Doxepin caused \"brain zaps\"  Latuda was ineffective.  Amitriptyline 25 mg nightly helpful for sleep but we didn't want her on too many serotonergic meds.  Ambien minimally helpful for insomnia.  Pristiq and effexor worsened anxiety, both low dose.  Stopped lamictal for unclear reasons 25 mg.  belsomra 5 mg nightly didn't help with insomnia.  Therapy: continue with Genevieve at [facility].  Referred for couples therapy 10/2022  Status post IOP at [facility] 10/2022  Labs/Studies: BMP to monitor sodium levels in February was entirely normal, EKG February shows rate 72, sinus, .    TREATMENT PLAN/GOALS: Continue supportive psychotherapy efforts and medications as indicated. Treatment and medication options discussed during today's visit. Patient acknowledged and verbally consented to continue with current treatment plan and was educated on the importance of compliance with treatment and follow-up appointments.    MEDICATION ISSUES:  YVETTE reviewed as expected.  Discussed medication options and treatment plan of prescribed medication as well as the risks, benefits, and side effects including potential falls, possible impaired driving and metabolic adversities among others. Patient is agreeable to call the office with any worsening of symptoms or onset of side effects. Patient is agreeable to call 911 " or go to the nearest ER should he/she begin having SI/HI. No medication side effects or related complaints today.     MEDS ORDERED DURING VISIT:    Return in about 2 months (around 10/31/2023).         This document has been electronically signed by Vinny Krishnamurthy MD  September 8, 2023 08:56 EDT       Part of this note may be an electronic transcription/translation of spoken language to printed text using the Dragon Dictation System.

## 2023-09-01 NOTE — TELEPHONE ENCOUNTER
[facility] Protocol Passed 10/17/2022 07:15 AM   Protocol Details  No active pregnancy on record    No positive pregnancy test in past 12 months    Blood pressure on record in past 15 months    PHQ2 or PHQ9 on record in past 12 months    Recent or future visit with authorizing provider        NEXT APPT WITH PROVIDER  Appointment with Vinny Krishnamurthy MD (10/31/2022)    LAST MED REFILL  escitalopram (LEXAPRO) 10 MG tablet (09/30/2022)    DUPLICATE REQUEST     TASK CLOSED    Update History & Physical    The patient's History and Physical of August 30, (Inpatient consult) was reviewed with the patient and I examined the patient again yesterday and today. There was progress since then towards having had an ERCP for his choledocholithiasis. Post procedure his LFTs were trended and they are decreasing. Clinically, he appeared well yesterday and again so this morning with no epigastric tenderness. He is NPO and ready for surgery. The surgical site was confirmed by the patient and me. Plan: The risks, benefits, expected outcome, and alternative to the recommended procedure have been discussed with the patient in detail, including potentially life threatening complications and follow-up surgical procedures for infection, bleeding, or hepatobiliary injury, including CBD injury. Patient understands and wants to proceed with the procedure.      Electronically signed by Leopold Belfast, MD on 9/1/2023 at 10:21 AM

## 2023-09-11 ENCOUNTER — TRANSCRIBE ORDERS (OUTPATIENT)
Dept: ADMINISTRATIVE | Facility: HOSPITAL | Age: 68
End: 2023-09-11
Payer: MEDICARE

## 2023-09-11 DIAGNOSIS — Z12.31 SCREENING MAMMOGRAM FOR BREAST CANCER: Primary | ICD-10-CM

## 2023-09-23 DIAGNOSIS — F41.1 GENERALIZED ANXIETY DISORDER: ICD-10-CM

## 2023-09-23 DIAGNOSIS — F51.05 INSOMNIA DUE TO MENTAL CONDITION: ICD-10-CM

## 2023-09-23 DIAGNOSIS — F41.0 PANIC ATTACKS: ICD-10-CM

## 2023-09-25 RX ORDER — ESCITALOPRAM OXALATE 10 MG/1
10 TABLET ORAL DAILY
Qty: 90 TABLET | Refills: 3 | OUTPATIENT
Start: 2023-09-25

## 2023-09-25 NOTE — TELEPHONE ENCOUNTER
NEXT APPT WITH PROVIDER  Appointment with Vinny Krishnamurthy MD (11/02/2023)     LAST MED REFILL  escitalopram (LEXAPRO) 10 MG tablet (08/31/2023)   QTY 90 WITH 3 REFILLS     TOO SOON FOR REFILL    PROVIDER PLEASE ADVISE

## 2023-10-20 ENCOUNTER — HOSPITAL ENCOUNTER (OUTPATIENT)
Dept: MAMMOGRAPHY | Facility: HOSPITAL | Age: 68
Discharge: HOME | End: 2023-10-20
Admitting: NURSE PRACTITIONER
Payer: MEDICARE

## 2023-10-20 DIAGNOSIS — Z12.31 SCREENING MAMMOGRAM FOR BREAST CANCER: ICD-10-CM

## 2023-10-20 PROCEDURE — 77067 SCR MAMMO BI INCL CAD: CPT

## 2023-10-20 PROCEDURE — 77063 BREAST TOMOSYNTHESIS BI: CPT

## 2023-11-02 ENCOUNTER — OFFICE VISIT (OUTPATIENT)
Dept: PSYCHIATRY | Facility: CLINIC | Age: 68
End: 2023-11-02
Payer: MEDICARE

## 2023-11-02 VITALS
WEIGHT: 157.2 LBS | DIASTOLIC BLOOD PRESSURE: 63 MMHG | BODY MASS INDEX: 26.84 KG/M2 | HEART RATE: 83 BPM | HEIGHT: 64 IN | SYSTOLIC BLOOD PRESSURE: 125 MMHG

## 2023-11-02 DIAGNOSIS — F41.1 GENERALIZED ANXIETY DISORDER: ICD-10-CM

## 2023-11-02 DIAGNOSIS — F33.1 MODERATE EPISODE OF RECURRENT MAJOR DEPRESSIVE DISORDER: ICD-10-CM

## 2023-11-02 DIAGNOSIS — F41.0 PANIC DISORDER: ICD-10-CM

## 2023-11-02 DIAGNOSIS — F33.40 RECURRENT MAJOR DEPRESSIVE DISORDER IN REMISSION: Primary | ICD-10-CM

## 2023-11-02 DIAGNOSIS — F51.05 INSOMNIA DUE TO MENTAL CONDITION: ICD-10-CM

## 2023-11-02 DIAGNOSIS — F41.0 PANIC ATTACKS: ICD-10-CM

## 2023-11-02 RX ORDER — ARIPIPRAZOLE 5 MG/1
5 TABLET ORAL DAILY
Qty: 90 TABLET | Refills: 1 | Status: SHIPPED | OUTPATIENT
Start: 2023-11-02

## 2023-11-02 RX ORDER — AMITRIPTYLINE HYDROCHLORIDE 25 MG/1
25 TABLET, FILM COATED ORAL NIGHTLY
Qty: 90 TABLET | Refills: 3 | Status: SHIPPED | OUTPATIENT
Start: 2023-11-02

## 2023-11-02 NOTE — PROGRESS NOTES
"Subjective   Shabana Ferguson is a 67 y.o. female who presents today for follow up    Referring Provider:  No referring provider defined for this encounter.    Chief Complaint:  mdd margarita    History of Present Illness:     Shabana Ferguson is a 64 year old /White female, hx of anxiety and depression, fractured patella, HLD, osteopenia, ulcer referred by MARIE Brice, for anxiety and depression management.   Chart: Psychotropic medications: amitriptyline 25 mg QHS, Celexa 20 mg p.o. daily, lorazepam 0.5 p.o. twice daily as needed anxiety.      \"Shabana\" and Jaya    : In person interview:  Chart review:   Benign mammogram.  Seen by orthopedics and physical therapy.  Plannin/31: Stable, well, no changes. Light box, we discussed it.  Euthymic at last visit.  \"I didn't get the light box.\"  I'm sleeping  Overall good, but have bad days  Regular exercise 3 days a week  Rarely uses ativan  Volunteer work  \"Not feeling real shantal in my life\" and has anticipatory anxiety, as in, she was feeling anxious before this appointment  Mood/Depression: some bad \"hours,\" but overall ok.   Anxiety: restless at times, social anxiety  Panic attacks: rare, controlled on bzd  Energy: good  Concentration: good  Sleeping: stable, better  Eating: stable  Refills: y  Substances: def  Therapy: started group therapy with other women around her age.  Medication compliant: y  SE: n  No SI HI AVH.    : In person interview:  Chart review:     Seen by orthopedics and physical therapy.  Planning:     Euthymic at last visit.  \"I have good times... but sometimes it comes back.\"  Recently I'm good  2-3 weeks ago the stress made it worse. Obdulio and his family went to [place].  She became anxious and didn't want to go and had to miss the wedding  There was some guilt tripping by family  Rarely uses ativan  Mood/Depression: can push away rare feelings of hopelessness.  Anxiety: still feel restless sometimes.  Panic " "attacks: rare, controlled on bzd  Energy: good  Concentration: good  Sleeping:  Takes half a lorazepam at night to sleep, along with other night meds.  Much better  Eating: stable  Refills: y  Substances: def  Therapy: started group therapy with other women around her age.    Medication compliant: y  SE: n  No SI HI AVH.      : In person interview:  Chart review: Seen by orthopedics and physical therapy.  Planning: Euthymic at last visit.  \"I'm most of the time, good.\"    Mood/Depression: can push away rare feelings of hopelessness.  Anxiety: still feel restless sometimes.  Panic attacks: rare, controlled on bzd  Energy: good  Concentration: good  Sleeping:  Takes half a lorazepam at night to sleep, along with other night meds.  Eating: stable  Refills: y  Substances: def  Therapy: started group therapy with other women around her age.  Medication compliant: y  SE: n  No SI HI AVH.    : In person interview:  Chart review: getting PT for right shoulder rotator cuff tear.  Planning: stable no changes.  \"I'm praying and I give thanks to God.\"  \"I'm doing a lot better.\"  Mood/Depression: only if I get stressed out  Anxiety: sometimes get stressed out.  Takes an ativan, takes one in the evening (half). About half a day.  Panic attacks: denies  Energy: good  Concentration: stable  Sleeping: good, well  Eatin lbs  Refills: y  Substances: n  Therapy: continuing  Medication compliant: y  SE: n  No SI HI AVH.    3/3: In person interview:  Chart review: No new.  Planning: Well, stable, no changes.  \"Friend's father .\"  98 yo.  \"I'm doing good.\"  Once in a while I have trouble sleeping. Mostly I sleep well.  Tried to wean down on ativan, but has brain zaps.  Jaya: things are doing well.  I'm doing things now like pictures  Family is doing better. Relationships getting better.   Mood/Depression: stable, fairly well  Anxiety: some bouts, minimal  Panic attacks: controlled on ativan  Energy: " "Stable  Concentration: Stable  Sleeping: Well  Eating: Stable  Refills: Yes  Substances: Denies  Therapy:  Their couples therapist left. Referred to someone else, who doesn't take their insurance. Trying [facility]; has an appnt.  Medication compliant: Yes  No SI HI AVH.    : In person interview:  Chart review: Seen by orthopedic surgery for right rotator cuff tear, getting physical therapy for this.  MRI of the right shoulder ordered.  Planning: Stable, well, no changes.   \"  Mood/Depression:  Anxiety:  Panic attacks:  Energy:  Concentration:  Sleeping:  Eating:  Refills:  Substances:  Therapy:  Medication compliant:  SE:   No SI HI AVH.    : In person interview:  Chart review: No new.    Planning: No changes at last visit.  Are they still doing couples therapy?  \"I'm ok.\"  Stable, once in a while I go a little down, but then get back up.  Continuing to do couples therapy  Mood/Depression: minimal depressed mood  Anxiety: sometimes, but controlled  Takes half an ativan some nights, otherwise has anxiety in the morning  Also has brain zaps at night if doesn't take it -- this is the biggest reason  Volunteering at [employer]  Both are volunteering at [employer]. Also seeing some pts.  Panic attacks: n  Energy: good  Concentration: good  Sleeping: well  Eatin lb wg   Refills: y  Substances: n  Therapy: individual and couples (every 2 weeks)  Medication compliant: y  No SI HI AVH.    : In person interview:  Chart review: No new.  Planning: Increased gabapentin and started amitriptyline at night.  \"I'm doing a lot better.\"  Sleeping better on amitriptyline  Pt wants to get involved in volunteering, [employer], every Tues and Thurs  Mood/Depression: better  Anxiety: less worrying  Trip was a success; they saw one of the grandchildren they haven't seen in a while. Patient smiled and laughed during this discussion. Cooked for him.  Taking less lorazepam  Panic attacks: n  Energy: " "improving  Concentration: improving  Sleeping: well  Eating: stable  Refills: n  Substances: n  Therapy: continuing therapy and couples therapy  Medication compliant: y  No SI HI AVH.    11/18: In person interview:  Chart review: Seen by primary care November 14.  PHQ-9 is 14, very difficult.  Feels her mental health is better than it was a year ago, however.  Planning: They have started marriage counseling.  Increased gabapentin in the afternoons to 200 mg a day.  Reintroduce amitriptyline in the evenings.  \"I'm suffering.\"  Monday has a big trip to [place]. Just got back from [place] trip, which \"was traumatic.\"  Nicola didn't join them for dinner last night -- upsetting for her.  Mood/Depression:  Depressed mood  Anxiety:  Restless, irritable, on edge -- lorazepam works. Starts at noon.  Very clingy, don't want to be by myself, throughout the day  Panic attacks: Controlled on Ativan  Energy: Down  Concentration: Still not a goal  Sleeping: Initial insomnia  Eating: Stable weight  Refills: Yes  Substances: No  Therapy: continuing marriage counseling, have to pay out of pocket  Medication compliant: y  No SI HI AVH.    11/7: In person interview:  Chart review: No new.  Planning: Increased Abilify and referred to couples therapy a week ago.  \"I'm having a hard time to concentrate and sit still.\"  Jaya:  Couldn't go back to sleep last night  Nicola's move was this past Friday  Moved animals to his house on Saturday  He feels there is less negativity, but she is not sure  Mood/Depression:  Depressed mood  Anxiety:  Worrying a lot, very nervous especially in the afternoons  Pacing  Panic attacks: y, controlled on lorazepam  Energy: stable, not at goal  Concentration:   Some issues with concentration  Sleeping:  Always wakes at 4-6 am, maintenance insomnia  But slept well last night 7-6 am.  Eating: 3 lb wl since 10/31  Refills:  y  Substances: n  Therapy: started marriage counseling, next appnt next week  Medication " "compliant: y  No SI HI AVH.    10/31: In person interview:  Chart review: No new.  Planning: No changes at last visit.  \"I feel anxiety in the afternoon.\"  Stressors:  Nicola, her son, is moving out. Stressful.  Pt wants to end the marriage; they've discussed it numerous times recently.  Couples therapy: they've tried in the past, more than once, problems with each therapist (patient)  Mood/Depression:  Depressed mood in the afternoons, not mornings  Jaya: trip to [place] was fun but stressful and \"it was in the end a major setback\"  Last night was very destructive: sleepless  Anxiety:  [facility]; recommend taking gabapentin 100 mg in afternoon rather than morning.  Because it is costing so much money, they are not going back.  Worrying, irritable, insomnia, on edge  Panic attacks:  Yes, in the afternoon, controlled on ativan  Energy: down  Concentration: poor  Sleeping: initial insomnia  Eating: 3 lb wg 8/22  Refills: No  Substances: No  Therapy: Yes continuing  Medication compliant: y  No MARLA MIDDLETON AVH.    9/30: In person interview:  Chart review: Recently discharged from [facility] outpatient.  Patient was depressed from October of last last year to essentially a couple months ago when she started to improve.  Consider light box.  Planning: Restarted Ambien at last visit.  Patient was improving at last visit.  Consider light box  \"Good.\"  I don't need to take the ambien, I just need the gabapentin  TMS helping as well, 10 more sessions, \"really helping\"  Discussed light box  Mood/Depression: stable, well  Anxiety:  Anxiety in the afternoons, takes lorazepam daily at 6 pm  Takes care of the brain zaps  Panic attacks: n  Sleeping: well on the gabapentin  Eating: stable  Refills: y  Substances:  Therapy: IOP completed  Very beneficial, \"I never thought I would like it.\" I would recommend it to anyone.  Medication compliant: y  No SI HI AVH.    8/30: In person interview:  Chart review: Seen " "by urgent care August 20 for back pain.  COVID-negative.  Also had some cervical adenopathy. EEG is abnormal.  Planning: Trial of Abilify appears to be working well.  This is according to the conversation I had with both of them on August 8.  \"I'm better... I feel good when I get up in the morning.\"  P1, G6  Doing TMS  Then IOP.  The problem I still have is sleeping.  Initial insomnia for 2 hours each night.  Afternoons, gets anxious. Uses ativan rarely. Use has gone down dramatically.  Prescribed gabapentin by [facility]. Helping her anxiety.  Jaya \"quality of life has gone WAY up.\"  Sees neurology in early September.  Mood/Depression: improved  Anxiety: improved  Panic attacks: rare  Eating: stable  Refills: n  Substances: n  Therapy: def  Medication compliant: y  No MARLA VINCENT.    8/1: In person interview:  Chart review: Patient admitted to the [facility] July 18.  Awaiting records.  Planning: This is a follow-up appointment after her admission to the [facility].  \" I am better.\"  Patient reported she was extremely afraid initially of being admitted, however admission was a very \"positive\" experience for her, and \"I felt at peace.\"  Could not tolerate olanzapine 2.5 mg nightly as it gave her nightmares.  She has stopped this medication.  She has set up intensive outpatient at [facility], has already gone to [facility] last week, and also has TMS scheduled.  Per her , \"the crying spells have stopped.\"  Mood/Depression: Slightly improved, still has days of depression.  Reports that some days she is perfect, then other days she becomes very depressed and anxious, but lorazepam helps at that time.  Panic attacks: Still has occasionally  Sleeping: Still has some trouble sleeping, but recently has been using delta 8 to sleep  Eating: Stable, now eating  Refills: No  Substances: No  Therapy: Continuing  Medication compliant: Yes  No MARLA VINCENT.    6/17: In person interview:  Chart review: Seen by primary care June " "9.  Apparently experiencing multiple side effects from anxiety and depression medications.  Looks like the results of the testing are not associated with an increased risk for hyper homocystinemia (MTHFR gene assay).  Planning: Belsomra was too expensive.  \"All in all I'm better.\"  Still has nervous stomach; getting help with that from PCP.  Sleeping better  Taking ativan in the am consistently and also at night to sleep (rarely).  Low mood in afternoon with crying spells  Mood/Depression: stable   Anxiety: under control  Sleeping: well  Eating: stable  Refills: n  Substances: n  Therapy: has a counselor at [facility] now, plus [facility]  Medication compliant: y  No SI HI AV.    5/20: In person interview:  Chart review: Patient presented in the emergency room for abdominal pain, she did not want a wait so she left.  Saw primary care for abdominal pain which patient believes is related to starting Pristiq a month ago.  Despite having stopped it long ago, she continues to have abdominal symptoms.  Somatization?  Planning: Try Belsomra for insomnia.  \" I am getting better.\"   agrees.  Patient is becoming more functional.  Depression and anxiety have improved.  Still residual symptoms.  She has only been on the higher dose of Lexapro for about 4 weeks ago.  Still having trouble with insomnia but it is controlled on Ambien most nights.  Some nights it is not.  Some nights she does not take Ambien.  Utilizing half a milligram of lorazepam twice daily most days.  More active around the house.  Famotidine is helping with abdominal pain.  Refills: Yes  Substances: No  Therapy: Deferred  Medication compliant: Yes  No SI HI AVH.    4/22: In person interview:  Chart review: 4/11: I called the patient to check in on her progress on lexapro. \"I'm doing better than I was before.\" Sleeping well. Taking nyquil to sleep. Utilizing lorazepam frequently. Has been on lexapro for 10 days. She understands that she needs to give it more " "time. More active. Has no appetite, however. I mentioned to her the importance of moving to a higher level of care (IOP, inpt). Pt voiced understanding and agreed to proceed. Jaya: crying episodes have stopped. Better energy.  \"I can't sleep.\"  Persistent insomnia, anxious before bed, unclear why. Has tried ambien which sometimes helps. Also over the counter meds.   Irritable, restless, worrying.   Still depressed, but this has improved  Compliant on lexapro  Still having panic attacks.  Utilizing lorazepam.  Per , she has made significant improvement over the last few months, but still has a way to go.  Patient agrees.  Declines IOP, inpatient admission  Therapy: Deferred  Medication compliant: To some extent yes  No SI HI AVH.    3/24: In person interview: With her   Chart review: Patient is now discontinued all medications except for Lamictal 25 mg a day.  I started her on doxepin in the evenings for insomnia on March 21.  \"I think I need lower doses.\"  Persistent crying spells, and anxiety. Utilizing ativan.  Doxepin is helping with insomnia.  Patient reports that she knows of someone in her 70s who struggled with depression for years until they came up with a solution.  This gave the patient hope.  She noted that the patient was on very low doses of Celexa and other psychotropic medication.  Medication compliant: y  No SI HI AVH.    3/15: In person interview: With her   Chart review: Patient had another panic attack 3/14 morning.  I advised her to utilize the Ativan liberally as she is not always using it.  Compliant on Latuda 40 mg a day.  She notes that there are some periods where she feels \"completely normal.\"  Then other times, she completely breaks down and has depression and anxiety. Having trouble sleeping for the last 4 days.  Target this with Ambien 5 mg nightly. Risks, benefits, side effects discussed with patient including sedation, dizziness, GI upset, hallucinations, " "sleepwalking, sleep-eating.  After discussion of these risks and benefits, the patient voiced understanding and agreed to proceed. Continue Latuda and Lamictal. Start titrating off of amitriptyline by taking half a tab, 12.5 mg in other words, nightly.  \" The Ambien kept me asleep for a couple hours.\"  Patient slept for two hours, got up, ate something, and did some reading.  Then went back to sleep.  Notes that she did not feel anxious during this time.  When she woke the next morning she felt very tired.  Utilizing Ativan more liberally to prevent panic attacks.  Some limitation today about how her family is distancing themselves from her due to her depression.  Feels like they should be more supportive.  Wants to switch to a different therapist.  She understands that we will have a therapist start next month.  Would like to wait until that time.  Problems with connecting to her present therapist.  Therapy: Continuing  Medication compliant: Yes  No SI HI AVH.    3/10: In person: With her   Interview:  Chart review: We have reduced the dose of Effexor to 37.5 mg a day, and started Lamictal 25 mg daily.  Continuing Latuda at its present dose of 20 mg a day.  October labs show reassuring CMP, CBC.  \" I stopped the Effexor.\"  Patient states she is already feeling better.   agrees.  Did not take Effexor this morning and is not experiencing panic and anxiety in the afternoon like she has been for the past several days.  Tolerating the Lamictal without side effects.  No crying spells  Affect is still depressed  Medication compliant: Yes  No SI HI AVH.    2/15:Virtual visit via Zoom audio and video due to the COVID-19 pandemic.  Patient is accepting of and agreeable to visit.  The visit consisted of the patient and I. The patient is at home, and I am at the office.  Interview:  Chart review: DEXA scan this month.  Normal in the lumbar spine and hips, it is decreased by 0.9% in the femoral necks compared to " "2007.  \"Yesterday had a breakdown and cried for hours.\"  Otherwise has been doing very well.  Still utilizing lorazepam once a day.  Has done TMS for what sounds like about a week and a half.  No major changes that she can identify.  Sleeping well  Still some uncontrolled worrying, irritability, restlessness.  Medication compliant: Yes  No SI HI AVH.    1/17: Virtual visit via Zoom audio and video due to the COVID-19 pandemic.  Patient is accepting of and agreeable to visit.  The visit consisted of the patient and I.  Interview:  Chart review: No new chart developments since January 3.  EKG in October reveals 67, sinus, .  \"Doing even better than I was 2 weeks ago.\"  Still has mood swings, but not nearly as bad as before.  Have not needed to use lorazepam most days.  Starting TMS this week.  Depression/Mood: much better  Anxiety: much better  Refills: n  Sleeping: well  Eating: stable  Substances: n  Therapy: continuing  Medication compliant: y  No SI HI AVH.    1/3/22: Virtual visit via Zoom audio and video due to the COVID-19 pandemic.  Patient is accepting of and agreeable to visit.  The visit consisted of the patient and I.  Interview:  Chart review: No new chart developments since December 17.  \"Doing much better.\"  Sleeping well at night.  Some anxiety sometimes, but has days where she doesn't have anxiety.  No panic attacks  No crying spells.  Some days doesn't take lorazepam, other times takes a half a pill; when she does, it works very well.  Some anxiety looking up TMS last night, took a lorazepam and felt much better.   No holiday disruption at all. Handled the holidays well.   Approved for TMS.   Depression/Mood:  Depressed mood: much better  Seasonal pattern: denies  Severity: mild  Insomnia: denies  Duration: months  Anxiety:  Uncontrolled worrying: y  Severity: mild  Restlessness/feeling on edge: y  Irritability: y  Insomnia: n  Duration: months  Panic attacks: still has them rarely  Refills: " "y  Sleeping: y  Eating: stable  Substances: n  Therapy: With Genevieve at [facility] (continuing)  Medication compliant: y  No SI HI AVH.    6/1: Interview: Continued improvement. Doing well from d and a perspective. Persistent constipation that began with abilify. Still not taking lorazepam as her anxiety is under control. Denies SI HI AVH.  concurs.    9/11/20 H&P: Patient presented today with her . Both provided extensive history regarding her depression and anxiety. Patient had been seen by  at [facility] for roughly a year and a half. Patient reported that Dr. Gordon had become convinced that she had bipolar disorder, which both she and her  disagree with. Patient and  deny any episodes in the past where she experienced, for an extended length of time lasting at least several days, no need for sleep, rapid speech, risk-taking behaviors. Per the  and wife pair, her previous psychiatrist insisted on her being on a mood stabilizer.   Patient reports that she had been stable on Celexa for \"several years.\" In 2019, January, the patient underwent a colonoscopy where dysplasia was revealed. This led to significant anxiety and a \"breakdown.\" The dysplasia was subsequently removed. Due to the heightened anxiety the patient's psychiatrist took the patient off of Celexa and started her on Lexapro. The patient did not tolerate Lexapro well, she continued to remain anxious, she began to engage in therapy in addition to medication management. The Lexapro was subsequently switched to Viibryd. The Viibryd did not work. The patient also began to experience panic attacks including shortness of breath, crying, tachycardia, palpitations. The panic attacks were new and had never happened before.   In January 2020 the patient experienced another breakdown and sunk into a deeper depression. The COVID-19 pandemic only worsened her situation. In February the patient's , a physician, decided to " become her full-time caretaker as she would become anxious and panicky without him present. The patient also experienced intermittent bouts of suicidal ideation.   Recently, the patient and  demanded of their psychiatrist to take her off of Viibryd and Seroquel. They actually tapered her off of Seroquel themselves. They asked him to switch her back to Celexa. She has been on Celexa now for the last 3 days and is already begun to experience improvement. She states that the Seroquel led to having hallucinations and actually worsened insomnia. Last night she did not take Seroquel for the first time in many months and she slept very well. Her mood is slightly improved. Regarding her anxiety, she endorses muscle tension and fatigue restlessness irritability and a history of insomnia. Regarding her depression she denies SI HI AVH, denies anhedonia denies guilt, notes some decreased energy, psychomotor retardation, and a history of insomnia. They had also tried acupuncture in the past with some success. Psychiatric review of systems is negative for psychosis nancy, positive for anxiety and depression. Possibly positive for  depression and postpartum depression. The patient is medication compliant.       Past Psychiatric History:  Began Psychiatric Treatment: Several years   Dx: Depression and anxiety   Psychiatrist: Doctor Gordon for the last year and a half   Therapist: Sees a therapist at [facility] History: Denies, Although she came close to needing admission recently.   Medication Trials: See HPI. Seroquel, Prozac which was too activating, Zoloft which was too activating, Celexa which worked well, Effexor which worked well, Lexapro which did not work, Viibryd which did not work.   Self-Harm: Denies   Suicide Attempts: Denies   OB/GYN HISTORY:  Sexually Active: OB/GYN history deferred   Substance Abuse History:  Types: Denies all, including illicit   Social History:  Marital Status:     Employed: No     Kids: 4   House: House    Hx: Denies   Family History:  Suicide Attempts: Deferred today, Due to lack of time   Suicide Completions: Deferred   Substance Use: Deferred   Psychiatric Conditions: Deferred    depression, psychosis, anxiety: Deferred   Developmental History:  Born: Deferred   Siblings: Deferred   Access to Weapons: Denies   Thought Content: no suicidal ideation, no homicidal ideation, no auditory hallucinations, no visual hallucinations, and     PHQ-9 Depression Screening  PHQ-9 Total Score:      Little interest or pleasure in doing things?     Feeling down, depressed, or hopeless?     Trouble falling or staying asleep, or sleeping too much?     Feeling tired or having little energy?     Poor appetite or overeating?     Feeling bad about yourself - or that you are a failure or have let yourself or your family down?     Trouble concentrating on things, such as reading the newspaper or watching television?     Moving or speaking so slowly that other people could have noticed? Or the opposite - being so fidgety or restless that you have been moving around a lot more than usual?     Thoughts that you would be better off dead, or of hurting yourself in some way?     PHQ-9 Total Score       FAREED-7       Past Surgical History:  Past Surgical History:   Procedure Laterality Date    COLONOSCOPY      last scope     COLONOSCOPY N/A 10/29/2021    Procedure: COLONOSCOPY with possible biopies.;  Surgeon: Skyler Fuller MD;  Location: [facility] ENDOSCOPY;  Service: General;  Laterality: N/A;    KNEE SURGERY  2017    broken knee       Problem List:  Patient Active Problem List   Diagnosis    Anxiety    Depression    History of fracture of patella    Hyperlipemia    Osteopenia    Ulcerative lesion    Constipation    History of colonic polyps    Tinnitus of right ear    Sensorineural hearing loss (SNHL) of both ears    Right shoulder pain       Allergy:   No Known Allergies      Discontinued Medications:  Medications Discontinued During This Encounter   Medication Reason    diclofenac (VOLTAREN) 75 MG EC tablet     meloxicam (Mobic) 15 MG tablet Non-compliance    amitriptyline (ELAVIL) 25 MG tablet Reorder    ARIPiprazole (Abilify) 2 MG tablet Reorder         Current Medications:   Current Outpatient Medications   Medication Sig Dispense Refill    amitriptyline (ELAVIL) 25 MG tablet Take 1 tablet by mouth Every Night. 90 tablet 3    ARIPiprazole (Abilify) 5 MG tablet Take 1 tablet by mouth Daily. 90 tablet 1    atorvastatin (LIPITOR) 20 MG tablet TAKE 1 TABLET BY MOUTH DAILY 90 tablet 1    Calcium Carb-Cholecalciferol (OYSCO 500 + D) 500-200 MG-UNIT tablet       cholecalciferol (VITAMIN D3) 25 MCG (1000 UT) tablet Take 1 tablet by mouth Daily.      Coenzyme Q10 (CO Q10 PO) Take  by mouth.      escitalopram (LEXAPRO) 10 MG tablet Take 1 tablet by mouth Daily. 90 tablet 3    gabapentin (NEURONTIN) 300 MG capsule 300 mg qam, 300 mg qpm, 600 mg qhs 120 capsule 5    GARLIC PO garlic 1,000 mg oral capsule take 1 capsule by oral route daily   Active      LORazepam (ATIVAN) 1 MG tablet Take 1 tablet by mouth 2 (Two) Times a Day As Needed for Anxiety. for anxiety 60 tablet 5    Multiple Vitamins-Iron (MULTIVITAMIN PLUS IRON ADULT PO) Women's Multivitamin 18 mg iron-400 mcg-500 mg oral tablet take 1 tablet by oral route daily   Active      Omega-3 Fatty Acids (fish oil) 1000 MG capsule capsule 3 capsules 3 (Three) Times a Day With Meals. PT(PATIENT) REPORTS SHE TAKES MEDICATION MORNING/LUNCH/EVENING      Probiotic Product (PROBIOTIC PO) Take  by mouth.      vitamin E 400 UNIT capsule vitamin E 400 unit oral capsule take 1 capsule by oral route daily   Active       No current facility-administered medications for this visit.       Past Medical History:  Past Medical History:   Diagnosis Date    Acid reflux 2022    Anxiety     Depression     Fracture of patella 09/12/2017    Hyperlipemia      Osteopenia     Panic disorder     Psychiatric inpatient 07/22/2022    Discharged 07/22/2022 for Anxiety, Depression, and Suicidal Bx's    Seizures     reports seizure activity, but under control.       Social History     Socioeconomic History    Marital status:    Tobacco Use    Smoking status: Never    Smokeless tobacco: Never   Vaping Use    Vaping Use: Never used   Substance and Sexual Activity    Alcohol use: Not Currently    Drug use: Never    Sexual activity: Not Currently         Family History   Problem Relation Age of Onset    Stroke Mother     Heart disease Mother     No Known Problems Father     No Known Problems Sister     Cancer Brother     No Known Problems Maternal Aunt     No Known Problems Paternal Aunt     No Known Problems Maternal Uncle     No Known Problems Paternal Uncle     No Known Problems Maternal Grandfather     No Known Problems Maternal Grandmother     No Known Problems Paternal Grandfather     No Known Problems Paternal Grandmother     No Known Problems Cousin     Seizures Son     No Known Problems Other     ADD / ADHD Neg Hx     Alcohol abuse Neg Hx     Anxiety disorder Neg Hx     Bipolar disorder Neg Hx     Dementia Neg Hx     Depression Neg Hx     Drug abuse Neg Hx     OCD Neg Hx     Paranoid behavior Neg Hx     Schizophrenia Neg Hx     Self-Injurious Behavior  Neg Hx     Suicide Attempts Neg Hx      Review of Systems:  Review of Systems   Constitutional:  Negative for diaphoresis and fatigue.   HENT:  Negative for drooling.    Eyes:  Negative for visual disturbance.   Respiratory:  Negative for cough and shortness of breath.    Cardiovascular:  Negative for chest pain, palpitations and leg swelling.   Gastrointestinal:  Negative for diarrhea, nausea and vomiting.   Endocrine: Negative for cold intolerance and heat intolerance.   Genitourinary:  Negative for difficulty urinating.   Musculoskeletal:  Negative for joint swelling.   Allergic/Immunologic: Negative for  "immunocompromised state.   Neurological:  Negative for dizziness, seizures, syncope, speech difficulty, light-headedness, numbness and headaches.         Mental Status Exam  Appearance  : sitting in a chair with her , good eye contact, normal street clothes, groomed, in person  Behavior  : pleasant and cooperative  Motor  : No abnormal  Speech  : normal rhythm, rate, volume, tone, not hyperverbal, not pressured, normal prosidy, Speaks with an accent  Mood  : \"Overall ok, but I don't feel like I have any shantal\"  Affect  : euthymic, very slight constriction, mood congruent, fair variability  Thought Content  : negative suicidal ideations, negative homicidal ideations, negative obsessions  Perceptions  : negative auditory hallucinations, negative visual hallucinations  Thought Process  : linear  Insight/Judgement  : fair/fair  Cognition  : grossly intact  Attention  : intact      Physical Exam:  Physical Exam    Vital Signs:   /63   Pulse 83   Ht 162.6 cm (64\")   Wt 71.3 kg (157 lb 3.2 oz)   BMI 26.98 kg/m²      Lab Results:   No visits with results within 6 Month(s) from this visit.   Latest known visit with results is:   Admission on 08/20/2022, Discharged on 08/20/2022   Component Date Value Ref Range Status    SARS Antigen 08/20/2022 Not Detected  Not Detected, Presumptive Negative Final    Internal Control 08/20/2022 Passed  Passed Final    Lot Number 08/20/2022 [id number]   Final    Expiration Date 08/20/2022 92,423   Final    Rapid Influenza A Ag 08/20/2022 Negative  Negative Final    Rapid Influenza B Ag 08/20/2022 Negative  Negative Final    Internal Control 08/20/2022 Passed  Passed Final    Lot Number 08/20/2022 [id number]   Final    Expiration Date 08/20/2022 120,423   Final    COVID19 08/20/2022 Not Detected  Not Detected - Ref. Range Final       EKG Results:  No orders to display       Imaging Results:  No Images in the past 120 days found..      Assessment & Plan   Diagnoses and all orders for " this visit:    1. Recurrent major depressive disorder in remission (Primary)    2. Generalized anxiety disorder  -     amitriptyline (ELAVIL) 25 MG tablet; Take 1 tablet by mouth Every Night.  Dispense: 90 tablet; Refill: 3    3. Panic attacks  -     amitriptyline (ELAVIL) 25 MG tablet; Take 1 tablet by mouth Every Night.  Dispense: 90 tablet; Refill: 3    4. Panic disorder  -     amitriptyline (ELAVIL) 25 MG tablet; Take 1 tablet by mouth Every Night.  Dispense: 90 tablet; Refill: 3    5. Insomnia due to mental condition  -     amitriptyline (ELAVIL) 25 MG tablet; Take 1 tablet by mouth Every Night.  Dispense: 90 tablet; Refill: 3    6. Moderate episode of recurrent major depressive disorder  -     amitriptyline (ELAVIL) 25 MG tablet; Take 1 tablet by mouth Every Night.  Dispense: 90 tablet; Refill: 3  -     ARIPiprazole (Abilify) 5 MG tablet; Take 1 tablet by mouth Daily.  Dispense: 90 tablet; Refill: 1        Visit Diagnoses:    ICD-10-CM ICD-9-CM   1. Recurrent major depressive disorder in remission  F33.40 296.35   2. Generalized anxiety disorder  F41.1 300.02   3. Panic attacks  F41.0 300.01   4. Panic disorder  F41.0 300.01   5. Insomnia due to mental condition  F51.05 300.9     327.02   6. Moderate episode of recurrent major depressive disorder  F33.1 296.32       INITIAL presentation most consistent with major depressive disorder in partial remission, FAREED, rare panic attacks.    11/2: chronic mild anhedonia. Increase abilify and start light box.    Allowed patient to freely discuss and process issues, such as:  Social anxiety.  Her feelings of hopelessness and restlessness and how they recur at times. Her fear of her mood getting worse again.  ...without interruption or judgement with unconditional positive   ... using Rogerian psychotherapeutic techniques including unconditional positive regard, reflective listening, and demonstrating clear empathy.     Time (minutes) spent providing supportive  psychotherapy: 20  Functional status: mild impairment  Treatment plan: Medication management and supportive psychotherapy  Prognosis: good  Progress: stable, well  4w    8/31: Stable, well, no changes. Light box, we discussed it.    6/30: Stable, well. No changes.     4/28: Euthymic well. No changes.     3/3: Stable, well, no changes.      1/20: Well, stable, no changes.     12/9: Significant improvement. No changes.     11/18: Residual anxiety, residual depression.  Increase gabapentin, start amitriptyline at night for initial insomnia.  Close follow-up.     11/7: Residual anx (at noon), consider adding gabapentin in the mornings. and dep. Increase gabapentin in the afternoons. No other changes.     10/31: Couples therapy, increase Abilify, close follow-up.  I feel that their marriage issues have come to a head recently, and this stressor could largely explain patient's depression and anxiety.  Patient mentioned Celexa again, she is likely interested in switching.     9/30: well, stable, some constriction today. No changes.     8/30: Remarkably well. Initial insomnia.    8/1: Try another mood stabilizer in place of olanzapine.  Will trial Abilify again.  Also consider Geodon.      6/17: start buspar, doing better.     5/20: Continue Lexapro and lorazepam.  Try Belsomra in place of Ambien.      4/22: Some improvement in anx and dep, but still residual symptoms.  Persistent insomnia. Declines IOP, inpatient admission.  Increase Lexapro and start trazodone.      3/24: Focus on lower doses.  Start Pristiq.  Depression with anxious distress versus depression and generalized anxiety disorder.  She only has distractibility, no other symptoms of hypomania.      3/15: Continue lurasidone 40 mg at night, increase Ambien to 10 mg at night.  Continue low-dose Lamictal, plan to increase further.  Continue titrating off of amitriptyline.     3/10: Effexor has now been stopped.  Continue Latuda and Lamictal at present doses.  I  will see her back in a few days and then at 6 weeks.  Bipolar 2?  If so, how come she is tolerating amitriptyline in the evenings?     2/15: Still utilizing lorazepam which means the anxiety is not under control.  Increase Effexor.    1/17: Continued improvement.  No changes, only on effexor 75 mg for 2 weeks.  Tolerating medications without side effects.      1/3: Continued improvement. Increase effexor to target residual dep anx. TMS to start 1/6.     PLAN:  Risk Assessment: Risk of self-harm acutely remain low. Risk factors include a history of suicidal ideation, mood disorder, anxiety disorder, presence of psychosocial stressors such as colonic dysplasia, COVID-19 pandemic. Protective factors include no history of self-harm or suicide attempts, good social support, willingness to engage in care, improved depressive symptoms. Risk of self-harm chronically also remain low, but could be further elevated in the event of treatment noncompliance and/or AODA.  Medications:   START light box.   CONTINUE lexapro 10 mg daily. Risks, benefits, alternatives discussed with patient including GI upset, nausea vomiting diarrhea, theoretical decrease of seizure threshold predisposing the patient to a slightly higher seizure risk, headaches, sexual dysfunction, serotonin syndrome, bleeding risk, increased suicidality in patients 24 years and younger.  After discussion of these risks and benefits, the patient voiced understanding and agreed to proceed.  CONTINUE gabapentin 300 mg qam, 300 qpm, 600 mg nightly. Risks, benefits, alternatives discussed with patient including sedation, dizziness/falls risk, GI upset, weight gain.  After discussion of these risks and benefits, the patient voiced understanding and agreed to proceed. LAVERNE ordered, Mamadou reviewed.  INCREASE Abilify 4 to 5 mg daily. Risks, benefits, alternatives discussed with patient including increased energy, exacerbation of irritability, akathisia, GI upset, orthostatic  "hypotension, increased appetite.  After discussion of these risks and benefits, the patient voiced understanding and agreed to proceed.  CONTINUE amitriptyline 25 mg nightly. Risks, benefits, alternatives discussed with patient including sedation, dizziness, falls, GI upset, constipation, urinary retention, dry mouth.  After discussion of these risks and benefits, the patient voiced understanding and agreed to proceed.  CONTINUE lorazepam 1 mg bid PRN. Risks, benefits, alternatives discussed with patient including GI upset, sedation, dizziness, respiratory depression, falls risk.  After discussion of these risks and benefits, the patient voiced understanding and agreed to proceed.  S/P:   buspar 5 mg bid was ineffective.  Trazodone 50 mg nightly not helpful for insomnia.  Mirtazapine 15 mg, 7.5 mg: ineffective; made anxiety and depression worse.  Abilify: brain zaps and constipation  Seroquel made depression worse  celexa 20 mg daily, ineffective after several months  Doxepin caused \"brain zaps\"  Latuda was ineffective.  Amitriptyline 25 mg nightly helpful for sleep but we didn't want her on too many serotonergic meds.  Ambien minimally helpful for insomnia.  Pristiq and effexor worsened anxiety, both low dose.  Stopped lamictal for unclear reasons 25 mg.  belsomra 5 mg nightly didn't help with insomnia.  Therapy: continue with [facility] at [facility].  Referred for couples therapy 10/2022  Status post IOP at [facility] 10/2022  Labs/Studies: BMP to monitor sodium levels in February was entirely normal, EKG February shows rate 72, sinus, .    TREATMENT PLAN/GOALS: Continue supportive psychotherapy efforts and medications as indicated. Treatment and medication options discussed during today's visit. Patient acknowledged and verbally consented to continue with current treatment plan and was educated on the importance of compliance with treatment and follow-up appointments.    MEDICATION ISSUES:  YVETTE reviewed as " expected.  Discussed medication options and treatment plan of prescribed medication as well as the risks, benefits, and side effects including potential falls, possible impaired driving and metabolic adversities among others. Patient is agreeable to call the office with any worsening of symptoms or onset of side effects. Patient is agreeable to call 911 or go to the nearest ER should he/she begin having SI/HI. No medication side effects or related complaints today.     MEDS ORDERED DURING VISIT:    Return in about 4 weeks (around 11/30/2023).         This document has been electronically signed by Vinny Krishnamurthy MD  November 2, 2023 10:41 EDT       Part of this note may be an electronic transcription/translation of spoken language to printed text using the Dragon Dictation System.

## 2023-11-02 NOTE — PATIENT INSTRUCTIONS
1.  Please return to clinic at your next scheduled visit.  Contact the clinic ([phone]) at least 24 hours prior in the event you need to cancel.  2.  Do no harm to yourself or others.    3.  Avoid alcohol and drugs.    4.  Take all medications as prescribed.  Please contact the clinic with any concerns. If you are in need of medication refills, please call the clinic at [phone].    5. Should you want to get in touch with your provider, Dr. Vinny Krishnamurthy, please utilize [employer] or contact the office ([phone]), and staff will be able to page Dr. Krishnamurthy directly.  6.  In the event you have personal crisis, contact the following crisis numbers: Suicide Prevention Hotline 1-920.517.3710; [facility] Helpline 4-728-880-PPOQ; [facility] [phone]; text HELLO to 721281; or 581.     Light box/Happy Light/Light Therapy: Obtain a light box, 10,000 lux, put the box about 1 foot away from you, facing you at an angle (not directly), use it daily, right after waking up, for 1 hour daily. Don't stare directly at it. Read, eat, watch tv, etc. Call your insurance company to see if they can reimburse you for the cost. Available at big box retailers. I used [employer] to get a Verilux one.

## 2023-11-14 DIAGNOSIS — F51.05 INSOMNIA DUE TO MENTAL CONDITION: ICD-10-CM

## 2023-11-14 DIAGNOSIS — F41.0 PANIC ATTACKS: ICD-10-CM

## 2023-11-14 DIAGNOSIS — F33.1 MODERATE EPISODE OF RECURRENT MAJOR DEPRESSIVE DISORDER: ICD-10-CM

## 2023-11-14 DIAGNOSIS — F41.1 GENERALIZED ANXIETY DISORDER: ICD-10-CM

## 2023-11-14 RX ORDER — GABAPENTIN 300 MG/1
CAPSULE ORAL
Qty: 120 CAPSULE | Refills: 5 | Status: SHIPPED | OUTPATIENT
Start: 2023-11-14

## 2023-11-19 DIAGNOSIS — F41.0 PANIC ATTACKS: ICD-10-CM

## 2023-11-19 DIAGNOSIS — F41.0 PANIC DISORDER: ICD-10-CM

## 2023-11-19 DIAGNOSIS — F51.05 INSOMNIA DUE TO MENTAL CONDITION: ICD-10-CM

## 2023-11-19 DIAGNOSIS — F41.1 GENERALIZED ANXIETY DISORDER: ICD-10-CM

## 2023-11-19 DIAGNOSIS — F33.1 MODERATE EPISODE OF RECURRENT MAJOR DEPRESSIVE DISORDER: ICD-10-CM

## 2023-11-20 RX ORDER — AMITRIPTYLINE HYDROCHLORIDE 25 MG/1
25 TABLET, FILM COATED ORAL NIGHTLY
Qty: 90 TABLET | Refills: 3 | OUTPATIENT
Start: 2023-11-20

## 2023-11-20 NOTE — TELEPHONE ENCOUNTER
NEXT VISIT WITH PROVIDER   Appointment with Vinny Krishnamurthy MD (12/06/2023)     LAST SEEN BY PROVIDER   Office Visit with Vinny Krishnamurthy MD (11/02/2023)     LAST MED REFILL  amitriptyline (ELAVIL) 25 MG tablet (11/02/2023)     TOO SOON FOR REFILL    PROVIDER PLEASE ADVISE

## 2023-11-27 RX ORDER — ATORVASTATIN CALCIUM 20 MG/1
TABLET, FILM COATED ORAL
Qty: 90 TABLET | Refills: 1 | Status: SHIPPED | OUTPATIENT
Start: 2023-11-27

## 2023-12-06 ENCOUNTER — OFFICE VISIT (OUTPATIENT)
Dept: PSYCHIATRY | Facility: CLINIC | Age: 68
End: 2023-12-06
Payer: MEDICARE

## 2023-12-06 VITALS
WEIGHT: 155.6 LBS | HEIGHT: 61 IN | DIASTOLIC BLOOD PRESSURE: 66 MMHG | HEART RATE: 85 BPM | BODY MASS INDEX: 29.38 KG/M2 | SYSTOLIC BLOOD PRESSURE: 132 MMHG

## 2023-12-06 DIAGNOSIS — F41.0 PANIC DISORDER: ICD-10-CM

## 2023-12-06 DIAGNOSIS — F41.0 PANIC ATTACKS: ICD-10-CM

## 2023-12-06 DIAGNOSIS — F51.05 INSOMNIA DUE TO MENTAL CONDITION: ICD-10-CM

## 2023-12-06 DIAGNOSIS — F33.40 RECURRENT MAJOR DEPRESSIVE DISORDER IN REMISSION: ICD-10-CM

## 2023-12-06 DIAGNOSIS — F41.1 GENERALIZED ANXIETY DISORDER: Primary | ICD-10-CM

## 2023-12-06 NOTE — PATIENT INSTRUCTIONS
1.  Please return to clinic at your next scheduled visit.  Contact the clinic ([phone]) at least 24 hours prior in the event you need to cancel.  2.  Do no harm to yourself or others.    3.  Avoid alcohol and drugs.    4.  Take all medications as prescribed.  Please contact the clinic with any concerns. If you are in need of medication refills, please call the clinic at [phone].    5. Should you want to get in touch with your provider, Dr. Vinny Krishnamurthy, please utilize [employer] or contact the office ([phone]), and staff will be able to page Dr. Krishnamurthy directly.  6.  In the event you have personal crisis, contact the following crisis numbers: Suicide Prevention Hotline 1-675.665.9123; [facility] Helpline 1-282-098-ALEXANDREA; [facility] Room [phone]; text HELLO to 776288; or 161.

## 2023-12-06 NOTE — PROGRESS NOTES
"Subjective   Shabana Ferguson is a 67 y.o. female who presents today for follow up    Referring Provider:  No referring provider defined for this encounter.    Chief Complaint:  mdd margarita    History of Present Illness:     Shabana Ferguson is a 64 year old /White female, hx of anxiety and depression, fractured patella, HLD, osteopenia, ulcer referred by MARIE Brice, for anxiety and depression management.   Chart: Psychotropic medications: amitriptyline 25 mg QHS, Celexa 20 mg p.o. daily, lorazepam 0.5 p.o. twice daily as needed anxiety.      \"Shabana\" and Jaya      : In person interview:  Chart review:   No visits.  Benign mammogram.  Seen by orthopedics and physical therapy.  Plannin/2: chronic mild anhedonia. Increase abilify and start light box.  \"Better.\"  Had some stress  Lots of traveling  Used the light box  Regular exercise 3 days a week  Rarely uses ativan  Volunteer work  \"I enjoyed seeing his sister recently\"   Previous important: \"Not feeling real shantal in my life\" and has anticipatory anxiety, as in, she was feeling anxious before a previous appointment  Mood/Depression: improving, possibly at goal  Anxiety: restless at times, social anxiety  Panic attacks: rare, controlled on bzd  Energy: stable  Concentration: stable  Sleeping: stable, better  Eating: stable  Refills: y  Substances: def  Therapy: group therapy with other women around her age.  Medication compliant: y  SE: n  No SI HI AVH.      : In person interview:  Chart review:   Benign mammogram.  Seen by orthopedics and physical therapy.  Plannin/31: Stable, well, no changes. Light box, we discussed it.  Euthymic at last visit.  \"I didn't get the light box.\"  I'm sleeping  Overall good, but have bad days  Regular exercise 3 days a week  Rarely uses ativan  Volunteer work  \"Not feeling real shantal in my life\" and has anticipatory anxiety, as in, she was feeling anxious before this " "appointment  Mood/Depression: some bad \"hours,\" but overall ok.   Anxiety: restless at times, social anxiety  Panic attacks: rare, controlled on bzd  Energy: good  Concentration: good  Sleeping: stable, better  Eating: stable  Refills: y  Substances: def  Therapy: started group therapy with other women around her age.  Medication compliant: y  SE: n  No SI HI AVH.    8/31: In person interview:  Chart review:     Seen by orthopedics and physical therapy.  Planning:     Euthymic at last visit.  \"I have good times... but sometimes it comes back.\"  Recently I'm good  2-3 weeks ago the stress made it worse. Obdulio and his family went to [place].  She became anxious and didn't want to go and had to miss the wedding  There was some guilt tripping by family  Rarely uses ativan  Mood/Depression: can push away rare feelings of hopelessness.  Anxiety: still feel restless sometimes.  Panic attacks: rare, controlled on bzd  Energy: good  Concentration: good  Sleeping:  Takes half a lorazepam at night to sleep, along with other night meds.  Much better  Eating: stable  Refills: y  Substances: def  Therapy: started group therapy with other women around her age.    Medication compliant: y  SE: n  No SI HI AVH.      6/30: In person interview:  Chart review: Seen by orthopedics and physical therapy.  Planning: Euthymic at last visit.  \"I'm most of the time, good.\"    Mood/Depression: can push away rare feelings of hopelessness.  Anxiety: still feel restless sometimes.  Panic attacks: rare, controlled on bzd  Energy: good  Concentration: good  Sleeping:  Takes half a lorazepam at night to sleep, along with other night meds.  Eating: stable  Refills: y  Substances: def  Therapy: started group therapy with other women around her age.  Medication compliant: y  SE: n  No SI HI AVH.    4/28: In person interview:  Chart review: getting PT for right shoulder rotator cuff tear.  Planning: stable no changes.  \"I'm praying and I give thanks to " "God.\"  \"I'm doing a lot better.\"  Mood/Depression: only if I get stressed out  Anxiety: sometimes get stressed out.  Takes an ativan, takes one in the evening (half). About half a day.  Panic attacks: denies  Energy: good  Concentration: stable  Sleeping: good, well  Eatin lbs  Refills: y  Substances: n  Therapy: continuing  Medication compliant: y  SE: n  No SI HI AVH.    3/3: In person interview:  Chart review: No new.  Planning: Well, stable, no changes.  \"Friend's father .\"  89 yo.  \"I'm doing good.\"  Once in a while I have trouble sleeping. Mostly I sleep well.  Tried to wean down on ativan, but has brain zaps.  Jaya: things are doing well.  I'm doing things now like pictures  Family is doing better. Relationships getting better.   Mood/Depression: stable, fairly well  Anxiety: some bouts, minimal  Panic attacks: controlled on ativan  Energy: Stable  Concentration: Stable  Sleeping: Well  Eating: Stable  Refills: Yes  Substances: Denies  Therapy:  Their couples therapist left. Referred to someone else, who doesn't take their insurance. Trying [facility]; has an appnt.  Medication compliant: Yes  No SI HI AVH.    : In person interview:  Chart review: Seen by orthopedic surgery for right rotator cuff tear, getting physical therapy for this.  MRI of the right shoulder ordered.  Planning: Stable, well, no changes.   \"  Mood/Depression:  Anxiety:  Panic attacks:  Energy:  Concentration:  Sleeping:  Eating:  Refills:  Substances:  Therapy:  Medication compliant:  SE:   No SI HI AVH.    : In person interview:  Chart review: No new.    Planning: No changes at last visit.  Are they still doing couples therapy?  \"I'm ok.\"  Stable, once in a while I go a little down, but then get back up.  Continuing to do couples therapy  Mood/Depression: minimal depressed mood  Anxiety: sometimes, but controlled  Takes half an ativan some nights, otherwise has anxiety in the morning  Also has brain zaps at night if " "doesn't take it -- this is the biggest reason  Volunteering at [employer]  Both are volunteering at [employer]. Also seeing some pts.  Panic attacks: n  Energy: good  Concentration: good  Sleeping: well  Eatin lb wg   Refills: y  Substances: n  Therapy: individual and couples (every 2 weeks)  Medication compliant: y  No SI HI AVH.    : In person interview:  Chart review: No new.  Planning: Increased gabapentin and started amitriptyline at night.  \"I'm doing a lot better.\"  Sleeping better on amitriptyline  Pt wants to get involved in volunteering, [employer], every Tues and Thurs  Mood/Depression: better  Anxiety: less worrying  Trip was a success; they saw one of the grandchildren they haven't seen in a while. Patient smiled and laughed during this discussion. Cooked for him.  Taking less lorazepam  Panic attacks: n  Energy: improving  Concentration: improving  Sleeping: well  Eating: stable  Refills: n  Substances: n  Therapy: continuing therapy and couples therapy  Medication compliant: y  No SI HI AVH.    : In person interview:  Chart review: Seen by primary care .  PHQ-9 is 14, very difficult.  Feels her mental health is better than it was a year ago, however.  Planning: They have started marriage counseling.  Increased gabapentin in the afternoons to 200 mg a day.  Reintroduce amitriptyline in the evenings.  \"I'm suffering.\"  Monday has a big trip to [place]. Just got back from [place] trip, which \"was traumatic.\"  Nicola didn't join them for dinner last night -- upsetting for her.  Mood/Depression:  Depressed mood  Anxiety:  Restless, irritable, on edge -- lorazepam works. Starts at noon.  Very clingy, don't want to be by myself, throughout the day  Panic attacks: Controlled on Ativan  Energy: Down  Concentration: Still not a goal  Sleeping: Initial insomnia  Eating: Stable weight  Refills: Yes  Substances: No  Therapy: continuing marriage counseling, have to pay out " "of pocket  Medication compliant: y  No SI HI AVH.    11/7: In person interview:  Chart review: No new.  Planning: Increased Abilify and referred to couples therapy a week ago.  \"I'm having a hard time to concentrate and sit still.\"  Jaya:  Couldn't go back to sleep last night  Nicola's move was this past Friday  Moved animals to his house on Saturday  He feels there is less negativity, but she is not sure  Mood/Depression:  Depressed mood  Anxiety:  Worrying a lot, very nervous especially in the afternoons  Pacing  Panic attacks: y, controlled on lorazepam  Energy: stable, not at goal  Concentration:   Some issues with concentration  Sleeping:  Always wakes at 4-6 am, maintenance insomnia  But slept well last night 7-6 am.  Eating: 3 lb wl since 10/31  Refills:  y  Substances: n  Therapy: started marriage counseling, next appnt next week  Medication compliant: y  No SI HI AVH.    10/31: In person interview:  Chart review: No new.  Planning: No changes at last visit.  \"I feel anxiety in the afternoon.\"  Stressors:  Nicola, her son, is moving out. Stressful.  Pt wants to end the marriage; they've discussed it numerous times recently.  Couples therapy: they've tried in the past, more than once, problems with each therapist (patient)  Mood/Depression:  Depressed mood in the afternoons, not mornings  Jaya: trip to [place] was fun but stressful and \"it was in the end a major setback\"  Last night was very destructive: sleepless  Anxiety:  [facility]ent to [facility]; recommend taking gabapentin 100 mg in afternoon rather than morning.  Because it is costing so much money, they are not going back.  Worrying, irritable, insomnia, on edge  Panic attacks:  Yes, in the afternoon, controlled on ativan  Energy: down  Concentration: poor  Sleeping: initial insomnia  Eating: 3 lb wg 8/22  Refills: No  Substances: No  Therapy: Yes continuing  Medication compliant: y  No SI HI AVH.    9/30: In person interview:  Chart review: " "Recently discharged from [facility] intensive outpatient.  Patient was depressed from October of last last year to essentially a couple months ago when she started to improve.  Consider light box.  Planning: Restarted Ambien at last visit.  Patient was improving at last visit.  Consider light box  \"Good.\"  I don't need to take the ambien, I just need the gabapentin  TMS helping as well, 10 more sessions, \"really helping\"  Discussed light box  Mood/Depression: stable, well  Anxiety:  Anxiety in the afternoons, takes lorazepam daily at 6 pm  Takes care of the brain zaps  Panic attacks: n  Sleeping: well on the gabapentin  Eating: stable  Refills: y  Substances:  Therapy: IOP completed  Very beneficial, \"I never thought I would like it.\" I would recommend it to anyone.  Medication compliant: y  No SI HI AVH.    8/30: In person interview:  Chart review: Seen by urgent care August 20 for back pain.  COVID-negative.  Also had some cervical adenopathy. EEG is abnormal.  Planning: Trial of Abilify appears to be working well.  This is according to the conversation I had with both of them on August 8.  \"I'm better... I feel good when I get up in the morning.\"  P1, G6  Doing TMS  Then IOP.  The problem I still have is sleeping.  Initial insomnia for 2 hours each night.  Afternoons, gets anxious. Uses ativan rarely. Use has gone down dramatically.  Prescribed gabapentin by [facility]. Helping her anxiety.  Jaya \"quality of life has gone WAY up.\"  Sees neurology in early September.  Mood/Depression: improved  Anxiety: improved  Panic attacks: rare  Eating: stable  Refills: n  Substances: n  Therapy: def  Medication compliant: y  No SI HI AVH.    8/1: In person interview:  Chart review: Patient admitted to the [facility] July 18.  Awaiting records.  Planning: This is a follow-up appointment after her admission to the [facility].  \" I am better.\"  Patient reported she was extremely afraid initially of being admitted, however admission " "was a very \"positive\" experience for her, and \"I felt at peace.\"  Could not tolerate olanzapine 2.5 mg nightly as it gave her nightmares.  She has stopped this medication.  She has set up intensive outpatient at [facility], has already gone to [facility] last week, and also has TMS scheduled.  Per her , \"the crying spells have stopped.\"  Mood/Depression: Slightly improved, still has days of depression.  Reports that some days she is perfect, then other days she becomes very depressed and anxious, but lorazepam helps at that time.  Panic attacks: Still has occasionally  Sleeping: Still has some trouble sleeping, but recently has been using delta 8 to sleep  Eating: Stable, now eating  Refills: No  Substances: No  Therapy: Continuing  Medication compliant: Yes  No MARLA VINCENT.    6/17: In person interview:  Chart review: Seen by primary care June 9.  Apparently experiencing multiple side effects from anxiety and depression medications.  Looks like the results of the testing are not associated with an increased risk for hyper homocystinemia (MTHFR gene assay).  Planning: Belsomra was too expensive.  \"All in all I'm better.\"  Still has nervous stomach; getting help with that from PCP.  Sleeping better  Taking ativan in the am consistently and also at night to sleep (rarely).  Low mood in afternoon with crying spells  Mood/Depression: stable   Anxiety: under control  Sleeping: well  Eating: stable  Refills: n  Substances: n  Therapy: has a counselor at [facility] now, plus [facility]  Medication compliant: y  No MARLA BALLARD.    5/20: In person interview:  Chart review: Patient presented in the emergency room for abdominal pain, she did not want a wait so she left.  Saw primary care for abdominal pain which patient believes is related to starting Pristiq a month ago.  Despite having stopped it long ago, she continues to have abdominal symptoms.  Somatization?  Planning: Try Belsomra for insomnia.  \" I am getting " "better.\"   agrees.  Patient is becoming more functional.  Depression and anxiety have improved.  Still residual symptoms.  She has only been on the higher dose of Lexapro for about 4 weeks ago.  Still having trouble with insomnia but it is controlled on Ambien most nights.  Some nights it is not.  Some nights she does not take Ambien.  Utilizing half a milligram of lorazepam twice daily most days.  More active around the house.  Famotidine is helping with abdominal pain.  Refills: Yes  Substances: No  Therapy: Deferred  Medication compliant: Yes  No SI HI AVH.    4/22: In person interview:  Chart review: 4/11: I called the patient to check in on her progress on lexapro. \"I'm doing better than I was before.\" Sleeping well. Taking nyquil to sleep. Utilizing lorazepam frequently. Has been on lexapro for 10 days. She understands that she needs to give it more time. More active. Has no appetite, however. I mentioned to her the importance of moving to a higher level of care (IOP, inpt). Pt voiced understanding and agreed to proceed. Jaya: crying episodes have stopped. Better energy.  \"I can't sleep.\"  Persistent insomnia, anxious before bed, unclear why. Has tried ambien which sometimes helps. Also over the counter meds.   Irritable, restless, worrying.   Still depressed, but this has improved  Compliant on lexapro  Still having panic attacks.  Utilizing lorazepam.  Per , she has made significant improvement over the last few months, but still has a way to go.  Patient agrees.  Declines IOP, inpatient admission  Therapy: Deferred  Medication compliant: To some extent yes  No SI HI AVH.    3/24: In person interview: With her   Chart review: Patient is now discontinued all medications except for Lamictal 25 mg a day.  I started her on doxepin in the evenings for insomnia on March 21.  \"I think I need lower doses.\"  Persistent crying spells, and anxiety. Utilizing ativan.  Doxepin is helping with " "insomnia.  Patient reports that she knows of someone in her 70s who struggled with depression for years until they came up with a solution.  This gave the patient hope.  She noted that the patient was on very low doses of Celexa and other psychotropic medication.  Medication compliant: y  No MARLA BALLARDH.    3/15: In person interview: With her   Chart review: Patient had another panic attack 3/14 morning.  I advised her to utilize the Ativan liberally as she is not always using it.  Compliant on Latuda 40 mg a day.  She notes that there are some periods where she feels \"completely normal.\"  Then other times, she completely breaks down and has depression and anxiety. Having trouble sleeping for the last 4 days.  Target this with Ambien 5 mg nightly. Risks, benefits, side effects discussed with patient including sedation, dizziness, GI upset, hallucinations, sleepwalking, sleep-eating.  After discussion of these risks and benefits, the patient voiced understanding and agreed to proceed. Continue Latuda and Lamictal. Start titrating off of amitriptyline by taking half a tab, 12.5 mg in other words, nightly.  \" The Ambien kept me asleep for a couple hours.\"  Patient slept for two hours, got up, ate something, and did some reading.  Then went back to sleep.  Notes that she did not feel anxious during this time.  When she woke the next morning she felt very tired.  Utilizing Ativan more liberally to prevent panic attacks.  Some limitation today about how her family is distancing themselves from her due to her depression.  Feels like they should be more supportive.  Wants to switch to a different therapist.  She understands that we will have a therapist start next month.  Would like to wait until that time.  Problems with connecting to her present therapist.  Therapy: Continuing  Medication compliant: Yes  No MARLA HI ELIOH.    3/10: In person: With her   Interview:  Chart review: We have reduced the dose of Effexor " "to 37.5 mg a day, and started Lamictal 25 mg daily.  Continuing Latuda at its present dose of 20 mg a day.  October labs show reassuring CMP, CBC.  \" I stopped the Effexor.\"  Patient states she is already feeling better.   agrees.  Did not take Effexor this morning and is not experiencing panic and anxiety in the afternoon like she has been for the past several days.  Tolerating the Lamictal without side effects.  No crying spells  Affect is still depressed  Medication compliant: Yes  No SI HI AVH.    2/15:Virtual visit via Zoom audio and video due to the COVID-19 pandemic.  Patient is accepting of and agreeable to visit.  The visit consisted of the patient and I. The patient is at home, and I am at the office.  Interview:  Chart review: DEXA scan this month.  Normal in the lumbar spine and hips, it is decreased by 0.9% in the femoral necks compared to 2007.  \"Yesterday had a breakdown and cried for hours.\"  Otherwise has been doing very well.  Still utilizing lorazepam once a day.  Has done TMS for what sounds like about a week and a half.  No major changes that she can identify.  Sleeping well  Still some uncontrolled worrying, irritability, restlessness.  Medication compliant: Yes  No SI HI AVH.    1/17: Virtual visit via Zoom audio and video due to the COVID-19 pandemic.  Patient is accepting of and agreeable to visit.  The visit consisted of the patient and I.  Interview:  Chart review: No new chart developments since January 3.  EKG in October reveals 67, sinus, .  \"Doing even better than I was 2 weeks ago.\"  Still has mood swings, but not nearly as bad as before.  Have not needed to use lorazepam most days.  Starting TMS this week.  Depression/Mood: much better  Anxiety: much better  Refills: n  Sleeping: well  Eating: stable  Substances: n  Therapy: continuing  Medication compliant: y  No SI HI AVH.    1/3/22: Virtual visit via Zoom audio and video due to the COVID-19 pandemic.  Patient is " "accepting of and agreeable to visit.  The visit consisted of the patient and I.  Interview:  Chart review: No new chart developments since December 17.  \"Doing much better.\"  Sleeping well at night.  Some anxiety sometimes, but has days where she doesn't have anxiety.  No panic attacks  No crying spells.  Some days doesn't take lorazepam, other times takes a half a pill; when she does, it works very well.  Some anxiety looking up TMS last night, took a lorazepam and felt much better.   No holiday disruption at all. Handled the holidays well.   Approved for TMS.   Depression/Mood:  Depressed mood: much better  Seasonal pattern: denies  Severity: mild  Insomnia: denies  Duration: months  Anxiety:  Uncontrolled worrying: y  Severity: mild  Restlessness/feeling on edge: y  Irritability: y  Insomnia: n  Duration: months  Panic attacks: still has them rarely  Refills: y  Sleeping: y  Eating: stable  Substances: n  Therapy: With Genevieve at [facility] (continuing)  Medication compliant: y  No SI HI AVH.    6/1: Interview: Continued improvement. Doing well from d and a perspective. Persistent constipation that began with abilify. Still not taking lorazepam as her anxiety is under control. Denies SI HI AVH.  concurs.    9/11/20 H&P: Patient presented today with her . Both provided extensive history regarding her depression and anxiety. Patient had been seen by  at [facility] for roughly a year and a half. Patient reported that Dr. Gordon had become convinced that she had bipolar disorder, which both she and her  disagree with. Patient and  deny any episodes in the past where she experienced, for an extended length of time lasting at least several days, no need for sleep, rapid speech, risk-taking behaviors. Per the  and wife pair, her previous psychiatrist insisted on her being on a mood stabilizer.   Patient reports that she had been stable on Celexa for \"several years.\" In 2019, January, the " "patient underwent a colonoscopy where dysplasia was revealed. This led to significant anxiety and a \"breakdown.\" The dysplasia was subsequently removed. Due to the heightened anxiety the patient's psychiatrist took the patient off of Celexa and started her on Lexapro. The patient did not tolerate Lexapro well, she continued to remain anxious, she began to engage in therapy in addition to medication management. The Lexapro was subsequently switched to Viibryd. The Viibryd did not work. The patient also began to experience panic attacks including shortness of breath, crying, tachycardia, palpitations. The panic attacks were new and had never happened before.   In January 2020 the patient experienced another breakdown and sunk into a deeper depression. The COVID-19 pandemic only worsened her situation. In February the patient's , a physician, decided to become her full-time caretaker as she would become anxious and panicky without him present. The patient also experienced intermittent bouts of suicidal ideation.   Recently, the patient and  demanded of their psychiatrist to take her off of Viibryd and Seroquel. They actually tapered her off of Seroquel themselves. They asked him to switch her back to Celexa. She has been on Celexa now for the last 3 days and is already begun to experience improvement. She states that the Seroquel led to having hallucinations and actually worsened insomnia. Last night she did not take Seroquel for the first time in many months and she slept very well. Her mood is slightly improved. Regarding her anxiety, she endorses muscle tension and fatigue restlessness irritability and a history of insomnia. Regarding her depression she denies SI HI AVH, denies anhedonia denies guilt, notes some decreased energy, psychomotor retardation, and a history of insomnia. They had also tried acupuncture in the past with some success. Psychiatric review of systems is negative for psychosis " nancy, positive for anxiety and depression. Possibly positive for  depression and postpartum depression. The patient is medication compliant.       Past Psychiatric History:  Began Psychiatric Treatment: Several years   Dx: Depression and anxiety   Psychiatrist: Doctor Gordon for the last year and a half   Therapist: Sees a therapist at [facility] History: Denies, Although she came close to needing admission recently.   Medication Trials: See HPI. Seroquel, Prozac which was too activating, Zoloft which was too activating, Celexa which worked well, Effexor which worked well, Lexapro which did not work, Viibryd which did not work.   Self-Harm: Denies   Suicide Attempts: Denies   OB/GYN HISTORY:  Sexually Active: OB/GYN history deferred   Substance Abuse History:  Types: Denies all, including illicit   Social History:  Marital Status:    Employed: No     Kids: 4   House: House    Hx: Denies   Family History:  Suicide Attempts: Deferred today, Due to lack of time   Suicide Completions: Deferred   Substance Use: Deferred   Psychiatric Conditions: Deferred    depression, psychosis, anxiety: Deferred   Developmental History:  Born: Deferred   Siblings: Deferred   Access to Weapons: Denies   Thought Content: no suicidal ideation, no homicidal ideation, no auditory hallucinations, no visual hallucinations, and     PHQ-9 Depression Screening  PHQ-9 Total Score:      Little interest or pleasure in doing things?     Feeling down, depressed, or hopeless?     Trouble falling or staying asleep, or sleeping too much?     Feeling tired or having little energy?     Poor appetite or overeating?     Feeling bad about yourself - or that you are a failure or have let yourself or your family down?     Trouble concentrating on things, such as reading the newspaper or watching television?     Moving or speaking so slowly that other people could have noticed? Or the opposite - being so fidgety or  restless that you have been moving around a lot more than usual?     Thoughts that you would be better off dead, or of hurting yourself in some way?     PHQ-9 Total Score       FAREED-7       Past Surgical History:  Past Surgical History:   Procedure Laterality Date    COLONOSCOPY  1/[id number]    last scope 2020    COLONOSCOPY N/A 10/29/2021    Procedure: COLONOSCOPY with possible biopies.;  Surgeon: Skyler Fuller MD;  Location: [facility] END[facility];  Service: General;  Laterality: N/A;    KNEE SURGERY  2017    broken knee       Problem List:  Patient Active Problem List   Diagnosis    Anxiety    Depression    History of fracture of patella    Hyperlipemia    Osteopenia    Ulcerative lesion    Constipation    History of colonic polyps    Tinnitus of right ear    Sensorineural hearing loss (SNHL) of both ears    Right shoulder pain       Allergy:   No Known Allergies     Discontinued Medications:  There are no discontinued medications.        Current Medications:   Current Outpatient Medications   Medication Sig Dispense Refill    amitriptyline (ELAVIL) 25 MG tablet Take 1 tablet by mouth Every Night. 90 tablet 3    ARIPiprazole (Abilify) 5 MG tablet Take 1 tablet by mouth Daily. 90 tablet 1    atorvastatin (LIPITOR) 20 MG tablet TAKE 1 TABLET BY MOUTH DAILY 90 tablet 1    Calcium Carb-Cholecalciferol (OYSCO 500 + D) 500-200 MG-UNIT tablet       cholecalciferol (VITAMIN D3) 25 MCG (1000 UT) tablet Take 1 tablet by mouth Daily.      Coenzyme Q10 (CO Q10 PO) Take  by mouth.      escitalopram (LEXAPRO) 10 MG tablet Take 1 tablet by mouth Daily. 90 tablet 3    gabapentin (NEURONTIN) 300 MG capsule 300 mg qam, 300 mg qpm, 600 mg qhs 120 capsule 5    GARLIC PO garlic 1,000 mg oral capsule take 1 capsule by oral route daily   Active      LORazepam (ATIVAN) 1 MG tablet Take 1 tablet by mouth 2 (Two) Times a Day As Needed for Anxiety. for anxiety 60 tablet 5    Multiple Vitamins-Iron (MULTIVITAMIN PLUS IRON ADULT PO) Women's  Multivitamin 18 mg iron-400 mcg-500 mg oral tablet take 1 tablet by oral route daily   Active      Omega-3 Fatty Acids (fish oil) 1000 MG capsule capsule 3 capsules 3 (Three) Times a Day With Meals. PT(PATIENT) REPORTS SHE TAKES MEDICATION MORNING/LUNCH/EVENING      Probiotic Product (PROBIOTIC PO) Take  by mouth.      vitamin E 400 UNIT capsule vitamin E 400 unit oral capsule take 1 capsule by oral route daily   Active       No current facility-administered medications for this visit.       Past Medical History:  Past Medical History:   Diagnosis Date    Acid reflux 2022    Anxiety     Depression     Fracture of patella 09/12/2017    Hyperlipemia     Osteopenia     Panic disorder     Psychiatric inpatient 07/22/2022    Discharged 07/22/2022 for Anxiety, Depression, and Suicidal Bx's    Seizures     reports seizure activity, but under control.       Social History     Socioeconomic History    Marital status:    Tobacco Use    Smoking status: Never    Smokeless tobacco: Never   Vaping Use    Vaping Use: Never used   Substance and Sexual Activity    Alcohol use: Not Currently    Drug use: Never    Sexual activity: Not Currently         Family History   Problem Relation Age of Onset    Stroke Mother     Heart disease Mother     No Known Problems Father     No Known Problems Sister     Cancer Brother     No Known Problems Maternal Aunt     No Known Problems Paternal Aunt     No Known Problems Maternal Uncle     No Known Problems Paternal Uncle     No Known Problems Maternal Grandfather     No Known Problems Maternal Grandmother     No Known Problems Paternal Grandfather     No Known Problems Paternal Grandmother     No Known Problems Cousin     Seizures Son     No Known Problems Other     ADD / ADHD Neg Hx     Alcohol abuse Neg Hx     Anxiety disorder Neg Hx     Bipolar disorder Neg Hx     Dementia Neg Hx     Depression Neg Hx     Drug abuse Neg Hx     OCD Neg Hx     Paranoid behavior Neg Hx     Schizophrenia Neg  "Hx     Self-Injurious Behavior  Neg Hx     Suicide Attempts Neg Hx      Review of Systems:  Review of Systems   Constitutional:  Negative for diaphoresis and fatigue.   HENT:  Negative for drooling.    Eyes:  Negative for visual disturbance.   Respiratory:  Negative for cough and shortness of breath.    Cardiovascular:  Negative for chest pain, palpitations and leg swelling.   Gastrointestinal:  Negative for diarrhea, nausea and vomiting.   Endocrine: Negative for cold intolerance and heat intolerance.   Genitourinary:  Negative for difficulty urinating.   Musculoskeletal:  Negative for joint swelling.   Allergic/Immunologic: Negative for immunocompromised state.   Neurological:  Negative for dizziness, seizures, syncope, speech difficulty, light-headedness, numbness and headaches.         Mental Status Exam  Appearance  : sitting in a chair with her , good eye contact, normal street clothes, groomed, in person  Behavior  : pleasant and cooperative  Motor  : No abnormal  Speech  : normal rhythm, rate, volume, tone, not hyperverbal, not pressured, normal prosidy, Speaks with an accent  Mood  : \"It's better\"  Affect  : improvement in very slight constriction, mood congruent, fair variability  Thought Content  : negative suicidal ideations, negative homicidal ideations, negative obsessions  Perceptions  : negative auditory hallucinations, negative visual hallucinations  Thought Process  : linear  Insight/Judgement  : fair/fair  Cognition  : grossly intact  Attention  : intact      Physical Exam:  Physical Exam    Vital Signs:   /66   Pulse 85   Ht 154.9 cm (60.98\")   Wt 70.6 kg (155 lb 9.6 oz)   BMI 29.42 kg/m²      Lab Results:   No visits with results within 6 Month(s) from this visit.   Latest known visit with results is:   Admission on 08/20/2022, Discharged on 08/20/2022   Component Date Value Ref Range Status    SARS Antigen 08/20/2022 Not Detected  Not Detected, Presumptive Negative Final    " Internal Control 08/20/2022 Passed  Passed Final    Lot Number 08/20/2022 [id number]   Final    Expiration Date 08/20/2022 92,423   Final    Rapid Influenza A Ag 08/20/2022 Negative  Negative Final    Rapid Influenza B Ag 08/20/2022 Negative  Negative Final    Internal Control 08/20/2022 Passed  Passed Final    Lot Number 08/20/2022 [id number]   Final    Expiration Date 08/20/2022 120,423   Final    COVID19 08/20/2022 Not Detected  Not Detected - Ref. Range Final       EKG Results:  No orders to display       Imaging Results:  No Images in the past 120 days found..      Assessment & Plan   Diagnoses and all orders for this visit:    1. Generalized anxiety disorder (Primary)    2. Panic attacks    3. Panic disorder    4. Recurrent major depressive disorder in remission    5. Insomnia due to mental condition        Visit Diagnoses:    ICD-10-CM ICD-9-CM   1. Generalized anxiety disorder  F41.1 300.02   2. Panic attacks  F41.0 300.01   3. Panic disorder  F41.0 300.01   4. Recurrent major depressive disorder in remission  F33.40 296.35   5. Insomnia due to mental condition  F51.05 300.9     327.02       INITIAL presentation most consistent with major depressive disorder in partial remission, FAREED, rare panic attacks.      12/6: No changes for now, but may increase to 7.5 mg daily of abilify (half a 15 mg tab). Still some residual MDD.    Allowed patient to freely discuss and process issues, such as:  Ongoing: Social anxiety.  Ongoing: Her feelings of hopelessness and restlessness and how they recur at times. Her fear of her mood getting worse again.  ...without interruption or judgement with unconditional positive   ... using Rogerian psychotherapeutic techniques including unconditional positive regard, reflective listening, and demonstrating clear empathy.     Time (minutes) spent providing supportive psychotherapy: 16  Functional status: mild impairment  Treatment plan: Medication management and supportive  psychotherapy  Prognosis: good  Progress: stable, well  4w    11/2: chronic mild anhedonia. Increase abilify and start light box.    8/31: Stable, well, no changes. Light box, we discussed it.    6/30: Stable, well. No changes.     4/28: Euthymic well. No changes.     3/3: Stable, well, no changes.      1/20: Well, stable, no changes.     12/9: Significant improvement. No changes.     11/18: Residual anxiety, residual depression.  Increase gabapentin, start amitriptyline at night for initial insomnia.  Close follow-up.     11/7: Residual anx (at noon), consider adding gabapentin in the mornings. and dep. Increase gabapentin in the afternoons. No other changes.     10/31: Couples therapy, increase Abilify, close follow-up.  I feel that their marriage issues have come to a head recently, and this stressor could largely explain patient's depression and anxiety.  Patient mentioned Celexa again, she is likely interested in switching.     9/30: well, stable, some constriction today. No changes.     8/30: Remarkably well. Initial insomnia.    8/1: Try another mood stabilizer in place of olanzapine.  Will trial Abilify again.  Also consider Geodon.      6/17: start buspar, doing better.     5/20: Continue Lexapro and lorazepam.  Try Belsomra in place of Ambien.      4/22: Some improvement in anx and dep, but still residual symptoms.  Persistent insomnia. Declines IOP, inpatient admission.  Increase Lexapro and start trazodone.      3/24: Focus on lower doses.  Start Pristiq.  Depression with anxious distress versus depression and generalized anxiety disorder.  She only has distractibility, no other symptoms of hypomania.      3/15: Continue lurasidone 40 mg at night, increase Ambien to 10 mg at night.  Continue low-dose Lamictal, plan to increase further.  Continue titrating off of amitriptyline.     3/10: Effexor has now been stopped.  Continue Latuda and Lamictal at present doses.  I will see her back in a few days and  then at 6 weeks.  Bipolar 2?  If so, how come she is tolerating amitriptyline in the evenings?     2/15: Still utilizing lorazepam which means the anxiety is not under control.  Increase Effexor.    1/17: Continued improvement.  No changes, only on effexor 75 mg for 2 weeks.  Tolerating medications without side effects.      1/3: Continued improvement. Increase effexor to target residual dep anx. TMS to start 1/6.     PLAN:  Risk Assessment: Risk of self-harm acutely remain low. Risk factors include a history of suicidal ideation, mood disorder, anxiety disorder, presence of psychosocial stressors such as colonic dysplasia, COVID-19 pandemic. Protective factors include no history of self-harm or suicide attempts, good social support, willingness to engage in care, improved depressive symptoms. Risk of self-harm chronically also remain low, but could be further elevated in the event of treatment noncompliance and/or AODA.  Medications:   CONTINUE light box.   CONTINUE lexapro 10 mg daily. Risks, benefits, alternatives discussed with patient including GI upset, nausea vomiting diarrhea, theoretical decrease of seizure threshold predisposing the patient to a slightly higher seizure risk, headaches, sexual dysfunction, serotonin syndrome, bleeding risk, increased suicidality in patients 24 years and younger.  After discussion of these risks and benefits, the patient voiced understanding and agreed to proceed.  CONTINUE gabapentin 300 mg qam, 300 qpm, 600 mg nightly. Risks, benefits, alternatives discussed with patient including sedation, dizziness/falls risk, GI upset, weight gain.  After discussion of these risks and benefits, the patient voiced understanding and agreed to proceed. UDS ordered, Mamadou reviewed.  CONTINUE Abilify 4 to 5 mg daily. Risks, benefits, alternatives discussed with patient including increased energy, exacerbation of irritability, akathisia, GI upset, orthostatic hypotension, increased appetite.  " After discussion of these risks and benefits, the patient voiced understanding and agreed to proceed.  CONTINUE amitriptyline 25 mg nightly. Risks, benefits, alternatives discussed with patient including sedation, dizziness, falls, GI upset, constipation, urinary retention, dry mouth.  After discussion of these risks and benefits, the patient voiced understanding and agreed to proceed.  CONTINUE lorazepam 1 mg bid PRN. Risks, benefits, alternatives discussed with patient including GI upset, sedation, dizziness, respiratory depression, falls risk.  After discussion of these risks and benefits, the patient voiced understanding and agreed to proceed.  S/P:   buspar 5 mg bid was ineffective.  Trazodone 50 mg nightly not helpful for insomnia.  Mirtazapine 15 mg, 7.5 mg: ineffective; made anxiety and depression worse.  Abilify: brain zaps and constipation  Seroquel made depression worse  celexa 20 mg daily, ineffective after several months  Doxepin caused \"brain zaps\"  Latuda was ineffective.  Amitriptyline 25 mg nightly helpful for sleep but we didn't want her on too many serotonergic meds.  Ambien minimally helpful for insomnia.  Pristiq and effexor worsened anxiety, both low dose.  Stopped lamictal for unclear reasons 25 mg.  belsomra 5 mg nightly didn't help with insomnia.  Therapy: continue with Genevieve at [facility].  Referred for couples therapy 10/2022  Status post IOP at [facility] 10/2022  Labs/Studies: BMP to monitor sodium levels in February was entirely normal, EKG February shows rate 72, sinus, .    TREATMENT PLAN/GOALS: Continue supportive psychotherapy efforts and medications as indicated. Treatment and medication options discussed during today's visit. Patient acknowledged and verbally consented to continue with current treatment plan and was educated on the importance of compliance with treatment and follow-up appointments.    MEDICATION ISSUES:  YVETTE reviewed as expected.  Discussed medication " options and treatment plan of prescribed medication as well as the risks, benefits, and side effects including potential falls, possible impaired driving and metabolic adversities among others. Patient is agreeable to call the office with any worsening of symptoms or onset of side effects. Patient is agreeable to call 911 or go to the nearest ER should he/she begin having SI/HI. No medication side effects or related complaints today.     MEDS ORDERED DURING VISIT:    Return in about 4 weeks (around 1/3/2024).         This document has been electronically signed by Vinny Krishnamurthy MD  December 6, 2023 11:08 EST       Part of this note may be an electronic transcription/translation of spoken language to printed text using the Dragon Dictation System.

## 2023-12-12 ENCOUNTER — OFFICE VISIT (OUTPATIENT)
Dept: ORTHOPEDIC SURGERY | Facility: CLINIC | Age: 68
End: 2023-12-12
Payer: MEDICARE

## 2023-12-12 VITALS
BODY MASS INDEX: 29.07 KG/M2 | HEART RATE: 73 BPM | DIASTOLIC BLOOD PRESSURE: 77 MMHG | SYSTOLIC BLOOD PRESSURE: 120 MMHG | HEIGHT: 61 IN | WEIGHT: 154 LBS

## 2023-12-12 DIAGNOSIS — M71.22 BAKER'S CYST OF KNEE, LEFT: ICD-10-CM

## 2023-12-12 DIAGNOSIS — M25.562 LEFT KNEE PAIN, UNSPECIFIED CHRONICITY: Primary | ICD-10-CM

## 2023-12-12 RX ORDER — MELOXICAM 15 MG/1
15 TABLET ORAL DAILY
Qty: 21 TABLET | Refills: 0 | Status: SHIPPED | OUTPATIENT
Start: 2023-12-12

## 2023-12-12 NOTE — PROGRESS NOTES
"Chief Complaint  Initial Evaluation of the Left Knee     Subjective      Shabana Ferguson presents to [facility] for initial evaluation of the left knee. She is here today for her left knee.  She has pain in the back of the knee.  She has a bulge in the back of her knee 2 days ago.  She had an injury to that leg 7 years ago.  She has problems with stairs.  She has difficulty with prolonged standing and ambulation. She denies pain of the bakers cyst.     No Known Allergies     Social History     Socioeconomic History    Marital status:    Tobacco Use    Smoking status: Never    Smokeless tobacco: Never   Vaping Use    Vaping Use: Never used   Substance and Sexual Activity    Alcohol use: Not Currently    Drug use: Never    Sexual activity: Not Currently        I reviewed the patient's chief complaint, history of present illness, review of systems, past medical history, surgical history, family history, social history, medications, and allergy list.     Review of Systems     Constitutional: Denies fevers, chills, weight loss  Cardiovascular: Denies chest pain, shortness of breath  Skin: Denies rashes, acute skin changes  Neurologic: Denies headache, loss of consciousness        Vital Signs:   /77   Pulse 73   Ht 154.9 cm (60.98\")   Wt 69.9 kg (154 lb)   BMI 29.12 kg/m²          Physical Exam  General: Alert. No acute distress    Ortho Exam        LEFT KNEE Flexion 120. Extension 0. Stable to varus/valgus stress. Stable to anterior/posterior drawer. Neurovascularly intact. Negative Lyn. Negative Lachman. Positive EHL, FHL, HS and TA. Sensation intact to light touch all 5 nerves of the foot. Ambulates with Antalgic gait. Patella is well tracking. Calf supple, non-tender. Negative tenderness to the medial joint line. Negative tenderness to the lateral joint line. Negative Crepitus. Good strength to hamstrings, quadriceps, dorsiflexors, and plantar flexors.  Knee " Extensor Mechanism intact Tender to the posterior of the left knee.        Procedures      Imaging Results (Most Recent)       Procedure Component Value Units Date/Time    XR Knee 3 View Left [[id number]] Resulted: 12/12/23 1054     Updated: 12/12/23 1056             Result Review :     X-Ray Report:  Left knee X-Ray  Indication: Evaluation of the left knee  AP/Lateral and Kyle view(s)  Findings: Mild arthritis.   Prior studies available for comparison: no             Assessment and Plan     Diagnoses and all orders for this visit:    1. Left knee pain, unspecified chronicity (Primary)  -     XR Knee 3 View Left    2. Baker's cyst of knee, left        Discussed the treatment plan with the patient. I reviewed the X-rays that were obtained today with the patient.     Prescribed anti inflammatory. HEP exercises.     Call or return if worsening symptoms.    Follow Up     PRN      Patient was given instructions and counseling regarding her condition or for health maintenance advice. Please see specific information pulled into the AVS if appropriate.     Scribed for Kedar Simpson MD by Bouchra Campos MA.  12/12/23   10:57 EST    I have personally performed the services described in this document as scribed by the above individual and it is both accurate and complete. Kedar Simpson MD 12/12/23

## 2023-12-28 ENCOUNTER — OFFICE VISIT (OUTPATIENT)
Dept: FAMILY MEDICINE CLINIC | Facility: CLINIC | Age: 68
End: 2023-12-28
Payer: MEDICARE

## 2023-12-28 VITALS
HEART RATE: 75 BPM | BODY MASS INDEX: 29.45 KG/M2 | DIASTOLIC BLOOD PRESSURE: 74 MMHG | HEIGHT: 61 IN | SYSTOLIC BLOOD PRESSURE: 122 MMHG | OXYGEN SATURATION: 99 % | WEIGHT: 156 LBS | TEMPERATURE: 97.1 F

## 2023-12-28 DIAGNOSIS — H61.23 BILATERAL IMPACTED CERUMEN: ICD-10-CM

## 2023-12-28 DIAGNOSIS — E55.9 VITAMIN D DEFICIENCY: ICD-10-CM

## 2023-12-28 DIAGNOSIS — K21.9 GASTROESOPHAGEAL REFLUX DISEASE, UNSPECIFIED WHETHER ESOPHAGITIS PRESENT: ICD-10-CM

## 2023-12-28 DIAGNOSIS — R73.09 ELEVATED GLUCOSE: ICD-10-CM

## 2023-12-28 DIAGNOSIS — E66.3 OVERWEIGHT WITH BODY MASS INDEX (BMI) OF 29 TO 29.9 IN ADULT: ICD-10-CM

## 2023-12-28 DIAGNOSIS — M85.88 OSTEOPENIA OF LUMBAR SPINE: ICD-10-CM

## 2023-12-28 DIAGNOSIS — Z13.29 THYROID DISORDER SCREENING: ICD-10-CM

## 2023-12-28 DIAGNOSIS — E78.2 MIXED HYPERLIPIDEMIA: ICD-10-CM

## 2023-12-28 DIAGNOSIS — Z78.0 POSTMENOPAUSAL: ICD-10-CM

## 2023-12-28 DIAGNOSIS — Z00.00 ENCOUNTER FOR SUBSEQUENT ANNUAL WELLNESS VISIT (AWV) IN MEDICARE PATIENT: Primary | ICD-10-CM

## 2023-12-28 PROCEDURE — 84443 ASSAY THYROID STIM HORMONE: CPT | Performed by: NURSE PRACTITIONER

## 2023-12-28 PROCEDURE — 83036 HEMOGLOBIN GLYCOSYLATED A1C: CPT | Performed by: NURSE PRACTITIONER

## 2023-12-28 PROCEDURE — 80061 LIPID PANEL: CPT | Performed by: NURSE PRACTITIONER

## 2023-12-28 PROCEDURE — 85025 COMPLETE CBC W/AUTO DIFF WBC: CPT | Performed by: NURSE PRACTITIONER

## 2023-12-28 PROCEDURE — 80053 COMPREHEN METABOLIC PANEL: CPT | Performed by: NURSE PRACTITIONER

## 2023-12-28 PROCEDURE — 82306 VITAMIN D 25 HYDROXY: CPT | Performed by: NURSE PRACTITIONER

## 2023-12-28 RX ORDER — ATORVASTATIN CALCIUM 20 MG/1
20 TABLET, FILM COATED ORAL DAILY
Qty: 90 TABLET | Refills: 3 | Status: SHIPPED | OUTPATIENT
Start: 2023-12-28

## 2023-12-28 NOTE — PROGRESS NOTES
The ABCs of the Annual Wellness Visit  Subsequent Medicare Wellness Visit    Subjective    Shabana Ferguson is a 68 y.o. female who presents for a Subsequent Medicare Wellness Visit.    The following portions of the patient's history were reviewed and   updated as appropriate: allergies, current medications, past family history, past medical history, past social history, past surgical history, and problem list.    Compared to one year ago, the patient feels her physical   health is the same.    Compared to one year ago, the patient feels her mental   health is better.    Recent Hospitalizations:  She was not admitted to the hospital during the last year.       Current Medical Providers:  Patient Care Team:  Kvng Ramirez APRN as PCP - General (Nurse Practitioner)    Outpatient Medications Prior to Visit   Medication Sig Dispense Refill    amitriptyline (ELAVIL) 25 MG tablet Take 1 tablet by mouth Every Night. 90 tablet 3    ARIPiprazole (Abilify) 5 MG tablet Take 1 tablet by mouth Daily. 90 tablet 1    Calcium Carb-Cholecalciferol (OYSCO 500 + D) 500-200 MG-UNIT tablet       cholecalciferol (VITAMIN D3) 25 MCG (1000 UT) tablet Take 1 tablet by mouth Daily.      Coenzyme Q10 (CO Q10 PO) Take  by mouth.      escitalopram (LEXAPRO) 10 MG tablet Take 1 tablet by mouth Daily. 90 tablet 3    gabapentin (NEURONTIN) 300 MG capsule 300 mg qam, 300 mg qpm, 600 mg qhs 120 capsule 5    GARLIC PO garlic 1,000 mg oral capsule take 1 capsule by oral route daily   Active      LORazepam (ATIVAN) 1 MG tablet Take 1 tablet by mouth 2 (Two) Times a Day As Needed for Anxiety. for anxiety 60 tablet 5    meloxicam (MOBIC) 15 MG tablet Take 1 tablet by mouth Daily. 21 tablet 0    Multiple Vitamins-Iron (MULTIVITAMIN PLUS IRON ADULT PO) Women's Multivitamin 18 mg iron-400 mcg-500 mg oral tablet take 1 tablet by oral route daily   Active      Omega-3 Fatty Acids (fish oil) 1000 MG capsule capsule 3 capsules 3 (Three) Times a Day  "With Meals. PT(PATIENT) REPORTS SHE TAKES MEDICATION MORNING/LUNCH/EVENING      Probiotic Product (PROBIOTIC PO) Take  by mouth.      vitamin E 400 UNIT capsule vitamin E 400 unit oral capsule take 1 capsule by oral route daily   Active      atorvastatin (LIPITOR) 20 MG tablet TAKE 1 TABLET BY MOUTH DAILY 90 tablet 1     No facility-administered medications prior to visit.       No opioid medication identified on active medication list. I have reviewed chart for other potential  high risk medication/s and harmful drug interactions in the elderly.        Aspirin is not on active medication list.  Aspirin use is not indicated based on review of current medical condition/s. Risk of harm outweighs potential benefits.  .    Patient Active Problem List   Diagnosis    Anxiety    Depression    History of fracture of patella    Hyperlipemia    Osteopenia    Ulcerative lesion    Constipation    History of colonic polyps    Tinnitus of right ear    Sensorineural hearing loss (SNHL) of both ears    Right shoulder pain     Advance Care Planning   Advance Care Planning     Advance Directive is not on file.  ACP discussion was held with the patient during this visit. Patient has an advance directive (not in EMR), copy requested.     Objective    Vitals:    12/28/23 1352   BP: 122/74   Pulse: 75   Temp: 97.1 °F (36.2 °C)   SpO2: 99%   Weight: 70.8 kg (156 lb)   Height: 154.9 cm (60.98\")     Estimated body mass index is 29.5 kg/m² as calculated from the following:    Height as of this encounter: 154.9 cm (60.98\").    Weight as of this encounter: 70.8 kg (156 lb).           Does the patient have evidence of cognitive impairment? No          HEALTH RISK ASSESSMENT    Smoking Status:  Social History     Tobacco Use   Smoking Status Never   Smokeless Tobacco Never     Alcohol Consumption:  Social History     Substance and Sexual Activity   Alcohol Use Not Currently     Fall Risk Screen:    STEADI Fall Risk Assessment was completed, and " patient is at LOW risk for falls.Assessment completed on:2023    Depression Screenin/28/2023     1:47 PM   PHQ-2/PHQ-9 Depression Screening   Little Interest or Pleasure in Doing Things 0-->not at all   Feeling Down, Depressed or Hopeless 1-->several days   PHQ-9: Brief Depression Severity Measure Score 1       Health Habits and Functional and Cognitive Screenin/28/2023     1:47 PM   Functional & Cognitive Status   Do you have difficulty preparing food and eating? No   Do you have difficulty bathing yourself, getting dressed or grooming yourself? No   Do you have difficulty using the toilet? No   Do you have difficulty moving around from place to place? No   Do you have trouble with steps or getting out of a bed or a chair? No   Current Diet Well Balanced Diet   Dental Exam Up to date   Eye Exam Not up to date   Exercise (times per week) 7 times per week        Exercise Frequency Comment 5 to 7 days a week   Current Exercises Include Walking   Do you need help using the phone?  No   Are you deaf or do you have serious difficulty hearing?  Yes   Do you need help to go to places out of walking distance? No   Do you need help shopping? No   Do you need help preparing meals?  No   Do you need help with housework?  No   Do you need help with laundry? No   Do you need help taking your medications? No   Do you need help managing money? No   Do you ever drive or ride in a car without wearing a seat belt? No   Have you felt unusual stress, anger or loneliness in the last month? No   Who do you live with? Spouse   If you need help, do you have trouble finding someone available to you? No   Have you been bothered in the last four weeks by sexual problems? No   Do you have difficulty concentrating, remembering or making decisions? Yes       Age-appropriate Screening Schedule:  Refer to the list below for future screening recommendations based on patient's age, sex and/or medical conditions. Orders for  these recommended tests are listed in the plan section. The patient has been provided with a written plan.    Health Maintenance   Topic Date Due    LIPID PANEL  08/15/2023    DXA SCAN  02/03/2024    BMI FOLLOWUP  03/28/2024    COLORECTAL CANCER SCREENING  10/29/2024    ANNUAL WELLNESS VISIT  12/28/2024    MAMMOGRAM  10/20/2025    TDAP/TD VACCINES (2 - Td or Tdap) 10/23/2033    HEPATITIS C SCREENING  Completed    COVID-19 Vaccine  Completed    INFLUENZA VACCINE  Completed    Pneumococcal Vaccine 65+  Completed    ZOSTER VACCINE  Completed                  CMS Preventative Services Quick Reference  Risk Factors Identified During Encounter  Vision Screening Recommended  The above risks/problems have been discussed with the patient.  Pertinent information has been shared with the patient in the After Visit Summary.  An After Visit Summary and PPPS were made available to the patient.    Follow Up:   Next Medicare Wellness visit to be scheduled in 1 year.       Additional E&M Note during same encounter follows:  Patient has multiple medical problems which are significant and separately identifiable that require additional work above and beyond the Medicare Wellness Visit.      Chief Complaint  Medicare Wellness-subsequent    Subjective        HPI  Shabana Ferguson is also being seen today for      Heartburn  She complains of abdominal pain and heartburn. This is a recurrent problem. The current episode started 1 to 4 weeks ago. The problem has been gradually improving. The heartburn does not wake her from sleep. The heartburn does not limit her activity. Exacerbated by: anxiety and depression. Decrease appetite. She has tried a histamine-2 antagonist and a PPI for the symptoms. The treatment provided moderate relief.   Hyperlipidemia  This is a chronic problem. The current episode started more than 1 year ago. Condition status: Has been off the medication since fall of 2021. Current antihyperlipidemic treatment  "includes statins. Compliance problems: Adherence to medication.      Objective   Vital Signs:  /74   Pulse 75   Temp 97.1 °F (36.2 °C)   Ht 154.9 cm (60.98\")   Wt 70.8 kg (156 lb)   SpO2 99%   BMI 29.50 kg/m²     Physical Exam  Vitals reviewed.   Constitutional:       General: She is not in acute distress.     Appearance: Normal appearance. She is well-developed. She is not ill-appearing.      Comments: Overweight BMI of 29.5   HENT:      Head: Normocephalic and atraumatic.      Right Ear: There is impacted cerumen.      Left Ear: There is impacted cerumen.   Eyes:      Conjunctiva/sclera: Conjunctivae normal.      Pupils: Pupils are equal, round, and reactive to light.   Cardiovascular:      Rate and Rhythm: Normal rate and regular rhythm.      Heart sounds: No murmur heard.  Pulmonary:      Effort: Pulmonary effort is normal.      Breath sounds: Normal breath sounds. No wheezing or rhonchi.   Abdominal:      Palpations: Abdomen is soft.      Tenderness: There is no abdominal tenderness.   Musculoskeletal:      Right lower leg: No edema.      Left lower leg: No edema.   Skin:     General: Skin is warm and dry.   Neurological:      Mental Status: She is alert and oriented to person, place, and time.   Psychiatric:         Mood and Affect: Mood and affect normal.         Behavior: Behavior normal.         Thought Content: Thought content normal.         Judgment: Judgment normal.          The following data was reviewed by: MARIE Weinberg on 12/28/2023:    Data reviewed : Previous office note  Ear Cerumen Removal    Date/Time: 12/28/2023 2:13 PM    Performed by: Shahla Bourgeois MA  Authorized by: Kvng Ramirez APRN    Anesthesia:  Local Anesthetic: none  Location details: left ear and right ear  Patient tolerance: patient tolerated the procedure well with no immediate complications  Procedure type: irrigation   Sedation:  Patient sedated: no                Assessment and Plan "   Diagnoses and all orders for this visit:    1. Encounter for subsequent annual wellness visit (AWV) in Medicare patient (Primary)    2. Mixed hyperlipidemia  -     Comprehensive Metabolic Panel  -     CBC & Differential  -     Lipid Panel    3. Elevated glucose  -     Hemoglobin A1c    4. Gastroesophageal reflux disease, unspecified whether esophagitis present    5. Thyroid disorder screening  -     TSH    6. Vitamin D deficiency  -     Vitamin D,25-Hydroxy    7. Postmenopausal  -     DEXA Bone Density Axial; Future    8. Osteopenia of lumbar spine  -     DEXA Bone Density Axial; Future    9. Overweight with body mass index (BMI) of 29 to 29.9 in adult    Other orders  -     atorvastatin (LIPITOR) 20 MG tablet; Take 1 tablet by mouth Daily.  Dispense: 90 tablet; Refill: 3  -     Ear Cerumen Removal         Overall patient is doing well, states her mental health has improved and stabilized.  Patient still does walks, tries to stay active.  She is due for repeat DEXA scan 2/4/2024 we will go ahead and schedule DEXA scan today and have it scheduled after 2/4/2024.  Patient does have a history of osteopenia lumbar spine.  Blood pressure stable at 122/74, refill Lipitor, recheck lipid panel and adjust medication if needed.  Discussed return precautions.  Patient did have buildup of cerumen bilateral ears will irrigated during today's visit to remove the cerumen.  Discussed to call office if any questions or concerns.  Patient and spouse verbalized understanding and is agreeable treatment plan.    Follow Up   Return in about 6 months (around 6/28/2024) for Recheck.  Patient was given instructions and counseling regarding her condition or for health maintenance advice. Please see specific information pulled into the AVS if appropriate.

## 2023-12-29 LAB
25(OH)D3 SERPL-MCNC: 53.5 NG/ML (ref 30–100)
ALBUMIN SERPL-MCNC: 4.6 G/DL (ref 3.5–5.2)
ALBUMIN/GLOB SERPL: 1.7 G/DL
ALP SERPL-CCNC: 110 U/L (ref 39–117)
ALT SERPL W P-5'-P-CCNC: 26 U/L (ref 1–33)
ANION GAP SERPL CALCULATED.3IONS-SCNC: 10.9 MMOL/L (ref 5–15)
AST SERPL-CCNC: 30 U/L (ref 1–32)
BASOPHILS # BLD AUTO: 0.02 10*3/MM3 (ref 0–0.2)
BASOPHILS NFR BLD AUTO: 0.3 % (ref 0–1.5)
BILIRUB SERPL-MCNC: 1 MG/DL (ref 0–1.2)
BUN SERPL-MCNC: 16 MG/DL (ref 8–23)
BUN/CREAT SERPL: 19.5 (ref 7–25)
CALCIUM SPEC-SCNC: 10.2 MG/DL (ref 8.6–10.5)
CHLORIDE SERPL-SCNC: 102 MMOL/L (ref 98–107)
CHOLEST SERPL-MCNC: 191 MG/DL (ref 0–200)
CO2 SERPL-SCNC: 26.1 MMOL/L (ref 22–29)
CREAT SERPL-MCNC: 0.82 MG/DL (ref 0.57–1)
DEPRECATED RDW RBC AUTO: 44 FL (ref 37–54)
EGFRCR SERPLBLD CKD-EPI 2021: 77.5 ML/MIN/1.73
EOSINOPHIL # BLD AUTO: 0.05 10*3/MM3 (ref 0–0.4)
EOSINOPHIL NFR BLD AUTO: 0.7 % (ref 0.3–6.2)
ERYTHROCYTE [DISTWIDTH] IN BLOOD BY AUTOMATED COUNT: 13.6 % (ref 12.3–15.4)
GLOBULIN UR ELPH-MCNC: 2.7 GM/DL
GLUCOSE SERPL-MCNC: 88 MG/DL (ref 65–99)
HBA1C MFR BLD: 5.7 % (ref 4.8–5.6)
HCT VFR BLD AUTO: 43.3 % (ref 34–46.6)
HDLC SERPL-MCNC: 61 MG/DL (ref 40–60)
HGB BLD-MCNC: 14 G/DL (ref 12–15.9)
IMM GRANULOCYTES # BLD AUTO: 0.02 10*3/MM3 (ref 0–0.05)
IMM GRANULOCYTES NFR BLD AUTO: 0.3 % (ref 0–0.5)
LDLC SERPL CALC-MCNC: 108 MG/DL (ref 0–100)
LDLC/HDLC SERPL: 1.71 {RATIO}
LYMPHOCYTES # BLD AUTO: 2.55 10*3/MM3 (ref 0.7–3.1)
LYMPHOCYTES NFR BLD AUTO: 35.8 % (ref 19.6–45.3)
MCH RBC QN AUTO: 29 PG (ref 26.6–33)
MCHC RBC AUTO-ENTMCNC: 32.3 G/DL (ref 31.5–35.7)
MCV RBC AUTO: 89.6 FL (ref 79–97)
MONOCYTES # BLD AUTO: 0.58 10*3/MM3 (ref 0.1–0.9)
MONOCYTES NFR BLD AUTO: 8.1 % (ref 5–12)
NEUTROPHILS NFR BLD AUTO: 3.91 10*3/MM3 (ref 1.7–7)
NEUTROPHILS NFR BLD AUTO: 54.8 % (ref 42.7–76)
NRBC BLD AUTO-RTO: 0 /100 WBC (ref 0–0.2)
PLATELET # BLD AUTO: 288 10*3/MM3 (ref 140–450)
PMV BLD AUTO: 9.3 FL (ref 6–12)
POTASSIUM SERPL-SCNC: 5 MMOL/L (ref 3.5–5.2)
PROT SERPL-MCNC: 7.3 G/DL (ref 6–8.5)
RBC # BLD AUTO: 4.83 10*6/MM3 (ref 3.77–5.28)
SODIUM SERPL-SCNC: 139 MMOL/L (ref 136–145)
TRIGL SERPL-MCNC: 127 MG/DL (ref 0–150)
TSH SERPL DL<=0.05 MIU/L-ACNC: 0.82 UIU/ML (ref 0.27–4.2)
VLDLC SERPL-MCNC: 22 MG/DL (ref 5–40)
WBC NRBC COR # BLD AUTO: 7.13 10*3/MM3 (ref 3.4–10.8)

## 2023-12-29 NOTE — PROGRESS NOTES
Called and spoke to Shabana Ferguson to relay results. Pt verbalized understanding. No further questions/concerns at this time.

## 2024-01-11 ENCOUNTER — OFFICE VISIT (OUTPATIENT)
Dept: PSYCHIATRY | Facility: CLINIC | Age: 69
End: 2024-01-11
Payer: MEDICARE

## 2024-01-11 VITALS
DIASTOLIC BLOOD PRESSURE: 68 MMHG | BODY MASS INDEX: 26.73 KG/M2 | HEIGHT: 64 IN | HEART RATE: 84 BPM | WEIGHT: 156.6 LBS | SYSTOLIC BLOOD PRESSURE: 126 MMHG

## 2024-01-11 DIAGNOSIS — F41.0 PANIC ATTACKS: ICD-10-CM

## 2024-01-11 DIAGNOSIS — F51.05 INSOMNIA DUE TO MENTAL CONDITION: ICD-10-CM

## 2024-01-11 DIAGNOSIS — F33.40 RECURRENT MAJOR DEPRESSIVE DISORDER IN REMISSION: Primary | ICD-10-CM

## 2024-01-11 DIAGNOSIS — F41.1 GENERALIZED ANXIETY DISORDER: ICD-10-CM

## 2024-01-11 DIAGNOSIS — F41.0 PANIC DISORDER: ICD-10-CM

## 2024-01-11 NOTE — PROGRESS NOTES
"Subjective   Shabana Ferguson is a 68 y.o. female who presents today for follow up    Referring Provider:  No referring provider defined for this encounter.    Chief Complaint:  mdd fareed    History of Present Illness:     Shabana Ferguson is a 64 year old /White female, hx of anxiety and depression, fractured patella, HLD, osteopenia, ulcer referred by MARIE Brice, for anxiety and depression management.   Chart: Psychotropic medications: amitriptyline 25 mg QHS, Celexa 20 mg p.o. daily, lorazepam 0.5 p.o. twice daily as needed anxiety.      \"Shabana\" and Jaya    : In person interview:  Chart review:   : Fam med, ortho, reassuring cmp, tsh, vit d, cbc. Elevated LDL.  No visits.  Benign mammogram.  Seen by orthopedics and physical therapy.  Plannin/6: No changes for now, but may increase to 7.5 mg daily of abilify (half a 15 mg tab). Still some residual MDD.  : chronic mild anhedonia. Increase abilify and start light box.  \"It flipped and I'm doing better.\"  8/10  Some stress and anxiety, but not FAREED, but situational  Learning from group meetings, low self esteem  Previous important:   \"Not feeling real shantal in my life\" and has anticipatory anxiety, as in, she was feeling anxious before a previous appointment  Mood/Depression: MDD at goal  Anxiety: stable FAREED, restless at times, social anxiety  Panic attacks: rare, controlled on bzd, half a tab qod  Energy: stable  Concentration: stable  Sleeping: stable  Eatin lbs  Refills: y  Substances: def  Therapy: group therapy with other women around her age.  Medication compliant: y  SE: n  No SI HI AVH.      : In person interview:  Chart review:   No visits.  Benign mammogram.  Seen by orthopedics and physical therapy.  Plannin/2: chronic mild anhedonia. Increase abilify and start light box.  \"Better.\"  Had some stress  Lots of traveling  Used the light box  Regular exercise 3 days a week  Rarely uses " "ativan  Volunteer work  \"I enjoyed seeing his sister recently\"   Previous important: \"Not feeling real shantal in my life\" and has anticipatory anxiety, as in, she was feeling anxious before a previous appointment  Mood/Depression: improving, possibly at goal  Anxiety: restless at times, social anxiety  Panic attacks: rare, controlled on bzd  Energy: stable  Concentration: stable  Sleeping: stable, better  Eating: stable  Refills: y  Substances: def  Therapy: group therapy with other women around her age.  Medication compliant: y  SE: n  No SI HI AV.      : In person interview:  Chart review:   Benign mammogram.  Seen by orthopedics and physical therapy.  Plannin/31: Stable, well, no changes. Light box, we discussed it.  Euthymic at last visit.  \"I didn't get the light box.\"  I'm sleeping  Overall good, but have bad days  Regular exercise 3 days a week  Rarely uses ativan  Volunteer work  \"Not feeling real shantal in my life\" and has anticipatory anxiety, as in, she was feeling anxious before this appointment  Mood/Depression: some bad \"hours,\" but overall ok.   Anxiety: restless at times, social anxiety  Panic attacks: rare, controlled on bzd  Energy: good  Concentration: good  Sleeping: stable, better  Eating: stable  Refills: y  Substances: def  Therapy: started group therapy with other women around her age.  Medication compliant: y  SE: n  No SI HI AV.    : In person interview:  Chart review:     Seen by orthopedics and physical therapy.  Planning:     Euthymic at last visit.  \"I have good times... but sometimes it comes back.\"  Recently I'm good  2-3 weeks ago the stress made it worse. Obdulio and his family went to [place].  She became anxious and didn't want to go and had to miss the wedding  There was some guilt tripping by family  Rarely uses ativan  Mood/Depression: can push away rare feelings of hopelessness.  Anxiety: still feel restless sometimes.  Panic attacks: rare, controlled on bzd  Energy: " "good  Concentration: good  Sleeping:  Takes half a lorazepam at night to sleep, along with other night meds.  Much better  Eating: stable  Refills: y  Substances: def  Therapy: started group therapy with other women around her age.    Medication compliant: y  SE: n  No SI HI AVH.      : In person interview:  Chart review: Seen by orthopedics and physical therapy.  Planning: Euthymic at last visit.  \"I'm most of the time, good.\"    Mood/Depression: can push away rare feelings of hopelessness.  Anxiety: still feel restless sometimes.  Panic attacks: rare, controlled on bzd  Energy: good  Concentration: good  Sleeping:  Takes half a lorazepam at night to sleep, along with other night meds.  Eating: stable  Refills: y  Substances: def  Therapy: started group therapy with other women around her age.  Medication compliant: y  SE: n  No SI HI AVH.    : In person interview:  Chart review: getting PT for right shoulder rotator cuff tear.  Planning: stable no changes.  \"I'm praying and I give thanks to God.\"  \"I'm doing a lot better.\"  Mood/Depression: only if I get stressed out  Anxiety: sometimes get stressed out.  Takes an ativan, takes one in the evening (half). About half a day.  Panic attacks: denies  Energy: good  Concentration: stable  Sleeping: good, well  Eatin lbs  Refills: y  Substances: n  Therapy: continuing  Medication compliant: y  SE: n  No SI HI AVH.    3/3: In person interview:  Chart review: No new.  Planning: Well, stable, no changes.  \"Friend's father .\"  98 yo.  \"I'm doing good.\"  Once in a while I have trouble sleeping. Mostly I sleep well.  Tried to wean down on ativan, but has brain zaps.  Jaya: things are doing well.  I'm doing things now like pictures  Family is doing better. Relationships getting better.   Mood/Depression: stable, fairly well  Anxiety: some bouts, minimal  Panic attacks: controlled on ativan  Energy: Stable  Concentration: Stable  Sleeping: Well  Eating: " "Stable  Refills: Yes  Substances: Denies  Therapy:  Their couples therapist left. Referred to someone else, who doesn't take their insurance. Trying [facility]; has an appnt.  Medication compliant: Yes  No SI HI AVH.    : In person interview:  Chart review: Seen by orthopedic surgery for right rotator cuff tear, getting physical therapy for this.  MRI of the right shoulder ordered.  Planning: Stable, well, no changes.   \"  Mood/Depression:  Anxiety:  Panic attacks:  Energy:  Concentration:  Sleeping:  Eating:  Refills:  Substances:  Therapy:  Medication compliant:  SE:   No SI HI AVH.    : In person interview:  Chart review: No new.    Planning: No changes at last visit.  Are they still doing couples therapy?  \"I'm ok.\"  Stable, once in a while I go a little down, but then get back up.  Continuing to do couples therapy  Mood/Depression: minimal depressed mood  Anxiety: sometimes, but controlled  Takes half an ativan some nights, otherwise has anxiety in the morning  Also has brain zaps at night if doesn't take it -- this is the biggest reason  Volunteering at [employer]  Both are volunteering at [employer]. Also seeing some pts.  Panic attacks: n  Energy: good  Concentration: good  Sleeping: well  Eatin lb wg   Refills: y  Substances: n  Therapy: individual and couples (every 2 weeks)  Medication compliant: y  No SI HI AVH.    : In person interview:  Chart review: No new.  Planning: Increased gabapentin and started amitriptyline at night.  \"I'm doing a lot better.\"  Sleeping better on amitriptyline  Pt wants to get involved in volunteering, [employer], every Tues and Thurs  Mood/Depression: better  Anxiety: less worrying  Trip was a success; they saw one of the grandchildren they haven't seen in a while. Patient smiled and laughed during this discussion. Cooked for him.  Taking less lorazepam  Panic attacks: n  Energy: improving  Concentration: improving  Sleeping: well  Eating: " "stable  Refills: n  Substances: n  Therapy: continuing therapy and couples therapy  Medication compliant: y  No SI HI AVH.    11/18: In person interview:  Chart review: Seen by primary care November 14.  PHQ-9 is 14, very difficult.  Feels her mental health is better than it was a year ago, however.  Planning: They have started marriage counseling.  Increased gabapentin in the afternoons to 200 mg a day.  Reintroduce amitriptyline in the evenings.  \"I'm suffering.\"  Monday has a big trip to [place]. Just got back from [place] trip, which \"was traumatic.\"  Nicola didn't join them for dinner last night -- upsetting for her.  Mood/Depression:  Depressed mood  Anxiety:  Restless, irritable, on edge -- lorazepam works. Starts at noon.  Very clingy, don't want to be by myself, throughout the day  Panic attacks: Controlled on Ativan  Energy: Down  Concentration: Still not a goal  Sleeping: Initial insomnia  Eating: Stable weight  Refills: Yes  Substances: No  Therapy: continuing marriage counseling, have to pay out of pocket  Medication compliant: y  No SI HI AVH.    11/7: In person interview:  Chart review: No new.  Planning: Increased Abilify and referred to couples therapy a week ago.  \"I'm having a hard time to concentrate and sit still.\"  Jaya:  Couldn't go back to sleep last night  Nicola's move was this past Friday  Moved animals to his house on Saturday  He feels there is less negativity, but she is not sure  Mood/Depression:  Depressed mood  Anxiety:  Worrying a lot, very nervous especially in the afternoons  Pacing  Panic attacks: y, controlled on lorazepam  Energy: stable, not at goal  Concentration:   Some issues with concentration  Sleeping:  Always wakes at 4-6 am, maintenance insomnia  But slept well last night 7-6 am.  Eating: 3 lb wl since 10/31  Refills:  y  Substances: n  Therapy: started marriage counseling, next appnt next week  Medication compliant: y  No SI HI AVH.    10/31: In person " "interview:  Chart review: No new.  Planning: No changes at last visit.  \"I feel anxiety in the afternoon.\"  Stressors:  Nicola, her son, is moving out. Stressful.  Pt wants to end the marriage; they've discussed it numerous times recently.  Couples therapy: they've tried in the past, more than once, problems with each therapist (patient)  Mood/Depression:  Depressed mood in the afternoons, not mornings  Jaya: trip to [place] was fun but stressful and \"it was in the end a major setback\"  Last night was very destructive: sleepless  Anxiety:  [facility]ent to [facility]; recommend taking gabapentin 100 mg in afternoon rather than morning.  Because it is costing so much money, they are not going back.  Worrying, irritable, insomnia, on edge  Panic attacks:  Yes, in the afternoon, controlled on ativan  Energy: down  Concentration: poor  Sleeping: initial insomnia  Eating: 3 lb wg 8/22  Refills: No  Substances: No  Therapy: Yes continuing  Medication compliant: y  No SI HI AVH.    9/30: In person interview:  Chart review: Recently discharged from [facility] intensive outpatient.  Patient was depressed from October of last last year to essentially a couple months ago when she started to improve.  Consider light box.  Planning: Restarted Ambien at last visit.  Patient was improving at last visit.  Consider light box  \"Good.\"  I don't need to take the ambien, I just need the gabapentin  TMS helping as well, 10 more sessions, \"really helping\"  Discussed light box  Mood/Depression: stable, well  Anxiety:  Anxiety in the afternoons, takes lorazepam daily at 6 pm  Takes care of the brain zaps  Panic attacks: n  Sleeping: well on the gabapentin  Eating: stable  Refills: y  Substances:  Therapy: IOP completed  Very beneficial, \"I never thought I would like it.\" I would recommend it to anyone.  Medication compliant: y  No SI HI AVH.    8/30: In person interview:  Chart review: Seen by urgent care August 20 for back pain.  " "COVID-negative.  Also had some cervical adenopathy. EEG is abnormal.  Planning: Trial of Abilify appears to be working well.  This is according to the conversation I had with both of them on August 8.  \"I'm better... I feel good when I get up in the morning.\"  P1, G6  Doing TMS  Then IOP.  The problem I still have is sleeping.  Initial insomnia for 2 hours each night.  Afternoons, gets anxious. Uses ativan rarely. Use has gone down dramatically.  Prescribed gabapentin by [facility]. Helping her anxiety.  Jaya \"quality of life has gone WAY up.\"  Sees neurology in early September.  Mood/Depression: improved  Anxiety: improved  Panic attacks: rare  Eating: stable  Refills: n  Substances: n  Therapy: def  Medication compliant: y  No MARLA VINCENT.    8/1: In person interview:  Chart review: Patient admitted to the [facility] July 18.  Awaiting records.  Planning: This is a follow-up appointment after her admission to the [facility].  \" I am better.\"  Patient reported she was extremely afraid initially of being admitted, however admission was a very \"positive\" experience for her, and \"I felt at peace.\"  Could not tolerate olanzapine 2.5 mg nightly as it gave her nightmares.  She has stopped this medication.  She has set up intensive outpatient at [facility], has already gone to [facility] last week, and also has TMS scheduled.  Per her , \"the crying spells have stopped.\"  Mood/Depression: Slightly improved, still has days of depression.  Reports that some days she is perfect, then other days she becomes very depressed and anxious, but lorazepam helps at that time.  Panic attacks: Still has occasionally  Sleeping: Still has some trouble sleeping, but recently has been using delta 8 to sleep  Eating: Stable, now eating  Refills: No  Substances: No  Therapy: Continuing  Medication compliant: Yes  No MARLA VINCENT.    6/17: In person interview:  Chart review: Seen by primary care June 9.  Apparently experiencing multiple " "side effects from anxiety and depression medications.  Looks like the results of the testing are not associated with an increased risk for hyper homocystinemia (MTHFR gene assay).  Planning: Belsomra was too expensive.  \"All in all I'm better.\"  Still has nervous stomach; getting help with that from PCP.  Sleeping better  Taking ativan in the am consistently and also at night to sleep (rarely).  Low mood in afternoon with crying spells  Mood/Depression: stable   Anxiety: under control  Sleeping: well  Eating: stable  Refills: n  Substances: n  Therapy: has a counselor at [facility] now, plus [facility]  Medication compliant: y  No SI HI AV.    5/20: In person interview:  Chart review: Patient presented in the emergency room for abdominal pain, she did not want a wait so she left.  Saw primary care for abdominal pain which patient believes is related to starting Pristiq a month ago.  Despite having stopped it long ago, she continues to have abdominal symptoms.  Somatization?  Planning: Try B[facility]ra for insomnia.  \" I am getting better.\"   agrees.  Patient is becoming more functional.  Depression and anxiety have improved.  Still residual symptoms.  She has only been on the higher dose of Lexapro for about 4 weeks ago.  Still having trouble with insomnia but it is controlled on Ambien most nights.  Some nights it is not.  Some nights she does not take Ambien.  Utilizing half a milligram of lorazepam twice daily most days.  More active around the house.  Famotidine is helping with abdominal pain.  Refills: Yes  Substances: No  Therapy: Deferred  Medication compliant: Yes  No SI HI AVH.    4/22: In person interview:  Chart review: 4/11: I called the patient to check in on her progress on lexapro. \"I'm doing better than I was before.\" Sleeping well. Taking nyquil to sleep. Utilizing lorazepam frequently. Has been on lexapro for 10 days. She understands that she needs to give it more time. More active. Has no appetite, " "however. I mentioned to her the importance of moving to a higher level of care (IOP, inpt). Pt voiced understanding and agreed to proceed. Jaya: crying episodes have stopped. Better energy.  \"I can't sleep.\"  Persistent insomnia, anxious before bed, unclear why. Has tried ambien which sometimes helps. Also over the counter meds.   Irritable, restless, worrying.   Still depressed, but this has improved  Compliant on lexapro  Still having panic attacks.  Utilizing lorazepam.  Per , she has made significant improvement over the last few months, but still has a way to go.  Patient agrees.  Declines IOP, inpatient admission  Therapy: Deferred  Medication compliant: To some extent yes  No SI HI AVH.    3/24: In person interview: With her   Chart review: Patient is now discontinued all medications except for Lamictal 25 mg a day.  I started her on doxepin in the evenings for insomnia on March 21.  \"I think I need lower doses.\"  Persistent crying spells, and anxiety. Utilizing ativan.  Doxepin is helping with insomnia.  Patient reports that she knows of someone in her 70s who struggled with depression for years until they came up with a solution.  This gave the patient hope.  She noted that the patient was on very low doses of Celexa and other psychotropic medication.  Medication compliant: y  No SI HI AVH.    3/15: In person interview: With her   Chart review: Patient had another panic attack 3/14 morning.  I advised her to utilize the Ativan liberally as she is not always using it.  Compliant on Latuda 40 mg a day.  She notes that there are some periods where she feels \"completely normal.\"  Then other times, she completely breaks down and has depression and anxiety. Having trouble sleeping for the last 4 days.  Target this with Ambien 5 mg nightly. Risks, benefits, side effects discussed with patient including sedation, dizziness, GI upset, hallucinations, sleepwalking, sleep-eating.  After " "discussion of these risks and benefits, the patient voiced understanding and agreed to proceed. Continue Latuda and Lamictal. Start titrating off of amitriptyline by taking half a tab, 12.5 mg in other words, nightly.  \" The Ambien kept me asleep for a couple hours.\"  Patient slept for two hours, got up, ate something, and did some reading.  Then went back to sleep.  Notes that she did not feel anxious during this time.  When she woke the next morning she felt very tired.  Utilizing Ativan more liberally to prevent panic attacks.  Some limitation today about how her family is distancing themselves from her due to her depression.  Feels like they should be more supportive.  Wants to switch to a different therapist.  She understands that we will have a therapist start next month.  Would like to wait until that time.  Problems with connecting to her present therapist.  Therapy: Continuing  Medication compliant: Yes  No SI HI AVH.    3/10: In person: With her   Interview:  Chart review: We have reduced the dose of Effexor to 37.5 mg a day, and started Lamictal 25 mg daily.  Continuing Latuda at its present dose of 20 mg a day.  October labs show reassuring CMP, CBC.  \" I stopped the Effexor.\"  Patient states she is already feeling better.   agrees.  Did not take Effexor this morning and is not experiencing panic and anxiety in the afternoon like she has been for the past several days.  Tolerating the Lamictal without side effects.  No crying spells  Affect is still depressed  Medication compliant: Yes  No SI HI AVH.    2/15:Virtual visit via Zoom audio and video due to the COVID-19 pandemic.  Patient is accepting of and agreeable to visit.  The visit consisted of the patient and I. The patient is at home, and I am at the office.  Interview:  Chart review: DEXA scan this month.  Normal in the lumbar spine and hips, it is decreased by 0.9% in the femoral necks compared to 2007.  \"Yesterday had a breakdown " "and cried for hours.\"  Otherwise has been doing very well.  Still utilizing lorazepam once a day.  Has done TMS for what sounds like about a week and a half.  No major changes that she can identify.  Sleeping well  Still some uncontrolled worrying, irritability, restlessness.  Medication compliant: Yes  No SI HI AVH.    1/17: Virtual visit via Zoom audio and video due to the COVID-19 pandemic.  Patient is accepting of and agreeable to visit.  The visit consisted of the patient and I.  Interview:  Chart review: No new chart developments since January 3.  EKG in October reveals 68, sinus, .  \"Doing even better than I was 2 weeks ago.\"  Still has mood swings, but not nearly as bad as before.  Have not needed to use lorazepam most days.  Starting TMS this week.  Depression/Mood: much better  Anxiety: much better  Refills: n  Sleeping: well  Eating: stable  Substances: n  Therapy: continuing  Medication compliant: y  No SI HI AVH.    1/3/22: Virtual visit via Zoom audio and video due to the COVID-19 pandemic.  Patient is accepting of and agreeable to visit.  The visit consisted of the patient and I.  Interview:  Chart review: No new chart developments since December 17.  \"Doing much better.\"  Sleeping well at night.  Some anxiety sometimes, but has days where she doesn't have anxiety.  No panic attacks  No crying spells.  Some days doesn't take lorazepam, other times takes a half a pill; when she does, it works very well.  Some anxiety looking up TMS last night, took a lorazepam and felt much better.   No holiday disruption at all. Handled the holidays well.   Approved for TMS.   Depression/Mood:  Depressed mood: much better  Seasonal pattern: denies  Severity: mild  Insomnia: denies  Duration: months  Anxiety:  Uncontrolled worrying: y  Severity: mild  Restlessness/feeling on edge: y  Irritability: y  Insomnia: n  Duration: months  Panic attacks: still has them rarely  Refills: y  Sleeping: y  Eating: " "stable  Substances: n  Therapy: With Genevieve at [facility] (continuing)  Medication compliant: y  No SI HI AVH.    6/1: Interview: Continued improvement. Doing well from d and a perspective. Persistent constipation that began with abilify. Still not taking lorazepam as her anxiety is under control. Denies SI HI AVH.  concurs.    9/11/20 H&P: Patient presented today with her . Both provided extensive history regarding her depression and anxiety. Patient had been seen by  at [facility] for roughly a year and a half. Patient reported that Dr. Gordon had become convinced that she had bipolar disorder, which both she and her  disagree with. Patient and  deny any episodes in the past where she experienced, for an extended length of time lasting at least several days, no need for sleep, rapid speech, risk-taking behaviors. Per the  and wife pair, her previous psychiatrist insisted on her being on a mood stabilizer.   Patient reports that she had been stable on Celexa for \"several years.\" In 2019, January, the patient underwent a colonoscopy where dysplasia was revealed. This led to significant anxiety and a \"breakdown.\" The dysplasia was subsequently removed. Due to the heightened anxiety the patient's psychiatrist took the patient off of Celexa and started her on Lexapro. The patient did not tolerate Lexapro well, she continued to remain anxious, she began to engage in therapy in addition to medication management. The Lexapro was subsequently switched to Viibryd. The Viibryd did not work. The patient also began to experience panic attacks including shortness of breath, crying, tachycardia, palpitations. The panic attacks were new and had never happened before.   In January 2020 the patient experienced another breakdown and sunk into a deeper depression. The COVID-19 pandemic only worsened her situation. In February the patient's , a physician, decided to become her full-time " caretaker as she would become anxious and panicky without him present. The patient also experienced intermittent bouts of suicidal ideation.   Recently, the patient and  demanded of their psychiatrist to take her off of Viibryd and Seroquel. They actually tapered her off of Seroquel themselves. They asked him to switch her back to Celexa. She has been on Celexa now for the last 3 days and is already begun to experience improvement. She states that the Seroquel led to having hallucinations and actually worsened insomnia. Last night she did not take Seroquel for the first time in many months and she slept very well. Her mood is slightly improved. Regarding her anxiety, she endorses muscle tension and fatigue restlessness irritability and a history of insomnia. Regarding her depression she denies SI HI AVH, denies anhedonia denies guilt, notes some decreased energy, psychomotor retardation, and a history of insomnia. They had also tried acupuncture in the past with some success. Psychiatric review of systems is negative for psychosis nancy, positive for anxiety and depression. Possibly positive for  depression and postpartum depression. The patient is medication compliant.       Past Psychiatric History:  Began Psychiatric Treatment: Several years   Dx: Depression and anxiety   Psychiatrist: Doctor Gordon for the last year and a half   Therapist: Sees a therapist at [facility] History: Denies, Although she came close to needing admission recently.   Medication Trials: See HPI. Seroquel, Prozac which was too activating, Zoloft which was too activating, Celexa which worked well, Effexor which worked well, Lexapro which did not work, Viibryd which did not work.   Self-Harm: Denies   Suicide Attempts: Denies   OB/GYN HISTORY:  Sexually Active: OB/GYN history deferred   Substance Abuse History:  Types: Denies all, including illicit   Social History:  Marital Status:    Employed: No     Kids: 4    House: House    Hx: Denies   Family History:  Suicide Attempts: Deferred today, Due to lack of time   Suicide Completions: Deferred   Substance Use: Deferred   Psychiatric Conditions: Deferred    depression, psychosis, anxiety: Deferred   Developmental History:  Born: Deferred   Siblings: Deferred   Access to Weapons: Denies   Thought Content: no suicidal ideation, no homicidal ideation, no auditory hallucinations, no visual hallucinations, and     PHQ-9 Depression Screening  PHQ-9 Total Score:      Little interest or pleasure in doing things?     Feeling down, depressed, or hopeless?     Trouble falling or staying asleep, or sleeping too much?     Feeling tired or having little energy?     Poor appetite or overeating?     Feeling bad about yourself - or that you are a failure or have let yourself or your family down?     Trouble concentrating on things, such as reading the newspaper or watching television?     Moving or speaking so slowly that other people could have noticed? Or the opposite - being so fidgety or restless that you have been moving around a lot more than usual?     Thoughts that you would be better off dead, or of hurting yourself in some way?     PHQ-9 Total Score       FAREED-7       Past Surgical History:  Past Surgical History:   Procedure Laterality Date    COLONOSCOPY      last scope     COLONOSCOPY N/A 10/29/2021    Procedure: COLONOSCOPY with possible biopies.;  Surgeon: Skyler Fuller MD;  Location: [facility] ENDOSCOPY;  Service: General;  Laterality: N/A;    KNEE SURGERY      broken knee       Problem List:  Patient Active Problem List   Diagnosis    Anxiety    Depression    History of fracture of patella    Hyperlipemia    Osteopenia    Ulcerative lesion    Constipation    History of colonic polyps    Tinnitus of right ear    Sensorineural hearing loss (SNHL) of both ears    Right shoulder pain       Allergy:   No Known Allergies     Discontinued  Medications:  There are no discontinued medications.        Current Medications:   Current Outpatient Medications   Medication Sig Dispense Refill    amitriptyline (ELAVIL) 25 MG tablet Take 1 tablet by mouth Every Night. 90 tablet 3    ARIPiprazole (Abilify) 5 MG tablet Take 1 tablet by mouth Daily. 90 tablet 1    atorvastatin (LIPITOR) 20 MG tablet Take 1 tablet by mouth Daily. 90 tablet 3    Calcium Carb-Cholecalciferol (OYSCO 500 + D) 500-200 MG-UNIT tablet       cholecalciferol (VITAMIN D3) 25 MCG (1000 UT) tablet Take 1 tablet by mouth Daily.      Coenzyme Q10 (CO Q10 PO) Take  by mouth.      escitalopram (LEXAPRO) 10 MG tablet Take 1 tablet by mouth Daily. 90 tablet 3    gabapentin (NEURONTIN) 300 MG capsule 300 mg qam, 300 mg qpm, 600 mg qhs 120 capsule 5    GARLIC PO garlic 1,000 mg oral capsule take 1 capsule by oral route daily   Active      LORazepam (ATIVAN) 1 MG tablet Take 1 tablet by mouth 2 (Two) Times a Day As Needed for Anxiety. for anxiety 60 tablet 5    meloxicam (MOBIC) 15 MG tablet Take 1 tablet by mouth Daily. 21 tablet 0    Multiple Vitamins-Iron (MULTIVITAMIN PLUS IRON ADULT PO) Women's Multivitamin 18 mg iron-400 mcg-500 mg oral tablet take 1 tablet by oral route daily   Active      Omega-3 Fatty Acids (fish oil) 1000 MG capsule capsule 3 capsules 3 (Three) Times a Day With Meals. PT(PATIENT) REPORTS SHE TAKES MEDICATION MORNING/LUNCH/EVENING      Probiotic Product (PROBIOTIC PO) Take  by mouth.      vitamin E 400 UNIT capsule vitamin E 400 unit oral capsule take 1 capsule by oral route daily   Active       No current facility-administered medications for this visit.       Past Medical History:  Past Medical History:   Diagnosis Date    Acid reflux 2022    Anxiety     Depression     Fracture of patella 09/12/2017    Hyperlipemia     Osteopenia     Panic disorder     Psychiatric inpatient 07/22/2022    Discharged 07/22/2022 for Anxiety, Depression, and Suicidal Bx's    Seizures     reports  seizure activity, but under control.       Social History     Socioeconomic History    Marital status:    Tobacco Use    Smoking status: Never    Smokeless tobacco: Never   Vaping Use    Vaping Use: Never used   Substance and Sexual Activity    Alcohol use: Not Currently    Drug use: Never    Sexual activity: Not Currently         Family History   Problem Relation Age of Onset    Stroke Mother     Heart disease Mother     No Known Problems Father     No Known Problems Sister     Cancer Brother     No Known Problems Maternal Aunt     No Known Problems Paternal Aunt     No Known Problems Maternal Uncle     No Known Problems Paternal Uncle     No Known Problems Maternal Grandfather     No Known Problems Maternal Grandmother     No Known Problems Paternal Grandfather     No Known Problems Paternal Grandmother     No Known Problems Cousin     Seizures Son     No Known Problems Other     ADD / ADHD Neg Hx     Alcohol abuse Neg Hx     Anxiety disorder Neg Hx     Bipolar disorder Neg Hx     Dementia Neg Hx     Depression Neg Hx     Drug abuse Neg Hx     OCD Neg Hx     Paranoid behavior Neg Hx     Schizophrenia Neg Hx     Self-Injurious Behavior  Neg Hx     Suicide Attempts Neg Hx      Review of Systems:  Review of Systems   Constitutional:  Negative for diaphoresis and fatigue.   HENT:  Negative for drooling.    Eyes:  Negative for visual disturbance.   Respiratory:  Negative for cough and shortness of breath.    Cardiovascular:  Negative for chest pain, palpitations and leg swelling.   Gastrointestinal:  Negative for diarrhea, nausea and vomiting.   Endocrine: Negative for cold intolerance and heat intolerance.   Genitourinary:  Negative for difficulty urinating.   Musculoskeletal:  Negative for joint swelling.   Allergic/Immunologic: Negative for immunocompromised state.   Neurological:  Negative for dizziness, seizures, syncope, speech difficulty, light-headedness, numbness and headaches.         Mental Status  "Exam  Appearance  : sitting in a chair with her , good eye contact, normal street clothes, groomed, in person  Behavior  : pleasant and cooperative  Motor  : No abnormal  Speech  : normal rhythm, rate, volume, tone, not hyperverbal, not pressured, normal prosidy, Speaks with an accent  Mood  : \"It flipped and it's better\"  Affect  : nearly euthymic, mood congruent, fair variability  Thought Content  : negative suicidal ideations, negative homicidal ideations, negative obsessions  Perceptions  : negative auditory hallucinations, negative visual hallucinations  Thought Process  : linear  Insight/Judgement  : fair/fair  Cognition  : grossly intact  Attention  : intact      Physical Exam:  Physical Exam    Vital Signs:   /68   Pulse 84   Ht 162.6 cm (64\")   Wt 71 kg (156 lb 9.6 oz)   BMI 26.88 kg/m²      Lab Results:   Office Visit on 12/28/2023   Component Date Value Ref Range Status    Glucose 12/28/2023 88  65 - 99 mg/dL Final    BUN 12/28/2023 16  8 - 23 mg/dL Final    Creatinine 12/28/2023 0.82  0.57 - 1.00 mg/dL Final    Sodium 12/28/2023 139  136 - 145 mmol/L Final    Potassium 12/28/2023 5.0  3.5 - 5.2 mmol/L Final    Chloride 12/28/2023 102  98 - 107 mmol/L Final    CO2 12/28/2023 26.1  22.0 - 29.0 mmol/L Final    Calcium 12/28/2023 10.2  8.6 - 10.5 mg/dL Final    Total Protein 12/28/2023 7.3  6.0 - 8.5 g/dL Final    Albumin 12/28/2023 4.6  3.5 - 5.2 g/dL Final    ALT (SGPT) 12/28/2023 26  1 - 33 U/L Final    AST (SGOT) 12/28/2023 30  1 - 32 U/L Final    Alkaline Phosphatase 12/28/2023 110  39 - 117 U/L Final    Total Bilirubin 12/28/2023 1.0  0.0 - 1.2 mg/dL Final    Globulin 12/28/2023 2.7  gm/dL Final    A/G Ratio 12/28/2023 1.7  g/dL Final    BUN/Creatinine Ratio 12/28/2023 19.5  7.0 - 25.0 Final    Anion Gap 12/28/2023 10.9  5.0 - 15.0 mmol/L Final    eGFR 12/28/2023 77.5  >60.0 mL/min/1.73 Final    Total Cholesterol 12/28/2023 191  0 - 200 mg/dL Final    Triglycerides 12/28/2023 127  0 - " 150 mg/dL Final    HDL Cholesterol 12/28/2023 61 (H)  40 - 60 mg/dL Final    LDL Cholesterol  12/28/2023 108 (H)  0 - 100 mg/dL Final    VLDL Cholesterol 12/28/2023 22  5 - 40 mg/dL Final    LDL/HDL Ratio 12/28/2023 1.71   Final    TSH 12/28/2023 0.821  0.270 - 4.200 uIU/mL Final    25 Hydroxy, Vitamin D 12/28/2023 53.5  30.0 - 100.0 ng/ml Final    Hemoglobin A1C 12/28/2023 5.70 (H)  4.80 - 5.60 % Final    WBC 12/28/2023 7.13  3.40 - 10.80 10*3/mm3 Final    RBC 12/28/2023 4.83  3.77 - 5.28 10*6/mm3 Final    Hemoglobin 12/28/2023 14.0  12.0 - 15.9 g/dL Final    Hematocrit 12/28/2023 43.3  34.0 - 46.6 % Final    MCV 12/28/2023 89.6  79.0 - 97.0 fL Final    MCH 12/28/2023 29.0  26.6 - 33.0 pg Final    MCHC 12/28/2023 32.3  31.5 - 35.7 g/dL Final    RDW 12/28/2023 13.6  12.3 - 15.4 % Final    RDW-SD 12/28/2023 44.0  37.0 - 54.0 fl Final    MPV 12/28/2023 9.3  6.0 - 12.0 fL Final    Platelets 12/28/2023 288  140 - 450 10*3/mm3 Final    Neutrophil % 12/28/2023 54.8  42.7 - 76.0 % Final    Lymphocyte % 12/28/2023 35.8  19.6 - 45.3 % Final    Monocyte % 12/28/2023 8.1  5.0 - 12.0 % Final    Eosinophil % 12/28/2023 0.7  0.3 - 6.2 % Final    Basophil % 12/28/2023 0.3  0.0 - 1.5 % Final    Immature Grans % 12/28/2023 0.3  0.0 - 0.5 % Final    Neutrophils, Absolute 12/28/2023 3.91  1.70 - 7.00 10*3/mm3 Final    Lymphocytes, Absolute 12/28/2023 2.55  0.70 - 3.10 10*3/mm3 Final    Monocytes, Absolute 12/28/2023 0.58  0.10 - 0.90 10*3/mm3 Final    Eosinophils, Absolute 12/28/2023 0.05  0.00 - 0.40 10*3/mm3 Final    Basophils, Absolute 12/28/2023 0.02  0.00 - 0.20 10*3/mm3 Final    Immature Grans, Absolute 12/28/2023 0.02  0.00 - 0.05 10*3/mm3 Final    nRBC 12/28/2023 0.0  0.0 - 0.2 /100 WBC Final       EKG Results:  No orders to display       Imaging Results:  No Images in the past 120 days found..      Assessment & Plan   Diagnoses and all orders for this visit:    1. Recurrent major depressive disorder in remission  (Primary)    2. Generalized anxiety disorder    3. Panic attacks    4. Panic disorder    5. Insomnia due to mental condition        Visit Diagnoses:    ICD-10-CM ICD-9-CM   1. Recurrent major depressive disorder in remission  F33.40 296.35   2. Generalized anxiety disorder  F41.1 300.02   3. Panic attacks  F41.0 300.01   4. Panic disorder  F41.0 300.01   5. Insomnia due to mental condition  F51.05 300.9     327.02   INITIAL presentation most consistent with major depressive disorder in partial remission, FAREED, rare panic attacks.    1/11: Stable, well, explored her catastrophizing today. No changes. Wants to re-establish individual therapy with her old therapist at [facility] to manage catastrophizing, thoughts when she is lonely. Conflict processed between pt and  today; suggested restarting marriage counseling thru pt's therapist (who can make a referral). How much of patient's MH can be explained by her relp with her hsuband?    Allowed patient to freely discuss and process issues, such as:  Ongoing: Social anxiety conflicting with feeling lonely.  Ongoing: Her feelings of hopelessness and restlessness and how they recur at times. Her fear of her mood getting worse again.  ...without interruption or judgement with unconditional positive   ... using Rogerian psychotherapeutic techniques including unconditional positive regard, reflective listening, and demonstrating clear empathy.     Time (minutes) spent providing supportive psychotherapy: 24  Functional status: mild impairment  Treatment plan: Medication management and supportive psychotherapy  Prognosis: good  Progress: stable, well  4w    12/6: No changes for now, but may increase to 7.5 mg daily of abilify (half a 15 mg tab). Still some residual MDD.    11/2: chronic mild anhedonia. Increase abilify and start light box.    8/31: Stable, well, no changes. Light box, we discussed it.    6/30: Stable, well. No changes.     4/28: Euthymic well. No changes.      3/3: Stable, well, no changes.      1/20: Well, stable, no changes.     12/9: Significant improvement. No changes.     11/18: Residual anxiety, residual depression.  Increase gabapentin, start amitriptyline at night for initial insomnia.  Close follow-up.     11/7: Residual anx (at noon), consider adding gabapentin in the mornings. and dep. Increase gabapentin in the afternoons. No other changes.     10/31: Couples therapy, increase Abilify, close follow-up.  I feel that their marriage issues have come to a head recently, and this stressor could largely explain patient's depression and anxiety.  Patient mentioned Celexa again, she is likely interested in switching.     9/30: well, stable, some constriction today. No changes.     8/30: Remarkably well. Initial insomnia.    8/1: Try another mood stabilizer in place of olanzapine.  Will trial Abilify again.  Also consider Geodon.      6/17: start buspar, doing better.     5/20: Continue Lexapro and lorazepam.  Try Belsomra in place of Ambien.      4/22: Some improvement in anx and dep, but still residual symptoms.  Persistent insomnia. Declines IOP, inpatient admission.  Increase Lexapro and start trazodone.      3/24: Focus on lower doses.  Start Pristiq.  Depression with anxious distress versus depression and generalized anxiety disorder.  She only has distractibility, no other symptoms of hypomania.      3/15: Continue lurasidone 40 mg at night, increase Ambien to 10 mg at night.  Continue low-dose Lamictal, plan to increase further.  Continue titrating off of amitriptyline.     3/10: Effexor has now been stopped.  Continue Latuda and Lamictal at present doses.  I will see her back in a few days and then at 6 weeks.  Bipolar 2?  If so, how come she is tolerating amitriptyline in the evenings?     2/15: Still utilizing lorazepam which means the anxiety is not under control.  Increase Effexor.    1/17: Continued improvement.  No changes, only on effexor 75 mg for  2 weeks.  Tolerating medications without side effects.      1/3: Continued improvement. Increase effexor to target residual dep anx. TMS to start 1/6.     PLAN:  Risk Assessment: Risk of self-harm acutely remain low. Risk factors include a history of suicidal ideation, mood disorder, anxiety disorder, presence of psychosocial stressors such as colonic dysplasia, COVID-19 pandemic. Protective factors include no history of self-harm or suicide attempts, good social support, willingness to engage in care, improved depressive symptoms. Risk of self-harm chronically also remain low, but could be further elevated in the event of treatment noncompliance and/or AODA.  Medications:   CONTINUE light box.   CONTINUE lexapro 10 mg daily. Risks, benefits, alternatives discussed with patient including GI upset, nausea vomiting diarrhea, theoretical decrease of seizure threshold predisposing the patient to a slightly higher seizure risk, headaches, sexual dysfunction, serotonin syndrome, bleeding risk, increased suicidality in patients 24 years and younger.  After discussion of these risks and benefits, the patient voiced understanding and agreed to proceed.  CONTINUE gabapentin 300 mg qam, 300 qpm, 600 mg nightly. Risks, benefits, alternatives discussed with patient including sedation, dizziness/falls risk, GI upset, weight gain.  After discussion of these risks and benefits, the patient voiced understanding and agreed to proceed. UDS ordered, Mamadou reviewed.  CONTINUE Abilify 5 mg daily. Risks, benefits, alternatives discussed with patient including increased energy, exacerbation of irritability, akathisia, GI upset, orthostatic hypotension, increased appetite.  After discussion of these risks and benefits, the patient voiced understanding and agreed to proceed.  CONTINUE amitriptyline 25 mg nightly. Risks, benefits, alternatives discussed with patient including sedation, dizziness, falls, GI upset, constipation, urinary  "retention, dry mouth.  After discussion of these risks and benefits, the patient voiced understanding and agreed to proceed.  CONTINUE lorazepam 1 mg bid PRN. Risks, benefits, alternatives discussed with patient including GI upset, sedation, dizziness, respiratory depression, falls risk.  After discussion of these risks and benefits, the patient voiced understanding and agreed to proceed.  S/P:   buspar 5 mg bid was ineffective.  Trazodone 50 mg nightly not helpful for insomnia.  Mirtazapine 15 mg, 7.5 mg: ineffective; made anxiety and depression worse.  Abilify: brain zaps and constipation  Seroquel made depression worse  celexa 20 mg daily, ineffective after several months  Doxepin caused \"brain zaps\"  Latuda was ineffective.  Amitriptyline 25 mg nightly helpful for sleep but we didn't want her on too many serotonergic meds.  Ambien minimally helpful for insomnia.  Pristiq and effexor worsened anxiety, both low dose.  Stopped lamictal for unclear reasons 25 mg.  belsomra 5 mg nightly didn't help with insomnia.  Therapy: continue with Genev[facility] at [facility].  Referred for couples therapy 10/2022  Status post IOP at [facility] 10/2022  Labs/Studies: BMP to monitor sodium levels in February was entirely normal, EKG February shows rate 72, sinus, .    TREATMENT PLAN/GOALS: Continue supportive psychotherapy efforts and medications as indicated. Treatment and medication options discussed during today's visit. Patient acknowledged and verbally consented to continue with current treatment plan and was educated on the importance of compliance with treatment and follow-up appointments.    MEDICATION ISSUES:  YVETTE reviewed as expected.  Discussed medication options and treatment plan of prescribed medication as well as the risks, benefits, and side effects including potential falls, possible impaired driving and metabolic adversities among others. Patient is agreeable to call the office with any worsening of symptoms or " onset of side effects. Patient is agreeable to call 911 or go to the nearest ER should he/she begin having SI/HI. No medication side effects or related complaints today.     MEDS ORDERED DURING VISIT:    Return in about 4 weeks (around 2/8/2024).         This document has been electronically signed by Vinny Krishnamurthy MD  January 11, 2024 11:26 EST       Part of this note may be an electronic transcription/translation of spoken language to printed text using the Dragon Dictation System.

## 2024-01-11 NOTE — PATIENT INSTRUCTIONS
1.  Please return to clinic at your next scheduled visit.  Contact the clinic ([phone]) at least 24 hours prior in the event you need to cancel.  2.  Do no harm to yourself or others.    3.  Avoid alcohol and drugs.    4.  Take all medications as prescribed.  Please contact the clinic with any concerns. If you are in need of medication refills, please call the clinic at [phone].    5. Should you want to get in touch with your provider, Dr. Vinny Krishnamurthy, please utilize [employer] or contact the office ([phone]), and staff will be able to page Dr. Krishnamurthy directly.  6.  In the event you have personal crisis, contact the following crisis numbers: Suicide Prevention Hotline 1-139.465.1365; [facility] Helpline 5-033-296-ALEX[facility]REA; [facility] [phone]; text HELLO to 808622; or 098.

## 2024-02-05 ENCOUNTER — HOSPITAL ENCOUNTER (OUTPATIENT)
Dept: BONE DENSITY | Facility: HOSPITAL | Age: 69
Discharge: HOME | End: 2024-02-05
Admitting: NURSE PRACTITIONER
Payer: MEDICARE

## 2024-02-05 DIAGNOSIS — Z78.0 POSTMENOPAUSAL: ICD-10-CM

## 2024-02-05 DIAGNOSIS — M85.88 OSTEOPENIA OF LUMBAR SPINE: ICD-10-CM

## 2024-02-05 PROCEDURE — 77080 DXA BONE DENSITY AXIAL: CPT

## 2024-02-09 ENCOUNTER — OFFICE VISIT (OUTPATIENT)
Dept: PSYCHIATRY | Facility: CLINIC | Age: 69
End: 2024-02-09
Payer: MEDICARE

## 2024-02-09 VITALS
WEIGHT: 155.8 LBS | DIASTOLIC BLOOD PRESSURE: 76 MMHG | BODY MASS INDEX: 26.6 KG/M2 | SYSTOLIC BLOOD PRESSURE: 123 MMHG | HEART RATE: 84 BPM | HEIGHT: 64 IN

## 2024-02-09 DIAGNOSIS — F41.0 PANIC DISORDER: ICD-10-CM

## 2024-02-09 DIAGNOSIS — F51.05 INSOMNIA DUE TO MENTAL CONDITION: ICD-10-CM

## 2024-02-09 DIAGNOSIS — F41.0 PANIC ATTACKS: ICD-10-CM

## 2024-02-09 DIAGNOSIS — F33.1 MODERATE EPISODE OF RECURRENT MAJOR DEPRESSIVE DISORDER: ICD-10-CM

## 2024-02-09 DIAGNOSIS — F41.1 GENERALIZED ANXIETY DISORDER: ICD-10-CM

## 2024-02-09 DIAGNOSIS — F33.40 RECURRENT MAJOR DEPRESSIVE DISORDER IN REMISSION: Primary | ICD-10-CM

## 2024-02-09 RX ORDER — ARIPIPRAZOLE 5 MG/1
7.5 TABLET ORAL DAILY
Qty: 90 TABLET | Refills: 1 | Status: SHIPPED | OUTPATIENT
Start: 2024-02-09

## 2024-02-09 NOTE — PATIENT INSTRUCTIONS
1.  Please return to clinic at your next scheduled visit.  Contact the clinic ([phone]) at least 24 hours prior in the event you need to cancel.  2.  Do no harm to yourself or others.    3.  Avoid alcohol and drugs.    4.  Take all medications as prescribed.  Please contact the clinic with any concerns. If you are in need of medication refills, please call the clinic at [phone].    5. Should you want to get in touch with your provider, Dr. Vinny Krishnamurthy, please utilize J[employer] or contact the office ([phone]), and staff will be able to page Dr. Krishnamurthy directly.  6.  In the event you have personal crisis, contact the following crisis numbers: Suicide Prevention Hotline 1-353.257.7172; [facility] Helpline 7-514-471-ALEX[facility]REA; [facility] [phone]; text HELLO to 932064; or 704.

## 2024-02-09 NOTE — PROGRESS NOTES
"Subjective   Shabana Ferguson is a 68 y.o. female who presents today for follow up    Referring Provider:  No referring provider defined for this encounter.    Chief Complaint:  mdd fareed    History of Present Illness:     Shabana Ferguson is a 64 year old /White female, hx of anxiety and depression, fractured patella, HLD, osteopenia, ulcer referred by MARIE Brice, for anxiety and depression management.   Chart: Psychotropic medications: amitriptyline 25 mg QHS, Celexa 20 mg p.o. daily, lorazepam 0.5 p.o. twice daily as needed anxiety.      \"Shabana\" and Jaya  Has never been on vraylar    : In person interview:  Chart review:   : no visits.   : Fam med, ortho, reassuring cmp, tsh, vit d, cbc. Elevated LDL.  No visits.  Benign mammogram.  Seen by orthopedics and physical therapy.  Plannin/11: Stable, well, explored her catastrophizing today. No changes. Wants to re-establish individual therapy with her old therapist at [facility] to manage catastrophizing, thoughts when she is lonely. Conflict processed between pt and  today; suggested restarting marriage counseling thru pt's therapist (who can make a referral). How much of patient's MH can be explained by her relp with her hsuband?  : No changes for now, but may increase to 7.5 mg daily of abilify (half a 15 mg tab). Still some residual MDD.  : chronic mild anhedonia. Increase abilify and start light box.  \"We have a  at home, he had to stay\"  \"I am ok, but not 100%.\"  Still have some anxiety.   Sometimes has insomnia with \"brain zaps.\" There are days when I don't have it.  Mood/Depression: MDD some depressed mood  Anxiety: FAREED some mild restlessness  Social anxiety: chronic, persistent  Panic attacks: rare, controlled on bzd, half a tab qod  Energy: stable  Concentration: stable  Sleeping: rare initial insomnia  Eatin, 156 lbs  Refills: y  Substances: def  Therapy: group therapy with other women " "around her age.  Medication compliant: y  SE: n  No SI HI AVH.      : In person interview:  Chart review:   : Fam med, ortho, reassuring cmp, tsh, vit d, cbc. Elevated LDL.  No visits.  Benign mammogram.  Seen by orthopedics and physical therapy.  Plannin/6: No changes for now, but may increase to 7.5 mg daily of abilify (half a 15 mg tab). Still some residual MDD.  : chronic mild anhedonia. Increase abilify and start light box.  \"It flipped and I'm doing better.\"  8/10  Some stress and anxiety, but not FAREED, but situational  Learning from group meetings, low self esteem  Previous important:   \"Not feeling real shantal in my life\" and has anticipatory anxiety, as in, she was feeling anxious before a previous appointment  Mood/Depression: MDD at goal  Anxiety: stable FAREED, restless at times, social anxiety  Panic attacks: rare, controlled on bzd, half a tab qod  Energy: stable  Concentration: stable  Sleeping: stable  Eatin lbs  Refills: y  Substances: def  Therapy: group therapy with other women around her age.  Medication compliant: y  SE: n  No SI HI AVH.      : In person interview:  Chart review:   No visits.  Benign mammogram.  Seen by orthopedics and physical therapy.  Plannin/2: chronic mild anhedonia. Increase abilify and start light box.  \"Better.\"  Had some stress  Lots of traveling  Used the light box  Regular exercise 3 days a week  Rarely uses ativan  Volunteer work  \"I enjoyed seeing his sister recently\"   Previous important: \"Not feeling real shantal in my life\" and has anticipatory anxiety, as in, she was feeling anxious before a previous appointment  Mood/Depression: improving, possibly at goal  Anxiety: restless at times, social anxiety  Panic attacks: rare, controlled on bzd  Energy: stable  Concentration: stable  Sleeping: stable, better  Eating: stable  Refills: y  Substances: def  Therapy: group therapy with other women around her age.  Medication compliant: y  SE: " "n  No SI HI AVH.      : In person interview:  Chart review:   Benign mammogram.  Seen by orthopedics and physical therapy.  Plannin/31: Stable, well, no changes. Light box, we discussed it.  Euthymic at last visit.  \"I didn't get the light box.\"  I'm sleeping  Overall good, but have bad days  Regular exercise 3 days a week  Rarely uses ativan  Volunteer work  \"Not feeling real shantal in my life\" and has anticipatory anxiety, as in, she was feeling anxious before this appointment  Mood/Depression: some bad \"hours,\" but overall ok.   Anxiety: restless at times, social anxiety  Panic attacks: rare, controlled on bzd  Energy: good  Concentration: good  Sleeping: stable, better  Eating: stable  Refills: y  Substances: def  Therapy: started group therapy with other women around her age.  Medication compliant: y  SE: n  No SI HI AVH.    : In person interview:  Chart review:     Seen by orthopedics and physical therapy.  Planning:     Euthymic at last visit.  \"I have good times... but sometimes it comes back.\"  Recently I'm good  2-3 weeks ago the stress made it worse. Obdulio and his family went to [place].  She became anxious and didn't want to go and had to miss the wedding  There was some guilt tripping by family  Rarely uses ativan  Mood/Depression: can push away rare feelings of hopelessness.  Anxiety: still feel restless sometimes.  Panic attacks: rare, controlled on bzd  Energy: good  Concentration: good  Sleeping:  Takes half a lorazepam at night to sleep, along with other night meds.  Much better  Eating: stable  Refills: y  Substances: def  Therapy: started group therapy with other women around her age.    Medication compliant: y  SE: n  No SI HI AVH.      : In person interview:  Chart review: Seen by orthopedics and physical therapy.  Planning: Euthymic at last visit.  \"I'm most of the time, good.\"    Mood/Depression: can push away rare feelings of hopelessness.  Anxiety: still feel restless " "sometimes.  Panic attacks: rare, controlled on bzd  Energy: good  Concentration: good  Sleeping:  Takes half a lorazepam at night to sleep, along with other night meds.  Eating: stable  Refills: y  Substances: def  Therapy: started group therapy with other women around her age.  Medication compliant: y  SE: n  No SI HI AVH.    : In person interview:  Chart review: getting PT for right shoulder rotator cuff tear.  Planning: stable no changes.  \"I'm praying and I give thanks to God.\"  \"I'm doing a lot better.\"  Mood/Depression: only if I get stressed out  Anxiety: sometimes get stressed out.  Takes an ativan, takes one in the evening (half). About half a day.  Panic attacks: denies  Energy: good  Concentration: stable  Sleeping: good, well  Eatin lbs  Refills: y  Substances: n  Therapy: continuing  Medication compliant: y  SE: n  No SI HI AVH.    3/3: In person interview:  Chart review: No new.  Planning: Well, stable, no changes.  \"Friend's father .\"  96 yo.  \"I'm doing good.\"  Once in a while I have trouble sleeping. Mostly I sleep well.  Tried to wean down on ativan, but has brain zaps.  Jaya: things are doing well.  I'm doing things now like pictures  Family is doing better. Relationships getting better.   Mood/Depression: stable, fairly well  Anxiety: some bouts, minimal  Panic attacks: controlled on ativan  Energy: Stable  Concentration: Stable  Sleeping: Well  Eating: Stable  Refills: Yes  Substances: Denies  Therapy:  Their couples therapist left. Referred to someone else, who doesn't take their insurance. Trying [facility]; has an appnt.  Medication compliant: Yes  No SI HI AVH.    : In person interview:  Chart review: Seen by orthopedic surgery for right rotator cuff tear, getting physical therapy for this.  MRI of the right shoulder ordered.  Planning: Stable, well, no changes.   \"  Mood/Depression:  Anxiety:  Panic " "attacks:  Energy:  Concentration:  Sleeping:  Eating:  Refills:  Substances:  Therapy:  Medication compliant:  SE:   No SI HI AVH.    : In person interview:  Chart review: No new.    Planning: No changes at last visit.  Are they still doing couples therapy?  \"I'm ok.\"  Stable, once in a while I go a little down, but then get back up.  Continuing to do couples therapy  Mood/Depression: minimal depressed mood  Anxiety: sometimes, but controlled  Takes half an ativan some nights, otherwise has anxiety in the morning  Also has brain zaps at night if doesn't take it -- this is the biggest reason  Volunteering at [employer]  Both are volunteering at [employer]. Also seeing some pts.  Panic attacks: n  Energy: good  Concentration: good  Sleeping: well  Eatin lb wg   Refills: y  Substances: n  Therapy: individual and couples (every 2 weeks)  Medication compliant: y  No SI HI AVH.    : In person interview:  Chart review: No new.  Planning: Increased gabapentin and started amitriptyline at night.  \"I'm doing a lot better.\"  Sleeping better on amitriptyline  Pt wants to get involved in volunteering, [employer], every Tues and Th  Mood/Depression: better  Anxiety: less worrying  Trip was a success; they saw one of the grandchildren they haven't seen in a while. Patient smiled and laughed during this discussion. Cooked for him.  Taking less lorazepam  Panic attacks: n  Energy: improving  Concentration: improving  Sleeping: well  Eating: stable  Refills: n  Substances: n  Therapy: continuing therapy and couples therapy  Medication compliant: y  No SI HI AVH.    : In person interview:  Chart review: Seen by primary care .  PHQ-9 is 14, very difficult.  Feels her mental health is better than it was a year ago, however.  Planning: They have started marriage counseling.  Increased gabapentin in the afternoons to 200 mg a day.  Reintroduce amitriptyline in the evenings.  \"I'm " "suffering.\"  Monday has a big trip to [place]. Just got back from [place] trip, which \"was traumatic.\"  Nicola didn't join them for dinner last night -- upsetting for her.  Mood/Depression:  Depressed mood  Anxiety:  Restless, irritable, on edge -- lorazepam works. Starts at noon.  Very clingy, don't want to be by myself, throughout the day  Panic attacks: Controlled on Ativan  Energy: Down  Concentration: Still not a goal  Sleeping: Initial insomnia  Eating: Stable weight  Refills: Yes  Substances: No  Therapy: continuing marriage counseling, have to pay out of pocket  Medication compliant: y  No SI HI AVH.    11/7: In person interview:  Chart review: No new.  Planning: Increased Abilify and referred to couples therapy a week ago.  \"I'm having a hard time to concentrate and sit still.\"  Jaya:  Couldn't go back to sleep last night  Nicola's move was this past Friday  Moved animals to his house on Saturday  He feels there is less negativity, but she is not sure  Mood/Depression:  Depressed mood  Anxiety:  Worrying a lot, very nervous especially in the afternoons  Pacing  Panic attacks: y, controlled on lorazepam  Energy: stable, not at goal  Concentration:   Some issues with concentration  Sleeping:  Always wakes at 4-6 am, maintenance insomnia  But slept well last night 7-6 am.  Eating: 3 lb wl since 10/31  Refills:  y  Substances: n  Therapy: started marriage counseling, next appnt next week  Medication compliant: y  No SI HI AVH.    10/31: In person interview:  Chart review: No new.  Planning: No changes at last visit.  \"I feel anxiety in the afternoon.\"  Stressors:  Nicola, her son, is moving out. Stressful.  Pt wants to end the marriage; they've discussed it numerous times recently.  Couples therapy: they've tried in the past, more than once, problems with each therapist (patient)  Mood/Depression:  Depressed mood in the afternoons, not mornings  Jaya: trip to [place] was fun but stressful and \"it was in the " "end a major setback\"  Last night was very destructive: sleepless  Anxiety:  [facility]ent to [facility]; recommend taking gabapentin 100 mg in afternoon rather than morning.  Because it is costing so much money, they are not going back.  Worrying, irritable, insomnia, on edge  Panic attacks:  Yes, in the afternoon, controlled on ativan  Energy: down  Concentration: poor  Sleeping: initial insomnia  Eating: 3 lb wg 8/22  Refills: No  Substances: No  Therapy: Yes continuing  Medication compliant: y  No SI HI AVH.    9/30: In person interview:  Chart review: Recently discharged from [facility] outpatient.  Patient was depressed from October of last last year to essentially a couple months ago when she started to improve.  Consider light box.  Planning: Restarted Ambien at last visit.  Patient was improving at last visit.  Consider light box  \"Good.\"  I don't need to take the ambien, I just need the gabapentin  TMS helping as well, 10 more sessions, \"really helping\"  Discussed light box  Mood/Depression: stable, well  Anxiety:  Anxiety in the afternoons, takes lorazepam daily at 6 pm  Takes care of the brain zaps  Panic attacks: n  Sleeping: well on the gabapentin  Eating: stable  Refills: y  Substances:  Therapy: IOP completed  Very beneficial, \"I never thought I would like it.\" I would recommend it to anyone.  Medication compliant: y  No SI HI AVH.    8/30: In person interview:  Chart review: Seen by urgent care August 20 for back pain.  COVID-negative.  Also had some cervical adenopathy. EEG is abnormal.  Planning: Trial of Abilify appears to be working well.  This is according to the conversation I had with both of them on August 8.  \"I'm better... I feel good when I get up in the morning.\"  P1, G6  Doing TMS  Then IOP.  The problem I still have is sleeping.  Initial insomnia for 2 hours each night.  Afternoons, gets anxious. Uses ativan rarely. Use has gone down dramatically.  Prescribed gabapentin by " "[facility]. Helping her anxiety.  Jaya \"quality of life has gone WAY up.\"  Sees neurology in early September.  Mood/Depression: improved  Anxiety: improved  Panic attacks: rare  Eating: stable  Refills: n  Substances: n  Therapy: def  Medication compliant: y  No MARLA VINCENT.    8/1: In person interview:  Chart review: Patient admitted to the [facility] July 18.  Awaiting records.  Planning: This is a follow-up appointment after her admission to the [facility].  \" I am better.\"  Patient reported she was extremely afraid initially of being admitted, however admission was a very \"positive\" experience for her, and \"I felt at peace.\"  Could not tolerate olanzapine 2.5 mg nightly as it gave her nightmares.  She has stopped this medication.  She has set up intensive outpatient at [facility], has already gone to [facility] last week, and also has TMS scheduled.  Per her , \"the crying spells have stopped.\"  Mood/Depression: Slightly improved, still has days of depression.  Reports that some days she is perfect, then other days she becomes very depressed and anxious, but lorazepam helps at that time.  Panic attacks: Still has occasionally  Sleeping: Still has some trouble sleeping, but recently has been using delta 8 to sleep  Eating: Stable, now eating  Refills: No  Substances: No  Therapy: Continuing  Medication compliant: Yes  No MARLA VINCENT.    6/17: In person interview:  Chart review: Seen by primary care June 9.  Apparently experiencing multiple side effects from anxiety and depression medications.  Looks like the results of the testing are not associated with an increased risk for hyper homocystinemia (MTHFR gene assay).  Planning: Belsomra was too expensive.  \"All in all I'm better.\"  Still has nervous stomach; getting help with that from PCP.  Sleeping better  Taking ativan in the am consistently and also at night to sleep (rarely).  Low mood in afternoon with crying spells  Mood/Depression: stable   Anxiety: " "under control  Sleeping: well  Eating: stable  Refills: n  Substances: n  Therapy: has a counselor at [facility] now, plus [facility]  Medication compliant: y  No SI [facility].    5/20: In person interview:  Chart review: Patient presented in the emergency room for abdominal pain, she did not want a wait so she left.  Saw primary care for abdominal pain which patient believes is related to starting Pristiq a month ago.  Despite having stopped it long ago, she continues to have abdominal symptoms.  Somatization?  Planning: Try Belsomra for insomnia.  \" I am getting better.\"   agrees.  Patient is becoming more functional.  Depression and anxiety have improved.  Still residual symptoms.  She has only been on the higher dose of Lexapro for about 4 weeks ago.  Still having trouble with insomnia but it is controlled on Ambien most nights.  Some nights it is not.  Some nights she does not take Ambien.  Utilizing half a milligram of lorazepam twice daily most days.  More active around the house.  Famotidine is helping with abdominal pain.  Refills: Yes  Substances: No  Therapy: Deferred  Medication compliant: Yes  No SI HI AV.    4/22: In person interview:  Chart review: 4/11: I called the patient to check in on her progress on lexapro. \"I'm doing better than I was before.\" Sleeping well. Taking nyquil to sleep. Utilizing lorazepam frequently. Has been on lexapro for 10 days. She understands that she needs to give it more time. More active. Has no appetite, however. I mentioned to her the importance of moving to a higher level of care (IOP, inpt). Pt voiced understanding and agreed to proceed. Jaya: crying episodes have stopped. Better energy.  \"I can't sleep.\"  Persistent insomnia, anxious before bed, unclear why. Has tried ambien which sometimes helps. Also over the counter meds.   Irritable, restless, worrying.   Still depressed, but this has improved  Compliant on lexapro  Still having panic attacks.  Utilizing " "lorazepam.  Per , she has made significant improvement over the last few months, but still has a way to go.  Patient agrees.  Declines IOP, inpatient admission  Therapy: Deferred  Medication compliant: To some extent yes  No SI HI AVH.    3/24: In person interview: With her   Chart review: Patient is now discontinued all medications except for Lamictal 25 mg a day.  I started her on doxepin in the evenings for insomnia on March 21.  \"I think I need lower doses.\"  Persistent crying spells, and anxiety. Utilizing ativan.  Doxepin is helping with insomnia.  Patient reports that she knows of someone in her 70s who struggled with depression for years until they came up with a solution.  This gave the patient hope.  She noted that the patient was on very low doses of Celexa and other psychotropic medication.  Medication compliant: y  No MARLA MIDDLETON AVH.    3/15: In person interview: With her   Chart review: Patient had another panic attack 3/14 morning.  I advised her to utilize the Ativan liberally as she is not always using it.  Compliant on Latuda 40 mg a day.  She notes that there are some periods where she feels \"completely normal.\"  Then other times, she completely breaks down and has depression and anxiety. Having trouble sleeping for the last 4 days.  Target this with Ambien 5 mg nightly. Risks, benefits, side effects discussed with patient including sedation, dizziness, GI upset, hallucinations, sleepwalking, sleep-eating.  After discussion of these risks and benefits, the patient voiced understanding and agreed to proceed. Continue Latuda and Lamictal. Start titrating off of amitriptyline by taking half a tab, 12.5 mg in other words, nightly.  \" The Ambien kept me asleep for a couple hours.\"  Patient slept for two hours, got up, ate something, and did some reading.  Then went back to sleep.  Notes that she did not feel anxious during this time.  When she woke the next morning she felt very " "tired.  Utilizing Ativan more liberally to prevent panic attacks.  Some limitation today about how her family is distancing themselves from her due to her depression.  Feels like they should be more supportive.  Wants to switch to a different therapist.  She understands that we will have a therapist start next month.  Would like to wait until that time.  Problems with connecting to her present therapist.  Therapy: Continuing  Medication compliant: Yes  No SI HI AVH.    3/10: In person: With her   Interview:  Chart review: We have reduced the dose of Effexor to 37.5 mg a day, and started Lamictal 25 mg daily.  Continuing Latuda at its present dose of 20 mg a day.  October labs show reassuring CMP, CBC.  \" I stopped the Effexor.\"  Patient states she is already feeling better.   agrees.  Did not take Effexor this morning and is not experiencing panic and anxiety in the afternoon like she has been for the past several days.  Tolerating the Lamictal without side effects.  No crying spells  Affect is still depressed  Medication compliant: Yes  No SI HI AVH.    2/15:Virtual visit via Zoom audio and video due to the COVID-19 pandemic.  Patient is accepting of and agreeable to visit.  The visit consisted of the patient and I. The patient is at home, and I am at the office.  Interview:  Chart review: DEXA scan this month.  Normal in the lumbar spine and hips, it is decreased by 0.9% in the femoral necks compared to 2007.  \"Yesterday had a breakdown and cried for hours.\"  Otherwise has been doing very well.  Still utilizing lorazepam once a day.  Has done TMS for what sounds like about a week and a half.  No major changes that she can identify.  Sleeping well  Still some uncontrolled worrying, irritability, restlessness.  Medication compliant: Yes  No SI HI AVH.    1/17: Virtual visit via Zoom audio and video due to the COVID-19 pandemic.  Patient is accepting of and agreeable to visit.  The visit consisted of " "the patient and I.  Interview:  Chart review: No new chart developments since January 3.  EKG in October reveals 68, sinus, .  \"Doing even better than I was 2 weeks ago.\"  Still has mood swings, but not nearly as bad as before.  Have not needed to use lorazepam most days.  Starting TMS this week.  Depression/Mood: much better  Anxiety: much better  Refills: n  Sleeping: well  Eating: stable  Substances: n  Therapy: continuing  Medication compliant: y  No SI HI AVH.    1/3/22: Virtual visit via Zoom audio and video due to the COVID-19 pandemic.  Patient is accepting of and agreeable to visit.  The visit consisted of the patient and I.  Interview:  Chart review: No new chart developments since December 17.  \"Doing much better.\"  Sleeping well at night.  Some anxiety sometimes, but has days where she doesn't have anxiety.  No panic attacks  No crying spells.  Some days doesn't take lorazepam, other times takes a half a pill; when she does, it works very well.  Some anxiety looking up TMS last night, took a lorazepam and felt much better.   No holiday disruption at all. Handled the holidays well.   Approved for TMS.   Depression/Mood:  Depressed mood: much better  Seasonal pattern: denies  Severity: mild  Insomnia: denies  Duration: months  Anxiety:  Uncontrolled worrying: y  Severity: mild  Restlessness/feeling on edge: y  Irritability: y  Insomnia: n  Duration: months  Panic attacks: still has them rarely  Refills: y  Sleeping: y  Eating: stable  Substances: n  Therapy: With Genevieve at [facility] (continuing)  Medication compliant: y  No SI HI AVH.    6/1: Interview: Continued improvement. Doing well from d and a perspective. Persistent constipation that began with abilify. Still not taking lorazepam as her anxiety is under control. Denies SI HI AVH.  concurs.    9/11/20 H&P: Patient presented today with her . Both provided extensive history regarding her depression and anxiety. Patient had been seen by " " at [facility] for roughly a year and a half. Patient reported that Dr. Gordon had become convinced that she had bipolar disorder, which both she and her  disagree with. Patient and  deny any episodes in the past where she experienced, for an extended length of time lasting at least several days, no need for sleep, rapid speech, risk-taking behaviors. Per the  and wife pair, her previous psychiatrist insisted on her being on a mood stabilizer.   Patient reports that she had been stable on Celexa for \"several years.\" In 2019, January, the patient underwent a colonoscopy where dysplasia was revealed. This led to significant anxiety and a \"breakdown.\" The dysplasia was subsequently removed. Due to the heightened anxiety the patient's psychiatrist took the patient off of Celexa and started her on Lexapro. The patient did not tolerate Lexapro well, she continued to remain anxious, she began to engage in therapy in addition to medication management. The Lexapro was subsequently switched to Viibryd. The Viibryd did not work. The patient also began to experience panic attacks including shortness of breath, crying, tachycardia, palpitations. The panic attacks were new and had never happened before.   In January 2020 the patient experienced another breakdown and sunk into a deeper depression. The COVID-19 pandemic only worsened her situation. In February the patient's , a physician, decided to become her full-time caretaker as she would become anxious and panicky without him present. The patient also experienced intermittent bouts of suicidal ideation.   Recently, the patient and  demanded of their psychiatrist to take her off of Viibryd and Seroquel. They actually tapered her off of Seroquel themselves. They asked him to switch her back to Celexa. She has been on Celexa now for the last 3 days and is already begun to experience improvement. She states that the Seroquel led to having " hallucinations and actually worsened insomnia. Last night she did not take Seroquel for the first time in many months and she slept very well. Her mood is slightly improved. Regarding her anxiety, she endorses muscle tension and fatigue restlessness irritability and a history of insomnia. Regarding her depression she denies SI HI AVH, denies anhedonia denies guilt, notes some decreased energy, psychomotor retardation, and a history of insomnia. They had also tried acupuncture in the past with some success. Psychiatric review of systems is negative for psychosis nancy, positive for anxiety and depression. Possibly positive for  depression and postpartum depression. The patient is medication compliant.       Past Psychiatric History:  Began Psychiatric Treatment: Several years   Dx: Depression and anxiety   Psychiatrist: Doctor Gordon for the last year and a half   Therapist: Sees a therapist at [facility] History: Denies, Although she came close to needing admission recently.   Medication Trials: See HPI. Seroquel, Prozac which was too activating, Zoloft which was too activating, Celexa which worked well, Effexor which worked well, Lexapro which did not work, Viibryd which did not work.   Self-Harm: Denies   Suicide Attempts: Denies   OB/GYN HISTORY:  Sexually Active: OB/GYN history deferred   Substance Abuse History:  Types: Denies all, including illicit   Social History:  Marital Status:    Employed: No     Kids: 4   House: House    Hx: Denies   Family History:  Suicide Attempts: Deferred today, Due to lack of time   Suicide Completions: Deferred   Substance Use: Deferred   Psychiatric Conditions: Deferred    depression, psychosis, anxiety: Deferred   Developmental History:  Born: Deferred   Siblings: Deferred   Access to Weapons: Denies   Thought Content: no suicidal ideation, no homicidal ideation, no auditory hallucinations, no visual hallucinations, and     PHQ-9  Depression Screening  PHQ-9 Total Score:      Little interest or pleasure in doing things?     Feeling down, depressed, or hopeless?     Trouble falling or staying asleep, or sleeping too much?     Feeling tired or having little energy?     Poor appetite or overeating?     Feeling bad about yourself - or that you are a failure or have let yourself or your family down?     Trouble concentrating on things, such as reading the newspaper or watching television?     Moving or speaking so slowly that other people could have noticed? Or the opposite - being so fidgety or restless that you have been moving around a lot more than usual?     Thoughts that you would be better off dead, or of hurting yourself in some way?     PHQ-9 Total Score       FAREED-7       Past Surgical History:  Past Surgical History:   Procedure Laterality Date    COLONOSCOPY  [id number]    last scope 2020    COLONOSCOPY N/A 10/29/2021    Procedure: COLONOSCOPY with possible biopies.;  Surgeon: Skyler Fuller MD;  Location: [facility] END[facility]COPY;  Service: General;  Laterality: N/A;    KNEE SURGERY  2017    broken knee       Problem List:  Patient Active Problem List   Diagnosis    Anxiety    Depression    History of fracture of patella    Hyperlipemia    Osteopenia    Ulcerative lesion    Constipation    History of colonic polyps    Tinnitus of right ear    Sensorineural hearing loss (SNHL) of both ears    Right shoulder pain       Allergy:   No Known Allergies     Discontinued Medications:  Medications Discontinued During This Encounter   Medication Reason    meloxicam (MOBIC) 15 MG tablet Non-compliance    ARIPiprazole (Abilify) 5 MG tablet            Current Medications:   Current Outpatient Medications   Medication Sig Dispense Refill    amitriptyline (ELAVIL) 25 MG tablet Take 1 tablet by mouth Every Night. 90 tablet 3    ARIPiprazole (Abilify) 5 MG tablet Take 1.5 tablets by mouth Daily. 90 tablet 1    atorvastatin (LIPITOR) 20 MG tablet Take 1  tablet by mouth Daily. 90 tablet 3    Calcium Carb-Cholecalciferol (OYSCO 500 + D) 500-200 MG-UNIT tablet       cholecalciferol (VITAMIN D3) 25 MCG (1000 UT) tablet Take 1 tablet by mouth Daily.      Coenzyme Q10 (CO Q10 PO) Take  by mouth.      escitalopram (LEXAPRO) 10 MG tablet Take 1 tablet by mouth Daily. 90 tablet 3    gabapentin (NEURONTIN) 300 MG capsule 300 mg qam, 300 mg qpm, 600 mg qhs 120 capsule 5    GARLIC PO garlic 1,000 mg oral capsule take 1 capsule by oral route daily   Active      LORazepam (ATIVAN) 1 MG tablet Take 1 tablet by mouth 2 (Two) Times a Day As Needed for Anxiety. for anxiety 60 tablet 5    Multiple Vitamins-Iron (MULTIVITAMIN PLUS IRON ADULT PO) Women's Multivitamin 18 mg iron-400 mcg-500 mg oral tablet take 1 tablet by oral route daily   Active      Omega-3 Fatty Acids (fish oil) 1000 MG capsule capsule 3 capsules 3 (Three) Times a Day With Meals. PT(PATIENT) REPORTS SHE TAKES MEDICATION MORNING/LUNCH/EVENING      Probiotic Product (PROBIOTIC PO) Take  by mouth.      vitamin E 400 UNIT capsule vitamin E 400 unit oral capsule take 1 capsule by oral route daily   Active       No current facility-administered medications for this visit.       Past Medical History:  Past Medical History:   Diagnosis Date    Acid reflux 2022    Anxiety     Depression     Fracture of patella 09/12/2017    Hyperlipemia     Osteopenia     Panic disorder     Psychiatric inpatient 07/22/2022    Discharged 07/22/2022 for Anxiety, Depression, and Suicidal Bx's    Seizures     reports seizure activity, but under control.       Social History     Socioeconomic History    Marital status:    Tobacco Use    Smoking status: Never    Smokeless tobacco: Never   Vaping Use    Vaping Use: Never used   Substance and Sexual Activity    Alcohol use: Not Currently    Drug use: Never    Sexual activity: Not Currently         Family History   Problem Relation Age of Onset    Stroke Mother     Heart disease Mother     No  "Known Problems Father     No Known Problems Sister     Cancer Brother     No Known Problems Maternal Aunt     No Known Problems Paternal Aunt     No Known Problems Maternal Uncle     No Known Problems Paternal Uncle     No Known Problems Maternal Grandfather     No Known Problems Maternal Grandmother     No Known Problems Paternal Grandfather     No Known Problems Paternal Grandmother     No Known Problems Cousin     Seizures Son     No Known Problems Other     ADD / ADHD Neg Hx     Alcohol abuse Neg Hx     Anxiety disorder Neg Hx     Bipolar disorder Neg Hx     Dementia Neg Hx     Depression Neg Hx     Drug abuse Neg Hx     OCD Neg Hx     Paranoid behavior Neg Hx     Schizophrenia Neg Hx     Self-Injurious Behavior  Neg Hx     Suicide Attempts Neg Hx      Review of Systems:  Review of Systems   Constitutional:  Negative for diaphoresis and fatigue.   HENT:  Negative for drooling.    Eyes:  Negative for visual disturbance.   Respiratory:  Negative for cough and shortness of breath.    Cardiovascular:  Negative for chest pain, palpitations and leg swelling.   Gastrointestinal:  Negative for diarrhea, nausea and vomiting.   Endocrine: Negative for cold intolerance and heat intolerance.   Genitourinary:  Negative for difficulty urinating.   Musculoskeletal:  Negative for joint swelling.   Allergic/Immunologic: Negative for immunocompromised state.   Neurological:  Negative for dizziness, seizures, speech difficulty, light-headedness and numbness.         Mental Status Exam  Appearance  : sitting in a chair with her , good eye contact, normal street clothes, groomed, in person  Behavior  : pleasant and cooperative  Motor  : No abnormal  Speech  : normal rhythm, rate, volume, tone, not hyperverbal, not pressured, normal prosidy, Speaks with an accent  Mood  : \"still some anxiety, and depression, and insomnia\"  Affect  : nearly euthymic, mood congruent, fair variability  Thought Content  : negative suicidal " "ideations, negative homicidal ideations, negative obsessions  Perceptions  : negative auditory hallucinations, negative visual hallucinations  Thought Process  : linear  Insight/Judgement  : fair/fair  Cognition  : grossly intact  Attention  : intact      Physical Exam:  Physical Exam    Vital Signs:   /76   Pulse 84   Ht 162.6 cm (64\")   Wt 70.7 kg (155 lb 12.8 oz)   BMI 26.74 kg/m²      Lab Results:   Office Visit on 12/28/2023   Component Date Value Ref Range Status    Glucose 12/28/2023 88  65 - 99 mg/dL Final    BUN 12/28/2023 16  8 - 23 mg/dL Final    Creatinine 12/28/2023 0.82  0.57 - 1.00 mg/dL Final    Sodium 12/28/2023 139  136 - 145 mmol/L Final    Potassium 12/28/2023 5.0  3.5 - 5.2 mmol/L Final    Chloride 12/28/2023 102  98 - 107 mmol/L Final    CO2 12/28/2023 26.1  22.0 - 29.0 mmol/L Final    Calcium 12/28/2023 10.2  8.6 - 10.5 mg/dL Final    Total Protein 12/28/2023 7.3  6.0 - 8.5 g/dL Final    Albumin 12/28/2023 4.6  3.5 - 5.2 g/dL Final    ALT (SGPT) 12/28/2023 26  1 - 33 U/L Final    AST (SGOT) 12/28/2023 30  1 - 32 U/L Final    Alkaline Phosphatase 12/28/2023 110  39 - 117 U/L Final    Total Bilirubin 12/28/2023 1.0  0.0 - 1.2 mg/dL Final    Globulin 12/28/2023 2.7  gm/dL Final    A/G Ratio 12/28/2023 1.7  g/dL Final    BUN/Creatinine Ratio 12/28/2023 19.5  7.0 - 25.0 Final    Anion Gap 12/28/2023 10.9  5.0 - 15.0 mmol/L Final    eGFR 12/28/2023 77.5  >60.0 mL/min/1.73 Final    Total Cholesterol 12/28/2023 191  0 - 200 mg/dL Final    Triglycerides 12/28/2023 127  0 - 150 mg/dL Final    HDL Cholesterol 12/28/2023 61 (H)  40 - 60 mg/dL Final    LDL Cholesterol  12/28/2023 108 (H)  0 - 100 mg/dL Final    VLDL Cholesterol 12/28/2023 22  5 - 40 mg/dL Final    LDL/HDL Ratio 12/28/2023 1.71   Final    TSH 12/28/2023 0.821  0.270 - 4.200 uIU/mL Final    25 Hydroxy, Vitamin D 12/28/2023 53.5  30.0 - 100.0 ng/ml Final    Hemoglobin A1C 12/28/2023 5.70 (H)  4.80 - 5.60 % Final    WBC 12/28/2023 " 7.13  3.40 - 10.80 10*3/mm3 Final    RBC 12/28/2023 4.83  3.77 - 5.28 10*6/mm3 Final    Hemoglobin 12/28/2023 14.0  12.0 - 15.9 g/dL Final    Hematocrit 12/28/2023 43.3  34.0 - 46.6 % Final    MCV 12/28/2023 89.6  79.0 - 97.0 fL Final    MCH 12/28/2023 29.0  26.6 - 33.0 pg Final    MCHC 12/28/2023 32.3  31.5 - 35.7 g/dL Final    RDW 12/28/2023 13.6  12.3 - 15.4 % Final    RDW-SD 12/28/2023 44.0  37.0 - 54.0 fl Final    MPV 12/28/2023 9.3  6.0 - 12.0 fL Final    Platelets 12/28/2023 288  140 - 450 10*3/mm3 Final    Neutrophil % 12/28/2023 54.8  42.7 - 76.0 % Final    Lymphocyte % 12/28/2023 35.8  19.6 - 45.3 % Final    Monocyte % 12/28/2023 8.1  5.0 - 12.0 % Final    Eosinophil % 12/28/2023 0.7  0.3 - 6.2 % Final    Basophil % 12/28/2023 0.3  0.0 - 1.5 % Final    Immature Grans % 12/28/2023 0.3  0.0 - 0.5 % Final    Neutrophils, Absolute 12/28/2023 3.91  1.70 - 7.00 10*3/mm3 Final    Lymphocytes, Absolute 12/28/2023 2.55  0.70 - 3.10 10*3/mm3 Final    Monocytes, Absolute 12/28/2023 0.58  0.10 - 0.90 10*3/mm3 Final    Eosinophils, Absolute 12/28/2023 0.05  0.00 - 0.40 10*3/mm3 Final    Basophils, Absolute 12/28/2023 0.02  0.00 - 0.20 10*3/mm3 Final    Immature Grans, Absolute 12/28/2023 0.02  0.00 - 0.05 10*3/mm3 Final    nRBC 12/28/2023 0.0  0.0 - 0.2 /100 WBC Final       EKG Results:  No orders to display       Imaging Results:  No Images in the past 120 days found..      Assessment & Plan   Diagnoses and all orders for this visit:    1. Recurrent major depressive disorder in remission (Primary)    2. Generalized anxiety disorder    3. Panic attacks    4. Panic disorder    5. Insomnia due to mental condition    6. Moderate episode of recurrent major depressive disorder  -     ARIPiprazole (Abilify) 5 MG tablet; Take 1.5 tablets by mouth Daily.  Dispense: 90 tablet; Refill: 1        Visit Diagnoses:    ICD-10-CM ICD-9-CM   1. Recurrent major depressive disorder in remission  F33.40 296.35   2. Generalized anxiety  disorder  F41.1 300.02   3. Panic attacks  F41.0 300.01   4. Panic disorder  F41.0 300.01   5. Insomnia due to mental condition  F51.05 300.9     327.02   6. Moderate episode of recurrent major depressive disorder  F33.1 296.32   INITIAL presentation most consistent with major depressive disorder in partial remission, FAREED, rare panic attacks.    2/9: Increase abilify with the supply she has: 7 mg daily for a month, then 7.5 mg daily. Targeting anx, dep, insomnia. Watch brain zaps. Discussed vraylar as well.    Allowed patient to freely discuss and process issues, such as:  Ongoing: Social anxiety conflicting with feeling lonely.  Ongoing: Her feelings of hopelessness and restlessness and how they recur at times. Her fear of her mood getting worse again.  ...without interruption or judgement with unconditional positive   ... using Rogerian psychotherapeutic techniques including unconditional positive regard, reflective listening, and demonstrating clear empathy.     Time (minutes) spent providing supportive psychotherapy: 16  Functional status: mild impairment  Treatment plan: Medication management and supportive psychotherapy  Prognosis: good  Progress: mild anx dep insomnia  4w    1/11: Stable, well, explored her catastrophizing today. No changes. Wants to re-establish individual therapy with her old therapist at [facility] to manage catastrophizing, thoughts when she is lonely. Conflict processed between pt and  today; suggested restarting marriage counseling thru pt's therapist (who can make a referral). How much of patient's MH can be explained by her relp with her hsuband?    12/6: No changes for now, but may increase to 7.5 mg daily of abilify (half a 15 mg tab). Still some residual MDD.    11/2: chronic mild anhedonia. Increase abilify and start light box.    8/31: Stable, well, no changes. Light box, we discussed it.    6/30: Stable, well. No changes.     4/28: Euthymic well. No changes.     3/3: Stable,  well, no changes.      1/20: Well, stable, no changes.     12/9: Significant improvement. No changes.     11/18: Residual anxiety, residual depression.  Increase gabapentin, start amitriptyline at night for initial insomnia.  Close follow-up.     11/7: Residual anx (at noon), consider adding gabapentin in the mornings. and dep. Increase gabapentin in the afternoons. No other changes.     10/31: Couples therapy, increase Abilify, close follow-up.  I feel that their marriage issues have come to a head recently, and this stressor could largely explain patient's depression and anxiety.  Patient mentioned Celexa again, she is likely interested in switching.     9/30: well, stable, some constriction today. No changes.     8/30: Remarkably well. Initial insomnia.    8/1: Try another mood stabilizer in place of olanzapine.  Will trial Abilify again.  Also consider Geodon.      6/17: start buspar, doing better.     5/20: Continue Lexapro and lorazepam.  Try Belsomra in place of Ambien.      4/22: Some improvement in anx and dep, but still residual symptoms.  Persistent insomnia. Declines IOP, inpatient admission.  Increase Lexapro and start trazodone.      3/24: Focus on lower doses.  Start Pristiq.  Depression with anxious distress versus depression and generalized anxiety disorder.  She only has distractibility, no other symptoms of hypomania.      3/15: Continue lurasidone 40 mg at night, increase Ambien to 10 mg at night.  Continue low-dose Lamictal, plan to increase further.  Continue titrating off of amitriptyline.     3/10: Effexor has now been stopped.  Continue Latuda and Lamictal at present doses.  I will see her back in a few days and then at 6 weeks.  Bipolar 2?  If so, how come she is tolerating amitriptyline in the evenings?     2/15: Still utilizing lorazepam which means the anxiety is not under control.  Increase Effexor.    1/17: Continued improvement.  No changes, only on effexor 75 mg for 2 weeks.   Tolerating medications without side effects.      1/3: Continued improvement. Increase effexor to target residual dep anx. TMS to start 1/6.     PLAN:  Risk Assessment: Risk of self-harm acutely remain low. Risk factors include a history of suicidal ideation, mood disorder, anxiety disorder, presence of psychosocial stressors such as colonic dysplasia, COVID-19 pandemic. Protective factors include no history of self-harm or suicide attempts, good social support, willingness to engage in care, improved depressive symptoms. Risk of self-harm chronically also remain low, but could be further elevated in the event of treatment noncompliance and/or AODA.  Medications:   CONTINUE light box.   CONTINUE lexapro 10 mg daily. Risks, benefits, alternatives discussed with patient including GI upset, nausea vomiting diarrhea, theoretical decrease of seizure threshold predisposing the patient to a slightly higher seizure risk, headaches, sexual dysfunction, serotonin syndrome, bleeding risk, increased suicidality in patients 24 years and younger.  After discussion of these risks and benefits, the patient voiced understanding and agreed to proceed.  CONTINUE gabapentin 300 mg qam, 300 qpm, 600 mg nightly. Risks, benefits, alternatives discussed with patient including sedation, dizziness/falls risk, GI upset, weight gain.  After discussion of these risks and benefits, the patient voiced understanding and agreed to proceed. UDS ordered, Mamadou reviewed.  INCREASE Abilify 5 to 7 mg, then 7.5 mg daily. Risks, benefits, alternatives discussed with patient including increased energy, exacerbation of irritability, akathisia, GI upset, orthostatic hypotension, increased appetite.  After discussion of these risks and benefits, the patient voiced understanding and agreed to proceed.  CONTINUE amitriptyline 25 mg nightly. Risks, benefits, alternatives discussed with patient including sedation, dizziness, falls, GI upset, constipation,  "urinary retention, dry mouth.  After discussion of these risks and benefits, the patient voiced understanding and agreed to proceed.  CONTINUE lorazepam 1 mg bid PRN. Risks, benefits, alternatives discussed with patient including GI upset, sedation, dizziness, respiratory depression, falls risk.  After discussion of these risks and benefits, the patient voiced understanding and agreed to proceed.  S/P:   buspar 5 mg bid was ineffective.  Trazodone 50 mg nightly not helpful for insomnia.  Mirtazapine 15 mg, 7.5 mg: ineffective; made anxiety and depression worse.  Abilify: brain zaps and constipation  Seroquel made depression worse  celexa 20 mg daily, ineffective after several months  Doxepin caused \"brain zaps\"  Latuda was ineffective.  Amitriptyline 25 mg nightly helpful for sleep but we didn't want her on too many serotonergic meds.  Ambien minimally helpful for insomnia.  Pristiq and effexor worsened anxiety, both low dose.  Stopped lamictal for unclear reasons 25 mg.  belsomra 5 mg nightly didn't help with insomnia.  Therapy: continue with Genevieve at [facility].  Referred for couples therapy 10/2022  Status post IOP at [facility] 10/2022  Labs/Studies: BMP to monitor sodium levels in February was entirely normal, EKG February shows rate 72, sinus, .    TREATMENT PLAN/GOALS: Continue supportive psychotherapy efforts and medications as indicated. Treatment and medication options discussed during today's visit. Patient acknowledged and verbally consented to continue with current treatment plan and was educated on the importance of compliance with treatment and follow-up appointments.    MEDICATION ISSUES:  YVETTE reviewed as expected.  Discussed medication options and treatment plan of prescribed medication as well as the risks, benefits, and side effects including potential falls, possible impaired driving and metabolic adversities among others. Patient is agreeable to call the office with any worsening of " symptoms or onset of side effects. Patient is agreeable to call 911 or go to the nearest ER should he/she begin having SI/HI. No medication side effects or related complaints today.     MEDS ORDERED DURING VISIT:    Return in about 4 weeks (around 3/8/2024).         This document has been electronically signed by Vinny Krishnamurthy MD  February 9, 2024 11:40 EST       Part of this note may be an electronic transcription/translation of spoken language to printed text using the Dragon Dictation System.

## 2024-02-19 DIAGNOSIS — F41.1 GENERALIZED ANXIETY DISORDER: ICD-10-CM

## 2024-02-19 DIAGNOSIS — F51.05 INSOMNIA DUE TO MENTAL CONDITION: ICD-10-CM

## 2024-02-19 DIAGNOSIS — F41.0 PANIC ATTACKS: ICD-10-CM

## 2024-02-19 DIAGNOSIS — F33.1 MODERATE EPISODE OF RECURRENT MAJOR DEPRESSIVE DISORDER: ICD-10-CM

## 2024-02-19 DIAGNOSIS — F41.0 PANIC DISORDER: ICD-10-CM

## 2024-02-20 RX ORDER — AMITRIPTYLINE HYDROCHLORIDE 25 MG/1
25 TABLET, FILM COATED ORAL NIGHTLY
Qty: 90 TABLET | Refills: 3 | OUTPATIENT
Start: 2024-02-20

## 2024-03-21 NOTE — PROGRESS NOTES
"Subjective   Shabana Ferguson is a 68 y.o. female who presents today for follow up    Referring Provider:  No referring provider defined for this encounter.    Chief Complaint:  mdd fareed    History of Present Illness:     Shabana Ferguson is a 64 year old /White female, hx of anxiety and depression, fractured patella, HLD, osteopenia, ulcer referred by MARIE Brice, for anxiety and depression management.   Chart: Psychotropic medications: amitriptyline 25 mg QHS, Celexa 20 mg p.o. daily, lorazepam 0.5 p.o. twice daily as needed anxiety.      \"Shabana\" and Jaya  Has never been on vraylar      3/22: In person interview:  Chart review:   3/22: no visits.  Plannin/9: Increase abilify with the supply she has: 7 mg daily for a month, then 7.5 mg daily. Targeting anx, dep, insomnia. Watch brain zaps. Discussed vraylar as well.  \"I don't feel a big difference.\"  Still has some anxiety, depression.  Sometimes has insomnia with \"brain zaps.\" There are days when I don't have it.  Mood/Depression: MDD some depressed mood  Anxiety: FAREED some mild restlessness  Social anxiety: chronic, persistent  Panic attacks: rare, controlled on bzd, half a tab qod  Energy: stable  Concentration: stable  Sleeping: rare initial insomnia  Eatin, 155, 156 lbs  Refills: y  Substances: def  Therapy: group therapy with other women around her age.  Medication compliant: y  SE: n  No SI HI AVH.      : In person interview:  Chart review:   : no visits.   : Fam med, ortho, reassuring cmp, tsh, vit d, cbc. Elevated LDL.  No visits.  Benign mammogram.  Seen by orthopedics and physical therapy.  Plannin/11: Stable, well, explored her catastrophizing today. No changes. Wants to re-establish individual therapy with her old therapist at [facility] to manage catastrophizing, thoughts when she is lonely. Conflict processed between pt and  today; suggested restarting marriage counseling thru pt's therapist " "(who can make a referral). How much of patient's MH can be explained by her relp with her hsuband?  : No changes for now, but may increase to 7.5 mg daily of abilify (half a 15 mg tab). Still some residual MDD.  : chronic mild anhedonia. Increase abilify and start light box.  \"We have a  at home, he had to stay\"  \"I am ok, but not 100%.\"  Still have some anxiety.   Sometimes has insomnia with \"brain zaps.\" There are days when I don't have it.  Mood/Depression: MDD some depressed mood  Anxiety: FAREED some mild restlessness  Social anxiety: chronic, persistent  Panic attacks: rare, controlled on bzd, half a tab qod  Energy: stable  Concentration: stable  Sleeping: rare initial insomnia  Eatin, 156 lbs  Refills: y  Substances: def  Therapy: group therapy with other women around her age.  Medication compliant: y  SE: n  No SI HI AV.      : In person interview:  Chart review:   : Fam med, ortho, reassuring cmp, tsh, vit d, cbc. Elevated LDL.  No visits.  Benign mammogram.  Seen by orthopedics and physical therapy.  Plannin/6: No changes for now, but may increase to 7.5 mg daily of abilify (half a 15 mg tab). Still some residual MDD.  : chronic mild anhedonia. Increase abilify and start light box.  \"It flipped and I'm doing better.\"  8/10  Some stress and anxiety, but not FAREED, but situational  Learning from group meetings, low self esteem  Previous important:   \"Not feeling real shantal in my life\" and has anticipatory anxiety, as in, she was feeling anxious before a previous appointment  Mood/Depression: MDD at goal  Anxiety: stable FAREED, restless at times, social anxiety  Panic attacks: rare, controlled on bzd, half a tab qod  Energy: stable  Concentration: stable  Sleeping: stable  Eatin lbs  Refills: y  Substances: def  Therapy: group therapy with other women around her age.  Medication compliant: y  SE: n  No SI HI AVH.      : In person interview:  Chart review:   No " "visits.  Benign mammogram.  Seen by orthopedics and physical therapy.  Plannin/2: chronic mild anhedonia. Increase abilify and start light box.  \"Better.\"  Had some stress  Lots of traveling  Used the light box  Regular exercise 3 days a week  Rarely uses ativan  Volunteer work  \"I enjoyed seeing his sister recently\"   Previous important: \"Not feeling real shantal in my life\" and has anticipatory anxiety, as in, she was feeling anxious before a previous appointment  Mood/Depression: improving, possibly at goal  Anxiety: restless at times, social anxiety  Panic attacks: rare, controlled on bzd  Energy: stable  Concentration: stable  Sleeping: stable, better  Eating: stable  Refills: y  Substances: def  Therapy: group therapy with other women around her age.  Medication compliant: y  SE: n  No SI HI AV.      20 H&P: Patient presented today with her . Both provided extensive history regarding her depression and anxiety. Patient had been seen by  at [facility] for roughly a year and a half. Patient reported that Dr. Gordon had become convinced that she had bipolar disorder, which both she and her  disagree with. Patient and  deny any episodes in the past where she experienced, for an extended length of time lasting at least several days, no need for sleep, rapid speech, risk-taking behaviors. Per the  and wife pair, her previous psychiatrist insisted on her being on a mood stabilizer.   Patient reports that she had been stable on Celexa for \"several years.\" In , the patient underwent a colonoscopy where dysplasia was revealed. This led to significant anxiety and a \"breakdown.\" The dysplasia was subsequently removed. Due to the heightened anxiety the patient's psychiatrist took the patient off of Celexa and started her on Lexapro. The patient did not tolerate Lexapro well, she continued to remain anxious, she began to engage in therapy in addition to medication " management. The Lexapro was subsequently switched to Viibryd. The Viibryd did not work. The patient also began to experience panic attacks including shortness of breath, crying, tachycardia, palpitations. The panic attacks were new and had never happened before.   In 2020 the patient experienced another breakdown and sunk into a deeper depression. The COVID-19 pandemic only worsened her situation. In February the patient's , a physician, decided to become her full-time caretaker as she would become anxious and panicky without him present. The patient also experienced intermittent bouts of suicidal ideation.   Recently, the patient and  demanded of their psychiatrist to take her off of Viibryd and Seroquel. They actually tapered her off of Seroquel themselves. They asked him to switch her back to Celexa. She has been on Celexa now for the last 3 days and is already begun to experience improvement. She states that the Seroquel led to having hallucinations and actually worsened insomnia. Last night she did not take Seroquel for the first time in many months and she slept very well. Her mood is slightly improved. Regarding her anxiety, she endorses muscle tension and fatigue restlessness irritability and a history of insomnia. Regarding her depression she denies SI HI AVH, denies anhedonia denies guilt, notes some decreased energy, psychomotor retardation, and a history of insomnia. They had also tried acupuncture in the past with some success. Psychiatric review of systems is negative for psychosis nancy, positive for anxiety and depression. Possibly positive for  depression and postpartum depression. The patient is medication compliant.       Past Psychiatric History:  Began Psychiatric Treatment: Several years   Dx: Depression and anxiety   Psychiatrist: Doctor Gordon for the last year and a half   Therapist: Sees a therapist at [facility]   Admissions History: Denies, Although she came close  to needing admission recently.   Medication Trials: See HPI. Seroquel, Prozac which was too activating, Zoloft which was too activating, Celexa which worked well, Effexor which worked well, Lexapro which did not work, Viibryd which did not work.   Self-Harm: Denies   Suicide Attempts: Denies   OB/GYN HISTORY:  Sexually Active: OB/GYN history deferred   Substance Abuse History:  Types: Denies all, including illicit   Social History:  Marital Status:    Employed: No     Kids: 4   House: House    Hx: Denies   Family History:  Suicide Attempts: Deferred today, Due to lack of time   Suicide Completions: Deferred   Substance Use: Deferred   Psychiatric Conditions: Deferred    depression, psychosis, anxiety: Deferred   Developmental History:  Born: Deferred   Siblings: Deferred   Access to Weapons: Denies   Thought Content: no suicidal ideation, no homicidal ideation, no auditory hallucinations, no visual hallucinations, and     PHQ-9 Depression Screening  PHQ-9 Total Score:      Little interest or pleasure in doing things?     Feeling down, depressed, or hopeless?     Trouble falling or staying asleep, or sleeping too much?     Feeling tired or having little energy?     Poor appetite or overeating?     Feeling bad about yourself - or that you are a failure or have let yourself or your family down?     Trouble concentrating on things, such as reading the newspaper or watching television?     Moving or speaking so slowly that other people could have noticed? Or the opposite - being so fidgety or restless that you have been moving around a lot more than usual?     Thoughts that you would be better off dead, or of hurting yourself in some way?     PHQ-9 Total Score       FAREED-7       Past Surgical History:  Past Surgical History:   Procedure Laterality Date    COLONOSCOPY      last scope 2020    COLONOSCOPY N/A 10/29/2021    Procedure: COLONOSCOPY with possible biopies.;  Surgeon: Alina  Skyler OLVERA MD;  Location: [facility] ENDOSCOPY;  Service: General;  Laterality: N/A;    KNEE SURGERY  2017    broken knee       Problem List:  Patient Active Problem List   Diagnosis    Anxiety    Depression    History of fracture of patella    Hyperlipemia    Osteopenia    Ulcerative lesion    Constipation    History of colonic polyps    Tinnitus of right ear    Sensorineural hearing loss (SNHL) of both ears    Right shoulder pain       Allergy:   No Known Allergies     Discontinued Medications:  There are no discontinued medications.          Current Medications:   Current Outpatient Medications   Medication Sig Dispense Refill    amitriptyline (ELAVIL) 25 MG tablet Take 1 tablet by mouth Every Night. 90 tablet 3    ARIPiprazole (Abilify) 5 MG tablet Take 1.5 tablets by mouth Daily. 90 tablet 1    atorvastatin (LIPITOR) 20 MG tablet Take 1 tablet by mouth Daily. 90 tablet 3    Calcium Carb-Cholecalciferol (OYSCO 500 + D) 500-200 MG-UNIT tablet       cholecalciferol (VITAMIN D3) 25 MCG (1000 UT) tablet Take 1 tablet by mouth Daily.      Coenzyme Q10 (CO Q10 PO) Take  by mouth.      escitalopram (LEXAPRO) 10 MG tablet Take 1 tablet by mouth Daily. 90 tablet 3    gabapentin (NEURONTIN) 300 MG capsule 300 mg qam, 300 mg qpm, 600 mg qhs 120 capsule 5    GARLIC PO garlic 1,000 mg oral capsule take 1 capsule by oral route daily   Active      LORazepam (ATIVAN) 1 MG tablet Take 1 tablet by mouth 2 (Two) Times a Day As Needed for Anxiety. for anxiety 60 tablet 5    Multiple Vitamins-Iron (MULTIVITAMIN PLUS IRON ADULT PO) Women's Multivitamin 18 mg iron-400 mcg-500 mg oral tablet take 1 tablet by oral route daily   Active      Omega-3 Fatty Acids (fish oil) 1000 MG capsule capsule 3 capsules 3 (Three) Times a Day With Meals. PT(PATIENT) REPORTS SHE TAKES MEDICATION MORNING/LUNCH/EVENING      Probiotic Product (PROBIOTIC PO) Take  by mouth.      vitamin E 400 UNIT capsule vitamin E 400 unit oral capsule take 1 capsule by oral route  daily   Active       No current facility-administered medications for this visit.       Past Medical History:  Past Medical History:   Diagnosis Date    Acid reflux 2022    Anxiety     Depression     Fracture of patella 09/12/2017    Hyperlipemia     Osteopenia     Panic disorder     Psychiatric inpatient 07/22/2022    Discharged 07/22/2022 for Anxiety, Depression, and Suicidal Bx's    Seizures     reports seizure activity, but under control.       Social History     Socioeconomic History    Marital status:    Tobacco Use    Smoking status: Never    Smokeless tobacco: Never   Vaping Use    Vaping status: Never Used   Substance and Sexual Activity    Alcohol use: Not Currently    Drug use: Never    Sexual activity: Not Currently         Family History   Problem Relation Age of Onset    Stroke Mother     Heart disease Mother     No Known Problems Father     No Known Problems Sister     Cancer Brother     No Known Problems Maternal Aunt     No Known Problems Paternal Aunt     No Known Problems Maternal Uncle     No Known Problems Paternal Uncle     No Known Problems Maternal Grandfather     No Known Problems Maternal Grandmother     No Known Problems Paternal Grandfather     No Known Problems Paternal Grandmother     No Known Problems Cousin     Seizures Son     No Known Problems Other     ADD / ADHD Neg Hx     Alcohol abuse Neg Hx     Anxiety disorder Neg Hx     Bipolar disorder Neg Hx     Dementia Neg Hx     Depression Neg Hx     Drug abuse Neg Hx     OCD Neg Hx     Paranoid behavior Neg Hx     Schizophrenia Neg Hx     Self-Injurious Behavior  Neg Hx     Suicide Attempts Neg Hx      Review of Systems:  Review of Systems   Constitutional:  Negative for diaphoresis and fatigue.   HENT:  Negative for drooling.    Eyes:  Negative for visual disturbance.   Respiratory:  Negative for cough and shortness of breath.    Cardiovascular:  Negative for chest pain, palpitations and leg swelling.   Gastrointestinal:   "Negative for diarrhea, nausea and vomiting.   Endocrine: Negative for cold intolerance and heat intolerance.   Genitourinary:  Negative for difficulty urinating.   Musculoskeletal:  Negative for joint swelling.   Allergic/Immunologic: Negative for immunocompromised state.   Neurological:  Negative for dizziness, seizures, speech difficulty, light-headedness and numbness.         Mental Status Exam  Appearance  : sitting in a chair with her , good eye contact, normal street clothes, groomed, in person  Behavior  : pleasant and cooperative  Motor  : No abnormal  Speech  : normal rhythm, rate, volume, tone, not hyperverbal, not pressured, normal prosidy, Speaks with an accent  Mood  : \"still some anxiety, and depression, and insomnia\"  Affect  : nearly euthymic, mood congruent, fair variability  Thought Content  : negative suicidal ideations, negative homicidal ideations, negative obsessions  Perceptions  : negative auditory hallucinations, negative visual hallucinations  Thought Process  : linear  Insight/Judgement  : fair/fair  Cognition  : grossly intact  Attention  : intact      Physical Exam:  Physical Exam    Vital Signs:   /73   Pulse 91   Ht 162.6 cm (64\")   Wt 70 kg (154 lb 6.4 oz)   BMI 26.50 kg/m²      Lab Results:   Office Visit on 12/28/2023   Component Date Value Ref Range Status    Glucose 12/28/2023 88  65 - 99 mg/dL Final    BUN 12/28/2023 16  8 - 23 mg/dL Final    Creatinine 12/28/2023 0.82  0.57 - 1.00 mg/dL Final    Sodium 12/28/2023 139  136 - 145 mmol/L Final    Potassium 12/28/2023 5.0  3.5 - 5.2 mmol/L Final    Chloride 12/28/2023 102  98 - 107 mmol/L Final    CO2 12/28/2023 26.1  22.0 - 29.0 mmol/L Final    Calcium 12/28/2023 10.2  8.6 - 10.5 mg/dL Final    Total Protein 12/28/2023 7.3  6.0 - 8.5 g/dL Final    Albumin 12/28/2023 4.6  3.5 - 5.2 g/dL Final    ALT (SGPT) 12/28/2023 26  1 - 33 U/L Final    AST (SGOT) 12/28/2023 30  1 - 32 U/L Final    Alkaline Phosphatase " 12/28/2023 110  39 - 117 U/L Final    Total Bilirubin 12/28/2023 1.0  0.0 - 1.2 mg/dL Final    Globulin 12/28/2023 2.7  gm/dL Final    A/G Ratio 12/28/2023 1.7  g/dL Final    BUN/Creatinine Ratio 12/28/2023 19.5  7.0 - 25.0 Final    Anion Gap 12/28/2023 10.9  5.0 - 15.0 mmol/L Final    eGFR 12/28/2023 77.5  >60.0 mL/min/1.73 Final    Total Cholesterol 12/28/2023 191  0 - 200 mg/dL Final    Triglycerides 12/28/2023 127  0 - 150 mg/dL Final    HDL Cholesterol 12/28/2023 61 (H)  40 - 60 mg/dL Final    LDL Cholesterol  12/28/2023 108 (H)  0 - 100 mg/dL Final    VLDL Cholesterol 12/28/2023 22  5 - 40 mg/dL Final    LDL/HDL Ratio 12/28/2023 1.71   Final    TSH 12/28/2023 0.821  0.270 - 4.200 uIU/mL Final    25 Hydroxy, Vitamin D 12/28/2023 53.5  30.0 - 100.0 ng/ml Final    Hemoglobin A1C 12/28/2023 5.70 (H)  4.80 - 5.60 % Final    WBC 12/28/2023 7.13  3.40 - 10.80 10*3/mm3 Final    RBC 12/28/2023 4.83  3.77 - 5.28 10*6/mm3 Final    Hemoglobin 12/28/2023 14.0  12.0 - 15.9 g/dL Final    Hematocrit 12/28/2023 43.3  34.0 - 46.6 % Final    MCV 12/28/2023 89.6  79.0 - 97.0 fL Final    MCH 12/28/2023 29.0  26.6 - 33.0 pg Final    MCHC 12/28/2023 32.3  31.5 - 35.7 g/dL Final    RDW 12/28/2023 13.6  12.3 - 15.4 % Final    RDW-SD 12/28/2023 44.0  37.0 - 54.0 fl Final    MPV 12/28/2023 9.3  6.0 - 12.0 fL Final    Platelets 12/28/2023 288  140 - 450 10*3/mm3 Final    Neutrophil % 12/28/2023 54.8  42.7 - 76.0 % Final    Lymphocyte % 12/28/2023 35.8  19.6 - 45.3 % Final    Monocyte % 12/28/2023 8.1  5.0 - 12.0 % Final    Eosinophil % 12/28/2023 0.7  0.3 - 6.2 % Final    Basophil % 12/28/2023 0.3  0.0 - 1.5 % Final    Immature Grans % 12/28/2023 0.3  0.0 - 0.5 % Final    Neutrophils, Absolute 12/28/2023 3.91  1.70 - 7.00 10*3/mm3 Final    Lymphocytes, Absolute 12/28/2023 2.55  0.70 - 3.10 10*3/mm3 Final    Monocytes, Absolute 12/28/2023 0.58  0.10 - 0.90 10*3/mm3 Final    Eosinophils, Absolute 12/28/2023 0.05  0.00 - 0.40 10*3/mm3 Final     Basophils, Absolute 12/28/2023 0.02  0.00 - 0.20 10*3/mm3 Final    Immature Grans, Absolute 12/28/2023 0.02  0.00 - 0.05 10*3/mm3 Final    nRBC 12/28/2023 0.0  0.0 - 0.2 /100 WBC Final       EKG Results:  No orders to display       Imaging Results:  No Images in the past 120 days found..      Assessment & Plan   Diagnoses and all orders for this visit:    1. Generalized anxiety disorder (Primary)    2. Panic attacks    3. Panic disorder    4. Insomnia due to mental condition    5. Moderate episode of recurrent major depressive disorder        Visit Diagnoses:    ICD-10-CM ICD-9-CM   1. Generalized anxiety disorder  F41.1 300.02   2. Panic attacks  F41.0 300.01   3. Panic disorder  F41.0 300.01   4. Insomnia due to mental condition  F51.05 300.9     327.02   5. Moderate episode of recurrent major depressive disorder  F33.1 296.32   INITIAL presentation most consistent with major depressive disorder in partial remission, FAREED, rare panic attacks.    3/22: No change at all. Reduce dose back to previous of abilify (5 mg), and increase amitriptyline instead.    Allowed patient to freely discuss and process issues, such as:  Ongoing: Social anxiety conflicting with feeling lonely.  Ongoing: Her feelings of hopelessness and restlessness and how they recur at times. Her fear of her mood getting worse again.  ...without interruption or judgement with unconditional positive   ... using Rogerian psychotherapeutic techniques including unconditional positive regard, reflective listening, and demonstrating clear empathy.     Time (minutes) spent providing supportive psychotherapy: 16  (This time is exclusive to the therapy session and separate from the time spent on activities used to meet the criteria for the E/M service (history, exam, medical decision-making).)  Functional status: mild impairment  Treatment plan: Medication management and supportive psychotherapy  Prognosis: good  Progress: mild anx dep insomnia  4w    2/9:  Increase abilify with the supply she has: 7 mg daily for a month, then 7.5 mg daily. Targeting anx, dep, insomnia. Watch brain zaps. Discussed vraylar as well.    1/11: Stable, well, explored her catastrophizing today. No changes. Wants to re-establish individual therapy with her old therapist at [facility] to manage catastrophizing, thoughts when she is lonely. Conflict processed between pt and  today; suggested restarting marriage counseling thru pt's therapist (who can make a referral). How much of patient's MH can be explained by her relp with her hsuband?    12/6: No changes for now, but may increase to 7.5 mg daily of abilify (half a 15 mg tab). Still some residual MDD.    11/2: chronic mild anhedonia. Increase abilify and start light box.    8/31: Stable, well, no changes. Light box, we discussed it.    6/30: Stable, well. No changes.     4/28: Euthymic well. No changes.     3/3: Stable, well, no changes.      1/20: Well, stable, no changes.     12/9: Significant improvement. No changes.     11/18: Residual anxiety, residual depression.  Increase gabapentin, start amitriptyline at night for initial insomnia.  Close follow-up.     11/7: Residual anx (at noon), consider adding gabapentin in the mornings. and dep. Increase gabapentin in the afternoons. No other changes.     10/31: Couples therapy, increase Abilify, close follow-up.  I feel that their marriage issues have come to a head recently, and this stressor could largely explain patient's depression and anxiety.  Patient mentioned Celexa again, she is likely interested in switching.     9/30: well, stable, some constriction today. No changes.     8/30: Remarkably well. Initial insomnia.    8/1: Try another mood stabilizer in place of olanzapine.  Will trial Abilify again.  Also consider Geodon.      6/17: start buspar, doing better.     5/20: Continue Lexapro and lorazepam.  Try Belsomra in place of Ambien.      4/22: Some improvement in anx and dep, but  still residual symptoms.  Persistent insomnia. Declines IOP, inpatient admission.  Increase Lexapro and start trazodone.      3/24: Focus on lower doses.  Start Pristiq.  Depression with anxious distress versus depression and generalized anxiety disorder.  She only has distractibility, no other symptoms of hypomania.      3/15: Continue lurasidone 40 mg at night, increase Ambien to 10 mg at night.  Continue low-dose Lamictal, plan to increase further.  Continue titrating off of amitriptyline.     3/10: Effexor has now been stopped.  Continue Latuda and Lamictal at present doses.  I will see her back in a few days and then at 6 weeks.  Bipolar 2?  If so, how come she is tolerating amitriptyline in the evenings?     2/15: Still utilizing lorazepam which means the anxiety is not under control.  Increase Effexor.    1/17: Continued improvement.  No changes, only on effexor 75 mg for 2 weeks.  Tolerating medications without side effects.      1/3: Continued improvement. Increase effexor to target residual dep anx. TMS to start 1/6.     PLAN:  Risk Assessment: Risk of self-harm acutely remain low. Risk factors include a history of suicidal ideation, mood disorder, anxiety disorder, presence of psychosocial stressors such as colonic dysplasia, COVID-19 pandemic. Protective factors include no history of self-harm or suicide attempts, good social support, willingness to engage in care, improved depressive symptoms. Risk of self-harm chronically also remain low, but could be further elevated in the event of treatment noncompliance and/or AODA.  Medications:   CONTINUE light box.   CONTINUE lexapro 10 mg daily. Risks, benefits, alternatives discussed with patient including GI upset, nausea vomiting diarrhea, theoretical decrease of seizure threshold predisposing the patient to a slightly higher seizure risk, headaches, sexual dysfunction, serotonin syndrome, bleeding risk, increased suicidality in patients 24 years and  "younger.  After discussion of these risks and benefits, the patient voiced understanding and agreed to proceed.  CONTINUE gabapentin 300 mg qam, 300 qpm, 600 mg nightly. Risks, benefits, alternatives discussed with patient including sedation, dizziness/falls risk, GI upset, weight gain.  After discussion of these risks and benefits, the patient voiced understanding and agreed to proceed. UDS ordered, Mamadou reviewed.  REDUCE Abilify 7.5 to 5 mg qhs. Risks, benefits, alternatives discussed with patient including increased energy, exacerbation of irritability, akathisia, GI upset, orthostatic hypotension, increased appetite.  After discussion of these risks and benefits, the patient voiced understanding and agreed to proceed.  INCREASE amitriptyline 25 to 37.5 mg nightly. Risks, benefits, alternatives discussed with patient including sedation, dizziness, falls, GI upset, constipation, urinary retention, dry mouth.  After discussion of these risks and benefits, the patient voiced understanding and agreed to proceed.  CONTINUE lorazepam 1 mg bid PRN. Risks, benefits, alternatives discussed with patient including GI upset, sedation, dizziness, respiratory depression, falls risk.  After discussion of these risks and benefits, the patient voiced understanding and agreed to proceed.  S/P:   buspar 5 mg bid was ineffective.  Trazodone 50 mg nightly not helpful for insomnia.  Mirtazapine 15 mg, 7.5 mg: ineffective; made anxiety and depression worse.  Abilify: brain zaps and constipation  Seroquel made depression worse  celexa 20 mg daily, ineffective after several months  Doxepin caused \"brain zaps\"  Latuda was ineffective.  Amitriptyline 25 mg nightly helpful for sleep but we didn't want her on too many serotonergic meds. Restarted.  Ambien minimally helpful for insomnia.  Pristiq and effexor worsened anxiety, both low dose.  Stopped lamictal for unclear reasons 25 mg.  belsomra 5 mg nightly didn't help with " insomnia.  Therapy: continue with Genevieve at [facility].  Referred for couples therapy 10/2022  Status post IOP at [facility] 10/2022  Labs/Studies: BMP to monitor sodium levels in February was entirely normal, EKG February shows rate 72, sinus, .    TREATMENT PLAN/GOALS: Continue supportive psychotherapy efforts and medications as indicated. Treatment and medication options discussed during today's visit. Patient acknowledged and verbally consented to continue with current treatment plan and was educated on the importance of compliance with treatment and follow-up appointments.    MEDICATION ISSUES:  YVETTE reviewed as expected.  Discussed medication options and treatment plan of prescribed medication as well as the risks, benefits, and side effects including potential falls, possible impaired driving and metabolic adversities among others. Patient is agreeable to call the office with any worsening of symptoms or onset of side effects. Patient is agreeable to call 911 or go to the nearest ER should he/she begin having SI/HI. No medication side effects or related complaints today.     MEDS ORDERED DURING VISIT:    Return in about 4 weeks (around 4/19/2024).         This document has been electronically signed by Vinny Krishnamurthy MD  March 22, 2024 10:37 EDT       Part of this note may be an electronic transcription/translation of spoken language to printed text using the Dragon Dictation System.

## 2024-03-21 NOTE — PATIENT INSTRUCTIONS
1.  Please return to clinic at your next scheduled visit.  Contact the clinic ([phone]) at least 24 hours prior in the event you need to cancel.  2.  Do no harm to yourself or others.    3.  Avoid alcohol and drugs.    4.  Take all medications as prescribed.  Please contact the clinic with any concerns. If you are in need of medication refills, please call the clinic at [phone].    5. Should you want to get in touch with your provider, Dr. Vinny Krishnamurthy, please utilize [employer] or contact the office ([phone]), and staff will be able to page Dr. Krishnamurthy directly.  6.  In the event you have personal crisis, contact the following crisis numbers: Suicide Prevention Hotline 1-697.569.6732; [facility] Helpline 5-448-434-AL[place]REA; [facility] Emergency Room [phone]; text HELLO to 463314; or 982.

## 2024-03-22 ENCOUNTER — OFFICE VISIT (OUTPATIENT)
Dept: PSYCHIATRY | Facility: CLINIC | Age: 69
End: 2024-03-22
Payer: MEDICARE

## 2024-03-22 VITALS
HEIGHT: 64 IN | BODY MASS INDEX: 26.36 KG/M2 | DIASTOLIC BLOOD PRESSURE: 73 MMHG | WEIGHT: 154.4 LBS | HEART RATE: 91 BPM | SYSTOLIC BLOOD PRESSURE: 128 MMHG

## 2024-03-22 DIAGNOSIS — F51.05 INSOMNIA DUE TO MENTAL CONDITION: ICD-10-CM

## 2024-03-22 DIAGNOSIS — F41.1 GENERALIZED ANXIETY DISORDER: Primary | ICD-10-CM

## 2024-03-22 DIAGNOSIS — F41.0 PANIC DISORDER: ICD-10-CM

## 2024-03-22 DIAGNOSIS — F33.1 MODERATE EPISODE OF RECURRENT MAJOR DEPRESSIVE DISORDER: ICD-10-CM

## 2024-03-22 DIAGNOSIS — F41.0 PANIC ATTACKS: ICD-10-CM

## 2024-04-22 NOTE — PROGRESS NOTES
"Subjective   Shabana Ferguson is a 68 y.o. female who presents today for follow up    Referring Provider:  No referring provider defined for this encounter.    Chief Complaint:  mdd fareed    History of Present Illness:     Shabana Ferguson is a 64 year old /White female, hx of anxiety and depression, fractured patella, HLD, osteopenia, ulcer referred by MARIE Brice, for anxiety and depression management.   Initial Chart Review 2020: Psychotropic medications: amitriptyline 25 mg QHS, Celexa 20 mg p.o. daily, lorazepam 0.5 p.o. twice daily as needed anxiety.      \"Shabana\" and Jaya  Has never been on vraylar    Visits (Below):    24: In person interview:  Chart review:   24: no visits.  Planning:   3/22: No change at all. Reduce dose back to previous of abilify (5 mg), and increase amitriptyline instead.  \"Better.\"  Minimal depression, anxiety  Sometimes has insomnia with \"brain zaps.\" There are days when I don't have it.  Mood/Depression: MDD some depressed mood  Anxiety: FAREED some mild restlessness  Social anxiety: chronic, persistent  Panic attacks: rare, controlled on bzd, half a tab qod  Energy: stable  Concentration: stable  Sleeping: rare initial insomnia  Eatin, 155, 156 lbs  Refills: y  Substances: def  Therapy: group therapy with other women around her age.  Medication compliant: y  SE: n  No SI HI AVH.      3/22: In person interview:  Chart review:   3/22: no visits.  Plannin/9: Increase abilify with the supply she has: 7 mg daily for a month, then 7.5 mg daily. Targeting anx, dep, insomnia. Watch brain zaps. Discussed vraylar as well.  \"I don't feel a big difference.\"  Still has some anxiety, depression.  Sometimes has insomnia with \"brain zaps.\" There are days when I don't have it.  Mood/Depression: MDD some depressed mood  Anxiety: FAREED some mild restlessness  Social anxiety: chronic, persistent  Panic attacks: rare, controlled on bzd, half a tab " "qod  Energy: stable  Concentration: stable  Sleeping: rare initial insomnia  Eatin, 155, 156 lbs  Refills: y  Substances: def  Therapy: group therapy with other women around her age.  Medication compliant: y  SE: n  No SI HI AVH.      : In person interview:  Chart review:   : no visits.   : Fam med, ortho, reassuring cmp, tsh, vit d, cbc. Elevated LDL.  No visits.  Benign mammogram.  Seen by orthopedics and physical therapy.  Plannin/11: Stable, well, explored her catastrophizing today. No changes. Wants to re-establish individual therapy with her old therapist at [facility] to manage catastrophizing, thoughts when she is lonely. Conflict processed between pt and  today; suggested restarting marriage counseling thru pt's therapist (who can make a referral). How much of patient's MH can be explained by her relp with her hsuband?  : No changes for now, but may increase to 7.5 mg daily of abilify (half a 15 mg tab). Still some residual MDD.  : chronic mild anhedonia. Increase abilify and start light box.  \"We have a  at home, he had to stay\"  \"I am ok, but not 100%.\"  Still have some anxiety.   Sometimes has insomnia with \"brain zaps.\" There are days when I don't have it.  Mood/Depression: MDD some depressed mood  Anxiety: FAREED some mild restlessness  Social anxiety: chronic, persistent  Panic attacks: rare, controlled on bzd, half a tab qod  Energy: stable  Concentration: stable  Sleeping: rare initial insomnia  Eatin, 156 lbs  Refills: y  Substances: def  Therapy: group therapy with other women around her age.  Medication compliant: y  SE: n  No SI HI AVH.      : In person interview:  Chart review:   : Fam med, ortho, reassuring cmp, tsh, vit d, cbc. Elevated LDL.  No visits.  Benign mammogram.  Seen by orthopedics and physical therapy.  Plannin/6: No changes for now, but may increase to 7.5 mg daily of abilify (half a 15 mg tab). Still some residual " "MDD.  : chronic mild anhedonia. Increase abilify and start light box.  \"It flipped and I'm doing better.\"  8/10  Some stress and anxiety, but not FAREED, but situational  Learning from group meetings, low self esteem  Previous important:   \"Not feeling real shantal in my life\" and has anticipatory anxiety, as in, she was feeling anxious before a previous appointment  Mood/Depression: MDD at goal  Anxiety: stable FAREED, restless at times, social anxiety  Panic attacks: rare, controlled on bzd, half a tab qod  Energy: stable  Concentration: stable  Sleeping: stable  Eatin lbs  Refills: y  Substances: def  Therapy: group therapy with other women around her age.  Medication compliant: y  SE: n  No SI HI AVH.      : In person interview:  Chart review:   No visits.  Benign mammogram.  Seen by orthopedics and physical therapy.  Plannin/2: chronic mild anhedonia. Increase abilify and start light box.  \"Better.\"  Had some stress  Lots of traveling  Used the light box  Regular exercise 3 days a week  Rarely uses ativan  Volunteer work  \"I enjoyed seeing his sister recently\"   Previous important: \"Not feeling real shantal in my life\" and has anticipatory anxiety, as in, she was feeling anxious before a previous appointment  Mood/Depression: improving, possibly at goal  Anxiety: restless at times, social anxiety  Panic attacks: rare, controlled on bzd  Energy: stable  Concentration: stable  Sleeping: stable, better  Eating: stable  Refills: y  Substances: def  Therapy: group therapy with other women around her age.  Medication compliant: y  SE: n  No SI HI AVH.      20 H&P: Patient presented today with her . Both provided extensive history regarding her depression and anxiety. Patient had been seen by  at [facility] for roughly a year and a half. Patient reported that Dr. Gordon had become convinced that she had bipolar disorder, which both she and her  disagree with. Patient and  deny any " "episodes in the past where she experienced, for an extended length of time lasting at least several days, no need for sleep, rapid speech, risk-taking behaviors. Per the  and wife pair, her previous psychiatrist insisted on her being on a mood stabilizer.   Patient reports that she had been stable on Celexa for \"several years.\" In 2019, January, the patient underwent a colonoscopy where dysplasia was revealed. This led to significant anxiety and a \"breakdown.\" The dysplasia was subsequently removed. Due to the heightened anxiety the patient's psychiatrist took the patient off of Celexa and started her on Lexapro. The patient did not tolerate Lexapro well, she continued to remain anxious, she began to engage in therapy in addition to medication management. The Lexapro was subsequently switched to Viibryd. The Viibryd did not work. The patient also began to experience panic attacks including shortness of breath, crying, tachycardia, palpitations. The panic attacks were new and had never happened before.   In January 2020 the patient experienced another breakdown and sunk into a deeper depression. The COVID-19 pandemic only worsened her situation. In February the patient's , a physician, decided to become her full-time caretaker as she would become anxious and panicky without him present. The patient also experienced intermittent bouts of suicidal ideation.   Recently, the patient and  demanded of their psychiatrist to take her off of Viibryd and Seroquel. They actually tapered her off of Seroquel themselves. They asked him to switch her back to Celexa. She has been on Celexa now for the last 3 days and is already begun to experience improvement. She states that the Seroquel led to having hallucinations and actually worsened insomnia. Last night she did not take Seroquel for the first time in many months and she slept very well. Her mood is slightly improved. Regarding her anxiety, she endorses " muscle tension and fatigue restlessness irritability and a history of insomnia. Regarding her depression she denies SI HI AVH, denies anhedonia denies guilt, notes some decreased energy, psychomotor retardation, and a history of insomnia. They had also tried acupuncture in the past with some success. Psychiatric review of systems is negative for psychosis nancy, positive for anxiety and depression. Possibly positive for  depression and postpartum depression. The patient is medication compliant.       Past Psychiatric History:  Began Psychiatric Treatment: Several years   Dx: Depression and anxiety   Psychiatrist: Doctor Gordon for the last year and a half   Therapist: Sees a therapist at [facility] History: Denies, Although she came close to needing admission recently.   Medication Trials: See HPI. Seroquel, Prozac which was too activating, Zoloft which was too activating, Celexa which worked well, Effexor which worked well, Lexapro which did not work, Viibryd which did not work.   Self-Harm: Denies   Suicide Attempts: Denies   OB/GYN HISTORY:  Sexually Active: OB/GYN history deferred   Substance Abuse History:  Types: Denies all, including illicit   Social History:  Marital Status:    Employed: No     Kids: 4   House: House    Hx: Denies   Family History:  Suicide Attempts: Deferred today, Due to lack of time   Suicide Completions: Deferred   Substance Use: Deferred   Psychiatric Conditions: Deferred    depression, psychosis, anxiety: Deferred   Developmental History:  Born: Deferred   Siblings: Deferred   Access to Weapons: Denies   Thought Content: no suicidal ideation, no homicidal ideation, no auditory hallucinations, no visual hallucinations, and     PHQ-9 Depression Screening  PHQ-9 Total Score:      Little interest or pleasure in doing things?     Feeling down, depressed, or hopeless?     Trouble falling or staying asleep, or sleeping too much?     Feeling tired or  having little energy?     Poor appetite or overeating?     Feeling bad about yourself - or that you are a failure or have let yourself or your family down?     Trouble concentrating on things, such as reading the newspaper or watching television?     Moving or speaking so slowly that other people could have noticed? Or the opposite - being so fidgety or restless that you have been moving around a lot more than usual?     Thoughts that you would be better off dead, or of hurting yourself in some way?     PHQ-9 Total Score       FAREED-7       Past Surgical History:  Past Surgical History:   Procedure Laterality Date    COLONOSCOPY  [id number]    last scope 2020    COLONOSCOPY N/A 10/29/2021    Procedure: COLONOSCOPY with possible biopies.;  Surgeon: Skyler Fuller MD;  Location: [facility] ENDOSCOPY;  Service: General;  Laterality: N/A;    KNEE SURGERY  2017    broken knee       Problem List:  Patient Active Problem List   Diagnosis    Anxiety    Depression    History of fracture of patella    Hyperlipemia    Osteopenia    Ulcerative lesion    Constipation    History of colonic polyps    Tinnitus of right ear    Sensorineural hearing loss (SNHL) of both ears    Right shoulder pain       Allergy:   No Known Allergies     Discontinued Medications:  Medications Discontinued During This Encounter   Medication Reason    ARIPiprazole (Abilify) 5 MG tablet Reorder    gabapentin (NEURONTIN) 300 MG capsule Reorder             Current Medications:   Current Outpatient Medications   Medication Sig Dispense Refill    amitriptyline (ELAVIL) 25 MG tablet Take 1 tablet by mouth Every Night. 90 tablet 3    ARIPiprazole (Abilify) 5 MG tablet Take 1 tablet by mouth Daily. 90 tablet 1    atorvastatin (LIPITOR) 20 MG tablet Take 1 tablet by mouth Daily. 90 tablet 3    Calcium Carb-Cholecalciferol (OYSCO 500 + D) 500-200 MG-UNIT tablet       cholecalciferol (VITAMIN D3) 25 MCG (1000 UT) tablet Take 1 tablet by mouth Daily.      Coenzyme Q10  (CO Q10 PO) Take  by mouth.      escitalopram (LEXAPRO) 10 MG tablet Take 1 tablet by mouth Daily. 90 tablet 3    [START ON 5/13/2024] gabapentin (NEURONTIN) 300 MG capsule 300 mg qam, 300 mg qpm, 600 mg qhs 120 capsule 5    GARLIC PO garlic 1,000 mg oral capsule take 1 capsule by oral route daily   Active      LORazepam (ATIVAN) 1 MG tablet Take 1 tablet by mouth 2 (Two) Times a Day As Needed for Anxiety. for anxiety 60 tablet 5    Multiple Vitamins-Iron (MULTIVITAMIN PLUS IRON ADULT PO) Women's Multivitamin 18 mg iron-400 mcg-500 mg oral tablet take 1 tablet by oral route daily   Active      Omega-3 Fatty Acids (fish oil) 1000 MG capsule capsule 3 capsules 3 (Three) Times a Day With Meals. PT(PATIENT) REPORTS SHE TAKES MEDICATION MORNING/LUNCH/EVENING      Probiotic Product (PROBIOTIC PO) Take  by mouth.      vitamin E 400 UNIT capsule vitamin E 400 unit oral capsule take 1 capsule by oral route daily   Active       No current facility-administered medications for this visit.       Past Medical History:  Past Medical History:   Diagnosis Date    Acid reflux 2022    Anxiety     Depression     Fracture of patella 09/12/2017    Hyperlipemia     Osteopenia     Panic disorder     Psychiatric inpatient 07/22/2022    Discharged 07/22/2022 for Anxiety, Depression, and Suicidal Bx's    Seizures     reports seizure activity, but under control.       Social History     Socioeconomic History    Marital status:    Tobacco Use    Smoking status: Never    Smokeless tobacco: Never   Vaping Use    Vaping status: Never Used   Substance and Sexual Activity    Alcohol use: Not Currently    Drug use: Never    Sexual activity: Not Currently         Family History   Problem Relation Age of Onset    Stroke Mother     Heart disease Mother     No Known Problems Father     No Known Problems Sister     Cancer Brother     No Known Problems Maternal Aunt     No Known Problems Paternal Aunt     No Known Problems Maternal Uncle     No  "Known Problems Paternal Uncle     No Known Problems Maternal Grandfather     No Known Problems Maternal Grandmother     No Known Problems Paternal Grandfather     No Known Problems Paternal Grandmother     No Known Problems Cousin     Seizures Son     No Known Problems Other     ADD / ADHD Neg Hx     Alcohol abuse Neg Hx     Anxiety disorder Neg Hx     Bipolar disorder Neg Hx     Dementia Neg Hx     Depression Neg Hx     Drug abuse Neg Hx     OCD Neg Hx     Paranoid behavior Neg Hx     Schizophrenia Neg Hx     Self-Injurious Behavior  Neg Hx     Suicide Attempts Neg Hx      Review of Systems:  Review of Systems   Constitutional:  Negative for diaphoresis and fatigue.   HENT:  Negative for drooling.    Eyes:  Negative for visual disturbance.   Respiratory:  Negative for cough and shortness of breath.    Cardiovascular:  Negative for chest pain, palpitations and leg swelling.   Gastrointestinal:  Negative for diarrhea, nausea and vomiting.   Endocrine: Negative for cold intolerance and heat intolerance.   Genitourinary:  Negative for difficulty urinating.   Musculoskeletal:  Negative for joint swelling.   Allergic/Immunologic: Negative for immunocompromised state.   Neurological:  Negative for dizziness, seizures, speech difficulty, light-headedness and numbness.         Mental Status Exam  Appearance  : sitting in a chair with her , good eye contact, normal street clothes, groomed, in person  Behavior  : pleasant and cooperative  Motor  : No abnormal  Speech  : normal rhythm, rate, volume, tone, not hyperverbal, not pressured, normal prosidy, Speaks with an accent  Mood  : \"still some anxiety, and depression, and insomnia\"  Affect  : nearly euthymic, mood congruent, fair variability  Thought Content  : negative suicidal ideations, negative homicidal ideations, negative obsessions  Perceptions  : negative auditory hallucinations, negative visual hallucinations  Thought Process  : linear  Insight/Judgement  : " "fair/fair  Cognition  : grossly intact  Attention  : intact      Physical Exam:  Physical Exam    Vital Signs:   /68   Pulse 74   Ht 162.6 cm (64\")   Wt 71.4 kg (157 lb 6.4 oz)   BMI 27.02 kg/m²      Lab Results:   Office Visit on 12/28/2023   Component Date Value Ref Range Status    Glucose 12/28/2023 88  65 - 99 mg/dL Final    BUN 12/28/2023 16  8 - 23 mg/dL Final    Creatinine 12/28/2023 0.82  0.57 - 1.00 mg/dL Final    Sodium 12/28/2023 139  136 - 145 mmol/L Final    Potassium 12/28/2023 5.0  3.5 - 5.2 mmol/L Final    Chloride 12/28/2023 102  98 - 107 mmol/L Final    CO2 12/28/2023 26.1  22.0 - 29.0 mmol/L Final    Calcium 12/28/2023 10.2  8.6 - 10.5 mg/dL Final    Total Protein 12/28/2023 7.3  6.0 - 8.5 g/dL Final    Albumin 12/28/2023 4.6  3.5 - 5.2 g/dL Final    ALT (SGPT) 12/28/2023 26  1 - 33 U/L Final    AST (SGOT) 12/28/2023 30  1 - 32 U/L Final    Alkaline Phosphatase 12/28/2023 110  39 - 117 U/L Final    Total Bilirubin 12/28/2023 1.0  0.0 - 1.2 mg/dL Final    Globulin 12/28/2023 2.7  gm/dL Final    A/G Ratio 12/28/2023 1.7  g/dL Final    BUN/Creatinine Ratio 12/28/2023 19.5  7.0 - 25.0 Final    Anion Gap 12/28/2023 10.9  5.0 - 15.0 mmol/L Final    eGFR 12/28/2023 77.5  >60.0 mL/min/1.73 Final    Total Cholesterol 12/28/2023 191  0 - 200 mg/dL Final    Triglycerides 12/28/2023 127  0 - 150 mg/dL Final    HDL Cholesterol 12/28/2023 61 (H)  40 - 60 mg/dL Final    LDL Cholesterol  12/28/2023 108 (H)  0 - 100 mg/dL Final    VLDL Cholesterol 12/28/2023 22  5 - 40 mg/dL Final    LDL/HDL Ratio 12/28/2023 1.71   Final    TSH 12/28/2023 0.821  0.270 - 4.200 uIU/mL Final    25 Hydroxy, Vitamin D 12/28/2023 53.5  30.0 - 100.0 ng/ml Final    Hemoglobin A1C 12/28/2023 5.70 (H)  4.80 - 5.60 % Final    WBC 12/28/2023 7.13  3.40 - 10.80 10*3/mm3 Final    RBC 12/28/2023 4.83  3.77 - 5.28 10*6/mm3 Final    Hemoglobin 12/28/2023 14.0  12.0 - 15.9 g/dL Final    Hematocrit 12/28/2023 43.3  34.0 - 46.6 % Final    " MCV 12/28/2023 89.6  79.0 - 97.0 fL Final    MCH 12/28/2023 29.0  26.6 - 33.0 pg Final    MCHC 12/28/2023 32.3  31.5 - 35.7 g/dL Final    RDW 12/28/2023 13.6  12.3 - 15.4 % Final    RDW-SD 12/28/2023 44.0  37.0 - 54.0 fl Final    MPV 12/28/2023 9.3  6.0 - 12.0 fL Final    Platelets 12/28/2023 288  140 - 450 10*3/mm3 Final    Neutrophil % 12/28/2023 54.8  42.7 - 76.0 % Final    Lymphocyte % 12/28/2023 35.8  19.6 - 45.3 % Final    Monocyte % 12/28/2023 8.1  5.0 - 12.0 % Final    Eosinophil % 12/28/2023 0.7  0.3 - 6.2 % Final    Basophil % 12/28/2023 0.3  0.0 - 1.5 % Final    Immature Grans % 12/28/2023 0.3  0.0 - 0.5 % Final    Neutrophils, Absolute 12/28/2023 3.91  1.70 - 7.00 10*3/mm3 Final    Lymphocytes, Absolute 12/28/2023 2.55  0.70 - 3.10 10*3/mm3 Final    Monocytes, Absolute 12/28/2023 0.58  0.10 - 0.90 10*3/mm3 Final    Eosinophils, Absolute 12/28/2023 0.05  0.00 - 0.40 10*3/mm3 Final    Basophils, Absolute 12/28/2023 0.02  0.00 - 0.20 10*3/mm3 Final    Immature Grans, Absolute 12/28/2023 0.02  0.00 - 0.05 10*3/mm3 Final    nRBC 12/28/2023 0.0  0.0 - 0.2 /100 WBC Final       EKG Results:  No orders to display       Imaging Results:  No Images in the past 120 days found..      Assessment & Plan   Diagnoses and all orders for this visit:    1. Generalized anxiety disorder (Primary)  -     gabapentin (NEURONTIN) 300 MG capsule; 300 mg qam, 300 mg qpm, 600 mg qhs  Dispense: 120 capsule; Refill: 5    2. Panic attacks  -     gabapentin (NEURONTIN) 300 MG capsule; 300 mg qam, 300 mg qpm, 600 mg qhs  Dispense: 120 capsule; Refill: 5    3. Panic disorder    4. Insomnia due to mental condition  -     gabapentin (NEURONTIN) 300 MG capsule; 300 mg qam, 300 mg qpm, 600 mg qhs  Dispense: 120 capsule; Refill: 5    5. Moderate episode of recurrent major depressive disorder  -     ARIPiprazole (Abilify) 5 MG tablet; Take 1 tablet by mouth Daily.  Dispense: 90 tablet; Refill: 1  -     gabapentin (NEURONTIN) 300 MG capsule; 300  mg qam, 300 mg qpm, 600 mg qhs  Dispense: 120 capsule; Refill: 5        Visit Diagnoses:    ICD-10-CM ICD-9-CM   1. Generalized anxiety disorder  F41.1 300.02   2. Panic attacks  F41.0 300.01   3. Panic disorder  F41.0 300.01   4. Insomnia due to mental condition  F51.05 300.9     327.02   5. Moderate episode of recurrent major depressive disorder  F33.1 296.32   INITIAL presentation most consistent with major depressive disorder in partial remission, FAREED, rare panic attacks.    4/23/24: Improved, no changes to give elavil more time to work.    Allowed patient to freely discuss and process issues, such as:  Her relp with her .  Her improvement recently.  ... using Rogerian psychotherapeutic techniques including unconditional positive regard, reflective listening, and demonstrating clear empathy, with the goal of ameliorating symptoms and maintaining, restoring, or improving self-esteem, adaptive skills, and ego or psychological functions (Tayla et al. 1991).  Time (minutes) spent providing supportive psychotherapy: 17  (This time is exclusive to the therapy session and separate from the time spent on activities used to meet the criteria for the E/M service (history, exam, medical decision-making).)  Start: 11:19  Stop: 11: 36  Functional status: mild impairment  Treatment plan: Medication management and supportive psychotherapy  Prognosis: good  Progress: improving  4w    3/22: No change at all. Reduce dose back to previous of abilify (5 mg), and increase amitriptyline instead.    2/9: Increase abilify with the supply she has: 7 mg daily for a month, then 7.5 mg daily. Targeting anx, dep, insomnia. Watch brain zaps. Discussed vraylar as well.    1/11: Stable, well, explored her catastrophizing today. No changes. Wants to re-establish individual therapy with her old therapist at [facility] to manage catastrophizing, thoughts when she is lonely. Conflict processed between pt and  today; suggested restarting  marriage counseling thru pt's therapist (who can make a referral). How much of patient's MH can be explained by her relp with her hsuband?    12/6: No changes for now, but may increase to 7.5 mg daily of abilify (half a 15 mg tab). Still some residual MDD.    11/2: chronic mild anhedonia. Increase abilify and start light box.    8/31: Stable, well, no changes. Light box, we discussed it.    6/30: Stable, well. No changes.     4/28: Euthymic well. No changes.     3/3: Stable, well, no changes.      1/20: Well, stable, no changes.     12/9: Significant improvement. No changes.     11/18: Residual anxiety, residual depression.  Increase gabapentin, start amitriptyline at night for initial insomnia.  Close follow-up.     11/7: Residual anx (at noon), consider adding gabapentin in the mornings. and dep. Increase gabapentin in the afternoons. No other changes.     10/31: Couples therapy, increase Abilify, close follow-up.  I feel that their marriage issues have come to a head recently, and this stressor could largely explain patient's depression and anxiety.  Patient mentioned Celexa again, she is likely interested in switching.     9/30: well, stable, some constriction today. No changes.     8/30: Remarkably well. Initial insomnia.    8/1: Try another mood stabilizer in place of olanzapine.  Will trial Abilify again.  Also consider Geodon.      6/17: start buspar, doing better.     5/20: Continue Lexapro and lorazepam.  Try Belsomra in place of Ambien.      4/22: Some improvement in anx and dep, but still residual symptoms.  Persistent insomnia. Declines IOP, inpatient admission.  Increase Lexapro and start trazodone.      3/24: Focus on lower doses.  Start Pristiq.  Depression with anxious distress versus depression and generalized anxiety disorder.  She only has distractibility, no other symptoms of hypomania.      3/15: Continue lurasidone 40 mg at night, increase Ambien to 10 mg at night.  Continue low-dose Lamictal,  plan to increase further.  Continue titrating off of amitriptyline.     3/10: Effexor has now been stopped.  Continue Latuda and Lamictal at present doses.  I will see her back in a few days and then at 6 weeks.  Bipolar 2?  If so, how come she is tolerating amitriptyline in the evenings?     2/15: Still utilizing lorazepam which means the anxiety is not under control.  Increase Effexor.    1/17: Continued improvement.  No changes, only on effexor 75 mg for 2 weeks.  Tolerating medications without side effects.      1/3: Continued improvement. Increase effexor to target residual dep anx. TMS to start 1/6.     PLAN:  Risk Assessment: Risk of self-harm acutely remain low. Risk factors include a history of suicidal ideation, mood disorder, anxiety disorder, presence of psychosocial stressors such as colonic dysplasia, COVID-19 pandemic. Protective factors include no history of self-harm or suicide attempts, good social support, willingness to engage in care, improved depressive symptoms. Risk of self-harm chronically also remain low, but could be further elevated in the event of treatment noncompliance and/or AODA.  Medications:   CONTINUE light box.   CONTINUE lexapro 10 mg daily. Risks, benefits, alternatives discussed with patient including GI upset, nausea vomiting diarrhea, theoretical decrease of seizure threshold predisposing the patient to a slightly higher seizure risk, headaches, sexual dysfunction, serotonin syndrome, bleeding risk, increased suicidality in patients 24 years and younger.  After discussion of these risks and benefits, the patient voiced understanding and agreed to proceed.  CONTINUE gabapentin 300 mg qam, 300 qpm, 600 mg nightly. Risks, benefits, alternatives discussed with patient including sedation, dizziness/falls risk, GI upset, weight gain.  After discussion of these risks and benefits, the patient voiced understanding and agreed to proceed. UDS ordered, Mamadou reviewed.  CONTINUE  "Abilify 5 mg qhs. Risks, benefits, alternatives discussed with patient including increased energy, exacerbation of irritability, akathisia, GI upset, orthostatic hypotension, increased appetite.  After discussion of these risks and benefits, the patient voiced understanding and agreed to proceed.  CONTINUE amitriptyline 37.5 mg nightly. Risks, benefits, alternatives discussed with patient including sedation, dizziness, falls, GI upset, constipation, urinary retention, dry mouth.  After discussion of these risks and benefits, the patient voiced understanding and agreed to proceed.  CONTINUE lorazepam 1 mg bid PRN. Risks, benefits, alternatives discussed with patient including GI upset, sedation, dizziness, respiratory depression, falls risk.  After discussion of these risks and benefits, the patient voiced understanding and agreed to proceed.  S/P:   buspar 5 mg bid was ineffective.  Trazodone 50 mg nightly not helpful for insomnia.  Mirtazapine 15 mg, 7.5 mg: ineffective; made anxiety and depression worse.  Abilify: brain zaps and constipation  Seroquel made depression worse  celexa 20 mg daily, ineffective after several months  Doxepin caused \"brain zaps\"  Latuda was ineffective.  Amitriptyline 25 mg nightly helpful for sleep but we didn't want her on too many serotonergic meds. Restarted.  Ambien minimally helpful for insomnia.  Pristiq and effexor worsened anxiety, both low dose.  Stopped lamictal for unclear reasons 25 mg.  belsomra 5 mg nightly didn't help with insomnia.  Therapy: continue with Genevieve at [facility].  Referred for couples therapy 10/2022  Status post IOP at [facility] 10/2022  Labs/Studies: BMP to monitor sodium levels in February was entirely normal, EKG February shows rate 72, sinus, .    TREATMENT PLAN/GOALS: Continue supportive psychotherapy efforts and medications as indicated. Treatment and medication options discussed during today's visit. Patient acknowledged and verbally consented " to continue with current treatment plan and was educated on the importance of compliance with treatment and follow-up appointments.    MEDICATION ISSUES:  YVETTE reviewed as expected.  Discussed medication options and treatment plan of prescribed medication as well as the risks, benefits, and side effects including potential falls, possible impaired driving and metabolic adversities among others. Patient is agreeable to call the office with any worsening of symptoms or onset of side effects. Patient is agreeable to call 911 or go to the nearest ER should he/she begin having SI/HI. No medication side effects or related complaints today.     MEDS ORDERED DURING VISIT:    Return in about 4 weeks (around 5/21/2024).         This document has been electronically signed by Vinny Krishnamurthy MD  April 23, 2024 12:40 EDT       Part of this note may be an electronic transcription/translation of spoken language to printed text using the Dragon Dictation System.

## 2024-04-22 NOTE — PATIENT INSTRUCTIONS
1.  Please return to clinic at your next scheduled visit.  Contact the clinic ([phone]) at least 24 hours prior in the event you need to cancel.  2.  Do no harm to yourself or others.    3.  Avoid alcohol and drugs.    4.  Take all medications as prescribed.  Please contact the clinic with any concerns. If you are in need of medication refills, please call the clinic at [phone].    5. Should you want to get in touch with your provider, Dr. Vinny Krishnamurthy, please utilize PhoneTell or contact the office ([phone]), and staff will be able to page Dr. Krishnamurthy directly.  6.  In the event you have personal crisis, contact the following crisis numbers: Suicide Prevention Hotline 1-768.721.4198; [facility] Helpline 1-904-802-AL[place]REA; [facility] Emergency Room [phone]; text HELLO to 880344; or 739.

## 2024-04-23 ENCOUNTER — OFFICE VISIT (OUTPATIENT)
Dept: PSYCHIATRY | Facility: CLINIC | Age: 69
End: 2024-04-23
Payer: MEDICARE

## 2024-04-23 VITALS
BODY MASS INDEX: 26.87 KG/M2 | HEART RATE: 74 BPM | HEIGHT: 64 IN | SYSTOLIC BLOOD PRESSURE: 133 MMHG | WEIGHT: 157.4 LBS | DIASTOLIC BLOOD PRESSURE: 68 MMHG

## 2024-04-23 DIAGNOSIS — F41.1 GENERALIZED ANXIETY DISORDER: Primary | ICD-10-CM

## 2024-04-23 DIAGNOSIS — F41.0 PANIC DISORDER: ICD-10-CM

## 2024-04-23 DIAGNOSIS — F41.0 PANIC ATTACKS: ICD-10-CM

## 2024-04-23 DIAGNOSIS — F51.05 INSOMNIA DUE TO MENTAL CONDITION: ICD-10-CM

## 2024-04-23 DIAGNOSIS — F33.1 MODERATE EPISODE OF RECURRENT MAJOR DEPRESSIVE DISORDER: ICD-10-CM

## 2024-04-23 PROCEDURE — 99214 OFFICE O/P EST MOD 30 MIN: CPT | Performed by: STUDENT IN AN ORGANIZED HEALTH CARE EDUCATION/TRAINING PROGRAM

## 2024-04-23 PROCEDURE — 1159F MED LIST DOCD IN RCRD: CPT | Performed by: STUDENT IN AN ORGANIZED HEALTH CARE EDUCATION/TRAINING PROGRAM

## 2024-04-23 PROCEDURE — 90833 PSYTX W PT W E/M 30 MIN: CPT | Performed by: STUDENT IN AN ORGANIZED HEALTH CARE EDUCATION/TRAINING PROGRAM

## 2024-04-23 PROCEDURE — 1160F RVW MEDS BY RX/DR IN RCRD: CPT | Performed by: STUDENT IN AN ORGANIZED HEALTH CARE EDUCATION/TRAINING PROGRAM

## 2024-04-23 RX ORDER — GABAPENTIN 300 MG/1
CAPSULE ORAL
Qty: 120 CAPSULE | Refills: 5 | Status: SHIPPED | OUTPATIENT
Start: 2024-05-13

## 2024-04-23 RX ORDER — ARIPIPRAZOLE 5 MG/1
5 TABLET ORAL DAILY
Qty: 90 TABLET | Refills: 1 | Status: SHIPPED | OUTPATIENT
Start: 2024-04-23

## 2024-05-13 ENCOUNTER — TELEPHONE (OUTPATIENT)
Dept: PSYCHIATRY | Facility: CLINIC | Age: 69
End: 2024-05-13
Payer: MEDICARE

## 2024-05-13 NOTE — TELEPHONE ENCOUNTER
Pt came in to office requesting an early refill on her gabapentin as she is leaving on vacation.  This has already been sent to the pharmacy.  No further questions at this time

## 2024-06-05 NOTE — PATIENT INSTRUCTIONS
1.  Please return to clinic at your next scheduled visit.  Contact the clinic ([phone]) at least 24 hours prior in the event you need to cancel.  2.  Do no harm to yourself or others.    3.  Avoid alcohol and drugs.    4.  Take all medications as prescribed.  Please contact the clinic with any concerns. If you are in need of medication refills, please call the clinic at [phone].    5. Should you want to get in touch with your provider, Dr. Vinny Krishnamurthy, please utilize [phone] or contact the office ([phone]), and staff will be able to page Dr. Krishnamurthy directly.  6.  In the event you have personal crisis, contact the following crisis numbers: Suicide Prevention Hotline 1-991.269.7031; [facility] Helpline 3-874-325-ALEXANDREA; [facility] Emergency Room [phone]; text HELLO to 812270; or 768.

## 2024-06-05 NOTE — PROGRESS NOTES
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 Focus on lower doses.  Start Pristiq.  Depression with anxious distress versus depression and generalized anxiety disorder.  She only has distractibility, no other symptoms of hypomania.      3/15: Continue lurasidone 40 mg at night, increase Ambien to 10 mg at night.  Continue low-dose Lamictal, plan to increase further.  Continue titrating off of amitriptyline.     3/10: Effexor has now been stopped.  Continue Latuda and Lamictal at present doses.  I will see her back in a few days and then at 6 weeks.  Bipolar 2?  If so, how come she is tolerating amitriptyline in the evenings?     2/15: Still utilizing lorazepam which means the anxiety is not under control.  Increase Effexor.    1/17: Continued improvement.  No changes, only on effexor 75 mg for 2 weeks.  Tolerating medications without side effects.      1/3: Continued improvement. Increase effexor to target residual dep anx. TMS to start 1/6.     PLAN:  Risk Assessment: Risk of self-harm acutely remain low. Risk factors include a history of suicidal ideation, mood disorder, anxiety disorder, presence of psychosocial stressors such as colonic dysplasia, COVID-19 pandemic. Protective  factors include no history of self-harm or suicide attempts, good social support, willingness to engage in care, improved depressive symptoms. Risk of self-harm chronically also remain low, but could be further elevated in the event of treatment noncompliance and/or AODA.  Medications:   CONTINUE light box.   CONTINUE lexapro 10 mg daily. Risks, benefits, alternatives discussed with patient including GI upset, nausea vomiting diarrhea, theoretical decrease of seizure threshold predisposing the patient to a slightly higher seizure risk, headaches, sexual dysfunction, serotonin syndrome, bleeding risk, increased suicidality in patients 24 years and younger.  After discussion of these risks and benefits, the patient voiced understanding and agreed to proceed.  CONTINUE gabapentin 300 mg qam, 300 qpm, 600 mg nightly. Risks, benefits, alternatives discussed with patient including sedation, dizziness/falls risk, GI upset, weight gain.  After discussion of these risks and benefits, the patient voiced understanding and agreed to proceed. UDS ordered, Mamadou reviewed.  CONTINUE Abilify 5 mg qhs. Risks, benefits, alternatives discussed with patient including increased energy, exacerbation of irritability, akathisia, GI upset, orthostatic hypotension, increased appetite.  After discussion of these risks and benefits, the patient voiced understanding and agreed to proceed.  CONTINUE amitriptyline 37.5 mg nightly. Risks, benefits, alternatives discussed with patient including sedation, dizziness, falls, GI upset, constipation, urinary retention, dry mouth.  After discussion of these risks and benefits, the patient voiced understanding and agreed to proceed.  CONTINUE lorazepam 1 mg bid PRN. Risks, benefits, alternatives discussed with patient including GI upset, sedation, dizziness, respiratory depression, falls risk.  After discussion of these risks and benefits, the patient voiced understanding and agreed to proceed.  S/P:  "  buspar 5 mg bid was ineffective.  Trazodone 50 mg nightly not helpful for insomnia.  Mirtazapine 15 mg, 7.5 mg: ineffective; made anxiety and depression worse.  Abilify: brain zaps and constipation  Seroquel made depression worse  celexa 20 mg daily, ineffective after several months  Doxepin caused \"brain zaps\"  Latuda was ineffective.  Amitriptyline 25 mg nightly helpful for sleep but we didn't want her on too many serotonergic meds. Restarted.  Ambien minimally helpful for insomnia.  Pristiq and effexor worsened anxiety, both low dose.  Stopped lamictal for unclear reasons 25 mg.  belsomra 5 mg nightly didn't help with insomnia.  Therapy: continue with Genevieve at [facility].  Referred for couples therapy 10/2022  Status post IOP at [facility] 10/2022  Labs/Studies: BMP to monitor sodium levels in February was entirely normal, EKG February shows rate 72, sinus, .    TREATMENT PLAN/GOALS: Continue supportive psychotherapy efforts and medications as indicated. Treatment and medication options discussed during today's visit. Patient acknowledged and verbally consented to continue with current treatment plan and was educated on the importance of compliance with treatment and follow-up appointments.    MEDICATION ISSUES:  YVETTE reviewed as expected.  Discussed medication options and treatment plan of prescribed medication as well as the risks, benefits, and side effects including potential falls, possible impaired driving and metabolic adversities among others. Patient is agreeable to call the office with any worsening of symptoms or onset of side effects. Patient is agreeable to call 911 or go to the nearest ER should he/she begin having SI/HI. No medication side effects or related complaints today.     MEDS ORDERED DURING VISIT:    Return in about 4 weeks (around 7/4/2024).         This document has been electronically signed by Vinny Krishnamurthy MD  June 6, 2024 10:41 EDT       Part of this note may be an " electronic transcription/translation of spoken language to printed text using the Dragon Dictation System.

## 2024-06-06 ENCOUNTER — OFFICE VISIT (OUTPATIENT)
Dept: PSYCHIATRY | Facility: CLINIC | Age: 69
End: 2024-06-06
Payer: MEDICARE

## 2024-06-06 VITALS
HEIGHT: 64 IN | SYSTOLIC BLOOD PRESSURE: 123 MMHG | WEIGHT: 154.2 LBS | DIASTOLIC BLOOD PRESSURE: 62 MMHG | BODY MASS INDEX: 26.32 KG/M2 | HEART RATE: 82 BPM

## 2024-06-06 DIAGNOSIS — F51.05 INSOMNIA DUE TO MENTAL CONDITION: ICD-10-CM

## 2024-06-06 DIAGNOSIS — F41.0 PANIC ATTACKS: ICD-10-CM

## 2024-06-06 DIAGNOSIS — F33.40 RECURRENT MAJOR DEPRESSIVE DISORDER IN REMISSION: ICD-10-CM

## 2024-06-06 DIAGNOSIS — F41.1 GENERALIZED ANXIETY DISORDER: Primary | ICD-10-CM

## 2024-06-06 DIAGNOSIS — F41.0 PANIC DISORDER: ICD-10-CM

## 2024-06-06 RX ORDER — AMITRIPTYLINE HYDROCHLORIDE 25 MG/1
37.5 TABLET, FILM COATED ORAL NIGHTLY
Qty: 135 TABLET | Refills: 1 | Status: SHIPPED | OUTPATIENT
Start: 2024-06-06

## 2024-07-10 NOTE — PROGRESS NOTES
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 predisposing the patient to a slightly higher seizure risk, headaches, sexual dysfunction, serotonin syndrome, bleeding risk, increased suicidality in patients 24 years and younger.  After discussion of these risks and benefits, the patient voiced understanding and agreed to proceed.  CONTINUE gabapentin 300 mg qam, 300 qpm, 600 mg nightly. Risks, benefits, alternatives discussed with patient including sedation, dizziness/falls risk, GI upset, weight gain.  After discussion of these risks and benefits, the patient voiced understanding and agreed to proceed. UDS ordered, Mamadou reviewed.  CONTINUE Abilify 5 mg qhs. Risks, benefits, alternatives discussed with patient including increased energy, exacerbation of irritability, akathisia, GI upset, orthostatic hypotension, increased appetite.  After discussion of these risks and benefits, the patient voiced understanding and agreed to proceed.  CONTINUE amitriptyline 37.5 mg nightly. Risks, benefits, alternatives discussed with patient including sedation, dizziness, falls, GI upset, constipation, urinary retention, dry mouth.  After discussion of these risks and benefits, the patient voiced understanding and agreed to  "proceed.  CONTINUE lorazepam 1 mg bid PRN. Risks, benefits, alternatives discussed with patient including GI upset, sedation, dizziness, respiratory depression, falls risk.  After discussion of these risks and benefits, the patient voiced understanding and agreed to proceed.  S/P:   buspar 5 mg bid was ineffective.  Trazodone 50 mg nightly not helpful for insomnia.  Mirtazapine 15 mg, 7.5 mg: ineffective; made anxiety and depression worse.  Abilify: brain zaps and constipation  Seroquel made depression worse  celexa 20 mg daily, ineffective after several months  Doxepin caused \"brain zaps\"  Latuda was ineffective.  Amitriptyline 25 mg nightly helpful for sleep but we didn't want her on too many serotonergic meds. Restarted.  Ambien minimally helpful for insomnia.  Pristiq and effexor worsened anxiety, both low dose.  Stopped lamictal for unclear reasons 25 mg.  belsomra 5 mg nightly didn't help with insomnia.  Therapy: continue with Genevieve at [facility].  Referred for couples therapy 10/2022  Status post IOP at [facility] 10/2022  Labs/Studies: BMP to monitor sodium levels in February was entirely normal, EKG February shows rate 72, sinus, .    TREATMENT PLAN/GOALS: Continue supportive psychotherapy efforts and medications as indicated. Treatment and medication options discussed during today's visit. Patient acknowledged and verbally consented to continue with current treatment plan and was educated on the importance of compliance with treatment and follow-up appointments.    MEDICATION ISSUES:  YVETTE reviewed as expected.  Discussed medication options and treatment plan of prescribed medication as well as the risks, benefits, and side effects including potential falls, possible impaired driving and metabolic adversities among others. Patient is agreeable to call the office with any worsening of symptoms or onset of side effects. Patient is agreeable to call 911 or go to the nearest ER should he/she begin " having SI/HI. No medication side effects or related complaints today.     MEDS ORDERED DURING VISIT:    Return in about 4 weeks (around 8/8/2024).         This document has been electronically signed by Vinny Krishnamurthy MD  July 11, 2024 11:02 EDT       Part of this note may be an electronic transcription/translation of spoken language to printed text using the Dragon Dictation System.

## 2024-07-10 NOTE — PATIENT INSTRUCTIONS
1.  Please return to clinic at your next scheduled visit.  Contact the clinic ([phone]) at least 24 hours prior in the event you need to cancel.  2.  Do no harm to yourself or others.    3.  Avoid alcohol and drugs.    4.  Take all medications as prescribed.  Please contact the clinic with any concerns. If you are in need of medication refills, please call the clinic at [phone].    5. Should you want to get in touch with your provider, Dr. Vinny Krishnamurthy, please utilize [employer] or contact the office ([phone]), and staff will be able to page Dr. Krishnamurthy directly.  6.  In the event you have personal crisis, contact the following crisis numbers: Suicide Prevention Hotline 1-624.276.1122; [facility] Helpline 9-254-128-[place]A; [facility] [phone]; text HELLO to 290684; or 951.

## 2024-07-11 ENCOUNTER — OFFICE VISIT (OUTPATIENT)
Dept: PSYCHIATRY | Facility: CLINIC | Age: 69
End: 2024-07-11
Payer: MEDICARE

## 2024-07-11 VITALS
HEART RATE: 85 BPM | BODY MASS INDEX: 26.05 KG/M2 | SYSTOLIC BLOOD PRESSURE: 121 MMHG | HEIGHT: 64 IN | WEIGHT: 152.6 LBS | DIASTOLIC BLOOD PRESSURE: 63 MMHG

## 2024-07-11 DIAGNOSIS — F41.0 PANIC ATTACKS: ICD-10-CM

## 2024-07-11 DIAGNOSIS — F51.05 INSOMNIA DUE TO MENTAL CONDITION: ICD-10-CM

## 2024-07-11 DIAGNOSIS — F41.1 GENERALIZED ANXIETY DISORDER: Primary | ICD-10-CM

## 2024-07-11 DIAGNOSIS — F41.0 PANIC DISORDER: ICD-10-CM

## 2024-07-11 DIAGNOSIS — F33.40 RECURRENT MAJOR DEPRESSIVE DISORDER IN REMISSION: ICD-10-CM

## 2024-07-11 RX ORDER — CLONIDINE HYDROCHLORIDE 0.1 MG/1
0.1 TABLET ORAL
Qty: 30 TABLET | Refills: 2 | Status: SHIPPED | OUTPATIENT
Start: 2024-07-11

## 2024-08-28 NOTE — PROGRESS NOTES
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 operating vehicle, vessel, or machine. After discussion of these risks and benefits, the patient voiced understanding and agreed to proceed.  CONTINUE lexapro 10 mg daily. Risks, benefits, alternatives discussed with patient including GI upset, nausea vomiting diarrhea, theoretical decrease of seizure threshold predisposing the patient to a slightly higher seizure risk, headaches, sexual dysfunction, serotonin syndrome, bleeding risk, increased suicidality in patients 24 years and younger.  After discussion of these risks and benefits, the patient voiced understanding and agreed to proceed.  CONTINUE gabapentin 300 mg qam, 300 qpm, 600 mg nightly. Risks, benefits, alternatives discussed with patient including sedation, dizziness/falls risk, GI upset, weight gain.  After discussion of these risks and benefits, the patient voiced understanding and agreed to proceed. UDS ordered, Mamadou reviewed.  CONTINUE Abilify 5 mg qhs. Risks, benefits, alternatives discussed with patient including increased energy, exacerbation of irritability, akathisia, GI upset, orthostatic hypotension, increased  "appetite.  After discussion of these risks and benefits, the patient voiced understanding and agreed to proceed.  CONTINUE amitriptyline 37.5 mg nightly. Risks, benefits, alternatives discussed with patient including sedation, dizziness, falls, GI upset, constipation, urinary retention, dry mouth.  After discussion of these risks and benefits, the patient voiced understanding and agreed to proceed.  CONTINUE lorazepam 1 mg bid PRN. Risks, benefits, alternatives discussed with patient including GI upset, sedation, dizziness, respiratory depression, falls risk.  After discussion of these risks and benefits, the patient voiced understanding and agreed to proceed.  S/P:   buspar 5 mg bid was ineffective.  Trazodone 50 mg nightly not helpful for insomnia.  Mirtazapine 15 mg, 7.5 mg: ineffective; made anxiety and depression worse.  Abilify: brain zaps and constipation  Seroquel made depression worse  celexa 20 mg daily, ineffective after several months  Doxepin caused \"brain zaps\"  Latuda was ineffective.  Amitriptyline 25 mg nightly helpful for sleep but we didn't want her on too many serotonergic meds. Restarted.  Ambien minimally helpful for insomnia.  Pristiq and effexor worsened anxiety, both low dose.  Stopped lamictal for unclear reasons 25 mg.  belsomra 5 mg nightly didn't help with insomnia.  Therapy: continue with Gen[facility]ieve at [facility].  Referred for couples therapy 10/2022  Status post IOP at [facility] 10/2022  Labs/Studies: BMP to monitor sodium levels in February was entirely normal, EKG February shows rate 72, sinus, .    TREATMENT PLAN/GOALS: Continue supportive psychotherapy efforts and medications as indicated. Treatment and medication options discussed during today's visit. Patient acknowledged and verbally consented to continue with current treatment plan and was educated on the importance of compliance with treatment and follow-up appointments.    MEDICATION ISSUES:  YVETTE reviewed as " expected.  Discussed medication options and treatment plan of prescribed medication as well as the risks, benefits, and side effects including potential falls, possible impaired driving and metabolic adversities among others. Patient is agreeable to call the office with any worsening of symptoms or onset of side effects. Patient is agreeable to call 911 or go to the nearest ER should he/she begin having SI/HI. No medication side effects or related complaints today.     MEDS ORDERED DURING VISIT:    Return in about 6 weeks (around 10/10/2024).         This document has been electronically signed by Vinny Kirshnamurthy MD  August 29, 2024 10:11 EDT       Part of this note may be an electronic transcription/translation of spoken language to printed text using the Dragon Dictation System.     left total hip revision

## 2024-08-28 NOTE — PATIENT INSTRUCTIONS
1.  Please return to clinic at your next scheduled visit.  Contact the clinic ([phone]) at least 24 hours prior in the event you need to cancel.  2.  Do no harm to yourself or others.    3.  Avoid alcohol and drugs.    4.  Take all medications as prescribed.  Please contact the clinic with any concerns. If you are in need of medication refills, please call the clinic at [phone].    5. Should you want to get in touch with your provider, Dr. Vinny Krishnamurthy, please utilize [employer] or contact the office ([phone]), and staff will be able to page Dr. Krishnamurthy directly.  6.  In the event you have personal crisis, contact the following crisis numbers: Suicide Prevention Hotline 1-321.812.4978; [facility] Helpline 4-509-661-ALEXANDREA; [facility] Emergency Room [phone]; text HELLO to 449539; or 847.

## 2024-08-29 ENCOUNTER — OFFICE VISIT (OUTPATIENT)
Dept: PSYCHIATRY | Facility: CLINIC | Age: 69
End: 2024-08-29
Payer: MEDICARE

## 2024-08-29 VITALS
WEIGHT: 152.6 LBS | BODY MASS INDEX: 26.05 KG/M2 | HEIGHT: 64 IN | HEART RATE: 82 BPM | DIASTOLIC BLOOD PRESSURE: 64 MMHG | SYSTOLIC BLOOD PRESSURE: 115 MMHG

## 2024-08-29 DIAGNOSIS — F51.05 INSOMNIA DUE TO MENTAL CONDITION: ICD-10-CM

## 2024-08-29 DIAGNOSIS — F33.40 RECURRENT MAJOR DEPRESSIVE DISORDER IN REMISSION: ICD-10-CM

## 2024-08-29 DIAGNOSIS — F41.0 PANIC ATTACKS: ICD-10-CM

## 2024-08-29 DIAGNOSIS — F41.1 GENERALIZED ANXIETY DISORDER: Primary | ICD-10-CM

## 2024-08-29 DIAGNOSIS — F33.1 MODERATE EPISODE OF RECURRENT MAJOR DEPRESSIVE DISORDER: ICD-10-CM

## 2024-08-29 DIAGNOSIS — F41.0 PANIC DISORDER: ICD-10-CM

## 2024-08-29 RX ORDER — ARIPIPRAZOLE 5 MG/1
5 TABLET ORAL DAILY
Qty: 90 TABLET | Refills: 1 | Status: SHIPPED | OUTPATIENT
Start: 2024-08-29

## 2024-08-29 RX ORDER — ESCITALOPRAM OXALATE 10 MG/1
10 TABLET ORAL DAILY
Qty: 90 TABLET | Refills: 3 | Status: SHIPPED | OUTPATIENT
Start: 2024-08-29

## 2024-08-29 NOTE — PLAN OF CARE
[facility] psychotherapy to help manage depression and anxiety related to intrusive thoughts about illness/death, family conflict, isolation

## 2024-08-29 NOTE — TREATMENT PLAN
Multi-Disciplinary Problems (from Behavioral Health Treatment Plan)      Active Problems       Problem: Anxiety  Start Date: 08/29/24      Problem Details: The patient self-scales this problem as a 3 with 10 being the worst.    intrusive thoughts about illness/death, family conflict, isolation        Goal Priority Start Date Expected End Date End Date    Patient will develop and implement behavioral and cognitive strategies to reduce anxiety and irrational fears. -- 08/29/24 02/27/25 --    Goal Details: Progress toward goal:  The patient self-scales their progress related to this goal as a 2 with 10 being the worst.          Goal Intervention Frequency Start Date End Date    Help patient explore past emotional issues in relation to present anxiety. Q Month 08/29/24 --    Intervention Details: Duration of treatment until remission of symptoms.          Goal Intervention Frequency Start Date End Date    Help patient develop an awareness of their cognitive and physical responses to anxiety. Q Month 08/29/24 --    Intervention Details: Duration of treatment until remission of symptoms.                  Problem: Depression  Start Date: 08/29/24      Problem Details: The patient self-scales this problem as a 2 with 10 being the worst.  intrusive thoughts about illness/death, family conflict, isolation        Goal Priority Start Date Expected End Date End Date    Patient will demonstrate the ability to initiate new constructive life skills outside of sessions on a consistent basis. -- 08/29/24 02/27/25 --    Goal Details: Progress toward goal:  The patient self-scales their progress related to this goal as a 2 with 10 being the worst.          Goal Intervention Frequency Start Date End Date    Assist patient in setting attainable activities of daily living goals. PRN 08/29/24 --      Goal Intervention Frequency Start Date End Date    Provide education about depression Q Month 08/29/24 --    Intervention Details: Duration  of treatment until remission of symptoms.          Goal Intervention Frequency Start Date End Date    Assist patient in developing healthy coping strategies. Q Month 08/29/24 --    Intervention Details: Duration of treatment until remission of symptoms.                          Reviewed By       Vinny Krishnamurthy MD 08/29/24 1012                     I have discussed and reviewed this treatment plan with the patient.

## 2024-09-26 ENCOUNTER — TRANSCRIBE ORDERS (OUTPATIENT)
Dept: FAMILY MEDICINE CLINIC | Facility: CLINIC | Age: 69
End: 2024-09-26
Payer: MEDICARE

## 2024-09-26 DIAGNOSIS — Z12.31 SCREENING MAMMOGRAM FOR BREAST CANCER: Primary | ICD-10-CM

## 2024-09-30 ENCOUNTER — DOCUMENTATION (OUTPATIENT)
Dept: PHYSICAL THERAPY | Facility: CLINIC | Age: 69
End: 2024-09-30
Payer: MEDICARE

## 2024-09-30 NOTE — PROGRESS NOTES
Outpatient Physical Therapy  [address], [place] [address]      Discharge Summary  Discharge Summary from Physical Therapy Report      Dates  PT visit: 3/30/23-4/28/23  Number of Visits: 7       Goals  Plan Goals: SHOULDER  PROBLEMS:     1. The patient has limited ROM of the right shoulder.                         LTG 1: 12 weeks:  The patient will demonstrate AROM 150 degrees of shoulder flexion, 140 degrees of shoulder abduction to allow the patient to reach into upper kitchen cabinets and manipulate clothing behind the back with greater ease.                          STATUS:  Not met              STG 1a: 6 weeks:  The patient will demonstrate AROM 130 degrees of  shoulder flexion, 120 degrees of shoulder abduction.                          STATUS:  MET              TREATMENT: Manual therapy, therapeutic exercise, home exercise instruction, and modalities as needed to include: electrical stimulation, ultrasound, moist heat, and ice.    2. The patient has limited strength of the right shoulder.              LTG 2: 12 weeks:  The patient will demonstrate 4+ /5 strength for shoulder flexion, abduction, and internal rotation in order to demonstrate improved shoulder stability.                          STATUS:  Not met              STG 2a: 6 weeks:  The patient will demonstrate 4 /5 strength for shoulder flexion, abduction, external rotation, and internal rotation.                          STATUS: Met except for ER              STG2b:  6 weeks:  The patient will be independent with home exercises.                           STATUS:  Met, ongoing              TREATMENT: Manual therapy, therapeutic exercise, home exercise instruction, and modalities as needed to include: electrical stimulation, ultrasound, moist heat, and ice.                3. The patient complains of pain to the right shoulder.              LTG 3: 12 weeks:  The patient will report a pain rating of 0 /10 or better at its worst in  order to improve sleep quality and tolerance to performance of activities of daily living.                          STATUS:  Not met              STG 3a: 6 weeks:  The patient will report a pain rating of 4 /10 or better at its worst.                           STATUS:  MEt              TREATMENT: Manual therapy, therapeutic exercise, home exercise instruction, and modalities as needed to include: electrical stimulation, ultrasound, moist heat, and ice.    4. Carrying, Moving, and Handling Objects Functional Limitation                               LTG 4: 12 weeks:  The patient will demonstrate 1-19% limitation by achieving a score of 12-19 on the QuickDASH.                          STATUS:  MET              STG 4a: 6 weeks:  The patient will demonstrate 20-39 % limitation by achieving a score of 20-27 on the QuickDASH.                            STATUS:  MET              TREATMENT:  Manual therapy, therapeutic exercise, home exercise instruction, and modalities as needed to include: moist heat, electrical stimulation, and ultrasound.    Discharge Plan: Continue with current home exercise program as instructed    Date of Discharge 9/30/2024      Electronically signed:   Kaitlin Avealr PTA  Physical Therapist Assistant  [facility] License #: [id number]

## 2024-10-08 DIAGNOSIS — F41.1 GENERALIZED ANXIETY DISORDER: ICD-10-CM

## 2024-10-08 DIAGNOSIS — F41.0 PANIC ATTACKS: ICD-10-CM

## 2024-10-08 DIAGNOSIS — F41.0 PANIC DISORDER: ICD-10-CM

## 2024-10-08 DIAGNOSIS — F51.05 INSOMNIA DUE TO MENTAL CONDITION: ICD-10-CM

## 2024-10-08 RX ORDER — CLONIDINE HYDROCHLORIDE 0.1 MG/1
TABLET ORAL
Qty: 30 TABLET | Refills: 2 | Status: SHIPPED | OUTPATIENT
Start: 2024-10-08

## 2024-10-08 NOTE — TELEPHONE ENCOUNTER
NEXT VISIT WITH PROVIDER   Appointment with Vinny Krishnamurthy MD (10/10/2024)     LAST SEEN BY PROVIDER   Office Visit with Vinny Krishnamurthy MD (08/29/2024)     LAST MED REFILL  cloNIDine (Catapres) 0.1 MG tablet (07/11/2024)     PROVIDER PLEASE ADVISE

## 2024-10-09 NOTE — PROGRESS NOTES
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 target residual dep anx. TMS to start 1/6.     PLAN:  Risk Assessment: Risk of self-harm acutely remain low. Risk factors include a history of suicidal ideation, mood disorder, anxiety disorder, presence of psychosocial stressors such as colonic dysplasia, COVID-19 pandemic. Protective factors include no history of self-harm or suicide attempts, good social support, willingness to engage in care, improved depressive symptoms. Risk of self-harm chronically also remain low, but could be further elevated in the event of treatment noncompliance and/or AODA.  Medications:   CONTINUE light box.   CONTINUE clonidine 0.05 mg qhs. Risks, benefits, alternatives discussed with patient including dizziness, sedation, falls, low blood pressure, GI upset.  Use care when operating vehicle, vessel, or machine. After discussion of these risks and benefits, the patient voiced understanding and agreed to proceed.  CONTINUE lexapro 10 mg daily. Risks, benefits, alternatives discussed with patient including GI upset, nausea vomiting diarrhea, theoretical decrease of seizure threshold predisposing the patient to a slightly higher seizure risk, headaches, sexual  "dysfunction, serotonin syndrome, bleeding risk, increased suicidality in patients 24 years and younger.  After discussion of these risks and benefits, the patient voiced understanding and agreed to proceed.  CONTINUE gabapentin 300 mg qam, 300 qpm, 600 mg nightly. Risks, benefits, alternatives discussed with patient including sedation, dizziness/falls risk, GI upset, weight gain.  After discussion of these risks and benefits, the patient voiced understanding and agreed to proceed. UDS ordered, Mamadou reviewed.  CONTINUE Abilify 5 mg qhs. Risks, benefits, alternatives discussed with patient including increased energy, exacerbation of irritability, akathisia, GI upset, orthostatic hypotension, increased appetite.  After discussion of these risks and benefits, the patient voiced understanding and agreed to proceed.  CONTINUE amitriptyline 37.5 mg nightly. Risks, benefits, alternatives discussed with patient including sedation, dizziness, falls, GI upset, constipation, urinary retention, dry mouth.  After discussion of these risks and benefits, the patient voiced understanding and agreed to proceed.  CONTINUE lorazepam 1 mg bid PRN. Risks, benefits, alternatives discussed with patient including GI upset, sedation, dizziness, respiratory depression, falls risk.  After discussion of these risks and benefits, the patient voiced understanding and agreed to proceed.  S/P:   buspar 5 mg bid was ineffective.  Trazodone 50 mg nightly not helpful for insomnia.  Mirtazapine 15 mg, 7.5 mg: ineffective; made anxiety and depression worse.  Abilify: brain zaps and constipation  Seroquel made depression worse  celexa 20 mg daily, ineffective after several months  Doxepin caused \"brain zaps\"  Latuda was ineffective.  Amitriptyline 25 mg nightly helpful for sleep but we didn't want her on too many serotonergic meds. Restarted.  Ambien minimally helpful for insomnia.  Pristiq and effexor worsened anxiety, both low dose.  Stopped lamictal " for unclear reasons 25 mg.  belsomra 5 mg nightly didn't help with insomnia.  Therapy: continue with Genevieve at [facility].  Referred for couples therapy 10/2022  Status post IOP at [facility] 10/2022  Labs/Studies: BMP to monitor sodium levels in February was entirely normal, EKG February shows rate 72, sinus, .    TREATMENT PLAN/GOALS: Continue supportive psychotherapy efforts and medications as indicated. Treatment and medication options discussed during today's visit. Patient acknowledged and verbally consented to continue with current treatment plan and was educated on the importance of compliance with treatment and follow-up appointments.    MEDICATION ISSUES:  YVETTE reviewed as expected.  Discussed medication options and treatment plan of prescribed medication as well as the risks, benefits, and side effects including potential falls, possible impaired driving and metabolic adversities among others. Patient is agreeable to call the office with any worsening of symptoms or onset of side effects. Patient is agreeable to call 911 or go to the nearest ER should he/she begin having SI/HI. No medication side effects or related complaints today.     MEDS ORDERED DURING VISIT:    Return in about 6 weeks (around 11/21/2024).         This document has been electronically signed by Vinny Krishnamurthy MD  October 10, 2024 10:13 EDT       Part of this note may be an electronic transcription/translation of spoken language to printed text using the Dragon Dictation System.

## 2024-10-09 NOTE — PATIENT INSTRUCTIONS
1.  Please return to clinic at your next scheduled visit.  Contact the clinic ([phone]) at least 24 hours prior in the event you need to cancel.  2.  Do no harm to yourself or others.    3.  Avoid alcohol and drugs.    4.  Take all medications as prescribed.  Please contact the clinic with any concerns. If you are in need of medication refills, please call the clinic at [phone].    5. Should you want to get in touch with your provider, Dr. Vinny Krishnamurthy, please utilize [employer] or contact the office ([phone]), and staff will be able to page Dr. Krishnamurthy directly.  6.  In the event you have personal crisis, contact the following crisis numbers: Suicide Prevention Hotline 1-339.914.3898; [facility] Helpline 6-372-653-ALEXANDREA; [facility] [phone]; text HELLO to 730253; or 794.

## 2024-10-10 ENCOUNTER — OFFICE VISIT (OUTPATIENT)
Dept: PSYCHIATRY | Facility: CLINIC | Age: 69
End: 2024-10-10
Payer: MEDICARE

## 2024-10-10 VITALS
BODY MASS INDEX: 26.12 KG/M2 | WEIGHT: 153 LBS | DIASTOLIC BLOOD PRESSURE: 58 MMHG | SYSTOLIC BLOOD PRESSURE: 122 MMHG | HEIGHT: 64 IN | HEART RATE: 81 BPM

## 2024-10-10 DIAGNOSIS — F41.0 PANIC ATTACKS: ICD-10-CM

## 2024-10-10 DIAGNOSIS — F51.05 INSOMNIA DUE TO MENTAL CONDITION: ICD-10-CM

## 2024-10-10 DIAGNOSIS — F41.1 GENERALIZED ANXIETY DISORDER: Primary | ICD-10-CM

## 2024-10-10 DIAGNOSIS — F41.0 PANIC DISORDER: ICD-10-CM

## 2024-10-10 DIAGNOSIS — F33.40 RECURRENT MAJOR DEPRESSIVE DISORDER IN REMISSION: ICD-10-CM

## 2024-10-10 RX ORDER — GABAPENTIN 300 MG/1
CAPSULE ORAL
Qty: 120 CAPSULE | Refills: 5 | Status: SHIPPED | OUTPATIENT
Start: 2024-10-19

## 2024-10-21 ENCOUNTER — HOSPITAL ENCOUNTER (OUTPATIENT)
Dept: MAMMOGRAPHY | Facility: HOSPITAL | Age: 69
Discharge: HOME | End: 2024-10-21
Admitting: NURSE PRACTITIONER
Payer: MEDICARE

## 2024-10-21 DIAGNOSIS — Z12.31 SCREENING MAMMOGRAM FOR BREAST CANCER: ICD-10-CM

## 2024-10-21 PROCEDURE — 77063 BREAST TOMOSYNTHESIS BI: CPT

## 2024-10-21 PROCEDURE — 77067 SCR MAMMO BI INCL CAD: CPT

## 2024-11-04 ENCOUNTER — TELEPHONE (OUTPATIENT)
Dept: FAMILY MEDICINE CLINIC | Facility: CLINIC | Age: 69
End: 2024-11-04
Payer: MEDICARE

## 2024-11-07 ENCOUNTER — OFFICE VISIT (OUTPATIENT)
Dept: SURGERY | Facility: CLINIC | Age: 69
End: 2024-11-07
Payer: MEDICARE

## 2024-11-07 ENCOUNTER — PREP FOR SURGERY (OUTPATIENT)
Dept: OTHER | Facility: HOSPITAL | Age: 69
End: 2024-11-07
Payer: MEDICARE

## 2024-11-07 VITALS
HEART RATE: 80 BPM | DIASTOLIC BLOOD PRESSURE: 77 MMHG | OXYGEN SATURATION: 96 % | WEIGHT: 153 LBS | HEIGHT: 64 IN | BODY MASS INDEX: 26.12 KG/M2 | SYSTOLIC BLOOD PRESSURE: 128 MMHG

## 2024-11-07 DIAGNOSIS — Z12.11 SCREENING FOR MALIGNANT NEOPLASM OF COLON: Primary | ICD-10-CM

## 2024-11-07 DIAGNOSIS — Z86.0100 HISTORY OF COLONIC POLYPS: ICD-10-CM

## 2024-11-07 RX ORDER — POLYETHYLENE GLYCOL 3350 17 G/17G
POWDER, FOR SOLUTION ORAL
Qty: 238 PACKET | Refills: 0 | Status: SHIPPED | OUTPATIENT
Start: 2024-11-07

## 2024-11-07 RX ORDER — SODIUM CHLORIDE 0.9 % (FLUSH) 0.9 %
3 SYRINGE (ML) INJECTION EVERY 12 HOURS SCHEDULED
OUTPATIENT
Start: 2024-11-07

## 2024-11-07 RX ORDER — SODIUM CHLORIDE 0.9 % (FLUSH) 0.9 %
10 SYRINGE (ML) INJECTION AS NEEDED
OUTPATIENT
Start: 2024-11-07

## 2024-11-07 RX ORDER — SODIUM CHLORIDE 9 MG/ML
40 INJECTION, SOLUTION INTRAVENOUS AS NEEDED
OUTPATIENT
Start: 2024-11-07

## 2024-11-07 NOTE — PROGRESS NOTES
Chief Complaint: Colonoscopy    Subjective      Colonoscopy consultation       History of Present Illness  Shabana Ferguson is a 68 y.o. female presents to [facility] for colonoscopy consultation.      Patient presents today without complaints for screening colonoscopy.  She denies any abdominal pain, change in bowel habit or rectal bleeding.  Admits to family history of colon cancer with her brother.  Admits to history of colonic polyps.    Denies NAOMY.  Denies any cardiac issues.  Denies taking any GLP-1 receptors.    10/21: Colonoscopy ([facility]): Normal colon.     6/20: Colonoscopy ([place]): 2 sigmoid biopsies- inflammatory polyp; tubular adenoma.     5/19: colonoscopy ([facility]): sigmoid - inflammatory; cecum - tubulovillous adenoma; diverticulosis.     1/19: Colonoscopy ([place]): Cecum - tubular adenoma with high grade dysplasia; Ascending - tubular adenoma; sigmoid - 2 inflammatory polyps with surface erosions/ulceratoin; diverticulosis.       Objective     Past Medical History:   Diagnosis Date    Acid reflux 2022    Anxiety     Depression     Fracture of patella 09/12/2017    Hyperlipemia     Osteopenia     Panic disorder     Psychiatric inpatient 07/22/2022    Discharged 07/22/2022 for Anxiety, Depression, and Suicidal Bx's    Seizures     reports seizure activity, but under control.       Past Surgical History:   Procedure Laterality Date    COLONOSCOPY  1/812019    last scope 2020    COLONOSCOPY N/A 10/29/2021    Procedure: COLONOSCOPY with possible biopies.;  Surgeon: Skyler Fuller MD;  Location: [facility];  Service: General;  Laterality: N/A;    KNEE SURGERY  2017    broken knee       Outpatient Medications Marked as Taking for the 11/7/24 encounter (Office Visit) with Guerline Thacker APRN   Medication Sig Dispense Refill    amitriptyline (ELAVIL) 25 MG tablet Take 1.5 tablets by mouth Every Night. 135 tablet 1    ARIPiprazole (Abilify) 5 MG tablet Take  1 tablet by mouth Daily. 90 tablet 1    atorvastatin (LIPITOR) 20 MG tablet Take 1 tablet by mouth Daily. 90 tablet 3    Calcium Carb-Cholecalciferol (OYSCO 500 + D) 500-200 MG-UNIT tablet       cholecalciferol (VITAMIN D3) 25 MCG (1000 UT) tablet Take 1 tablet by mouth Daily.      cloNIDine (CATAPRES) 0.1 MG tablet TAKE 1 TABLET BY MOUTH AT BEDTIME MAY START WITH 1/2 TABLET AT FIRST 30 tablet 2    Coenzyme Q10 (CO Q10 PO) Take  by mouth.      escitalopram (LEXAPRO) 10 MG tablet Take 1 tablet by mouth Daily. 90 tablet 3    gabapentin (NEURONTIN) 300 MG capsule 300 mg qam, 300 mg qpm, 600 mg qhs 120 capsule 5    GARLIC PO garlic 1,000 mg oral capsule take 1 capsule by oral route daily   Active      Multiple Vitamins-Iron (MULTIVITAMIN PLUS IRON ADULT PO) Women's Multivitamin 18 mg iron-400 mcg-500 mg oral tablet take 1 tablet by oral route daily   Active      Omega-3 Fatty Acids (fish oil) 1000 MG capsule capsule 3 capsules 3 (Three) Times a Day With Meals. PT(PATIENT) REPORTS SHE TAKES MEDICATION MORNING/LUNCH/EVENING      Probiotic Product (PROBIOTIC PO) Take  by mouth.      vitamin E 400 UNIT capsule vitamin E 400 unit oral capsule take 1 capsule by oral route daily   Active         No Known Allergies     Family History   Problem Relation Age of Onset    Stroke Mother     Heart disease Mother     No Known Problems Father     No Known Problems Sister     Cancer Brother     No Known Problems Maternal Aunt     No Known Problems Paternal Aunt     No Known Problems Maternal Uncle     No Known Problems Paternal Uncle     No Known Problems Maternal Grandfather     No Known Problems Maternal Grandmother     No Known Problems Paternal Grandfather     No Known Problems Paternal Grandmother     No Known Problems Cousin     Seizures Son     No Known Problems Other     ADD / ADHD Neg Hx     Alcohol abuse Neg Hx     Anxiety disorder Neg Hx     Bipolar disorder Neg Hx     Dementia Neg Hx     Depression Neg Hx     Drug abuse Neg  "Hx     OCD Neg Hx     Paranoid behavior Neg Hx     Schizophrenia Neg Hx     Self-Injurious Behavior  Neg Hx     Suicide Attempts Neg Hx        Social History     Socioeconomic History    Marital status:    Tobacco Use    Smoking status: Never    Smokeless tobacco: Never   Vaping Use    Vaping status: Never Used   Substance and Sexual Activity    Alcohol use: Not Currently    Drug use: Never    Sexual activity: Not Currently       Review of Systems   Constitutional:  Negative for chills and fever.   Gastrointestinal:  Negative for abdominal distention, abdominal pain, anal bleeding, blood in stool, constipation, diarrhea and rectal pain.        Vital Signs:   /77 (BP Location: Left arm, Patient Position: Sitting, Cuff Size: Adult)   Pulse 80   Ht 162.6 cm (64.02\")   Wt 69.4 kg (153 lb)   SpO2 96%   BMI 26.25 kg/m²      Physical Exam  Vitals and nursing note reviewed.   Constitutional:       General: She is not in acute distress.     Appearance: Normal appearance. She is not ill-appearing.   HENT:      Head: Normocephalic and atraumatic.   Cardiovascular:      Rate and Rhythm: Normal rate.   Pulmonary:      Effort: Pulmonary effort is normal.      Breath sounds: No stridor.   Abdominal:      Palpations: Abdomen is soft.      Tenderness: There is no guarding.   Musculoskeletal:         General: No deformity. Normal range of motion.   Skin:     General: Skin is warm and dry.      Coloration: Skin is not jaundiced.   Neurological:      General: No focal deficit present.      Mental Status: She is alert and oriented to person, place, and time.   Psychiatric:         Mood and Affect: Mood normal.         Thought Content: Thought content normal.          Result Review :              []  Laboratory  []  Radiology  []  Pathology  []  Microbiology  []  EKG/Telemetry   []  Cardiology/Vascular   []  Old records  I spent 15 minutes caring for Shabana on this date of service. This time includes time spent by me " in the following activities: reviewing tests, obtaining and/or reviewing a separately obtained history, performing a medically appropriate examination and/or evaluation, ordering medications, tests, or procedures, and documenting information in the medical record   .        Assessment and Plan    Diagnoses and all orders for this visit:    1. Screening for malignant neoplasm of colon (Primary)    2. History of colonic polyps    Other orders  -     polyethylene glycol (MIRALAX) 17 g packet; Take as directed.  Instructions given in office.  Dispense: 238 g bottle  Dispense: 238 packet; Refill: 0        Follow Up   Return for Schedule colonoscopy with Dr. Fuller on 4/10/2025 at [facility].    Hospital arrival time: 12:00    Possible risks/complications, benefits, and alternatives to surgical or invasive procedures have been explained to patient and/or legal guardian.    Patient has been evaluated and can tolerate anesthesia and/or sedation. Risks, benefits, and alternatives to anesthesia and sedation have been explained to the patient and/or legal guardian. Patient verbalizes understanding and is willing to proceed with the above plan.     Patient was given instructions and counseling regarding her condition or for health maintenance advice. Please see specific information pulled into the AVS if appropriate.     Thank you for allowing me to participate in the care of this patient. Please call with questions or concerns.

## 2024-11-20 NOTE — PROGRESS NOTES
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 IOP, inpatient admission.  Increase Lexapro and start trazodone.      3/24: Focus on lower doses.  Start Pristiq.  Depression with anxious distress versus depression and generalized anxiety disorder.  She only has distractibility, no other symptoms of hypomania.      3/15: Continue lurasidone 40 mg at night, increase Ambien to 10 mg at night.  Continue low-dose Lamictal, plan to increase further.  Continue titrating off of amitriptyline.     3/10: Effexor has now been stopped.  Continue Latuda and Lamictal at present doses.  I will see her back in a few days and then at 6 weeks.  Bipolar 2?  If so, how come she is tolerating amitriptyline in the evenings?     2/15: Still utilizing lorazepam which means the anxiety is not under control.  Increase Effexor.    1/17: Continued improvement.  No changes, only on effexor 75 mg for 2 weeks.  Tolerating medications without side effects.      1/3: Continued improvement. Increase effexor to target residual dep anx. TMS to start 1/6.     PLAN:  Risk Assessment: Risk of self-harm acutely remain low. Risk factors include a history of suicidal ideation, mood disorder, anxiety disorder, presence of  psychosocial stressors such as colonic dysplasia, COVID-19 pandemic. Protective factors include no history of self-harm or suicide attempts, good social support, willingness to engage in care, improved depressive symptoms. Risk of self-harm chronically also remain low, but could be further elevated in the event of treatment noncompliance and/or AODA.  Medications:   CONTINUE light box.   CONTINUE clonidine 0.05 mg qhs. Risks, benefits, alternatives discussed with patient including dizziness, sedation, falls, low blood pressure, GI upset.  Use care when operating vehicle, vessel, or machine. After discussion of these risks and benefits, the patient voiced understanding and agreed to proceed.  CONTINUE lexapro 10 mg daily. Risks, benefits, alternatives discussed with patient including GI upset, nausea vomiting diarrhea, theoretical decrease of seizure threshold predisposing the patient to a slightly higher seizure risk, headaches, sexual dysfunction, serotonin syndrome, bleeding risk, increased suicidality in patients 24 years and younger.  After discussion of these risks and benefits, the patient voiced understanding and agreed to proceed.  CONTINUE gabapentin 300 mg qam, 300 qpm, 600 mg nightly. Risks, benefits, alternatives discussed with patient including sedation, dizziness/falls risk, GI upset, weight gain.  After discussion of these risks and benefits, the patient voiced understanding and agreed to proceed. UDS ordered, Mamadou reviewed.  CONTINUE Abilify 5 mg qhs. Risks, benefits, alternatives discussed with patient including increased energy, exacerbation of irritability, akathisia, GI upset, orthostatic hypotension, increased appetite.  After discussion of these risks and benefits, the patient voiced understanding and agreed to proceed.  CONTINUE amitriptyline 37.5 mg nightly. Risks, benefits, alternatives discussed with patient including sedation, dizziness, falls, GI upset, constipation, urinary retention,  "dry mouth.  After discussion of these risks and benefits, the patient voiced understanding and agreed to proceed.  CONTINUE lorazepam 1 mg bid PRN. Risks, benefits, alternatives discussed with patient including GI upset, sedation, dizziness, respiratory depression, falls risk.  After discussion of these risks and benefits, the patient voiced understanding and agreed to proceed.  S/P:   buspar 5 mg bid was ineffective.  Trazodone 50 mg nightly not helpful for insomnia.  Mirtazapine 15 mg, 7.5 mg: ineffective; made anxiety and depression worse.  Abilify: brain zaps and constipation  Seroquel made depression worse  celexa 20 mg daily, ineffective after several months  Doxepin caused \"brain zaps\"  Latuda was ineffective.  Amitriptyline 25 mg nightly helpful for sleep but we didn't want her on too many serotonergic meds. Restarted.  Ambien minimally helpful for insomnia.  Pristiq and effexor worsened anxiety, both low dose.  Stopped lamictal for unclear reasons 25 mg.  belsomra 5 mg nightly didn't help with insomnia.  Therapy: continue with Genevieve at [facility].  Referred for couples therapy 10/2022  Status post IOP at [facility] 10/2022  Labs/Studies: BMP to monitor sodium levels in February was entirely normal, EKG February shows rate 72, sinus, .    TREATMENT PLAN/GOALS: Continue supportive psychotherapy efforts and medications as indicated. Treatment and medication options discussed during today's visit. Patient acknowledged and verbally consented to continue with current treatment plan and was educated on the importance of compliance with treatment and follow-up appointments.    MEDICATION ISSUES:  YVETTE reviewed as expected.  Discussed medication options and treatment plan of prescribed medication as well as the risks, benefits, and side effects including potential falls, possible impaired driving and metabolic adversities among others. Patient is agreeable to call the office with any worsening of symptoms or " onset of side effects. Patient is agreeable to call 911 or go to the nearest ER should he/she begin having SI/HI. No medication side effects or related complaints today.     MEDS ORDERED DURING VISIT:    Return in about 2 months (around 1/21/2025).         This document has been electronically signed by Vinny Krishnamurthy MD  November 21, 2024 10:02 EST       Part of this note may be an electronic transcription/translation of spoken language to printed text using the Dragon Dictation System.

## 2024-11-20 NOTE — PATIENT INSTRUCTIONS
1.  Please return to clinic at your next scheduled visit.  Contact the clinic ([phone]) at least 24 hours prior in the event you need to cancel.  2.  Do no harm to yourself or others.    3.  Avoid alcohol and drugs.    4.  Take all medications as prescribed.  Please contact the clinic with any concerns. If you are in need of medication refills, please call the clinic at [phone].    5. Should you want to get in touch with your provider, Dr. Vinny Krishnamurthy, please utilize [employer] or contact the office ([phone]), and staff will be able to page Dr. Krishnamurthy directly.  6.  In the event you have personal crisis, contact the following crisis numbers: Suicide Prevention Hotline 1-539.515.6841; [facility] Helpline 0-977-128-ALEX[facility]REA; [facility] Emergency Room [phone]; text HELLO to 710891; or 593.

## 2024-11-21 ENCOUNTER — OFFICE VISIT (OUTPATIENT)
Dept: PSYCHIATRY | Facility: CLINIC | Age: 69
End: 2024-11-21
Payer: MEDICARE

## 2024-11-21 VITALS
WEIGHT: 156.8 LBS | HEART RATE: 92 BPM | OXYGEN SATURATION: 96 % | BODY MASS INDEX: 26.77 KG/M2 | DIASTOLIC BLOOD PRESSURE: 62 MMHG | HEIGHT: 64 IN | SYSTOLIC BLOOD PRESSURE: 136 MMHG

## 2024-11-21 DIAGNOSIS — F41.0 PANIC DISORDER: ICD-10-CM

## 2024-11-21 DIAGNOSIS — F41.1 GENERALIZED ANXIETY DISORDER: Primary | ICD-10-CM

## 2024-11-21 DIAGNOSIS — F41.0 PANIC ATTACKS: ICD-10-CM

## 2024-11-21 DIAGNOSIS — F33.40 RECURRENT MAJOR DEPRESSIVE DISORDER IN REMISSION: ICD-10-CM

## 2024-11-21 DIAGNOSIS — F33.1 MODERATE EPISODE OF RECURRENT MAJOR DEPRESSIVE DISORDER: ICD-10-CM

## 2024-11-21 DIAGNOSIS — F51.05 INSOMNIA DUE TO MENTAL CONDITION: ICD-10-CM

## 2024-11-21 RX ORDER — ARIPIPRAZOLE 5 MG/1
5 TABLET ORAL DAILY
Qty: 90 TABLET | Refills: 1 | Status: SHIPPED | OUTPATIENT
Start: 2024-11-21

## 2024-11-21 RX ORDER — CLONIDINE HYDROCHLORIDE 0.1 MG/1
0.1 TABLET ORAL NIGHTLY
Qty: 90 TABLET | Refills: 1 | Status: SHIPPED | OUTPATIENT
Start: 2024-11-21

## 2024-12-13 RX ORDER — ATORVASTATIN CALCIUM 20 MG/1
20 TABLET, FILM COATED ORAL DAILY
Qty: 90 TABLET | Refills: 0 | Status: SHIPPED | OUTPATIENT
Start: 2024-12-13

## 2025-01-23 ENCOUNTER — OFFICE VISIT (OUTPATIENT)
Dept: PSYCHIATRY | Facility: CLINIC | Age: 70
End: 2025-01-23
Payer: MEDICARE

## 2025-01-23 VITALS
BODY MASS INDEX: 27.14 KG/M2 | SYSTOLIC BLOOD PRESSURE: 119 MMHG | DIASTOLIC BLOOD PRESSURE: 58 MMHG | HEIGHT: 64 IN | HEART RATE: 89 BPM | WEIGHT: 159 LBS

## 2025-01-23 DIAGNOSIS — F41.0 PANIC ATTACKS: ICD-10-CM

## 2025-01-23 DIAGNOSIS — F41.0 PANIC DISORDER: ICD-10-CM

## 2025-01-23 DIAGNOSIS — F41.1 GENERALIZED ANXIETY DISORDER: Primary | ICD-10-CM

## 2025-01-23 DIAGNOSIS — F33.40 RECURRENT MAJOR DEPRESSIVE DISORDER IN REMISSION: ICD-10-CM

## 2025-01-23 DIAGNOSIS — F51.05 INSOMNIA DUE TO MENTAL CONDITION: ICD-10-CM

## 2025-01-23 NOTE — TREATMENT PLAN
Anxiety:  2/10 progressing    Goals:  Patient will develop and implement behavioral and cognitive strategies to reduce anxiety and irrational fears, monthly, using Rogerian psychotherapy and CBT where appropriate.  Help patient explore past emotional issues in relation to present anxiety, monthly, until remission of symptoms, using Rogerian psychotherapy and CBT where appropriate.  Help patient develop an awareness of their cognitive and physical responses to anxiety, monthly, until remission of symptoms, using Rogerian psychotherapy and CBT where appropriate.          Depression:  1/10 progressing    Goals:  Patient will demonstrate the ability to initiate new constructive life skills outside of sessions on a consistent basis, monthly, using Rogerian psychotherapy and CBT where appropriate.  Assist patient in setting attainable activities of daily living goals, monthly, using Rogerian psychotherapy and CBT where appropriate.  Provide education about depression, monthly, using Rogerian psychotherapy and CBT where appropriate.  Assist patient in developing healthy coping strategies, monthly, using Rogerian psychotherapy and CBT where appropriate.    Rogerian psychotherapy and CBT will be used to help accomplish the above goals and manage depression and anxiety related to intrusive thoughts about illness/death, family conflict, isolation, family well-being      I have discussed and reviewed this treatment plan with the patient.      Reviewed by Vinny Krishnamurthy MD   01/23/2025

## 2025-01-23 NOTE — PROGRESS NOTES
"Subjective   Shabana Ferguson is a 69 y.o. female who presents today for follow up    Referring Provider:  No referring provider defined for this encounter.    Chief Complaint:  mdd fareed    History of Present Illness:     Shabana Ferguson is a 64 year old /White female, hx of anxiety and depression, fractured patella, HLD, osteopenia, ulcer referred by MARIE Brice, for anxiety and depression management.   Initial Chart Review 2020: Psychotropic medications: amitriptyline 25 mg QHS, Celexa 20 mg p.o. daily, lorazepam 0.5 p.o. twice daily as needed anxiety.     Chart review:   2025: no visits.  2024: gen surg, unknown, benign mammo  10/10/2024: no visits.  08/15/2024: no visits.  24: no visits.  2024: No visits.  24: no visits.    Plannin2024: Fairly stable, no changes.   10/10/2024: Much improved FAREED, no changes. May be stable.  2024: Improved, give clonidine more time to work. 6w    Visits (Below):  \"Shabana\" and Jaya  Has never been on vraylar   2025:   In person interview:  \"I've been good.\"  Loves having Nicola back.  No MH complaints.  Not really taking the ativan  Mood/Depression: MDD stable  Anxiety: FAREED fairly stable  Social anxiety: chronic, persistent  Panic attacks: stable  Energy: stable  Concentration: stable  Insomnia: initial, stable  Eatin, 156, 153, 152, 152, 154, 155, 156 lbs  Refills: y  Substances: def  Therapy:   No longer doing group therapy with other women her age ()  individual counseling  [facility]  Medication compliant: y  SE: n  No SI HI AVH.      2024:   In person interview:  \"Nicola moved back in with us.\"  Likes that he's back  I don't take the ativan anymore  Still have some minimal anxiety  Had some issues getting gabapentin filled right, but straightened it out at the pharmacy  Mood/Depression: MDD stable  Anxiety: FAREED fairly stable  Social anxiety: chronic, " "persistent  Panic attacks: stable  Energy: stable  Concentration: stable  Insomnia: initial, stable  Eatin, 153, 152, 152, 154, 155, 156 lbs  Refills: y  Substances: def  Therapy:   Stopped  group therapy with other women her age.  individual counseling  [facility]  Medication compliant: y  SE: n  No MARLA MIDDLETON AVH.      10/10/2024:   In person interview:  \"I'm doing a lot better.\"  Not taking ativan  The clonidine is helping  Family is good. Obdulio is on vacation.  Better intrusive thoughts  Mood/Depression: MDD stable  Anxiety: FAREED improved intrusive thoughts, less restless, on edge  Social anxiety: chronic, persistent  Panic attacks: stable  Energy: stable  Concentration: stable  Insomnia: initial, stable  Eatin, 152, 152, 154, 155, 156 lbs  Refills: y  Substances: def  Therapy:   Stopped  group therapy with other women her age.  Still doing individual counseling  [facility]  Medication compliant: y  SE: n  No MARLA HI AVH.      2024:   In person interview:  \"I'm ok... still feeling on edge.\"  I started it recently a week and a half ago. Taking half a tab.  Spells of anxiety improving  Family helps as well  Less intrusive thoughts about death and illness  Mood/Depression: MDD stable  Anxiety: FAREED stable  Social anxiety: chronic, persistent  Panic attacks: stable  Energy: stable  Concentration: stable  Insomnia: initial, stable  Eatin, 152, 154, 155, 156 lbs  Refills: y  Substances: def  Therapy:   Stopped  group therapy with other women her age.  Still doing individual counseling  [facility]  Medication compliant: y  SE: n  No MARLA HI AV.      24: In person interview:  \"Overall I'm doing better.\"  Spells of anxiety when doesn't have something to distract her.  Volunteering at helping hand 2x/wk  [facility]  Spending time with grandchildren 2x wk  When not distracted, thinks about illness and death. \"I have these intrusive thoughts.\"  Mood/Depression: MDD stable  Anxiety: FAREED " "stable  Social anxiety: chronic, persistent  Panic attacks: rare, controlled on bzd, half a tab qod  Energy: stable  Concentration: stable  Insomnia: rare initial  Eatin, 154, 155, 156 lbs  Refills: y  Substances: def  Therapy:   Stopped  group therapy with other women her age.  Still doing individual counseling  [facility]  Medication compliant: y  SE: n  No SI HI AVH.    ...    20 H&P: Patient presented today with her . Both provided extensive history regarding her depression and anxiety. Patient had been seen by  at [facility] for roughly a year and a half. Patient reported that Dr. Gordon had become convinced that she had bipolar disorder, which both she and her  disagree with. Patient and  deny any episodes in the past where she experienced, for an extended length of time lasting at least several days, no need for sleep, rapid speech, risk-taking behaviors. Per the  and wife pair, her previous psychiatrist insisted on her being on a mood stabilizer.   Patient reports that she had been stable on Celexa for \"several years.\" In , the patient underwent a colonoscopy where dysplasia was revealed. This led to significant anxiety and a \"breakdown.\" The dysplasia was subsequently removed. Due to the heightened anxiety the patient's psychiatrist took the patient off of Celexa and started her on Lexapro. The patient did not tolerate Lexapro well, she continued to remain anxious, she began to engage in therapy in addition to medication management. The Lexapro was subsequently switched to Viibryd. The Viibryd did not work. The patient also began to experience panic attacks including shortness of breath, crying, tachycardia, palpitations. The panic attacks were new and had never happened before.   In 2020 the patient experienced another breakdown and sunk into a deeper depression. The COVID-19 pandemic only worsened her situation. In February the " patient's , a physician, decided to become her full-time caretaker as she would become anxious and panicky without him present. The patient also experienced intermittent bouts of suicidal ideation.   Recently, the patient and  demanded of their psychiatrist to take her off of Viibryd and Seroquel. They actually tapered her off of Seroquel themselves. They asked him to switch her back to Celexa. She has been on Celexa now for the last 3 days and is already begun to experience improvement. She states that the Seroquel led to having hallucinations and actually worsened insomnia. Last night she did not take Seroquel for the first time in many months and she slept very well. Her mood is slightly improved. Regarding her anxiety, she endorses muscle tension and fatigue restlessness irritability and a history of insomnia. Regarding her depression she denies SI HI AVH, denies anhedonia denies guilt, notes some decreased energy, psychomotor retardation, and a history of insomnia. They had also tried acupuncture in the past with some success. Psychiatric review of systems is negative for psychosis nancy, positive for anxiety and depression. Possibly positive for  depression and postpartum depression. The patient is medication compliant.       Past Psychiatric History:  Began Psychiatric Treatment: Several years   Dx: Depression and anxiety   Psychiatrist: Doctor Gordon for the last year and a half   Therapist: Sees a therapist at [facility] History: Denies, Although she came close to needing admission recently.   Medication Trials: See HPI. Seroquel, Prozac which was too activating, Zoloft which was too activating, Celexa which worked well, Effexor which worked well, Lexapro which did not work, Viibryd which did not work.   Self-Harm: Denies   Suicide Attempts: Denies   OB/GYN HISTORY:  Sexually Active: OB/GYN history deferred   Substance Abuse History:  Types: Denies all, including illicit    Social History:  Marital Status:    Employed: No     Kids: 4   House: House    Hx: Denies   Family History:  Suicide Attempts: Deferred today, Due to lack of time   Suicide Completions: Deferred   Substance Use: Deferred   Psychiatric Conditions: Deferred    depression, psychosis, anxiety: Deferred   Developmental History:  Born: Deferred   Siblings: Deferred   Access to Weapons: Denies   Thought Content: no suicidal ideation, no homicidal ideation, no auditory hallucinations, no visual hallucinations, and     PHQ-9 Depression Screening  PHQ-9 Total Score:      Little interest or pleasure in doing things?     Feeling down, depressed, or hopeless?     Trouble falling or staying asleep, or sleeping too much?     Feeling tired or having little energy?     Poor appetite or overeating?     Feeling bad about yourself - or that you are a failure or have let yourself or your family down?     Trouble concentrating on things, such as reading the newspaper or watching television?     Moving or speaking so slowly that other people could have noticed? Or the opposite - being so fidgety or restless that you have been moving around a lot more than usual?     Thoughts that you would be better off dead, or of hurting yourself in some way?     PHQ-9 Total Score       FAREED-7  Feeling nervous, anxious or on edge: Several days  Not being able to stop or control worrying: Several days  Worrying too much about different things: Several days  Trouble Relaxing: Several days  Being so restless that it is hard to sit still: Not at all  Feeling afraid as if something awful might happen: Several days  Becoming easily annoyed or irritable: Not at all  FAREED 7 Total Score: 5  If you checked any problems, how difficult have these problems made it for you to do your work, take care of things at home, or get along with other people: Somewhat difficult    Past Surgical History:  Past Surgical History:   Procedure Laterality  Date    COLONOSCOPY  [id number]    last scope 2020    COLONOSCOPY N/A 10/29/2021    Procedure: COLONOSCOPY with possible biopies.;  Surgeon: Skyler Fuller MD;  Location: [facility] ENDOSCOPY;  Service: General;  Laterality: N/A;    KNEE SURGERY  2017    broken knee       Problem List:  Patient Active Problem List   Diagnosis    Anxiety    Depression    History of fracture of patella    Hyperlipemia    Osteopenia    Ulcerative lesion    Constipation    History of colonic polyps    Tinnitus of right ear    Sensorineural hearing loss (SNHL) of both ears    Right shoulder pain    Screening for malignant neoplasm of colon       Allergy:   No Known Allergies     Discontinued Medications:  There are no discontinued medications.        Current Medications:   Current Outpatient Medications   Medication Sig Dispense Refill    amitriptyline (ELAVIL) 25 MG tablet Take 1.5 tablets by mouth Every Night. 135 tablet 3    ARIPiprazole (Abilify) 5 MG tablet Take 1 tablet by mouth Daily. 90 tablet 1    atorvastatin (LIPITOR) 20 MG tablet TAKE 1 TABLET BY MOUTH DAILY 90 tablet 0    Calcium Carb-Cholecalciferol (OYSCO 500 + D) 500-200 MG-UNIT tablet       cholecalciferol (VITAMIN D3) 25 MCG (1000 UT) tablet Take 1 tablet by mouth Daily.      cloNIDine (CATAPRES) 0.1 MG tablet Take 1 tablet by mouth Every Night. 90 tablet 1    Coenzyme Q10 (CO Q10 PO) Take  by mouth.      escitalopram (LEXAPRO) 10 MG tablet Take 1 tablet by mouth Daily. 90 tablet 3    gabapentin (NEURONTIN) 300 MG capsule 300 mg qam, 300 mg qpm, 600 mg qhs 120 capsule 5    GARLIC PO garlic 1,000 mg oral capsule take 1 capsule by oral route daily   Active      Multiple Vitamins-Iron (MULTIVITAMIN PLUS IRON ADULT PO) Women's Multivitamin 18 mg iron-400 mcg-500 mg oral tablet take 1 tablet by oral route daily   Active      Omega-3 Fatty Acids (fish oil) 1000 MG capsule capsule 3 capsules 3 (Three) Times a Day With Meals. PT(PATIENT) REPORTS SHE TAKES MEDICATION  MORNING/LUNCH/EVENING      polyethylene glycol (MIRALAX) 17 g packet Take as directed.  Instructions given in office.  Dispense: 238 g bottle 238 packet 0    Probiotic Product (PROBIOTIC PO) Take  by mouth.      vitamin E 400 UNIT capsule vitamin E 400 unit oral capsule take 1 capsule by oral route daily   Active      LORazepam (ATIVAN) 1 MG tablet Take 1 tablet by mouth 2 (Two) Times a Day As Needed for Anxiety. for anxiety (Patient not taking: Reported on 1/23/2025) 60 tablet 5     No current facility-administered medications for this visit.       Past Medical History:  Past Medical History:   Diagnosis Date    Acid reflux 2022    Anxiety     Depression     Fracture of patella 09/12/2017    Hyperlipemia     Osteopenia     Panic disorder     Psychiatric inpatient 07/22/2022    Discharged 07/22/2022 for Anxiety, Depression, and Suicidal Bx's    Seizures     reports seizure activity, but under control.       Social History     Socioeconomic History    Marital status:    Tobacco Use    Smoking status: Never    Smokeless tobacco: Never   Vaping Use    Vaping status: Never Used   Substance and Sexual Activity    Alcohol use: Not Currently    Drug use: Never    Sexual activity: Not Currently         Family History   Problem Relation Age of Onset    Stroke Mother     Heart disease Mother     No Known Problems Father     No Known Problems Sister     Cancer Brother     No Known Problems Maternal Aunt     No Known Problems Paternal Aunt     No Known Problems Maternal Uncle     No Known Problems Paternal Uncle     No Known Problems Maternal Grandfather     No Known Problems Maternal Grandmother     No Known Problems Paternal Grandfather     No Known Problems Paternal Grandmother     No Known Problems Cousin     Seizures Son     No Known Problems Other     ADD / ADHD Neg Hx     Alcohol abuse Neg Hx     Anxiety disorder Neg Hx     Bipolar disorder Neg Hx     Dementia Neg Hx     Depression Neg Hx     Drug abuse Neg Hx      "OCD Neg Hx     Paranoid behavior Neg Hx     Schizophrenia Neg Hx     Self-Injurious Behavior  Neg Hx     Suicide Attempts Neg Hx      Review of Systems:  Review of Systems   Constitutional:  Negative for diaphoresis and fatigue.   HENT:  Negative for drooling.    Eyes:  Negative for visual disturbance.   Respiratory:  Negative for cough and shortness of breath.    Cardiovascular:  Negative for chest pain, palpitations and leg swelling.   Gastrointestinal:  Negative for nausea and vomiting.   Endocrine: Negative for cold intolerance and heat intolerance.   Genitourinary:  Negative for difficulty urinating.   Musculoskeletal:  Negative for joint swelling.   Allergic/Immunologic: Negative for immunocompromised state.   Neurological:  Negative for dizziness, seizures, speech difficulty and numbness.         Mental Status Exam  Appearance  : sitting in a chair, good eye contact, normal street clothes, groomed, in person  Behavior  : pleasant and cooperative  Motor  : No abnormal  Speech  : normal rhythm, rate, volume, tone, not hyperverbal, not pressured, normal prosidy, Speaks with an accent  Mood  : \"I'm doing so much better\"  Affect  : euthymic, mood congruent, fair variability  Thought Content  : negative suicidal ideations, negative homicidal ideations, negative obsessions  Perceptions  : negative auditory hallucinations, negative visual hallucinations  Thought Process  : linear  Insight/Judgement  : fair/fair  Cognition  : grossly intact  Attention  : intact      Physical Exam:  Physical Exam    Vital Signs:   /58   Pulse 89   Ht 162.6 cm (64\")   Wt 72.1 kg (159 lb)   BMI 27.29 kg/m²      Lab Results:   No visits with results within 6 Month(s) from this visit.   Latest known visit with results is:   Office Visit on 12/28/2023   Component Date Value Ref Range Status    Glucose 12/28/2023 88  65 - 99 mg/dL Final    BUN 12/28/2023 16  8 - 23 mg/dL Final    Creatinine 12/28/2023 0.82  0.57 - 1.00 mg/dL Final "    Sodium 12/28/2023 139  136 - 145 mmol/L Final    Potassium 12/28/2023 5.0  3.5 - 5.2 mmol/L Final    Chloride 12/28/2023 102  98 - 107 mmol/L Final    CO2 12/28/2023 26.1  22.0 - 29.0 mmol/L Final    Calcium 12/28/2023 10.2  8.6 - 10.5 mg/dL Final    Total Protein 12/28/2023 7.3  6.0 - 8.5 g/dL Final    Albumin 12/28/2023 4.6  3.5 - 5.2 g/dL Final    ALT (SGPT) 12/28/2023 26  1 - 33 U/L Final    AST (SGOT) 12/28/2023 30  1 - 32 U/L Final    Alkaline Phosphatase 12/28/2023 110  39 - 117 U/L Final    Total Bilirubin 12/28/2023 1.0  0.0 - 1.2 mg/dL Final    Globulin 12/28/2023 2.7  gm/dL Final    A/G Ratio 12/28/2023 1.7  g/dL Final    BUN/Creatinine Ratio 12/28/2023 19.5  7.0 - 25.0 Final    Anion Gap 12/28/2023 10.9  5.0 - 15.0 mmol/L Final    eGFR 12/28/2023 77.5  >60.0 mL/min/1.73 Final    Total Cholesterol 12/28/2023 191  0 - 200 mg/dL Final    Triglycerides 12/28/2023 127  0 - 150 mg/dL Final    HDL Cholesterol 12/28/2023 61 (H)  40 - 60 mg/dL Final    LDL Cholesterol  12/28/2023 108 (H)  0 - 100 mg/dL Final    VLDL Cholesterol 12/28/2023 22  5 - 40 mg/dL Final    LDL/HDL Ratio 12/28/2023 1.71   Final    TSH 12/28/2023 0.821  0.270 - 4.200 uIU/mL Final    25 Hydroxy, Vitamin D 12/28/2023 53.5  30.0 - 100.0 ng/ml Final    Hemoglobin A1C 12/28/2023 5.70 (H)  4.80 - 5.60 % Final    WBC 12/28/2023 7.13  3.40 - 10.80 10*3/mm3 Final    RBC 12/28/2023 4.83  3.77 - 5.28 10*6/mm3 Final    Hemoglobin 12/28/2023 14.0  12.0 - 15.9 g/dL Final    Hematocrit 12/28/2023 43.3  34.0 - 46.6 % Final    MCV 12/28/2023 89.6  79.0 - 97.0 fL Final    MCH 12/28/2023 29.0  26.6 - 33.0 pg Final    MCHC 12/28/2023 32.3  31.5 - 35.7 g/dL Final    RDW 12/28/2023 13.6  12.3 - 15.4 % Final    RDW-SD 12/28/2023 44.0  37.0 - 54.0 fl Final    MPV 12/28/2023 9.3  6.0 - 12.0 fL Final    Platelets 12/28/2023 288  140 - 450 10*3/mm3 Final    Neutrophil % 12/28/2023 54.8  42.7 - 76.0 % Final    Lymphocyte % 12/28/2023 35.8  19.6 - 45.3 % Final     Monocyte % 12/28/2023 8.1  5.0 - 12.0 % Final    Eosinophil % 12/28/2023 0.7  0.3 - 6.2 % Final    Basophil % 12/28/2023 0.3  0.0 - 1.5 % Final    Immature Grans % 12/28/2023 0.3  0.0 - 0.5 % Final    Neutrophils, Absolute 12/28/2023 3.91  1.70 - 7.00 10*3/mm3 Final    Lymphocytes, Absolute 12/28/2023 2.55  0.70 - 3.10 10*3/mm3 Final    Monocytes, Absolute 12/28/2023 0.58  0.10 - 0.90 10*3/mm3 Final    Eosinophils, Absolute 12/28/2023 0.05  0.00 - 0.40 10*3/mm3 Final    Basophils, Absolute 12/28/2023 0.02  0.00 - 0.20 10*3/mm3 Final    Immature Grans, Absolute 12/28/2023 0.02  0.00 - 0.05 10*3/mm3 Final    nRBC 12/28/2023 0.0  0.0 - 0.2 /100 WBC Final       EKG Results:  No orders to display       Imaging Results:  No Images in the past 120 days found..      Assessment & Plan   Diagnoses and all orders for this visit:    1. Generalized anxiety disorder (Primary)    2. Panic attacks    3. Panic disorder    4. Insomnia due to mental condition    5. Recurrent major depressive disorder in remission        Visit Diagnoses:    ICD-10-CM ICD-9-CM   1. Generalized anxiety disorder  F41.1 300.02   2. Panic attacks  F41.0 300.01   3. Panic disorder  F41.0 300.01   4. Insomnia due to mental condition  F51.05 300.9     327.02   5. Recurrent major depressive disorder in remission  F33.40 296.35   INITIAL presentation most consistent with major depressive disorder in partial remission, FAREED, rare panic attacks.    01/23/2025: Stable, well, no med changes.    Acknowledged and normalized patient's thoughts, feelings, and concerns. Allowed patient to freely discuss and process issues, such as:  Anxiety and depression regarding family conflict/relationships.  ... using Rogerian psychotherapeutic techniques including unconditional positive regard, reflective listening, and demonstrating clear empathy, with the goal of ameliorating symptoms and maintaining, restoring, or improving self-esteem, adaptive skills, and ego or psychological  functions (Tayla et al. 1991), the long-term goal of which is to develop a better, healthier perspective and help the patient bear their circumstances more easily.  Time (minutes) spent providing supportive psychotherapy: 5  (This time is exclusive to the therapy session and separate from the time spent on activities used to meet the criteria for the E/M service (history, exam, medical decision-making).)  Start: 10:04  Stop: 10:09  Functional status: mild impairment  Treatment plan: Medication management and supportive psychotherapy  Prognosis: good  Progress: stable  2m    11/21/2024: Fairly stable, no changes.     10/10/2024: Much improved FAREED, no changes. May be stable.    08/29/2024: Improved, give clonidine more time to work. 6w    07/11/2024: Intrusive thoughts when not distracted, start low dose clonidine. Has a family reunion coming up soon (all except son in [place]).    06/06/2024: Some bouts of depression and anxiety, with anxiety worse, lasting about 1 hr, daily. In between, complete resolution. Monitor for now. May add lamictal or increase elavil.    4/23/24: Improved, no changes to give elavil more time to work.    3/22: No change at all. Reduce dose back to previous of abilify (5 mg), and increase amitriptyline instead.    2/9: Increase abilify with the supply she has: 7 mg daily for a month, then 7.5 mg daily. Targeting anx, dep, insomnia. Watch brain zaps. Discussed vraylar as well.    1/11: Stable, well, explored her catastrophizing today. No changes. Wants to re-establish individual therapy with her old therapist at [facility] to manage catastrophizing, thoughts when she is lonely. Conflict processed between pt and  today; suggested restarting marriage counseling thru pt's therapist (who can make a referral). How much of patient's MH can be explained by her relp with her hsuband?    12/6: No changes for now, but may increase to 7.5 mg daily of abilify (half a 15 mg tab). Still some residual  MDD.    11/2: chronic mild anhedonia. Increase abilify and start light box.    8/31: Stable, well, no changes. Light box, we discussed it.    6/30: Stable, well. No changes.     4/28: Euthymic well. No changes.     3/3: Stable, well, no changes.      1/20: Well, stable, no changes.     12/9: Significant improvement. No changes.     11/18: Residual anxiety, residual depression.  Increase gabapentin, start amitriptyline at night for initial insomnia.  Close follow-up.     11/7: Residual anx (at noon), consider adding gabapentin in the mornings. and dep. Increase gabapentin in the afternoons. No other changes.     10/31: Couples therapy, increase Abilify, close follow-up.  I feel that their marriage issues have come to a head recently, and this stressor could largely explain patient's depression and anxiety.  Patient mentioned Celexa again, she is likely interested in switching.     9/30: well, stable, some constriction today. No changes.     8/30: Remarkably well. Initial insomnia.    8/1: Try another mood stabilizer in place of olanzapine.  Will trial Abilify again.  Also consider Geodon.      6/17: start buspar, doing better.     5/20: Continue Lexapro and lorazepam.  Try Belsomra in place of Ambien.      4/22: Some improvement in anx and dep, but still residual symptoms.  Persistent insomnia. Declines IOP, inpatient admission.  Increase Lexapro and start trazodone.      3/24: Focus on lower doses.  Start Pristiq.  Depression with anxious distress versus depression and generalized anxiety disorder.  She only has distractibility, no other symptoms of hypomania.      3/15: Continue lurasidone 40 mg at night, increase Ambien to 10 mg at night.  Continue low-dose Lamictal, plan to increase further.  Continue titrating off of amitriptyline.     3/10: Effexor has now been stopped.  Continue Latuda and Lamictal at present doses.  I will see her back in a few days and then at 6 weeks.  Bipolar 2?  If so, how come she is  tolerating amitriptyline in the evenings?     2/15: Still utilizing lorazepam which means the anxiety is not under control.  Increase Effexor.    1/17: Continued improvement.  No changes, only on effexor 75 mg for 2 weeks.  Tolerating medications without side effects.      1/3: Continued improvement. Increase effexor to target residual dep anx. TMS to start 1/6.     PLAN:  Risk Assessment: Risk of self-harm acutely remain low. Risk factors include a history of suicidal ideation, mood disorder, anxiety disorder, presence of psychosocial stressors such as colonic dysplasia, COVID-19 pandemic. Protective factors include no history of self-harm or suicide attempts, good social support, willingness to engage in care, improved depressive symptoms. Risk of self-harm chronically also remain low, but could be further elevated in the event of treatment noncompliance and/or AODA.  Medications:   CONTINUE light box.   CONTINUE clonidine 0.05 mg qhs. Risks, benefits, alternatives discussed with patient including dizziness, sedation, falls, low blood pressure, GI upset.  Use care when operating vehicle, vessel, or machine. After discussion of these risks and benefits, the patient voiced understanding and agreed to proceed.  CONTINUE lexapro 10 mg daily. Risks, benefits, alternatives discussed with patient including GI upset, nausea vomiting diarrhea, theoretical decrease of seizure threshold predisposing the patient to a slightly higher seizure risk, headaches, sexual dysfunction, serotonin syndrome, bleeding risk, increased suicidality in patients 24 years and younger.  After discussion of these risks and benefits, the patient voiced understanding and agreed to proceed.  CONTINUE gabapentin 300 mg qam, 300 qpm, 600 mg nightly. Risks, benefits, alternatives discussed with patient including sedation, dizziness/falls risk, GI upset, weight gain.  After discussion of these risks and benefits, the patient voiced understanding and  "agreed to proceed. UDS ordered, Mamadou reviewed.  CONTINUE Abilify 5 mg qhs. Risks, benefits, alternatives discussed with patient including increased energy, exacerbation of irritability, akathisia, GI upset, orthostatic hypotension, increased appetite.  After discussion of these risks and benefits, the patient voiced understanding and agreed to proceed.  CONTINUE amitriptyline 37.5 mg nightly. Risks, benefits, alternatives discussed with patient including sedation, dizziness, falls, GI upset, constipation, urinary retention, dry mouth.  After discussion of these risks and benefits, the patient voiced understanding and agreed to proceed.  CONTINUE lorazepam 1 mg bid PRN. Risks, benefits, alternatives discussed with patient including GI upset, sedation, dizziness, respiratory depression, falls risk.  After discussion of these risks and benefits, the patient voiced understanding and agreed to proceed.  S/P:   buspar 5 mg bid was ineffective.  Trazodone 50 mg nightly not helpful for insomnia.  Mirtazapine 15 mg, 7.5 mg: ineffective; made anxiety and depression worse.  Abilify: brain zaps and constipation  Seroquel made depression worse  celexa 20 mg daily, ineffective after several months  Doxepin caused \"brain zaps\"  Latuda was ineffective.  Amitriptyline 25 mg nightly helpful for sleep but we didn't want her on too many serotonergic meds. Restarted.  Ambien minimally helpful for insomnia.  Pristiq and effexor worsened anxiety, both low dose.  Stopped lamictal for unclear reasons 25 mg.  belsomra 5 mg nightly didn't help with insomnia.  Therapy: continue with Genevieve at [facility].  Referred for couples therapy 10/2022  Status post IOP at [facility] 10/2022  Labs/Studies: BMP to monitor sodium levels in February was entirely normal, EKG February shows rate 72, sinus, .    TREATMENT PLAN/GOALS: Continue supportive psychotherapy efforts and medications as indicated. Treatment and medication options discussed during " today's visit. Patient acknowledged and verbally consented to continue with current treatment plan and was educated on the importance of compliance with treatment and follow-up appointments.    MEDICATION ISSUES:  YVETTE reviewed as expected.  Discussed medication options and treatment plan of prescribed medication as well as the risks, benefits, and side effects including potential falls, possible impaired driving and metabolic adversities among others. Patient is agreeable to call the office with any worsening of symptoms or onset of side effects. Patient is agreeable to call 911 or go to the nearest ER should he/she begin having SI/HI. No medication side effects or related complaints today.     MEDS ORDERED DURING VISIT:    Return in about 2 months (around 3/23/2025).         This document has been electronically signed by Vinny Krishnamurthy MD  January 23, 2025 10:11 EST       Part of this note may be an electronic transcription/translation of spoken language to printed text using the Dragon Dictation System.     FAMILY HISTORY:  No pertinent family history in first degree relatives

## 2025-02-06 ENCOUNTER — OFFICE VISIT (OUTPATIENT)
Dept: FAMILY MEDICINE CLINIC | Facility: CLINIC | Age: 70
End: 2025-02-06
Payer: MEDICARE

## 2025-02-06 VITALS
TEMPERATURE: 97.9 F | OXYGEN SATURATION: 98 % | HEART RATE: 82 BPM | WEIGHT: 155 LBS | DIASTOLIC BLOOD PRESSURE: 68 MMHG | HEIGHT: 64 IN | BODY MASS INDEX: 26.46 KG/M2 | SYSTOLIC BLOOD PRESSURE: 108 MMHG

## 2025-02-06 DIAGNOSIS — E78.2 MIXED HYPERLIPIDEMIA: ICD-10-CM

## 2025-02-06 DIAGNOSIS — Z00.00 ENCOUNTER FOR SUBSEQUENT ANNUAL WELLNESS VISIT (AWV) IN MEDICARE PATIENT: Primary | ICD-10-CM

## 2025-02-06 DIAGNOSIS — Z86.0100 HISTORY OF COLON POLYPS: ICD-10-CM

## 2025-02-06 DIAGNOSIS — E55.9 VITAMIN D DEFICIENCY: ICD-10-CM

## 2025-02-06 LAB
25(OH)D3 SERPL-MCNC: 40.7 NG/ML (ref 30–100)
ALBUMIN SERPL-MCNC: 3.9 G/DL (ref 3.5–5.2)
ALBUMIN/GLOB SERPL: 1.2 G/DL
ALP SERPL-CCNC: 121 U/L (ref 39–117)
ALT SERPL W P-5'-P-CCNC: 25 U/L (ref 1–33)
ANION GAP SERPL CALCULATED.3IONS-SCNC: 10.3 MMOL/L (ref 5–15)
AST SERPL-CCNC: 31 U/L (ref 1–32)
BASOPHILS # BLD AUTO: 0.04 10*3/MM3 (ref 0–0.2)
BASOPHILS NFR BLD AUTO: 0.6 % (ref 0–1.5)
BILIRUB SERPL-MCNC: 0.8 MG/DL (ref 0–1.2)
BUN SERPL-MCNC: 20 MG/DL (ref 8–23)
BUN/CREAT SERPL: 22.2 (ref 7–25)
CALCIUM SPEC-SCNC: 9.9 MG/DL (ref 8.6–10.5)
CHLORIDE SERPL-SCNC: 104 MMOL/L (ref 98–107)
CHOLEST SERPL-MCNC: 172 MG/DL (ref 0–200)
CO2 SERPL-SCNC: 25.7 MMOL/L (ref 22–29)
CREAT SERPL-MCNC: 0.9 MG/DL (ref 0.57–1)
DEPRECATED RDW RBC AUTO: 46.6 FL (ref 37–54)
EGFRCR SERPLBLD CKD-EPI 2021: 68.9 ML/MIN/1.73
EOSINOPHIL # BLD AUTO: 0.06 10*3/MM3 (ref 0–0.4)
EOSINOPHIL NFR BLD AUTO: 0.9 % (ref 0.3–6.2)
ERYTHROCYTE [DISTWIDTH] IN BLOOD BY AUTOMATED COUNT: 13.7 % (ref 12.3–15.4)
GLOBULIN UR ELPH-MCNC: 3.2 GM/DL
GLUCOSE SERPL-MCNC: 101 MG/DL (ref 65–99)
HCT VFR BLD AUTO: 42.1 % (ref 34–46.6)
HDLC SERPL-MCNC: 54 MG/DL (ref 40–60)
HGB BLD-MCNC: 13.1 G/DL (ref 12–15.9)
IMM GRANULOCYTES # BLD AUTO: 0.01 10*3/MM3 (ref 0–0.05)
IMM GRANULOCYTES NFR BLD AUTO: 0.2 % (ref 0–0.5)
LDLC SERPL CALC-MCNC: 92 MG/DL (ref 0–100)
LDLC/HDLC SERPL: 1.63 {RATIO}
LYMPHOCYTES # BLD AUTO: 2.09 10*3/MM3 (ref 0.7–3.1)
LYMPHOCYTES NFR BLD AUTO: 32.3 % (ref 19.6–45.3)
MCH RBC QN AUTO: 29.2 PG (ref 26.6–33)
MCHC RBC AUTO-ENTMCNC: 31.1 G/DL (ref 31.5–35.7)
MCV RBC AUTO: 93.8 FL (ref 79–97)
MONOCYTES # BLD AUTO: 0.61 10*3/MM3 (ref 0.1–0.9)
MONOCYTES NFR BLD AUTO: 9.4 % (ref 5–12)
NEUTROPHILS NFR BLD AUTO: 3.67 10*3/MM3 (ref 1.7–7)
NEUTROPHILS NFR BLD AUTO: 56.6 % (ref 42.7–76)
NRBC BLD AUTO-RTO: 0 /100 WBC (ref 0–0.2)
PLATELET # BLD AUTO: 267 10*3/MM3 (ref 140–450)
PMV BLD AUTO: 9.8 FL (ref 6–12)
POTASSIUM SERPL-SCNC: 4.3 MMOL/L (ref 3.5–5.2)
PROT SERPL-MCNC: 7.1 G/DL (ref 6–8.5)
RBC # BLD AUTO: 4.49 10*6/MM3 (ref 3.77–5.28)
SODIUM SERPL-SCNC: 140 MMOL/L (ref 136–145)
TRIGL SERPL-MCNC: 151 MG/DL (ref 0–150)
TSH SERPL DL<=0.05 MIU/L-ACNC: 0.57 UIU/ML (ref 0.27–4.2)
VLDLC SERPL-MCNC: 26 MG/DL (ref 5–40)
WBC NRBC COR # BLD AUTO: 6.48 10*3/MM3 (ref 3.4–10.8)

## 2025-02-06 PROCEDURE — 84443 ASSAY THYROID STIM HORMONE: CPT | Performed by: NURSE PRACTITIONER

## 2025-02-06 PROCEDURE — 99213 OFFICE O/P EST LOW 20 MIN: CPT | Performed by: NURSE PRACTITIONER

## 2025-02-06 PROCEDURE — 80053 COMPREHEN METABOLIC PANEL: CPT | Performed by: NURSE PRACTITIONER

## 2025-02-06 PROCEDURE — G0439 PPPS, SUBSEQ VISIT: HCPCS | Performed by: NURSE PRACTITIONER

## 2025-02-06 PROCEDURE — 82306 VITAMIN D 25 HYDROXY: CPT | Performed by: NURSE PRACTITIONER

## 2025-02-06 PROCEDURE — 1170F FXNL STATUS ASSESSED: CPT | Performed by: NURSE PRACTITIONER

## 2025-02-06 PROCEDURE — 85025 COMPLETE CBC W/AUTO DIFF WBC: CPT | Performed by: NURSE PRACTITIONER

## 2025-02-06 PROCEDURE — 80061 LIPID PANEL: CPT | Performed by: NURSE PRACTITIONER

## 2025-02-06 PROCEDURE — 1160F RVW MEDS BY RX/DR IN RCRD: CPT | Performed by: NURSE PRACTITIONER

## 2025-02-06 PROCEDURE — 1159F MED LIST DOCD IN RCRD: CPT | Performed by: NURSE PRACTITIONER

## 2025-02-06 PROCEDURE — 1126F AMNT PAIN NOTED NONE PRSNT: CPT | Performed by: NURSE PRACTITIONER

## 2025-02-06 RX ORDER — ATORVASTATIN CALCIUM 20 MG/1
20 TABLET, FILM COATED ORAL DAILY
Qty: 90 TABLET | Refills: 3 | Status: SHIPPED | OUTPATIENT
Start: 2025-02-06

## 2025-02-06 NOTE — PROGRESS NOTES
Subjective   The ABCs of the Annual Wellness Visit  Medicare Wellness Visit      Shabana Ferguson is a 69 y.o. patient who presents for a Medicare Wellness Visit.    The following portions of the patient's history were reviewed and   updated as appropriate: allergies, current medications, past family history, past medical history, past social history, past surgical history, and problem list.    Compared to one year ago, the patient's physical   health is the same.  Compared to one year ago, the patient's mental   health is the same.        Current Medical Providers:  Patient Care Team:  Kvng Ramirez APRN as PCP - General (Nurse Practitioner)    Outpatient Medications Prior to Visit   Medication Sig Dispense Refill    amitriptyline (ELAVIL) 25 MG tablet Take 1.5 tablets by mouth Every Night. 135 tablet 3    ARIPiprazole (Abilify) 5 MG tablet Take 1 tablet by mouth Daily. 90 tablet 1    Calcium Carb-Cholecalciferol (OYSCO 500 + D) 500-200 MG-UNIT tablet       cholecalciferol (VITAMIN D3) 25 MCG (1000 UT) tablet Take 1 tablet by mouth Daily.      cloNIDine (CATAPRES) 0.1 MG tablet Take 1 tablet by mouth Every Night. 90 tablet 1    Coenzyme Q10 (CO Q10 PO) Take  by mouth.      escitalopram (LEXAPRO) 10 MG tablet Take 1 tablet by mouth Daily. 90 tablet 3    gabapentin (NEURONTIN) 300 MG capsule 300 mg qam, 300 mg qpm, 600 mg qhs 120 capsule 5    GARLIC PO garlic 1,000 mg oral capsule take 1 capsule by oral route daily   Active      Multiple Vitamins-Iron (MULTIVITAMIN PLUS IRON ADULT PO) Women's Multivitamin 18 mg iron-400 mcg-500 mg oral tablet take 1 tablet by oral route daily   Active      Omega-3 Fatty Acids (fish oil) 1000 MG capsule capsule 3 capsules 3 (Three) Times a Day With Meals. PT(PATIENT) REPORTS SHE TAKES MEDICATION MORNING/LUNCH/EVENING      polyethylene glycol (MIRALAX) 17 g packet Take as directed.  Instructions given in office.  Dispense: 238 g bottle 238 packet 0    Probiotic Product  "(PROBIOTIC PO) Take  by mouth.      vitamin E 400 UNIT capsule vitamin E 400 unit oral capsule take 1 capsule by oral route daily   Active      atorvastatin (LIPITOR) 20 MG tablet TAKE 1 TABLET BY MOUTH DAILY 90 tablet 0    LORazepam (ATIVAN) 1 MG tablet Take 1 tablet by mouth 2 (Two) Times a Day As Needed for Anxiety. for anxiety (Patient not taking: Reported on 11/21/2024) 60 tablet 5     No facility-administered medications prior to visit.     No opioid medication identified on active medication list. I have reviewed chart for other potential  high risk medication/s and harmful drug interactions in the elderly.      Aspirin is not on active medication list.  Aspirin use is not indicated based on review of current medical condition/s. Risk of harm outweighs potential benefits.  .    Patient Active Problem List   Diagnosis    Anxiety    Depression    History of fracture of patella    Hyperlipemia    Osteopenia    Ulcerative lesion    Constipation    History of colonic polyps    Tinnitus of right ear    Sensorineural hearing loss (SNHL) of both ears    Right shoulder pain    Screening for malignant neoplasm of colon     Advance Care Planning Advance Directive is not on file.  ACP discussion was held with the patient during this visit. Patient does not have an advance directive, information provided.            Objective   Vitals:    02/06/25 1344   BP: 108/68   Pulse: 82   Temp: 97.9 °F (36.6 °C)   SpO2: 98%   Weight: 70.3 kg (155 lb)   Height: 162.6 cm (64\")   PainSc: 0-No pain       Estimated body mass index is 26.61 kg/m² as calculated from the following:    Height as of this encounter: 162.6 cm (64\").    Weight as of this encounter: 70.3 kg (155 lb).         Gait and Balance Evaluation:  Normal         Does the patient have evidence of cognitive impairment? No                                                                                                Health  Risk Assessment    Smoking Status:  Social History "     Tobacco Use   Smoking Status Never   Smokeless Tobacco Never     Alcohol Consumption:  Social History     Substance and Sexual Activity   Alcohol Use Not Currently       Fall Risk Screen  STEADI Fall Risk Assessment was completed, and patient is at LOW risk for falls.Assessment completed on:2025    Depression Screening   Little interest or pleasure in doing things? Not at all   Feeling down, depressed, or hopeless? Not at all   PHQ-2 Total Score 0      Health Habits and Functional and Cognitive Screenin/6/2025     1:47 PM   Functional & Cognitive Status   Do you have difficulty preparing food and eating? No   Do you have difficulty bathing yourself, getting dressed or grooming yourself? No   Do you have difficulty using the toilet? No   Do you have difficulty moving around from place to place? No   Do you have trouble with steps or getting out of a bed or a chair? No   Current Diet Well Balanced Diet   Dental Exam Up to date   Eye Exam Not up to date   Exercise (times per week) 7 times per week   Current Exercises Include Walking   Do you need help using the phone?  No   Are you deaf or do you have serious difficulty hearing?  No   Do you need help to go to places out of walking distance? No   Do you need help shopping? No   Do you need help preparing meals?  No   Do you need help with housework?  No   Do you need help with laundry? No   Do you need help taking your medications? No   Do you need help managing money? No   Do you ever drive or ride in a car without wearing a seat belt? No   Have you felt unusual stress, anger or loneliness in the last month? No   Who do you live with? Spouse   If you need help, do you have trouble finding someone available to you? No   Have you been bothered in the last four weeks by sexual problems? No   Do you have difficulty concentrating, remembering or making decisions? No           Age-appropriate Screening Schedule:  Refer to the list below for future screening  recommendations based on patient's age, sex and/or medical conditions. Orders for these recommended tests are listed in the plan section. The patient has been provided with a written plan.    Health Maintenance List  Health Maintenance   Topic Date Due    LIPID PANEL  12/28/2024    COLORECTAL CANCER SCREENING  05/07/2025 (Originally 1955)    BMI FOLLOWUP  11/07/2025    DXA SCAN  02/05/2026    ANNUAL WELLNESS VISIT  02/06/2026    MAMMOGRAM  10/21/2026    TDAP/TD VACCINES (2 - Td or Tdap) 10/23/2033    HEPATITIS C SCREENING  Completed    COVID-19 Vaccine  Completed    INFLUENZA VACCINE  Completed    Pneumococcal Vaccine 65+  Completed    ZOSTER VACCINE  Completed                                                                                                                                                CMS Preventative Services Quick Reference  Risk Factors Identified During Encounter  Depression/Dysphoria: Current medication is effective, no change recommended    The above risks/problems have been discussed with the patient.  Pertinent information has been shared with the patient in the After Visit Summary.  An After Visit Summary and PPPS were made available to the patient.    Follow Up:   Next Medicare Wellness visit to be scheduled in 1 year.          Additional E&M Note during same encounter follows:  Patient has multiple medical problems which are significant and separately identifiable that require additional work above and beyond the Medicare Wellness Visit.      Chief Complaint  Medicare Wellness-subsequent    History of Present Illness  The patient is a 69-year-old female who presents for an annual physical exam.    She reports no current health issues and has been tolerating her medications well, with no reported side effects. She has exhausted her supply of cholesterol medication and requires a refill. Typically, she receives a year's supply of this medication. She does not take aspirin daily. She  "maintains an active lifestyle and has not experienced any falls within the past year. She has not required hospitalization or emergency room visits in the past year. She reports no cognitive impairment or memory issues. She is a non-smoker.    She has an upcoming colonoscopy scheduled for 04/2025 with Dr. Fuller. She believes she is due for blood work. She has completed her mammogram.    SOCIAL HISTORY  She does not smoke.    MEDICATIONS  aspirin      Shabana Ferguson is a 69 y.o. female who presents to [facility]       Objective   Vital Signs:   Vitals:    02/06/25 1344   BP: 108/68   Pulse: 82   Temp: 97.9 °F (36.6 °C)   SpO2: 98%   Weight: 70.3 kg (155 lb)   Height: 162.6 cm (64\")   PainSc: 0-No pain       Wt Readings from Last 3 Encounters:   02/06/25 70.3 kg (155 lb)   01/23/25 72.1 kg (159 lb)   11/21/24 71.1 kg (156 lb 12.8 oz)     BP Readings from Last 3 Encounters:   02/06/25 108/68   01/23/25 119/58   11/21/24 136/62       Physical Exam  Vitals reviewed.   Constitutional:       General: She is not in acute distress.     Appearance: Normal appearance. She is well-developed. She is not ill-appearing.   HENT:      Head: Normocephalic and atraumatic.   Eyes:      Conjunctiva/sclera: Conjunctivae normal.      Pupils: Pupils are equal, round, and reactive to light.   Cardiovascular:      Rate and Rhythm: Normal rate and regular rhythm.      Heart sounds: No murmur heard.  Pulmonary:      Effort: Pulmonary effort is normal.      Breath sounds: Normal breath sounds. No wheezing.   Skin:     General: Skin is warm and dry.   Neurological:      Mental Status: She is alert and oriented to person, place, and time.   Psychiatric:         Mood and Affect: Mood and affect normal.         Behavior: Behavior normal.         Thought Content: Thought content normal.         Judgment: Judgment normal.         The following data was reviewed by MARIE Weinberg on    Previous " office note    Assessment & Plan   Diagnoses and all orders for this visit:    1. Encounter for subsequent annual wellness visit (AWV) in Medicare patient (Primary)  -     CBC & Differential  -     Comprehensive Metabolic Panel  -     Lipid Panel  -     TSH  -     Vitamin D,25-Hydroxy    2. Mixed hyperlipidemia  -     CBC & Differential  -     Comprehensive Metabolic Panel  -     Lipid Panel  -     TSH  -     Vitamin D,25-Hydroxy    3. History of colon polyps  -     CBC & Differential  -     Comprehensive Metabolic Panel  -     Lipid Panel  -     TSH  -     Vitamin D,25-Hydroxy    4. Vitamin D deficiency  -     Vitamin D,25-Hydroxy    Other orders  -     atorvastatin (LIPITOR) 20 MG tablet; Take 1 tablet by mouth Daily.  Dispense: 90 tablet; Refill: 3        Assessment & Plan  1. Annual physical examination.  He reports no side effects from his current medications and has not experienced any falls in the past year. He does not take aspirin daily and has no cognitive impairment or decline. A comprehensive blood work panel will be ordered today. He is advised to maintain his active lifestyle and continue his current health regimen.    2. Hypercholesterolemia.  He has run out of his cholesterol medication. A prescription refill for his cholesterol medication has been provided, with a supply sufficient for 90 days and three additional refills.    3. Health Maintenance.  He is due for a colonoscopy, which is already scheduled for April with Dr. Fuller. He has also completed a mammogram.    Follow-up  The patient will follow up in 1 year.              FOLLOW UP  Return in about 1 year (around 2/6/2026) for Medicare Wellness.  Patient was given instructions and counseling regarding her condition or for health maintenance advice. Please see specific information pulled into the AVS if appropriate.     Kvng Ramirez, MARIE  02/06/25  15:54 EST    Patient or patient representative verbalized consent for the use of  Ambient Listening during the visit with  MARIE Weinberg for chart documentation. 2/6/2025  12:34 EST

## 2025-03-14 RX ORDER — ATORVASTATIN CALCIUM 20 MG/1
20 TABLET, FILM COATED ORAL DAILY
Qty: 90 TABLET | Refills: 3 | Status: SHIPPED | OUTPATIENT
Start: 2025-03-14

## 2025-03-27 ENCOUNTER — OFFICE VISIT (OUTPATIENT)
Dept: PSYCHIATRY | Facility: CLINIC | Age: 70
End: 2025-03-27
Payer: MEDICARE

## 2025-03-27 VITALS
BODY MASS INDEX: 26.46 KG/M2 | WEIGHT: 155 LBS | DIASTOLIC BLOOD PRESSURE: 52 MMHG | SYSTOLIC BLOOD PRESSURE: 115 MMHG | HEIGHT: 64 IN | HEART RATE: 91 BPM

## 2025-03-27 DIAGNOSIS — F41.1 GENERALIZED ANXIETY DISORDER: Primary | ICD-10-CM

## 2025-03-27 DIAGNOSIS — F33.40 RECURRENT MAJOR DEPRESSIVE DISORDER IN REMISSION: ICD-10-CM

## 2025-03-27 DIAGNOSIS — F41.0 PANIC DISORDER: ICD-10-CM

## 2025-03-27 DIAGNOSIS — F41.0 PANIC ATTACKS: ICD-10-CM

## 2025-03-27 DIAGNOSIS — F51.05 INSOMNIA DUE TO MENTAL CONDITION: ICD-10-CM

## 2025-03-27 NOTE — PROGRESS NOTES
"Subjective   Shabana Ferguson is a 69 y.o. female who presents today for follow up    Referring Provider:  No referring provider defined for this encounter.    Chief Complaint:  mdd fareed    History of Present Illness:     Shabana Ferguson is a 64 year old /White female, hx of anxiety and depression, fractured patella, HLD, osteopenia, ulcer referred by MARIE Brice, for anxiety and depression management.   Initial Chart Review 2020: Psychotropic medications: amitriptyline 25 mg QHS, Celexa 20 mg p.o. daily, lorazepam 0.5 p.o. twice daily as needed anxiety.     Chart review:   2025: fam med; reassuring TSH vit D; abnl gluc 101 ap 121 on cmp; abnl high trigs 151 on lipids; abnl MCHC on cbc.  2025: no visits.  2024: gen surg, unknown, benign mammo  10/10/2024: no visits.  08/15/2024: no visits.  24: no visits.  2024: No visits.  24: no visits.    Plann[place]2025: Stable, well, no med changes.  2024: Fairly stable, no changes.   10/10/2024: Much improved FAREED, no changes. May be stable.    Visits (Below):  \"Shabana\" and Jaya  Has never been on vraylar   2025:   In person interview:  \"I've been having more feelings of doom.\"  Distraction helps.  Some anxiety, some depression. Both come and go.  Hasn't been using the light box  Not using lorazepam  No triggers. Nicola is doing ok.  Marriage is \"better\"  Mood/Depression: MDD depressed mood, mild  Anxiety: FAREED mild worrying, on edge, restless  Social anxiety: chronic, persistent  Panic attacks: stable  Energy: stable  Concentration: stable  Insomnia: initial, stable  Eatin, 159, 156, 153, 152, 152, 154, 155, 156 lbs  Refills: y  Substances: def  Therapy:   No longer doing group therapy with other women her age ()  individual counseling  [facility]  Medication compliant: y  SE: n  No SI HI AVH.      2025:   In person interview:  \"I've been good.\"  Loves having " "Nicola back.  No MH complaints.  Not really taking the ativan  Mood/Depression: MDD stable  Anxiety: FAREED fairly stable  Social anxiety: chronic, persistent  Panic attacks: stable  Energy: stable  Concentration: stable  Insomnia: initial, stable  Eatin, 156, 153, 152, 152, 154, 155, 156 lbs  Refills: y  Substances: def  Therapy:   No longer doing group therapy with other women her age ()  individual counseling  [facility]  Medication compliant: y  SE: n  No SI HI AVH.      2024:   In person interview:  \"Nicola moved back in with us.\"  Likes that he's back  I don't take the ativan anymore  Still have some minimal anxiety  Had some issues getting gabapentin filled right, but straightened it out at the pharmacy  Mood/Depression: MDD stable  Anxiety: FAREED fairly stable  Social anxiety: chronic, persistent  Panic attacks: stable  Energy: stable  Concentration: stable  Insomnia: initial, stable  Eatin, 153, 152, 152, 154, 155, 156 lbs  Refills: y  Substances: def  Therapy:   Stopped  group therapy with other women her age.  individual counseling  [facility]  Medication compliant: y  SE: n  No SI HI AVH.      10/10/2024:   In person interview:  \"I'm doing a lot better.\"  Not taking ativan  The clonidine is helping  Family is good. Obdulio is on vacation.  Better intrusive thoughts  Mood/Depression: MDD stable  Anxiety: FAREED improved intrusive thoughts, less restless, on edge  Social anxiety: chronic, persistent  Panic attacks: stable  Energy: stable  Concentration: stable  Insomnia: initial, stable  Eatin, 152, 152, 154, 155, 156 lbs  Refills: y  Substances: def  Therapy:   Stopped  group therapy with other women her age.  Still doing individual counseling  [facility]  Medication compliant: y  SE: n  No SI HI AVH.    ...    20 H&P: Patient presented today with her . Both provided extensive history regarding her depression and anxiety. Patient had been seen by  at [facility] at " "[place] for roughly a year and a half. Patient reported that Dr. Gordon had become convinced that she had bipolar disorder, which both she and her  disagree with. Patient and  deny any episodes in the past where she experienced, for an extended length of time lasting at least several days, no need for sleep, rapid speech, risk-taking behaviors. Per the  and wife pair, her previous psychiatrist insisted on her being on a mood stabilizer.   Patient reports that she had been stable on Celexa for \"several years.\" In 2019, January, the patient underwent a colonoscopy where dysplasia was revealed. This led to significant anxiety and a \"breakdown.\" The dysplasia was subsequently removed. Due to the heightened anxiety the patient's psychiatrist took the patient off of Celexa and started her on Lexapro. The patient did not tolerate Lexapro well, she continued to remain anxious, she began to engage in therapy in addition to medication management. The Lexapro was subsequently switched to Viibryd. The Viibryd did not work. The patient also began to experience panic attacks including shortness of breath, crying, tachycardia, palpitations. The panic attacks were new and had never happened before.   In January 2020 the patient experienced another breakdown and sunk into a deeper depression. The COVID-19 pandemic only worsened her situation. In February the patient's , a physician, decided to become her full-time caretaker as she would become anxious and panicky without him present. The patient also experienced intermittent bouts of suicidal ideation.   Recently, the patient and  demanded of their psychiatrist to take her off of Viibryd and Seroquel. They actually tapered her off of Seroquel themselves. They asked him to switch her back to Celexa. She has been on Celexa now for the last 3 days and is already begun to experience improvement. She states that the Seroquel led to having hallucinations " and actually worsened insomnia. Last night she did not take Seroquel for the first time in many months and she slept very well. Her mood is slightly improved. Regarding her anxiety, she endorses muscle tension and fatigue restlessness irritability and a history of insomnia. Regarding her depression she denies SI HI AVH, denies anhedonia denies guilt, notes some decreased energy, psychomotor retardation, and a history of insomnia. They had also tried acupuncture in the past with some success. Psychiatric review of systems is negative for psychosis nancy, positive for anxiety and depression. Possibly positive for  depression and postpartum depression. The patient is medication compliant.       Past Psychiatric History:  Began Psychiatric Treatment: Several years   Dx: Depression and anxiety   Psychiatrist: Doctor Gordon for the last year and a half   Therapist: Sees a therapist at [facility] History: Denies, Although she came close to needing admission recently.   Medication Trials: See HPI. Seroquel, Prozac which was too activating, Zoloft which was too activating, Celexa which worked well, Effexor which worked well, Lexapro which did not work, Viibryd which did not work.   Self-Harm: Denies   Suicide Attempts: Denies   OB/GYN HISTORY:  Sexually Active: OB/GYN history deferred   Substance Abuse History:  Types: Denies all, including illicit   Social History:  Marital Status:    Employed: No     Kids: 4   House: House    Hx: Denies   Family History:  Suicide Attempts: Deferred today, Due to lack of time   Suicide Completions: Deferred   Substance Use: Deferred   Psychiatric Conditions: Deferred    depression, psychosis, anxiety: Deferred   Developmental History:  Born: Deferred   Siblings: Deferred   Access to Weapons: Denies   Thought Content: no suicidal ideation, no homicidal ideation, no auditory hallucinations, no visual hallucinations, and     PHQ-9 Depression  Screening  PHQ-9 Total Score:      Little interest or pleasure in doing things?     Feeling down, depressed, or hopeless?     Trouble falling or staying asleep, or sleeping too much?     Feeling tired or having little energy?     Poor appetite or overeating?     Feeling bad about yourself - or that you are a failure or have let yourself or your family down?     Trouble concentrating on things, such as reading the newspaper or watching television?     Moving or speaking so slowly that other people could have noticed? Or the opposite - being so fidgety or restless that you have been moving around a lot more than usual?     Thoughts that you would be better off dead, or of hurting yourself in some way?     PHQ-9 Total Score       FAREED-7       Past Surgical History:  Past Surgical History:   Procedure Laterality Date    COLONOSCOPY  [id number]    last scope 2020    COLONOSCOPY N/A 10/29/2021    Procedure: COLONOSCOPY with possible biopies.;  Surgeon: Skyler Fuller MD;  Location: [facility]COPY;  Service: General;  Laterality: N/A;    KNEE SURGERY  2017    broken knee       Problem List:  Patient Active Problem List   Diagnosis    Anxiety    Depression    History of fracture of patella    Hyperlipemia    Osteopenia    Ulcerative lesion    Constipation    History of colonic polyps    Tinnitus of right ear    Sensorineural hearing loss (SNHL) of both ears    Right shoulder pain    Screening for malignant neoplasm of colon       Allergy:   No Known Allergies     Discontinued Medications:  There are no discontinued medications.        Current Medications:   Current Outpatient Medications   Medication Sig Dispense Refill    amitriptyline (ELAVIL) 25 MG tablet Take 1.5 tablets by mouth Every Night. 135 tablet 3    ARIPiprazole (Abilify) 5 MG tablet Take 1 tablet by mouth Daily. 90 tablet 1    atorvastatin (LIPITOR) 20 MG tablet TAKE 1 TABLET BY MOUTH DAILY 90 tablet 3    Calcium Carb-Cholecalciferol (OYSCO 500 + D) 500-200  MG-UNIT tablet       cholecalciferol (VITAMIN D3) 25 MCG (1000 UT) tablet Take 1 tablet by mouth Daily.      cloNIDine (CATAPRES) 0.1 MG tablet Take 1 tablet by mouth Every Night. 90 tablet 1    Coenzyme Q10 (CO Q10 PO) Take  by mouth.      escitalopram (LEXAPRO) 10 MG tablet Take 1 tablet by mouth Daily. 90 tablet 3    gabapentin (NEURONTIN) 300 MG capsule 300 mg qam, 300 mg qpm, 600 mg qhs 120 capsule 5    GARLIC PO garlic 1,000 mg oral capsule take 1 capsule by oral route daily   Active      LORazepam (ATIVAN) 1 MG tablet Take 1 tablet by mouth 2 (Two) Times a Day As Needed for Anxiety. for anxiety 60 tablet 5    Multiple Vitamins-Iron (MULTIVITAMIN PLUS IRON ADULT PO) Women's Multivitamin 18 mg iron-400 mcg-500 mg oral tablet take 1 tablet by oral route daily   Active      Omega-3 Fatty Acids (fish oil) 1000 MG capsule capsule 3 capsules 3 (Three) Times a Day With Meals. PT(PATIENT) REPORTS SHE TAKES MEDICATION MORNING/LUNCH/EVENING      polyethylene glycol (MIRALAX) 17 g packet Take as directed.  Instructions given in office.  Dispense: 238 g bottle 238 packet 0    Probiotic Product (PROBIOTIC PO) Take  by mouth.      vitamin E 400 UNIT capsule vitamin E 400 unit oral capsule take 1 capsule by oral route daily   Active       No current facility-administered medications for this visit.       Past Medical History:  Past Medical History:   Diagnosis Date    Acid reflux 2022    Anxiety     Depression     Fracture of patella 09/12/2017    Hyperlipemia     Osteopenia     Panic disorder     Psychiatric inpatient 07/22/2022    Discharged 07/22/2022 for Anxiety, Depression, and Suicidal Bx's    Seizures     reports seizure activity, but under control.       Social History     Socioeconomic History    Marital status:    Tobacco Use    Smoking status: Never    Smokeless tobacco: Never   Vaping Use    Vaping status: Never Used   Substance and Sexual Activity    Alcohol use: Not Currently    Drug use: Never    Sexual  "activity: Not Currently         Family History   Problem Relation Age of Onset    Stroke Mother     Heart disease Mother     No Known Problems Father     No Known Problems Sister     Cancer Brother     No Known Problems Maternal Aunt     No Known Problems Paternal Aunt     No Known Problems Maternal Uncle     No Known Problems Paternal Uncle     No Known Problems Maternal Grandfather     No Known Problems Maternal Grandmother     No Known Problems Paternal Grandfather     No Known Problems Paternal Grandmother     No Known Problems Cousin     Seizures Son     No Known Problems Other     ADD / ADHD Neg Hx     Alcohol abuse Neg Hx     Anxiety disorder Neg Hx     Bipolar disorder Neg Hx     Dementia Neg Hx     Depression Neg Hx     Drug abuse Neg Hx     OCD Neg Hx     Paranoid behavior Neg Hx     Schizophrenia Neg Hx     Self-Injurious Behavior  Neg Hx     Suicide Attempts Neg Hx      Review of Systems:  Review of Systems   Constitutional:  Negative for diaphoresis and fatigue.   HENT:  Negative for drooling.    Eyes:  Negative for visual disturbance.   Respiratory:  Negative for cough and shortness of breath.    Cardiovascular:  Negative for chest pain, palpitations and leg swelling.   Gastrointestinal:  Negative for nausea and vomiting.   Endocrine: Negative for cold intolerance and heat intolerance.   Genitourinary:  Negative for difficulty urinating.   Musculoskeletal:  Negative for joint swelling.   Allergic/Immunologic: Negative for immunocompromised state.   Neurological:  Negative for dizziness, seizures, speech difficulty and numbness.         Mental Status Exam  Appearance  : sitting in a chair, good eye contact, normal street clothes, groomed, in person  Behavior  : pleasant and cooperative  Motor  : No abnormal  Speech  : normal rhythm, rate, volume, tone, not hyperverbal, not pressured, normal prosidy, Speaks with an accent  Mood  : \"I'm feeling some doom\"  Affect  : very mild anxiety, mood congruent, fair " "variability  Thought Content  : negative suicidal ideations, negative homicidal ideations, negative obsessions  Perceptions  : negative auditory hallucinations, negative visual hallucinations  Thought Process  : linear  Insight/Judgement  : fair/fair  Cognition  : grossly intact  Attention  : intact      Physical Exam:  Physical Exam    Vital Signs:   /52   Pulse 91   Ht 162.6 cm (64.02\")   Wt 70.3 kg (155 lb)   BMI 26.59 kg/m²      Lab Results:   Office Visit on 02/06/2025   Component Date Value Ref Range Status    Glucose 02/06/2025 101 (H)  65 - 99 mg/dL Final    BUN 02/06/2025 20  8 - 23 mg/dL Final    Creatinine 02/06/2025 0.90  0.57 - 1.00 mg/dL Final    Sodium 02/06/2025 140  136 - 145 mmol/L Final    Potassium 02/06/2025 4.3  3.5 - 5.2 mmol/L Final    Chloride 02/06/2025 104  98 - 107 mmol/L Final    CO2 02/06/2025 25.7  22.0 - 29.0 mmol/L Final    Calcium 02/06/2025 9.9  8.6 - 10.5 mg/dL Final    Total Protein 02/06/2025 7.1  6.0 - 8.5 g/dL Final    Albumin 02/06/2025 3.9  3.5 - 5.2 g/dL Final    ALT (SGPT) 02/06/2025 25  1 - 33 U/L Final    AST (SGOT) 02/06/2025 31  1 - 32 U/L Final    Alkaline Phosphatase 02/06/2025 121 (H)  39 - 117 U/L Final    Total Bilirubin 02/06/2025 0.8  0.0 - 1.2 mg/dL Final    Globulin 02/06/2025 3.2  gm/dL Final    A/G Ratio 02/06/2025 1.2  g/dL Final    BUN/Creatinine Ratio 02/06/2025 22.2  7.0 - 25.0 Final    Anion Gap 02/06/2025 10.3  5.0 - 15.0 mmol/L Final    eGFR 02/06/2025 68.9  >60.0 mL/min/1.73 Final    Total Cholesterol 02/06/2025 172  0 - 200 mg/dL Final    Triglycerides 02/06/2025 151 (H)  0 - 150 mg/dL Final    HDL Cholesterol 02/06/2025 54  40 - 60 mg/dL Final    LDL Cholesterol  02/06/2025 92  0 - 100 mg/dL Final    VLDL Cholesterol 02/06/2025 26  5 - 40 mg/dL Final    LDL/HDL Ratio 02/06/2025 1.63   Final    TSH 02/06/2025 0.568  0.270 - 4.200 uIU/mL Final    25 Hydroxy, Vitamin D 02/06/2025 40.7  30.0 - 100.0 ng/ml Final    WBC 02/06/2025 6.48  3.40 - " 10.80 10*3/mm3 Final    RBC 02/06/2025 4.49  3.77 - 5.28 10*6/mm3 Final    Hemoglobin 02/06/2025 13.1  12.0 - 15.9 g/dL Final    Hematocrit 02/06/2025 42.1  34.0 - 46.6 % Final    MCV 02/06/2025 93.8  79.0 - 97.0 fL Final    MCH 02/06/2025 29.2  26.6 - 33.0 pg Final    MCHC 02/06/2025 31.1 (L)  31.5 - 35.7 g/dL Final    RDW 02/06/2025 13.7  12.3 - 15.4 % Final    RDW-SD 02/06/2025 46.6  37.0 - 54.0 fl Final    MPV 02/06/2025 9.8  6.0 - 12.0 fL Final    Platelets 02/06/2025 267  140 - 450 10*3/mm3 Final    Neutrophil % 02/06/2025 56.6  42.7 - 76.0 % Final    Lymphocyte % 02/06/2025 32.3  19.6 - 45.3 % Final    Monocyte % 02/06/2025 9.4  5.0 - 12.0 % Final    Eosinophil % 02/06/2025 0.9  0.3 - 6.2 % Final    Basophil % 02/06/2025 0.6  0.0 - 1.5 % Final    Immature Grans % 02/06/2025 0.2  0.0 - 0.5 % Final    Neutrophils, Absolute 02/06/2025 3.67  1.70 - 7.00 10*3/mm3 Final    Lymphocytes, Absolute 02/06/2025 2.09  0.70 - 3.10 10*3/mm3 Final    Monocytes, Absolute 02/06/2025 0.61  0.10 - 0.90 10*3/mm3 Final    Eosinophils, Absolute 02/06/2025 0.06  0.00 - 0.40 10*3/mm3 Final    Basophils, Absolute 02/06/2025 0.04  0.00 - 0.20 10*3/mm3 Final    Immature Grans, Absolute 02/06/2025 0.01  0.00 - 0.05 10*3/mm3 Final    nRBC 02/06/2025 0.0  0.0 - 0.2 /100 WBC Final       EKG Results:  No orders to display       Imaging Results:  No Images in the past 120 days found..      Assessment & Plan   Diagnoses and all orders for this visit:    1. Generalized anxiety disorder (Primary)    2. Recurrent major depressive disorder in remission    3. Panic attacks    4. Panic disorder    5. Insomnia due to mental condition        Visit Diagnoses:    ICD-10-CM ICD-9-CM   1. Generalized anxiety disorder  F41.1 300.02   2. Recurrent major depressive disorder in remission  F33.40 296.35   3. Panic attacks  F41.0 300.01   4. Panic disorder  F41.0 300.01   5. Insomnia due to mental condition  F51.05 300.9     327.02   INITIAL presentation most  consistent with major depressive disorder in partial remission, FAREED, rare panic attacks.    03/27/2025: some mild MDD, FAREED, increase clonidine. Next change would be increase amitriptyline to 50 mg qhs. Not using lorazepam. Will need early refill of gabapentin.    Acknowledged and normalized patient's thoughts, feelings, and concerns. Allowed patient to freely discuss and process issues, such as:  Anxiety and depression regarding family conflict/relationships.  Anxiety and depression regarding family's well-being.  ... using Rogerian psychotherapeutic techniques including unconditional positive regard, reflective listening, and demonstrating clear empathy, with the goal of ameliorating symptoms and maintaining, restoring, or improving self-esteem, adaptive skills, and ego or psychological functions (Tayla et al. 1991), the long-term goal of which is to develop a better, healthier perspective and help the patient bear their circumstances more easily.  Time (minutes) spent providing supportive psychotherapy: 16  (This time is exclusive to the therapy session and separate from the time spent on activities used to meet the criteria for the E/M service (history, exam, medical decision-making).)  Start: 10:05  Stop: 10:21  Functional status: mild impairment  Treatment plan: Medication management and supportive psychotherapy  Prognosis: good  Progress: stable  4w    01/23/2025: Stable, well, no med changes.    11/21/2024: Fairly stable, no changes.     10/10/2024: Much improved FAREED, no changes. May be stable.    08/29/2024: Improved, give clonidine more time to work. 6w    07/11/2024: Intrusive thoughts when not distracted, start low dose clonidine. Has a family reunion coming up soon (all except son in [place]).    06/06/2024: Some bouts of depression and anxiety, with anxiety worse, lasting about 1 hr, daily. In between, complete resolution. Monitor for now. May add lamictal or increase elavil.    4/23/24: Improved, no  changes to give elavil more time to work.    3/22: No change at all. Reduce dose back to previous of abilify (5 mg), and increase amitriptyline instead.    2/9: Increase abilify with the supply she has: 7 mg daily for a month, then 7.5 mg daily. Targeting anx, dep, insomnia. Watch brain zaps. Discussed vraylar as well.    1/11: Stable, well, explored her catastrophizing today. No changes. Wants to re-establish individual therapy with her old therapist at [facility] to manage catastrophizing, thoughts when she is lonely. Conflict processed between pt and  today; suggested restarting marriage counseling thru pt's therapist (who can make a referral). How much of patient's MH can be explained by her relp with her hsuband?    12/6: No changes for now, but may increase to 7.5 mg daily of abilify (half a 15 mg tab). Still some residual MDD.    11/2: chronic mild anhedonia. Increase abilify and start light box.    8/31: Stable, well, no changes. Light box, we discussed it.    6/30: Stable, well. No changes.     4/28: Euthymic well. No changes.     3/3: Stable, well, no changes.      1/20: Well, stable, no changes.     12/9: Significant improvement. No changes.     11/18: Residual anxiety, residual depression.  Increase gabapentin, start amitriptyline at night for initial insomnia.  Close follow-up.     11/7: Residual anx (at noon), consider adding gabapentin in the mornings. and dep. Increase gabapentin in the afternoons. No other changes.     10/31: Couples therapy, increase Abilify, close follow-up.  I feel that their marriage issues have come to a head recently, and this stressor could largely explain patient's depression and anxiety.  Patient mentioned Celexa again, she is likely interested in switching.     9/30: well, stable, some constriction today. No changes.     8/30: Remarkably well. Initial insomnia.    8/1: Try another mood stabilizer in place of olanzapine.  Will trial Abilify again.  Also consider Geodon.       6/17: start buspar, doing better.     5/20: Continue Lexapro and lorazepam.  Try Belsomra in place of Ambien.      4/22: Some improvement in anx and dep, but still residual symptoms.  Persistent insomnia. Declines IOP, inpatient admission.  Increase Lexapro and start trazodone.      3/24: Focus on lower doses.  Start Pristiq.  Depression with anxious distress versus depression and generalized anxiety disorder.  She only has distractibility, no other symptoms of hypomania.      3/15: Continue lurasidone 40 mg at night, increase Ambien to 10 mg at night.  Continue low-dose Lamictal, plan to increase further.  Continue titrating off of amitriptyline.     3/10: Effexor has now been stopped.  Continue Latuda and Lamictal at present doses.  I will see her back in a few days and then at 6 weeks.  Bipolar 2?  If so, how come she is tolerating amitriptyline in the evenings?     2/15: Still utilizing lorazepam which means the anxiety is not under control.  Increase Effexor.    1/17: Continued improvement.  No changes, only on effexor 75 mg for 2 weeks.  Tolerating medications without side effects.      1/3: Continued improvement. Increase effexor to target residual dep anx. TMS to start 1/6.     PLAN:  Risk Assessment: Risk of self-harm acutely remain low. Risk factors include a history of suicidal ideation, mood disorder, anxiety disorder, presence of psychosocial stressors such as colonic dysplasia, COVID-19 pandemic. Protective factors include no history of self-harm or suicide attempts, good social support, willingness to engage in care, improved depressive symptoms. Risk of self-harm chronically also remain low, but could be further elevated in the event of treatment noncompliance and/or AODA.  Medications:   CONTINUE light box.   INCREASE clonidine 0.05 to 0.1 mg qhs. Risks, benefits, alternatives discussed with patient including dizziness, sedation, falls, low blood pressure, GI upset.  Use care when operating  vehicle, vessel, or machine. After discussion of these risks and benefits, the patient voiced understanding and agreed to proceed.  CONTINUE lexapro 10 mg daily. Risks, benefits, alternatives discussed with patient including GI upset, nausea vomiting diarrhea, theoretical decrease of seizure threshold predisposing the patient to a slightly higher seizure risk, headaches, sexual dysfunction, serotonin syndrome, bleeding risk, increased suicidality in patients 24 years and younger.  After discussion of these risks and benefits, the patient voiced understanding and agreed to proceed.  CONTINUE gabapentin 300 mg qam, 300 qpm, 600 mg nightly. Risks, benefits, alternatives discussed with patient including sedation, dizziness/falls risk, GI upset, weight gain.  After discussion of these risks and benefits, the patient voiced understanding and agreed to proceed. UDS ordered, Mamadou reviewed.  CONTINUE Abilify 5 mg qhs. Risks, benefits, alternatives discussed with patient including increased energy, exacerbation of irritability, akathisia, GI upset, orthostatic hypotension, increased appetite.  After discussion of these risks and benefits, the patient voiced understanding and agreed to proceed.  CONTINUE amitriptyline 37.5 mg nightly. Risks, benefits, alternatives discussed with patient including sedation, dizziness, falls, GI upset, constipation, urinary retention, dry mouth.  After discussion of these risks and benefits, the patient voiced understanding and agreed to proceed.  STOPPED lorazepam 1 mg bid PRN. (Not needed 3/25) Risks, benefits, alternatives discussed with patient including GI upset, sedation, dizziness, respiratory depression, falls risk.  After discussion of these risks and benefits, the patient voiced understanding and agreed to proceed.  S/P:   buspar 5 mg bid was ineffective.  Trazodone 50 mg nightly not helpful for insomnia.  Mirtazapine 15 mg, 7.5 mg: ineffective; made anxiety and depression  "worse.  Abilify: brain zaps and constipation  Seroquel made depression worse  celexa 20 mg daily, ineffective after several months  Doxepin caused \"brain zaps\"  Latuda was ineffective.  Amitriptyline 25 mg nightly helpful for sleep but we didn't want her on too many serotonergic meds. Restarted.  Ambien minimally helpful for insomnia.  Pristiq and effexor worsened anxiety, both low dose.  Stopped lamictal for unclear reasons 25 mg.  belsomra 5 mg nightly didn't help with insomnia.  Therapy: continue with Genevieve at [facility] (formerly [facility]3].  Referred for couples therapy 10/2022  Status post IOP at [facility] 10/2022  Labs/Studies: BMP to monitor sodium levels in February was entirely normal, EKG February shows rate 72, sinus, .    TREATMENT PLAN/GOALS: Continue supportive psychotherapy efforts and medications as indicated. Treatment and medication options discussed during today's visit. Patient acknowledged and verbally consented to continue with current treatment plan and was educated on the importance of compliance with treatment and follow-up appointments.    MEDICATION ISSUES:  YVETTE reviewed as expected.  Discussed medication options and treatment plan of prescribed medication as well as the risks, benefits, and side effects including potential falls, possible impaired driving and metabolic adversities among others. Patient is agreeable to call the office with any worsening of symptoms or onset of side effects. Patient is agreeable to call 911 or go to the nearest ER should he/she begin having SI/HI. No medication side effects or related complaints today.     MEDS ORDERED DURING VISIT:    Return in about 4 weeks (around 4/24/2025).         This document has been electronically signed by Vinny Krishnamurthy MD  March 27, 2025 10:23 EDT       Part of this note may be an electronic transcription/translation of spoken language to printed text using the Dragon Dictation System.    "

## 2025-04-03 NOTE — PRE-PROCEDURE INSTRUCTIONS
Reminded of arrival time at 1200 , Entrance C of the [facility] hospital. Instructed to bring picture ID and Insurance Card. Have a  over the age of 18 to drive you home the day of procedure.      Clear liquid diet the day before the procedure, nothing red. Call MD office with any questions about the prep or if in need of the prep    Nothing by mouth after midnight the night before the procedure or the AM of the procedure with the exception of your bowel prep and medication. Bowel Prep and medication must be completed 2 hours prior to arrival time. Medication should be taken with just a sip of water.    Meds to take AM of Procedure-  no am meds

## 2025-04-07 ENCOUNTER — ANESTHESIA EVENT (OUTPATIENT)
Dept: GASTROENTEROLOGY | Facility: HOSPITAL | Age: 70
End: 2025-04-07
Payer: MEDICARE

## 2025-04-07 NOTE — ANESTHESIA PREPROCEDURE EVALUATION
Anesthesia Evaluation     Patient summary reviewed and Nursing notes reviewed   NPO Solid Status: > 8 hours  NPO Liquid Status: > 4 hours           Airway   Mallampati: I  TM distance: >3 FB  Neck ROM: full  No difficulty expected  Dental - normal exam     Pulmonary     breath sounds clear to auscultation  Cardiovascular - normal exam  Exercise tolerance: good (4-7 METS)    ECG reviewed  Rhythm: regular  Rate: normal    (+) hyperlipidemia      Neuro/Psych  (+) seizures (pt denies any seizures) well controlled, psychiatric history Anxiety and Depression  GI/Hepatic/Renal/Endo    (+) GERD well controlled    Musculoskeletal     Abdominal    Substance History      OB/GYN          Other        ROS/Med Hx Other: Screening, hx polyps     NORMAL ECG -  Sinus rhythm  When compared with ECG of 12-Feb-2021 10:29:54,  No significant change  Electronically Signed By: Carlo Reed ([facility]) 21-May-2022 16:19:21  Date and Time of Study: 2022-05-13 17:21:09                  Anesthesia Plan    ASA 2     general   total IV anesthesia  (Patient understands anesthesia not responsible for dental damage. Risks explained including allergic reactions, BP, HR, O2 changes, aspiration, advanced airway placement. Pt verbalized understanding.)  intravenous induction     Anesthetic plan, risks, benefits, and alternatives have been provided, discussed and informed consent has been obtained with: patient.  Pre-procedure education provided  Plan discussed with CRNA.      CODE STATUS:

## 2025-04-10 ENCOUNTER — HOSPITAL ENCOUNTER (OUTPATIENT)
Facility: HOSPITAL | Age: 70
Setting detail: HOSPITAL OUTPATIENT SURGERY
Discharge: HOME | End: 2025-04-10
Attending: SURGERY | Admitting: SURGERY
Payer: MEDICARE

## 2025-04-10 ENCOUNTER — ANESTHESIA (OUTPATIENT)
Dept: GASTROENTEROLOGY | Facility: HOSPITAL | Age: 70
End: 2025-04-10
Payer: MEDICARE

## 2025-04-10 VITALS
BODY MASS INDEX: 25.57 KG/M2 | WEIGHT: 149.03 LBS | TEMPERATURE: 97.5 F | HEART RATE: 79 BPM | RESPIRATION RATE: 19 BRPM | SYSTOLIC BLOOD PRESSURE: 117 MMHG | OXYGEN SATURATION: 98 % | DIASTOLIC BLOOD PRESSURE: 67 MMHG

## 2025-04-10 DIAGNOSIS — Z86.0100 HISTORY OF COLONIC POLYPS: ICD-10-CM

## 2025-04-10 DIAGNOSIS — Z12.11 SCREENING FOR MALIGNANT NEOPLASM OF COLON: ICD-10-CM

## 2025-04-10 PROCEDURE — 25810000003 LACTATED RINGERS PER 1000 ML: Performed by: NURSE ANESTHETIST, CERTIFIED REGISTERED

## 2025-04-10 PROCEDURE — 88305 TISSUE EXAM BY PATHOLOGIST: CPT | Performed by: SURGERY

## 2025-04-10 PROCEDURE — 25010000002 LIDOCAINE PF 2% 2 % SOLUTION: Performed by: NURSE ANESTHETIST, CERTIFIED REGISTERED

## 2025-04-10 PROCEDURE — 25010000002 PROPOFOL 10 MG/ML EMULSION: Performed by: NURSE ANESTHETIST, CERTIFIED REGISTERED

## 2025-04-10 RX ORDER — PROPOFOL 10 MG/ML
VIAL (ML) INTRAVENOUS CONTINUOUS PRN
Status: DISCONTINUED | OUTPATIENT
Start: 2025-04-10 | End: 2025-04-10 | Stop reason: SURG

## 2025-04-10 RX ORDER — SODIUM CHLORIDE 0.9 % (FLUSH) 0.9 %
3 SYRINGE (ML) INJECTION EVERY 12 HOURS SCHEDULED
Status: DISCONTINUED | OUTPATIENT
Start: 2025-04-10 | End: 2025-04-10 | Stop reason: HOSPADM

## 2025-04-10 RX ORDER — LIDOCAINE HYDROCHLORIDE 20 MG/ML
INJECTION, SOLUTION EPIDURAL; INFILTRATION; INTRACAUDAL; PERINEURAL AS NEEDED
Status: DISCONTINUED | OUTPATIENT
Start: 2025-04-10 | End: 2025-04-10 | Stop reason: SURG

## 2025-04-10 RX ORDER — SODIUM CHLORIDE, SODIUM LACTATE, POTASSIUM CHLORIDE, CALCIUM CHLORIDE 600; 310; 30; 20 MG/100ML; MG/100ML; MG/100ML; MG/100ML
INJECTION, SOLUTION INTRAVENOUS CONTINUOUS PRN
Status: DISCONTINUED | OUTPATIENT
Start: 2025-04-10 | End: 2025-04-10 | Stop reason: SURG

## 2025-04-10 RX ORDER — SODIUM CHLORIDE 9 MG/ML
40 INJECTION, SOLUTION INTRAVENOUS AS NEEDED
Status: DISCONTINUED | OUTPATIENT
Start: 2025-04-10 | End: 2025-04-10 | Stop reason: HOSPADM

## 2025-04-10 RX ORDER — SODIUM CHLORIDE, SODIUM LACTATE, POTASSIUM CHLORIDE, CALCIUM CHLORIDE 600; 310; 30; 20 MG/100ML; MG/100ML; MG/100ML; MG/100ML
30 INJECTION, SOLUTION INTRAVENOUS CONTINUOUS
Status: DISCONTINUED | OUTPATIENT
Start: 2025-04-10 | End: 2025-04-10 | Stop reason: HOSPADM

## 2025-04-10 RX ORDER — SODIUM CHLORIDE 0.9 % (FLUSH) 0.9 %
10 SYRINGE (ML) INJECTION AS NEEDED
Status: DISCONTINUED | OUTPATIENT
Start: 2025-04-10 | End: 2025-04-10 | Stop reason: HOSPADM

## 2025-04-10 RX ADMIN — LIDOCAINE HYDROCHLORIDE 80 MG: 20 INJECTION, SOLUTION INTRAVENOUS at 15:11

## 2025-04-10 RX ADMIN — PROPOFOL 200 MCG/KG/MIN: 10 INJECTION, EMULSION INTRAVENOUS at 15:11

## 2025-04-10 RX ADMIN — SODIUM CHLORIDE, POTASSIUM CHLORIDE, SODIUM LACTATE AND CALCIUM CHLORIDE: 600; 310; 30; 20 INJECTION, SOLUTION INTRAVENOUS at 15:10

## 2025-04-10 NOTE — ANESTHESIA POSTPROCEDURE EVALUATION
Patient: Shabana Ferguson    Procedure Summary       Date: 04/10/25 Room / Location: [facility] ENDOSCOPY 3 / [facility] ENDOSCOPY    Anesthesia Start: 1510 Anesthesia Stop: 1537    Procedure: COLONOSCOPY WITH BIOPSY POLYPECTOMY Diagnosis:       Screening for malignant neoplasm of colon      History of colonic polyps      (Screening for malignant neoplasm of colon [Z12.11])      (History of colonic polyps [Z86.0100])    Surgeons: Skyler Fuller MD Provider: Gen Marinelli CRNA    Anesthesia Type: general ASA Status: 2            Anesthesia Type: general    Vitals  Vitals Value Taken Time   /67 04/10/25 15:52   Temp 36.4 °C (97.5 °F) 04/10/25 15:52   Pulse 76 04/10/25 15:54   Resp 19 04/10/25 15:52   SpO2 97 % 04/10/25 15:54   Vitals shown include unfiled device data.        Post Anesthesia Care and Evaluation    Patient location during evaluation: bedside  Patient participation: complete - patient participated  Level of consciousness: awake  Pain management: adequate    Airway patency: patent  Anesthetic complications: No anesthetic complications  PONV Status: controlled  Cardiovascular status: acceptable and stable  Respiratory status: acceptable

## 2025-04-10 NOTE — ANESTHESIA POSTPROCEDURE EVALUATION
Patient: Shabana Ferguson    Procedure Summary       Date: 04/10/25 Room / Location: [facility] ENDOSCOPY 3 / [facility] ENDOSCOPY    Anesthesia Start: 1510 Anesthesia Stop: 1537    Procedure: COLONOSCOPY WITH BIOPSY POLYPECTOMY Diagnosis:       Screening for malignant neoplasm of colon      History of colonic polyps      (Screening for malignant neoplasm of colon [Z12.11])      (History of colonic polyps [Z86.0100])    Surgeons: Skyler Fuller MD Provider: Gen Marinelli CRNA    Anesthesia Type: general ASA Status: 2            Anesthesia Type: general    Vitals  Vitals Value Taken Time   /67 04/10/25 15:52   Temp 36.4 °C (97.5 °F) 04/10/25 15:52   Pulse 76 04/10/25 15:54   Resp 19 04/10/25 15:52   SpO2 97 % 04/10/25 15:54   Vitals shown include unfiled device data.        Post Anesthesia Care and Evaluation    Patient location during evaluation: bedside  Patient participation: complete - patient participated  Level of consciousness: awake  Pain management: adequate    Airway patency: patent  Anesthetic complications: No anesthetic complications  PONV Status: controlled  Cardiovascular status: acceptable and stable  Respiratory status: acceptable

## 2025-04-10 NOTE — H&P
Colonoscopy consultation        History of Present Illness  Shabana Ferguson is a 68 y.o. female presents to [facility] GENERAL SURGERY for colonoscopy consultation.       Patient presents today without complaints for screening colonoscopy.  She denies any abdominal pain, change in bowel habit or rectal bleeding.  Admits to family history of colon cancer with her brother.  Admits to history of colonic polyps.     Denies NAOMY.  Denies any cardiac issues.  Denies taking any GLP-1 receptors.     10/21: Colonoscopy ([facility]): Normal colon.      6/20: Colonoscopy ([facility]): 2 sigmoid biopsies- inflammatory polyp; tubular adenoma.     5/19: colonoscopy ([facility]): sigmoid - inflammatory; cecum - tubulovillous adenoma; diverticulosis.     1/19: Colonoscopy ([facility]): Cecum - tubular adenoma with high grade dysplasia; Ascending - tubular adenoma; sigmoid - 2 inflammatory polyps with surface erosions/ulceratoin; diverticulosis.        Objective  Medical History        Past Medical History:   Diagnosis Date    Acid reflux 2022    Anxiety      Depression      Fracture of patella 09/12/2017    Hyperlipemia      Osteopenia      Panic disorder      Psychiatric inpatient 07/22/2022     Discharged 07/22/2022 for Anxiety, Depression, and Suicidal Bx's    Seizures       reports seizure activity, but under control.            Surgical History         Past Surgical History:   Procedure Laterality Date    COLONOSCOPY   [id number]     last scope 2020    COLONOSCOPY N/A 10/29/2021     Procedure: COLONOSCOPY with possible biopies.;  Surgeon: Skyler Fuller MD;  Location: [facility] ENDOSCOPY;  Service: General;  Laterality: N/A;    KNEE SURGERY   2017     broken knee            Medications Taking          Outpatient Medications Marked as Taking for the 11/7/24 encounter (Office Visit) with Guerline Thacker APRN   Medication Sig Dispense Refill    amitriptyline (ELAVIL) 25 MG tablet Take 1.5 tablets by mouth Every Night.  135 tablet 1    ARIPiprazole (Abilify) 5 MG tablet Take 1 tablet by mouth Daily. 90 tablet 1    atorvastatin (LIPITOR) 20 MG tablet Take 1 tablet by mouth Daily. 90 tablet 3    Calcium Carb-Cholecalciferol (OYSCO 500 + D) 500-200 MG-UNIT tablet          cholecalciferol (VITAMIN D3) 25 MCG (1000 UT) tablet Take 1 tablet by mouth Daily.        cloNIDine (CATAPRES) 0.1 MG tablet TAKE 1 TABLET BY MOUTH AT BEDTIME MAY START WITH 1/2 TABLET AT FIRST 30 tablet 2    Coenzyme Q10 (CO Q10 PO) Take  by mouth.        escitalopram (LEXAPRO) 10 MG tablet Take 1 tablet by mouth Daily. 90 tablet 3    gabapentin (NEURONTIN) 300 MG capsule 300 mg qam, 300 mg qpm, 600 mg qhs 120 capsule 5    GARLIC PO garlic 1,000 mg oral capsule take 1 capsule by oral route daily   Active        Multiple Vitamins-Iron (MULTIVITAMIN PLUS IRON ADULT PO) Women's Multivitamin 18 mg iron-400 mcg-500 mg oral tablet take 1 tablet by oral route daily   Active        Omega-3 Fatty Acids (fish oil) 1000 MG capsule capsule 3 capsules 3 (Three) Times a Day With Meals. PT(PATIENT) REPORTS SHE TAKES MEDICATION MORNING/LUNCH/EVENING        Probiotic Product (PROBIOTIC PO) Take  by mouth.        vitamin E 400 UNIT capsule vitamin E 400 unit oral capsule take 1 capsule by oral route daily   Active                Allergies   No Known Allergies               Family History   Problem Relation Age of Onset    Stroke Mother      Heart disease Mother      No Known Problems Father      No Known Problems Sister      Cancer Brother      No Known Problems Maternal Aunt      No Known Problems Paternal Aunt      No Known Problems Maternal Uncle      No Known Problems Paternal Uncle      No Known Problems Maternal Grandfather      No Known Problems Maternal Grandmother      No Known Problems Paternal Grandfather      No Known Problems Paternal Grandmother      No Known Problems Cousin      Seizures Son      No Known Problems Other      ADD / ADHD Neg Hx      Alcohol abuse Neg Hx   "    Anxiety disorder Neg Hx      Bipolar disorder Neg Hx      Dementia Neg Hx      Depression Neg Hx      Drug abuse Neg Hx      OCD Neg Hx      Paranoid behavior Neg Hx      Schizophrenia Neg Hx      Self-Injurious Behavior  Neg Hx      Suicide Attempts Neg Hx           Social History   Social History           Socioeconomic History    Marital status:    Tobacco Use    Smoking status: Never    Smokeless tobacco: Never   Vaping Use    Vaping status: Never Used   Substance and Sexual Activity    Alcohol use: Not Currently    Drug use: Never    Sexual activity: Not Currently            Review of Systems   Constitutional:  Negative for chills and fever.   Gastrointestinal:  Negative for abdominal distention, abdominal pain, anal bleeding, blood in stool, constipation, diarrhea and rectal pain.         Vital Signs:   /77 (BP Location: Left arm, Patient Position: Sitting, Cuff Size: Adult)   Pulse 80   Ht 162.6 cm (64.02\")   Wt 69.4 kg (153 lb)   SpO2 96%   BMI 26.25 kg/m²      Physical Exam  Vitals and nursing note reviewed.   Constitutional:       General: She is not in acute distress.     Appearance: Normal appearance. She is not ill-appearing.   HENT:      Head: Normocephalic and atraumatic.   Cardiovascular:      Rate and Rhythm: Normal rate.   Pulmonary:      Effort: Pulmonary effort is normal.      Breath sounds: No stridor.   Abdominal:      Palpations: Abdomen is soft.      Tenderness: There is no guarding.   Musculoskeletal:         General: No deformity. Normal range of motion.   Skin:     General: Skin is warm and dry.      Coloration: Skin is not jaundiced.   Neurological:      General: No focal deficit present.      Mental Status: She is alert and oriented to person, place, and time.   Psychiatric:         Mood and Affect: Mood normal.         Thought Content: Thought content normal.            Result Review  :                  []  Laboratory  []  Radiology  []  Pathology  []  " Microbiology  []  EKG/Telemetry   []  Cardiology/Vascular   []  Old records  I spent 15 minutes caring for Shabana on this date of service. This time includes time spent by me in the following activities: reviewing tests, obtaining and/or reviewing a separately obtained history, performing a medically appropriate examination and/or evaluation, ordering medications, tests, or procedures, and documenting information in the medical record   .      Assessment  Assessment and Plan    Diagnoses and all orders for this visit:     1. Screening for malignant neoplasm of colon (Primary)     2. History of colonic polyps     Other orders  -     polyethylene glycol (MIRALAX) 17 g packet; Take as directed.  Instructions given in office.  Dispense: 238 g bottle  Dispense: 238 packet; Refill: 0           Follow Up   Return for Schedule colonoscopy with Dr. Fuller on 4/10/20[facility] [place].     Hospital arrival time: 12:00     Possible risks/complications, benefits, and alternatives to surgical or invasive procedures have been explained to patient and/or legal guardian.     Patient has been evaluated and can tolerate anesthesia and/or sedation. Risks, benefits, and alternatives to anesthesia and sedation have been explained to the patient and/or legal guardian. Patient verbalizes understanding and is willing to proceed with the above plan.      Patient was given instructions and counseling regarding her condition or for health maintenance advice. Please see specific information pulled into the AVS if appropriate.      Thank you for allowing me to participate in the care of this patient. Please call with questions or concerns.

## 2025-04-14 LAB
CYTO UR: NORMAL
LAB AP CASE REPORT: NORMAL
LAB AP CLINICAL INFORMATION: NORMAL
PATH REPORT.FINAL DX SPEC: NORMAL
PATH REPORT.GROSS SPEC: NORMAL

## 2025-04-23 NOTE — PROGRESS NOTES
"Subjective   Shabana Ferguson is a 69 y.o. female who presents today for follow up    Referring Provider:  No referring provider defined for this encounter.    Chief Complaint:  mdd fareed    History of Present Illness:     Chart review:   2025: colonoscopy recently.  2025: fam med; reassuring TSH vit D; abnl gluc 101 ap 121 on cmp; abnl high trigs 151 on lipids; abnl MCHC on cbc.  2025: no visits.  2024: gen surg, unknown, benign mammo  10/10/2024: no visits.  08/15/2024: no visits.  24: no visits.  2024: No visits.  24: no visits.    Plannin2025: some mild MDD, FAREED, increase clonidine. Next change would be increase amitriptyline to 50 mg qhs. Not using lorazepam. Will need early refill of gabapentin.  2025: Stable, well, no med changes.  2024: Fairly stable, no changes.     Visits (Below):  \"Shabana\" and Jaya  Has never been on vraylar   2025:   In person interview:  \"Better.\"  Less anxious  Still mild depression, \"but I like where I'm at.\"  Sometimes. It comes and goes  2-3x a day, lasts for an hour  Manages with distraction  Depression brought on by not having something to do  Mood/Depression: MDD fairly stable, situational depression  Anxiety: FAREED mild worrying, on edge, restless -- improving  Social anxiety: chronic, persistent  Panic attacks: stable  Energy: stable  Concentration: stable  Insomnia: initial, stable  Eatin, 155, 159, 156, 153, 152, 152, 154, 155, 156 lbs  Refills: y  Substances: def  Therapy:   No longer doing group therapy with other women her age ()  individual counseling  [employer] group  Medication compliant: y  SE: n  No SI HI AVH.      2025:   In person interview:  \"I've been having more feelings of doom.\"  Distraction helps.  Some anxiety, some depression. Both come and go.  Hasn't been using the light box  Not using lorazepam  No triggers. Nicola is doing ok.  Marriage is " "\"better\"  Mood/Depression: MDD depressed mood, mild  Anxiety: FAREED mild worrying, on edge, restless  Social anxiety: chronic, persistent  Panic attacks: stable  Energy: stable  Concentration: stable  Insomnia: initial, stable  Eatin, 159, 156, 153, 152, 152, 154, 155, 156 lbs  Refills: y  Substances: def  Therapy:   No longer doing group therapy with other women her age ()  individual [facility] group  Medication compliant: y  SE: n  No SI HI AVH.      2025:   In person interview:  \"I've been good.\"  Loves having Nicola back.  No MH complaints.  Not really taking the ativan  Mood/Depression: MDD stable  Anxiety: FAREED fairly stable  Social anxiety: chronic, persistent  Panic attacks: stable  Energy: stable  Concentration: stable  Insomnia: initial, stable  Eatin, 156, 153, 152, 152, 154, 155, 156 lbs  Refills: y  Substances: def  Therapy:   No longer doing group therapy with other women her age ()  individual [facility] group  Medication compliant: y  SE: n  No SI HI AVH.      2024:   In person interview:  \"Nicola moved back in with us.\"  Likes that he's back  I don't take the ativan anymore  Still have some minimal anxiety  Had some issues getting gabapentin filled right, but straightened it out at the pharmacy  Mood/Depression: MDD stable  Anxiety: FAREED fairly stable  Social anxiety: chronic, persistent  Panic attacks: stable  Energy: stable  Concentration: stable  Insomnia: initial, stable  Eatin, 153, 152, 152, 154, 155, 156 lbs  Refills: y  Substances: def  Therapy:   Stopped  group therapy with other women her age.  individual counseling  [facility] group  Medication compliant: y  SE: n  No SI HI AVH.      10/10/2024:   In person interview:  \"I'm doing a lot better.\"  Not taking ativan  The clonidine is helping  Family is good. Obdulio is on vacation.  Better intrusive thoughts  Mood/Depression: MDD stable  Anxiety: FAREED improved intrusive thoughts, less restless, " "on edge  Social anxiety: chronic, persistent  Panic attacks: stable  Energy: stable  Concentration: stable  Insomnia: initial, stable  Eatin, 152, 152, 154, 155, 156 lbs  Refills: y  Substances: def  Therapy:   Stopped  group therapy with other women her age.  Still doing individual counseling  [employer] group  Medication compliant: y  SE: n  No SI HI AVH.    ...    Shabana Ferguson is a 64 year old /White female, hx of anxiety and depression, fractured patella, HLD, osteopenia, ulcer referred by MARIE Brice, for anxiety and depression management.     Initial Chart Review 2020: Psychotropic medications: amitriptyline 25 mg QHS, Celexa 20 mg p.o. daily, lorazepam 0.5 p.o. twice daily as needed anxiety.     20 H&P: Patient presented today with her . Both provided extensive history regarding her depression and anxiety. Patient had been seen by  at [facility] for roughly a year and a half. Patient reported that Dr. Gordon had become convinced that she had bipolar disorder, which both she and her  disagree with. Patient and  deny any episodes in the past where she experienced, for an extended length of time lasting at least several days, no need for sleep, rapid speech, risk-taking behaviors. Per the  and wife pair, her previous psychiatrist insisted on her being on a mood stabilizer.   Patient reports that she had been stable on Celexa for \"several years.\" In , the patient underwent a colonoscopy where dysplasia was revealed. This led to significant anxiety and a \"breakdown.\" The dysplasia was subsequently removed. Due to the heightened anxiety the patient's psychiatrist took the patient off of Celexa and started her on Lexapro. The patient did not tolerate Lexapro well, she continued to remain anxious, she began to engage in therapy in addition to medication management. The Lexapro was subsequently switched to Viibryd. The Viibryd did " not work. The patient also began to experience panic attacks including shortness of breath, crying, tachycardia, palpitations. The panic attacks were new and had never happened before.   In 2020 the patient experienced another breakdown and sunk into a deeper depression. The COVID-19 pandemic only worsened her situation. In February the patient's , a physician, decided to become her full-time caretaker as she would become anxious and panicky without him present. The patient also experienced intermittent bouts of suicidal ideation.   Recently, the patient and  demanded of their psychiatrist to take her off of Viibryd and Seroquel. They actually tapered her off of Seroquel themselves. They asked him to switch her back to Celexa. She has been on Celexa now for the last 3 days and is already begun to experience improvement. She states that the Seroquel led to having hallucinations and actually worsened insomnia. Last night she did not take Seroquel for the first time in many months and she slept very well. Her mood is slightly improved. Regarding her anxiety, she endorses muscle tension and fatigue restlessness irritability and a history of insomnia. Regarding her depression she denies SI HI AVH, denies anhedonia denies guilt, notes some decreased energy, psychomotor retardation, and a history of insomnia. They had also tried acupuncture in the past with some success. Psychiatric review of systems is negative for psychosis nancy, positive for anxiety and depression. Possibly positive for  depression and postpartum depression. The patient is medication compliant.       Past Psychiatric History:  Began Psychiatric Treatment: Several years   Dx: Depression and anxiety   Psychiatrist: Doctor Gordon for the last year and a half   Therapist: Sees a therapist at [facility]   Admissions History: Denies, Although she came close to needing admission recently.   Medication Trials: See HPI. Seroquel, Prozac  which was too activating, Zoloft which was too activating, Celexa which worked well, Effexor which worked well, Lexapro which did not work, Viibryd which did not work.   Self-Harm: Denies   Suicide Attempts: Denies   OB/GYN HISTORY:  Sexually Active: OB/GYN history deferred   Substance Abuse History:  Types: Denies all, including illicit   Social History:  Marital Status:    Employed: No     Kids: 4   House: House    Hx: Denies   Family History:  Suicide Attempts: Deferred today, Due to lack of time   Suicide Completions: Deferred   Substance Use: Deferred   Psychiatric Conditions: Deferred    depression, psychosis, anxiety: Deferred   Developmental History:  Born: Deferred   Siblings: Deferred   Access to Weapons: Denies   Thought Content: no suicidal ideation, no homicidal ideation, no auditory hallucinations, no visual hallucinations, and     PHQ-9 Depression Screening  PHQ-9 Total Score:      Little interest or pleasure in doing things?     Feeling down, depressed, or hopeless?     Trouble falling or staying asleep, or sleeping too much?     Feeling tired or having little energy?     Poor appetite or overeating?     Feeling bad about yourself - or that you are a failure or have let yourself or your family down?     Trouble concentrating on things, such as reading the newspaper or watching television?     Moving or speaking so slowly that other people could have noticed? Or the opposite - being so fidgety or restless that you have been moving around a lot more than usual?     Thoughts that you would be better off dead, or of hurting yourself in some way?     PHQ-9 Total Score       FAREED-7       Past Surgical History:  Past Surgical History:   Procedure Laterality Date    COLONOSCOPY      last scope     COLONOSCOPY N/A 10/29/2021    Procedure: COLONOSCOPY with possible biopies.;  Surgeon: Skyler Fuller MD;  Location: [facility] ENDOSCOPY;  Service: General;  Laterality: N/A;     COLONOSCOPY N/A 4/10/2025    Procedure: COLONOSCOPY WITH BIOPSY POLYPECTOMY;  Surgeon: Skyler Fuller MD;  Location: [facility] ENDOSCOPY;  Service: General;  Laterality: N/A;  COLON POLYP    KNEE SURGERY  2017    broken knee       Problem List:  Patient Active Problem List   Diagnosis    Anxiety    Depression    History of fracture of patella    Hyperlipemia    Osteopenia    Ulcerative lesion    Constipation    History of colonic polyps    Tinnitus of right ear    Sensorineural hearing loss (SNHL) of both ears    Right shoulder pain    Screening for malignant neoplasm of colon       Allergy:   No Known Allergies     Discontinued Medications:  Medications Discontinued During This Encounter   Medication Reason    gabapentin (NEURONTIN) 300 MG capsule Reorder           Current Medications:   Current Outpatient Medications   Medication Sig Dispense Refill    [START ON 5/5/2025] gabapentin (NEURONTIN) 300 MG capsule 300 mg qam, 300 mg qpm, 600 mg qhs 120 capsule 5    amitriptyline (ELAVIL) 25 MG tablet Take 1.5 tablets by mouth Every Night. 135 tablet 3    ARIPiprazole (Abilify) 5 MG tablet Take 1 tablet by mouth Daily. 90 tablet 1    atorvastatin (LIPITOR) 20 MG tablet TAKE 1 TABLET BY MOUTH DAILY 90 tablet 3    Calcium Carb-Cholecalciferol (OYSCO 500 + D) 500-200 MG-UNIT tablet       cholecalciferol (VITAMIN D3) 25 MCG (1000 UT) tablet Take 1 tablet by mouth Daily.      cloNIDine (CATAPRES) 0.1 MG tablet Take 1 tablet by mouth Every Night. 90 tablet 1    Coenzyme Q10 (CO Q10 PO) Take  by mouth.      escitalopram (LEXAPRO) 10 MG tablet Take 1 tablet by mouth Daily. 90 tablet 3    GARLIC PO garlic 1,000 mg oral capsule take 1 capsule by oral route daily   Active      Multiple Vitamins-Iron (MULTIVITAMIN PLUS IRON ADULT PO) Women's Multivitamin 18 mg iron-400 mcg-500 mg oral tablet take 1 tablet by oral route daily   Active      Omega-3 Fatty Acids (fish oil) 1000 MG capsule capsule 3 capsules 3 (Three) Times a Day With  Meals. PT(PATIENT) REPORTS SHE TAKES MEDICATION MORNING/LUNCH/EVENING      Probiotic Product (PROBIOTIC PO) Take  by mouth.      vitamin E 400 UNIT capsule vitamin E 400 unit oral capsule take 1 capsule by oral route daily   Active       No current facility-administered medications for this visit.       Past Medical History:  Past Medical History:   Diagnosis Date    Acid reflux 2022    Anxiety     Depression     Fracture of patella 09/12/2017    Hyperlipemia     Osteopenia     Panic disorder     Psychiatric inpatient 07/22/2022    Discharged 07/22/2022 for Anxiety, Depression, and Suicidal Bx's    Seizures     reports seizure activity, but under control.       Social History     Socioeconomic History    Marital status:    Tobacco Use    Smoking status: Never    Smokeless tobacco: Never   Vaping Use    Vaping status: Never Used   Substance and Sexual Activity    Alcohol use: Not Currently    Drug use: Never    Sexual activity: Not Currently         Family History   Problem Relation Age of Onset    Stroke Mother     Heart disease Mother     No Known Problems Father     No Known Problems Sister     Cancer Brother     No Known Problems Maternal Aunt     No Known Problems Paternal Aunt     No Known Problems Maternal Uncle     No Known Problems Paternal Uncle     No Known Problems Maternal Grandfather     No Known Problems Maternal Grandmother     No Known Problems Paternal Grandfather     No Known Problems Paternal Grandmother     No Known Problems Cousin     Seizures Son     No Known Problems Other     ADD / ADHD Neg Hx     Alcohol abuse Neg Hx     Anxiety disorder Neg Hx     Bipolar disorder Neg Hx     Dementia Neg Hx     Depression Neg Hx     Drug abuse Neg Hx     OCD Neg Hx     Paranoid behavior Neg Hx     Schizophrenia Neg Hx     Self-Injurious Behavior  Neg Hx     Suicide Attempts Neg Hx      Review of Systems:  Review of Systems   Constitutional:  Negative for diaphoresis and fatigue.   HENT:  Negative for  "drooling.    Eyes:  Negative for visual disturbance.   Respiratory:  Negative for cough and shortness of breath.    Cardiovascular:  Negative for chest pain, palpitations and leg swelling.   Gastrointestinal:  Negative for nausea and vomiting.   Endocrine: Negative for cold intolerance and heat intolerance.   Genitourinary:  Negative for difficulty urinating.   Musculoskeletal:  Negative for joint swelling.   Allergic/Immunologic: Negative for immunocompromised state.   Neurological:  Negative for dizziness, seizures, speech difficulty and numbness.         Mental Status Exam  Appearance  : sitting in a chair, good eye contact, normal street clothes, groomed, in person  Behavior  : pleasant and cooperative  Motor  : No abnormal  Speech  : normal rhythm, rate, volume, tone, not hyperverbal, not pressured, normal prosidy, Speaks with an accent  Mood  : \"I'm feeling some doom\"  Affect  : very mild anxiety, mood congruent, fair variability  Thought Content  : negative suicidal ideations, negative homicidal ideations, negative obsessions  Perceptions  : negative auditory hallucinations, negative visual hallucinations  Thought Process  : linear  Insight/Judgement  : fair/fair  Cognition  : grossly intact  Attention  : intact      Physical Exam:  Physical Exam    Vital Signs:   /54   Pulse 88   Ht 162.6 cm (64\")   Wt 70.2 kg (154 lb 12.8 oz)   SpO2 98%   BMI 26.57 kg/m²      Lab Results:   Admission on 04/10/2025, Discharged on 04/10/2025   Component Date Value Ref Range Status    Case Report 04/10/2025    Final                    Value:Surgical Pathology Report                         Case: [id number]                                  Authorizing Provider:  Skyler Fuller MD      Collected:           04/10/2025 03:25 PM          Ordering Location:     [facility] Received:            04/11/2025 09:41 AM                                 SUITES                                                      " "                 Pathologist:           Guanakito Hunt MD                                                            Specimen:    Ileocecal Valve, ILEOCECAL VALVE POLYP, BIOPSY                                             Clinical Information 04/10/2025    Final                    Value:Screening for malignant neoplasm of colon  History of colonic polyps      Final Diagnosis 04/10/2025    Final                    Value:Ileocecal valve polyp, biopsy:   - Tubular adenoma      Gross Description 04/10/2025    Final                    Value:1. Ileocecal Valve.  Received in formalin and labeled \" ileocecal valve polyp\" is a 0.4 cm fragment of tan soft tissue. The specimen is entirely submitted in one cassette.   GREGORIO      Microscopic Description 04/10/2025    Final                    Value:Microscopic examination performed.     Office Visit on 02/06/2025   Component Date Value Ref Range Status    Glucose 02/06/2025 101 (H)  65 - 99 mg/dL Final    BUN 02/06/2025 20  8 - 23 mg/dL Final    Creatinine 02/06/2025 0.90  0.57 - 1.00 mg/dL Final    Sodium 02/06/2025 140  136 - 145 mmol/L Final    Potassium 02/06/2025 4.3  3.5 - 5.2 mmol/L Final    Chloride 02/06/2025 104  98 - 107 mmol/L Final    CO2 02/06/2025 25.7  22.0 - 29.0 mmol/L Final    Calcium 02/06/2025 9.9  8.6 - 10.5 mg/dL Final    Total Protein 02/06/2025 7.1  6.0 - 8.5 g/dL Final    Albumin 02/06/2025 3.9  3.5 - 5.2 g/dL Final    ALT (SGPT) 02/06/2025 25  1 - 33 U/L Final    AST (SGOT) 02/06/2025 31  1 - 32 U/L Final    Alkaline Phosphatase 02/06/2025 121 (H)  39 - 117 U/L Final    Total Bilirubin 02/06/2025 0.8  0.0 - 1.2 mg/dL Final    Globulin 02/06/2025 3.2  gm/dL Final    A/G Ratio 02/06/2025 1.2  g/dL Final    BUN/Creatinine Ratio 02/06/2025 22.2  7.0 - 25.0 Final    Anion Gap 02/06/2025 10.3  5.0 - 15.0 mmol/L Final    eGFR 02/06/2025 68.9  >60.0 mL/min/1.73 Final    Total Cholesterol 02/06/2025 172  0 - 200 mg/dL Final    Triglycerides 02/06/2025 151 (H)  0 - " 150 mg/dL Final    HDL Cholesterol 02/06/2025 54  40 - 60 mg/dL Final    LDL Cholesterol  02/06/2025 92  0 - 100 mg/dL Final    VLDL Cholesterol 02/06/2025 26  5 - 40 mg/dL Final    LDL/HDL Ratio 02/06/2025 1.63   Final    TSH 02/06/2025 0.568  0.270 - 4.200 uIU/mL Final    25 Hydroxy, Vitamin D 02/06/2025 40.7  30.0 - 100.0 ng/ml Final    WBC 02/06/2025 6.48  3.40 - 10.80 10*3/mm3 Final    RBC 02/06/2025 4.49  3.77 - 5.28 10*6/mm3 Final    Hemoglobin 02/06/2025 13.1  12.0 - 15.9 g/dL Final    Hematocrit 02/06/2025 42.1  34.0 - 46.6 % Final    MCV 02/06/2025 93.8  79.0 - 97.0 fL Final    MCH 02/06/2025 29.2  26.6 - 33.0 pg Final    MCHC 02/06/2025 31.1 (L)  31.5 - 35.7 g/dL Final    RDW 02/06/2025 13.7  12.3 - 15.4 % Final    RDW-SD 02/06/2025 46.6  37.0 - 54.0 fl Final    MPV 02/06/2025 9.8  6.0 - 12.0 fL Final    Platelets 02/06/2025 267  140 - 450 10*3/mm3 Final    Neutrophil % 02/06/2025 56.6  42.7 - 76.0 % Final    Lymphocyte % 02/06/2025 32.3  19.6 - 45.3 % Final    Monocyte % 02/06/2025 9.4  5.0 - 12.0 % Final    Eosinophil % 02/06/2025 0.9  0.3 - 6.2 % Final    Basophil % 02/06/2025 0.6  0.0 - 1.5 % Final    Immature Grans % 02/06/2025 0.2  0.0 - 0.5 % Final    Neutrophils, Absolute 02/06/2025 3.67  1.70 - 7.00 10*3/mm3 Final    Lymphocytes, Absolute 02/06/2025 2.09  0.70 - 3.10 10*3/mm3 Final    Monocytes, Absolute 02/06/2025 0.61  0.10 - 0.90 10*3/mm3 Final    Eosinophils, Absolute 02/06/2025 0.06  0.00 - 0.40 10*3/mm3 Final    Basophils, Absolute 02/06/2025 0.04  0.00 - 0.20 10*3/mm3 Final    Immature Grans, Absolute 02/06/2025 0.01  0.00 - 0.05 10*3/mm3 Final    nRBC 02/06/2025 0.0  0.0 - 0.2 /100 WBC Final       EKG Results:  No orders to display       Imaging Results:  No Images in the past 120 days found..      Assessment & Plan   Diagnoses and all orders for this visit:    1. Generalized anxiety disorder (Primary)  -     gabapentin (NEURONTIN) 300 MG capsule; 300 mg qam, 300 mg qpm, 600 mg qhs   Dispense: 120 capsule; Refill: 5    2. Recurrent major depressive disorder in remission  -     gabapentin (NEURONTIN) 300 MG capsule; 300 mg qam, 300 mg qpm, 600 mg qhs  Dispense: 120 capsule; Refill: 5    3. Panic attacks  -     gabapentin (NEURONTIN) 300 MG capsule; 300 mg qam, 300 mg qpm, 600 mg qhs  Dispense: 120 capsule; Refill: 5    4. Panic disorder    5. Insomnia due to mental condition  -     gabapentin (NEURONTIN) 300 MG capsule; 300 mg qam, 300 mg qpm, 600 mg qhs  Dispense: 120 capsule; Refill: 5        Visit Diagnoses:    ICD-10-CM ICD-9-CM   1. Generalized anxiety disorder  F41.1 300.02   2. Recurrent major depressive disorder in remission  F33.40 296.35   3. Panic attacks  F41.0 300.01   4. Panic disorder  F41.0 300.01   5. Insomnia due to mental condition  F51.05 300.9     327.02   INITIAL presentation most consistent with major depressive disorder in partial remission, FAREED, rare panic attacks.    04/24/2025: Improved FAREED, stable. Some mild depression which may not even be MDD. No other changes.    Acknowledged and normalized patient's thoughts, feelings, and concerns. Allowed patient to freely discuss and process issues, such as:  Anxiety and depression regarding family conflict/relationships.  Anxiety and depression regarding family's well-being.  ... using Rogerian psychotherapeutic techniques including unconditional positive regard, reflective listening, and demonstrating clear empathy, with the goal of ameliorating symptoms and maintaining, restoring, or improving self-esteem, adaptive skills, and ego or psychological functions (Tayla et al. 1991), the long-term goal of which is to develop a better, healthier perspective and help the patient bear their circumstances more easily.  Time (minutes) spent providing supportive psychotherapy: 16  (This time is exclusive to the therapy session and separate from the time spent on activities used to meet the criteria for the E/M service (history, exam,  medical decision-making).)  Start: 8:44  Stop: 9:00  Functional status: mild impairment  Treatment plan: Medication management and supportive psychotherapy  Prognosis: good  Progress: improving  8w    03/27/2025: some mild MDD, FAREED, increase clonidine. Next change would be increase amitriptyline to 50 mg qhs. Not using lorazepam. Will need early refill of gabapentin.    01/23/2025: Stable, well, no med changes.    11/21/2024: Fairly stable, no changes.     10/10/2024: Much improved FAREED, no changes. May be stable.    08/29/2024: Improved, give clonidine more time to work. 6w    07/11/2024: Intrusive thoughts when not distracted, start low dose clonidine. Has a family reunion coming up soon (all except son in [place]).    06/06/2024: Some bouts of depression and anxiety, with anxiety worse, lasting about 1 hr, daily. In between, complete resolution. Monitor for now. May add lamictal or increase elavil.    4/23/24: Improved, no changes to give elavil more time to work.    3/22: No change at all. Reduce dose back to previous of abilify (5 mg), and increase amitriptyline instead.    2/9: Increase abilify with the supply she has: 7 mg daily for a month, then 7.5 mg daily. Targeting anx, dep, insomnia. Watch brain zaps. Discussed vraylar as well.    1/11: Stable, well, explored her catastrophizing today. No changes. Wants to re-establish individual therapy with her old therapist at [facility] to manage catastrophizing, thoughts when she is lonely. Conflict processed between pt and  today; suggested restarting marriage counseling thru pt's therapist (who can make a referral). How much of patient's MH can be explained by her relp with her hsuband?    12/6: No changes for now, but may increase to 7.5 mg daily of abilify (half a 15 mg tab). Still some residual MDD.    11/2: chronic mild anhedonia. Increase abilify and start light box.    8/31: Stable, well, no changes. Light box, we discussed it.    6/30: Stable, well. No  changes.     4/28: Euthymic well. No changes.     3/3: Stable, well, no changes.      1/20: Well, stable, no changes.     12/9: Significant improvement. No changes.     11/18: Residual anxiety, residual depression.  Increase gabapentin, start amitriptyline at night for initial insomnia.  Close follow-up.     11/7: Residual anx (at noon), consider adding gabapentin in the mornings. and dep. Increase gabapentin in the afternoons. No other changes.     10/31: Couples therapy, increase Abilify, close follow-up.  I feel that their marriage issues have come to a head recently, and this stressor could largely explain patient's depression and anxiety.  Patient mentioned Celexa again, she is likely interested in switching.     9/30: well, stable, some constriction today. No changes.     8/30: Remarkably well. Initial insomnia.    8/1: Try another mood stabilizer in place of olanzapine.  Will trial Abilify again.  Also consider Geodon.      6/17: start buspar, doing better.     5/20: Continue Lexapro and lorazepam.  Try Belsomra in place of Ambien.      4/22: Some improvement in anx and dep, but still residual symptoms.  Persistent insomnia. Declines IOP, inpatient admission.  Increase Lexapro and start trazodone.      3/24: Focus on lower doses.  Start Pristiq.  Depression with anxious distress versus depression and generalized anxiety disorder.  She only has distractibility, no other symptoms of hypomania.      3/15: Continue lurasidone 40 mg at night, increase Ambien to 10 mg at night.  Continue low-dose Lamictal, plan to increase further.  Continue titrating off of amitriptyline.     3/10: Effexor has now been stopped.  Continue Latuda and Lamictal at present doses.  I will see her back in a few days and then at 6 weeks.  Bipolar 2?  If so, how come she is tolerating amitriptyline in the evenings?     2/15: Still utilizing lorazepam which means the anxiety is not under control.  Increase Effexor.    1/17: Continued  improvement.  No changes, only on effexor 75 mg for 2 weeks.  Tolerating medications without side effects.      1/3: Continued improvement. Increase effexor to target residual dep anx. TMS to start 1/6.     PLAN:  Risk Assessment: Risk of self-harm acutely remain low. Risk factors include a history of suicidal ideation, mood disorder, anxiety disorder, presence of psychosocial stressors such as colonic dysplasia, COVID-19 pandemic. Protective factors include no history of self-harm or suicide attempts, good social support, willingness to engage in care, improved depressive symptoms. Risk of self-harm chronically also remain low, but could be further elevated in the event of treatment noncompliance and/or AODA.  Medications:   CONTINUE light box.   CONTINUE clonidine 0.1 mg qhs. Risks, benefits, alternatives discussed with patient including dizziness, sedation, falls, low blood pressure, GI upset.  Use care when operating vehicle, vessel, or machine. After discussion of these risks and benefits, the patient voiced understanding and agreed to proceed.  CONTINUE lexapro 10 mg daily. Risks, benefits, alternatives discussed with patient including GI upset, nausea vomiting diarrhea, theoretical decrease of seizure threshold predisposing the patient to a slightly higher seizure risk, headaches, sexual dysfunction, serotonin syndrome, bleeding risk, increased suicidality in patients 24 years and younger.  After discussion of these risks and benefits, the patient voiced understanding and agreed to proceed.  CONTINUE gabapentin 300 mg qam, 300 qpm, 600 mg nightly. Risks, benefits, alternatives discussed with patient including sedation, dizziness/falls risk, GI upset, weight gain.  After discussion of these risks and benefits, the patient voiced understanding and agreed to proceed. LAVERNE ordered, Mamadou reviewed.  CONTINUE Abilify 5 mg qhs. Risks, benefits, alternatives discussed with patient including increased energy,  "exacerbation of irritability, akathisia, GI upset, orthostatic hypotension, increased appetite.  After discussion of these risks and benefits, the patient voiced understanding and agreed to proceed.  CONTINUE amitriptyline 37.5 mg nightly. Risks, benefits, alternatives discussed with patient including sedation, dizziness, falls, GI upset, constipation, urinary retention, dry mouth.  After discussion of these risks and benefits, the patient voiced understanding and agreed to proceed.  STOPPED lorazepam 1 mg bid PRN. (Not needed 3/25) Risks, benefits, alternatives discussed with patient including GI upset, sedation, dizziness, respiratory depression, falls risk.  After discussion of these risks and benefits, the patient voiced understanding and agreed to proceed.  S/P:   buspar 5 mg bid was ineffective.  Trazodone 50 mg nightly not helpful for insomnia.  Mirtazapine 15 mg, 7.5 mg: ineffective; made anxiety and depression worse.  Abilify: brain zaps and constipation  Seroquel made depression worse  celexa 20 mg daily, ineffective after several months  Doxepin caused \"brain zaps\"  Latuda was ineffective.  Amitriptyline 25 mg nightly helpful for sleep but we didn't want her on too many serotonergic meds. Restarted.  Ambien minimally helpful for insomnia.  Pristiq and effexor worsened anxiety, both low dose.  Stopped lamictal for unclear reasons 25 mg.  belsomra 5 mg nightly didn't help with insomnia.  Therapy: continue with Genevieve at [facility].  Referred for couples therapy 10/2022  Status post IOP at [facility] 10/2022  Labs/Studies: BMP to monitor sodium levels in February was entirely normal, EKG February shows rate 72, sinus, .    TREATMENT PLAN/GOALS: Continue supportive psychotherapy efforts and medications as indicated. Treatment and medication options discussed during today's visit. Patient acknowledged and verbally consented to continue with current treatment plan and was educated on the importance of " compliance with treatment and follow-up appointments.    MEDICATION ISSUES:  YVETTE reviewed as expected.  Discussed medication options and treatment plan of prescribed medication as well as the risks, benefits, and side effects including potential falls, possible impaired driving and metabolic adversities among others. Patient is agreeable to call the office with any worsening of symptoms or onset of side effects. Patient is agreeable to call 911 or go to the nearest ER should he/she begin having SI/HI. No medication side effects or related complaints today.     MEDS ORDERED DURING VISIT:    Return in about 8 weeks (around 6/19/2025).         This document has been electronically signed by Vinny Krishnamurthy MD  April 24, 2025 09:00 EDT       Part of this note may be an electronic transcription/translation of spoken language to printed text using the Dragon Dictation System.

## 2025-04-24 ENCOUNTER — OFFICE VISIT (OUTPATIENT)
Dept: PSYCHIATRY | Facility: CLINIC | Age: 70
End: 2025-04-24
Payer: MEDICARE

## 2025-04-24 VITALS
WEIGHT: 154.8 LBS | BODY MASS INDEX: 26.43 KG/M2 | OXYGEN SATURATION: 98 % | SYSTOLIC BLOOD PRESSURE: 112 MMHG | DIASTOLIC BLOOD PRESSURE: 54 MMHG | HEIGHT: 64 IN | HEART RATE: 88 BPM

## 2025-04-24 DIAGNOSIS — F41.0 PANIC DISORDER: ICD-10-CM

## 2025-04-24 DIAGNOSIS — F51.05 INSOMNIA DUE TO MENTAL CONDITION: ICD-10-CM

## 2025-04-24 DIAGNOSIS — F41.1 GENERALIZED ANXIETY DISORDER: Primary | ICD-10-CM

## 2025-04-24 DIAGNOSIS — F41.0 PANIC ATTACKS: ICD-10-CM

## 2025-04-24 DIAGNOSIS — F33.40 RECURRENT MAJOR DEPRESSIVE DISORDER IN REMISSION: ICD-10-CM

## 2025-04-24 RX ORDER — GABAPENTIN 300 MG/1
CAPSULE ORAL
Qty: 120 CAPSULE | Refills: 5 | Status: SHIPPED | OUTPATIENT
Start: 2025-05-05

## 2025-04-25 ENCOUNTER — OFFICE VISIT (OUTPATIENT)
Dept: SURGERY | Facility: CLINIC | Age: 70
End: 2025-04-25
Payer: MEDICARE

## 2025-04-25 VITALS — WEIGHT: 154 LBS | HEIGHT: 64 IN | BODY MASS INDEX: 26.29 KG/M2

## 2025-04-25 DIAGNOSIS — D36.9 TUBULAR ADENOMA: ICD-10-CM

## 2025-04-25 DIAGNOSIS — Z98.890 S/P COLONOSCOPY WITH POLYPECTOMY: Primary | ICD-10-CM

## 2025-04-25 NOTE — PROGRESS NOTES
"Chief Complaint: Follow-up    Subjective      Follow-up visit       History of Present Illness  Shabana Ferguson is a 69 y.o. female presents to [facility] for follow-up visit.    Patient presents today for a follow-up visit after undergoing a colonoscopy on/10/25 performed by Dr. Skyler Fuller.  A tattoo was seen in the sigmoid colon; tubular adenoma ileocecal valve per colonoscopy/pathology.    Results for orders placed or performed during the hospital encounter of 04/10/25   Tissue Pathology Exam    Collection Time: 04/10/25  3:25 PM    Specimen: Ileocecal Valve; Polyp   Result Value Ref Range    Case Report       Surgical Pathology Report                         Case: [id number]                                  Authorizing Provider:  Skyler Fuller MD      Collected:           04/10/2025 03:25 PM          Ordering Location:     [facility] Received:            04/11/2025 09:41 AM                                 SUITES                                                                       Pathologist:           Guanakito Hunt MD                                                            Specimen:    Ileocecal Valve, ILEOCECAL VALVE POLYP, BIOPSY                                             Clinical Information       Screening for malignant neoplasm of colon  History of colonic polyps      Final Diagnosis       Ileocecal valve polyp, biopsy:   - Tubular adenoma      Gross Description       1. Ileocecal Valve.  Received in formalin and labeled \" ileocecal valve polyp\" is a 0.4 cm fragment of tan soft tissue. The specimen is entirely submitted in one cassette.   GREGORIO      Microscopic Description       Microscopic examination performed.       Colonoscopy performed at difficulty.  The patient tolerated the procedure well.    Patient denies any postoperative complications.  Objective     Past Medical History:   Diagnosis Date    Acid reflux 2022    Anxiety     " Depression     Fracture of patella 09/12/2017    Hyperlipemia     Osteopenia     Panic disorder     Psychiatric inpatient 07/22/2022    Discharged 07/22/2022 for Anxiety, Depression, and Suicidal Bx's    Seizures     reports seizure activity, but under control.       Past Surgical History:   Procedure Laterality Date    COLONOSCOPY  1/812019    last scope 2020    COLONOSCOPY N/A 10/29/2021    Procedure: COLONOSCOPY with possible biopies.;  Surgeon: Skyler Fuller MD;  Location: [facility] ENDOSCOPY;  Service: General;  Laterality: N/A;    COLONOSCOPY N/A 4/10/2025    Procedure: COLONOSCOPY WITH BIOPSY POLYPECTOMY;  Surgeon: Skyler Fuller MD;  Location: [facility] ENDOSCOPY;  Service: General;  Laterality: N/A;  COLON POLYP    KNEE SURGERY  2017    broken knee       Outpatient Medications Marked as Taking for the 4/25/25 encounter (Office Visit) with Guerline Thacker APRN   Medication Sig Dispense Refill    amitriptyline (ELAVIL) 25 MG tablet Take 1.5 tablets by mouth Every Night. 135 tablet 3    ARIPiprazole (Abilify) 5 MG tablet Take 1 tablet by mouth Daily. 90 tablet 1    atorvastatin (LIPITOR) 20 MG tablet TAKE 1 TABLET BY MOUTH DAILY 90 tablet 3    Calcium Carb-Cholecalciferol (OYSCO 500 + D) 500-200 MG-UNIT tablet       cholecalciferol (VITAMIN D3) 25 MCG (1000 UT) tablet Take 1 tablet by mouth Daily.      cloNIDine (CATAPRES) 0.1 MG tablet Take 1 tablet by mouth Every Night. 90 tablet 1    Coenzyme Q10 (CO Q10 PO) Take  by mouth.      escitalopram (LEXAPRO) 10 MG tablet Take 1 tablet by mouth Daily. 90 tablet 3    [START ON 5/5/2025] gabapentin (NEURONTIN) 300 MG capsule 300 mg qam, 300 mg qpm, 600 mg qhs 120 capsule 5    GARLIC PO garlic 1,000 mg oral capsule take 1 capsule by oral route daily   Active      Multiple Vitamins-Iron (MULTIVITAMIN PLUS IRON ADULT PO) Women's Multivitamin 18 mg iron-400 mcg-500 mg oral tablet take 1 tablet by oral route daily   Active      Omega-3 Fatty Acids (fish oil) 1000 MG  "capsule capsule 3 capsules 3 (Three) Times a Day With Meals. PT(PATIENT) REPORTS SHE TAKES MEDICATION MORNING/LUNCH/EVENING      Probiotic Product (PROBIOTIC PO) Take  by mouth.      vitamin E 400 UNIT capsule vitamin E 400 unit oral capsule take 1 capsule by oral route daily   Active         No Known Allergies     Family History   Problem Relation Age of Onset    Stroke Mother     Heart disease Mother     No Known Problems Father     No Known Problems Sister     Cancer Brother     No Known Problems Maternal Aunt     No Known Problems Paternal Aunt     No Known Problems Maternal Uncle     No Known Problems Paternal Uncle     No Known Problems Maternal Grandfather     No Known Problems Maternal Grandmother     No Known Problems Paternal Grandfather     No Known Problems Paternal Grandmother     No Known Problems Cousin     Seizures Son     No Known Problems Other     ADD / ADHD Neg Hx     Alcohol abuse Neg Hx     Anxiety disorder Neg Hx     Bipolar disorder Neg Hx     Dementia Neg Hx     Depression Neg Hx     Drug abuse Neg Hx     OCD Neg Hx     Paranoid behavior Neg Hx     Schizophrenia Neg Hx     Self-Injurious Behavior  Neg Hx     Suicide Attempts Neg Hx        Social History     Socioeconomic History    Marital status:    Tobacco Use    Smoking status: Never    Smokeless tobacco: Never   Vaping Use    Vaping status: Never Used   Substance and Sexual Activity    Alcohol use: Not Currently    Drug use: Never    Sexual activity: Not Currently       Review of Systems   Constitutional:  Negative for chills and fever.   Gastrointestinal:  Negative for abdominal distention, abdominal pain, anal bleeding, blood in stool, constipation, diarrhea and rectal pain.        Vital Signs:   Ht 162.6 cm (64\")   Wt 69.9 kg (154 lb)   BMI 26.43 kg/m²      Physical Exam  Vitals and nursing note reviewed.   Constitutional:       General: She is not in acute distress.     Appearance: Normal appearance. She is not " ill-appearing.   HENT:      Head: Normocephalic and atraumatic.   Cardiovascular:      Rate and Rhythm: Normal rate.   Pulmonary:      Effort: Pulmonary effort is normal.      Breath sounds: No stridor.   Abdominal:      Palpations: Abdomen is soft.      Tenderness: There is no guarding.   Musculoskeletal:         General: Normal range of motion.   Skin:     General: Skin is warm and dry.      Coloration: Skin is not jaundiced.   Neurological:      General: No focal deficit present.      Mental Status: She is alert and oriented to person, place, and time.   Psychiatric:         Mood and Affect: Mood normal.         Thought Content: Thought content normal.          Result Review :          []  Laboratory  []  Radiology  []  Pathology  []  Microbiology  []  EKG/Telemetry   []  Cardiology/Vascular   []  Old records  Today reviewed Dr. Fuller's operative and pathology report.     Assessment and Plan    Diagnoses and all orders for this visit:    1. S/P colonoscopy with polypectomy (Primary)    2. Tubular adenoma        Follow Up   Return for Rescreen colon in 5 years; follow-up in the interim as needed.    Avoid high fat diets    Increase fiber intake    Per AGA guidelines patient will follow-up for colonoscopy surveillance as directed.    Patient was given instructions and counseling regarding her condition or for health maintenance advice. Please see specific information pulled into the AVS if appropriate.     As always, it has been a pleasure to participate in your patient's care. Please call with questions or concerns.

## 2025-05-06 ENCOUNTER — TELEPHONE (OUTPATIENT)
Dept: SURGERY | Facility: CLINIC | Age: 70
End: 2025-05-06
Payer: MEDICARE

## 2025-05-06 NOTE — TELEPHONE ENCOUNTER
----- Message from April Kedar sent at 5/6/2025  8:15 AM EDT -----  5 year colon recall. [facility]

## 2025-06-18 NOTE — PROGRESS NOTES
"Subjective   Shabana Ferguson is a 69 y.o. female who presents today for follow up    Referring Provider:  No referring provider defined for this encounter.    Chief Complaint:  mdd margarita    History of Present Illness:     Chart review:   2025: gen surg  2025: colonoscopy recently.  2025: fam med; reassuring TSH vit D; abnl gluc 101 ap 121 on cmp; abnl high trigs 151 on lipids; abnl MCHC on cbc.  2025: no visits.  2024: gen surg, unknown, benign mammo  10/10/2024: no visits.  08/15/2024: no visits.  24: no visits.  2024: No visits.  24: no visits.    Visits (Below):  \"Shabana\" and Jaya  Has never been on vraylar   2025:   In person interview:  \"I feel good, I feel like I'm still healing.\"  Depression and anxiety are under control  Enjoying the volunteering at [employer], does it T, W, Th for a few hours each day  Mood/Depression: MDD stable  Anxiety: MARGARITA improved, stable  Social anxiety: chronic, persistent  Panic attacks: stable  Energy: stable  Concentration: stable  Insomnia: initial, stable  Eatin, 149, 155, 159, 156, 153, 152, 152, 154, 155, 156 lbs  Refills: y  Substances: def  Therapy:   No longer doing group therapy with other women her age ()  individual counseling  [facility] group  Medication compliant: y  SE: n  No SI [facility].      2025:   In person interview:  \"Better.\"  Less anxious  Still mild depression, \"but I like where I'm at.\"  Sometimes. It comes and goes  2-3x a day, lasts for an hour  Manages with distraction  Depression brought on by not having something to do  Mood/Depression: MDD fairly stable, situational depression  Anxiety: MARGARITA mild worrying, on edge, restless -- improving  Social anxiety: chronic, persistent  Panic attacks: stable  Energy: stable  Concentration: stable  Insomnia: initial, stable  Eatin, 155, 159, 156, 153, 152, 152, 154, 155, 156 lbs  Refills: y  Substances: def  Therapy:   No longer " "doing group therapy with other women her age ()  individual [facility] group  Medication compliant: y  SE: n  No SI HI AVH.      2025:   In person interview:  \"I've been having more feelings of doom.\"  Distraction helps.  Some anxiety, some depression. Both come and go.  Hasn't been using the light box  Not using lorazepam  No triggers. Nicola is doing ok.  Marriage is \"better\"  Mood/Depression: MDD depressed mood, mild  Anxiety: FAREED mild worrying, on edge, restless  Social anxiety: chronic, persistent  Panic attacks: stable  Energy: stable  Concentration: stable  Insomnia: initial, stable  Eatin, 159, 156, 153, 152, 152, 154, 155, 156 lbs  Refills: y  Substances: def  Therapy:   No longer doing group therapy with other women her age ()  individual [facility] group  Medication compliant: y  SE: n  No SI HI AVH.      2025:   In person interview:  \"I've been good.\"  Loves having Nicola back.  No MH complaints.  Not really taking the ativan  Mood/Depression: MDD stable  Anxiety: FAREED fairly stable  Social anxiety: chronic, persistent  Panic attacks: stable  Energy: stable  Concentration: stable  Insomnia: initial, stable  Eatin, 156, 153, 152, 152, 154, 155, 156 lbs  Refills: y  Substances: def  Therapy:   No longer doing group therapy with other women her age ()  individual [facility] group  Medication compliant: y  SE: n  No SI HI AVH.      ...    Shabana Ferguson is a 64 year old /White female, hx of anxiety and depression, fractured patella, HLD, osteopenia, ulcer referred by MARIE Brice, for anxiety and depression management.     Initial Chart Review 2020: Psychotropic medications: amitriptyline 25 mg QHS, Celexa 20 mg p.o. daily, lorazepam 0.5 p.o. twice daily as needed anxiety.     20 H&P: Patient presented today with her . Both provided extensive history regarding her depression and anxiety. Patient had been seen by " " at [facility] at [facility] for roughly a year and a half. Patient reported that Dr. Gordon had become convinced that she had bipolar disorder, which both she and her  disagree with. Patient and  deny any episodes in the past where she experienced, for an extended length of time lasting at least several days, no need for sleep, rapid speech, risk-taking behaviors. Per the  and wife pair, her previous psychiatrist insisted on her being on a mood stabilizer.   Patient reports that she had been stable on Celexa for \"several years.\" In 2019, January, the patient underwent a colonoscopy where dysplasia was revealed. This led to significant anxiety and a \"breakdown.\" The dysplasia was subsequently removed. Due to the heightened anxiety the patient's psychiatrist took the patient off of Celexa and started her on Lexapro. The patient did not tolerate Lexapro well, she continued to remain anxious, she began to engage in therapy in addition to medication management. The Lexapro was subsequently switched to Viibryd. The Viibryd did not work. The patient also began to experience panic attacks including shortness of breath, crying, tachycardia, palpitations. The panic attacks were new and had never happened before.   In January 2020 the patient experienced another breakdown and sunk into a deeper depression. The COVID-19 pandemic only worsened her situation. In February the patient's , a physician, decided to become her full-time caretaker as she would become anxious and panicky without him present. The patient also experienced intermittent bouts of suicidal ideation.   Recently, the patient and  demanded of their psychiatrist to take her off of Viibryd and Seroquel. They actually tapered her off of Seroquel themselves. They asked him to switch her back to Celexa. She has been on Celexa now for the last 3 days and is already begun to experience improvement. She states that the Seroquel led to having " hallucinations and actually worsened insomnia. Last night she did not take Seroquel for the first time in many months and she slept very well. Her mood is slightly improved. Regarding her anxiety, she endorses muscle tension and fatigue restlessness irritability and a history of insomnia. Regarding her depression she denies SI HI AVH, denies anhedonia denies guilt, notes some decreased energy, psychomotor retardation, and a history of insomnia. They had also tried acupuncture in the past with some success. Psychiatric review of systems is negative for psychosis nancy, positive for anxiety and depression. Possibly positive for  depression and postpartum depression. The patient is medication compliant.       Past Psychiatric History:  Began Psychiatric Treatment: Several years   Dx: Depression and anxiety   Psychiatrist: Doctor Gordon for the last year and a half   Therapist: Sees a therapist at [facility] History: Denies, Although she came close to needing admission recently.   Medication Trials: See HPI. Seroquel, Prozac which was too activating, Zoloft which was too activating, Celexa which worked well, Effexor which worked well, Lexapro which did not work, Viibryd which did not work.   Self-Harm: Denies   Suicide Attempts: Denies   OB/GYN HISTORY:  Sexually Active: OB/GYN history deferred   Substance Abuse History:  Types: Denies all, including illicit   Social History:  Marital Status:    Employed: No     Kids: 4   House: House    Hx: Denies   Family History:  Suicide Attempts: Deferred today, Due to lack of time   Suicide Completions: Deferred   Substance Use: Deferred   Psychiatric Conditions: Deferred    depression, psychosis, anxiety: Deferred   Developmental History:  Born: Deferred   Siblings: Deferred   Access to Weapons: Denies   Thought Content: no suicidal ideation, no homicidal ideation, no auditory hallucinations, no visual hallucinations, and     PHQ-9  Depression Screening  PHQ-9 Total Score:      Little interest or pleasure in doing things?     Feeling down, depressed, or hopeless?     Trouble falling or staying asleep, or sleeping too much?     Feeling tired or having little energy?     Poor appetite or overeating?     Feeling bad about yourself - or that you are a failure or have let yourself or your family down?     Trouble concentrating on things, such as reading the newspaper or watching television?     Moving or speaking so slowly that other people could have noticed? Or the opposite - being so fidgety or restless that you have been moving around a lot more than usual?     Thoughts that you would be better off dead, or of hurting yourself in some way?     PHQ-9 Total Score       FAREED-7       Past Surgical History:  Past Surgical History:   Procedure Laterality Date    COLONOSCOPY  [id number]    last scope 2020    COLONOSCOPY N/A 10/29/2021    Procedure: COLONOSCOPY with possible biopies.;  Surgeon: Skyler Fuller MD;  Location: [facility] ENDOSCOPY;  Service: General;  Laterality: N/A;    COLONOSCOPY N/A 4/10/2025    Procedure: COLONOSCOPY WITH BIOPSY POLYPECTOMY;  Surgeon: Skyler Fuller MD;  Location: [facility] ENDOSCOPY;  Service: General;  Laterality: N/A;  COLON POLYP    KNEE SURGERY  2017    broken knee       Problem List:  Patient Active Problem List   Diagnosis    Anxiety    Depression    History of fracture of patella    Hyperlipemia    Osteopenia    Ulcerative lesion    Constipation    History of colonic polyps    Tinnitus of right ear    Sensorineural hearing loss (SNHL) of both ears    Right shoulder pain    Screening for malignant neoplasm of colon       Allergy:   No Known Allergies     Discontinued Medications:  Medications Discontinued During This Encounter   Medication Reason    cloNIDine (CATAPRES) 0.1 MG tablet Reorder    ARIPiprazole (Abilify) 5 MG tablet Reorder    escitalopram (LEXAPRO) 10 MG tablet Reorder             Current  Medications:   Current Outpatient Medications   Medication Sig Dispense Refill    ARIPiprazole (Abilify) 5 MG tablet Take 1 tablet by mouth Daily. 90 tablet 3    cloNIDine (CATAPRES) 0.1 MG tablet Take 1 tablet by mouth Every Night. 90 tablet 3    escitalopram (LEXAPRO) 10 MG tablet Take 1 tablet by mouth Daily. 90 tablet 3    amitriptyline (ELAVIL) 25 MG tablet Take 1.5 tablets by mouth Every Night. 135 tablet 3    atorvastatin (LIPITOR) 20 MG tablet TAKE 1 TABLET BY MOUTH DAILY 90 tablet 3    Calcium Carb-Cholecalciferol (OYSCO 500 + D) 500-200 MG-UNIT tablet       cholecalciferol (VITAMIN D3) 25 MCG (1000 UT) tablet Take 1 tablet by mouth Daily.      Coenzyme Q10 (CO Q10 PO) Take  by mouth.      gabapentin (NEURONTIN) 300 MG capsule 300 mg qam, 300 mg qpm, 600 mg qhs 120 capsule 5    GARLIC PO garlic 1,000 mg oral capsule take 1 capsule by oral route daily   Active      Multiple Vitamins-Iron (MULTIVITAMIN PLUS IRON ADULT PO) Women's Multivitamin 18 mg iron-400 mcg-500 mg oral tablet take 1 tablet by oral route daily   Active      Omega-3 Fatty Acids (fish oil) 1000 MG capsule capsule 3 capsules 3 (Three) Times a Day With Meals. PT(PATIENT) REPORTS SHE TAKES MEDICATION MORNING/LUNCH/EVENING      Probiotic Product (PROBIOTIC PO) Take  by mouth.      vitamin E 400 UNIT capsule vitamin E 400 unit oral capsule take 1 capsule by oral route daily   Active       No current facility-administered medications for this visit.       Past Medical History:  Past Medical History:   Diagnosis Date    Acid reflux 2022    Anxiety     Depression     Fracture of patella 09/12/2017    Hyperlipemia     Osteopenia     Panic disorder     Psychiatric inpatient 07/22/2022    Discharged 07/22/2022 for Anxiety, Depression, and Suicidal Bx's    Seizures     reports seizure activity, but under control.       Social History     Socioeconomic History    Marital status:    Tobacco Use    Smoking status: Never    Smokeless tobacco: Never    Vaping Use    Vaping status: Never Used   Substance and Sexual Activity    Alcohol use: Not Currently    Drug use: Never    Sexual activity: Not Currently         Family History   Problem Relation Age of Onset    Stroke Mother     Heart disease Mother     No Known Problems Father     No Known Problems Sister     Cancer Brother     No Known Problems Maternal Aunt     No Known Problems Paternal Aunt     No Known Problems Maternal Uncle     No Known Problems Paternal Uncle     No Known Problems Maternal Grandfather     No Known Problems Maternal Grandmother     No Known Problems Paternal Grandfather     No Known Problems Paternal Grandmother     No Known Problems Cousin     Seizures Son     No Known Problems Other     ADD / ADHD Neg Hx     Alcohol abuse Neg Hx     Anxiety disorder Neg Hx     Bipolar disorder Neg Hx     Dementia Neg Hx     Depression Neg Hx     Drug abuse Neg Hx     OCD Neg Hx     Paranoid behavior Neg Hx     Schizophrenia Neg Hx     Self-Injurious Behavior  Neg Hx     Suicide Attempts Neg Hx      Review of Systems:  Review of Systems   Constitutional:  Negative for diaphoresis and fatigue.   HENT:  Negative for drooling.    Eyes:  Negative for visual disturbance.   Respiratory:  Negative for cough and shortness of breath.    Cardiovascular:  Negative for chest pain, palpitations and leg swelling.   Gastrointestinal:  Negative for nausea and vomiting.   Endocrine: Negative for cold intolerance and heat intolerance.   Genitourinary:  Negative for difficulty urinating.   Musculoskeletal:  Negative for joint swelling.   Allergic/Immunologic: Negative for immunocompromised state.   Neurological:  Negative for dizziness, seizures, speech difficulty and numbness.         Mental Status Exam  Appearance  : good eye contact, normal street clothes, groomed  Behavior  : pleasant and cooperative  Motor  : No abnormal  Speech  : normal rhythm, rate, volume, tone, not hyperverbal, not pressured, normal prosidy,  "Speaks with an accent  Mood  : \"I feel good, I'm healing\"  Affect  : euthymic, mood congruent, fair variability  Thought Content  : negative suicidal ideations, negative homicidal ideations, negative obsessions  Perceptions  : negative auditory hallucinations, negative visual hallucinations  Thought Process  : linear  Insight/Judgement  : fair/fair  Cognition  : grossly intact  Attention  : intact      Physical Exam:  Physical Exam    Vital Signs:   /58   Pulse 87   Ht 162.6 cm (64\")   Wt 70.2 kg (154 lb 12.8 oz)   BMI 26.57 kg/m²      Lab Results:   Admission on 04/10/2025, Discharged on 04/10/2025   Component Date Value Ref Range Status    Case Report 04/10/2025    Final                    Value:Surgical Pathology Report                         Case: [id number]                                  Authorizing Provider:  Skyler Fuller MD      Collected:           04/10/2025 03:25 PM          [facility] Received:            04/11/2025 09:41 AM                                 SUITES                                                                       Pathologist:           Guanakito Hunt MD                                                            Specimen:    Ileocecal Valve, ILEOCECAL VALVE POLYP, BIOPSY                                             Clinical Information 04/10/2025    Final                    Value:Screening for malignant neoplasm of colon  History of colonic polyps      Final Diagnosis 04/10/2025    Final                    Value:Ileocecal valve polyp, biopsy:   - Tubular adenoma      Gross Description 04/10/2025    Final                    Value:1. Ileocecal Valve.  Received in formalin and labeled \" ileocecal valve polyp\" is a 0.4 cm fragment of tan soft tissue. The specimen is entirely submitted in one cassette.   GREGORIO      Microscopic Description 04/10/2025    Final                    Value:Microscopic examination performed.     Office Visit on " 02/06/2025   Component Date Value Ref Range Status    Glucose 02/06/2025 101 (H)  65 - 99 mg/dL Final    BUN 02/06/2025 20  8 - 23 mg/dL Final    Creatinine 02/06/2025 0.90  0.57 - 1.00 mg/dL Final    Sodium 02/06/2025 140  136 - 145 mmol/L Final    Potassium 02/06/2025 4.3  3.5 - 5.2 mmol/L Final    Chloride 02/06/2025 104  98 - 107 mmol/L Final    CO2 02/06/2025 25.7  22.0 - 29.0 mmol/L Final    Calcium 02/06/2025 9.9  8.6 - 10.5 mg/dL Final    Total Protein 02/06/2025 7.1  6.0 - 8.5 g/dL Final    Albumin 02/06/2025 3.9  3.5 - 5.2 g/dL Final    ALT (SGPT) 02/06/2025 25  1 - 33 U/L Final    AST (SGOT) 02/06/2025 31  1 - 32 U/L Final    Alkaline Phosphatase 02/06/2025 121 (H)  39 - 117 U/L Final    Total Bilirubin 02/06/2025 0.8  0.0 - 1.2 mg/dL Final    Globulin 02/06/2025 3.2  gm/dL Final    A/G Ratio 02/06/2025 1.2  g/dL Final    BUN/Creatinine Ratio 02/06/2025 22.2  7.0 - 25.0 Final    Anion Gap 02/06/2025 10.3  5.0 - 15.0 mmol/L Final    eGFR 02/06/2025 68.9  >60.0 mL/min/1.73 Final    Total Cholesterol 02/06/2025 172  0 - 200 mg/dL Final    Triglycerides 02/06/2025 151 (H)  0 - 150 mg/dL Final    HDL Cholesterol 02/06/2025 54  40 - 60 mg/dL Final    LDL Cholesterol  02/06/2025 92  0 - 100 mg/dL Final    VLDL Cholesterol 02/06/2025 26  5 - 40 mg/dL Final    LDL/HDL Ratio 02/06/2025 1.63   Final    TSH 02/06/2025 0.568  0.270 - 4.200 uIU/mL Final    25 Hydroxy, Vitamin D 02/06/2025 40.7  30.0 - 100.0 ng/ml Final    WBC 02/06/2025 6.48  3.40 - 10.80 10*3/mm3 Final    RBC 02/06/2025 4.49  3.77 - 5.28 10*6/mm3 Final    Hemoglobin 02/06/2025 13.1  12.0 - 15.9 g/dL Final    Hematocrit 02/06/2025 42.1  34.0 - 46.6 % Final    MCV 02/06/2025 93.8  79.0 - 97.0 fL Final    MCH 02/06/2025 29.2  26.6 - 33.0 pg Final    MCHC 02/06/2025 31.1 (L)  31.5 - 35.7 g/dL Final    RDW 02/06/2025 13.7  12.3 - 15.4 % Final    RDW-SD 02/06/2025 46.6  37.0 - 54.0 fl Final    MPV 02/06/2025 9.8  6.0 - 12.0 fL Final    Platelets 02/06/2025  267  140 - 450 10*3/mm3 Final    Neutrophil % 02/06/2025 56.6  42.7 - 76.0 % Final    Lymphocyte % 02/06/2025 32.3  19.6 - 45.3 % Final    Monocyte % 02/06/2025 9.4  5.0 - 12.0 % Final    Eosinophil % 02/06/2025 0.9  0.3 - 6.2 % Final    Basophil % 02/06/2025 0.6  0.0 - 1.5 % Final    Immature Grans % 02/06/2025 0.2  0.0 - 0.5 % Final    Neutrophils, Absolute 02/06/2025 3.67  1.70 - 7.00 10*3/mm3 Final    Lymphocytes, Absolute 02/06/2025 2.09  0.70 - 3.10 10*3/mm3 Final    Monocytes, Absolute 02/06/2025 0.61  0.10 - 0.90 10*3/mm3 Final    Eosinophils, Absolute 02/06/2025 0.06  0.00 - 0.40 10*3/mm3 Final    Basophils, Absolute 02/06/2025 0.04  0.00 - 0.20 10*3/mm3 Final    Immature Grans, Absolute 02/06/2025 0.01  0.00 - 0.05 10*3/mm3 Final    nRBC 02/06/2025 0.0  0.0 - 0.2 /100 WBC Final       EKG Results:  No orders to display       Imaging Results:  No Images in the past 120 days found..      Assessment & Plan   Diagnoses and all orders for this visit:    1. Generalized anxiety disorder (Primary)  -     cloNIDine (CATAPRES) 0.1 MG tablet; Take 1 tablet by mouth Every Night.  Dispense: 90 tablet; Refill: 3  -     escitalopram (LEXAPRO) 10 MG tablet; Take 1 tablet by mouth Daily.  Dispense: 90 tablet; Refill: 3    2. Recurrent major depressive disorder in remission    3. Panic attacks  -     cloNIDine (CATAPRES) 0.1 MG tablet; Take 1 tablet by mouth Every Night.  Dispense: 90 tablet; Refill: 3  -     escitalopram (LEXAPRO) 10 MG tablet; Take 1 tablet by mouth Daily.  Dispense: 90 tablet; Refill: 3    4. Panic disorder  -     cloNIDine (CATAPRES) 0.1 MG tablet; Take 1 tablet by mouth Every Night.  Dispense: 90 tablet; Refill: 3    5. Insomnia due to mental condition  -     cloNIDine (CATAPRES) 0.1 MG tablet; Take 1 tablet by mouth Every Night.  Dispense: 90 tablet; Refill: 3  -     escitalopram (LEXAPRO) 10 MG tablet; Take 1 tablet by mouth Daily.  Dispense: 90 tablet; Refill: 3    6. Moderate episode of recurrent  major depressive disorder  -     ARIPiprazole (Abilify) 5 MG tablet; Take 1 tablet by mouth Daily.  Dispense: 90 tablet; Refill: 3        Visit Diagnoses:    ICD-10-CM ICD-9-CM   1. Generalized anxiety disorder  F41.1 300.02   2. Recurrent major depressive disorder in remission  F33.40 296.35   3. Panic attacks  F41.0 300.01   4. Panic disorder  F41.0 300.01   5. Insomnia due to mental condition  F51.05 300.9     327.02   6. Moderate episode of recurrent major depressive disorder  F33.1 296.32   INITIAL presentation most consistent with major depressive disorder in partial remission, FAREED, rare panic attacks.    06/19/2025: Stable, well, no med changes.     Acknowledged and normalized patient's thoughts, feelings, and concerns. Allowed patient to freely discuss and process issues, such as:  Anxiety and depression regarding family conflict/relationships.  Anxiety and depression regarding family's well-being.  ... using Rogerian psychotherapeutic techniques including unconditional positive regard, reflective listening, and demonstrating clear empathy, with the goal of ameliorating symptoms and maintaining, restoring, or improving self-esteem, adaptive skills, and ego or psychological functions (Tayla et al. 1991), the long-term goal of which is to develop a better, healthier perspective and help the patient bear their circumstances more easily.  Time (minutes) spent providing supportive psychotherapy: 4  (This time is exclusive to the therapy session and separate from the time spent on activities used to meet the criteria for the E/M service (history, exam, medical decision-making).)  Start: 8:50  Stop: 8:54  Functional status: mild impairment  Treatment plan: Medication management and supportive psychotherapy  Prognosis: good  Progress: stable  2m    04/24/2025: Improved FAREED, stable. Some mild depression which may not even be MDD. No other changes.    03/27/2025: some mild MDD, FAREED, increase clonidine. Next change  would be increase amitriptyline to 50 mg qhs. Not using lorazepam. Will need early refill of gabapentin.    01/23/2025: Stable, well, no med changes.    11/21/2024: Fairly stable, no changes.     10/10/2024: Much improved FAREED, no changes. May be stable.    08/29/2024: Improved, give clonidine more time to work. 6w    07/11/2024: Intrusive thoughts when not distracted, start low dose clonidine. Has a family reunion coming up soon (all except son in [place]).    06/06/2024: Some bouts of depression and anxiety, with anxiety worse, lasting about 1 hr, daily. In between, complete resolution. Monitor for now. May add lamictal or increase elavil.    4/23/24: Improved, no changes to give elavil more time to work.    3/22: No change at all. Reduce dose back to previous of abilify (5 mg), and increase amitriptyline instead.    2/9: Increase abilify with the supply she has: 7 mg daily for a month, then 7.5 mg daily. Targeting anx, dep, insomnia. Watch brain zaps. Discussed vraylar as well.    1/11: Stable, well, explored her catastrophizing today. No changes. Wants to re-establish individual therapy with her old therapist at [facility] to manage catastrophizing, thoughts when she is lonely. Conflict processed between pt and  today; suggested restarting marriage counseling thru pt's therapist (who can make a referral). How much of patient's MH can be explained by her relp with her hsuband?    12/6: No changes for now, but may increase to 7.5 mg daily of abilify (half a 15 mg tab). Still some residual MDD.    11/2: chronic mild anhedonia. Increase abilify and start light box.    8/31: Stable, well, no changes. Light box, we discussed it.    6/30: Stable, well. No changes.     4/28: Euthymic well. No changes.     3/3: Stable, well, no changes.      1/20: Well, stable, no changes.     12/9: Significant improvement. No changes.     11/18: Residual anxiety, residual depression.  Increase gabapentin, start amitriptyline at night  for initial insomnia.  Close follow-up.     11/7: Residual anx (at noon), consider adding gabapentin in the mornings. and dep. Increase gabapentin in the afternoons. No other changes.     10/31: Couples therapy, increase Abilify, close follow-up.  I feel that their marriage issues have come to a head recently, and this stressor could largely explain patient's depression and anxiety.  Patient mentioned Celexa again, she is likely interested in switching.     9/30: well, stable, some constriction today. No changes.     8/30: Remarkably well. Initial insomnia.    8/1: Try another mood stabilizer in place of olanzapine.  Will trial Abilify again.  Also consider Geodon.      6/17: start buspar, doing better.     5/20: Continue Lexapro and lorazepam.  Try Belsomra in place of Ambien.      4/22: Some improvement in anx and dep, but still residual symptoms.  Persistent insomnia. Declines IOP, inpatient admission.  Increase Lexapro and start trazodone.      3/24: Focus on lower doses.  Start Pristiq.  Depression with anxious distress versus depression and generalized anxiety disorder.  She only has distractibility, no other symptoms of hypomania.      3/15: Continue lurasidone 40 mg at night, increase Ambien to 10 mg at night.  Continue low-dose Lamictal, plan to increase further.  Continue titrating off of amitriptyline.     3/10: Effexor has now been stopped.  Continue Latuda and Lamictal at present doses.  I will see her back in a few days and then at 6 weeks.  Bipolar 2?  If so, how come she is tolerating amitriptyline in the evenings?     2/15: Still utilizing lorazepam which means the anxiety is not under control.  Increase Effexor.    1/17: Continued improvement.  No changes, only on effexor 75 mg for 2 weeks.  Tolerating medications without side effects.      1/3: Continued improvement. Increase effexor to target residual dep anx. TMS to start 1/6.     PLAN:  Risk Assessment: Risk of self-harm acutely remain low.  Risk factors include a history of suicidal ideation, mood disorder, anxiety disorder, presence of psychosocial stressors such as colonic dysplasia, COVID-19 pandemic. Protective factors include no history of self-harm or suicide attempts, good social support, willingness to engage in care, improved depressive symptoms. Risk of self-harm chronically also remain low, but could be further elevated in the event of treatment noncompliance and/or AODA.  Medications:   CONTINUE light box.   CONTINUE clonidine 0.1 mg qhs. Risks, benefits, alternatives discussed with patient including dizziness, sedation, falls, low blood pressure, GI upset.  Use care when operating vehicle, vessel, or machine. After discussion of these risks and benefits, the patient voiced understanding and agreed to proceed.  CONTINUE lexapro 10 mg daily. Risks, benefits, alternatives discussed with patient including GI upset, nausea vomiting diarrhea, theoretical decrease of seizure threshold predisposing the patient to a slightly higher seizure risk, headaches, sexual dysfunction, serotonin syndrome, bleeding risk, increased suicidality in patients 24 years and younger.  After discussion of these risks and benefits, the patient voiced understanding and agreed to proceed.  CONTINUE gabapentin 300 mg qam, 300 qpm, 600 mg nightly. Risks, benefits, alternatives discussed with patient including sedation, dizziness/falls risk, GI upset, weight gain.  After discussion of these risks and benefits, the patient voiced understanding and agreed to proceed. LAVERNE ordered, Mamadou reviewed.  CONTINUE Abilify 5 mg qhs. Risks, benefits, alternatives discussed with patient including increased energy, exacerbation of irritability, akathisia, GI upset, orthostatic hypotension, increased appetite.  After discussion of these risks and benefits, the patient voiced understanding and agreed to proceed.  CONTINUE amitriptyline 37.5 mg nightly. Risks, benefits, alternatives  "discussed with patient including sedation, dizziness, falls, GI upset, constipation, urinary retention, dry mouth.  After discussion of these risks and benefits, the patient voiced understanding and agreed to proceed.  STOPPED lorazepam 1 mg bid PRN. (Not needed 3/25) Risks, benefits, alternatives discussed with patient including GI upset, sedation, dizziness, respiratory depression, falls risk.  After discussion of these risks and benefits, the patient voiced understanding and agreed to proceed.  S/P:   buspar 5 mg bid was ineffective.  Trazodone 50 mg nightly not helpful for insomnia.  Mirtazapine 15 mg, 7.5 mg: ineffective; made anxiety and depression worse.  Abilify: brain zaps and constipation  Seroquel made depression worse  celexa 20 mg daily, ineffective after several months  Doxepin caused \"brain zaps\"  Latuda was ineffective.  Amitriptyline 25 mg nightly helpful for sleep but we didn't want her on too many serotonergic meds. Restarted.  Ambien minimally helpful for insomnia.  Pristiq and effexor worsened anxiety, both low dose.  Stopped lamictal for unclear reasons 25 mg.  belsomra 5 mg nightly didn't help with insomnia.  Therapy: continue with Genevieve at [facility].  Referred for couples therapy 10/2022  Status post IOP at [facility] 10/2022  Labs/Studies: BMP to monitor sodium levels in February was entirely normal, EKG February shows rate 72, sinus, .    TREATMENT PLAN/GOALS: Continue supportive psychotherapy efforts and medications as indicated. Treatment and medication options discussed during today's visit. Patient acknowledged and verbally consented to continue with current treatment plan and was educated on the importance of compliance with treatment and follow-up appointments.    MEDICATION ISSUES:  YVETTE reviewed as expected.  Discussed medication options and treatment plan of prescribed medication as well as the risks, benefits, and side effects including potential falls, possible impaired " driving and metabolic adversities among others. Patient is agreeable to call the office with any worsening of symptoms or onset of side effects. Patient is agreeable to call 911 or go to the nearest ER should he/she begin having SI/HI. No medication side effects or related complaints today.     MEDS ORDERED DURING VISIT:    Return in about 2 months (around 8/19/2025).         This document has been electronically signed by Vinny Krishnamurthy MD  June 19, 2025 08:57 EDT       Part of this note may be an electronic transcription/translation of spoken language to printed text using the Dragon Dictation System.

## 2025-06-19 ENCOUNTER — OFFICE VISIT (OUTPATIENT)
Dept: PSYCHIATRY | Facility: CLINIC | Age: 70
End: 2025-06-19
Payer: MEDICARE

## 2025-06-19 VITALS
BODY MASS INDEX: 26.43 KG/M2 | HEART RATE: 87 BPM | DIASTOLIC BLOOD PRESSURE: 58 MMHG | HEIGHT: 64 IN | SYSTOLIC BLOOD PRESSURE: 123 MMHG | WEIGHT: 154.8 LBS

## 2025-06-19 DIAGNOSIS — F41.1 GENERALIZED ANXIETY DISORDER: Primary | ICD-10-CM

## 2025-06-19 DIAGNOSIS — F51.05 INSOMNIA DUE TO MENTAL CONDITION: ICD-10-CM

## 2025-06-19 DIAGNOSIS — F33.40 RECURRENT MAJOR DEPRESSIVE DISORDER IN REMISSION: ICD-10-CM

## 2025-06-19 DIAGNOSIS — F41.0 PANIC ATTACKS: ICD-10-CM

## 2025-06-19 DIAGNOSIS — F33.1 MODERATE EPISODE OF RECURRENT MAJOR DEPRESSIVE DISORDER: ICD-10-CM

## 2025-06-19 DIAGNOSIS — F41.0 PANIC DISORDER: ICD-10-CM

## 2025-06-19 RX ORDER — CLONIDINE HYDROCHLORIDE 0.1 MG/1
0.1 TABLET ORAL NIGHTLY
Qty: 90 TABLET | Refills: 3 | Status: SHIPPED | OUTPATIENT
Start: 2025-06-19

## 2025-06-19 RX ORDER — ARIPIPRAZOLE 5 MG/1
5 TABLET ORAL DAILY
Qty: 90 TABLET | Refills: 3 | Status: SHIPPED | OUTPATIENT
Start: 2025-06-19

## 2025-06-19 RX ORDER — ESCITALOPRAM OXALATE 10 MG/1
10 TABLET ORAL DAILY
Qty: 90 TABLET | Refills: 3 | Status: SHIPPED | OUTPATIENT
Start: 2025-06-19

## 2025-06-19 NOTE — TREATMENT PLAN
Anxiety:  2/10 progressing    Goals:  Patient will develop and implement behavioral and cognitive strategies to reduce anxiety and irrational fears, monthly, using Rogerian psychotherapy and CBT where appropriate.  Help patient explore past emotional issues in relation to present anxiety, monthly, until remission of symptoms, using Rogerian psychotherapy and CBT where appropriate.  Help patient develop an awareness of their cognitive and physical responses to anxiety, monthly, until remission of symptoms, using Rogerian psychotherapy and CBT where appropriate.          Depression:  1/10 progressing    Goals:  Patient will demonstrate the ability to initiate new constructive life skills outside of sessions on a consistent basis, monthly, using Rogerian psychotherapy and CBT where appropriate.  Assist patient in setting attainable activities of daily living goals, monthly, using Rogerian psychotherapy and CBT where appropriate.  Provide education about depression, monthly, using Rogerian psychotherapy and CBT where appropriate.  Assist patient in developing healthy coping strategies, monthly, using Rogerian psychotherapy and CBT where appropriate.    Rogerian psychotherapy and CBT will be used to help accomplish the above goals and manage depression and anxiety related to intrusive thoughts about illness/death, family conflict, isolation, family well-being       I have discussed and reviewed this treatment plan with the patient.      Reviewed by Vinny Krishnamurthy MD   06/19/2025

## 2025-08-21 ENCOUNTER — OFFICE VISIT (OUTPATIENT)
Dept: PSYCHIATRY | Facility: CLINIC | Age: 70
End: 2025-08-21
Payer: MEDICARE

## 2025-08-21 VITALS
HEIGHT: 64 IN | BODY MASS INDEX: 26.15 KG/M2 | SYSTOLIC BLOOD PRESSURE: 114 MMHG | OXYGEN SATURATION: 96 % | WEIGHT: 153.2 LBS | HEART RATE: 87 BPM | DIASTOLIC BLOOD PRESSURE: 70 MMHG

## 2025-08-21 DIAGNOSIS — F51.05 INSOMNIA DUE TO MENTAL CONDITION: ICD-10-CM

## 2025-08-21 DIAGNOSIS — F33.40 RECURRENT MAJOR DEPRESSIVE DISORDER IN REMISSION: ICD-10-CM

## 2025-08-21 DIAGNOSIS — F41.0 PANIC ATTACKS: ICD-10-CM

## 2025-08-21 DIAGNOSIS — F41.0 PANIC DISORDER: ICD-10-CM

## 2025-08-21 DIAGNOSIS — F41.1 GENERALIZED ANXIETY DISORDER: Primary | ICD-10-CM

## (undated) DEVICE — SOL IRRG H2O PL/BG 1000ML STRL

## (undated) DEVICE — LINER SURG CANSTR SXN S/RIGD 1500CC

## (undated) DEVICE — SOL IRR NACL 0.9PCT BO 1000ML

## (undated) DEVICE — Device

## (undated) DEVICE — DEFENDO AIR WATER SUCTION AND BIOPSY VALVE KIT FOR  OLYMPUS: Brand: DEFENDO AIR/WATER/SUCTION AND BIOPSY VALVE

## (undated) DEVICE — SINGLE-USE BIOPSY FORCEPS: Brand: RADIAL JAW 4

## (undated) DEVICE — COLON KIT: Brand: MEDLINE INDUSTRIES, INC.

## (undated) DEVICE — CONN JET HYDRA H20 AUXILIARY DISP

## (undated) DEVICE — Device: Brand: DEFENDO AIR/WATER/SUCTION AND BIOPSY VALVE

## (undated) DEVICE — SOLIDIFIER LIQLOC PLS 1500CC BT